# Patient Record
Sex: FEMALE | Race: WHITE | NOT HISPANIC OR LATINO | URBAN - METROPOLITAN AREA
[De-identification: names, ages, dates, MRNs, and addresses within clinical notes are randomized per-mention and may not be internally consistent; named-entity substitution may affect disease eponyms.]

---

## 2017-11-19 ENCOUNTER — INPATIENT (INPATIENT)
Facility: HOSPITAL | Age: 82
LOS: 1 days | Discharge: HOME | DRG: 923 | End: 2017-11-21
Attending: FAMILY MEDICINE | Admitting: HOSPITALIST
Payer: MEDICARE

## 2017-11-19 VITALS
SYSTOLIC BLOOD PRESSURE: 176 MMHG | OXYGEN SATURATION: 96 % | TEMPERATURE: 98 F | DIASTOLIC BLOOD PRESSURE: 80 MMHG | RESPIRATION RATE: 18 BRPM | HEART RATE: 98 BPM | WEIGHT: 162.92 LBS

## 2017-11-19 DIAGNOSIS — R06.09 OTHER FORMS OF DYSPNEA: ICD-10-CM

## 2017-11-19 DIAGNOSIS — Z09 ENCOUNTER FOR FOLLOW-UP EXAMINATION AFTER COMPLETED TREATMENT FOR CONDITIONS OTHER THAN MALIGNANT NEOPLASM: Chronic | ICD-10-CM

## 2017-11-19 DIAGNOSIS — Z98.89 OTHER SPECIFIED POSTPROCEDURAL STATES: Chronic | ICD-10-CM

## 2017-11-19 DIAGNOSIS — F32.9 MAJOR DEPRESSIVE DISORDER, SINGLE EPISODE, UNSPECIFIED: ICD-10-CM

## 2017-11-19 DIAGNOSIS — Z79.82 LONG TERM (CURRENT) USE OF ASPIRIN: ICD-10-CM

## 2017-11-19 DIAGNOSIS — J18.9 PNEUMONIA, UNSPECIFIED ORGANISM: ICD-10-CM

## 2017-11-19 DIAGNOSIS — Z87.891 PERSONAL HISTORY OF NICOTINE DEPENDENCE: ICD-10-CM

## 2017-11-19 DIAGNOSIS — N32.81 OVERACTIVE BLADDER: ICD-10-CM
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
## 2017-11-19 DIAGNOSIS — Z90.49 ACQUIRED ABSENCE OF OTHER SPECIFIED PARTS OF DIGESTIVE TRACT: Chronic | ICD-10-CM

## 2017-11-19 DIAGNOSIS — E03.9 HYPOTHYROIDISM, UNSPECIFIED: ICD-10-CM

## 2017-11-19 DIAGNOSIS — R00.0 TACHYCARDIA, UNSPECIFIED: ICD-10-CM

## 2017-11-19 DIAGNOSIS — I25.10 ATHEROSCLEROTIC HEART DISEASE OF NATIVE CORONARY ARTERY WITHOUT ANGINA PECTORIS: ICD-10-CM

## 2017-11-19 DIAGNOSIS — I10 ESSENTIAL (PRIMARY) HYPERTENSION: ICD-10-CM

## 2017-11-19 DIAGNOSIS — M10.9 GOUT, UNSPECIFIED: ICD-10-CM

## 2017-11-19 DIAGNOSIS — Z29.9 ENCOUNTER FOR PROPHYLACTIC MEASURES, UNSPECIFIED: ICD-10-CM

## 2017-11-19 DIAGNOSIS — Z90.710 ACQUIRED ABSENCE OF BOTH CERVIX AND UTERUS: Chronic | ICD-10-CM

## 2017-11-19 DIAGNOSIS — T78.1XXA OTHER ADVERSE FOOD REACTIONS, NOT ELSEWHERE CLASSIFIED, INITIAL ENCOUNTER: ICD-10-CM

## 2017-11-19 DIAGNOSIS — M19.90 UNSPECIFIED OSTEOARTHRITIS, UNSPECIFIED SITE: ICD-10-CM

## 2017-11-19 DIAGNOSIS — R09.81 NASAL CONGESTION: ICD-10-CM

## 2017-11-19 LAB
ALBUMIN SERPL ELPH-MCNC: 3.5 G/DL — SIGNIFICANT CHANGE UP (ref 3.3–5)
ALP SERPL-CCNC: 101 U/L — SIGNIFICANT CHANGE UP (ref 40–120)
ALT FLD-CCNC: 23 U/L — SIGNIFICANT CHANGE UP (ref 12–78)
ANION GAP SERPL CALC-SCNC: 6 MMOL/L — SIGNIFICANT CHANGE UP (ref 5–17)
APTT BLD: 28 SEC — SIGNIFICANT CHANGE UP (ref 27.5–37.4)
AST SERPL-CCNC: 19 U/L — SIGNIFICANT CHANGE UP (ref 15–37)
BILIRUB SERPL-MCNC: 0.2 MG/DL — SIGNIFICANT CHANGE UP (ref 0.2–1.2)
BUN SERPL-MCNC: 39 MG/DL — HIGH (ref 7–23)
CALCIUM SERPL-MCNC: 9.1 MG/DL — SIGNIFICANT CHANGE UP (ref 8.5–10.1)
CHLORIDE SERPL-SCNC: 113 MMOL/L — HIGH (ref 96–108)
CO2 SERPL-SCNC: 25 MMOL/L — SIGNIFICANT CHANGE UP (ref 22–31)
CREAT SERPL-MCNC: 1.2 MG/DL — SIGNIFICANT CHANGE UP (ref 0.5–1.3)
GLUCOSE SERPL-MCNC: 119 MG/DL — HIGH (ref 70–99)
HCT VFR BLD CALC: 36.9 % — SIGNIFICANT CHANGE UP (ref 34.5–45)
HGB BLD-MCNC: 11.3 G/DL — LOW (ref 11.5–15.5)
INR BLD: 0.97 RATIO — SIGNIFICANT CHANGE UP (ref 0.88–1.16)
LACTATE SERPL-SCNC: 1.1 MMOL/L — SIGNIFICANT CHANGE UP (ref 0.7–2)
MCHC RBC-ENTMCNC: 30.2 PG — SIGNIFICANT CHANGE UP (ref 27–34)
MCHC RBC-ENTMCNC: 30.5 GM/DL — LOW (ref 32–36)
MCV RBC AUTO: 99.1 FL — SIGNIFICANT CHANGE UP (ref 80–100)
NT-PROBNP SERPL-SCNC: 488 PG/ML — HIGH (ref 0–450)
PLATELET # BLD AUTO: 236 K/UL — SIGNIFICANT CHANGE UP (ref 150–400)
POTASSIUM SERPL-MCNC: 4.6 MMOL/L — SIGNIFICANT CHANGE UP (ref 3.5–5.3)
POTASSIUM SERPL-SCNC: 4.6 MMOL/L — SIGNIFICANT CHANGE UP (ref 3.5–5.3)
PROCALCITONIN SERPL-MCNC: <0.05 — SIGNIFICANT CHANGE UP (ref 0–0.04)
PROT SERPL-MCNC: 7 G/DL — SIGNIFICANT CHANGE UP (ref 6–8.3)
PROTHROM AB SERPL-ACNC: 10.6 SEC — SIGNIFICANT CHANGE UP (ref 9.8–12.7)
RAPID RVP RESULT: SIGNIFICANT CHANGE UP
RBC # BLD: 3.72 M/UL — LOW (ref 3.8–5.2)
RBC # FLD: 13.7 % — SIGNIFICANT CHANGE UP (ref 10.3–14.5)
SODIUM SERPL-SCNC: 144 MMOL/L — SIGNIFICANT CHANGE UP (ref 135–145)
WBC # BLD: 8.7 K/UL — SIGNIFICANT CHANGE UP (ref 3.8–10.5)
WBC # FLD AUTO: 8.7 K/UL — SIGNIFICANT CHANGE UP (ref 3.8–10.5)

## 2017-11-19 PROCEDURE — 71010: CPT | Mod: 26

## 2017-11-19 PROCEDURE — 93010 ELECTROCARDIOGRAM REPORT: CPT | Mod: 76

## 2017-11-19 PROCEDURE — 71250 CT THORAX DX C-: CPT | Mod: 26

## 2017-11-19 PROCEDURE — 99285 EMERGENCY DEPT VISIT HI MDM: CPT

## 2017-11-19 PROCEDURE — 99223 1ST HOSP IP/OBS HIGH 75: CPT | Mod: AI

## 2017-11-19 RX ORDER — SENNA PLUS 8.6 MG/1
2 TABLET ORAL AT BEDTIME
Qty: 0 | Refills: 0 | Status: DISCONTINUED | OUTPATIENT
Start: 2017-11-19 | End: 2017-11-21

## 2017-11-19 RX ORDER — IPRATROPIUM/ALBUTEROL SULFATE 18-103MCG
3 AEROSOL WITH ADAPTER (GRAM) INHALATION ONCE
Qty: 0 | Refills: 0 | Status: COMPLETED | OUTPATIENT
Start: 2017-11-19 | End: 2017-11-19

## 2017-11-19 RX ORDER — SODIUM CHLORIDE 9 MG/ML
500 INJECTION INTRAMUSCULAR; INTRAVENOUS; SUBCUTANEOUS ONCE
Qty: 0 | Refills: 0 | Status: COMPLETED | OUTPATIENT
Start: 2017-11-19 | End: 2017-11-19

## 2017-11-19 RX ORDER — HEPARIN SODIUM 5000 [USP'U]/ML
5000 INJECTION INTRAVENOUS; SUBCUTANEOUS EVERY 12 HOURS
Qty: 0 | Refills: 0 | Status: DISCONTINUED | OUTPATIENT
Start: 2017-11-19 | End: 2017-11-21

## 2017-11-19 RX ADMIN — Medication 125 MILLIGRAM(S): at 19:11

## 2017-11-19 RX ADMIN — SODIUM CHLORIDE 500 MILLILITER(S): 9 INJECTION INTRAMUSCULAR; INTRAVENOUS; SUBCUTANEOUS at 22:21

## 2017-11-19 RX ADMIN — Medication 3 MILLILITER(S): at 19:11

## 2017-11-19 NOTE — ED PROVIDER NOTE - ATTENDING CONTRIBUTION TO CARE
I have personally performed a face to face diagnostic evaluation on this patient.  I have reviewed the PA note and agree with the history, exam, and plan of care, except as noted.  History and Exam by me shows 89 female presents to ER c/o shortness of breath worse with exertion for the past week, getting worse in the past few days, feels heart racing, better with rest, denies fever, feels chest congestion, no cough, patient alert and oriented, heart sounds tachycardic, lungs sounds right lower lobe expiratory wheeze, 2(+)pedal edema, f/u labs, chest xray, ekg.

## 2017-11-19 NOTE — ED ADULT NURSE NOTE - OBJECTIVE STATEMENT
pt arrived with reports of SOB on exertion, pt from home. denies fever, chills, cp, nausea, vomiting. pt lungs diminished on the bases with wheezing. pt speaks in full sentences, equal chest rise and fall.

## 2017-11-19 NOTE — H&P ADULT - PROBLEM SELECTOR PLAN 1
pneumonia vs new copd vs suspected PE  continue IV abx  duonebs  VQ scan in the AM  continous pulse ox

## 2017-11-19 NOTE — ED ADULT NURSE NOTE - PSH
S/P cholecystectomy    S/P hysterectomy    S/P lumpectomy of breast    S/P lung surgery, follow-up exam  s/p removal of suspicious lesion, negative

## 2017-11-19 NOTE — H&P ADULT - NSHPREVIEWOFSYSTEMS_GEN_ALL_CORE
REVIEW OF SYSTEMS:    CONSTITUTIONAL: No fever, weight loss, or fatigue  EYES: No eye pain, visual disturbances, or discharge  ENMT:  No difficulty hearing, tinnitus, vertigo; No sinus or throat pain  NECK: No pain or stiffness  RESPIRATORY: +SOB, +productive cough, +wheezing, + chills no hemoptysis  CARDIOVASCULAR: No chest pain, palpitations, dizziness, or leg swelling  GASTROINTESTINAL: No abdominal or epigastric pain. No nausea, vomiting, or hematemesis; No diarrhea or constipation. No melena or hematochezia.  GENITOURINARY: No dysuria, frequency, hematuria, or incontinence  NEUROLOGICAL: No headaches, memory loss, loss of strength, numbness, or tremors  SKIN: No itching, burning, rashes, or lesions   LYMPH NODES: No enlarged glands  ENDOCRINE: No heat or cold intolerance; No hair loss  MUSCULOSKELETAL: No joint pain or swelling; No muscle, back, or extremity pain  PSYCHIATRIC: No depression, anxiety, mood swings, or difficulty sleeping REVIEW OF SYSTEMS:    CONSTITUTIONAL: No fever, weight loss, or fatigue  EYES: No eye pain, visual disturbances, or discharge  ENMT:  No difficulty hearing, tinnitus, vertigo; No sinus or throat pain  NECK: No pain or stiffness  RESPIRATORY: +SOB, +productive cough, +wheezing, + chills no hemoptysis  CARDIOVASCULAR: No chest pain, palpitations, dizziness, or leg swelling  GASTROINTESTINAL: No abdominal or epigastric pain. No nausea, vomiting, or hematemesis; No diarrhea or constipation. No melena or hematochezia.  GENITOURINARY: No dysuria, frequency, hematuria, or incontinence  NEUROLOGICAL: No headaches, memory loss, loss of strength, or tremors.  +chronic numbness on right foot  SKIN: No itching, burning, rashes, or lesions   LYMPH NODES: No enlarged glands  ENDOCRINE: No heat or cold intolerance; No hair loss  MUSCULOSKELETAL: No joint pain or swelling; No muscle, back, or extremity pain  PSYCHIATRIC: No depression, anxiety, mood swings, or difficulty sleeping

## 2017-11-19 NOTE — ED ADULT NURSE NOTE - PMH
Depression    Gout    HLD (hyperlipidemia)    Hypertension    Hypothyroid    Osteoarthritis    Overactive bladder

## 2017-11-19 NOTE — ED PROVIDER NOTE - OBJECTIVE STATEMENT
88 yo female presents with phlegmy cough, chills, sob with exertion x 2 days, feels she is loosing her voice.  had some mild left chest pain, that resolved.  states has hx of pneumonia, no sick contacts, no recent travel.  former smoker , quit 37 years ago. PMD Dr Varela , Cardio Dr Fonseca, hx of htn, hld, hypothyroid

## 2017-11-19 NOTE — H&P ADULT - ASSESSMENT
88 yo f pmh Depression,Gout,HLD Hypertension, Hypothyroid, Osteoarthritis, Overactive bladder being admitted for shortness of breath 2/2 pneumonia vs new COPD vs possible PE

## 2017-11-19 NOTE — H&P ADULT - HISTORY OF PRESENT ILLNESS
89 yo f pmh Depression,Gout,HLD Hypertension, Hypothyroid, Osteoarthritis, Overactive bladder presenting to the ER with shortness of breath with exertion for the past 3 to 4 days.  It started to get worse yesterday and became difficult to breath today.  Pt does report a productive cough, chills, but no fever. Denies CP.  In the ER, CXR showed infiltrates.  Pt was given levaquin. Initially thinking patient was in rapid afib, she was given Cardizem, but it was likely a tachycardia.  Duonebs and steroids were given 2/2 the wheezing heard by the provider.  Pt's d-dimer was elevated, CTA was not ordered 2/2 pt's allergy to iodine.

## 2017-11-19 NOTE — ED PROVIDER NOTE - CARE PLAN
Principal Discharge DX:	Dyspnea on exertion  Secondary Diagnosis:	Cough Principal Discharge DX:	Dyspnea on exertion  Secondary Diagnosis:	Cough  Secondary Diagnosis:	Pneumonia due to infectious organism, unspecified laterality, unspecified part of lung

## 2017-11-19 NOTE — H&P ADULT - FAMILY HISTORY
Family history of breast cancer     Child  Still living? Unknown  Family history of nasopharyngeal cancer, Age at diagnosis: Age Unknown

## 2017-11-19 NOTE — H&P ADULT - NSHPPHYSICALEXAM_GEN_ALL_CORE
GENERAL: NAD, well-groomed, well-developed  HEAD:  Atraumatic, Normocephalic  EYES: EOMI, PERRLA, conjunctiva and sclera clear  ENMT: No tonsillar erythema, exudates, or enlargement; Moist mucous membranes, Good dentition, No lesions  NECK: Supple, No JVD, Normal thyroid  CHEST/LUNG: +Wheezing b/l, rhonchi heard left base  HEART: Regular rate and rhythm; No murmurs, rubs, or gallops  ABDOMEN: Soft, Nontender, Nondistended; Bowel sounds present  EXTREMITIES:  2+ Peripheral Pulses, No clubbing, cyanosis, or edema  LYMPH: No lymphadenopathy noted  NERVOUS SYSTEM:  Alert & Oriented X3, Good concentration; Motor Strength 5/5 B/L upper and lower extremities; DTRs 2+ intact and symmetric  SKIN: No rashes or lesions

## 2017-11-20 DIAGNOSIS — N32.81 OVERACTIVE BLADDER: ICD-10-CM

## 2017-11-20 LAB
ANION GAP SERPL CALC-SCNC: 8 MMOL/L — SIGNIFICANT CHANGE UP (ref 5–17)
BASOPHILS # BLD AUTO: 0 K/UL — SIGNIFICANT CHANGE UP (ref 0–0.2)
BASOPHILS NFR BLD AUTO: 0.4 % — SIGNIFICANT CHANGE UP (ref 0–2)
BUN SERPL-MCNC: 33 MG/DL — HIGH (ref 7–23)
CALCIUM SERPL-MCNC: 9.1 MG/DL — SIGNIFICANT CHANGE UP (ref 8.5–10.1)
CHLORIDE SERPL-SCNC: 114 MMOL/L — HIGH (ref 96–108)
CK MB BLD-MCNC: 2.9 % — SIGNIFICANT CHANGE UP (ref 0–3.5)
CK MB CFR SERPL CALC: 2.6 NG/ML — SIGNIFICANT CHANGE UP (ref 0–3.6)
CK SERPL-CCNC: 89 U/L — SIGNIFICANT CHANGE UP (ref 26–192)
CO2 SERPL-SCNC: 22 MMOL/L — SIGNIFICANT CHANGE UP (ref 22–31)
CREAT SERPL-MCNC: 1.1 MG/DL — SIGNIFICANT CHANGE UP (ref 0.5–1.3)
EOSINOPHIL # BLD AUTO: 0 K/UL — SIGNIFICANT CHANGE UP (ref 0–0.5)
EOSINOPHIL NFR BLD AUTO: 0 % — SIGNIFICANT CHANGE UP (ref 0–6)
GLUCOSE SERPL-MCNC: 142 MG/DL — HIGH (ref 70–99)
HCT VFR BLD CALC: 36.5 % — SIGNIFICANT CHANGE UP (ref 34.5–45)
HGB BLD-MCNC: 11.1 G/DL — LOW (ref 11.5–15.5)
LYMPHOCYTES # BLD AUTO: 0.9 K/UL — LOW (ref 1–3.3)
LYMPHOCYTES # BLD AUTO: 11 % — LOW (ref 13–44)
MCHC RBC-ENTMCNC: 29.6 PG — SIGNIFICANT CHANGE UP (ref 27–34)
MCHC RBC-ENTMCNC: 30.4 GM/DL — LOW (ref 32–36)
MCV RBC AUTO: 97.3 FL — SIGNIFICANT CHANGE UP (ref 80–100)
MONOCYTES # BLD AUTO: 0.1 K/UL — SIGNIFICANT CHANGE UP (ref 0–0.9)
MONOCYTES NFR BLD AUTO: 0.9 % — LOW (ref 1–9)
NEUTROPHILS # BLD AUTO: 7.4 K/UL — SIGNIFICANT CHANGE UP (ref 1.8–7.4)
NEUTROPHILS NFR BLD AUTO: 87.8 % — HIGH (ref 43–77)
PLATELET # BLD AUTO: 235 K/UL — SIGNIFICANT CHANGE UP (ref 150–400)
POTASSIUM SERPL-MCNC: 4.8 MMOL/L — SIGNIFICANT CHANGE UP (ref 3.5–5.3)
POTASSIUM SERPL-SCNC: 4.8 MMOL/L — SIGNIFICANT CHANGE UP (ref 3.5–5.3)
RBC # BLD: 3.75 M/UL — LOW (ref 3.8–5.2)
RBC # FLD: 13.4 % — SIGNIFICANT CHANGE UP (ref 10.3–14.5)
SODIUM SERPL-SCNC: 144 MMOL/L — SIGNIFICANT CHANGE UP (ref 135–145)
TROPONIN I SERPL-MCNC: 0.02 NG/ML — SIGNIFICANT CHANGE UP (ref 0.01–0.04)
WBC # BLD: 8.4 K/UL — SIGNIFICANT CHANGE UP (ref 3.8–10.5)
WBC # FLD AUTO: 8.4 K/UL — SIGNIFICANT CHANGE UP (ref 3.8–10.5)

## 2017-11-20 PROCEDURE — 99233 SBSQ HOSP IP/OBS HIGH 50: CPT

## 2017-11-20 PROCEDURE — 99223 1ST HOSP IP/OBS HIGH 75: CPT

## 2017-11-20 PROCEDURE — 78582 LUNG VENTILAT&PERFUS IMAGING: CPT | Mod: 26

## 2017-11-20 RX ORDER — SPIRONOLACTONE 25 MG/1
25 TABLET, FILM COATED ORAL DAILY
Qty: 0 | Refills: 0 | Status: DISCONTINUED | OUTPATIENT
Start: 2017-11-20 | End: 2017-11-21

## 2017-11-20 RX ORDER — ATENOLOL 25 MG/1
25 TABLET ORAL EVERY 12 HOURS
Qty: 0 | Refills: 0 | Status: DISCONTINUED | OUTPATIENT
Start: 2017-11-20 | End: 2017-11-21

## 2017-11-20 RX ORDER — OXYBUTYNIN CHLORIDE 5 MG
5 TABLET ORAL AT BEDTIME
Qty: 0 | Refills: 0 | Status: DISCONTINUED | OUTPATIENT
Start: 2017-11-20 | End: 2017-11-21

## 2017-11-20 RX ORDER — VENLAFAXINE HCL 75 MG
37.5 CAPSULE, EXT RELEASE 24 HR ORAL DAILY
Qty: 0 | Refills: 0 | Status: DISCONTINUED | OUTPATIENT
Start: 2017-11-20 | End: 2017-11-21

## 2017-11-20 RX ORDER — ATENOLOL 25 MG/1
12.5 TABLET ORAL
Qty: 0 | Refills: 0 | Status: DISCONTINUED | OUTPATIENT
Start: 2017-11-20 | End: 2017-11-20

## 2017-11-20 RX ORDER — DOXAZOSIN MESYLATE 4 MG
2 TABLET ORAL DAILY
Qty: 0 | Refills: 0 | Status: DISCONTINUED | OUTPATIENT
Start: 2017-11-20 | End: 2017-11-21

## 2017-11-20 RX ORDER — ASPIRIN/CALCIUM CARB/MAGNESIUM 324 MG
81 TABLET ORAL DAILY
Qty: 0 | Refills: 0 | Status: DISCONTINUED | OUTPATIENT
Start: 2017-11-20 | End: 2017-11-21

## 2017-11-20 RX ORDER — VENLAFAXINE HCL 75 MG
37.5 CAPSULE, EXT RELEASE 24 HR ORAL DAILY
Qty: 0 | Refills: 0 | Status: DISCONTINUED | OUTPATIENT
Start: 2017-11-20 | End: 2017-11-20

## 2017-11-20 RX ORDER — ALLOPURINOL 300 MG
100 TABLET ORAL DAILY
Qty: 0 | Refills: 0 | Status: COMPLETED | OUTPATIENT
Start: 2017-11-20 | End: 2017-11-20

## 2017-11-20 RX ORDER — LEVOTHYROXINE SODIUM 125 MCG
75 TABLET ORAL DAILY
Qty: 0 | Refills: 0 | Status: DISCONTINUED | OUTPATIENT
Start: 2017-11-20 | End: 2017-11-21

## 2017-11-20 RX ORDER — VALSARTAN 80 MG/1
160 TABLET ORAL DAILY
Qty: 0 | Refills: 0 | Status: DISCONTINUED | OUTPATIENT
Start: 2017-11-20 | End: 2017-11-21

## 2017-11-20 RX ORDER — NIFEDIPINE 30 MG
90 TABLET, EXTENDED RELEASE 24 HR ORAL DAILY
Qty: 0 | Refills: 0 | Status: DISCONTINUED | OUTPATIENT
Start: 2017-11-20 | End: 2017-11-21

## 2017-11-20 RX ORDER — LANOLIN ALCOHOL/MO/W.PET/CERES
3 CREAM (GRAM) TOPICAL ONCE
Qty: 0 | Refills: 0 | Status: COMPLETED | OUTPATIENT
Start: 2017-11-20 | End: 2017-11-20

## 2017-11-20 RX ADMIN — Medication 100 MILLIGRAM(S): at 11:41

## 2017-11-20 RX ADMIN — ATENOLOL 25 MILLIGRAM(S): 25 TABLET ORAL at 17:22

## 2017-11-20 RX ADMIN — VALSARTAN 160 MILLIGRAM(S): 80 TABLET ORAL at 07:21

## 2017-11-20 RX ADMIN — HEPARIN SODIUM 5000 UNIT(S): 5000 INJECTION INTRAVENOUS; SUBCUTANEOUS at 17:22

## 2017-11-20 RX ADMIN — Medication 3 MILLIGRAM(S): at 20:27

## 2017-11-20 RX ADMIN — HEPARIN SODIUM 5000 UNIT(S): 5000 INJECTION INTRAVENOUS; SUBCUTANEOUS at 05:59

## 2017-11-20 RX ADMIN — SPIRONOLACTONE 25 MILLIGRAM(S): 25 TABLET, FILM COATED ORAL at 05:59

## 2017-11-20 RX ADMIN — Medication 5 MILLIGRAM(S): at 20:27

## 2017-11-20 RX ADMIN — Medication 81 MILLIGRAM(S): at 11:41

## 2017-11-20 RX ADMIN — Medication 75 MICROGRAM(S): at 05:59

## 2017-11-20 RX ADMIN — ATENOLOL 12.5 MILLIGRAM(S): 25 TABLET ORAL at 05:59

## 2017-11-20 RX ADMIN — Medication 90 MILLIGRAM(S): at 06:07

## 2017-11-20 RX ADMIN — Medication 37.5 MILLIGRAM(S): at 11:41

## 2017-11-20 RX ADMIN — Medication 2 MILLIGRAM(S): at 05:58

## 2017-11-20 NOTE — PROGRESS NOTE ADULT - ASSESSMENT
89 yo f pmh Depression,Gout,HLD Hypertension, Hypothyroid, Osteoarthritis, Overactive bladder being admitted for dyspnea on exertion, unclear cause, cardiac and pulm workup underway.

## 2017-11-20 NOTE — PROGRESS NOTE ADULT - SUBJECTIVE AND OBJECTIVE BOX
HPI:  89 yo f pmh Depression,Gout,HLD Hypertension, Hypothyroid, Osteoarthritis, Overactive bladder presented to the ER with shortness of breath with exertion for the past 3 to 4 days.  It started to get worse one day prior and became difficult to breath the day of admission.  Pt does report a productive cough, chills, but no fever. Denies CP.  In the ER, CXR showed infiltrates.  Initially there was concern for rapid afib, she was given Cardizem, but it was likely a sinus tachycardia.  Duonebs and steroids were given 2/2 the wheezing heard by the ED provider.  Pt's d-dimer was elevated, CTA was not ordered 2/2 pt's allergy to iodine. (19 Nov 2017 23:30)    INTERVAL EVENTS: No acute events overnight. Patient feels fine while at rest, she becomes short of breath with ambulation. She denies any chest pain or palpitations, but says she has felt her heart "pounding" at times when this has happened. She reports only minimal cough, but no sputum production.     REVIEW OF SYSTEMS:    CONSTITUTIONAL: No weakness, fevers or chills  EYES/ENT: No visual changes, no throat pain   RESPIRATORY: Minimal cough, no wheezing, hemoptysis; No shortness of breath at rest, +SPEAR.  CARDIOVASCULAR: No chest pain or palpitations  GASTROINTESTINAL: No abdominal pain, nausea, vomiting, or hematemesis; No diarrhea or constipation. No melena or hematochezia.  GENITOURINARY: No dysuria, frequency or hematuria  NEUROLOGICAL: No dizziness, numbness, or weakness  SKIN: No itching, burning, rashes, or lesions   All other review of systems is negative unless indicated above.    VITAL SIGNS:  Vital Signs Last 24 Hrs  T(C): 36.7 (20 Nov 2017 12:39), Max: 36.9 (20 Nov 2017 08:37)  T(F): 98 (20 Nov 2017 12:39), Max: 98.4 (20 Nov 2017 08:37)  HR: 65 (20 Nov 2017 12:39) (65 - 110)  BP: 159/68 (20 Nov 2017 12:39) (131/53 - 176/80)  BP(mean): --  RR: 16 (20 Nov 2017 12:39) (15 - 24)  SpO2: 98% (20 Nov 2017 12:39) (96% - 98%)      PHYSICAL EXAM:     GENERAL: no acute distress  HEENT: NC/AT, EOMI, neck supple, MMM  RESPIRATORY: LCTAB/L, no rhonchi, rales, or wheezing  CARDIOVASCULAR: RRR, no murmurs, gallops, rubs  ABDOMINAL: soft, non-tender, non-distended, positive bowel sounds   EXTREMITIES: no clubbing, cyanosis, or edema  NEUROLOGICAL: alert and oriented x 3, non-focal  SKIN: no rashes or lesions   MUSCULOSKELETAL: no gross joint deformity                          11.1   8.4   )-----------( 235      ( 20 Nov 2017 06:10 )             36.5     11-20    144  |  114<H>  |  33<H>  ----------------------------<  142<H>  4.8   |  22  |  1.10    Ca    9.1      20 Nov 2017 06:10    TPro  7.0  /  Alb  3.5  /  TBili  0.2  /  DBili  x   /  AST  19  /  ALT  23  /  AlkPhos  101  11-19      MEDICATIONS  (STANDING):  aspirin enteric coated 81 milliGRAM(s) Oral daily  ATENolol  Tablet 25 milliGRAM(s) Oral every 12 hours  doxazosin 2 milliGRAM(s) Oral daily  heparin  Injectable 5000 Unit(s) SubCutaneous every 12 hours  levothyroxine 75 MICROGram(s) Oral daily  NIFEdipine XL 90 milliGRAM(s) Oral daily  oxybutynin 5 milliGRAM(s) Oral at bedtime  spironolactone 25 milliGRAM(s) Oral daily  valsartan 160 milliGRAM(s) Oral daily  venlafaxine 37.5 milliGRAM(s) Oral daily

## 2017-11-20 NOTE — PROGRESS NOTE ADULT - PROBLEM SELECTOR PLAN 1
Unclear cause.  No sign of pneumonia on CT, patient afebrile, procalcitonin negative, will stop Levaquin and monitor.    V/Q scan very low probablilty of PE. Patient is saturating satisfactorily on room air. She maintains her saturation even when ambulating.   Pulchuckie sandoval pending ([facility]).    Will attempt to rule out cardiac causes of dyspnea.   Will get outpatient cardiology records.  Continue to monitor on tele and will trend cardiac enzymes. First set negative.   Will check echo.  Cardio following.

## 2017-11-20 NOTE — ED ADULT NURSE REASSESSMENT NOTE - NS ED NURSE REASSESS COMMENT FT1
10 mg of Cardizem given to pt for rapid heart rate as ordered by MD. Vss, afebrile. another EKG ordered by MD. will continue to monitor
Received pt and report from FABIÁN Bashir. Pt in no apparent distress at this time. Waiting to give report to floor.
Assumed care for pt awake alert oriented x 4, breathing treatment in progress, O2 Sat 97% on room air, remained on the monitor, sinus tachy, denies chest pain at this time. Md aware of heart rate. Vss, afebrile. will continue to monitor

## 2017-11-21 ENCOUNTER — TRANSCRIPTION ENCOUNTER (OUTPATIENT)
Age: 82
End: 2017-11-21

## 2017-11-21 VITALS
OXYGEN SATURATION: 96 % | SYSTOLIC BLOOD PRESSURE: 132 MMHG | DIASTOLIC BLOOD PRESSURE: 73 MMHG | TEMPERATURE: 98 F | HEART RATE: 55 BPM | RESPIRATION RATE: 18 BRPM

## 2017-11-21 DIAGNOSIS — R00.0 TACHYCARDIA, UNSPECIFIED: ICD-10-CM

## 2017-11-21 LAB
ANION GAP SERPL CALC-SCNC: 5 MMOL/L — SIGNIFICANT CHANGE UP (ref 5–17)
BUN SERPL-MCNC: 43 MG/DL — HIGH (ref 7–23)
CALCIUM SERPL-MCNC: 8.8 MG/DL — SIGNIFICANT CHANGE UP (ref 8.5–10.1)
CHLORIDE SERPL-SCNC: 112 MMOL/L — HIGH (ref 96–108)
CO2 SERPL-SCNC: 27 MMOL/L — SIGNIFICANT CHANGE UP (ref 22–31)
CREAT SERPL-MCNC: 1.3 MG/DL — SIGNIFICANT CHANGE UP (ref 0.5–1.3)
GLUCOSE SERPL-MCNC: 90 MG/DL — SIGNIFICANT CHANGE UP (ref 70–99)
HCT VFR BLD CALC: 36.7 % — SIGNIFICANT CHANGE UP (ref 34.5–45)
HGB BLD-MCNC: 11.4 G/DL — LOW (ref 11.5–15.5)
MCHC RBC-ENTMCNC: 30.6 PG — SIGNIFICANT CHANGE UP (ref 27–34)
MCHC RBC-ENTMCNC: 30.9 GM/DL — LOW (ref 32–36)
MCV RBC AUTO: 99.1 FL — SIGNIFICANT CHANGE UP (ref 80–100)
PLATELET # BLD AUTO: 238 K/UL — SIGNIFICANT CHANGE UP (ref 150–400)
POTASSIUM SERPL-MCNC: 4.6 MMOL/L — SIGNIFICANT CHANGE UP (ref 3.5–5.3)
POTASSIUM SERPL-SCNC: 4.6 MMOL/L — SIGNIFICANT CHANGE UP (ref 3.5–5.3)
RBC # BLD: 3.71 M/UL — LOW (ref 3.8–5.2)
RBC # FLD: 14.1 % — SIGNIFICANT CHANGE UP (ref 10.3–14.5)
SODIUM SERPL-SCNC: 144 MMOL/L — SIGNIFICANT CHANGE UP (ref 135–145)
WBC # BLD: 12.1 K/UL — HIGH (ref 3.8–10.5)
WBC # FLD AUTO: 12.1 K/UL — HIGH (ref 3.8–10.5)

## 2017-11-21 PROCEDURE — 99233 SBSQ HOSP IP/OBS HIGH 50: CPT

## 2017-11-21 PROCEDURE — 99239 HOSP IP/OBS DSCHRG MGMT >30: CPT

## 2017-11-21 PROCEDURE — 93306 TTE W/DOPPLER COMPLETE: CPT | Mod: 26

## 2017-11-21 RX ORDER — ATENOLOL 25 MG/1
1 TABLET ORAL
Qty: 60 | Refills: 0 | OUTPATIENT
Start: 2017-11-21 | End: 2017-12-21

## 2017-11-21 RX ADMIN — Medication 37.5 MILLIGRAM(S): at 11:30

## 2017-11-21 RX ADMIN — ATENOLOL 25 MILLIGRAM(S): 25 TABLET ORAL at 05:55

## 2017-11-21 RX ADMIN — Medication 90 MILLIGRAM(S): at 05:55

## 2017-11-21 RX ADMIN — HEPARIN SODIUM 5000 UNIT(S): 5000 INJECTION INTRAVENOUS; SUBCUTANEOUS at 05:56

## 2017-11-21 RX ADMIN — Medication 75 MICROGRAM(S): at 05:55

## 2017-11-21 RX ADMIN — VALSARTAN 160 MILLIGRAM(S): 80 TABLET ORAL at 05:55

## 2017-11-21 RX ADMIN — Medication 81 MILLIGRAM(S): at 11:30

## 2017-11-21 RX ADMIN — SPIRONOLACTONE 25 MILLIGRAM(S): 25 TABLET, FILM COATED ORAL at 05:55

## 2017-11-21 RX ADMIN — Medication 2 MILLIGRAM(S): at 05:56

## 2017-11-21 NOTE — DISCHARGE NOTE ADULT - CARE PROVIDER_API CALL
Manuel Varela), Internal Medicine  [address] [facility]  [address] [place] [address]  Phone: [phone]  Fax: [phone]    Juvenal Anderson), Critical Care Medicine; Internal Medicine; Pulmonary Disease  [address] [place] [address]  Phone: [phone]  Fax: [phone]

## 2017-11-21 NOTE — DISCHARGE NOTE ADULT - MEDICATION SUMMARY - MEDICATIONS TO CHANGE
I will SWITCH the dose or number of times a day I take the medications listed below when I get home from the hospital:    atenolol  -- 12.5  by mouth 2 times a day

## 2017-11-21 NOTE — CONSULT NOTE ADULT - ASSESSMENT
88 yo f pmh Depression,Gout,HLD Hypertension, Hypothyroid, Osteoarthritis, Overactive bladder being admitted for dyspnea on exertion, most likely due to tachyarrythmmia and possible allergic rxn to shrimp.  Now asymptomatic with good O2 sat.
89 F w hx of HTN, hypothyroid, reportedly nonobs CAD in past, HLD presents for dyspnea.   - She is complaining of worsening dyspnea on exertion. She states that this has happened to her in the past and her cardiac workup had been unremarkable.   - Ddimer is mildly positive and patient was reportedly tachycardic on arrival.  Would pursue a V/Q scan  - No signs of vol ol  - EKG without ischemic changes. Trend CE.   - Consider Pulm eval  - Check echo  - Montir on tele.   - try to get outpt records.   - BP appears elevated. I would cont the Nifedipine, Doxazosin, Diovan at their current dose. Increase atenolol 25mg q12.   - Further cardiac workup, including ischemic workup,  will depend on clinical course.   - All other workup per primary team. Will followup.

## 2017-11-21 NOTE — DISCHARGE NOTE ADULT - PATIENT PORTAL LINK FT
“You can access the [facility] Patient Portal, offered by [facility], by registering with the following website: [email or web address]”

## 2017-11-21 NOTE — CONSULT NOTE ADULT - SUBJECTIVE AND OBJECTIVE BOX
PULMONARY/CRITICAL CARE        Patient is a 89y old  Female who presents with a chief complaint of shortness of breath on exertion (19 Nov 2017 23:30)    BRIEF HOSPITAL COURSE: ***89 yo f pmh Depression,Gout,HLD Hypertension, Hypothyroid, Osteoarthritis, Overactive bladder presented to the ER with shortness of breath with exertion for the past 3 to 4 days.  It started to get worse one day prior and became difficult to breath the day of admission.  Pt does report a productive cough, chills, but no fever. Denies CP.  In the ER, CXR showed infiltrates.  Initially there was concern for rapid afib, she was given Cardizem, but it was likely a sinus tachycardia.  Duonebs and steroids were given 2/2 the wheezing heard by the ED provider.  Pt's d-dimer was elevated, CTA was not ordered 2/2 pt's allergy to iodine. (19 Nov 2017 23:30)  Pt was exposed to shrimp when palpitations occured, which she is allergic to.  Now asymptomatic  Events last 24 hours: ***    PAST MEDICAL & SURGICAL HISTORY:  HLD (hyperlipidemia)  Hypothyroid  Osteoarthritis  Gout  Overactive bladder  Depression  Hypertension  S/P lung surgery, follow-up exam: s/p removal of suspicious lesion, negative  S/P hysterectomy  S/P lumpectomy of breast  S/P cholecystectomy    Allergies    iodine (Anaphylaxis)  penicillin (Other)  sulfa drugs (Hives)  throat closes (Other)  throat closes (Other)    Intolerances      FAMILY HISTORY:  Family history of breast cancer  Family history of nasopharyngeal cancer (Child): daughter      Review of Systems:  CONSTITUTIONAL: No fever, chills, or fatigue  EYES: No eye pain, visual disturbances, or discharge  ENMT:  No difficulty hearing, tinnitus, vertigo; No sinus or throat pain  NECK: No pain or stiffness  RESPIRATORY: No cough, wheezing, chills or hemoptysis; No shortness of breath  CARDIOVASCULAR: No chest pain, palpitations, dizziness, or leg swelling  GASTROINTESTINAL: No abdominal or epigastric pain. No nausea, vomiting, or hematemesis; No diarrhea or constipation. No melena or hematochezia.  GENITOURINARY: No dysuria, frequency, hematuria, or incontinence  NEUROLOGICAL: No headaches, memory loss, loss of strength, numbness, or tremors  SKIN: No itching, burning, rashes, or lesions   MUSCULOSKELETAL: No joint pain or swelling; No muscle, back, or extremity pain  PSYCHIATRIC: No depression, anxiety, mood swings, or difficulty sleeping      Medications:    ATENolol  Tablet 25 milliGRAM(s) Oral every 12 hours  doxazosin 2 milliGRAM(s) Oral daily  NIFEdipine XL 90 milliGRAM(s) Oral daily  spironolactone 25 milliGRAM(s) Oral daily  valsartan 160 milliGRAM(s) Oral daily      venlafaxine 37.5 milliGRAM(s) Oral daily      aspirin enteric coated 81 milliGRAM(s) Oral daily  heparin  Injectable 5000 Unit(s) SubCutaneous every 12 hours    bisacodyl 5 milliGRAM(s) Oral daily PRN  senna 2 Tablet(s) Oral at bedtime PRN    oxybutynin 5 milliGRAM(s) Oral at bedtime    levothyroxine 75 MICROGram(s) Oral daily                  ICU Vital Signs Last 24 Hrs  T(C): 36.9 (21 Nov 2017 07:11), Max: 37 (21 Nov 2017 05:00)  T(F): 98.5 (21 Nov 2017 07:11), Max: 98.6 (21 Nov 2017 05:00)  HR: 50 (21 Nov 2017 07:11) (50 - 75)  BP: 138/73 (21 Nov 2017 07:11) (133/68 - 171/75)  BP(mean): --  ABP: --  ABP(mean): --  RR: 18 (21 Nov 2017 07:11) (16 - 18)  SpO2: 94% (21 Nov 2017 07:11) (90% - 98%)    Vital Signs Last 24 Hrs  T(C): 36.9 (21 Nov 2017 07:11), Max: 37 (21 Nov 2017 05:00)  T(F): 98.5 (21 Nov 2017 07:11), Max: 98.6 (21 Nov 2017 05:00)  HR: 50 (21 Nov 2017 07:11) (50 - 75)  BP: 138/73 (21 Nov 2017 07:11) (133/68 - 171/75)  BP(mean): --  RR: 18 (21 Nov 2017 07:11) (16 - 18)  SpO2: 94% (21 Nov 2017 07:11) (90% - 98%)        I&O's Detail    20 Nov 2017 07:01  -  21 Nov 2017 07:00  --------------------------------------------------------  IN:    Oral Fluid: 840 mL  Total IN: 840 mL    OUT:    Voided: 100 mL  Total OUT: 100 mL    Total NET: 740 mL            LABS:                        11.4   12.1  )-----------( 238      ( 21 Nov 2017 06:17 )             36.7     11-21    144  |  112<H>  |  43<H>  ----------------------------<  90  4.6   |  27  |  1.30    Ca    8.8      21 Nov 2017 06:17    TPro  7.0  /  Alb  3.5  /  TBili  0.2  /  DBili  x   /  AST  19  /  ALT  23  /  AlkPhos  101  11-19      CARDIAC MARKERS ( 20 Nov 2017 14:28 )  .021 ng/mL / x     / 89 U/L / x     / 2.6 ng/mL  CARDIAC MARKERS ( 19 Nov 2017 18:13 )  <.015 ng/mL / x     / 53 U/L / x     / 1.1 ng/mL      CAPILLARY BLOOD GLUCOSE        PT/INR - ( 19 Nov 2017 18:13 )   PT: 10.6 sec;   INR: 0.97 ratio         PTT - ( 19 Nov 2017 18:13 )  PTT:28.0 sec    CULTURES:  Culture Results:   No growth to date. (11-19 @ 23:50)  Culture Results:   No growth to date. (11-19 @ 23:39)      Physical Examination:    General: No acute distress.      HEENT: Pupils equal, reactive to light.  Symmetric.    PULM: Clear to auscultation bilaterally, no significant sputum production    CVS: Regular rate and rhythm, no murmurs, rubs, or gallops    ABD: Soft, nondistended, nontender, normoactive bowel sounds, no masses    EXT: trace pedal edema, nontender    SKIN: Warm and well perfused, no rashes noted.    NEURO: Alert, oriented, interactive, nonfocal    RADIOLOGY: ***    CRITICAL CARE TIME SPENT: ***
CHIEF COMPLAINT: Patient is a 89y old  Female who presents with a chief complaint of shortness of breath on exertion (19 Nov 2017 23:30)      HPI:  88 yo f pmh Depression, Gout, HLD Hypertension, Hypothyroid, Osteoarthritis, Overactive bladder, positive stress test and negative angiogram in the past (last on ~4 years ago) presenting to the ER with shortness of breath with exertion for the past 3 to 4 days. She notes that her dyspnea has been worsening over the last week.  She also noted that her heart would race when walking. Pt does report a productive cough, chills, but no fever. Denies CP.    She has chronic lower extremity. Denies any chest pain,  PND, orthopnea, near syncope, syncope,   stroke like symptoms.   Her last ischemic evaluation was apparently about 2 years ago which was unremarkable. Currentl;y she is feeling better.   In the ED she had an CXR with infiltrates.    EKG: SR with 1st degree AVB with SA.     REVIEW OF SYSTEMS:   All other review of systems are negative unless indicated above    PAST MEDICAL & SURGICAL HISTORY:  HLD (hyperlipidemia)  Hypothyroid  Osteoarthritis  Gout  Overactive bladder  Depression  Hypertension  S/P lung surgery, follow-up exam: s/p removal of suspicious lesion, negative  S/P hysterectomy  S/P lumpectomy of breast  S/P cholecystectomy      SOCIAL HISTORY:  No tobacco, ethanol, or drug abuse.    FAMILY HISTORY:  Family history of breast cancer  Family history of nasopharyngeal cancer (Child): daughter    No family history of acute MI or sudden cardiac death.    MEDICATIONS  (STANDING):  aspirin enteric coated 81 milliGRAM(s) Oral daily  ATENolol  Tablet 12.5 milliGRAM(s) Oral two times a day  doxazosin 2 milliGRAM(s) Oral daily  heparin  Injectable 5000 Unit(s) SubCutaneous every 12 hours  levoFLOXacin IVPB 250 milliGRAM(s) IV Intermittent every 24 hours  levothyroxine 75 MICROGram(s) Oral daily  NIFEdipine XL 90 milliGRAM(s) Oral daily  oxybutynin 5 milliGRAM(s) Oral at bedtime  spironolactone 25 milliGRAM(s) Oral daily  valsartan 160 milliGRAM(s) Oral daily  venlafaxine 37.5 milliGRAM(s) Oral daily    MEDICATIONS  (PRN):  bisacodyl 5 milliGRAM(s) Oral daily PRN Constipation  senna 2 Tablet(s) Oral at bedtime PRN Constipation      Allergies    iodine (Anaphylaxis)  penicillin (Other)  sulfa drugs (Hives)  throat closes (Other)  throat closes (Other)    Intolerances        Home meds:  Home Medications:  allopurinol 100 mg oral tablet: 1 tab(s) orally once a day (at bedtime) (20 Feb 2015 15:31)  aspirin 81 mg oral tablet: 1 tab(s) orally once a day (20 Nov 2017 00:14)  atenolol: 12.5  orally 2 times a day (20 Feb 2015 15:31)  Calcium 600+D:  orally once a day (20 Feb 2015 15:31)  dicyclomine 20 mg oral tablet: 1 tab(s) orally 4 times a day (20 Nov 2017 00:19)  Diovan 160 mg oral tablet: 1 tab(s) orally 2 times a day (11 May 2014 13:45)  doxazosin 2 mg oral tablet: 1 tab(s) orally once a day (20 Nov 2017 00:21)  Effexor 37.5 mg oral capsule, extended release: 1 cap(s) orally once a day (11 May 2014 13:45)  Multi-Day Plus Minerals Multiple Vitamins with Minerals oral tablet: 1 tab(s) orally once a day (11 May 2014 13:45)  NIFEdipine 90 mg oral tablet, extended release: 1 tab(s) orally once a day (11 May 2014 13:45)  oxybutynin 5 mg oral tablet:  orally once a day (at bedtime) (11 May 2014 13:45)  spironolactone 25 mg oral tablet: 1 tab(s) orally once a day (20 Nov 2017 00:21)  Synthroid 75 mcg (0.075 mg) oral tablet: 1 tab(s) orally once a day (11 May 2014 13:45)  Vitamin D3:  orally once a day (11 May 2014 13:45)  Zetia 10 mg oral tablet: 1 tab(s) orally once a day (20 Feb 2015 15:44)        VITAL SIGNS:   Vital Signs Last 24 Hrs  T(C): 36.9 (20 Nov 2017 08:37), Max: 36.9 (20 Nov 2017 08:37)  T(F): 98.4 (20 Nov 2017 08:37), Max: 98.4 (20 Nov 2017 08:37)  HR: 83 (20 Nov 2017 08:37) (82 - 110)  BP: 169/70 (20 Nov 2017 08:37) (131/53 - 176/80)  BP(mean): --  RR: 17 (20 Nov 2017 08:37) (15 - 24)  SpO2: 98% (20 Nov 2017 08:37) (96% - 98%)    I&O's Summary      On Exam:  TELE: SR  Constitutional: NAD, awake and alert, well-developed  HEENT: Moist Mucous Membranes, Anicteric  Pulmonary: Decreased breath sounds b/l.   Cardiovascular: Regular, S1 and S2, 1/6 SM  Gastrointestinal: Bowel Sounds present, soft, nontender.   Lymph: trace peripheral edema. No lymphadenopathy.  Skin: No visible rashes or ulcers.  Psych:  Mood & affect appropriate    LABS: All Labs Reviewed:                        11.1   8.4   )-----------( 235      ( 20 Nov 2017 06:10 )             36.5                         11.3   8.7   )-----------( 236      ( 19 Nov 2017 18:13 )             36.9     20 Nov 2017 06:10    144    |  114    |  33     ----------------------------<  142    4.8     |  22     |  1.10   19 Nov 2017 18:13    144    |  113    |  39     ----------------------------<  119    4.6     |  25     |  1.20     Ca    9.1        20 Nov 2017 06:10  Ca    9.1        19 Nov 2017 18:13    TPro  7.0    /  Alb  3.5    /  TBili  0.2    /  DBili  x      /  AST  19     /  ALT  23     /  AlkPhos  101    19 Nov 2017 18:13    PT/INR - ( 19 Nov 2017 18:13 )   PT: 10.6 sec;   INR: 0.97 ratio         PTT - ( 19 Nov 2017 18:13 )  PTT:28.0 sec  CARDIAC MARKERS ( 19 Nov 2017 18:13 )  <.015 ng/mL / x     / 53 U/L / x     / 1.1 ng/mL      Blood Culture:   11-19 @ 18:13  Pro Bnp 488        RADIOLOGY:    < from: CT Chest No Cont (11.19.17 @ 22:49) >    EXAM:  CT CHEST                            PROCEDURE DATE:  11/19/2017          INTERPRETATION:  HISTORY: Difficulty breathing.    TECHNIQUE: A non-contrast CT scan of the chest was performed from the   thoracic inlet to the adrenal glands. [place] sagittal reformatted   images are provided.    COMPARISON: CT chest from 2/23/2015    FINDINGS:   Interval resolution of previously seen bilateral airspace opacities and   left lower lobe airspace consolidation. The lungs are clear without   suspicious pulmonary nodule or mass. There is no pleural effusion or   pneumothorax. The trachea and main bronchi are clear.    The heart is not enlarged. There is no pericardial effusion. The thoracic   aorta is normal in caliber without intramural hematoma. The main   pulmonary artery is again dilated to 3.3 cm.    Stable subcentimeter hypodense lesion in the right lobe of the thyroid   gland.    There is no significant axillary, mediastinal, or hilar lymphadenopathy.    The visualized bones do not demonstrate acute abnormality.    Images of the upper abdomen again demonstrate bilateral renal cysts, with   a hyperdense cyst again seen on the left. Nodularity of the left adrenal   gland secondary to adenomas is unchanged.    IMPRESSION:   Resolved airspace opacities. No evidence of pneumonia.                VENU LAU M.D., RADIOLOGIST  This document has been electronically signed. Nov 20 2017 12:25AM                < end of copied text >

## 2017-11-21 NOTE — DISCHARGE NOTE ADULT - PLAN OF CARE
maximize functional ability Likely related to possible allergic reaction versus nasal congestion.  Follow up with primary care physician.  Follow up with pulmonology Dr. Anderson for pulmonary function tests.  Follow up with your cardiologist for a repeat stress test. Take Atenolol 25mg two times daily.  Resume other home meds as previously prescribed. Continue Synthroid as prescribed. Continue home medication. Continue Allopurinol. Continue home meds. Atenolol has been increased to 25mg twice daily.

## 2017-11-21 NOTE — PROGRESS NOTE ADULT - ASSESSMENT
89 F w hx of HTN, hypothyroid, reportedly nonobs CAD in past, HLD presents for dyspnea.     - She feels better this AM.    - VQ low probability of PE  - No signs of vol ol  - EKG without ischemic changes  - CE trend not consistent with ACS  - Pulm eval pending  - Preliminary echo with normal LV function, no significant valvular heart disease, and normal pulmonary pressures  - Can stop telemetry  - Continue current antihypertensive regimen  - Suggested she get outpatient pharm stress with her cardiologist.    - D/c planning per primary team

## 2017-11-21 NOTE — DISCHARGE NOTE ADULT - CARE PLAN
Principal Discharge DX:	Dyspnea on exertion  Goal:	maximize functional ability  Instructions for follow-up, activity and diet:	Likely related to possible allergic reaction versus nasal congestion.  Follow up with primary care physician.  Follow up with pulmonology Dr. Anderson for pulmonary function tests.  Follow up with your cardiologist for a repeat stress test.  Secondary Diagnosis:	Hypertension  Instructions for follow-up, activity and diet:	Take Atenolol 25mg two times daily.  Resume other home meds as previously prescribed.  Secondary Diagnosis:	Hypothyroid  Instructions for follow-up, activity and diet:	Continue Synthroid as prescribed.  Secondary Diagnosis:	Overactive bladder  Instructions for follow-up, activity and diet:	Continue home medication.  Secondary Diagnosis:	Gout  Instructions for follow-up, activity and diet:	Continue Allopurinol.  Secondary Diagnosis:	Depression  Instructions for follow-up, activity and diet:	Continue home meds.  Secondary Diagnosis:	Tachyarrhythmia  Instructions for follow-up, activity and diet:	Atenolol has been increased to 25mg twice daily.

## 2017-11-21 NOTE — DISCHARGE NOTE ADULT - MEDICATION SUMMARY - MEDICATIONS TO TAKE
I will START or STAY ON the medications listed below when I get home from the hospital:    spironolactone 25 mg oral tablet  -- 1 tab(s) by mouth once a day  -- Indication: For Hypertension    aspirin 81 mg oral tablet  -- 1 tab(s) by mouth once a day  -- Indication: For Prevention    Diovan 160 mg oral tablet  -- 1 tab(s) by mouth 2 times a day  -- Indication: For Htn    doxazosin 2 mg oral tablet  -- 1 tab(s) by mouth once a day  -- Indication: For Htn    Effexor 37.5 mg oral capsule, extended release  -- 1 cap(s) by mouth once a day  -- Indication: For anxiety    allopurinol 100 mg oral tablet  -- 1 tab(s) by mouth once a day (at bedtime)  -- Indication: For Gout    Zetia 10 mg oral tablet  -- 1 tab(s) by mouth once a day  -- Indication: For Hld    atenolol 25 mg oral tablet  -- 1 tab(s) by mouth every 12 hours   -- Indication: For Htn    NIFEdipine 90 mg oral tablet, extended release  -- 1 tab(s) by mouth once a day  -- Indication: For Htn    dicyclomine 20 mg oral tablet  -- 1 tab(s) by mouth 4 times a day  -- Indication: For GI    Synthroid 75 mcg (0.075 mg) oral tablet  -- 1 tab(s) by mouth once a day  -- Indication: For Hypothyroid    oxybutynin 5 mg oral tablet  --  by mouth once a day (at bedtime)  -- Indication: For Overactive bladder    Calcium 600+D  --  by mouth once a day  -- Indication: For supplement    Multi-Day Plus Minerals Multiple Vitamins with Minerals oral tablet  -- 1 tab(s) by mouth once a day  -- Indication: For supplement    Vitamin D3  --  by mouth once a day  -- Indication: For supplement

## 2017-11-21 NOTE — PROGRESS NOTE ADULT - SUBJECTIVE AND OBJECTIVE BOX
[facility] - Talisha Duff, Jenniffer, Josefa, Zach, Savannah Pacheco  Office Number:  [phone]    Patient resting comfortably in bed in NAD.  Laying flat with no respiratory distress.  No complaints of chest pain, dyspnea, palpitations, PND, or orthopnea.    Telemetry:  Sinus rhythm and sinus bradycardia.    MEDICATIONS  (STANDING):  aspirin enteric coated 81 milliGRAM(s) Oral daily  ATENolol  Tablet 25 milliGRAM(s) Oral every 12 hours  doxazosin 2 milliGRAM(s) Oral daily  heparin  Injectable 5000 Unit(s) SubCutaneous every 12 hours  levothyroxine 75 MICROGram(s) Oral daily  NIFEdipine XL 90 milliGRAM(s) Oral daily  oxybutynin 5 milliGRAM(s) Oral at bedtime  spironolactone 25 milliGRAM(s) Oral daily  valsartan 160 milliGRAM(s) Oral daily  venlafaxine 37.5 milliGRAM(s) Oral daily    MEDICATIONS  (PRN):  bisacodyl 5 milliGRAM(s) Oral daily PRN Constipation  senna 2 Tablet(s) Oral at bedtime PRN Constipation      Allergies    iodine (Anaphylaxis)  penicillin (Other)  sulfa drugs (Hives)  throat closes (Other)  throat closes (Other)        Vital Signs Last 24 Hrs  T(C): 36.9 (21 Nov 2017 07:11), Max: 37 (21 Nov 2017 05:00)  T(F): 98.5 (21 Nov 2017 07:11), Max: 98.6 (21 Nov 2017 05:00)  HR: 50 (21 Nov 2017 07:11) (50 - 75)  BP: 138/73 (21 Nov 2017 07:11) (133/68 - 171/75)  BP(mean): --  RR: 18 (21 Nov 2017 07:11) (16 - 18)  SpO2: 94% (21 Nov 2017 07:11) (90% - 98%)    I&O's Summary    20 Nov 2017 07:01  -  21 Nov 2017 07:00  --------------------------------------------------------  IN: 840 mL / OUT: 100 mL / NET: 740 mL        ON EXAM:    General: NAD, awake and alert, oriented x 3  HEENT: Mucous membranes are moist, anicteric  Lungs: Non-labored, breath sounds are clear bilaterally, No wheezing, rales or rhonchi  Cardiovascular: Regular, S1 and S2, 1/6 systolic murmur  Gastrointestinal: Bowel Sounds present, soft, nontender.   Lymph: No peripheral edema. No lymphadenopathy.  Skin: No rashes or ulcers  Psych:  Mood & affect appropriate    LABS: All Labs Reviewed:                        11.4   12.1  )-----------( 238      ( 21 Nov 2017 06:17 )             36.7                         11.1   8.4   )-----------( 235      ( 20 Nov 2017 06:10 )             36.5                         11.3   8.7   )-----------( 236      ( 19 Nov 2017 18:13 )             36.9     21 Nov 2017 06:17    144    |  112    |  43     ----------------------------<  90     4.6     |  27     |  1.30   20 Nov 2017 06:10    144    |  114    |  33     ----------------------------<  142    4.8     |  22     |  1.10   19 Nov 2017 18:13    144    |  113    |  39     ----------------------------<  119    4.6     |  25     |  1.20     Ca    8.8        21 Nov 2017 06:17  Ca    9.1        20 Nov 2017 06:10  Ca    9.1        19 Nov 2017 18:13    TPro  7.0    /  Alb  3.5    /  TBili  0.2    /  DBili  x      /  AST  19     /  ALT  23     /  AlkPhos  101    19 Nov 2017 18:13    PT/INR - ( 19 Nov 2017 18:13 )   PT: 10.6 sec;   INR: 0.97 ratio         PTT - ( 19 Nov 2017 18:13 )  PTT:28.0 sec  CARDIAC MARKERS ( 20 Nov 2017 14:28 )  .021 ng/mL / x     / 89 U/L / x     / 2.6 ng/mL  CARDIAC MARKERS ( 19 Nov 2017 18:13 )  <.015 ng/mL / x     / 53 U/L / x     / 1.1 ng/mL      Blood Culture: Organism --  Gram Stain Blood -- Gram Stain --  Specimen Source .Blood Blood  Culture-Blood --    Organism --  Gram Stain Blood -- Gram Stain --  Specimen Source .Blood Blood  Culture-Blood --      11-19 @ 18:13  Pro Bnp 488

## 2017-11-21 NOTE — DISCHARGE NOTE ADULT - HOSPITAL COURSE
HPI:  89 yo f pmh Depression,Gout,HLD Hypertension, Hypothyroid, Osteoarthritis, Overactive bladder presenting to the ER with shortness of breath with exertion for the past 3 to 4 days.  It started to get worse one day prior to admission and became difficult to breathe today.  Pt does report a productive cough, chills, but no fever. She was initially treated with Levaquin for suspected pneumonia, but after further testing, antibiotics were discontinued.  PE ruled out with V/Q scan. Troponins/EKG were not consistent with ACS, and echo showed no finding to explain symptoms. Patient was evaluated by Cardio Dr. Serrano and Pulm Dr. Milner. She was monitored on telemetry and had no events. Her shortness of breath improved. Of note patient reports that she could have been exposed to shrimp in a dinner she shared and that she did have a similar reaction in the past.     Patient was seen and examined on day of discharge. Denies any chest pain, shortness of breath, palpitations or dizziness. Ambulating in room and dressing herself without dyspnea. Speaking in full sentences while ambulating.     VITAL SIGNS:  Vital Signs Last 24 Hrs  T(C): 36.8 (21 Nov 2017 15:43), Max: 37 (21 Nov 2017 05:00)  T(F): 98.2 (21 Nov 2017 15:43), Max: 98.6 (21 Nov 2017 05:00)  HR: 55 (21 Nov 2017 15:43) (50 - 87)  BP: 132/73 (21 Nov 2017 15:43) (123/53 - 171/75)  BP(mean): --  RR: 18 (21 Nov 2017 15:43) (16 - 18)  SpO2: 96% (21 Nov 2017 15:43) (90% - 96%)      PHYSICAL EXAM:     GENERAL: no acute distress  HEENT: NC/AT, EOMI, neck supple, MMM  RESPIRATORY: LCTAB/L, no rhonchi, rales, or wheezing  CARDIOVASCULAR: RRR, no murmurs, gallops, rubs  ABDOMINAL: soft, non-tender, non-distended, positive bowel sounds   EXTREMITIES: no clubbing, cyanosis, or edema  NEUROLOGICAL: alert and oriented x 3, non-focal  SKIN: no rashes or lesions   MUSCULOSKELETAL: no gross joint deformity     Patient stable for discharge home. She has been cleared by cardiology and pulmonology. She will call this week to schedule an appointment with Dr. Carrasco. He was notified of patient's discharge home and we will fax discharge note to him.     Time spent: 40 minutes.

## 2017-11-22 LAB — LEGIONELLA AG UR QL: NEGATIVE — SIGNIFICANT CHANGE UP

## 2017-11-25 LAB
CULTURE RESULTS: SIGNIFICANT CHANGE UP
CULTURE RESULTS: SIGNIFICANT CHANGE UP
SPECIMEN SOURCE: SIGNIFICANT CHANGE UP
SPECIMEN SOURCE: SIGNIFICANT CHANGE UP

## 2017-12-19 PROCEDURE — 93005 ELECTROCARDIOGRAM TRACING: CPT

## 2017-12-19 PROCEDURE — A9567: CPT

## 2017-12-19 PROCEDURE — 71045 X-RAY EXAM CHEST 1 VIEW: CPT

## 2017-12-19 PROCEDURE — 85027 COMPLETE CBC AUTOMATED: CPT

## 2017-12-19 PROCEDURE — 84145 PROCALCITONIN (PCT): CPT

## 2017-12-19 PROCEDURE — 87633 RESP VIRUS 12-25 TARGETS: CPT

## 2017-12-19 PROCEDURE — 99285 EMERGENCY DEPT VISIT HI MDM: CPT | Mod: 25

## 2017-12-19 PROCEDURE — 82553 CREATINE MB FRACTION: CPT

## 2017-12-19 PROCEDURE — 96365 THER/PROPH/DIAG IV INF INIT: CPT

## 2017-12-19 PROCEDURE — 84484 ASSAY OF TROPONIN QUANT: CPT

## 2017-12-19 PROCEDURE — 94640 AIRWAY INHALATION TREATMENT: CPT

## 2017-12-19 PROCEDURE — A9540: CPT

## 2017-12-19 PROCEDURE — 87798 DETECT AGENT NOS DNA AMP: CPT

## 2017-12-19 PROCEDURE — 93306 TTE W/DOPPLER COMPLETE: CPT

## 2017-12-19 PROCEDURE — 96375 TX/PRO/DX INJ NEW DRUG ADDON: CPT

## 2017-12-19 PROCEDURE — 85730 THROMBOPLASTIN TIME PARTIAL: CPT

## 2017-12-19 PROCEDURE — 85610 PROTHROMBIN TIME: CPT

## 2017-12-19 PROCEDURE — 78582 LUNG VENTILAT&PERFUS IMAGING: CPT

## 2017-12-19 PROCEDURE — 87449 NOS EACH ORGANISM AG IA: CPT

## 2017-12-19 PROCEDURE — 82550 ASSAY OF CK (CPK): CPT

## 2017-12-19 PROCEDURE — 83605 ASSAY OF LACTIC ACID: CPT

## 2017-12-19 PROCEDURE — 85379 FIBRIN DEGRADATION QUANT: CPT

## 2017-12-19 PROCEDURE — 71250 CT THORAX DX C-: CPT

## 2017-12-19 PROCEDURE — 80053 COMPREHEN METABOLIC PANEL: CPT

## 2017-12-19 PROCEDURE — 83880 ASSAY OF NATRIURETIC PEPTIDE: CPT

## 2017-12-19 PROCEDURE — 96372 THER/PROPH/DIAG INJ SC/IM: CPT | Mod: 59

## 2017-12-19 PROCEDURE — 87040 BLOOD CULTURE FOR BACTERIA: CPT

## 2017-12-19 PROCEDURE — 87581 M.PNEUMON DNA AMP PROBE: CPT

## 2017-12-19 PROCEDURE — 87486 CHLMYD PNEUM DNA AMP PROBE: CPT

## 2017-12-19 PROCEDURE — 80048 BASIC METABOLIC PNL TOTAL CA: CPT

## 2018-05-25 ENCOUNTER — INPATIENT (INPATIENT)
Facility: HOSPITAL | Age: 83
LOS: 8 days | Discharge: HOME | DRG: 684 | End: 2018-06-03
Attending: HOSPITALIST | Admitting: HOSPITALIST
Payer: MEDICARE

## 2018-05-25 VITALS
DIASTOLIC BLOOD PRESSURE: 62 MMHG | HEART RATE: 78 BPM | TEMPERATURE: 98 F | RESPIRATION RATE: 18 BRPM | OXYGEN SATURATION: 96 % | HEIGHT: 60 IN | SYSTOLIC BLOOD PRESSURE: 124 MMHG | WEIGHT: 154.1 LBS

## 2018-05-25 DIAGNOSIS — N17.9 ACUTE KIDNEY FAILURE, UNSPECIFIED: ICD-10-CM

## 2018-05-25 DIAGNOSIS — E78.5 HYPERLIPIDEMIA, UNSPECIFIED: ICD-10-CM

## 2018-05-25 DIAGNOSIS — R06.09 OTHER FORMS OF DYSPNEA: ICD-10-CM

## 2018-05-25 DIAGNOSIS — Z09 ENCOUNTER FOR FOLLOW-UP EXAMINATION AFTER COMPLETED TREATMENT FOR CONDITIONS OTHER THAN MALIGNANT NEOPLASM: Chronic | ICD-10-CM

## 2018-05-25 DIAGNOSIS — I10 ESSENTIAL (PRIMARY) HYPERTENSION: ICD-10-CM

## 2018-05-25 DIAGNOSIS — Z90.710 ACQUIRED ABSENCE OF BOTH CERVIX AND UTERUS: Chronic | ICD-10-CM

## 2018-05-25 DIAGNOSIS — R94.31 ABNORMAL ELECTROCARDIOGRAM [ECG] [EKG]: ICD-10-CM

## 2018-05-25 DIAGNOSIS — Z90.49 ACQUIRED ABSENCE OF OTHER SPECIFIED PARTS OF DIGESTIVE TRACT: Chronic | ICD-10-CM

## 2018-05-25 DIAGNOSIS — Z98.89 OTHER SPECIFIED POSTPROCEDURAL STATES: Chronic | ICD-10-CM

## 2018-05-25 LAB
ALBUMIN SERPL ELPH-MCNC: 3.7 G/DL — SIGNIFICANT CHANGE UP (ref 3.3–5)
ALP SERPL-CCNC: 75 U/L — SIGNIFICANT CHANGE UP (ref 30–120)
ALT FLD-CCNC: 29 U/L DA — SIGNIFICANT CHANGE UP (ref 10–60)
ANION GAP SERPL CALC-SCNC: 11 MMOL/L — SIGNIFICANT CHANGE UP (ref 5–17)
APPEARANCE UR: CLEAR — SIGNIFICANT CHANGE UP
AST SERPL-CCNC: 24 U/L — SIGNIFICANT CHANGE UP (ref 10–40)
BASOPHILS # BLD AUTO: 0.1 K/UL — SIGNIFICANT CHANGE UP (ref 0–0.2)
BASOPHILS NFR BLD AUTO: 1.2 % — SIGNIFICANT CHANGE UP (ref 0–2)
BILIRUB SERPL-MCNC: 0.4 MG/DL — SIGNIFICANT CHANGE UP (ref 0.2–1.2)
BILIRUB UR-MCNC: NEGATIVE — SIGNIFICANT CHANGE UP
BUN SERPL-MCNC: 119 MG/DL — HIGH (ref 7–23)
CALCIUM SERPL-MCNC: 9.1 MG/DL — SIGNIFICANT CHANGE UP (ref 8.4–10.5)
CHLORIDE SERPL-SCNC: 106 MMOL/L — SIGNIFICANT CHANGE UP (ref 96–108)
CO2 SERPL-SCNC: 20 MMOL/L — LOW (ref 22–31)
COLOR SPEC: SIGNIFICANT CHANGE UP
CREAT SERPL-MCNC: 3.04 MG/DL — HIGH (ref 0.5–1.3)
DIFF PNL FLD: ABNORMAL
EOSINOPHIL # BLD AUTO: 0.2 K/UL — SIGNIFICANT CHANGE UP (ref 0–0.5)
EOSINOPHIL NFR BLD AUTO: 1.6 % — SIGNIFICANT CHANGE UP (ref 0–6)
GLUCOSE SERPL-MCNC: 115 MG/DL — HIGH (ref 70–99)
GLUCOSE UR QL: NEGATIVE MG/DL — SIGNIFICANT CHANGE UP
HCT VFR BLD CALC: 35.7 % — SIGNIFICANT CHANGE UP (ref 34.5–45)
HGB BLD-MCNC: 11.1 G/DL — LOW (ref 11.5–15.5)
KETONES UR-MCNC: NEGATIVE — SIGNIFICANT CHANGE UP
LEUKOCYTE ESTERASE UR-ACNC: ABNORMAL
LIDOCAIN IGE QN: 359 U/L — SIGNIFICANT CHANGE UP (ref 73–393)
LYMPHOCYTES # BLD AUTO: 2.6 K/UL — SIGNIFICANT CHANGE UP (ref 1–3.3)
LYMPHOCYTES # BLD AUTO: 26.6 % — SIGNIFICANT CHANGE UP (ref 13–44)
MAGNESIUM SERPL-MCNC: 1.7 MG/DL — SIGNIFICANT CHANGE UP (ref 1.6–2.6)
MCHC RBC-ENTMCNC: 30.8 PG — SIGNIFICANT CHANGE UP (ref 27–34)
MCHC RBC-ENTMCNC: 31.2 GM/DL — LOW (ref 32–36)
MCV RBC AUTO: 98.6 FL — SIGNIFICANT CHANGE UP (ref 80–100)
MONOCYTES # BLD AUTO: 0.8 K/UL — SIGNIFICANT CHANGE UP (ref 0–0.9)
MONOCYTES NFR BLD AUTO: 8.3 % — SIGNIFICANT CHANGE UP (ref 2–14)
NEUTROPHILS # BLD AUTO: 6.2 K/UL — SIGNIFICANT CHANGE UP (ref 1.8–7.4)
NEUTROPHILS NFR BLD AUTO: 62.3 % — SIGNIFICANT CHANGE UP (ref 43–77)
NITRITE UR-MCNC: NEGATIVE — SIGNIFICANT CHANGE UP
NT-PROBNP SERPL-SCNC: 295 PG/ML — SIGNIFICANT CHANGE UP (ref 0–450)
PH UR: 5 — SIGNIFICANT CHANGE UP (ref 5–8)
PHOSPHATE SERPL-MCNC: 5 MG/DL — HIGH (ref 2.5–4.5)
PLATELET # BLD AUTO: 249 K/UL — SIGNIFICANT CHANGE UP (ref 150–400)
POTASSIUM SERPL-MCNC: 5.2 MMOL/L — SIGNIFICANT CHANGE UP (ref 3.5–5.3)
POTASSIUM SERPL-SCNC: 5.2 MMOL/L — SIGNIFICANT CHANGE UP (ref 3.5–5.3)
PROT SERPL-MCNC: 7.1 G/DL — SIGNIFICANT CHANGE UP (ref 6–8.3)
PROT UR-MCNC: 30 MG/DL
RBC # BLD: 3.62 M/UL — LOW (ref 3.8–5.2)
RBC # FLD: 13.7 % — SIGNIFICANT CHANGE UP (ref 10.3–14.5)
SODIUM SERPL-SCNC: 137 MMOL/L — SIGNIFICANT CHANGE UP (ref 135–145)
SP GR SPEC: 1.02 — SIGNIFICANT CHANGE UP (ref 1.01–1.02)
T3FREE SERPL-MCNC: 1.83 PG/ML — SIGNIFICANT CHANGE UP (ref 1.8–4.6)
T4 FREE SERPL-MCNC: 1.4 NG/DL — SIGNIFICANT CHANGE UP (ref 0.9–1.8)
TSH SERPL-MCNC: 1.11 UIU/ML — SIGNIFICANT CHANGE UP (ref 0.27–4.2)
UROBILINOGEN FLD QL: NEGATIVE MG/DL — SIGNIFICANT CHANGE UP
WBC # BLD: 9.9 K/UL — SIGNIFICANT CHANGE UP (ref 3.8–10.5)
WBC # FLD AUTO: 9.9 K/UL — SIGNIFICANT CHANGE UP (ref 3.8–10.5)

## 2018-05-25 PROCEDURE — 74176 CT ABD & PELVIS W/O CONTRAST: CPT | Mod: 26

## 2018-05-25 PROCEDURE — 71046 X-RAY EXAM CHEST 2 VIEWS: CPT | Mod: 26

## 2018-05-25 PROCEDURE — 99223 1ST HOSP IP/OBS HIGH 75: CPT

## 2018-05-25 PROCEDURE — 93010 ELECTROCARDIOGRAM REPORT: CPT

## 2018-05-25 PROCEDURE — 76775 US EXAM ABDO BACK WALL LIM: CPT | Mod: 26

## 2018-05-25 PROCEDURE — 99285 EMERGENCY DEPT VISIT HI MDM: CPT

## 2018-05-25 PROCEDURE — 99223 1ST HOSP IP/OBS HIGH 75: CPT | Mod: AI

## 2018-05-25 RX ORDER — SODIUM CHLORIDE 9 MG/ML
500 INJECTION INTRAMUSCULAR; INTRAVENOUS; SUBCUTANEOUS ONCE
Qty: 0 | Refills: 0 | Status: COMPLETED | OUTPATIENT
Start: 2018-05-25 | End: 2018-05-25

## 2018-05-25 RX ORDER — SODIUM CHLORIDE 9 MG/ML
1000 INJECTION INTRAMUSCULAR; INTRAVENOUS; SUBCUTANEOUS
Qty: 0 | Refills: 0 | Status: DISCONTINUED | OUTPATIENT
Start: 2018-05-25 | End: 2018-05-28

## 2018-05-25 RX ORDER — OXYBUTYNIN CHLORIDE 5 MG
5 TABLET ORAL AT BEDTIME
Qty: 0 | Refills: 0 | Status: DISCONTINUED | OUTPATIENT
Start: 2018-05-25 | End: 2018-05-28

## 2018-05-25 RX ORDER — SODIUM CHLORIDE 9 MG/ML
3 INJECTION INTRAMUSCULAR; INTRAVENOUS; SUBCUTANEOUS ONCE
Qty: 0 | Refills: 0 | Status: COMPLETED | OUTPATIENT
Start: 2018-05-25 | End: 2018-05-25

## 2018-05-25 RX ORDER — HEPARIN SODIUM 5000 [USP'U]/ML
5000 INJECTION INTRAVENOUS; SUBCUTANEOUS EVERY 12 HOURS
Qty: 0 | Refills: 0 | Status: DISCONTINUED | OUTPATIENT
Start: 2018-05-25 | End: 2018-05-30

## 2018-05-25 RX ORDER — SODIUM CHLORIDE 9 MG/ML
1000 INJECTION INTRAMUSCULAR; INTRAVENOUS; SUBCUTANEOUS ONCE
Qty: 0 | Refills: 0 | Status: COMPLETED | OUTPATIENT
Start: 2018-05-25 | End: 2018-05-25

## 2018-05-25 RX ORDER — LEVOTHYROXINE SODIUM 125 MCG
75 TABLET ORAL DAILY
Qty: 0 | Refills: 0 | Status: DISCONTINUED | OUTPATIENT
Start: 2018-05-25 | End: 2018-06-03

## 2018-05-25 RX ORDER — DOXAZOSIN MESYLATE 4 MG
4 TABLET ORAL AT BEDTIME
Qty: 0 | Refills: 0 | Status: DISCONTINUED | OUTPATIENT
Start: 2018-05-25 | End: 2018-05-25

## 2018-05-25 RX ORDER — VENLAFAXINE HCL 75 MG
37.5 CAPSULE, EXT RELEASE 24 HR ORAL DAILY
Qty: 0 | Refills: 0 | Status: DISCONTINUED | OUTPATIENT
Start: 2018-05-25 | End: 2018-06-03

## 2018-05-25 RX ORDER — NIFEDIPINE 30 MG
90 TABLET, EXTENDED RELEASE 24 HR ORAL DAILY
Qty: 0 | Refills: 0 | Status: DISCONTINUED | OUTPATIENT
Start: 2018-05-25 | End: 2018-05-25

## 2018-05-25 RX ORDER — ATENOLOL 25 MG/1
25 TABLET ORAL EVERY 12 HOURS
Qty: 0 | Refills: 0 | Status: DISCONTINUED | OUTPATIENT
Start: 2018-05-25 | End: 2018-05-27

## 2018-05-25 RX ORDER — ALLOPURINOL 300 MG
100 TABLET ORAL AT BEDTIME
Qty: 0 | Refills: 0 | Status: DISCONTINUED | OUTPATIENT
Start: 2018-05-25 | End: 2018-06-03

## 2018-05-25 RX ORDER — DOXAZOSIN MESYLATE 4 MG
2 TABLET ORAL DAILY
Qty: 0 | Refills: 0 | Status: DISCONTINUED | OUTPATIENT
Start: 2018-05-25 | End: 2018-05-25

## 2018-05-25 RX ADMIN — Medication 37.5 MILLIGRAM(S): at 21:59

## 2018-05-25 RX ADMIN — SODIUM CHLORIDE 3 MILLILITER(S): 9 INJECTION INTRAMUSCULAR; INTRAVENOUS; SUBCUTANEOUS at 10:33

## 2018-05-25 RX ADMIN — HEPARIN SODIUM 5000 UNIT(S): 5000 INJECTION INTRAVENOUS; SUBCUTANEOUS at 18:28

## 2018-05-25 RX ADMIN — Medication 5 MILLIGRAM(S): at 21:59

## 2018-05-25 RX ADMIN — SODIUM CHLORIDE 9999 MILLILITER(S): 9 INJECTION INTRAMUSCULAR; INTRAVENOUS; SUBCUTANEOUS at 10:43

## 2018-05-25 RX ADMIN — ATENOLOL 25 MILLIGRAM(S): 25 TABLET ORAL at 18:28

## 2018-05-25 RX ADMIN — SODIUM CHLORIDE 500 MILLILITER(S): 9 INJECTION INTRAMUSCULAR; INTRAVENOUS; SUBCUTANEOUS at 12:43

## 2018-05-25 RX ADMIN — SODIUM CHLORIDE 1000 MILLILITER(S): 9 INJECTION INTRAMUSCULAR; INTRAVENOUS; SUBCUTANEOUS at 15:00

## 2018-05-25 RX ADMIN — Medication 100 MILLIGRAM(S): at 23:21

## 2018-05-25 NOTE — ED PROVIDER NOTE - CARE PLAN
Principal Discharge DX:	Acute kidney injury  Assessment and plan of treatment:	admit  Secondary Diagnosis:	Dehydration

## 2018-05-25 NOTE — CONSULT NOTE ADULT - SUBJECTIVE AND OBJECTIVE BOX
REASON FOR CONSULT: abnormal EKG    CHIEF COMPLAINT:  was sent by her physician     HPI:  Pt is a 89 yo female who presents to the ED for abnormal lab result.  PMHx of depression, gout, HLD, HTN, hypothyroidism, OA, overactive bladder.  Pt reports that for the last several weeks she has not been feeling well.  She reports extreme fatigue and chills.  Pt followed up with her PMD this week who preformed outpatient blood work.  She was notified today that her potassium was elevated and that she needed to come to the ED for further work up. Pt denies fever, V/C, CP, SOB, abd pain, ext numbness or weakness.  She does report that she suffers from IBS and had a flare yesterday with loose watery stool but pt reports that it was non-bloody, and she denies melena.  She further reports that she followed up with her urologist several days ago and was started on po Doxycyline for a possible UTI.  Pt also states that she has been steadily losing weight over the last several months and is down from 163 pounds to 154.  She also reports that all her life she has suffered from HTN but lately her BP has been running low and she has not had to take her medication including her water pill.    	Of note Dr. Pelaez confirms pt weight loss and states that her BPs have been running low which is not usual for her.  He reports that he received a call today that her BUN/Cr was 99/2 and her K was 6.4.  Pt last ratio in April was 4.1/1.3.  He also reports that she had RUQ pain on exam and had reported mild nausea at that time   Pt reports that she did have a prior upper endo and colonoscopy many years ago which was WNL per reports  She had 1 liter IVF in ED , second liter is going, CT scan of abdomen did not show any hydronephrosis or bladder distention. (25 May 2018 12:41). Patient was placed on Lasix  by her cardiologist 2 months ago ,as she was having SPEAR , which has improved much with diuretics ,     patient denies any chest pain ,  patient was not taking her medication for last couple of days , she was noted to have low normal SBP when she came to ER , BP improved with IV fluids  currently comfortable      PAST MEDICAL & SURGICAL HISTORY:  HLD (hyperlipidemia)  Hypothyroid  Osteoarthritis  Gout  Overactive bladder  Depression  Hypertension  S/P lung surgery, follow-up exam: s/p removal of suspicious lesion, negative  S/P hysterectomy  S/P lumpectomy of breast  S/P cholecystectomy      Allergies    iodine (Anaphylaxis)  penicillin (Other)  sulfa drugs (Hives)  throat closes (Other)  throat closes (Other)    Intolerances        SOCIAL HISTORY: former smoker     FAMILY HISTORY:  Family history of breast cancer  Family history of nasopharyngeal cancer (Child): daughter      MEDICATIONS:  MEDICATIONS  (STANDING):  allopurinol 100 milliGRAM(s) Oral at bedtime  ATENolol  Tablet 25 milliGRAM(s) Oral every 12 hours  levothyroxine 75 MICROGram(s) Oral daily  oxybutynin 5 milliGRAM(s) Oral at bedtime  sodium chloride 0.9% Bolus 1000 milliLiter(s) IV Bolus once  sodium chloride 0.9%. 1000 milliLiter(s) (100 mL/Hr) IV Continuous <Continuous>  venlafaxine XR. 37.5 milliGRAM(s) Oral daily    MEDICATIONS  (PRN):      REVIEW OF SYSTEMS:     as above   GENITOURINARY: positive for  dysuria, frequency or hematuria  NEUROLOGICAL: No numbness or weakness  SKIN: No itching, burning, rashes, or lesions   All other review of systems is negative unless indicated above    Vital Signs Last 24 Hrs  T(C): 36.7 (25 May 2018 09:16), Max: 36.7 (25 May 2018 09:16)  T(F): 98.1 (25 May 2018 09:16), Max: 98.1 (25 May 2018 09:16)  HR: 82 (25 May 2018 11:37) (78 - 82)  BP: 132/74 (25 May 2018 11:37) (124/62 - 132/74)  BP(mean): --  RR: 16 (25 May 2018 11:37) (16 - 18)  SpO2: 96% (25 May 2018 11:37) (96% - 96%)    I&O's Summary      PHYSICAL EXAM:    Constitutional: NAD, awake and alert, well-developed  HEENT: PERR, EOMI,  No oral cyananosis.  Neck:  supple,  No JVD  Respiratory: Breath sounds are clear bilaterally, No wheezing, rales or rhonchi  Cardiovascular: S1 and S2, regular rate and rhythm, no Murmurs, gallops or rubs  Gastrointestinal: Bowel Sounds present, soft, nontender.   Extremities: No peripheral edema. No clubbing or cyanosis.  Vascular: 2+ peripheral pulses  Neurological: A/O x 3, no focal deficits  Musculoskeletal: no calf tenderness.  Skin: No rashes.      LABS: All Labs Reviewed:                        11.1   9.9   )-----------( 249      ( 25 May 2018 09:58 )             35.7     25 May 2018 09:58    137    |  106    |  119    ----------------------------<  115    5.2     |  20     |  3.04     Ca    9.1        25 May 2018 09:58  Phos  5.0       25 May 2018 09:58  Mg     1.7       25 May 2018 09:58    TPro  7.1    /  Alb  3.7    /  TBili  0.4    /  DBili  x      /  AST  24     /  ALT  29     /  AlkPhos  75     25 May 2018 09:58          Blood Culture:   05-25 @ 09:58  Pro Bnp 295        RADIOLOGY/EKG: normal sinus rhythm  first degree av block   as per primary patient had normal EF

## 2018-05-25 NOTE — CONSULT NOTE ADULT - ASSESSMENT
·	BRENDA, CKD 4: Prerenal azotemia  ·	Hyperkalemia      Will hold ARB, Aldactone. IV hydration. Check sonogram. Check repeat labs and monitor renal function trend. Encourage PO intake as tolerated.   Labs as ordered. Avoid nephrotoxic meds as possible. Avoid ACEI, ARB and NSAIDS. Monitor input and output.   Will follow electrolytes and renal function trend. Further recommendations pending clinical course. Thank you for the courtesy of this referral. ·	BRENDA, CKD 4: Prerenal azotemia  ·	Hyperkalemia, as out pt  ·	Hypertension       Will hold ARB, Aldactone. IV hydration. Check sonogram. Check repeat labs and monitor renal function trend. Encourage PO intake as tolerated.   Labs as ordered. Avoid nephrotoxic meds as possible. Avoid ACEI, ARB and NSAIDS. Monitor input and output.   Will follow electrolytes and renal function trend. Further recommendations pending clinical course. Thank you for the courtesy of this referral.

## 2018-05-25 NOTE — H&P ADULT - HISTORY OF PRESENT ILLNESS
Pt is a 89 yo female who presents to the ED for abnormal lab result.  PMHx of depression, gout, HLD, HTN, hypothyroidism, OA, overactive bladder.  Pt reports that for the last several weeks she has not been feeling well.  She reports extreme fatigue and chills.  Pt followed up with her PMD this week who preformed outpatient blood work.  She was notified today that her potassium was elevated and that she needed to come to the ED for further work up. Pt denies fever, V/C, CP, SOB, abd pain, ext numbness or weakness.  She does report that she suffers from IBS and had a flare yesterday with loose watery stool but pt reports that it was non-bloody, and she denies melena.  She further reports that she followed up with her urologist several days ago and was started on po Doxycyline for a possible UTI.  Pt also states that she has been steadily losing weight over the last several months and is down from 163 pounds to 154.  She also reports that all her life she has suffered from HTN but lately her BP has been running low and she has not had to take her medication including her water pill.    	Of note Dr. Pelaez confirms pt weight loss and states that her BPs have been running low which is not usual for her.  He reports that he received a call today that her BUN/Cr was 99/2 and her K was 6.4.  Pt last ratio in April was 4.1/1.3.  He also reports that she had RUQ pain on exam and had reported mild nausea at that time   Pt reports that she did have a prior upper endo and colonoscopy many years ago which was WNL per reports Pt is a 89 yo female who presents to the ED for abnormal lab result.  PMHx of depression, gout, HLD, HTN, hypothyroidism, OA, overactive bladder.  Pt reports that for the last several weeks she has not been feeling well.  She reports extreme fatigue and chills.  Pt followed up with her PMD this week who preformed outpatient blood work.  She was notified today that her potassium was elevated and that she needed to come to the ED for further work up. Pt denies fever, V/C, CP, SOB, abd pain, ext numbness or weakness.  She does report that she suffers from IBS and had a flare yesterday with loose watery stool but pt reports that it was non-bloody, and she denies melena.  She further reports that she followed up with her urologist several days ago and was started on po Doxycyline for a possible UTI.  Pt also states that she has been steadily losing weight over the last several months and is down from 163 pounds to 154.  She also reports that all her life she has suffered from HTN but lately her BP has been running low and she has not had to take her medication including her water pill.    	Of note Dr. Pelaez confirms pt weight loss and states that her BPs have been running low which is not usual for her.  He reports that he received a call today that her BUN/Cr was 99/2 and her K was 6.4.  Pt last ratio in April was 4.1/1.3.  He also reports that she had RUQ pain on exam and had reported mild nausea at that time   Pt reports that she did have a prior upper endo and colonoscopy many years ago which was WNL per reports  She had 1 liter IVF in ED , second liter is going, CT scan of abdomen did not show any hydronephrosis or bladder distention.

## 2018-05-25 NOTE — ED PROVIDER NOTE - CONDUCTED A DETAILED DISCUSSION WITH PATIENT AND/OR GUARDIAN REGARDING, MDM
need to admit/lab results/return to ED if symptoms worsen, persist or questions arise/radiology results

## 2018-05-25 NOTE — H&P ADULT - NSHPSOCIALHISTORY_GEN_ALL_CORE
Alochol: Denied  Smoking: Nonsmoker  Drug Use: Denied  Marital Status:  Alcohol: Socially   Smoking: Nonsmoker  Drug Use: Denied  Marital Status:

## 2018-05-25 NOTE — CONSULT NOTE ADULT - SUBJECTIVE AND OBJECTIVE BOX
Patient is a 89y old  Female who presents with a chief complaint of abnormal labs (25 May 2018 12:41)    HPI:  Pt is a 89 yo female who presents to the ED for abnormal lab result.  PMHx of depression, gout, HLD, HTN, hypothyroidism, OA, overactive bladder.  Pt reports that for the last several weeks she has not been feeling well.  She reports extreme fatigue and chills.  Pt followed up with her PMD this week who preformed outpatient blood work.  She was notified today that her potassium was elevated and that she needed to come to the ED for further work up. Pt denies fever, V/C, CP, SOB, abd pain, ext numbness or weakness.  She does report that she suffers from IBS and had a flare yesterday with loose watery stool but pt reports that it was non-bloody, and she denies melena.  She further reports that she followed up with her urologist several days ago and was started on po Doxycyline for a possible UTI.  Pt also states that she has been steadily losing weight over the last several months and is down from 163 pounds to 154.  She also reports that all her life she has suffered from HTN but lately her BP has been running low and she has not had to take her medication including her water pill.     Of note Dr. Pelaez confirms pt weight loss and states that her BPs have been running low which is not usual for her.  He reports that he received a call today that her BUN/Cr was 99/2 and her K was 6.4.  Pt last ratio in April was 4.1/1.3.  He also reports that she had RUQ pain on exam and had reported mild nausea at that time   Pt reports that she did have a prior upper endo and colonoscopy many years ago which was WNL per reports  She had 1 liter IVF in ED , second liter is going, CT scan of abdomen did not show any hydronephrosis or bladder distention. (25 May 2018 12:41)    Renal consult called for BRENDA, Hyperkalemia. Pt on ARB and aldactone as out pt. No obstructive changes on CT.       PAST MEDICAL HISTORY:  HLD (hyperlipidemia)  Hypothyroid  Osteoarthritis  Gout  Overactive bladder  CVA (cerebral infarction)  Depression  Hypertension      PAST SURGICAL HISTORY:  S/P lung surgery, follow-up exam  S/P hysterectomy  S/P lumpectomy of breast  S/P cholecystectomy  No significant past surgical history      FAMILY HISTORY:  Family history of breast cancer  Family history of nasopharyngeal cancer (Child): daughter      SOCIAL HISTORY: No smoking or alcohol use     Allergies    iodine (Anaphylaxis)  penicillin (Other)  sulfa drugs (Hives)  throat closes (Other)  throat closes (Other)    Intolerances      Home Medications:  allopurinol 100 mg oral tablet: 1 tab(s) orally once a day (at bedtime) (25 May 2018 09:25)  Calcium 600+D:  orally once a day (25 May 2018 09:25)  cloNIDine: 1 tab(s) orally once a day (25 May 2018 12:32)  Diovan 160 mg oral tablet: 1 tab(s) orally once a day (25 May 2018 12:32)  doxazosin 2 mg oral tablet: 1 tab(s) orally once a day (25 May 2018 12:32)  doxazosin 4 mg oral tablet: orally once a day (at bedtime) (25 May 2018 12:32)  Effexor 37.5 mg oral capsule, extended release: 1 cap(s) orally once a day (25 May 2018 09:25)  Multi-Day Plus Minerals Multiple Vitamins with Minerals oral tablet: 1 tab(s) orally once a day (25 May 2018 09:25)  NIFEdipine 90 mg oral tablet, extended release: 1 tab(s) orally once a day (25 May 2018 09:25)  oxybutynin 5 mg oral tablet:  orally once a day (at bedtime) (25 May 2018 09:25)  spironolactone 25 mg oral tablet: 1 tab(s) orally once a day (25 May 2018 09:25)  Synthroid 75 mcg (0.075 mg) oral tablet: 1 tab(s) orally once a day (25 May 2018 09:25)  Vitamin D3:  orally once a day (25 May 2018 09:25)    MEDICATIONS  (STANDING):  allopurinol 100 milliGRAM(s) Oral at bedtime  ATENolol  Tablet 25 milliGRAM(s) Oral every 12 hours  levothyroxine 75 MICROGram(s) Oral daily  oxybutynin 5 milliGRAM(s) Oral at bedtime  sodium chloride 0.9% Bolus 1000 milliLiter(s) IV Bolus once  sodium chloride 0.9%. 1000 milliLiter(s) (100 mL/Hr) IV Continuous <Continuous>  venlafaxine XR. 37.5 milliGRAM(s) Oral daily    MEDICATIONS  (PRN):      REVIEW OF SYSTEMS:  General: NAD  Respiratory: No cough, SOB  Cardiovascular: No CP or Palpitations	  Gastrointestinal: No nausea, Vomiting. No diarrhea  Genitourinary: No urinary complaints	  Musculoskeletal: No leg swelling, No new rash or lesions	  Neurological: 	  all other systems negative    T(F): 98.1 (18 @ 09:16), Max: 98.1 (18 @ 09:16)  HR: 82 (18 @ 11:37) (78 - 82)  BP: 132/74 (18 @ 11:37) (124/62 - 132/74)  RR: 16 (18 @ 11:37) (16 - 18)  SpO2: 96% (18 @ 11:37) (96% - 96%)  Wt(kg): --    PHYSICAL EXAM:  General: NAD  Respiratory: b/l air entry  Cardiovascular: S1 S2  Gastrointestinal: soft  Extremities: no edema          137  |  106  |  119<H>  ----------------------------<  115<H>  5.2   |  20<L>  |  3.04<H>    Ca    9.1      25 May 2018 09:58  Phos  5.0       Mg     1.7         TPro  7.1  /  Alb  3.7  /  TBili  0.4  /  DBili  x   /  AST  24  /  ALT  29  /  AlkPhos  75                            11.1   9.9   )-----------( 249      ( 25 May 2018 09:58 )             35.7       Hematocrit: 35.7 % ( @ 09:58)  Hemoglobin: 11.1 g/dL ( @ 09:58)  Potassium, Serum: 5.2 mmol/L ( 09:58)  Calcium, Total Serum: 9.1 mg/dL ( 09:58)      Creatinine, Serum: 3.04 ( @ 09:58)      Urinalysis Basic - ( 25 May 2018 10:18 )    Color: Pale Yellow / Appearance: Clear / S.020 / pH: x  Gluc: x / Ketone: Negative  / Bili: Negative / Urobili: Negative mg/dL   Blood: x / Protein: 30 mg/dL / Nitrite: Negative   Leuk Esterase: Moderate / RBC: 0-2 /HPF / WBC 3-5   Sq Epi: x / Non Sq Epi: Few / Bacteria: Few      LIVER FUNCTIONS - ( 25 May 2018 09:58 )  Alb: 3.7 g/dL / Pro: 7.1 g/dL / ALK PHOS: 75 U/L / ALT: 29 U/L DA / AST: 24 U/L / GGT: x               < from: CT Renal Stone Hunt (18 @ 11:01) >  EXAM:  CT RENAL STONE HUNT                                  PROCEDURE DATE:  2018          INTERPRETATION:  History: New onset renal insufficiency with UTI.    CT abdomen and pelvis no exogenous oral or intravenous contrast.  There is a decrease in the cardiac chamber blood pool attenuation   suggesting an anemic state. Bibasal pleural parenchymal scarring. There   is a 3 mm subpleural parenchymal nodule in the lingula.  Gallbladder not visualized may be absent or contracted. No significant   biliary dilatation. Unenhanced liver spleen not remarkable.  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 Correlate with contrast-enhanced abdominal MR.  Subcentimeter left adrenal nodule statistically most likely representing   a cortical adenoma.  Multiple bilateral renal cysts including some hemorrhagic cysts. The   largest cyst in the right upper pole measures up to  6 cm. No   hydroureteronephrosis or urolithiasis bilaterally.  Atherosclerotic normal caliber abdominal aorta.  No suspicious adenopathy.  No actively inflamed or obstructed bowel. Moderate retained colonic   stool. Lipomatosis ileocecal valve.  No extraluminal fluid or gas.  Status post hysterectomy. Bladder not remarkable.  Grade 1 anterolisthesis L4 on L5. Mild age-indeterminate superior   endplate compression deformities of L3 and T11 exact age indeterminate   probably chronic.    Impression:    No evidenceof obstructive uropathy or urolithiasis.  No evidence of acute inflammatory process.  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 Correlate with MR if there are no contraindications.  Additional nonacute/incidental findings as discussed    < end of copied text > Patient is a 89y old  Female who presents with a chief complaint of abnormal labs (25 May 2018 12:41)    HPI:  Pt is a 89 yo female who presents to the ED for abnormal lab result.  PMHx of depression, gout, HLD, HTN, hypothyroidism, OA, overactive bladder.  Pt reports that for the last several weeks she has not been feeling well.  She reports extreme fatigue and chills.  Pt followed up with her PMD this week who preformed outpatient blood work.  She was notified today that her potassium was elevated and that she needed to come to the ED for further work up. Pt denies fever, V/C, CP, SOB, abd pain, ext numbness or weakness.  She does report that she suffers from IBS and had a flare yesterday with loose watery stool but pt reports that it was non-bloody, and she denies melena.  She further reports that she followed up with her urologist several days ago and was started on po Doxycyline for a possible UTI.  Pt also states that she has been steadily losing weight over the last several months and is down from 163 pounds to 154.  She also reports that all her life she has suffered from HTN but lately her BP has been running low and she has not had to take her medication including her water pill.     Of note Dr. Pelaez confirms pt weight loss and states that her BPs have been running low which is not usual for her.  He reports that he received a call today that her BUN/Cr was 99/2 and her K was 6.4.  Pt last ratio in April was 4.1/1.3.  He also reports that she had RUQ pain on exam and had reported mild nausea at that time   Pt reports that she did have a prior upper endo and colonoscopy many years ago which was WNL per reports  She had 1 liter IVF in ED , second liter is going, CT scan of abdomen did not show any hydronephrosis or bladder distention. (25 May 2018 12:41)    Renal consult called for BRENDA, Hyperkalemia. Pt on ARB and aldactone as out pt. No obstructive changes on CT.   History obtained from chart and pt. Pt denies any knowledge of kidney disease.       PAST MEDICAL HISTORY:  HLD (hyperlipidemia)  Hypothyroid  Osteoarthritis  Gout  Overactive bladder  CVA (cerebral infarction)  Depression  Hypertension      PAST SURGICAL HISTORY:  S/P lung surgery, follow-up exam  S/P hysterectomy  S/P lumpectomy of breast  S/P cholecystectomy  No significant past surgical history      FAMILY HISTORY:  Family history of breast cancer  Family history of nasopharyngeal cancer (Child): daughter      SOCIAL HISTORY: No smoking or alcohol use     Allergies    iodine (Anaphylaxis)  penicillin (Other)  sulfa drugs (Hives)  throat closes (Other)  throat closes (Other)    Intolerances      Home Medications:  allopurinol 100 mg oral tablet: 1 tab(s) orally once a day (at bedtime) (25 May 2018 09:25)  Calcium 600+D:  orally once a day (25 May 2018 09:25)  cloNIDine: 1 tab(s) orally once a day (25 May 2018 12:32)  Diovan 160 mg oral tablet: 1 tab(s) orally once a day (25 May 2018 12:32)  doxazosin 2 mg oral tablet: 1 tab(s) orally once a day (25 May 2018 12:32)  doxazosin 4 mg oral tablet: orally once a day (at bedtime) (25 May 2018 12:32)  Effexor 37.5 mg oral capsule, extended release: 1 cap(s) orally once a day (25 May 2018 09:25)  Multi-Day Plus Minerals Multiple Vitamins with Minerals oral tablet: 1 tab(s) orally once a day (25 May 2018 09:25)  NIFEdipine 90 mg oral tablet, extended release: 1 tab(s) orally once a day (25 May 2018 09:25)  oxybutynin 5 mg oral tablet:  orally once a day (at bedtime) (25 May 2018 09:25)  spironolactone 25 mg oral tablet: 1 tab(s) orally once a day (25 May 2018 09:25)  Synthroid 75 mcg (0.075 mg) oral tablet: 1 tab(s) orally once a day (25 May 2018 09:25)  Vitamin D3:  orally once a day (25 May 2018 09:25)    MEDICATIONS  (STANDING):  allopurinol 100 milliGRAM(s) Oral at bedtime  ATENolol  Tablet 25 milliGRAM(s) Oral every 12 hours  levothyroxine 75 MICROGram(s) Oral daily  oxybutynin 5 milliGRAM(s) Oral at bedtime  sodium chloride 0.9% Bolus 1000 milliLiter(s) IV Bolus once  sodium chloride 0.9%. 1000 milliLiter(s) (100 mL/Hr) IV Continuous <Continuous>  venlafaxine XR. 37.5 milliGRAM(s) Oral daily    MEDICATIONS  (PRN):      REVIEW OF SYSTEMS:  General: NAD  Respiratory: No cough, SOB  Cardiovascular: No CP or Palpitations	  Gastrointestinal: Poor PO intake   Genitourinary: No urinary complaints	  Musculoskeletal: No leg swelling, No new rash or lesions	  Neurological: 	  all other systems negative    T(F): 98.1 (18 @ 09:16), Max: 98.1 (18 @ 09:16)  HR: 82 (18 @ 11:37) (78 - 82)  BP: 132/74 (18 @ 11:37) (124/62 - 132/74)  RR: 16 (18 @ 11:37) (16 - 18)  SpO2: 96% (18 @ 11:37) (96% - 96%)  Wt(kg): --    PHYSICAL EXAM:  General: NAD  Respiratory: b/l air entry  Cardiovascular: S1 S2  Gastrointestinal: soft  Extremities: no edema          137  |  106  |  119<H>  ----------------------------<  115<H>  5.2   |  20<L>  |  3.04<H>    Ca    9.1      25 May 2018 09:58  Phos  5.0       Mg     1.7         TPro  7.1  /  Alb  3.7  /  TBili  0.4  /  DBili  x   /  AST  24  /  ALT  29  /  Alk Phos  75                            11.1   9.9   )-----------( 249      ( 25 May 2018 09:58 )             35.7       Hematocrit: 35.7 % ( @ 09:58)  Hemoglobin: 11.1 g/dL ( @ 09:58)  Potassium, Serum: 5.2 mmol/L ( @ 09:58)  Calcium, Total Serum: 9.1 mg/dL ( @ 09:58)      Creatinine, Serum: 3.04 ( @ 09:58)      Urinalysis Basic - ( 25 May 2018 10:18 )    Color: Pale Yellow / Appearance: Clear / S.020 / pH: x  Gluc: x / Ketone: Negative  / Bili: Negative / Urobili: Negative mg/dL   Blood: x / Protein: 30 mg/dL / Nitrite: Negative   Leuk Esterase: Moderate / RBC: 0-2 /HPF / WBC 3-5   Sq Epi: x / Non Sq Epi: Few / Bacteria: Few      LIVER FUNCTIONS - ( 25 May 2018 09:58 )  Alb: 3.7 g/dL / Pro: 7.1 g/dL / ALK PHOS: 75 U/L / ALT: 29 U/L DA / AST: 24 U/L / GGT: x               < from: CT Renal Stone Hunt (18 @ 11:01) >  EXAM:  CT RENAL STONE HUNT                                  PROCEDURE DATE:  2018          INTERPRETATION:  History: New onset renal insufficiency with UTI.    CT abdomen and pelvis no exogenous oral or intravenous contrast.  There is a decrease in the cardiac chamber blood pool attenuation   suggesting an anemic state. Bibasal pleural parenchymal scarring. There   is a 3 mm subpleural parenchymal nodule in the lingula.  Gallbladder not visualized may be absent or contracted. No significant   biliary dilatation. Unenhanced liver spleen not remarkable.  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 Correlate with contrast-enhanced abdominal MR.  Subcentimeter left adrenal nodule statistically most likely representing   a cortical adenoma.  Multiple bilateral renal cysts including some hemorrhagic cysts. The   largest cyst in the right upper pole measures up to  6 cm. No   hydroureteronephrosis or urolithiasis bilaterally.  Atherosclerotic normal caliber abdominal aorta.  No suspicious adenopathy.  No actively inflamed or obstructed bowel. Moderate retained colonic   stool. Lipomatosis ileocecal valve.  No extraluminal fluid or gas.  Status post hysterectomy. Bladder not remarkable.  Grade 1 anterolisthesis L4 on L5. Mild age-indeterminate superior   endplate compression deformities of L3 and T11 exact age indeterminate   probably chronic.    Impression:    No evidenceof obstructive uropathy or urolithiasis.  No evidence of acute inflammatory process.  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 Correlate with MR if there are no contraindications.  Additional nonacute/incidental findings as discussed    < end of copied text >

## 2018-05-25 NOTE — CONSULT NOTE ADULT - PROBLEM SELECTOR RECOMMENDATION 9
now controlled , in setting severe dehydration ,BRENDA ,     would keep her on lopressor 12.5 mg po BID , discontinue atenolol as it cleared by renal ,  monitor BP , will adjust dose base BP response during hospital stay

## 2018-05-25 NOTE — ED PROVIDER NOTE - OBJECTIVE STATEMENT
Pt is a 89 yo female who presents to the ED for abnormal lab result.  PMHx of depression, gout, HLD, HTN, hypothyroidism, OA, overactive bladder.  Pt reports that for the last several weeks she has not been feeling well.  She reports extreme fatigue and chills.  Pt followed up with her PMD this week who preformed outpatient blood work.  She was notified today that her potassium was elevated and that she needed to come to the ED for further work up. Pt denies fever, V/C, CP, SOB, abd pain, ext numbness or weakness.  She does report that she suffers from IBS and had a flare yesterday with loose watery stool but pt reports that it was non-bloody, and she denies melena.  She further reports that she followed up with her urologist several days ago and was started on po Doxycyline for a possible UTI.  Pt also states that she has been steadily losing weight over the last several months and is down from 163 pounds to 154.  She also reports that all her life she has suffered from HTN but lately her BP has been running low and she has not had to take her medication including her water pill.    Of note Dr. Pelaez confirms pt weight loss and states that her BPs have been running low which is not usual for her.  He reports that he received a call today that her BUN/Cr was 99/2 and her K was 6.4.  Pt last ratio in April was 4.1/1.3.  He also reports that she had RUQ pain on exam and had reported mild nausea at that time   Pt reports that she did have a prior upper endo and colonoscopy many years ago which was WNL per reports

## 2018-05-25 NOTE — H&P ADULT - ASSESSMENT
Pt is a 89 yo female who presents to the ED for abnormal lab result.  PMHx of depression, gout, HLD, HTN, hypothyroidism, OA, overactive bladder. Pt is a 89 yo female who presents to the ED for abnormal lab result.  PMHx of depression, gout, HLD, HTN, hypothyroidism, OA, overactive bladder.    BRENDA , 2/2 overdiuresis, pt not drinking enough  fluid. post renal causes already ruled out by CT scan, cont IVF, consult renal  with Dr Cornelius,   avoid any nephrotoxicity, IV contrast, monitor renal function.     HTN , recently was running low, hold all bp meds, resume it if bp start rising one by one.     Hypothyroid, stable ,cont synthroid    HLD controlled, off medication.    Depression, cont effexor.     IMPROVE VTE Individual Risk Assessment, start on heparin sc and SCD.           RISK                                                          Points    [  ] Previous VTE                                                3    [  ] Thrombophilia                                             2    [  ] Lower limb paralysis                                    2        (unable to hold up >15 seconds)      [  ] Current Cancer                                             2         (within 6 months)    [  ] Immobilization > 24 hrs                              1    [  ] ICU/CCU stay > 24 hours                            1    [ x ] Age > 60                                                    1    IMPROVE VTE Score ___1______ Pt is a 89 yo female who presents to the ED for abnormal lab result.  PMHx of depression, gout, HLD, HTN, hypothyroidism, OA, overactive bladder.    BRENDA , 2/2 overdiuresis, pt not drinking enough  fluid. post renal causes already ruled out by CT scan, cont IVF, consult renal  with Dr Cornelius,   avoid any nephrotoxicity, IV contrast, monitor renal function.     HTN , recently was running low, hold all bp meds, resume it if bp start rising one by one.     Hypothyroid, stable ,cont synthroid    HLD controlled, off medication.    Depression, cont effexor.     Hyperphosphatemia, 2/2 BRENDA, monitor level and f/u with renal team.     IMPROVE VTE Individual Risk Assessment, start on heparin sc and SCD.           RISK                                                          Points    [  ] Previous VTE                                                3    [  ] Thrombophilia                                             2    [  ] Lower limb paralysis                                    2        (unable to hold up >15 seconds)      [  ] Current Cancer                                             2         (within 6 months)    [  ] Immobilization > 24 hrs                              1    [  ] ICU/CCU stay > 24 hours                            1    [ x ] Age > 60                                                    1    IMPROVE VTE Score ___1______

## 2018-05-25 NOTE — CONSULT NOTE ADULT - PROBLEM SELECTOR RECOMMENDATION 5
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

## 2018-05-25 NOTE — ED ADULT NURSE REASSESSMENT NOTE - NS ED NURSE REASSESS COMMENT FT1
pt voided 30cc and u/a and culture sent,   post void bladder scan no urine pt pending labs pt states does not drink enough bladder scan same at pmd yesterday

## 2018-05-25 NOTE — ED ADULT NURSE NOTE - OBJECTIVE STATEMENT
told by pmd to go to ed for elevated potassium no h/o renal disease saw pmd yesterday also c/o weight loss and loss of appetite in past few months

## 2018-05-25 NOTE — ED ADULT NURSE NOTE - CHPI ED SYMPTOMS NEG
no vomiting/no chills/no back pain/no dizziness/no fever/no headache/no loss of consciousness/no nausea

## 2018-05-25 NOTE — H&P ADULT - NSHPPHYSICALEXAM_GEN_ALL_CORE
PHYSICAL EXAM    Constitutional: NAD, well-groomed, well-developed  HEENT: PERRLA, EOMI, Normal Hearing, MMM  Neck: No LAD, No JVD  Back: Normal spine flexure, No CVA tenderness  Respiratory: CTAB/L   Cardiovascular: S1 and S2, RRR, no M/G/R  Gastrointestinal: BS+, soft, NT/ND  Extremities: No peripheral edema  Vascular: 2+ peripheral pulses  Neurological: A/O x 3, no focal deficits  Skin: No rashes

## 2018-05-26 LAB
ANION GAP SERPL CALC-SCNC: 6 MMOL/L — SIGNIFICANT CHANGE UP (ref 5–17)
ANION GAP SERPL CALC-SCNC: 8 MMOL/L — SIGNIFICANT CHANGE UP (ref 5–17)
BUN SERPL-MCNC: 63 MG/DL — HIGH (ref 7–23)
BUN SERPL-MCNC: 79 MG/DL — HIGH (ref 7–23)
CALCIUM SERPL-MCNC: 8.8 MG/DL — SIGNIFICANT CHANGE UP (ref 8.4–10.5)
CALCIUM SERPL-MCNC: 9 MG/DL — SIGNIFICANT CHANGE UP (ref 8.4–10.5)
CHLORIDE SERPL-SCNC: 116 MMOL/L — HIGH (ref 96–108)
CHLORIDE SERPL-SCNC: 116 MMOL/L — HIGH (ref 96–108)
CHLORIDE UR-SCNC: 54 MMOL/L — SIGNIFICANT CHANGE UP
CO2 SERPL-SCNC: 19 MMOL/L — LOW (ref 22–31)
CO2 SERPL-SCNC: 21 MMOL/L — LOW (ref 22–31)
CREAT ?TM UR-MCNC: 47 MG/DL — SIGNIFICANT CHANGE UP
CREAT SERPL-MCNC: 1.58 MG/DL — HIGH (ref 0.5–1.3)
CREAT SERPL-MCNC: 1.75 MG/DL — HIGH (ref 0.5–1.3)
CULTURE RESULTS: SIGNIFICANT CHANGE UP
GLUCOSE SERPL-MCNC: 104 MG/DL — HIGH (ref 70–99)
GLUCOSE SERPL-MCNC: 91 MG/DL — SIGNIFICANT CHANGE UP (ref 70–99)
HCT VFR BLD CALC: 32.6 % — LOW (ref 34.5–45)
HGB BLD-MCNC: 10.4 G/DL — LOW (ref 11.5–15.5)
MCHC RBC-ENTMCNC: 31.4 PG — SIGNIFICANT CHANGE UP (ref 27–34)
MCHC RBC-ENTMCNC: 31.8 GM/DL — LOW (ref 32–36)
MCV RBC AUTO: 98.8 FL — SIGNIFICANT CHANGE UP (ref 80–100)
PLATELET # BLD AUTO: 225 K/UL — SIGNIFICANT CHANGE UP (ref 150–400)
POTASSIUM SERPL-MCNC: 5.4 MMOL/L — HIGH (ref 3.5–5.3)
POTASSIUM SERPL-MCNC: 5.6 MMOL/L — HIGH (ref 3.5–5.3)
POTASSIUM SERPL-SCNC: 5.4 MMOL/L — HIGH (ref 3.5–5.3)
POTASSIUM SERPL-SCNC: 5.6 MMOL/L — HIGH (ref 3.5–5.3)
POTASSIUM UR-SCNC: 16 MMOL/L — SIGNIFICANT CHANGE UP
PROT ?TM UR-MCNC: 6 MG/DL — SIGNIFICANT CHANGE UP (ref 0–12)
RBC # BLD: 3.3 M/UL — LOW (ref 3.8–5.2)
RBC # FLD: 13.5 % — SIGNIFICANT CHANGE UP (ref 10.3–14.5)
SODIUM SERPL-SCNC: 143 MMOL/L — SIGNIFICANT CHANGE UP (ref 135–145)
SODIUM SERPL-SCNC: 143 MMOL/L — SIGNIFICANT CHANGE UP (ref 135–145)
SODIUM UR-SCNC: 65 MMOL/L — SIGNIFICANT CHANGE UP
SPECIMEN SOURCE: SIGNIFICANT CHANGE UP
URATE SERPL-MCNC: 6.6 MG/DL — SIGNIFICANT CHANGE UP (ref 2.5–7)
WBC # BLD: 7.7 K/UL — SIGNIFICANT CHANGE UP (ref 3.8–10.5)
WBC # FLD AUTO: 7.7 K/UL — SIGNIFICANT CHANGE UP (ref 3.8–10.5)

## 2018-05-26 PROCEDURE — 99233 SBSQ HOSP IP/OBS HIGH 50: CPT

## 2018-05-26 PROCEDURE — 12345: CPT | Mod: NC

## 2018-05-26 PROCEDURE — 99232 SBSQ HOSP IP/OBS MODERATE 35: CPT

## 2018-05-26 RX ORDER — PANTOPRAZOLE SODIUM 20 MG/1
40 TABLET, DELAYED RELEASE ORAL ONCE
Qty: 0 | Refills: 0 | Status: COMPLETED | OUTPATIENT
Start: 2018-05-26 | End: 2018-05-26

## 2018-05-26 RX ORDER — LABETALOL HCL 100 MG
10 TABLET ORAL ONCE
Qty: 0 | Refills: 0 | Status: COMPLETED | OUTPATIENT
Start: 2018-05-26 | End: 2018-05-27

## 2018-05-26 RX ORDER — HYDRALAZINE HCL 50 MG
10 TABLET ORAL ONCE
Qty: 0 | Refills: 0 | Status: COMPLETED | OUTPATIENT
Start: 2018-05-26 | End: 2018-05-26

## 2018-05-26 RX ADMIN — Medication 0.1 MILLIGRAM(S): at 21:49

## 2018-05-26 RX ADMIN — ATENOLOL 25 MILLIGRAM(S): 25 TABLET ORAL at 17:42

## 2018-05-26 RX ADMIN — ATENOLOL 25 MILLIGRAM(S): 25 TABLET ORAL at 05:37

## 2018-05-26 RX ADMIN — Medication 5 MILLIGRAM(S): at 21:50

## 2018-05-26 RX ADMIN — HEPARIN SODIUM 5000 UNIT(S): 5000 INJECTION INTRAVENOUS; SUBCUTANEOUS at 17:45

## 2018-05-26 RX ADMIN — Medication 75 MICROGRAM(S): at 05:35

## 2018-05-26 RX ADMIN — Medication 100 MILLIGRAM(S): at 21:50

## 2018-05-26 RX ADMIN — Medication 10 MILLIGRAM(S): at 21:49

## 2018-05-26 RX ADMIN — SODIUM CHLORIDE 75 MILLILITER(S): 9 INJECTION INTRAMUSCULAR; INTRAVENOUS; SUBCUTANEOUS at 05:36

## 2018-05-26 RX ADMIN — PANTOPRAZOLE SODIUM 40 MILLIGRAM(S): 20 TABLET, DELAYED RELEASE ORAL at 16:46

## 2018-05-26 RX ADMIN — HEPARIN SODIUM 5000 UNIT(S): 5000 INJECTION INTRAVENOUS; SUBCUTANEOUS at 05:35

## 2018-05-26 RX ADMIN — Medication 37.5 MILLIGRAM(S): at 11:19

## 2018-05-26 NOTE — PROGRESS NOTE ADULT - SUBJECTIVE AND OBJECTIVE BOX
CHARLES GRAHAM  89y  Female    Patient is a 89y old  Female who presents with a chief complaint of abnormal labs (25 May 2018 12:41)      complains of some abdominal pain.   denies any nausea or vomiting.   has been eating oranges, and bananas.      PAST MEDICAL & SURGICAL HISTORY:  HLD (hyperlipidemia)  Hypothyroid  Osteoarthritis  Gout  Overactive bladder  Depression  Hypertension  S/P lung surgery, follow-up exam: s/p removal of suspicious lesion, negative  S/P hysterectomy  S/P lumpectomy of breast  S/P cholecystectomy    PHYSICAL EXAM:    T(C): 36.7 (18 @ 13:59), Max: 36.9 (18 @ 01:10)  HR: 70 (18 @ 13:59) (51 - 92)  BP: 156/72 (18 @ 13:59) (129/67 - 162/69)  RR: 18 (18 @ 13:59) (17 - 20)  SpO2: 96% (18 @ 13:59) (94% - 96%)  Wt(kg): --    I&O's Detail    25 May 2018 07:01  -  26 May 2018 07:00  --------------------------------------------------------  IN:    sodium chloride 0.9%.: 1125 mL  Total IN: 1125 mL    OUT:    Voided: 1450 mL  Total OUT: 1450 mL    Total NET: -325 mL          Respiratory: clear anteriorly, decreased BS at bases  Cardiovascular: S1 S2  Gastrointestinal: soft but tender, ND +BS  Extremities: edema   Neuro: Awake and alert    MEDICATIONS  (STANDING):  allopurinol 100 milliGRAM(s) Oral at bedtime  ATENolol  Tablet 25 milliGRAM(s) Oral every 12 hours  heparin  Injectable 5000 Unit(s) SubCutaneous every 12 hours  levothyroxine 75 MICROGram(s) Oral daily  oxybutynin 5 milliGRAM(s) Oral at bedtime  pantoprazole  Injectable 40 milliGRAM(s) IV Push once  sodium chloride 0.9%. 1000 milliLiter(s) (75 mL/Hr) IV Continuous <Continuous>  venlafaxine XR. 37.5 milliGRAM(s) Oral daily    MEDICATIONS  (PRN):                            10.4   7.7   )-----------( 225      ( 26 May 2018 07:12 )             32.6           143  |  116<H>  |  79<H>  ----------------------------<  91  5.6<H>   |  21<L>  |  1.75<H>    Ca    9.0      26 May 2018 07:12  Phos  5.0       Mg     1.7         TPro  7.1  /  Alb  3.7  /  TBili  0.4  /  DBili  x   /  AST  24  /  ALT  29  /  AlkPhos  75        Urinalysis Basic - ( 25 May 2018 10:18 )    Color: Pale Yellow / Appearance: Clear / S.020 / pH: x  Gluc: x / Ketone: Negative  / Bili: Negative / Urobili: Negative mg/dL   Blood: x / Protein: 30 mg/dL / Nitrite: Negative   Leuk Esterase: Moderate / RBC: 0-2 /HPF / WBC 3-5   Sq Epi: x / Non Sq Epi: Few / Bacteria: Few    < from: US Renal (18 @ 15:43) >  PROCEDURE DATE:  2018          INTERPRETATION:  History: Elevated BUN/creatinine.    Bilateral renal ultrasound.    Right kidney 9.5 the left 10.3 cm long dimension.  No hydronephrosis or shadowing calculus bilaterally. Multiple bilateral   renal cortical cysts are identified, some with low-level internal echoes   representing complex (hemorrhagic or proteinaceous) cyst. There is a   dominant septated cyst in the right upper pole measuring 6.6 x 5.5 cm.  Bladder distends up to 144-cc with a post void residual volume of 66 cc.    Impression: No post renal obstruction. Multiple bilateral renal cortical   cysts as described.

## 2018-05-26 NOTE — PROGRESS NOTE ADULT - SUBJECTIVE AND OBJECTIVE BOX
REASON FOR CONSULT: abnormal EKG    CHIEF COMPLAINT:  was sent by her physician     HPI:  Pt is a 89 yo female who presents to the ED for abnormal lab result.  PMHx of depression, gout, HLD, HTN, hypothyroidism, OA, overactive bladder.  Pt reports that for the last several weeks she has not been feeling well.  She reports extreme fatigue and chills.  Pt followed up with her PMD this week who preformed outpatient blood work.  She was notified today that her potassium was elevated and that she needed to come to the ED for further work up. Pt denies fever, V/C, CP, SOB, abd pain, ext numbness or weakness.  She does report that she suffers from IBS and had a flare yesterday with loose watery stool but pt reports that it was non-bloody, and she denies melena.  She further reports that she followed up with her urologist several days ago and was started on po Doxycyline for a possible UTI.  Pt also states that she has been steadily losing weight over the last several months and is down from 163 pounds to 154.  She also reports that all her life she has suffered from HTN but lately her BP has been running low and she has not had to take her medication including her water pill.    	Of note Dr. Pelaez confirms pt weight loss and states that her BPs have been running low which is not usual for her.  He reports that he received a call today that her BUN/Cr was 99/2 and her K was 6.4.  Pt last ratio in April was 4.1/1.3.  He also reports that she had RUQ pain on exam and had reported mild nausea at that time   Pt reports that she did have a prior upper endo and colonoscopy many years ago which was WNL per reports  She had 1 liter IVF in ED , second liter is going, CT scan of abdomen did not show any hydronephrosis or bladder distention. (25 May 2018 12:41). Patient was placed on Lasix  by her cardiologist 2 months ago ,as she was having SPEAR , which has improved much with diuretics ,     patient denies any chest pain ,  patient was not taking her medication for last couple of days , she was noted to have low normal SBP when she came to ER , BP improved with IV fluids  currently comfortable    18: No complaints and feels fine.  Her repeat potasium this AM is 5.6  No cp or sob.        PAST MEDICAL & SURGICAL HISTORY:  HLD (hyperlipidemia)  Hypothyroid  Osteoarthritis  Gout  Overactive bladder  Depression  Hypertension  S/P lung surgery, follow-up exam: s/p removal of suspicious lesion, negative  S/P hysterectomy  S/P lumpectomy of breast  S/P cholecystectomy      Allergies    iodine (Anaphylaxis)  penicillin (Other)  sulfa drugs (Hives)  throat closes (Other)  throat closes (Other)    Intolerances        SOCIAL HISTORY: former smoker     FAMILY HISTORY:  Family history of breast cancer  Family history of nasopharyngeal cancer (Child): daughter    REVIEW OF SYSTEMS:     as above   GENITOURINARY: positive for  dysuria, frequency or hematuria  NEUROLOGICAL: No numbness or weakness  SKIN: No itching, burning, rashes, or lesions   All other review of systems is negative unless indicated above    MEDICATIONS  (STANDING):  allopurinol 100 milliGRAM(s) Oral at bedtime  ATENolol  Tablet 25 milliGRAM(s) Oral every 12 hours  heparin  Injectable 5000 Unit(s) SubCutaneous every 12 hours  levothyroxine 75 MICROGram(s) Oral daily  oxybutynin 5 milliGRAM(s) Oral at bedtime  pantoprazole  Injectable 40 milliGRAM(s) IV Push once  sodium chloride 0.9%. 1000 milliLiter(s) (75 mL/Hr) IV Continuous <Continuous>  venlafaxine XR. 37.5 milliGRAM(s) Oral daily    MEDICATIONS  (PRN):      Vital Signs Last 24 Hrs  T(C): 36.7 (26 May 2018 13:59), Max: 36.9 (26 May 2018 01:10)  T(F): 98.1 (26 May 2018 13:59), Max: 98.5 (26 May 2018 01:10)  HR: 70 (26 May 2018 13:59) (51 - 91)  BP: 156/72 (26 May 2018 13:59) (136/79 - 162/69)  BP(mean): --  RR: 18 (26 May 2018 13:59) (17 - 20)  SpO2: 96% (26 May 2018 13:59) (94% - 96%)    I&O's Detail    25 May 2018 07:01  -  26 May 2018 07:00  --------------------------------------------------------  IN:    sodium chloride 0.9%.: 1125 mL  Total IN: 1125 mL    OUT:    Voided: 1450 mL  Total OUT: 1450 mL    Total NET: -325 mL          Daily     Daily Weight in k.5 (26 May 2018 12:07)    PHYSICAL EXAM:    Constitutional: NAD, awake and alert, well-developed  HEENT: PERR, EOMI,  No oral cyananosis.  Neck:  supple,  No JVD  Respiratory: Breath sounds are clear bilaterally, No wheezing, rales or rhonchi  Cardiovascular: S1 and S2, regular rate and rhythm, no Murmurs, gallops or rubs  Gastrointestinal: Bowel Sounds present, soft, nontender.   Extremities: No peripheral edema. No clubbing or cyanosis.  Vascular: 2+ peripheral pulses  Neurological: A/O x 3, no focal deficits  Musculoskeletal: no calf tenderness.  Skin: No rashes.      LABS: All Labs Reviewed:                                   10.4   7.7   )-----------( 225      ( 26 May 2018 07:12 )             32.6     05-26    143  |  116<H>  |  79<H>  ----------------------------<  91  5.6<H>   |  21<L>  |  1.75<H>    Ca    9.0      26 May 2018 07:12  Phos  5.0     05-25  Mg     1.7     05-25    TPro  7.1  /  Alb  3.7  /  TBili  0.4  /  DBili  x   /  AST  24  /  ALT  29  /  AlkPhos  75  05-25        LIVER FUNCTIONS - ( 25 May 2018 09:58 )  Alb: 3.7 g/dL / Pro: 7.1 g/dL / ALK PHOS: 75 U/L / ALT: 29 U/L DA / AST: 24 U/L / GGT: x             Pro Bnp 295    < from: TTE Echo Doppler w/o Cont (17 @ 10:03) >  Normal left ventricular size and systolic function, estimated LVEF of   65%. Normal right ventricular size and systolic function.  Grade 1   diastolic dysfunction. Normal biatrial size. The aortic root is normal in   size. The mitral valve is structurally normal, trace physiologic MR. The   aortic valve is calcified without stenosis or insufficiency. Trace   physiologic TR. Estimated PA systolic pressure is 41 mm Hg, assuming an   RA pressure of 10 mmHg. No significant pericardial effusion.        < end of copied text >      RADIOLOGY/EKG: normal sinus rhythm  first degree av block

## 2018-05-26 NOTE — DIETITIAN INITIAL EVALUATION ADULT. - OTHER INFO
Pt reports she had a mother/daughter home, grandson and his family live upstairs, she does he own food shopping and cooking, follows a low na diet, eating less for the last month as she was just not feeling like usual, no specific nutrition related complaints, takes a Align probiotic daily, feeling better and eating better, had a zachary BM yesterday. Pt with pmhx as below being treated for HTN and severe dehydration. skin- intact . Pt aware that her potassium and phosphorus is on the high side right now that is why she is on a renal . She says that she loves and normally eats a lot of tomatoes. Pt aware that for now she wont be getting tomatoes but once labs normalize her diet can hoepfully be liberalized.

## 2018-05-26 NOTE — CHART NOTE - NSCHARTNOTEFT_GEN_A_CORE
Called again re: BP.  Even after a dose of hydralazine and clonidine, repeat BP now is SBP >190 with a HR 76.  Will try a dose of labetalol.

## 2018-05-26 NOTE — DIETITIAN INITIAL EVALUATION ADULT. - PERTINENT LABORATORY DATA
( 5-25-18) phos 5.0( H) ( 5-26-18) na 143 k 5.6( H) cl 116( H) glu 91 BUN79( H) creat 1.75( H) trending down from 5-25-18 ca 9.0

## 2018-05-26 NOTE — PROGRESS NOTE ADULT - SUBJECTIVE AND OBJECTIVE BOX
Patient is a 89y old  Female who presents with a chief complaint of abnormal labs (25 May 2018 12:41)      INTERVAL HPI/OVERNIGHT EVENTS: no acute overnight events , pt is feeling fine .     MEDICATIONS  (STANDING):  allopurinol 100 milliGRAM(s) Oral at bedtime  ATENolol  Tablet 25 milliGRAM(s) Oral every 12 hours  heparin  Injectable 5000 Unit(s) SubCutaneous every 12 hours  levothyroxine 75 MICROGram(s) Oral daily  oxybutynin 5 milliGRAM(s) Oral at bedtime  sodium chloride 0.9%. 1000 milliLiter(s) (75 mL/Hr) IV Continuous <Continuous>  venlafaxine XR. 37.5 milliGRAM(s) Oral daily    MEDICATIONS  (PRN):      Allergies    iodine (Anaphylaxis)  penicillin (Other)  sulfa drugs (Hives)  throat closes (Other)  throat closes (Other)    Intolerances        REVIEW OF SYSTEMS:  CONSTITUTIONAL: No fever or chills  HEENT:  No headache, no sore throat  RESPIRATORY: No cough, wheezing, or shortness of breath  CARDIOVASCULAR: No chest pain, palpitations, or leg swelling  GASTROINTESTINAL: No nausea, vomiting, or diarrhea  GENITOURINARY: No dysuria, frequency, or hematuria  NEUROLOGICAL: no focal weakness or dizziness  SKIN:  No rashes or lesions   MUSCULOSKELETAL: no myalgias   PSYCHIATRIC: No depression or anxiety      Vital Signs Last 24 Hrs  T(C): 36.6 (26 May 2018 09:36), Max: 36.9 (26 May 2018 01:10)  T(F): 97.9 (26 May 2018 09:36), Max: 98.5 (26 May 2018 01:10)  HR: 51 (26 May 2018 09:36) (51 - 92)  BP: 158/70 (26 May 2018 09:36) (129/67 - 162/69)  BP(mean): --  RR: 20 (26 May 2018 09:36) (17 - 20)  SpO2: 95% (26 May 2018 05:42) (94% - 95%)    PHYSICAL EXAM:  GENERAL: NAD  HEENT:  EOMI, mmm  CHEST/LUNG:  CTA b/l, no rales, wheezes, or rhonchi  HEART:  RRR, S1, S2, []murmur  ABDOMEN:  BS+, soft, NT, ND  EXTREMITIES: no edema or calf tenderness  NERVOUS SYSTEM: AA&Ox3    DIET:     Cultures: Culture Results:   <10,000 CFU/ml Normal Urogenital sandeep present ( @ 14:56)      LABS:                        10.4   7.7   )-----------( 225      ( 26 May 2018 07:12 )             32.6     CBC Full  -  ( 26 May 2018 07:12 )  WBC Count : 7.7 K/uL  Hemoglobin : 10.4 g/dL  Hematocrit : 32.6 %  Platelet Count - Automated : 225 K/uL  Mean Cell Volume : 98.8 fl  Mean Cell Hemoglobin : 31.4 pg  Mean Cell Hemoglobin Concentration : 31.8 gm/dL  Auto Neutrophil # : x  Auto Lymphocyte # : x  Auto Monocyte # : x  Auto Eosinophil # : x  Auto Basophil # : x  Auto Neutrophil % : x  Auto Lymphocyte % : x  Auto Monocyte % : x  Auto Eosinophil % : x  Auto Basophil % : x    26 May 2018 07:12    143    |  116    |  79     ----------------------------<  91     5.6     |  21     |  1.75     Ca    9.0        26 May 2018 07:12        Urinalysis Basic - ( 25 May 2018 10:18 )    Color: Pale Yellow / Appearance: Clear / S.020 / pH: x  Gluc: x / Ketone: Negative  / Bili: Negative / Urobili: Negative mg/dL   Blood: x / Protein: 30 mg/dL / Nitrite: Negative   Leuk Esterase: Moderate / RBC: 0-2 /HPF / WBC 3-5   Sq Epi: x / Non Sq Epi: Few / Bacteria: Few      CAPILLARY BLOOD GLUCOSE              RADIOLOGY & ADDITIONAL TESTS:    Personally reviewed.     Consultant(s) Notes Reviewed:  [x] YES  [ ] NO    Care Discussed with [ ] Consultants  [x] Patient  [ ] Family  [x]      [ ] Other; RN  DVT ppx  Advanced directive

## 2018-05-26 NOTE — PROGRESS NOTE ADULT - ASSESSMENT
Pt is a 89 yo female with  PMHx of depression, gout, HLD, HTN, hypothyroidism, OA, overactive bladder is being admitted for BRENDA.     1. BRENDA likely due to  overdiuresis, pt not drinking enough  fluid. post renal causes already ruled out by CT scan,    Now improving   continue with  IVF,   renal  evaluation from  Dr Cornelius appreciated. ,   avoid any nephrotoxicity, IV contrast, monitor renal function.       2. Abnormal EKG. : non specific conduction disease , no  associated symptoms. cardiology evaluation appreciated.        3. SPEAR (dyspnea on exertion).  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     4. HTN - atenolol was discontinued due to worsened renal function.  continue with lopressor.     5. Hypothyroid, stable ,cont synthroid    6. HLD controlled, off medication.    7. Depression, cont effexor.     Hyperkalemia- , 2/2 BRENDA, monitor level and f/u with renal team.     IMPROVE VTE Individual Risk Assessment, start on heparin sc and SCD.           RISK                                                          Points    [  ] Previous VTE                                                3    [  ] Thrombophilia                                             2    [  ] Lower limb paralysis                                    2        (unable to hold up >15 seconds)      [  ] Current Cancer                                             2         (within 6 months)    [  ] Immobilization > 24 hrs                              1    [  ] ICU/CCU stay > 24 hours                            1    [ x ] Age > 60                                                    1    IMPROVE VTE Score ___1______ Pt is a 89 yo female with  PMHx of depression, gout, HLD, HTN, hypothyroidism, OA, overactive bladder is being admitted for BRENDA.     1. BRENDA likely due to  overdiuresis, pt not drinking enough  fluid. post renal causes already ruled out by CT scan,    Now improving   continue with  IVF,   renal  evaluation from  Dr Cornelius appreciated. ,   avoid any nephrotoxicity, IV contrast, monitor renal function.       2. Abnormal EKG. : non specific conduction disease , no  associated symptoms. cardiology evaluation appreciated.        3. SPEAR (dyspnea on exertion).  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     4. HTN - atenolol was discontinued due to worsened renal function.  continue with lopressor.     5. Hypothyroid, stable ,cont synthroid    6. HLD controlled, off medication.  7. Hyperkalemia- repeat BMP ordered , hold aldactone and ARBs , c/w iv fluids   low potassium diet. Renal follow up appreciated.   7. Depression, cont effexor.     Hyperkalemia- , 2/2 BRENDA, monitor level and f/u with renal team.     IMPROVE VTE Individual Risk Assessment, start on heparin sc and SCD.           RISK                                                          Points    [  ] Previous VTE                                                3    [  ] Thrombophilia                                             2    [  ] Lower limb paralysis                                    2        (unable to hold up >15 seconds)      [  ] Current Cancer                                             2         (within 6 months)    [  ] Immobilization > 24 hrs                              1    [  ] ICU/CCU stay > 24 hours                            1    [ x ] Age > 60                                                    1    IMPROVE VTE Score ___1______ Pt is a 89 yo female with  PMHx of depression, gout, HLD, HTN, hypothyroidism, OA, overactive bladder is being admitted for BRENDA.     1. BRENDA likely due to  overdiuresis, pt not drinking enough  fluid. post renal causes already ruled out by CT scan,    Now improving   continue with  IVF,   renal  evaluation from  Dr Cornelius appreciated. ,   avoid any nephrotoxicity, IV contrast, monitor renal function.       2. Abnormal EKG. : non specific conduction disease , no  associated symptoms. cardiology evaluation appreciated.        3. SPEAR (dyspnea on exertion).  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     4. HTN - atenolol was discontinued due to worsened renal function. .     5. Hypothyroid, stable ,cont synthroid    6. HLD controlled, off medication.  7. Hyperkalemia- repeat BMP ordered , hold aldactone and ARBs , c/w iv fluids   low potassium diet. Renal follow up appreciated.   7. Depression, cont effexor.     Hyperkalemia- , 2/2 BRENDA, monitor level and f/u with renal team.     IMPROVE VTE Individual Risk Assessment, start on heparin sc and SCD.           RISK                                                          Points    [  ] Previous VTE                                                3    [  ] Thrombophilia                                             2    [  ] Lower limb paralysis                                    2        (unable to hold up >15 seconds)      [  ] Current Cancer                                             2         (within 6 months)    [  ] Immobilization > 24 hrs                              1    [  ] ICU/CCU stay > 24 hours                            1    [ x ] Age > 60                                                    1    IMPROVE VTE Score ___1______

## 2018-05-26 NOTE — PROGRESS NOTE ADULT - ASSESSMENT
·	BRENDA, CKD 4: Prerenal azotemia, continue IVF  ·	Hyperkalemia, will repeat labs and place her on low potassium diet.   ·	Hypertension       Will hold ARB, Aldactone. IV hydration. Renal sonogram is normal. Check repeat labs and monitor renal function trend. Encourage PO intake as tolerated.   Labs as ordered. Avoid nephrotoxic meds as possible. Avoid ACEI, ARB and NSAIDS. Monitor input and output.   Will follow electrolytes and renal function trend. Spoke to family at bed side.

## 2018-05-27 LAB
ANION GAP SERPL CALC-SCNC: 8 MMOL/L — SIGNIFICANT CHANGE UP (ref 5–17)
ANION GAP SERPL CALC-SCNC: 8 MMOL/L — SIGNIFICANT CHANGE UP (ref 5–17)
BUN SERPL-MCNC: 45 MG/DL — HIGH (ref 7–23)
BUN SERPL-MCNC: 50 MG/DL — HIGH (ref 7–23)
CALCIUM SERPL-MCNC: 8.5 MG/DL — SIGNIFICANT CHANGE UP (ref 8.4–10.5)
CALCIUM SERPL-MCNC: 9.1 MG/DL — SIGNIFICANT CHANGE UP (ref 8.4–10.5)
CHLORIDE SERPL-SCNC: 112 MMOL/L — HIGH (ref 96–108)
CHLORIDE SERPL-SCNC: 117 MMOL/L — HIGH (ref 96–108)
CO2 SERPL-SCNC: 18 MMOL/L — LOW (ref 22–31)
CO2 SERPL-SCNC: 20 MMOL/L — LOW (ref 22–31)
CREAT SERPL-MCNC: 1.31 MG/DL — HIGH (ref 0.5–1.3)
CREAT SERPL-MCNC: 1.46 MG/DL — HIGH (ref 0.5–1.3)
GLUCOSE SERPL-MCNC: 101 MG/DL — HIGH (ref 70–99)
GLUCOSE SERPL-MCNC: 98 MG/DL — SIGNIFICANT CHANGE UP (ref 70–99)
HCT VFR BLD CALC: 32 % — LOW (ref 34.5–45)
HGB BLD-MCNC: 10.2 G/DL — LOW (ref 11.5–15.5)
MCHC RBC-ENTMCNC: 31.3 PG — SIGNIFICANT CHANGE UP (ref 27–34)
MCHC RBC-ENTMCNC: 31.9 GM/DL — LOW (ref 32–36)
MCV RBC AUTO: 98.1 FL — SIGNIFICANT CHANGE UP (ref 80–100)
PLATELET # BLD AUTO: 216 K/UL — SIGNIFICANT CHANGE UP (ref 150–400)
POTASSIUM SERPL-MCNC: 5.1 MMOL/L — SIGNIFICANT CHANGE UP (ref 3.5–5.3)
POTASSIUM SERPL-MCNC: 5.7 MMOL/L — HIGH (ref 3.5–5.3)
POTASSIUM SERPL-SCNC: 5.1 MMOL/L — SIGNIFICANT CHANGE UP (ref 3.5–5.3)
POTASSIUM SERPL-SCNC: 5.7 MMOL/L — HIGH (ref 3.5–5.3)
RBC # BLD: 3.26 M/UL — LOW (ref 3.8–5.2)
RBC # FLD: 14 % — SIGNIFICANT CHANGE UP (ref 10.3–14.5)
SODIUM SERPL-SCNC: 138 MMOL/L — SIGNIFICANT CHANGE UP (ref 135–145)
SODIUM SERPL-SCNC: 145 MMOL/L — SIGNIFICANT CHANGE UP (ref 135–145)
WBC # BLD: 9.1 K/UL — SIGNIFICANT CHANGE UP (ref 3.8–10.5)
WBC # FLD AUTO: 9.1 K/UL — SIGNIFICANT CHANGE UP (ref 3.8–10.5)

## 2018-05-27 PROCEDURE — 99232 SBSQ HOSP IP/OBS MODERATE 35: CPT

## 2018-05-27 PROCEDURE — 99233 SBSQ HOSP IP/OBS HIGH 50: CPT

## 2018-05-27 RX ORDER — SODIUM POLYSTYRENE SULFONATE 4.1 MEQ/G
15 POWDER, FOR SUSPENSION ORAL ONCE
Qty: 0 | Refills: 0 | Status: COMPLETED | OUTPATIENT
Start: 2018-05-27 | End: 2018-05-27

## 2018-05-27 RX ORDER — SODIUM POLYSTYRENE SULFONATE 4.1 MEQ/G
30 POWDER, FOR SUSPENSION ORAL ONCE
Qty: 0 | Refills: 0 | Status: COMPLETED | OUTPATIENT
Start: 2018-05-27 | End: 2018-05-27

## 2018-05-27 RX ORDER — HYDRALAZINE HCL 50 MG
25 TABLET ORAL EVERY 8 HOURS
Qty: 0 | Refills: 0 | Status: DISCONTINUED | OUTPATIENT
Start: 2018-05-27 | End: 2018-06-02

## 2018-05-27 RX ORDER — METOPROLOL TARTRATE 50 MG
12.5 TABLET ORAL
Qty: 0 | Refills: 0 | Status: DISCONTINUED | OUTPATIENT
Start: 2018-05-27 | End: 2018-05-27

## 2018-05-27 RX ADMIN — Medication 0.1 MILLIGRAM(S): at 23:21

## 2018-05-27 RX ADMIN — SODIUM CHLORIDE 75 MILLILITER(S): 9 INJECTION INTRAMUSCULAR; INTRAVENOUS; SUBCUTANEOUS at 11:38

## 2018-05-27 RX ADMIN — Medication 100 MILLIGRAM(S): at 21:21

## 2018-05-27 RX ADMIN — Medication 5 MILLIGRAM(S): at 21:21

## 2018-05-27 RX ADMIN — SODIUM POLYSTYRENE SULFONATE 15 GRAM(S): 4.1 POWDER, FOR SUSPENSION ORAL at 15:25

## 2018-05-27 RX ADMIN — HEPARIN SODIUM 5000 UNIT(S): 5000 INJECTION INTRAVENOUS; SUBCUTANEOUS at 05:59

## 2018-05-27 RX ADMIN — ATENOLOL 25 MILLIGRAM(S): 25 TABLET ORAL at 06:00

## 2018-05-27 RX ADMIN — Medication 75 MICROGRAM(S): at 05:59

## 2018-05-27 RX ADMIN — Medication 25 MILLIGRAM(S): at 19:27

## 2018-05-27 RX ADMIN — Medication 10 MILLIGRAM(S): at 00:09

## 2018-05-27 RX ADMIN — Medication 37.5 MILLIGRAM(S): at 11:38

## 2018-05-27 RX ADMIN — HEPARIN SODIUM 5000 UNIT(S): 5000 INJECTION INTRAVENOUS; SUBCUTANEOUS at 18:06

## 2018-05-27 RX ADMIN — SODIUM CHLORIDE 75 MILLILITER(S): 9 INJECTION INTRAMUSCULAR; INTRAVENOUS; SUBCUTANEOUS at 21:21

## 2018-05-27 NOTE — PROGRESS NOTE ADULT - ASSESSMENT
Pt is a 89 yo female with  PMHx of depression, gout, HLD, HTN, hypothyroidism, OA, overactive bladder is being admitted for BRENDA.     1. BRENDA likely due to  overdiuresis, pt not drinking enough  fluid. post renal causes already ruled out by CT scan,    Now improving   continue with  IVF,   renal  evaluation from  Dr Cornelius appreciated. ,   avoid any nephrotoxicity, IV contrast, monitor renal function.       2. Abnormal EKG. : non specific conduction disease , no  associated symptoms. cardiology evaluation appreciated.        3. SPEAR (dyspnea on exertion).  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     4. HTN - atenolol was discontinued due to worsened renal function. monitor BP off meds , cardiology is following.     5. Hypothyroid, stable ,cont synthroid    6. HLD controlled, off medication.  7. Hyperkalemia- repeat BMP ordered , hold aldactone and ARBs , c/w iv fluids   low potassium diet. Renal follow up appreciated.   7. Depression, cont effexor.     Hyperkalemia- , 2/2 BRENDA, monitor level and f/u with renal team.     IMPROVE VTE Individual Risk Assessment, start on heparin sc and SCD.           RISK                                                          Points    [  ] Previous VTE                                                3    [  ] Thrombophilia                                             2    [  ] Lower limb paralysis                                    2        (unable to hold up >15 seconds)      [  ] Current Cancer                                             2         (within 6 months)    [  ] Immobilization > 24 hrs                              1    [  ] ICU/CCU stay > 24 hours                            1    [ x ] Age > 60                                                    1    IMPROVE VTE Score ___1______ Pt is a 89 yo female with  PMHx of depression, gout, HLD, HTN, hypothyroidism, OA, overactive bladder is being admitted for BRENDA.     1. BRENDA likely due to  overdiuresis, pt not drinking enough  fluid. post renal causes already ruled out by CT scan,    Now improving   continue with  IVF,   renal  evaluation from  Dr Cornelius appreciated. ,   avoid any nephrotoxicity, IV contrast, monitor renal function.       2. Abnormal EKG. : non specific conduction disease , no  associated symptoms. cardiology evaluation appreciated.        3. SPEAR (dyspnea on exertion).  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 HTN - atenolol was discontinued due to worsened renal function. monitor BP off meds , cardiology is following. Bp is on higher side , monitor Bp closely.     5. Hypothyroid, stable ,cont synthroid    6. HLD controlled, off medication.  7. Hyperkalemia- repeat BMP ordered , hold aldactone and ARBs , c/w iv fluids   low potassium diet. Renal follow up appreciated.   7. Depression, cont effexor.     Hyperkalemia- , 2/2 BRENDA, monitor level and f/u with renal team.     IMPROVE VTE Individual Risk Assessment, start on heparin sc and SCD.           RISK                                                          Points    [  ] Previous VTE                                                3    [  ] Thrombophilia                                             2    [  ] Lower limb paralysis                                    2        (unable to hold up >15 seconds)      [  ] Current Cancer                                             2         (within 6 months)    [  ] Immobilization > 24 hrs                              1    [  ] ICU/CCU stay > 24 hours                            1    [ x ] Age > 60                                                    1    IMPROVE VTE Score ___1______

## 2018-05-27 NOTE — PROGRESS NOTE ADULT - SUBJECTIVE AND OBJECTIVE BOX
REASON FOR CONSULT: abnormal EKG    CHIEF COMPLAINT:  was sent by her physician     HPI:  Pt is a 89 yo female who presents to the ED for abnormal lab result.  PMHx of depression, gout, HLD, HTN, hypothyroidism, OA, overactive bladder.  Pt reports that for the last several weeks she has not been feeling well.  She reports extreme fatigue and chills.  Pt followed up with her PMD this week who preformed outpatient blood work.  She was notified today that her potassium was elevated and that she needed to come to the ED for further work up. Pt denies fever, V/C, CP, SOB, abd pain, ext numbness or weakness.  She does report that she suffers from IBS and had a flare yesterday with loose watery stool but pt reports that it was non-bloody, and she denies melena.  She further reports that she followed up with her urologist several days ago and was started on po Doxycyline for a possible UTI.  Pt also states that she has been steadily losing weight over the last several months and is down from 163 pounds to 154.  She also reports that all her life she has suffered from HTN but lately her BP has been running low and she has not had to take her medication including her water pill.  Of note Dr. Pelaez confirms pt weight loss and states that her BPs have been running low which is not usual for her.  He reports that he received a call today that her BUN/Cr was 99/2 and her K was 6.4.  Pt last ratio in April was 4.1/1.3.  He also reports that she had RUQ pain on exam and had reported mild nausea at that time. Pt reports that she did have a prior upper endo and colonoscopy many years ago which was WNL per reports  She had 1 liter IVF in ED , second liter is going, CT scan of abdomen did not show any hydronephrosis or bladder distention. (25 May 2018 12:41). Patient was placed on Lasix  by her cardiologist 2 months ago ,as she was having SPEAR , which has improved much with diuretics ,     patient denies any chest pain ,  patient was not taking her medication for last couple of days , she was noted to have low normal SBP when she came to ER , BP improved with IV fluids  currently comfortable    18: No complaints and feels fine.  Her repeat potasium this AM is 5.6  No cp or sob.   5/27/18: No new complaints. BP was running very high and potasium is elevated.      PAST MEDICAL & SURGICAL HISTORY:  HLD (hyperlipidemia)  Hypothyroid  Osteoarthritis  Gout  Overactive bladder  Depression  Hypertension  S/P lung surgery, follow-up exam: s/p removal of suspicious lesion, negative  S/P hysterectomy  S/P lumpectomy of breast  S/P cholecystectomy    Allergies  iodine (Anaphylaxis)  penicillin (Other)  sulfa drugs (Hives)  throat closes (Other)  throat closes (Other)    SOCIAL HISTORY: former smoker     FAMILY HISTORY:  Family history of breast cancer  Family history of nasopharyngeal cancer (Child): daughter    REVIEW OF SYSTEMS:     as above   GENITOURINARY: positive for  dysuria, frequency or hematuria  NEUROLOGICAL: No numbness or weakness  SKIN: No itching, burning, rashes, or lesions   All other review of systems is negative unless indicated above    MEDICATIONS  (STANDING):  allopurinol 100 milliGRAM(s) Oral at bedtime  heparin  Injectable 5000 Unit(s) SubCutaneous every 12 hours  levothyroxine 75 MICROGram(s) Oral daily  oxybutynin 5 milliGRAM(s) Oral at bedtime  sodium chloride 0.9%. 1000 milliLiter(s) (75 mL/Hr) IV Continuous <Continuous>  venlafaxine XR. 37.5 milliGRAM(s) Oral daily    MEDICATIONS  (PRN):      Vital Signs Last 24 Hrs  T(C): 36.9 (27 May 2018 13:55), Max: 37.1 (27 May 2018 04:57)  T(F): 98.4 (27 May 2018 13:55), Max: 98.8 (27 May 2018 04:57)  HR: 61 (27 May 2018 13:55) (56 - 82)  BP: 152/67 (27 May 2018 13:55) (152/67 - 178/76)  BP(mean): --  RR: 16 (27 May 2018 13:55) (14 - 19)  SpO2: 97% (27 May 2018 13:55) (95% - 97%)    I&O's Detail    26 May 2018 07:01  -  27 May 2018 07:00  --------------------------------------------------------  IN:    sodium chloride 0.9%.: 1575 mL  Total IN: 1575 mL    OUT:    Voided: 1100 mL  Total OUT: 1100 mL    Total NET: 475 mL      27 May 2018 07:01  -  27 May 2018 16:13  --------------------------------------------------------  IN:  Total IN: 0 mL    OUT:    Voided: 500 mL  Total OUT: 500 mL    Total NET: -500 mL          Daily     Daily     Daily     Daily Weight in k.5 (26 May 2018 12:07)    PHYSICAL EXAM:    Constitutional: NAD, awake and alert, well-developed  HEENT: PERR, EOMI,  No oral cyananosis.  Neck:  supple,  No JVD  Respiratory: Breath sounds are clear bilaterally, No wheezing, rales or rhonchi  Cardiovascular: S1 and S2, regular rate and rhythm, no Murmurs, gallops or rubs  Gastrointestinal: Bowel Sounds present, soft, nontender.   Extremities: No peripheral edema. No clubbing or cyanosis.  Vascular: 2+ peripheral pulses  Neurological: A/O x 3, no focal deficits  Musculoskeletal: no calf tenderness.  Skin: No rashes.      LABS: All Labs Reviewed:                         10.2   9.1   )-----------( 216      ( 27 May 2018 07:27 )             32.0     05-27    145  |  117<H>  |  50<H>  ----------------------------<  98  5.7<H>   |  20<L>  |  1.46<H>    Ca    9.1      27 May 2018 07:27      Pro Bnp 295    < from: TTE Echo Doppler w/o Cont (11.21.17 @ 10:03) >  Normal left ventricular size and systolic function, estimated LVEF of   65%. Normal right ventricular size and systolic function.  Grade 1   diastolic dysfunction. Normal biatrial size. The aortic root is normal in   size. The mitral valve is structurally normal, trace physiologic MR. The   aortic valve is calcified without stenosis or insufficiency. Trace   physiologic TR. Estimated PA systolic pressure is 41 mm Hg, assuming an   RA pressure of 10 mmHg. No significant pericardial effusion.        < end of copied text >      RADIOLOGY/EKG: normal sinus rhythm  first degree av block

## 2018-05-27 NOTE — PROGRESS NOTE ADULT - SUBJECTIVE AND OBJECTIVE BOX
Patient is a 89y old  Female who presents with a chief complaint of abnormal labs (25 May 2018 12:41)      INTERVAL HPI/OVERNIGHT EVENTS: no acute overnight events , pt is feeling fine .     MEDICATIONS  (STANDING):  allopurinol 100 milliGRAM(s) Oral at bedtime  heparin  Injectable 5000 Unit(s) SubCutaneous every 12 hours  levothyroxine 75 MICROGram(s) Oral daily  oxybutynin 5 milliGRAM(s) Oral at bedtime  sodium chloride 0.9%. 1000 milliLiter(s) (75 mL/Hr) IV Continuous <Continuous>  sodium polystyrene sulfonate Suspension 30 Gram(s) Oral once  venlafaxine XR. 37.5 milliGRAM(s) Oral daily    MEDICATIONS  (PRN):      Allergies    iodine (Anaphylaxis)  penicillin (Other)  sulfa drugs (Hives)  throat closes (Other)  throat closes (Other)    Intolerances        REVIEW OF SYSTEMS:  CONSTITUTIONAL: No fever or chills  HEENT:  No headache, no sore throat  RESPIRATORY: No cough, wheezing, or shortness of breath  CARDIOVASCULAR: No chest pain, palpitations, or leg swelling  GASTROINTESTINAL: No nausea, vomiting, or diarrhea  GENITOURINARY: No dysuria, frequency, or hematuria  NEUROLOGICAL: no focal weakness or dizziness  SKIN:  No rashes or lesions   MUSCULOSKELETAL: no myalgias   PSYCHIATRIC: No depression or anxiety  ROS Unable to obtain due to - [ ] Dementia  [ ] Lethargy  REST OF REVIEW Of SYSTEM - [ ] Normal     Vital Signs Last 24 Hrs  T(C): 36.9 (27 May 2018 09:26), Max: 37.1 (27 May 2018 04:57)  T(F): 98.4 (27 May 2018 09:26), Max: 98.8 (27 May 2018 04:57)  HR: 56 (27 May 2018 09:26) (56 - 82)  BP: 157/75 (27 May 2018 09:26) (156/72 - 178/76)  BP(mean): --  RR: 16 (27 May 2018 09:26) (14 - 19)  SpO2: 96% (27 May 2018 09:26) (95% - 96%)    PHYSICAL EXAM:  GENERAL: NAD  HEENT:  EOMI, mmm  CHEST/LUNG:  CTA b/l, no rales, wheezes, or rhonchi  HEART:  RRR, S1, S2, []murmur  ABDOMEN:  BS+, soft, NT, ND  EXTREMITIES: no edema or calf tenderness  SKIN:  no rash  NERVOUS SYSTEM: AA&Ox3    DIET:     Cultures: Culture Results:   <10,000 CFU/ml Normal Urogenital sandeep present (05-25 @ 14:56)      LABS:                        10.2   9.1   )-----------( 216      ( 27 May 2018 07:27 )             32.0     CBC Full  -  ( 27 May 2018 07:27 )  WBC Count : 9.1 K/uL  Hemoglobin : 10.2 g/dL  Hematocrit : 32.0 %  Platelet Count - Automated : 216 K/uL  Mean Cell Volume : 98.1 fl  Mean Cell Hemoglobin : 31.3 pg  Mean Cell Hemoglobin Concentration : 31.9 gm/dL  Auto Neutrophil # : x  Auto Lymphocyte # : x  Auto Monocyte # : x  Auto Eosinophil # : x  Auto Basophil # : x  Auto Neutrophil % : x  Auto Lymphocyte % : x  Auto Monocyte % : x  Auto Eosinophil % : x  Auto Basophil % : x    27 May 2018 07:27    145    |  117    |  50     ----------------------------<  98     5.7     |  20     |  1.46     Ca    9.1        27 May 2018 07:27          CAPILLARY BLOOD GLUCOSE              RADIOLOGY & ADDITIONAL TESTS:    Personally reviewed.     Consultant(s) Notes Reviewed:  [x] YES  [ ] NO    Care Discussed with [ ] Consultants  [x] Patient  [ ] Family  [x]      [ ] Other; RN  DVT ppx  Advanced directive Patient is a 89y old  Female who presents with a chief complaint of abnormal labs (25 May 2018 12:41)      INTERVAL HPI/OVERNIGHT EVENTS: no acute overnight events , pt is feeling fine .     MEDICATIONS  (STANDING):  allopurinol 100 milliGRAM(s) Oral at bedtime  heparin  Injectable 5000 Unit(s) SubCutaneous every 12 hours  levothyroxine 75 MICROGram(s) Oral daily  oxybutynin 5 milliGRAM(s) Oral at bedtime  sodium chloride 0.9%. 1000 milliLiter(s) (75 mL/Hr) IV Continuous <Continuous>  sodium polystyrene sulfonate Suspension 30 Gram(s) Oral once  venlafaxine XR. 37.5 milliGRAM(s) Oral daily    MEDICATIONS  (PRN):      Allergies    iodine (Anaphylaxis)  penicillin (Other)  sulfa drugs (Hives)  throat closes (Other)  throat closes (Other)    Intolerances        REVIEW OF SYSTEMS:  CONSTITUTIONAL: No fever or chills  HEENT:  No headache, no sore throat  RESPIRATORY: No cough, wheezing, or shortness of breath  CARDIOVASCULAR: No chest pain, palpitations, or leg swelling  GASTROINTESTINAL: No nausea, vomiting, or diarrhea  GENITOURINARY: No dysuria, frequency, or hematuria  NEUROLOGICAL: no focal weakness or dizziness  SKIN:  No rashes or lesions   MUSCULOSKELETAL: no myalgias   PSYCHIATRIC: No depression or anxiety      Vital Signs Last 24 Hrs  T(C): 36.9 (27 May 2018 09:26), Max: 37.1 (27 May 2018 04:57)  T(F): 98.4 (27 May 2018 09:26), Max: 98.8 (27 May 2018 04:57)  HR: 56 (27 May 2018 09:26) (56 - 82)  BP: 157/75 (27 May 2018 09:26) (156/72 - 178/76)  BP(mean): --  RR: 16 (27 May 2018 09:26) (14 - 19)  SpO2: 96% (27 May 2018 09:26) (95% - 96%)    PHYSICAL EXAM:  GENERAL: NAD  HEENT:  EOMI, mmm  CHEST/LUNG:  CTA b/l, no rales, wheezes, or rhonchi  HEART:  RRR, S1, S2, []murmur  ABDOMEN:  BS+, soft, NT, ND  EXTREMITIES: no edema or calf tenderness  NERVOUS SYSTEM: AA&Ox3    DIET:     Cultures: Culture Results:   <10,000 CFU/ml Normal Urogenital sandeep present (05-25 @ 14:56)      LABS:                        10.2   9.1   )-----------( 216      ( 27 May 2018 07:27 )             32.0     CBC Full  -  ( 27 May 2018 07:27 )  WBC Count : 9.1 K/uL  Hemoglobin : 10.2 g/dL  Hematocrit : 32.0 %  Platelet Count - Automated : 216 K/uL  Mean Cell Volume : 98.1 fl  Mean Cell Hemoglobin : 31.3 pg  Mean Cell Hemoglobin Concentration : 31.9 gm/dL  Auto Neutrophil # : x  Auto Lymphocyte # : x  Auto Monocyte # : x  Auto Eosinophil # : x  Auto Basophil # : x  Auto Neutrophil % : x  Auto Lymphocyte % : x  Auto Monocyte % : x  Auto Eosinophil % : x  Auto Basophil % : x    27 May 2018 07:27    145    |  117    |  50     ----------------------------<  98     5.7     |  20     |  1.46     Ca    9.1        27 May 2018 07:27          CAPILLARY BLOOD GLUCOSE              RADIOLOGY & ADDITIONAL TESTS:    Personally reviewed.     Consultant(s) Notes Reviewed:  [x] YES  [ ] NO    Care Discussed with [ ] Consultants  [x] Patient  [ ] Family  [x]      [ ] Other; RN  DVT ppx  Advanced directive

## 2018-05-27 NOTE — ED PROVIDER NOTE - PROGRESS NOTE
BL OM, resolving strep  dc amox, augmetin (hi dose for amox component), motrin pr, one dose now oral dex
Stable.

## 2018-05-27 NOTE — PROGRESS NOTE ADULT - ASSESSMENT
·	BRENDA, CKD 4: Prerenal azotemia, continue IVF  ·	Hyperkalemia, will repeat labs and place her on low potassium diet. Will give a dose of kayexalate today.   ·	Hypertension     Will hold ARB, Aldactone. IV hydration. Renal sonogram is normal. Check repeat labs and monitor renal function trend. Encourage PO intake as tolerated.   Labs as ordered. Avoid nephrotoxic meds as possible. Avoid ACEI, ARB and NSAIDS. Monitor input and output.   Will follow electrolytes and renal function trend. Spoke to family at bed side.

## 2018-05-27 NOTE — PROGRESS NOTE ADULT - SUBJECTIVE AND OBJECTIVE BOX
CHARLES GRAHAM  89y  Female    Patient is a 89y old  Female who presents with a chief complaint of abnormal labs (25 May 2018 12:41)      out of bed to chair. family at bed side.       PAST MEDICAL & SURGICAL HISTORY:  HLD (hyperlipidemia)  Hypothyroid  Osteoarthritis  Gout  Overactive bladder  Depression  Hypertension  S/P lung surgery, follow-up exam: s/p removal of suspicious lesion, negative  S/P hysterectomy  S/P lumpectomy of breast  S/P cholecystectomy          PHYSICAL EXAM:    T(C): 36.9 (05-27-18 @ 09:26), Max: 37.1 (05-27-18 @ 04:57)  HR: 56 (05-27-18 @ 09:26) (56 - 82)  BP: 157/75 (05-27-18 @ 09:26) (157/75 - 178/76)  RR: 16 (05-27-18 @ 09:26) (14 - 19)  SpO2: 96% (05-27-18 @ 09:26) (95% - 96%)  Wt(kg): --    I&O's Detail    26 May 2018 07:01  -  27 May 2018 07:00  --------------------------------------------------------  IN:    sodium chloride 0.9%.: 1575 mL  Total IN: 1575 mL    OUT:    Voided: 1100 mL  Total OUT: 1100 mL    Total NET: 475 mL      27 May 2018 07:01  -  27 May 2018 15:09  --------------------------------------------------------  IN:  Total IN: 0 mL    OUT:    Voided: 500 mL  Total OUT: 500 mL    Total NET: -500 mL          Respiratory: clear anteriorly, decreased BS at bases  Cardiovascular: S1 S2  Gastrointestinal: soft NT ND +BS  Extremities: edema   Neuro: Awake and alert    MEDICATIONS  (STANDING):  allopurinol 100 milliGRAM(s) Oral at bedtime  heparin  Injectable 5000 Unit(s) SubCutaneous every 12 hours  levothyroxine 75 MICROGram(s) Oral daily  oxybutynin 5 milliGRAM(s) Oral at bedtime  sodium chloride 0.9%. 1000 milliLiter(s) (75 mL/Hr) IV Continuous <Continuous>  sodium polystyrene sulfonate Suspension 15 Gram(s) Oral once  venlafaxine XR. 37.5 milliGRAM(s) Oral daily    MEDICATIONS  (PRN):                            10.2   9.1   )-----------( 216      ( 27 May 2018 07:27 )             32.0       05-27    145  |  117<H>  |  50<H>  ----------------------------<  98  5.7<H>   |  20<L>  |  1.46<H>    Ca    9.1      27 May 2018 07:27

## 2018-05-28 LAB
ANION GAP SERPL CALC-SCNC: 8 MMOL/L — SIGNIFICANT CHANGE UP (ref 5–17)
BUN SERPL-MCNC: 36 MG/DL — HIGH (ref 7–23)
CALCIUM SERPL-MCNC: 8.4 MG/DL — SIGNIFICANT CHANGE UP (ref 8.4–10.5)
CHLORIDE SERPL-SCNC: 114 MMOL/L — HIGH (ref 96–108)
CO2 SERPL-SCNC: 21 MMOL/L — LOW (ref 22–31)
CREAT SERPL-MCNC: 1.15 MG/DL — SIGNIFICANT CHANGE UP (ref 0.5–1.3)
EOSINOPHIL NFR URNS MANUAL: NEGATIVE — SIGNIFICANT CHANGE UP
GLUCOSE SERPL-MCNC: 86 MG/DL — SIGNIFICANT CHANGE UP (ref 70–99)
HCT VFR BLD CALC: 30.2 % — LOW (ref 34.5–45)
HGB BLD-MCNC: 9.6 G/DL — LOW (ref 11.5–15.5)
MCHC RBC-ENTMCNC: 31.2 PG — SIGNIFICANT CHANGE UP (ref 27–34)
MCHC RBC-ENTMCNC: 31.8 GM/DL — LOW (ref 32–36)
MCV RBC AUTO: 98.1 FL — SIGNIFICANT CHANGE UP (ref 80–100)
PLATELET # BLD AUTO: 194 K/UL — SIGNIFICANT CHANGE UP (ref 150–400)
POTASSIUM SERPL-MCNC: 5.3 MMOL/L — SIGNIFICANT CHANGE UP (ref 3.5–5.3)
POTASSIUM SERPL-SCNC: 5.3 MMOL/L — SIGNIFICANT CHANGE UP (ref 3.5–5.3)
RBC # BLD: 3.08 M/UL — LOW (ref 3.8–5.2)
RBC # FLD: 13.8 % — SIGNIFICANT CHANGE UP (ref 10.3–14.5)
SODIUM SERPL-SCNC: 143 MMOL/L — SIGNIFICANT CHANGE UP (ref 135–145)
WBC # BLD: 10.1 K/UL — SIGNIFICANT CHANGE UP (ref 3.8–10.5)
WBC # FLD AUTO: 10.1 K/UL — SIGNIFICANT CHANGE UP (ref 3.8–10.5)

## 2018-05-28 PROCEDURE — 99233 SBSQ HOSP IP/OBS HIGH 50: CPT

## 2018-05-28 PROCEDURE — 12345: CPT | Mod: NC

## 2018-05-28 PROCEDURE — 93010 ELECTROCARDIOGRAM REPORT: CPT

## 2018-05-28 PROCEDURE — 99232 SBSQ HOSP IP/OBS MODERATE 35: CPT

## 2018-05-28 PROCEDURE — 71045 X-RAY EXAM CHEST 1 VIEW: CPT | Mod: 26

## 2018-05-28 RX ORDER — AMLODIPINE BESYLATE 2.5 MG/1
5 TABLET ORAL ONCE
Qty: 0 | Refills: 0 | Status: COMPLETED | OUTPATIENT
Start: 2018-05-28 | End: 2018-05-28

## 2018-05-28 RX ORDER — METOPROLOL TARTRATE 50 MG
5 TABLET ORAL
Qty: 0 | Refills: 0 | Status: COMPLETED | OUTPATIENT
Start: 2018-05-28 | End: 2018-05-29

## 2018-05-28 RX ORDER — METOPROLOL TARTRATE 50 MG
12.5 TABLET ORAL EVERY 12 HOURS
Qty: 0 | Refills: 0 | Status: DISCONTINUED | OUTPATIENT
Start: 2018-05-28 | End: 2018-05-29

## 2018-05-28 RX ORDER — FUROSEMIDE 40 MG
20 TABLET ORAL ONCE
Qty: 0 | Refills: 0 | Status: COMPLETED | OUTPATIENT
Start: 2018-05-28 | End: 2018-05-28

## 2018-05-28 RX ADMIN — Medication 12.5 MILLIGRAM(S): at 13:04

## 2018-05-28 RX ADMIN — Medication 12.5 MILLIGRAM(S): at 21:44

## 2018-05-28 RX ADMIN — Medication 100 MILLIGRAM(S): at 21:44

## 2018-05-28 RX ADMIN — Medication 37.5 MILLIGRAM(S): at 11:20

## 2018-05-28 RX ADMIN — Medication 25 MILLIGRAM(S): at 05:43

## 2018-05-28 RX ADMIN — Medication 25 MILLIGRAM(S): at 16:24

## 2018-05-28 RX ADMIN — HEPARIN SODIUM 5000 UNIT(S): 5000 INJECTION INTRAVENOUS; SUBCUTANEOUS at 05:44

## 2018-05-28 RX ADMIN — Medication 20 MILLIGRAM(S): at 18:57

## 2018-05-28 RX ADMIN — AMLODIPINE BESYLATE 5 MILLIGRAM(S): 2.5 TABLET ORAL at 08:31

## 2018-05-28 RX ADMIN — HEPARIN SODIUM 5000 UNIT(S): 5000 INJECTION INTRAVENOUS; SUBCUTANEOUS at 17:09

## 2018-05-28 RX ADMIN — Medication 75 MICROGRAM(S): at 05:43

## 2018-05-28 NOTE — PROGRESS NOTE ADULT - ASSESSMENT
Pt is a 89 yo female with  PMHx of depression, gout, HLD, HTN, hypothyroidism, OA, overactive bladder is being admitted for BRENDA.     1. BRENDA likely due to  overdiuresis, pt not drinking enough  fluid. post renal causes already ruled out by CT scan,    Now improving   continue with  IVF,   renal  evaluation from  Dr Cornelius appreciated. ,   avoid any nephrotoxicity, IV contrast, monitor renal function.       2. Abnormal EKG. : non specific conduction disease , no  associated symptoms. cardiology evaluation appreciated.        3. SPEAR (dyspnea on exertion).  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 HTN - atenolol was discontinued due to worsened renal function. monitor BP off meds , cardiology is following. Bp is on higher side , monitor Bp closely.     5. Hypothyroid, stable ,cont synthroid    6. HLD controlled, off medication.  7. Hyperkalemia- repeat BMP ordered , hold aldactone and ARBs , c/w iv fluids   low potassium diet. Renal follow up appreciated.   7. Depression, cont effexor.     Hyperkalemia- , 2/2 BRENDA, monitor level and f/u with renal team.     IMPROVE VTE Individual Risk Assessment, start on heparin sc and SCD.           RISK                                                          Points    [  ] Previous VTE                                                3    [  ] Thrombophilia                                             2    [  ] Lower limb paralysis                                    2        (unable to hold up >15 seconds)      [  ] Current Cancer                                             2         (within 6 months)    [  ] Immobilization > 24 hrs                              1    [  ] ICU/CCU stay > 24 hours                            1    [ x ] Age > 60                                                    1    IMPROVE VTE Score ___1______ Pt is a 89 yo female with  PMHx of depression, gout, HLD, HTN, hypothyroidism, OA, overactive bladder is being admitted for BRENDA.     1. BRENDA likely due to  overdiuresis - improved   post renal causes already ruled out by CT scan,     continue with  IVF,   renal  evaluation from  Dr Cornelius appreciated. ,   avoid any nephrotoxicity, IV contrast, monitor renal function.       2. Abnormal EKG. : non specific conduction disease , no  associated symptoms. cardiology evaluation appreciated.        3. SPEAR (dyspnea on exertion)- resolved , cardiology f/up appreciated.     4. HTN - atenolol was discontinued , Aldactone and ARBs on hold   Bp was high , pt recieved amlodipine this am .  started low dose lopressor.   monitor BP closely.     5. Hypothyroid, stable ,cont synthroid    6. HLD controlled, off medication.  7. Hyperkalemia- repeat BMP ordered , hold aldactone and ARBs , c/w iv fluids   low potassium diet. Renal follow up appreciated.   7. Depression, cont effexor.     Hyperkalemia- , 2/2 BRENDA,now improved , pt received  Kayexalate yesterday.    monitor level and f/u with renal team.     IMPROVE VTE Individual Risk Assessment, start on heparin sc and SCD.           RISK                                                          Points    [  ] Previous VTE                                                3    [  ] Thrombophilia                                             2    [  ] Lower limb paralysis                                    2        (unable to hold up >15 seconds)      [  ] Current Cancer                                             2         (within 6 months)    [  ] Immobilization > 24 hrs                              1    [  ] ICU/CCU stay > 24 hours                            1    [ x ] Age > 60                                                    1    IMPROVE VTE Score ___1______

## 2018-05-28 NOTE — PROGRESS NOTE ADULT - SUBJECTIVE AND OBJECTIVE BOX
Patient is a 89y old  Female who presents with a chief complaint of abnormal labs (25 May 2018 12:41)      INTERVAL HPI/OVERNIGHT EVENTS:    MEDICATIONS  (STANDING):  allopurinol 100 milliGRAM(s) Oral at bedtime  heparin  Injectable 5000 Unit(s) SubCutaneous every 12 hours  levothyroxine 75 MICROGram(s) Oral daily  metoprolol tartrate 12.5 milliGRAM(s) Oral every 12 hours  oxybutynin 5 milliGRAM(s) Oral at bedtime  sodium chloride 0.9%. 1000 milliLiter(s) (75 mL/Hr) IV Continuous <Continuous>  venlafaxine XR. 37.5 milliGRAM(s) Oral daily    MEDICATIONS  (PRN):  hydrALAZINE 25 milliGRAM(s) Oral every 8 hours PRN Systolic blood pressure >      Allergies    iodine (Anaphylaxis)  penicillin (Other)  sulfa drugs (Hives)  throat closes (Other)  throat closes (Other)    Intolerances        REVIEW OF SYSTEMS:  CONSTITUTIONAL: No fever or chills  HEENT:  No headache, no sore throat  RESPIRATORY: No cough, wheezing, or shortness of breath  CARDIOVASCULAR: No chest pain, palpitations, or leg swelling  GASTROINTESTINAL: No nausea, vomiting, or diarrhea  GENITOURINARY: No dysuria, frequency, or hematuria  NEUROLOGICAL: no focal weakness or dizziness  SKIN:  No rashes or lesions   MUSCULOSKELETAL: no myalgias   PSYCHIATRIC: No depression or anxiety  ROS Unable to obtain due to - [ ] Dementia  [ ] Lethargy  REST OF REVIEW Of SYSTEM - [ ] Normal     Vital Signs Last 24 Hrs  T(C): 37.1 (28 May 2018 09:29), Max: 37.1 (28 May 2018 09:29)  T(F): 98.8 (28 May 2018 09:29), Max: 98.8 (28 May 2018 09:29)  HR: 69 (28 May 2018 09:29) (57 - 77)  BP: 148/67 (28 May 2018 09:29) (148/67 - 190/70)  BP(mean): --  RR: 18 (28 May 2018 09:29) (16 - 18)  SpO2: 96% (28 May 2018 09:29) (96% - 98%)    PHYSICAL EXAM:  GENERAL: NAD  HEENT:  EOMI, mmm  CHEST/LUNG:  CTA b/l, no rales, wheezes, or rhonchi  HEART:  RRR, S1, S2, []murmur  ABDOMEN:  BS+, soft, NT, ND  EXTREMITIES: no edema or calf tenderness  SKIN:  no rash  NERVOUS SYSTEM: AA&Ox3    DIET:     Cultures: Culture Results:   <10,000 CFU/ml Normal Urogenital sandeep present (05-25 @ 14:56)      LABS:                        9.6    10.1  )-----------( 194      ( 28 May 2018 07:30 )             30.2     CBC Full  -  ( 28 May 2018 07:30 )  WBC Count : 10.1 K/uL  Hemoglobin : 9.6 g/dL  Hematocrit : 30.2 %  Platelet Count - Automated : 194 K/uL  Mean Cell Volume : 98.1 fl  Mean Cell Hemoglobin : 31.2 pg  Mean Cell Hemoglobin Concentration : 31.8 gm/dL  Auto Neutrophil # : x  Auto Lymphocyte # : x  Auto Monocyte # : x  Auto Eosinophil # : x  Auto Basophil # : x  Auto Neutrophil % : x  Auto Lymphocyte % : x  Auto Monocyte % : x  Auto Eosinophil % : x  Auto Basophil % : x    28 May 2018 07:30    143    |  114    |  36     ----------------------------<  86     5.3     |  21     |  1.15     Ca    8.4        28 May 2018 07:30          CAPILLARY BLOOD GLUCOSE              RADIOLOGY & ADDITIONAL TESTS:    Personally reviewed.     Consultant(s) Notes Reviewed:  [x] YES  [ ] NO    Care Discussed with [ ] Consultants  [x] Patient  [ ] Family  [x]      [ ] Other; RN  DVT ppx  Advanced directive Patient is a 89y old  Female who presents with a chief complaint of abnormal labs (25 May 2018 12:41)      INTERVAL HPI/OVERNIGHT EVENTS: pts SBP was high , pt is feeling fine . No acute complaints .    MEDICATIONS  (STANDING):  allopurinol 100 milliGRAM(s) Oral at bedtime  heparin  Injectable 5000 Unit(s) SubCutaneous every 12 hours  levothyroxine 75 MICROGram(s) Oral daily  metoprolol tartrate 12.5 milliGRAM(s) Oral every 12 hours  oxybutynin 5 milliGRAM(s) Oral at bedtime  sodium chloride 0.9%. 1000 milliLiter(s) (75 mL/Hr) IV Continuous <Continuous>  venlafaxine XR. 37.5 milliGRAM(s) Oral daily    MEDICATIONS  (PRN):  hydrALAZINE 25 milliGRAM(s) Oral every 8 hours PRN Systolic blood pressure >      Allergies    iodine (Anaphylaxis)  penicillin (Other)  sulfa drugs (Hives)  throat closes (Other)  throat closes (Other)    Intolerances        REVIEW OF SYSTEMS:  CONSTITUTIONAL: No fever or chills  HEENT:  No headache, no sore throat  RESPIRATORY: No cough, wheezing, or shortness of breath  CARDIOVASCULAR: No chest pain, palpitations, or leg swelling  GASTROINTESTINAL: No nausea, vomiting, or diarrhea  GENITOURINARY: No dysuria, frequency, or hematuria  NEUROLOGICAL: no focal weakness or dizziness  SKIN:  No rashes or lesions   MUSCULOSKELETAL: no myalgias   PSYCHIATRIC: No depression or anxiety      Vital Signs Last 24 Hrs  T(C): 37.1 (28 May 2018 09:29), Max: 37.1 (28 May 2018 09:29)  T(F): 98.8 (28 May 2018 09:29), Max: 98.8 (28 May 2018 09:29)  HR: 69 (28 May 2018 09:29) (57 - 77)  BP: 148/67 (28 May 2018 09:29) (148/67 - 190/70)  BP(mean): --  RR: 18 (28 May 2018 09:29) (16 - 18)  SpO2: 96% (28 May 2018 09:29) (96% - 98%)    PHYSICAL EXAM:  GENERAL: NAD  HEENT:  EOMI, mmm  CHEST/LUNG:  CTA b/l, no rales, wheezes, or rhonchi  HEART:  RRR, S1, S2, []murmur  ABDOMEN:  BS+, soft, NT, ND  EXTREMITIES: no edema or calf tenderness  NERVOUS SYSTEM: AA&Ox3 , no focal neurological deficit.     DIET:     Cultures: Culture Results:   <10,000 CFU/ml Normal Urogenital sandeep present (05-25 @ 14:56)      LABS:                        9.6    10.1  )-----------( 194      ( 28 May 2018 07:30 )             30.2     CBC Full  -  ( 28 May 2018 07:30 )  WBC Count : 10.1 K/uL  Hemoglobin : 9.6 g/dL  Hematocrit : 30.2 %  Platelet Count - Automated : 194 K/uL  Mean Cell Volume : 98.1 fl  Mean Cell Hemoglobin : 31.2 pg  Mean Cell Hemoglobin Concentration : 31.8 gm/dL  Auto Neutrophil # : x  Auto Lymphocyte # : x  Auto Monocyte # : x  Auto Eosinophil # : x  Auto Basophil # : x  Auto Neutrophil % : x  Auto Lymphocyte % : x  Auto Monocyte % : x  Auto Eosinophil % : x  Auto Basophil % : x    28 May 2018 07:30    143    |  114    |  36     ----------------------------<  86     5.3     |  21     |  1.15     Ca    8.4        28 May 2018 07:30          CAPILLARY BLOOD GLUCOSE              RADIOLOGY & ADDITIONAL TESTS:    Personally reviewed.     Consultant(s) Notes Reviewed:  [x] YES  [ ] NO    Care Discussed with [ ] Consultants  [x] Patient  [ ] Family  [x]      [ ] Other; RN  DVT ppx  Advanced directive

## 2018-05-28 NOTE — PROGRESS NOTE ADULT - SUBJECTIVE AND OBJECTIVE BOX
· Subjective and Objective: 	  REASON FOR CONSULT: abnormal EKG    CHIEF COMPLAINT:  was sent by her physician     HPI:  Pt is a 89 yo female who presents to the ED for abnormal lab result.  PMHx of depression, gout, HLD, HTN, hypothyroidism, OA, overactive bladder.  Pt reports that for the last several weeks she has not been feeling well.  She reports extreme fatigue and chills.  Pt followed up with her PMD this week who preformed outpatient blood work.  She was notified today that her potassium was elevated and that she needed to come to the ED for further work up. Pt denies fever, V/C, CP, SOB, abd pain, ext numbness or weakness.  She does report that she suffers from IBS and had a flare yesterday with loose watery stool but pt reports that it was non-bloody, and she denies melena.  She further reports that she followed up with her urologist several days ago and was started on po Doxycyline for a possible UTI.  Pt also states that she has been steadily losing weight over the last several months and is down from 163 pounds to 154.  She also reports that all her life she has suffered from HTN but lately her BP has been running low and she has not had to take her medication including her water pill.  Of note Dr. Pelaez confirms pt weight loss and states that her BPs have been running low which is not usual for her.  He reports that he received a call today that her BUN/Cr was 99/2 and her K was 6.4.  Pt last ratio in April was 4.1/1.3.  He also reports that she had RUQ pain on exam and had reported mild nausea at that time. Pt reports that she did have a prior upper endo and colonoscopy many years ago which was WNL per reports  She had 1 liter IVF in ED , second liter is going, CT scan of abdomen did not show any hydronephrosis or bladder distention. (25 May 2018 12:41). Patient was placed on Lasix  by her cardiologist 2 months ago ,as she was having SPEAR , which has improved much with diuretics ,     patient denies any chest pain ,  patient was not taking her medication for last couple of days , she was noted to have low normal SBP when she came to ER , BP improved with IV fluids  currently comfortable    5/26/18: No complaints and feels fine.  Her repeat potasium this AM is 5.6  No cp or sob.   5/27/18: No new complaints. BP was running very high and potasium is elevated.      05/28/18 -  Comfortable. Denies chest pain, SOB, lightheadedness, palpitations.       PAST MEDICAL & SURGICAL HISTORY:  HLD (hyperlipidemia)  Hypothyroid  Osteoarthritis  Gout  Overactive bladder  Depression  Hypertension  S/P lung surgery, follow-up exam: s/p removal of suspicious lesion, negative  S/P hysterectomy  S/P lumpectomy of breast  S/P cholecystectomy      Allergies    iodine (Anaphylaxis)  penicillin (Other)  sulfa drugs (Hives)  throat closes (Other)  throat closes (Other)      MEDICATIONS:    MEDICATIONS  (STANDING):  allopurinol 100 milliGRAM(s) Oral at bedtime  amLODIPine   Tablet 5 milliGRAM(s) Oral once  heparin  Injectable 5000 Unit(s) SubCutaneous every 12 hours  levothyroxine 75 MICROGram(s) Oral daily  oxybutynin 5 milliGRAM(s) Oral at bedtime  sodium chloride 0.9%. 1000 milliLiter(s) (75 mL/Hr) IV Continuous <Continuous>  venlafaxine XR. 37.5 milliGRAM(s) Oral daily    MEDICATIONS  (PRN):  hydrALAZINE 25 milliGRAM(s) Oral every 8 hours PRN Systolic blood pressure >      Vital Signs Last 24 Hrs  T(C): 36.7 (28 May 2018 04:55), Max: 36.9 (27 May 2018 09:26)  T(F): 98.1 (28 May 2018 04:55), Max: 98.4 (27 May 2018 09:26)  HR: 72 (28 May 2018 07:31) (56 - 77)  BP: 190/70 (28 May 2018 07:31) (152/67 - 190/70)  BP(mean): --  RR: 16 (28 May 2018 04:55) (16 - 17)  SpO2: 96% (28 May 2018 04:55) (96% - 98%)    I&O's Summary    27 May 2018 07:01  -  28 May 2018 07:00  --------------------------------------------------------  IN: 900 mL / OUT: 2370 mL / NET: -1470 mL        PHYSICAL EXAM:    Constitutional: NAD, awake and alert, well-developed  HEENT: NC/AT. Anicteric. No oral cyanosis.   Respiratory: Breath sounds are clear bilaterally, No wheezing, rales or rhonchi  Cardiovascular: S1 and S2, regular rhythm.  Gastrointestinal: Bowel Sounds present, soft, nontender.   Extremities: No peripheral edema. No clubbing or cyanosis.  Neurological: A/O x 3.  Musculoskeletal: no calf tenderness.  Skin: No rashes.      LABS: All Labs Reviewed:                        10.2   9.1   )-----------( 216      ( 27 May 2018 07:27 )             32.0                         10.4   7.7   )-----------( 225      ( 26 May 2018 07:12 )             32.6                         11.1   9.9   )-----------( 249      ( 25 May 2018 09:58 )             35.7     28 May 2018 07:30    143    |  114    |  36     ----------------------------<  86     5.3     |  21     |  1.15   27 May 2018 16:06    138    |  112    |  45     ----------------------------<  101    5.1     |  18     |  1.31   27 May 2018 07:27    145    |  117    |  50     ----------------------------<  98     5.7     |  20     |  1.46     Ca    8.4        28 May 2018 07:30  Ca    8.5        27 May 2018 16:06  Ca    9.1        27 May 2018 07:27  Phos  5.0       25 May 2018 09:58  Mg     1.7       25 May 2018 09:58    TPro  7.1    /  Alb  3.7    /  TBili  0.4    /  DBili  x      /  AST  24     /  ALT  29     /  AlkPhos  75     25 May 2018 09:58        05-25 @ 09:58  Pro Bnp 295    05-25 @ 15:01  TSH: 1.11      RADIOLOGY/EKG:    EXAM:  XR CHEST PA LAT 2V                            PROCEDURE DATE:  05/25/2018          INTERPRETATION:  History: Weakness, admission.    PA lateral. Prior 11/19/2017.    Normal heart mediastinum. Atherosclerotic aorta. Mildly hyperinflated  lungs. Linear scarring right midlung and the right base. Blunted right   costophrenic angle similar to prior prior likely represents pleural   scarring. No acute infiltrate. Degenerative spondylosis dorsal spine.    Impression: No acute process. Stable exam.        GARRISON NAIK M.D., ATTENDING RADIOLOGIST  This document has been electronically signed. May 25 2018 10:23AM    OBSERVATIONS: - Echocardiogram -     Mitral Valve: normal, trace physiologic MR.  Aortic Valve/Aorta: Calcified trileaflet aortic valve.  Tricuspid Valve: normal with trace TR.  Pulmonic Valve: normal  Left Atrium: normal  Right Atrium: normal  Left Ventricle: normal LV size and systolic function, estimated LVEF of   65%.  Right Ventricle: normal size and systolic function.  Pericardium/Pleura: no significant pleural effusion, no significant   pericardial effusion.  Pulmonary/RV Pressure: estimated PA systolic pressure of 41 mmHg assuming   an RA pressure of 10 mmHg.  LV Diastolic Function: Grade 1 diastolic dysfunction.     Normal left ventricular size and systolic function, estimated LVEF of   65%. Normal right ventricular size and systolic function.  Grade 1   diastolic dysfunction. Normal biatrial size. The aortic root is normal in   size. The mitral valve is structurally normal, trace physiologic MR. The   aortic valve is calcified without stenosis or insufficiency. Trace   physiologic TR. Estimated PA systolic pressure is 41 mm Hg, assuming an   RA pressure of 10 mmHg. No significant pericardial effusion.        SAMANTA LIMON M.D., ATTENDING CARDIOLOGIST  This document has been electronically signed. Nov 22 2017  4:08PM    rhythm - sinus

## 2018-05-28 NOTE — CHART NOTE - NSCHARTNOTEFT_GEN_A_CORE
Assessment: Pt admitted c  BRENDA: Hx depression, gout, HLD, HTN, hypothyroidism, OA, overactive bladder. Hyperkalemia now resolved. Pt requested to see RD for education on K rich foods. Provided verbal and written education. Advised pt to avoid high K foods for now and to follow MD instructions p d/c.    Factors impacting intake: [ ] none [ ] nausea  [ ] vomiting [ ] diarrhea [ ] constipation  [ ]chewing problems [ ] swallowing issues  [ ] other:     Diet Presciption: Diet, Renal Restrictions:   For patients receiving Renal Replacement - No Protein Restr, No Conc K, No Conc Phos, Low Sodium (05-25-18 @ 14:27)    Intake: >50%    Current Weight: Weight (kg): 69.9 (05-25 @ 09:16)  % Weight Change no new wt    Pertinent Medications: MEDICATIONS  (STANDING):  allopurinol 100 milliGRAM(s) Oral at bedtime  heparin  Injectable 5000 Unit(s) SubCutaneous every 12 hours  levothyroxine 75 MICROGram(s) Oral daily  metoprolol tartrate 12.5 milliGRAM(s) Oral every 12 hours  oxybutynin 5 milliGRAM(s) Oral at bedtime  sodium chloride 0.9%. 1000 milliLiter(s) (75 mL/Hr) IV Continuous <Continuous>  venlafaxine XR. 37.5 milliGRAM(s) Oral daily    MEDICATIONS  (PRN):  hydrALAZINE 25 milliGRAM(s) Oral every 8 hours PRN Systolic blood pressure >    Pertinent Labs: 05-28 Na143 mmol/L Glu 86 mg/dL K+ 5.3 mmol/L Cr  1.15 mg/dL BUN 36 mg/dL<H> 05-25 Phos 5.0 mg/dL<H> 05-25 Alb 3.7 g/dL     CAPILLARY BLOOD GLUCOSE        Skin:     Estimated Needs:   [ x] no change since previous assessment  [ ] recalculated:     Previous Nutrition Diagnosis:   [ ] Inadequate Energy Intake [ ]Inadequate Oral Intake [ ] Excessive Energy Intake   [ ] Underweight [ ] Increased Nutrient Needs [ ] Overweight/Obesity   [ ] Altered GI Function [ ] Unintended Weight Loss [ ] Food & Nutrition Related Knowledge Deficit [ ] Malnutrition [x] altered nutrition related labs    Nutrition Diagnosis is [ ] ongoing  [x ] resolved [ ] not applicable     New Nutrition Diagnosis: [ ] not applicable       Interventions:   Recommend  [ ] Change Diet To:  [ ] Nutrition Supplement  [ ] Nutrition Support  [x ] Other: Continue plan of care. Recommend continue renal diet until MD states otherwise.    Monitoring and Evaluation:   [ ] PO intake [ x ] Tolerance to diet prescription [ x ] weights [ x ] labs[ x ] follow up per protocol  [ ] other:

## 2018-05-28 NOTE — PROGRESS NOTE ADULT - SUBJECTIVE AND OBJECTIVE BOX
Denies CP, c/o mild SPEAR    Vital Signs Last 24 Hrs  T(C): 36.7 (05-28-18 @ 16:56), Max: 37.1 (05-28-18 @ 09:29)  T(F): 98.1 (05-28-18 @ 16:56), Max: 98.8 (05-28-18 @ 09:29)  HR: 71 (05-28-18 @ 16:56) (63 - 77)  BP: 172/74 (05-28-18 @ 16:56) (148/67 - 190/70)  RR: 17 (05-28-18 @ 16:56) (16 - 18)  SpO2: 99% (05-28-18 @ 16:56) (96% - 99%)    Respiratory:  decreased BS at bases b/l  Cardiovascular: S1 S2  Gastrointestinal: soft NT ND +BS  Extremities: sm edema   Neuro: Awake and alert    allopurinol 100 milliGRAM(s) Oral at bedtime  heparin  Injectable 5000 Unit(s) SubCutaneous every 12 hours  hydrALAZINE 25 milliGRAM(s) Oral every 8 hours PRN  levothyroxine 75 MICROGram(s) Oral daily  metoprolol tartrate 12.5 milliGRAM(s) Oral every 12 hours  oxybutynin 5 milliGRAM(s) Oral at bedtime  sodium chloride 0.9%. 1000 milliLiter(s) IV Continuous <Continuous>  venlafaxine XR. 37.5 milliGRAM(s) Oral daily                 9.6    10.1  )-----------( 194      ( 28 May 2018 07:30 )             30.2     28 May 2018 07:30    143    |  114    |  36     ----------------------------<  86     5.3     |  21     |  1.15     Ca    8.4        28 May 2018 07:30    Assessment and Plan:   		  Pre-renal BRENDA on CKD III improved  Uncontrolled HTN  Will d/c IVF and f/u BP  Renal diet  Avoid ACE, ARB, Aldactone in future  Titrate Hydralazine as needed for BP control  No objection to Lasix as needed  BMP in am  No NSAID's  Renal f/u as op

## 2018-05-28 NOTE — PROGRESS NOTE ADULT - ASSESSMENT
Problem/Plan - 1:  ·  Problem: Hypertension.  Plan: Running high off meds.. Received po Amlodipine just recently this am. Would start low dose Lopressor for now; continue Amlodipine. She has been receiving  PRN hydralazine if SBP>160. Adjust BP meds as indicated.     Problem/Plan - 2:  ·  Problem: HLD (hyperlipidemia).  Plan: Lipid panel pending.     Problem/Plan - 3:  ·  Problem: Acute kidney injury.  Plan: Improving with IVF. Nephrology is following.     Problem/Plan - 4:  ·  Problem: Abnormal EKG.  Plan: Stable.     Problem/Plan - 5:  ·  Problem: SPEAR (dyspnea on exertion).  Plan: Resolved.  Last echocardiogram as noted above.

## 2018-05-28 NOTE — CHART NOTE - NSCHARTNOTEFT_GEN_A_CORE
Called regarding rapid afib and elevated BP.  Patient was in sinus but now is in rapid afib with HR in the 140s.  BP also elevated at 180s.  Medications were changed today.  Will give lopressor 10mg IV x1.

## 2018-05-29 DIAGNOSIS — E86.0 DEHYDRATION: ICD-10-CM

## 2018-05-29 DIAGNOSIS — E03.9 HYPOTHYROIDISM, UNSPECIFIED: ICD-10-CM

## 2018-05-29 LAB
ANION GAP SERPL CALC-SCNC: 5 MMOL/L — SIGNIFICANT CHANGE UP (ref 5–17)
BUN SERPL-MCNC: 28 MG/DL — HIGH (ref 7–23)
CALCIUM SERPL-MCNC: 8.9 MG/DL — SIGNIFICANT CHANGE UP (ref 8.4–10.5)
CHLORIDE SERPL-SCNC: 111 MMOL/L — HIGH (ref 96–108)
CO2 SERPL-SCNC: 23 MMOL/L — SIGNIFICANT CHANGE UP (ref 22–31)
CREAT SERPL-MCNC: 1.04 MG/DL — SIGNIFICANT CHANGE UP (ref 0.5–1.3)
GLUCOSE SERPL-MCNC: 102 MG/DL — HIGH (ref 70–99)
HCT VFR BLD CALC: 30.8 % — LOW (ref 34.5–45)
HGB BLD-MCNC: 10.7 G/DL — LOW (ref 11.5–15.5)
MCHC RBC-ENTMCNC: 32.1 PG — SIGNIFICANT CHANGE UP (ref 27–34)
MCHC RBC-ENTMCNC: 34.8 GM/DL — SIGNIFICANT CHANGE UP (ref 32–36)
MCV RBC AUTO: 92.4 FL — SIGNIFICANT CHANGE UP (ref 80–100)
PLATELET # BLD AUTO: 213 K/UL — SIGNIFICANT CHANGE UP (ref 150–400)
POTASSIUM SERPL-MCNC: 4 MMOL/L — SIGNIFICANT CHANGE UP (ref 3.5–5.3)
POTASSIUM SERPL-SCNC: 4 MMOL/L — SIGNIFICANT CHANGE UP (ref 3.5–5.3)
RBC # BLD: 3.34 M/UL — LOW (ref 3.8–5.2)
RBC # FLD: 13.4 % — SIGNIFICANT CHANGE UP (ref 10.3–14.5)
SODIUM SERPL-SCNC: 139 MMOL/L — SIGNIFICANT CHANGE UP (ref 135–145)
WBC # BLD: 11.1 K/UL — HIGH (ref 3.8–10.5)
WBC # FLD AUTO: 11.1 K/UL — HIGH (ref 3.8–10.5)

## 2018-05-29 PROCEDURE — 99233 SBSQ HOSP IP/OBS HIGH 50: CPT

## 2018-05-29 PROCEDURE — 12345: CPT | Mod: NC

## 2018-05-29 RX ORDER — METOPROLOL TARTRATE 50 MG
25 TABLET ORAL EVERY 12 HOURS
Qty: 0 | Refills: 0 | Status: DISCONTINUED | OUTPATIENT
Start: 2018-05-29 | End: 2018-05-31

## 2018-05-29 RX ORDER — METOPROLOL TARTRATE 50 MG
12.5 TABLET ORAL ONCE
Qty: 0 | Refills: 0 | Status: COMPLETED | OUTPATIENT
Start: 2018-05-29 | End: 2018-05-29

## 2018-05-29 RX ADMIN — Medication 25 MILLIGRAM(S): at 17:29

## 2018-05-29 RX ADMIN — HEPARIN SODIUM 5000 UNIT(S): 5000 INJECTION INTRAVENOUS; SUBCUTANEOUS at 17:30

## 2018-05-29 RX ADMIN — Medication 5 MILLIGRAM(S): at 00:09

## 2018-05-29 RX ADMIN — Medication 37.5 MILLIGRAM(S): at 12:09

## 2018-05-29 RX ADMIN — Medication 25 MILLIGRAM(S): at 20:58

## 2018-05-29 RX ADMIN — Medication 5 MILLIGRAM(S): at 00:02

## 2018-05-29 RX ADMIN — Medication 100 MILLIGRAM(S): at 20:59

## 2018-05-29 RX ADMIN — Medication 12.5 MILLIGRAM(S): at 05:48

## 2018-05-29 RX ADMIN — Medication 75 MICROGRAM(S): at 05:48

## 2018-05-29 RX ADMIN — HEPARIN SODIUM 5000 UNIT(S): 5000 INJECTION INTRAVENOUS; SUBCUTANEOUS at 05:46

## 2018-05-29 NOTE — PROGRESS NOTE ADULT - ASSESSMENT
Pt is a 89 yo female with  PMHx of depression, gout, HLD, HTN, hypothyroidism, OA, overactive bladder is being admitted for BRENDA, most likely due to dehydration. Improved.    1. BRENDA likely due to  overdiuresis - improved   post renal causes already ruled out by CT scan,     continue with  IVF,   renal  evaluation from  Dr Cornelius appreciated. ,   avoid any nephrotoxicity, IV contrast, monitor renal function.       2. Abnormal EKG. : non specific conduction disease , no  associated symptoms. cardiology evaluation appreciated.        3. SPEAR (dyspnea on exertion)- resolved , cardiology f/up appreciated.  Chronic, followed by me in office.

## 2018-05-29 NOTE — PROGRESS NOTE ADULT - ASSESSMENT
Atrial fibrillation:  Paroxysmal - currently in sinus rhythm; elevated risk of thromboembolism due to female sex, presence of HTN and advanced age with indication for initiation of anticoagulation -- patient asked me to discuss with her outpatient cardiologist (Dr. Gates) - I left a message with his  and await discussion; continue metoprolol (increase dose) -- observed pauses occur with conversions of AF to SR; patient has been tolerating higher doses of beta blocker (atenolol) in outpatient setting.    Hypertension:  Uncontrolled / labile / improving; titrate hydralazine.    Acute kidney injury:  Resolved ARF with IVF and discontinuation of Diovan, Aldactone.

## 2018-05-29 NOTE — PROGRESS NOTE ADULT - PROBLEM SELECTOR PROBLEM 5
History  Chief Complaint   Patient presents with    Cast Problem     right foot cast placed yesterday (after splinting in ED, and first cast that got wet)  presented today because cast feels very tight especially around ankle, which is not where the break is, saw ortho on [facility] [address]     51-year-old male with history right 5th metatarsal proximal fracture on the 05/13 presents to the emergency department with increasing pain in his ankle to tightness of his cast   Patient saw his orthopedist yesterday for a cast replacement after getting water into it and states since it has hardened his been very tight around his ankle is requesting the cast be taken off  States his toes feel numb and tingling however is good cap refill he does not have any systemic systems and his remaining review of systems negative  History provided by:  Patient   used: No    Ankle Pain   Location:  Ankle  Injury: yes    Ankle location:  R ankle  Pain details:     Quality:  Aching    Radiates to:  Does not radiate    Severity:  Moderate    Onset quality:  Sudden    Timing:  Constant    Progression:  Worsening  Chronicity:  New  Associated symptoms: no fatigue and no fever        None       Past Medical History:   Diagnosis Date    Foot injury     Fractures        History reviewed  No pertinent surgical history  History reviewed  No pertinent family history  I have reviewed and agree with the history as documented  Social History   Substance Use Topics    Smoking status: Never Smoker    Smokeless tobacco: Never Used    Alcohol use No        Review of Systems   Constitutional: Negative for chills, fatigue and fever  HENT: Negative for sore throat  Eyes: Negative for visual disturbance  Respiratory: Negative for shortness of breath  Cardiovascular: Negative for chest pain  Gastrointestinal: Negative for abdominal pain, blood in stool, constipation, diarrhea, nausea and vomiting  Genitourinary: Negative for difficulty urinating, dysuria and hematuria  Musculoskeletal: Positive for arthralgias  Skin: Negative for rash  Neurological: Negative for syncope, weakness and headaches  Hematological: Negative for adenopathy  Psychiatric/Behavioral: Negative for agitation and behavioral problems  All other systems reviewed and are negative  Physical Exam  ED Triage Vitals [05/19/18 2332]   Temperature Pulse Respirations Blood Pressure SpO2   98 °F (36 7 °C) 88 18 155/96 99 %      Temp src Heart Rate Source Patient Position - Orthostatic VS BP Location FiO2 (%)   -- Monitor -- -- --      Pain Score       9           Orthostatic Vital Signs  Vitals:    05/19/18 2332   BP: 155/96   Pulse: 88       Physical Exam   Constitutional: He is oriented to person, place, and time  He appears well-developed and well-nourished  HENT:   Head: Normocephalic and atraumatic  Eyes: Conjunctivae and EOM are normal  No scleral icterus  Neck: Normal range of motion  Neck supple  Cardiovascular: Normal rate and regular rhythm  No murmur heard  Pulmonary/Chest: Effort normal and breath sounds normal    Abdominal: Soft  Bowel sounds are normal  There is no tenderness  Musculoskeletal: Normal range of motion  Neurological: He is alert and oriented to person, place, and time  A sensory deficit is present  Skin: Skin is warm and dry  Psychiatric: He has a normal mood and affect  His behavior is normal    Nursing note and vitals reviewed        ED Medications  Medications - No data to display    Diagnostic Studies  Results Reviewed     None                 No orders to display         Procedures  Static Splint Application  Date/Time: 5/20/2018 1:04 AM  Performed by: Tobi Mclaughlin by: Omi Carrillo     Patient location:  Bedside and ED  Procedure performed by emergency physician: Yes    Other Assisting Provider: No    Consent:     Consent obtained:  Verbal    Consent given by:  Patient    Risks discussed:  Numbness and pain    Alternatives discussed:  No treatment  Universal protocol:     Procedure explained and questions answered to patient or proxy's satisfaction: yes      Relevant documents present and verified: yes      Patient identity confirmed:  Verbally with patient  Indication:     Indications: fracture    Pre-procedure details:     Sensation:  Numbness  Procedure details:     Laterality:  Right    Location:  Ankle    Ankle:  R ankle    Strapping: no      Splint type:  Short leg  Post-procedure details:     Pain:  Improved    Sensation:  Normal    Neurovascular Exam: skin pink      Patient tolerance of procedure: Tolerated well, no immediate complications          Phone Consults  ED Phone Contact    ED Course                               MDM  Number of Diagnoses or Management Options  Cast discomfort: new and does not require workup  Diagnosis management comments: 22-year-old male with cast on his right foot, will cut the cast and re-splint the cast pieces with Ace bandage, patient will follow up with his orthopedist on Monday  Good cap refill after wrapping of splint and patient's pain is significantly relieved  Amount and/or Complexity of Data Reviewed  Review and summarize past medical records: yes      CritCare Time    Disposition  Final diagnoses:   Cast discomfort     Time reflects when diagnosis was documented in both MDM as applicable and the Disposition within this note     Time User Action Codes Description Comment    5/20/2018 12:52 AM Fanny Robin Add [Z47 [id number]] Cast discomfort       ED Disposition     ED Disposition Condition Comment    Discharge  [facility] discharge to home/self care      Condition at discharge: Good        Follow-up Information     Follow up With Specialties Details Why Contact Info Additional Information    Neel José MD Sports Medicine, Orthopedic Surgery Schedule an appointment as soon as possible for a visit  21 [phone] [address]  [address] [address]  [facility] [address]  Emergency Department Emergency Medicine Go to If symptoms worsen [address] [facility], [address] [place] [address], [facility]          There are no discharge medications for this patient  No discharge procedures on file  ED Provider  Attending physically available and evaluated Davide Pastrana I managed the patient along with the ED Attending      Electronically Signed by         Sravan Weller MD  05/20/18 8283 Hypothyroid

## 2018-05-29 NOTE — PROGRESS NOTE ADULT - SUBJECTIVE AND OBJECTIVE BOX
Patient is a 89y old  Female who presents with a chief complaint of abnormal labs (25 May 2018 12:41)      Patient seen in follow up for BRENDA. Improved renal function.     PAST MEDICAL HISTORY:  HLD (hyperlipidemia)  Hypothyroid  Osteoarthritis  Gout  Overactive bladder  CVA (cerebral infarction)  Depression  Hypertension    MEDICATIONS  (STANDING):  allopurinol 100 milliGRAM(s) Oral at bedtime  heparin  Injectable 5000 Unit(s) SubCutaneous every 12 hours  levothyroxine 75 MICROGram(s) Oral daily  metoprolol tartrate 25 milliGRAM(s) Oral every 12 hours  venlafaxine XR. 37.5 milliGRAM(s) Oral daily    MEDICATIONS  (PRN):  hydrALAZINE 25 milliGRAM(s) Oral every 8 hours PRN Systolic blood pressure >    T(C): 36.7 (05-29-18 @ 10:22), Max: 37.1 (05-28-18 @ 09:29)  HR: 57 (05-29-18 @ 10:22) (57 - 136)  BP: 128/- (05-29-18 @ 10:22) (128/- - 190/70)  RR: 16 (05-29-18 @ 10:22) (16 - 18)  SpO2: 99% (05-29-18 @ 10:22) (95% - 99%)  Wt(kg): --  I&O's Detail    28 May 2018 07:01  -  29 May 2018 07:00  --------------------------------------------------------  IN:    Oral Fluid: 360 mL    sodium chloride 0.9%: 375 mL  Total IN: 735 mL    OUT:    Voided: 2550 mL  Total OUT: 2550 mL    Total NET: -1815 mL      29 May 2018 07:01  -  29 May 2018 12:07  --------------------------------------------------------  IN:    Oral Fluid: 500 mL  Total IN: 500 mL    OUT:  Total OUT: 0 mL    Total NET: 500 mL          PHYSICAL EXAM:  General: NAD  Respiratory: b/l air entry  Cardiovascular: S1 S2  Gastrointestinal: soft  Extremities:  edema                          10.7   11.1  )-----------( 213      ( 29 May 2018 07:34 )             30.8     05-29    139  |  111<H>  |  28<H>  ----------------------------<  102<H>  4.0   |  23  |  1.04    Ca    8.9      29 May 2018 07:34                Sodium, Serum: 139 (05-29 @ 07:34)  Sodium, Serum: 143 (05-28 @ 07:30)  Sodium, Serum: 138 (05-27 @ 16:06)  Sodium, Serum: 145 (05-27 @ 07:27)    Creatinine, Serum: 1.04 (05-29 @ 07:34)  Creatinine, Serum: 1.15 (05-28 @ 07:30)  Creatinine, Serum: 1.31 (05-27 @ 16:06)  Creatinine, Serum: 1.46 (05-27 @ 07:27)  Creatinine, Serum: 1.58 (05-26 @ 17:05)  Creatinine, Serum: 1.75 (05-26 @ 07:12)    Potassium, Serum: 4.0 (05-29 @ 07:34)  Potassium, Serum: 5.3 (05-28 @ 07:30)  Potassium, Serum: 5.1 (05-27 @ 16:06)  Potassium, Serum: 5.7 (05-27 @ 07:27)    Hemoglobin: 10.7 (05-29 @ 07:34)  Hemoglobin: 9.6 (05-28 @ 07:30)  Hemoglobin: 10.2 (05-27 @ 07:27)  Hemoglobin: 10.4 (05-26 @ 07:12)

## 2018-05-29 NOTE — PHYSICAL THERAPY INITIAL EVALUATION ADULT - ADDITIONAL COMMENTS
lives on first floor with no stairs. family lives above her. lives on first floor with no stairs. family lives above her. uses straight cane sometimes in home, uses straight cane outside of home.

## 2018-05-29 NOTE — PROGRESS NOTE ADULT - SUBJECTIVE AND OBJECTIVE BOX
PULMONARY/CRITICAL CARE      INTERVAL HPI/OVERNIGHT EVENTS: Pt. well known to me from office. Chronic SPEAR. Admitted for dehydration. Improved now.    89y FemaleHPI:  Pt is a 89 yo female who presents to the ED for abnormal lab result.  PMHx of depression, gout, HLD, HTN, hypothyroidism, OA, overactive bladder.  Pt reports that for the last several weeks she has not been feeling well.  She reports extreme fatigue and chills.  Pt followed up with her PMD this week who preformed outpatient blood work.  She was notified today that her potassium was elevated and that she needed to come to the ED for further work up. Pt denies fever, V/C, CP, SOB, abd pain, ext numbness or weakness.  She does report that she suffers from IBS and had a flare yesterday with loose watery stool but pt reports that it was non-bloody, and she denies melena.  She further reports that she followed up with her urologist several days ago and was started on po Doxycyline for a possible UTI.  Pt also states that she has been steadily losing weight over the last several months and is down from 163 pounds to 154.  She also reports that all her life she has suffered from HTN but lately her BP has been running low and she has not had to take her medication including her water pill.    	Of note Dr. Pelaez confirms pt weight loss and states that her BPs have been running low which is not usual for her.  He reports that he received a call today that her BUN/Cr was 99/2 and her K was 6.4.  Pt last ratio in April was 4.1/1.3.  He also reports that she had RUQ pain on exam and had reported mild nausea at that time   Pt reports that she did have a prior upper endo and colonoscopy many years ago which was WNL per reports  She had 1 liter IVF in ED , second liter is going, CT scan of abdomen did not show any hydronephrosis or bladder distention. (25 May 2018 12:41)        PAST MEDICAL & SURGICAL HISTORY:  HLD (hyperlipidemia)  Hypothyroid  Osteoarthritis  Gout  Overactive bladder  Depression  Hypertension  S/P lung surgery, follow-up exam: s/p removal of suspicious lesion, negative  S/P hysterectomy  S/P lumpectomy of breast  S/P cholecystectomy        ICU Vital Signs Last 24 Hrs  T(C): 36.8 (29 May 2018 06:37), Max: 37.1 (28 May 2018 09:29)  T(F): 98.2 (29 May 2018 06:37), Max: 98.8 (28 May 2018 09:29)  HR: 59 (29 May 2018 06:37) (59 - 136)  BP: 153/70 (29 May 2018 06:37) (148/67 - 182/92)  BP(mean): --  ABP: --  ABP(mean): --  RR: 18 (29 May 2018 06:37) (16 - 18)  SpO2: 99% (29 May 2018 06:37) (95% - 99%)    Qtts:     I&O's Summary    28 May 2018 07:01  -  29 May 2018 07:00  --------------------------------------------------------  IN: 735 mL / OUT: 2550 mL / NET: -1815 mL            REVIEW OF SYSTEMS:    CONSTITUTIONAL: No fever, weight loss, or fatigue  EYES: No eye pain, visual disturbances, or discharge  ENMT:  No difficulty hearing, tinnitus, vertigo; No sinus or throat pain  NECK: No pain or stiffness  BREASTS: No pain, masses, or nipple discharge  RESPIRATORY: No cough, wheezing, chills or hemoptysis; mild exertional shortness of breath  CARDIOVASCULAR: No chest pain, palpitations, dizziness, or leg swelling  GASTROINTESTINAL: No abdominal or epigastric pain. No nausea, vomiting, or hematemesis; No diarrhea or constipation. No melena or hematochezia.  GENITOURINARY: No dysuria, frequency, hematuria, or incontinence  NEUROLOGICAL: No headaches, memory loss, loss of strength, numbness, or tremors  SKIN: No itching, burning, rashes, or lesions   LYMPH NODES: No enlarged glands  ENDOCRINE: No heat or cold intolerance; No hair loss  MUSCULOSKELETAL: No joint pain or swelling; No muscle, back, or extremity pain, no calf tenderness  PSYCHIATRIC: No depression, anxiety, mood swings, or difficulty sleeping  HEME/LYMPH: No easy bruising, or bleeding gums  ALLERGY AND IMMUNOLOGIC: No hives or eczema      PHYSICAL EXAM:    GENERAL: NAD, well-groomed, well-developed, NAD  HEAD:  Atraumatic, Normocephalic  EYES: EOMI, PERRLA, conjunctiva and sclera clear  ENMT: No tonsillar erythema, exudates, or enlargement; Moist mucous membranes, Good dentition, No lesions  NECK: Supple, No JVD, Normal thyroid  NERVOUS SYSTEM:  Alert & Oriented X3, Good concentration; Motor Strength 5/5 B/L upper and lower extremities  CHEST/LUNG: Clear to percussion bilaterally; No rales, rhonchi, wheezing, or rubs  HEART: Regular rate and rhythm; No murmurs, rubs, or gallops  ABDOMEN: Soft, Nontender, Nondistended; Bowel sounds present  EXTREMITIES:  2+ Peripheral Pulses, No clubbing, cyanosis, or edema  LYMPH: No lymphadenopathy noted  SKIN: No rashes or lesions        LABS:                        10.7   11.1  )-----------( 213      ( 29 May 2018 07:34 )             30.8     05-29    139  |  111<H>  |  28<H>  ----------------------------<  102<H>  4.0   |  23  |  1.04    Ca    8.9      29 May 2018 07:34            vanco through     RADIOLOGY & ADDITIONAL STUDIES:< from: US Renal (05.25.18 @ 15:43) >  EXAM:  US KIDNEY(S)                                  PROCEDURE DATE:  05/25/2018          INTERPRETATION:  History: Elevated BUN/creatinine.    Bilateral renal ultrasound.    Right kidney 9.5 the left 10.3 cm long dimension.  No hydronephrosis or shadowing calculus bilaterally. Multiple bilateral   renal cortical cysts are identified, some with low-level internal echoes   representing complex (hemorrhagic or proteinaceous) cyst. There is a   dominant septated cyst in the right upper pole measuring 6.6 x 5.5 cm.  Bladder distends up to 144-cc with a post void residual volume of 66 cc.    Impression: No post renal obstruction. Multiple bilateral renal cortical   cysts as described.                  GARRISON NAIK M.D., ATTENDING RADIOLOGIST  This document has been electronically signed. May 25 2018  3:52PM    < end of copied text >  < from: Xray Chest 2 Views PA/Lat (05.25.18 @ 10:19) >    EXAM:  XR CHEST PA LAT 2V                                  PROCEDURE DATE:  05/25/2018          INTERPRETATION:  History: Weakness, admission.    PA lateral. Prior 11/19/2017.    Normal heart mediastinum. Atherosclerotic aorta. Mildly hyperinflated  lungs. Linear scarring right midlung and the right base. Blunted right   costophrenic angle similar to prior prior likely represents pleural   scarring. No acute infiltrate. Degenerative spondylosis dorsal spine.    Impression: No acute process. Stable exam.                  GARRISON NAKI M.D., ATTENDING RADIOLOGIST  This document has been electronically signed. May 25 2018 10:23AM        < end of copied text >  < from: CT Renal [place] [facility] (05.25.18 @ 11:01) >  EXAM:  CT RENAL STONE HUNT                                  PROCEDURE DATE:  05/25/2018          INTERPRETATION:  History: New onset renal insufficiency with UTI.    CT abdomen and pelvis no exogenous oral or intravenous contrast.  There is a decrease in the cardiac chamber blood pool attenuation   suggesting an anemic state. Bibasal pleural parenchymal scarring. There   is a 3 mm subpleural parenchymal nodule in the lingula.  Gallbladder not visualized may be absent or contracted. No significant   biliary dilatation. Unenhanced liver spleen not remarkable.  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 Correlate with contrast-enhanced abdominal MR.  Subcentimeter left adrenal nodule statistically most likely representing   a cortical adenoma.  Multiple bilateral renal cysts including some hemorrhagic cysts. The   largest cyst in the right upper pole measures up to  6 cm. No   hydroureteronephrosis or urolithiasis bilaterally.  Atherosclerotic normal caliber abdominal aorta.  No suspicious adenopathy.  No actively inflamed or obstructed bowel. Moderate retained colonic   stool. Lipomatosis ileocecal valve.  No extraluminal fluid or gas.  Status post hysterectomy. Bladder not remarkable.  Grade 1 anterolisthesis L4 on L5. Mild age-indeterminate superior   endplate compression deformities of L3 and T11 exact age indeterminate   probably chronic.    Impression:    No evidenceof obstructive uropathy or urolithiasis.  No evidence of acute inflammatory process.  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 Correlate with MR if there are no contraindications.  Additional nonacute/incidental findings as discussed                    GARRISON NAIK M.D., ATTENDING RADIOLOGIST  This document has been electronically signed. May 25 2018 11:40AM              < end of copied text >        CRITICAL CARE TIME SPENT:

## 2018-05-29 NOTE — PROGRESS NOTE ADULT - SUBJECTIVE AND OBJECTIVE BOX
REASON FOR VISIT: Atrial fibrillation    HPI:  89 year old woman with a history of HLD, hypothyroidism, osteoarthritis, gout, HTN, depression, admitted 5/25/18 with acute renal failure attributed to intravascular volume depletion; hospitalization complicated by poorly controlled hypertension and paroxysms of atrial fibrillation associated with rapid ventricular response.    5/29/18:  Comfortable; no new complaints; denies: palpitations.    MEDICATIONS  (STANDING):  allopurinol 100 milliGRAM(s) Oral at bedtime  heparin  Injectable 5000 Unit(s) SubCutaneous every 12 hours  levothyroxine 75 MICROGram(s) Oral daily  metoprolol tartrate 12.5 milliGRAM(s) Oral every 12 hours  venlafaxine XR. 37.5 milliGRAM(s) Oral daily    MEDICATIONS  (PRN):  hydrALAZINE 25 milliGRAM(s) Oral every 8 hours PRN Systolic blood pressure >    Vital Signs Last 24 Hrs  T(C): 36.8 (29 May 2018 06:37), Max: 36.8 (28 May 2018 16:18)  T(F): 98.2 (29 May 2018 06:37), Max: 98.2 (28 May 2018 16:18)  HR: 59 (29 May 2018 06:37) (59 - 136)  BP: 153/70 (29 May 2018 06:37) (149/75 - 182/92)  RR: 18 (29 May 2018 06:37) (16 - 18)  SpO2: 99% (29 May 2018 06:37) (95% - 99%)    PHYSICAL EXAM:  Constitutional: Seated in bedside chair, appears younger than stated age, awake and alert  HEENT: No oral cyanosis.   Respiratory: Breath sounds are clear bilaterally, No wheezing, rales or rhonchi  Cardiovascular: S1 and S2, regular rhythm.  Extremities: No peripheral edema. No clubbing or cyanosis.  Neurological: A/O x 3.  Skin: No rash.    LABS:                    10.7   11.1  )-----------( 213      ( 29 May 2018 07:34 )             30.8     139  |  111<H>  |  28<H>  ----------------------------<  102<H>  4.0   |  23  |  1.04    Tele: Sinus rhythm with paroxysms of AF with RVR; conversion pauses    TTE Echo Doppler w/o Cont (11.21.17 @ 10:03):  Normal left ventricular size and systolic function, estimated LVEF of  65%. Normal right ventricular size and systolic function.    Grade 1 diastolic dysfunction.   Normal biatrial size.  Trace physiologic MR.   The aortic valve is calcified without stenosis or insufficiency.   Trace physiologic TR.     Xray Chest 1 View-PORTABLE IMMEDIATE (05.28.18 @ 18:01):  Right costophrenic angle is blunted, unchanged. No lobar consolidation or vascular congestion. Heart size within normal limits. Hilar regions, mediastinal contours intact. Aortic knob contains calcifications. No acute osseous abnormalities. Old right rib fractures present.

## 2018-05-29 NOTE — GOALS OF CARE CONVERSATION - PERSONAL ADVANCE DIRECTIVE - CONVERSATION DETAILS
Pt requested DNR DNI,states she does not want a feeding tube.States her dtr Angela Snyder is her proxy,grandson Corby Snyder is alt HCP.

## 2018-05-29 NOTE — PROGRESS NOTE ADULT - ASSESSMENT
Pt is a 89 yo female with  PMHx of depression, gout, HLD, HTN, hypothyroidism, OA, overactive bladder is being admitted for BRENDA.     1. BRENDA likely due to  overdiuresis - resolved s/p IV fluids, creatinine back to baseline  -appreciate renal recs    2. Paroxysmal Afib with RVR with 2.4 second pauses: now in NSR,  c/w metoprolol 25mg bid, can have pauses during conversion from afib to nsr, cards will speak with patient outpatient cards,  (Dr. Gates)  regarding starting anticoag       3. SPEAR (dyspnea on exertion)- resolved , cardiology/pulm followup    4. HTN - BP controlled, atenolol was discontinued , Aldactone, ARBs on hold per renal given hyperkalemia  c/w metoprolol 25mg bid  c/w hydralazine 25mg q8 prn SBP>160  c/w lasix prn  also on clonidine daily at home but has gotten sporadic dosing while in patient  monitor BP closely.     5. Hypothyroid, stable ,cont synthroid    6. HLD controlled, off medication.    7. Hyperkalemia- resolved likely due to aldactone and ARBs  -low potassium diet. Renal follow up appreciated.   -monitor bmp as needed    7. Depression, cont effexor.         IMPROVE VTE Individual Risk Assessment, start on heparin sc and SCD.           RISK                                                          Points    [  ] Previous VTE                                                3    [  ] Thrombophilia                                             2    [  ] Lower limb paralysis                                    2        (unable to hold up >15 seconds)      [  ] Current Cancer                                             2         (within 6 months)    [  ] Immobilization > 24 hrs                              1    [  ] ICU/CCU stay > 24 hours                            1    [ x ] Age > 60                                                    1    IMPROVE VTE Score ___1______        Dispo: pending medical optomization Pt is a 89 yo female with  PMHx of depression, gout, HLD, HTN, hypothyroidism, OA, overactive bladder is being admitted for BRENDA.     1. BRENDA likely due to  overdiuresis - resolved s/p IV fluids, creatinine back to baseline  -appreciate renal recs    2. Paroxysmal Afib with RVR with 2.4 second pauses: now in NSR,  c/w metoprolol 25mg bid, can have pauses during conversion from afib to nsr, cards will speak with patient outpatient cards,  (Dr. Gates)  regarding starting anticoag       3. SPEAR (dyspnea on exertion)- resolved , cardiology/pulm followup    4. HTN - BP controlled, atenolol was discontinued , Aldactone, ARBs on hold per renal given hyperkalemia  c/w metoprolol 25mg bid  c/w hydralazine 25mg q8 prn SBP>160  c/w lasix prn  also on clonidine daily at home but has gotten sporadic dosing while in patient  monitor BP closely.     5. Hypothyroid, stable ,cont synthroid    6. HLD controlled, off medication.    7. Hyperkalemia- resolved likely due to aldactone and ARBs  -low potassium diet. Renal follow up appreciated.   -monitor bmp as needed    7. Depression, cont effexor.     8: dvt ppx: SHQ    Dispo: pending medical optimization

## 2018-05-29 NOTE — PROGRESS NOTE ADULT - SUBJECTIVE AND OBJECTIVE BOX
Patient is a 89y old  Female who presents with a chief complaint of abnormal labs (25 May 2018 12:41)      SUBJECTIVE / OVERNIGHT EVENTS: [place] that heart was racing last night, noted to be new onset AFib with RVR s/p lopressor 10mg IV  x 1 with pauses on tele longest 2.4 seconds, then sponeatnly converted to NSR. Otherwise patient now feels well without any chest pain, sob, palpitations, n/v.     MEDICATIONS  (STANDING):  allopurinol 100 milliGRAM(s) Oral at bedtime  heparin  Injectable 5000 Unit(s) SubCutaneous every 12 hours  levothyroxine 75 MICROGram(s) Oral daily  metoprolol tartrate 25 milliGRAM(s) Oral every 12 hours  venlafaxine XR. 37.5 milliGRAM(s) Oral daily    MEDICATIONS  (PRN):  hydrALAZINE 25 milliGRAM(s) Oral every 8 hours PRN Systolic blood pressure >      Vital Signs Last 24 Hrs  T(C): 36.7 (29 May 2018 10:22), Max: 36.8 (28 May 2018 16:18)  T(F): 98.1 (29 May 2018 10:22), Max: 98.2 (28 May 2018 16:18)  HR: 57 (29 May 2018 10:22) (57 - 136)  BP: 128/- (29 May 2018 10:22) (128/- - 182/92)  BP(mean): --  RR: 16 (29 May 2018 10:22) (16 - 18)  SpO2: 99% (29 May 2018 10:22) (95% - 99%)  CAPILLARY BLOOD GLUCOSE        I&O's Summary    28 May 2018 07:01  -  29 May 2018 07:00  --------------------------------------------------------  IN: 735 mL / OUT: 2550 mL / NET: -1815 mL    29 May 2018 07:01  -  29 May 2018 13:08  --------------------------------------------------------  IN: 500 mL / OUT: 0 mL / NET: 500 mL        PHYSICAL EXAM:  GENERAL: NAD, well-developed  HEAD:  Atraumatic, Normocephalic  NECK: Supple, No JVD  CHEST/LUNG: Clear to auscultation bilaterally; No wheeze  HEART: Regular rate and rhythm;   ABDOMEN: Soft, Nontender, Nondistended; Bowel sounds present  EXTREMITIES:  2+ Peripheral Pulses, No clubbing, cyanosis, or edema  PSYCH: AAOx3      LABS:                        10.7   11.1  )-----------( 213      ( 29 May 2018 07:34 )             30.8     05-29    139  |  111<H>  |  28<H>  ----------------------------<  102<H>  4.0   |  23  |  1.04    Ca    8.9      29 May 2018 07:34                RADIOLOGY & ADDITIONAL TESTS:    tele reviewed as above :currently in NSR, was in AFib with RVR with pauses overnight    Imaging Personally Reviewed:    Consultant(s) Notes Reviewed:  cards, nephro, pulm    Care Discussed with Consultants/Other Providers:

## 2018-05-29 NOTE — PHYSICAL THERAPY INITIAL EVALUATION ADULT - PERTINENT HX OF CURRENT PROBLEM, REHAB EVAL
HTN had not been feeling good in week prior. reports fatigue and chills. lower than normal BP. steadily losing weight over past month. elevated potassium

## 2018-05-29 NOTE — PROGRESS NOTE ADULT - ASSESSMENT
·	BRENDA, CKD 4: Prerenal azotemia  ·	Hyperkalemia, as out pt  ·	Hypertension       Improved renal function. To continue current meds. Monitor BP trend. Titrate BP meds as needed. Salt restriction.   Avoid nephrotoxic meds as possible. Will follow electrolytes and renal function trend.

## 2018-05-30 PROCEDURE — 93010 ELECTROCARDIOGRAM REPORT: CPT

## 2018-05-30 PROCEDURE — 99233 SBSQ HOSP IP/OBS HIGH 50: CPT

## 2018-05-30 RX ORDER — APIXABAN 2.5 MG/1
2.5 TABLET, FILM COATED ORAL EVERY 12 HOURS
Qty: 0 | Refills: 0 | Status: DISCONTINUED | OUTPATIENT
Start: 2018-05-30 | End: 2018-06-03

## 2018-05-30 RX ADMIN — Medication 25 MILLIGRAM(S): at 17:49

## 2018-05-30 RX ADMIN — HEPARIN SODIUM 5000 UNIT(S): 5000 INJECTION INTRAVENOUS; SUBCUTANEOUS at 05:38

## 2018-05-30 RX ADMIN — Medication 25 MILLIGRAM(S): at 21:17

## 2018-05-30 RX ADMIN — Medication 100 MILLIGRAM(S): at 21:17

## 2018-05-30 RX ADMIN — Medication 75 MICROGRAM(S): at 05:39

## 2018-05-30 RX ADMIN — Medication 37.5 MILLIGRAM(S): at 12:05

## 2018-05-30 RX ADMIN — APIXABAN 2.5 MILLIGRAM(S): 2.5 TABLET, FILM COATED ORAL at 17:49

## 2018-05-30 RX ADMIN — Medication 25 MILLIGRAM(S): at 05:39

## 2018-05-30 NOTE — PROGRESS NOTE ADULT - SUBJECTIVE AND OBJECTIVE BOX
PULMONARY/CRITICAL CARE      INTERVAL HPI/OVERNIGHT EVENTS: Some tachycardia overnite. No sob. No cp. Feels better. Ambulated.    89y FemaleHPI:  Pt is a 89 yo female who presents to the ED for abnormal lab result.  PMHx of depression, gout, HLD, HTN, hypothyroidism, OA, overactive bladder.  Pt reports that for the last several weeks she has not been feeling well.  She reports extreme fatigue and chills.  Pt followed up with her PMD this week who preformed outpatient blood work.  She was notified today that her potassium was elevated and that she needed to come to the ED for further work up. Pt denies fever, V/C, CP, SOB, abd pain, ext numbness or weakness.  She does report that she suffers from IBS and had a flare yesterday with loose watery stool but pt reports that it was non-bloody, and she denies melena.  She further reports that she followed up with her urologist several days ago and was started on po Doxycyline for a possible UTI.  Pt also states that she has been steadily losing weight over the last several months and is down from 163 pounds to 154.  She also reports that all her life she has suffered from HTN but lately her BP has been running low and she has not had to take her medication including her water pill.    	Of note Dr. Pelaez confirms pt weight loss and states that her BPs have been running low which is not usual for her.  He reports that he received a call today that her BUN/Cr was 99/2 and her K was 6.4.  Pt last ratio in April was 4.1/1.3.  He also reports that she had RUQ pain on exam and had reported mild nausea at that time   Pt reports that she did have a prior upper endo and colonoscopy many years ago which was WNL per reports  She had 1 liter IVF in ED , second liter is going, CT scan of abdomen did not show any hydronephrosis or bladder distention. (25 May 2018 12:41)        PAST MEDICAL & SURGICAL HISTORY:  HLD (hyperlipidemia)  Hypothyroid  Osteoarthritis  Gout  Overactive bladder  Depression  Hypertension  S/P lung surgery, follow-up exam: s/p removal of suspicious lesion, negative  S/P hysterectomy  S/P lumpectomy of breast  S/P cholecystectomy        ICU Vital Signs Last 24 Hrs  T(C): 36.9 (30 May 2018 05:32), Max: 36.9 (30 May 2018 05:32)  T(F): 98.4 (30 May 2018 05:32), Max: 98.4 (30 May 2018 05:32)  HR: 126 (30 May 2018 05:32) (57 - 126)  BP: 133/77 (30 May 2018 05:32) (128/- - 181/77)  BP(mean): --  ABP: --  ABP(mean): --  RR: 18 (30 May 2018 05:32) (16 - 20)  SpO2: 94% (30 May 2018 05:32) (94% - 99%)    Qtts:     I&O's Summary    29 May 2018 07:01  -  30 May 2018 07:00  --------------------------------------------------------  IN: 500 mL / OUT: 400 mL / NET: 100 mL            REVIEW OF SYSTEMS:    CONSTITUTIONAL: No fever, weight loss, or fatigue  EYES: No eye pain, visual disturbances, or discharge  ENMT:  No difficulty hearing, tinnitus, vertigo; No sinus or throat pain  NECK: No pain or stiffness  BREASTS: No pain, masses, or nipple discharge  RESPIRATORY: No cough, wheezing, chills or hemoptysis; No shortness of breath  CARDIOVASCULAR: No chest pain, palpitations, dizziness, or leg swelling  GASTROINTESTINAL: No abdominal or epigastric pain. No nausea, vomiting, or hematemesis; No diarrhea or constipation. No melena or hematochezia.  GENITOURINARY: No dysuria, frequency, hematuria, or incontinence  NEUROLOGICAL: No headaches, memory loss, loss of strength, numbness, or tremors  SKIN: No itching, burning, rashes, or lesions   LYMPH NODES: No enlarged glands  ENDOCRINE: No heat or cold intolerance; No hair loss  MUSCULOSKELETAL: No joint pain or swelling; No muscle, back, or extremity pain, no calf tenderness  PSYCHIATRIC: No depression, anxiety, mood swings, or difficulty sleeping  HEME/LYMPH: No easy bruising, or bleeding gums  ALLERGY AND IMMUNOLOGIC: No hives or eczema      PHYSICAL EXAM:    GENERAL: NAD, well-groomed, well-developed, NAD  HEAD:  Atraumatic, Normocephalic  EYES: EOMI, PERRLA, conjunctiva and sclera clear  ENMT: No tonsillar erythema, exudates, or enlargement; Moist mucous membranes, Good dentition, No lesions  NECK: Supple, No JVD, Normal thyroid  NERVOUS SYSTEM:  Alert & Oriented X3, Good concentration; Motor Strength 5/5 B/L upper and lower extremities  CHEST/LUNG: Clear to percussion bilaterally; No rales, rhonchi, wheezing, or rubs  HEART: Regular rate and rhythm; No murmurs, rubs, or gallops  ABDOMEN: Soft, Nontender, Nondistended; Bowel sounds present  EXTREMITIES:  2+ Peripheral Pulses, No clubbing, cyanosis, or edema  LYMPH: No lymphadenopathy noted  SKIN: No rashes or lesions        LABS:                        10.7   11.1  )-----------( 213      ( 29 May 2018 07:34 )             30.8     05-29    139  |  111<H>  |  28<H>  ----------------------------<  102<H>  4.0   |  23  |  1.04    Ca    8.9      29 May 2018 07:34            vanco through     RADIOLOGY & ADDITIONAL STUDIES:      < from: Xray Chest 1 View-PORTABLE IMMEDIATE (05.28.18 @ 18:01) >  EXAM:  XR CHEST PORTABLE IMMED 1V                                  PROCEDURE DATE:  05/28/2018          INTERPRETATION:  Comparison study dated 5/25/2018    Clinical information: Shortness of breath    Portable exam, 5:54 PM    Cardiac monitor leads overlie the chest.    Right costophrenic angle is blunted, unchanged. No lobar consolidation or   vascular congestion. Heart size within normal limits. Hilar regions,   mediastinal contours intact. Aortic knob contains calcifications. No   acute osseous abnormalities. Old right rib fractures present.    IMPRESSION: See above report                  SAHRA LOVING M.D.,ATTENDING RADIOLOGIST  This document has been electronically signed. May 29 2018  8:22AM              < end of copied text >  CRITICAL CARE TIME SPENT:

## 2018-05-30 NOTE — PROGRESS NOTE ADULT - ASSESSMENT
Pt is a 89 yo female with  PMHx of depression, gout, HLD, HTN, hypothyroidism, OA, overactive bladder is being admitted for BRENDA, most likely due to dehydration. Improved.    1. BRENDA likely due to  overdiuresis - improved   post renal causes already ruled out by CT scan,     continue with  IVF,   renal  evaluation from  Dr Cornelius appreciated. ,   avoid any nephrotoxicity, IV contrast, monitor renal function.       2. Abnormal EKG. : non specific conduction disease , no  associated symptoms. cardiology evaluation appreciated.        3. SPEAR (dyspnea on exertion)- resolved , cardiology f/up appreciated.  Chronic, followed by me in office. Pt is a 89 yo female with  PMHx of depression, gout, HLD, HTN, hypothyroidism, OA, overactive bladder is being admitted for BRENDA, most likely due to dehydration. Improved.  PAF.    1. BRENDA likely due to  overdiuresis - improved   post renal causes already ruled out by CT scan,     continue with  IVF,   renal  evaluation from  Dr Cornelius appreciated. ,   avoid any nephrotoxicity, IV contrast, monitor renal function.       2. Abnormal EKG. : non specific conduction disease , no  associated symptoms. cardiology evaluation appreciated.        3. SPEAR (dyspnea on exertion)- resolved , cardiology f/up appreciated.  Chronic, followed by me in office.

## 2018-05-30 NOTE — PROGRESS NOTE ADULT - SUBJECTIVE AND OBJECTIVE BOX
Patient is a 89y old  Female who is admitted for BRENDA on CKD attributed to diuretics.       SUBJECTIVE / OVERNIGHT EVENTS: BRENDA resolved, feeling well. Had newfound PAF seen by cardiology, started on eliquis. She denies CP SOB, orthopnea,  claudication on leg edema. She is ambulating and even denies dyspnea which she normally has when she ambulates long distances.       MEDICATIONS  (STANDING):  allopurinol 100 milliGRAM(s) Oral at bedtime  heparin  Injectable 5000 Unit(s) SubCutaneous every 12 hours  levothyroxine 75 MICROGram(s) Oral daily  metoprolol tartrate 25 milliGRAM(s) Oral every 12 hours  venlafaxine XR. 37.5 milliGRAM(s) Oral daily    Vital Signs Last 24 Hrs  T(C): 36.9 (30 May 2018 09:26), Max: 36.9 (30 May 2018 05:32)  T(F): 98.5 (30 May 2018 09:26), Max: 98.5 (30 May 2018 09:26)  HR: 85 (30 May 2018 09:26) (66 - 126)  BP: 152/61 (30 May 2018 09:26) (133/77 - 181/77)  BP(mean): --  RR: 18 (30 May 2018 09:26) (18 - 20)  SpO2: 95% (30 May 2018 09:26) (94% - 98%)    Tele strip AF 100s        PHYSICAL EXAM:  GENERAL: NAD, well-developed  HEAD:  Atraumatic, Normocephalic  NECK: Supple, No JVD  CHEST/LUNG: Clear to auscultation bilaterally; No wheeze  HEART: irreg Regular rate and rhythm;   ABDOMEN: Soft, Nontender, Nondistended; Bowel sounds present  EXTREMITIES:  2+ Peripheral Pulses, No clubbing, cyanosis, or edema  PSYCH: AAOx3                              10.7   11.1  )-----------( 213      ( 29 May 2018 07:34 )             30.8     29 May 2018 07:34    139    |  111    |  28     ----------------------------<  102    4.0     |  23     |  1.04     Ca    8.9        29 May 2018 07:34          CAPILLARY BLOOD GLUCOSE

## 2018-05-30 NOTE — PROGRESS NOTE ADULT - ASSESSMENT
Assessment and Plan:   · Assessment		    Atrial fibrillation:  Paroxysmal - currently in sinus rhythm;  as previously discussed, elevated risk of thromboembolism due to female sex, presence of HTN and advanced age with indication for initiation of anticoagulation . I spoke with her cardiologist Dr. Gates regarding her hospital course ( including labs ) . He is in agreement with initiating anticoagulation. He desires Eliquis 2.5 mg po BID. Continue beta blocker. Monitor CBC with anticoagulation.     Hypertension:  Uncontrolled / labile / improving; titrate hydralazine as required.     Acute kidney injury:  Resolved ARF with IVF and discontinuation of Diovan, Aldactone. Monitor BMP, Mg++ level.

## 2018-05-30 NOTE — PROGRESS NOTE ADULT - SUBJECTIVE AND OBJECTIVE BOX
Progress Note:   · Provider Specialty	Cardiology	      · Subjective and Objective: 	  REASON FOR VISIT: Atrial fibrillation    HPI:  89 year old woman with a history of HLD, hypothyroidism, osteoarthritis, gout, HTN, depression, admitted 5/25/18 with acute renal failure attributed to intravascular volume depletion; hospitalization complicated by poorly controlled hypertension and paroxysms of atrial fibrillation associated with rapid ventricular response.    5/29/18:  Comfortable; no new complaints; denies: palpitations.    05/30/18 - Feels well. No SOB, chest pail, palpitation, lightheadedness. Has ambulated without cardiac related sxs.       PAST MEDICAL & SURGICAL HISTORY:  HLD (hyperlipidemia)  Hypothyroid  Osteoarthritis  Gout  Overactive bladder  Depression  Hypertension  S/P lung surgery, follow-up exam: s/p removal of suspicious lesion, negative  S/P hysterectomy  S/P lumpectomy of breast  S/P cholecystectomy      Allergies    iodine (Anaphylaxis)  penicillin (Other)  sulfa drugs (Hives)  throat closes (Other)  throat closes (Other)      MEDICATIONS:    MEDICATIONS  (STANDING):  allopurinol 100 milliGRAM(s) Oral at bedtime  heparin  Injectable 5000 Unit(s) SubCutaneous every 12 hours  levothyroxine 75 MICROGram(s) Oral daily  metoprolol tartrate 25 milliGRAM(s) Oral every 12 hours  venlafaxine XR. 37.5 milliGRAM(s) Oral daily    MEDICATIONS  (PRN):  hydrALAZINE 25 milliGRAM(s) Oral every 8 hours PRN Systolic blood pressure >        Vital Signs Last 24 Hrs  T(C): 36.9 (30 May 2018 09:26), Max: 36.9 (30 May 2018 05:32)  T(F): 98.5 (30 May 2018 09:26), Max: 98.5 (30 May 2018 09:26)  HR: 85 (30 May 2018 09:26) (57 - 126)  BP: 152/61 (30 May 2018 09:26) (128/- - 181/77)  BP(mean): --  RR: 18 (30 May 2018 09:26) (16 - 20)  SpO2: 95% (30 May 2018 09:26) (94% - 99%)    I&O's Summary    29 May 2018 07:01  -  30 May 2018 07:00  --------------------------------------------------------  IN: 500 mL / OUT: 400 mL / NET: 100 mL        PHYSICAL EXAM:    Constitutional: NAD, awake and alert, well-developed  HEENT: NC/AT. No oral cyanosis.   Respiratory: Breath sounds are clear bilaterally, No wheezing, rales or rhonchi  Cardiovascular: S1 and S2, regular  rhythm.   Gastrointestinal: Bowel Sounds present, soft, nontender.   Extremities: No peripheral edema. No clubbing or cyanosis.  Neurological: A/O x 3.   Musculoskeletal: no calf tenderness.  Skin: No rashes.      LABS: All Labs Reviewed:                        10.7   11.1  )-----------( 213      ( 29 May 2018 07:34 )             30.8                         9.6    10.1  )-----------( 194      ( 28 May 2018 07:30 )             30.2     29 May 2018 07:34    139    |  111    |  28     ----------------------------<  102    4.0     |  23     |  1.04   28 May 2018 07:30    143    |  114    |  36     ----------------------------<  86     5.3     |  21     |  1.15   27 May 2018 16:06    138    |  112    |  45     ----------------------------<  101    5.1     |  18     |  1.31     Ca    8.9        29 May 2018 07:34  Ca    8.4        28 May 2018 07:30  Ca    8.5        27 May 2018 16:06        RADIOLOGY/EKG:      TTE Echo Doppler w/o Cont (11.21.17 @ 10:03):  Normal left ventricular size and systolic function, estimated LVEF of  65%. Normal right ventricular size and systolic function.    Grade 1 diastolic dysfunction.   Normal biatrial size.  Trace physiologic MR.   The aortic valve is calcified without stenosis or insufficiency.   Trace physiologic TR.     Xray Chest 1 View-PORTABLE IMMEDIATE (05.28.18 @ 18:01):  Right costophrenic angle is blunted, unchanged. No lobar consolidation or vascular congestion. Heart size within normal limits. Hilar regions, mediastinal contours intact. Aortic knob contains calcifications. No acute osseous abnormalities. Old right rib fractures present.    telemetry - sinus rhythm with episodic atrial fibrillation.

## 2018-05-30 NOTE — PROGRESS NOTE ADULT - ASSESSMENT
Pt is a 89 yo female with  PMHx of depression, gout, HLD, HTN, hypothyroidism, OA, overactive bladder admitted for BRENDA on CKD.     1. BRENDA superimposed on CKD 3. resolved, attributed to overdiuresis. Renal note appreciated.     2. Paroxysmal Afib. Day 1 Eliquis cardiology Dr Knox note appreciated.     3. SPEAR (dyspnea on exertion)- resolved    4. HTN - BP controlled, atenolol was discontinued , Aldactone, ARBs on hold per renal given hyperkalemia  c/w metoprolol 25mg bid  c/w hydralazine 25mg q8 prn SBP>160  c/w lasix prn  also on clonidine daily at home but has gotten sporadic dosing while in patient  monitor BP closely.     5. Hypothyroid, stable ,cont synthroid    6. HLD controlled, off medication.    7. Hyperkalemia- resolved likely due to aldactone and ARBs  -low potassium diet. Renal follow up appreciated.   -monitor bmp as needed    7. Depression, cont effexor.     8: dvt ppx: SHQ    Dispo: trial elequis, better rate control, trend Renal indices. Then possible candidate for DC home.

## 2018-05-31 LAB
ANION GAP SERPL CALC-SCNC: 8 MMOL/L — SIGNIFICANT CHANGE UP (ref 5–17)
BUN SERPL-MCNC: 32 MG/DL — HIGH (ref 7–23)
CALCIUM SERPL-MCNC: 9.2 MG/DL — SIGNIFICANT CHANGE UP (ref 8.4–10.5)
CHLORIDE SERPL-SCNC: 112 MMOL/L — HIGH (ref 96–108)
CO2 SERPL-SCNC: 24 MMOL/L — SIGNIFICANT CHANGE UP (ref 22–31)
CREAT SERPL-MCNC: 1.19 MG/DL — SIGNIFICANT CHANGE UP (ref 0.5–1.3)
GLUCOSE SERPL-MCNC: 92 MG/DL — SIGNIFICANT CHANGE UP (ref 70–99)
HCT VFR BLD CALC: 32.5 % — LOW (ref 34.5–45)
HGB BLD-MCNC: 10.8 G/DL — LOW (ref 11.5–15.5)
MCHC RBC-ENTMCNC: 32.6 PG — SIGNIFICANT CHANGE UP (ref 27–34)
MCHC RBC-ENTMCNC: 33.1 GM/DL — SIGNIFICANT CHANGE UP (ref 32–36)
MCV RBC AUTO: 98.5 FL — SIGNIFICANT CHANGE UP (ref 80–100)
PLATELET # BLD AUTO: 204 K/UL — SIGNIFICANT CHANGE UP (ref 150–400)
POTASSIUM SERPL-MCNC: 3.8 MMOL/L — SIGNIFICANT CHANGE UP (ref 3.5–5.3)
POTASSIUM SERPL-SCNC: 3.8 MMOL/L — SIGNIFICANT CHANGE UP (ref 3.5–5.3)
RBC # BLD: 3.3 M/UL — LOW (ref 3.8–5.2)
RBC # FLD: 13.8 % — SIGNIFICANT CHANGE UP (ref 10.3–14.5)
SODIUM SERPL-SCNC: 144 MMOL/L — SIGNIFICANT CHANGE UP (ref 135–145)
WBC # BLD: 9.9 K/UL — SIGNIFICANT CHANGE UP (ref 3.8–10.5)
WBC # FLD AUTO: 9.9 K/UL — SIGNIFICANT CHANGE UP (ref 3.8–10.5)

## 2018-05-31 PROCEDURE — 12345: CPT | Mod: NC

## 2018-05-31 PROCEDURE — 99232 SBSQ HOSP IP/OBS MODERATE 35: CPT

## 2018-05-31 PROCEDURE — 99233 SBSQ HOSP IP/OBS HIGH 50: CPT

## 2018-05-31 RX ORDER — AMLODIPINE BESYLATE 2.5 MG/1
5 TABLET ORAL ONCE
Qty: 0 | Refills: 0 | Status: COMPLETED | OUTPATIENT
Start: 2018-05-31 | End: 2018-05-31

## 2018-05-31 RX ORDER — LANOLIN ALCOHOL/MO/W.PET/CERES
1 CREAM (GRAM) TOPICAL AT BEDTIME
Qty: 0 | Refills: 0 | Status: DISCONTINUED | OUTPATIENT
Start: 2018-05-31 | End: 2018-06-03

## 2018-05-31 RX ORDER — METOPROLOL TARTRATE 50 MG
37.5 TABLET ORAL
Qty: 0 | Refills: 0 | Status: DISCONTINUED | OUTPATIENT
Start: 2018-05-31 | End: 2018-06-03

## 2018-05-31 RX ORDER — METOPROLOL TARTRATE 50 MG
12.5 TABLET ORAL ONCE
Qty: 0 | Refills: 0 | Status: COMPLETED | OUTPATIENT
Start: 2018-05-31 | End: 2018-05-31

## 2018-05-31 RX ADMIN — Medication 25 MILLIGRAM(S): at 05:41

## 2018-05-31 RX ADMIN — Medication 25 MILLIGRAM(S): at 15:21

## 2018-05-31 RX ADMIN — Medication 25 MILLIGRAM(S): at 23:45

## 2018-05-31 RX ADMIN — Medication 12.5 MILLIGRAM(S): at 09:44

## 2018-05-31 RX ADMIN — APIXABAN 2.5 MILLIGRAM(S): 2.5 TABLET, FILM COATED ORAL at 05:41

## 2018-05-31 RX ADMIN — AMLODIPINE BESYLATE 5 MILLIGRAM(S): 2.5 TABLET ORAL at 21:28

## 2018-05-31 RX ADMIN — Medication 37.5 MILLIGRAM(S): at 12:30

## 2018-05-31 RX ADMIN — APIXABAN 2.5 MILLIGRAM(S): 2.5 TABLET, FILM COATED ORAL at 18:05

## 2018-05-31 RX ADMIN — Medication 37.5 MILLIGRAM(S): at 18:05

## 2018-05-31 RX ADMIN — Medication 100 MILLIGRAM(S): at 21:28

## 2018-05-31 RX ADMIN — Medication 75 MICROGRAM(S): at 05:41

## 2018-05-31 NOTE — PROGRESS NOTE ADULT - SUBJECTIVE AND OBJECTIVE BOX
PULMONARY/CRITICAL CARE      INTERVAL HPI/OVERNIGHT EVENTS:  Feels better, ambulated. No sob.    89y FemaleHPI:  Pt is a 89 yo female who presents to the ED for abnormal lab result.  PMHx of depression, gout, HLD, HTN, hypothyroidism, OA, overactive bladder.  Pt reports that for the last several weeks she has not been feeling well.  She reports extreme fatigue and chills.  Pt followed up with her PMD this week who preformed outpatient blood work.  She was notified today that her potassium was elevated and that she needed to come to the ED for further work up. Pt denies fever, V/C, CP, SOB, abd pain, ext numbness or weakness.  She does report that she suffers from IBS and had a flare yesterday with loose watery stool but pt reports that it was non-bloody, and she denies melena.  She further reports that she followed up with her urologist several days ago and was started on po Doxycyline for a possible UTI.  Pt also states that she has been steadily losing weight over the last several months and is down from 163 pounds to 154.  She also reports that all her life she has suffered from HTN but lately her BP has been running low and she has not had to take her medication including her water pill.    	Of note Dr. Pelaez confirms pt weight loss and states that her BPs have been running low which is not usual for her.  He reports that he received a call today that her BUN/Cr was 99/2 and her K was 6.4.  Pt last ratio in April was 4.1/1.3.  He also reports that she had RUQ pain on exam and had reported mild nausea at that time   Pt reports that she did have a prior upper endo and colonoscopy many years ago which was WNL per reports  She had 1 liter IVF in ED , second liter is going, CT scan of abdomen did not show any hydronephrosis or bladder distention. (25 May 2018 12:41)        PAST MEDICAL & SURGICAL HISTORY:  HLD (hyperlipidemia)  Hypothyroid  Osteoarthritis  Gout  Overactive bladder  Depression  Hypertension  S/P lung surgery, follow-up exam: s/p removal of suspicious lesion, negative  S/P hysterectomy  S/P lumpectomy of breast  S/P cholecystectomy        ICU Vital Signs Last 24 Hrs  T(C): 36.7 (31 May 2018 05:43), Max: 36.9 (30 May 2018 09:26)  T(F): 98.1 (31 May 2018 05:43), Max: 98.5 (30 May 2018 09:26)  HR: 84 (31 May 2018 05:43) (66 - 88)  BP: 183/85 (31 May 2018 05:43) (140/60 - 197/74)  BP(mean): --  ABP: --  ABP(mean): --  RR: 18 (31 May 2018 05:43) (18 - 19)  SpO2: 95% (31 May 2018 05:43) (95% - 97%)    Qtts:     I&O's Summary    30 May 2018 07:01  -  31 May 2018 07:00  --------------------------------------------------------  IN: 400 mL / OUT: 400 mL / NET: 0 mL              REVIEW OF SYSTEMS:    CONSTITUTIONAL: No fever, weight loss, or fatigue  EYES: No eye pain, visual disturbances, or discharge  ENMT:  No difficulty hearing, tinnitus, vertigo; No sinus or throat pain  NECK: No pain or stiffness  BREASTS: No pain, masses, or nipple discharge  RESPIRATORY: No cough, wheezing, chills or hemoptysis; mild exertional shortness of breath  CARDIOVASCULAR: No chest pain, palpitations, dizziness, or leg swelling  GASTROINTESTINAL: No abdominal or epigastric pain. No nausea, vomiting, or hematemesis; No diarrhea or constipation. No melena or hematochezia.  GENITOURINARY: No dysuria, frequency, hematuria, or incontinence  NEUROLOGICAL: No headaches, memory loss, loss of strength, numbness, or tremors  SKIN: No itching, burning, rashes, or lesions   LYMPH NODES: No enlarged glands  ENDOCRINE: No heat or cold intolerance; No hair loss  MUSCULOSKELETAL: No joint pain or swelling; No muscle, back, or extremity pain, no calf tenderness  PSYCHIATRIC: No depression, anxiety, mood swings, or difficulty sleeping  HEME/LYMPH: No easy bruising, or bleeding gums  ALLERGY AND IMMUNOLOGIC: No hives or eczema      PHYSICAL EXAM:    GENERAL: NAD, well-groomed, well-developed, NAD  HEAD:  Atraumatic, Normocephalic  EYES: EOMI, PERRLA, conjunctiva and sclera clear  ENMT: No tonsillar erythema, exudates, or enlargement; Moist mucous membranes, Good dentition, No lesions  NECK: Supple, No JVD, Normal thyroid  NERVOUS SYSTEM:  Alert & Oriented X3, Good concentration; Motor Strength 5/5 B/L upper and lower extremities  CHEST/LUNG: Clear to percussion bilaterally; No rales, rhonchi, wheezing, or rubs  HEART: Regular rate and rhythm; No murmurs, rubs, or gallops  ABDOMEN: Soft, Nontender, Nondistended; Bowel sounds present  EXTREMITIES:  2+ Peripheral Pulses, No clubbing, cyanosis, or edema  LYMPH: No lymphadenopathy noted  SKIN: No rashes or lesions              LABS:                        10.8   9.9   )-----------( 204      ( 31 May 2018 06:24 )             32.5     05-31    144  |  112<H>  |  32<H>  ----------------------------<  92  3.8   |  24  |  1.19    Ca    9.2      31 May 2018 06:24            vanco through     RADIOLOGY & ADDITIONAL STUDIES:      CRITICAL CARE TIME SPENT:

## 2018-05-31 NOTE — PROGRESS NOTE ADULT - ASSESSMENT
Assessment and Plan:     Atrial fibrillation:  Paroxysmal - currently in sinus rhythm but episode of AF with RVR in 4am hour earlier today (asymptomatic); continue Eliquis; increase metoprolol to 37.5mg BID; Dr. Knox discussed case with her outpatient cardiologist (Dr. Gates) yesterday.    Hypertension:  Labile; continue hydralazine, metoprolol (dose to be increased today).     Acute kidney injury:  Resolved ARF with IVF and discontinuation of Diovan, Aldactone. Management as per nephrology.      ** D/c planning - I recommend that patient f/u with her outpatient cardiologist within 1 week of hospital discharge.

## 2018-05-31 NOTE — PROGRESS NOTE ADULT - ASSESSMENT
Pt is a 89 yo female with  PMHx of depression, gout, HLD, HTN, hypothyroidism, OA, overactive bladder admitted for BRENDA on CKD which resolved. Had AF RVR recently started on eliquis and metoprolol.     1. BRENDA superimposed on CKD 3. resolved. Seen by Nephro     2. Paroxysmal Afib. Day 2 Eliquis and upward titration of metoprolol. Spoke with Dr Nunez, and patient at length re: the benefits for continued telemetry monitoring while titrating her rate control. She is reasonable and accepted to stay, pending she is allowed to shower. Also spoke at length with her daughter Ms Angela Snyder via telephone, who voiced agreement to plan.      3. SPEAR (dyspnea on exertion)- resolved    4. HTN - BP controlled, atenolol was discontinued , Aldactone, ARBs on hold per renal given hyperkalemia  c/w metoprolol 37.5 mg bid  c/w hydralazine 25mg q8 prn SBP>160  c/w lasix prn  also on clonidine daily at home but has gotten sporadic dosing while in patient  monitor BP closely.     5. Hypothyroid, stable ,cont synthroid    6. HLD controlled, off medication.    7. Hyperkalemia- resolved likely due to aldactone and ARBs  -low potassium diet. Renal follow up appreciated.   -monitor bmp as needed    7. Depression, cont effexor.     8: dvt ppx: SHQ    Dispo: trial elequis, better rate control, trend HR on tele.

## 2018-05-31 NOTE — PROGRESS NOTE ADULT - ASSESSMENT
·	BRENDA, CKD 4: Prerenal azotemia  ·	Hyperkalemia, as out pt  ·	Hypertension       Stable renal function. To continue current meds. Monitor BP trend. Titrate BP meds as needed. Salt restriction.   Avoid nephrotoxic meds as possible. Will follow electrolytes and renal function trend. D/c planning.

## 2018-05-31 NOTE — PROGRESS NOTE ADULT - ASSESSMENT
Pt is a 89 yo female with  PMHx of depression, gout, HLD, HTN, hypothyroidism, OA, overactive bladder is being admitted for BRENDA, most likely due to dehydration. Improved.  PAF.  Wants to go home now. Can followup with me in office.    1. BRENDA likely due to  overdiuresis - improved   post renal causes already ruled out by CT scan,     continue with  IVF,   renal  evaluation from  Dr Cornelius appreciated. ,   avoid any nephrotoxicity, IV contrast, monitor renal function.       2. Abnormal EKG. : non specific conduction disease , no  associated symptoms. cardiology evaluation appreciated.        3. SPEAR (dyspnea on exertion)- resolved , cardiology f/up appreciated.  Chronic, followed by me in office.

## 2018-05-31 NOTE — PROGRESS NOTE ADULT - SUBJECTIVE AND OBJECTIVE BOX
Patient is a 89y old  Female who is admitted for BRENDA on CKD attributed to diuretics. New onset AF RVR with recent start of metoprolol and eliquis.       seen by Cardiolgy, increased metoprolol to 37.5 mg BID. She denies CP SOB. Has occasional palpitations. requests DC home soon, but really emphasises would like to shower.     Tele strip sinus 49 BPM        PHYSICAL EXAM:  GENERAL: NAD, well-developed  HEAD:  Atraumatic, Normocephalic  NECK: Supple, No JVD  CHEST/LUNG: Clear to auscultation bilaterally; No wheeze  HEART: irreg Regular rate and rhythm;   ABDOMEN: Soft, Nontender, Nondistended; Bowel sounds present  EXTREMITIES:  2+ Peripheral Pulses, No clubbing, cyanosis, or edema  PSYCH: AAOx3                                         10.8   9.9   )-----------( 204      ( 31 May 2018 06:24 )             32.5     31 May 2018 06:24    144    |  112    |  32     ----------------------------<  92     3.8     |  24     |  1.19     Ca    9.2        31 May 2018 06:24          CAPILLARY BLOOD GLUCOSE

## 2018-05-31 NOTE — PROGRESS NOTE ADULT - SUBJECTIVE AND OBJECTIVE BOX
REASON FOR VISIT: Atrial fibrillation    HPI:  89 year old woman with a history of HLD, hypothyroidism, osteoarthritis, gout, HTN, depression, admitted 5/25/18 with acute renal failure attributed to intravascular volume depletion; hospitalization complicated by poorly controlled hypertension and paroxysms of atrial fibrillation associated with rapid ventricular response.    5/31/18:  Complaining of poor quality sleep but otherwise feels well and eager to return home; denies: palpitations, dyspnea, chest pain.    MEDICATIONS  (STANDING):  allopurinol 100 milliGRAM(s) Oral at bedtime  apixaban 2.5 milliGRAM(s) Oral every 12 hours  levothyroxine 75 MICROGram(s) Oral daily  metoprolol tartrate 25 milliGRAM(s) Oral every 12 hours  venlafaxine XR. 37.5 milliGRAM(s) Oral daily    Vital Signs Last 24 Hrs  T(C): 36.7 (31 May 2018 05:43), Max: 36.9 (30 May 2018 09:26)  T(F): 98.1 (31 May 2018 05:43), Max: 98.5 (30 May 2018 09:26)  HR: 84 (31 May 2018 05:43) (66 - 88)  BP: 183/85 (31 May 2018 05:43) (140/60 - 197/74)  RR: 18 (31 May 2018 05:43) (18 - 19)  SpO2: 95% (31 May 2018 05:43) (95% - 97%)    PHYSICAL EXAM:  Constitutional: NAD, awake and alert, seated in bedside chair, no distress  HEENT: NC/AT. No oral cyanosis.   Respiratory: Breath sounds are clear bilaterally, No wheezing, rales or rhonchi  Cardiovascular: S1 and S2, regular  rhythm.   Gastrointestinal: Bowel Sounds present, soft, nontender.   Extremities: No peripheral edema. No clubbing or cyanosis.  Psych:  Appropriate mood and affect    LABS:                     10.8   9.9   )-----------( 204      ( 31 May 2018 06:24 )             32.5     144  |  112<H>  |  32<H>  ----------------------------<  92  3.8   |  24  |  1.19    Ca    9.2      31 May 2018 06:24    TTE Echo Doppler w/o Cont (11.21.17 @ 10:03):  Normal left ventricular size and systolic function, estimated LVEF of  65%. Normal right ventricular size and systolic function.    Grade 1 diastolic dysfunction.   Normal biatrial size.  Trace physiologic MR.   The aortic valve is calcified without stenosis or insufficiency.   Trace physiologic TR.     Xray Chest 1 View-PORTABLE IMMEDIATE (05.28.18 @ 18:01):  Right costophrenic angle is blunted, unchanged. No lobar consolidation or vascular congestion. Heart size within normal limits. Hilar regions, mediastinal contours intact. Aortic knob contains calcifications. No acute osseous abnormalities. Old right rib fractures present.    Telemetry - sinus rhythm with episodic atrial fibrillation.

## 2018-05-31 NOTE — PROGRESS NOTE ADULT - SUBJECTIVE AND OBJECTIVE BOX
Patient is a 89y old  Female who presents with a chief complaint of abnormal labs (25 May 2018 12:41)      Patient seen in follow up for BRENDA. Stable renal function.     PAST MEDICAL HISTORY:  HLD (hyperlipidemia)  Hypothyroid  Osteoarthritis  Gout  Overactive bladder  CVA (cerebral infarction)  Depression  Hypertension    MEDICATIONS  (STANDING):  allopurinol 100 milliGRAM(s) Oral at bedtime  apixaban 2.5 milliGRAM(s) Oral every 12 hours  levothyroxine 75 MICROGram(s) Oral daily  metoprolol tartrate 37.5 milliGRAM(s) Oral two times a day  venlafaxine XR. 37.5 milliGRAM(s) Oral daily    MEDICATIONS  (PRN):  hydrALAZINE 25 milliGRAM(s) Oral every 8 hours PRN Systolic blood pressure >    T(C): 36.7 (05-31-18 @ 14:08), Max: 36.9 (05-30-18 @ 05:32)  HR: 69 (05-31-18 @ 15:19) (66 - 126)  BP: 175/74 (05-31-18 @ 15:19) (133/77 - 197/74)  RR: 20 (05-31-18 @ 14:08)  SpO2: 94% (05-31-18 @ 14:08)  Wt(kg): --  I&O's Detail    30 May 2018 07:01  -  31 May 2018 07:00  --------------------------------------------------------  IN:    Oral Fluid: 400 mL  Total IN: 400 mL    OUT:    Voided: 400 mL  Total OUT: 400 mL    Total NET: 0 mL      31 May 2018 07:01  -  31 May 2018 15:24  --------------------------------------------------------  IN:    Oral Fluid: 350 mL  Total IN: 350 mL    OUT:  Total OUT: 0 mL    Total NET: 350 mL            PHYSICAL EXAM:  General: NAD  Respiratory: b/l air entry  Cardiovascular: S1 S2  Gastrointestinal: soft  Extremities:  edema         LABORATORY:                        10.8   9.9   )-----------( 204      ( 31 May 2018 06:24 )             32.5     05-31    144  |  112<H>  |  32<H>  ----------------------------<  92  3.8   |  24  |  1.19    Ca    9.2      31 May 2018 06:24      Sodium, Serum: 144 mmol/L (05-31 @ 06:24)    Potassium, Serum: 3.8 mmol/L (05-31 @ 06:24)    Hemoglobin: 10.8 g/dL (05-31 @ 06:24)  Hemoglobin: 10.7 g/dL (05-29 @ 07:34)    Creatinine, Serum 1.19 (05-31 @ 06:24)  Creatinine, Serum 1.04 (05-29 @ 07:34)

## 2018-06-01 ENCOUNTER — TRANSCRIPTION ENCOUNTER (OUTPATIENT)
Age: 83
End: 2018-06-01

## 2018-06-01 LAB
ANION GAP SERPL CALC-SCNC: 8 MMOL/L — SIGNIFICANT CHANGE UP (ref 5–17)
BUN SERPL-MCNC: 29 MG/DL — HIGH (ref 7–23)
CALCIUM SERPL-MCNC: 9.3 MG/DL — SIGNIFICANT CHANGE UP (ref 8.4–10.5)
CHLORIDE SERPL-SCNC: 109 MMOL/L — HIGH (ref 96–108)
CO2 SERPL-SCNC: 23 MMOL/L — SIGNIFICANT CHANGE UP (ref 22–31)
CREAT SERPL-MCNC: 1.02 MG/DL — SIGNIFICANT CHANGE UP (ref 0.5–1.3)
GLUCOSE SERPL-MCNC: 100 MG/DL — HIGH (ref 70–99)
HCT VFR BLD CALC: 33 % — LOW (ref 34.5–45)
HGB BLD-MCNC: 11 G/DL — LOW (ref 11.5–15.5)
MCHC RBC-ENTMCNC: 32.6 PG — SIGNIFICANT CHANGE UP (ref 27–34)
MCHC RBC-ENTMCNC: 33.4 GM/DL — SIGNIFICANT CHANGE UP (ref 32–36)
MCV RBC AUTO: 97.5 FL — SIGNIFICANT CHANGE UP (ref 80–100)
PLATELET # BLD AUTO: 216 K/UL — SIGNIFICANT CHANGE UP (ref 150–400)
POTASSIUM SERPL-MCNC: 4.1 MMOL/L — SIGNIFICANT CHANGE UP (ref 3.5–5.3)
POTASSIUM SERPL-SCNC: 4.1 MMOL/L — SIGNIFICANT CHANGE UP (ref 3.5–5.3)
RBC # BLD: 3.39 M/UL — LOW (ref 3.8–5.2)
RBC # FLD: 14.2 % — SIGNIFICANT CHANGE UP (ref 10.3–14.5)
SODIUM SERPL-SCNC: 140 MMOL/L — SIGNIFICANT CHANGE UP (ref 135–145)
WBC # BLD: 11 K/UL — HIGH (ref 3.8–10.5)
WBC # FLD AUTO: 11 K/UL — HIGH (ref 3.8–10.5)

## 2018-06-01 PROCEDURE — 99232 SBSQ HOSP IP/OBS MODERATE 35: CPT

## 2018-06-01 PROCEDURE — 12345: CPT | Mod: NC

## 2018-06-01 PROCEDURE — 74018 RADEX ABDOMEN 1 VIEW: CPT | Mod: 26

## 2018-06-01 RX ORDER — SPIRONOLACTONE 25 MG/1
1 TABLET, FILM COATED ORAL
Qty: 0 | Refills: 0 | COMMUNITY

## 2018-06-01 RX ORDER — DOXAZOSIN MESYLATE 4 MG
2 TABLET ORAL DAILY
Qty: 0 | Refills: 0 | Status: DISCONTINUED | OUTPATIENT
Start: 2018-06-01 | End: 2018-06-02

## 2018-06-01 RX ORDER — HYDRALAZINE HCL 50 MG
1 TABLET ORAL
Qty: 0 | Refills: 0 | COMMUNITY
Start: 2018-06-01

## 2018-06-01 RX ORDER — ONDANSETRON 8 MG/1
4 TABLET, FILM COATED ORAL EVERY 6 HOURS
Qty: 0 | Refills: 0 | Status: DISCONTINUED | OUTPATIENT
Start: 2018-06-01 | End: 2018-06-03

## 2018-06-01 RX ORDER — NIFEDIPINE 30 MG
90 TABLET, EXTENDED RELEASE 24 HR ORAL DAILY
Qty: 0 | Refills: 0 | Status: DISCONTINUED | OUTPATIENT
Start: 2018-06-02 | End: 2018-06-03

## 2018-06-01 RX ORDER — DOXAZOSIN MESYLATE 4 MG
4 TABLET ORAL AT BEDTIME
Qty: 0 | Refills: 0 | Status: DISCONTINUED | OUTPATIENT
Start: 2018-06-01 | End: 2018-06-03

## 2018-06-01 RX ORDER — VALSARTAN 80 MG/1
1 TABLET ORAL
Qty: 0 | Refills: 0 | COMMUNITY

## 2018-06-01 RX ORDER — METOPROLOL TARTRATE 50 MG
1 TABLET ORAL
Qty: 60 | Refills: 0 | OUTPATIENT
Start: 2018-06-01

## 2018-06-01 RX ORDER — APIXABAN 2.5 MG/1
1 TABLET, FILM COATED ORAL
Qty: 60 | Refills: 0
Start: 2018-06-01

## 2018-06-01 RX ORDER — POLYETHYLENE GLYCOL 3350 17 G/17G
17 POWDER, FOR SOLUTION ORAL AT BEDTIME
Qty: 0 | Refills: 0 | Status: DISCONTINUED | OUTPATIENT
Start: 2018-06-01 | End: 2018-06-03

## 2018-06-01 RX ORDER — NIFEDIPINE 30 MG
90 TABLET, EXTENDED RELEASE 24 HR ORAL DAILY
Qty: 0 | Refills: 0 | Status: DISCONTINUED | OUTPATIENT
Start: 2018-06-01 | End: 2018-06-01

## 2018-06-01 RX ORDER — AMLODIPINE BESYLATE 2.5 MG/1
5 TABLET ORAL ONCE
Qty: 0 | Refills: 0 | Status: COMPLETED | OUTPATIENT
Start: 2018-06-01 | End: 2018-06-01

## 2018-06-01 RX ADMIN — AMLODIPINE BESYLATE 5 MILLIGRAM(S): 2.5 TABLET ORAL at 03:00

## 2018-06-01 RX ADMIN — Medication 4 MILLIGRAM(S): at 21:39

## 2018-06-01 RX ADMIN — Medication 75 MICROGRAM(S): at 06:02

## 2018-06-01 RX ADMIN — Medication 37.5 MILLIGRAM(S): at 17:31

## 2018-06-01 RX ADMIN — POLYETHYLENE GLYCOL 3350 17 GRAM(S): 17 POWDER, FOR SOLUTION ORAL at 21:40

## 2018-06-01 RX ADMIN — Medication 10 MILLIGRAM(S): at 15:08

## 2018-06-01 RX ADMIN — APIXABAN 2.5 MILLIGRAM(S): 2.5 TABLET, FILM COATED ORAL at 17:31

## 2018-06-01 RX ADMIN — Medication 100 MILLIGRAM(S): at 21:39

## 2018-06-01 RX ADMIN — Medication 2 MILLIGRAM(S): at 10:47

## 2018-06-01 RX ADMIN — Medication 1 MILLIGRAM(S): at 00:32

## 2018-06-01 RX ADMIN — Medication 37.5 MILLIGRAM(S): at 11:55

## 2018-06-01 RX ADMIN — APIXABAN 2.5 MILLIGRAM(S): 2.5 TABLET, FILM COATED ORAL at 06:02

## 2018-06-01 RX ADMIN — Medication 12.5 MILLIGRAM(S): at 06:03

## 2018-06-01 RX ADMIN — ONDANSETRON 4 MILLIGRAM(S): 8 TABLET, FILM COATED ORAL at 14:21

## 2018-06-01 NOTE — PROGRESS NOTE ADULT - ASSESSMENT
·	BRENDA, CKD 4: Prerenal azotemia  ·	Hyperkalemia, as out pt  ·	Hypertension       BP meds adjusted by cardio. To continue current meds. Monitor BP trend. Titrate BP meds as needed as out pt. Salt restriction.   Avoid nephrotoxic meds as possible. Will follow electrolytes and renal function trend. D/c planning.

## 2018-06-01 NOTE — DISCHARGE NOTE ADULT - MEDICATION SUMMARY - MEDICATIONS TO STOP TAKING
I will STOP taking the medications listed below when I get home from the hospital:    oxybutynin 5 mg oral tablet  --  by mouth once a day (at bedtime)    spironolactone 25 mg oral tablet  -- 1 tab(s) by mouth once a day    atenolol 25 mg oral tablet  -- 1 tab(s) by mouth every 12 hours    cloNIDine  -- 1 tab(s) by mouth once a day    Diovan 160 mg oral tablet  -- 1 tab(s) by mouth once a day

## 2018-06-01 NOTE — CHART NOTE - NSCHARTNOTEFT_GEN_A_CORE
called to room by RN as patient called for help on toilet. She states she had constipation this morning that resolved with a hard BM. Now she had lunch and had stomach ache followed by large amount of liquid stool and nausea and emesis X 1 of undigested food. She denies CP SOB. Abd pain to lower abdomen bilat quadrants, crampy in natuyre amd 4/10.    O:   Vital Signs Last 24 Hrs  T(C): 36.7 (01 Jun 2018 14:27), Max: 37 (01 Jun 2018 00:05)  T(F): 98 (01 Jun 2018 14:27), Max: 98.6 (01 Jun 2018 00:05)  HR: 95 (01 Jun 2018 14:27) (68 - 96)  BP: 133/66 (01 Jun 2018 14:27) (117/69 - 185/74)  BP(mean): --  RR: 18 (01 Jun 2018 14:27) (16 - 20)  SpO2: 96% (01 Jun 2018 14:27) (95% - 98%)    Exam    Gen alert NAD in bed  HEENT trach ML no jvd  Cor RRR  Pulm CTAB  Abd soft non tender +BS hypoactive negative rebound neg psoas/obturator. Rectal refused  Ext no edema      A/P    1. Nausea Vomiting and diarrhea. AXR with retained voluminous stool. no gross loops or air levels. Will call [facility] for stat read. DDx overflow diarrhea superimposed on chronic constipation. NPO, suppository. trend symptoms. May benefit from GI consult. Hold DC home pending clinical course. Patient and Son in law Eliecer at bedside agrees with plan.

## 2018-06-01 NOTE — PROGRESS NOTE ADULT - ASSESSMENT
Assessment and Plan:     Atrial fibrillation:  Paroxysmal - currently in sinus rhythm; still with asymptomatic episodes of AF (ventricular rate is less fast when in AF); continue metoprolol, Eliquis; additional titrations of AVN slowing agents may be necessary in outpatient setting - patient plans to see Dr. Gates next week.    Hypertension:  Poorly controlled; resume doxazosin and nifedipine; I discussed with Dr. Cornelius.     Acute kidney injury:  Resolved ARF with IVF and discontinuation of Diovan, Aldactone. Management as per nephrology.      ** Case d/w Dr. Reyes 5/31.

## 2018-06-01 NOTE — CHART NOTE - NSCHARTNOTEFT_GEN_A_CORE
Assessment:     Factors impacting intake: [ x] none [ ] nausea  [ ] vomiting [ ] diarrhea [ ] constipation  [ ]chewing problems [ ] swallowing issues  [ ] other:     Diet Presciption: Diet, Renal Restrictions:   For patients receiving Renal Replacement - No Protein Restr, No Conc K, No Conc Phos, Low Sodium (05-25-18 @ 14:27)    Intake: 50-75%    Current Weight: Weight (kg): 69.9 (05-25 @ 09:16)  % Weight Change    Pertinent Medications: MEDICATIONS  (STANDING):  allopurinol 100 milliGRAM(s) Oral at bedtime  apixaban 2.5 milliGRAM(s) Oral every 12 hours  levothyroxine 75 MICROGram(s) Oral daily  metoprolol tartrate 37.5 milliGRAM(s) Oral two times a day  venlafaxine XR. 37.5 milliGRAM(s) Oral daily    MEDICATIONS  (PRN):  hydrALAZINE 25 milliGRAM(s) Oral every 8 hours PRN Systolic blood pressure >  melatonin 1 milliGRAM(s) Oral at bedtime PRN Insomnia    Pertinent Labs: 06-01 Na140 mmol/L Glu 100 mg/dL<H> K+ 4.1 mmol/L Cr  1.02 mg/dL BUN 29 mg/dL<H> 05-25 Phos 5.0 mg/dL<H> 05-25 Alb 3.7 g/dL     CAPILLARY BLOOD GLUCOSE        Skin:     Estimated Needs:   [x ] no change since previous assessment  [ ] recalculated:     Previous Nutrition Diagnosis:   [ ] Inadequate Energy Intake [ ]Inadequate Oral Intake [ ] Excessive Energy Intake   [ ] Underweight [ ] Increased Nutrient Needs [ ] Overweight/Obesity   [ ] Altered GI Function [ ] Unintended Weight Loss [ ] Food & Nutrition Related Knowledge Deficit [ ] Malnutrition     Nutrition Diagnosis is [ ] ongoing  [ ] resolved [ ] not applicable     New Nutrition Diagnosis: [x ] not applicable       Interventions:   Recommend  [ ] Change Diet To:  [ ] Nutrition Supplement  [ ] Nutrition Support  [x ] Other: Continue plan of care    Monitoring and Evaluation:   [ ] PO intake [ x ] Tolerance to diet prescription [ x ] weights [ x ] labs[ x ] follow up per protocol  [ ] other: Assessment:  Pt admitted c  BRENDA: Hx depression, gout, HLD, HTN, hypothyroidism, OA, overactive bladder. Hyperkalemia now resolved. Pt reports feeling much better.  Provided verbal and written education on renal diet. Advised pt to avoid high K foods for now and to follow MD instructions p d/c.    Factors impacting intake: [ x] none [ ] nausea  [ ] vomiting [ ] diarrhea [ ] constipation  [ ]chewing problems [ ] swallowing issues  [ ] other:     Diet Presciption: Diet, Renal Restrictions:   For patients receiving Renal Replacement - No Protein Restr, No Conc K, No Conc Phos, Low Sodium (05-25-18 @ 14:27)    Intake: 50-75%    Current Weight: Weight (kg): 69.9 (05-25 @ 09:16)  % Weight Change    Pertinent Medications: MEDICATIONS  (STANDING):  allopurinol 100 milliGRAM(s) Oral at bedtime  apixaban 2.5 milliGRAM(s) Oral every 12 hours  levothyroxine 75 MICROGram(s) Oral daily  metoprolol tartrate 37.5 milliGRAM(s) Oral two times a day  venlafaxine XR. 37.5 milliGRAM(s) Oral daily    MEDICATIONS  (PRN):  hydrALAZINE 25 milliGRAM(s) Oral every 8 hours PRN Systolic blood pressure >  melatonin 1 milliGRAM(s) Oral at bedtime PRN Insomnia    Pertinent Labs: 06-01 Na140 mmol/L Glu 100 mg/dL<H> K+ 4.1 mmol/L Cr  1.02 mg/dL BUN 29 mg/dL<H> 05-25 Phos 5.0 mg/dL<H> 05-25 Alb 3.7 g/dL     CAPILLARY BLOOD GLUCOSE        Skin:     Estimated Needs:   [x ] no change since previous assessment  [ ] recalculated:     Previous Nutrition Diagnosis:   [ ] Inadequate Energy Intake [ ]Inadequate Oral Intake [ ] Excessive Energy Intake   [ ] Underweight [ ] Increased Nutrient Needs [ ] Overweight/Obesity   [ ] Altered GI Function [ ] Unintended Weight Loss [ ] Food & Nutrition Related Knowledge Deficit [ ] Malnutrition     Nutrition Diagnosis is [ ] ongoing  [ ] resolved [ ] not applicable     New Nutrition Diagnosis: [x ] not applicable       Interventions:   Recommend  [ ] Change Diet To:  [ ] Nutrition Supplement  [ ] Nutrition Support  [x ] Other: Continue plan of care    Monitoring and Evaluation:   [ ] PO intake [ x ] Tolerance to diet prescription [ x ] weights [ x ] labs[ x ] follow up per protocol  [ ] other: Assessment:  Pt admitted c  BRENDA: Hx depression, gout, HLD, HTN, hypothyroidism, OA, overactive bladder. Hyperkalemia now resolved. Pt reports feeling much better.  Provided verbal and written education on renal diet. Advised pt to avoid high K/Na  foods for now and to follow MD instructions p d/c.    Factors impacting intake: [ x] none [ ] nausea  [ ] vomiting [ ] diarrhea [ ] constipation  [ ]chewing problems [ ] swallowing issues  [ ] other:     Diet Presciption: Diet, Renal Restrictions:   For patients receiving Renal Replacement - No Protein Restr, No Conc K, No Conc Phos, Low Sodium (05-25-18 @ 14:27)    Intake: 50-75%    Current Weight: Weight (kg): 69.9 (05-25 @ 09:16)  % Weight Change no new wt    Pertinent Medications: MEDICATIONS  (STANDING):  allopurinol 100 milliGRAM(s) Oral at bedtime  apixaban 2.5 milliGRAM(s) Oral every 12 hours  levothyroxine 75 MICROGram(s) Oral daily  metoprolol tartrate 37.5 milliGRAM(s) Oral two times a day  venlafaxine XR. 37.5 milliGRAM(s) Oral daily    MEDICATIONS  (PRN):  hydrALAZINE 25 milliGRAM(s) Oral every 8 hours PRN Systolic blood pressure >  melatonin 1 milliGRAM(s) Oral at bedtime PRN Insomnia    Pertinent Labs: 06-01 Na140 mmol/L Glu 100 mg/dL<H> K+ 4.1 mmol/L Cr  1.02 mg/dL BUN 29 mg/dL<H> 05-25 Phos 5.0 mg/dL<H> 05-25 Alb 3.7 g/dL     CAPILLARY BLOOD GLUCOSE        Skin: intact    Estimated Needs:   [x ] no change since previous assessment  [ ] recalculated:     Previous Nutrition Diagnosis:   [ ] Inadequate Energy Intake [ ]Inadequate Oral Intake [ ] Excessive Energy Intake   [ ] Underweight [ ] Increased Nutrient Needs [ ] Overweight/Obesity   [ ] Altered GI Function [ ] Unintended Weight Loss [ ] Food & Nutrition Related Knowledge Deficit [ ] Malnutrition     Nutrition Diagnosis is [ ] ongoing  [ ] resolved [ ] not applicable     New Nutrition Diagnosis: [x ] not applicable       Interventions:   Recommend  [ ] Change Diet To:  [ ] Nutrition Supplement  [ ] Nutrition Support  [x ] Other: Continue plan of care    Monitoring and Evaluation:   [ ] PO intake [ x ] Tolerance to diet prescription [ x ] weights [ x ] labs[ x ] follow up per protocol  [ ] other:

## 2018-06-01 NOTE — PROGRESS NOTE ADULT - SUBJECTIVE AND OBJECTIVE BOX
REASON FOR VISIT: Atrial fibrillation    HPI:  89 year old woman with a history of HLD, hypothyroidism, osteoarthritis, gout, HTN, depression, admitted 5/25/18 with acute renal failure attributed to intravascular volume depletion; hospitalization complicated by poorly controlled hypertension and paroxysms of atrial fibrillation associated with rapid ventricular response.    5/31/18:  Complaining of poor quality sleep but otherwise feels well and eager to return home; denies: palpitations, dyspnea, chest pain.  6/1/18:  Feels well; no complaints.    MEDICATIONS  (STANDING):  allopurinol 100 milliGRAM(s) Oral at bedtime  apixaban 2.5 milliGRAM(s) Oral every 12 hours  levothyroxine 75 MICROGram(s) Oral daily  metoprolol tartrate 37.5 milliGRAM(s) Oral two times a day  venlafaxine XR. 37.5 milliGRAM(s) Oral daily    MEDICATIONS  (PRN):  hydrALAZINE 25 milliGRAM(s) Oral every 8 hours PRN Systolic blood pressure >  melatonin 1 milliGRAM(s) Oral at bedtime PRN Insomnia    Vital Signs Last 24 Hrs  T(C): 36.5 (01 Jun 2018 05:28), Max: 37 (01 Jun 2018 00:05)  T(F): 97.7 (01 Jun 2018 05:28), Max: 98.6 (01 Jun 2018 00:05)  HR: 84 (01 Jun 2018 05:28) (68 - 96)  BP: 169/74 (01 Jun 2018 05:28) (146/88 - 187/75)  RR: 16 (01 Jun 2018 05:28) (16 - 20)  SpO2: 96% (01 Jun 2018 05:28) (94% - 98%)    PHYSICAL EXAM:  Constitutional: NAD, awake and alert, seated in bedside chair  Respiratory: Breath sounds are clear bilaterally, No wheezing, rales or rhonchi  Cardiovascular: S1 and S2, regular  rhythm.   Gastrointestinal: Bowel Sounds present, soft, nontender.   Extremities: No peripheral edema. No clubbing or cyanosis.  Psych:  Appropriate mood and affect    LABS:                             11.0   11.0  )-----------( 216      ( 01 Jun 2018 07:23 )             33.0     140  |  109<H>  |  29<H>  ----------------------------<  100<H>  4.1   |  23  |  1.02    Ca    9.3      01 Jun 2018 07:23    TTE Echo Doppler w/o Cont (11.21.17 @ 10:03):  Normal left ventricular size and systolic function, estimated LVEF of  65%. Normal right ventricular size and systolic function.    Grade 1 diastolic dysfunction.   Normal biatrial size.  Trace physiologic MR.   The aortic valve is calcified without stenosis or insufficiency.   Trace physiologic TR.     Xray Chest 1 View-PORTABLE IMMEDIATE (05.28.18 @ 18:01):  Right costophrenic angle is blunted, unchanged. No lobar consolidation or vascular congestion. Heart size within normal limits. Hilar regions, mediastinal contours intact. Aortic knob contains calcifications. No acute osseous abnormalities. Old right rib fractures present.    Telemetry - sinus rhythm with episodic atrial fibrillation.

## 2018-06-01 NOTE — DISCHARGE NOTE ADULT - CARE PROVIDER_API CALL
chao goodwin  [address] [place], [place]  Phone: [phone]  Fax: (   )    -    Manuel Varela), Internal Medicine  [address] [place] [address]  Phone: [phone]  Fax: [phone]

## 2018-06-01 NOTE — PROGRESS NOTE ADULT - ASSESSMENT
Pt is a 89 yo female with  PMHx of depression, gout, HLD, HTN, hypothyroidism, OA, overactive bladder admitted for BRENDA on CKD which resolved. Had AF RVR recently started on eliquis and metoprolol.     1. BRENDA superimposed on CKD 3. resolved. Seen by Nephro     2. Paroxysmal Afib. Day 3 Eliquis and upward titration of metoprolol.     3. SPEAR (dyspnea on exertion)- resolved    4. HTN - BP poorly controlled, atenolol was discontinued , Aldactone, ARBs on hold per renal given hyperkalemia  c/w metoprolol 37.5 mg bid  c/w hydralazine 25mg q8 prn SBP>160  c/w lasix prn  cardiology recs noted; to restart doxazosin and nifedipine.     5. Hypothyroid, stable ,cont synthroid    6. HLD controlled, off medication.    7. Hyperkalemia- resolved likely due to aldactone and ARBs  -low potassium diet. Renal follow up appreciated.   -monitor bmp as needed    7. Depression, cont effexor.     8: dvt ppx: SHQ  9. Insomnia; improved with melatonin 1 mg  10. constopation; resolved. patient states MOM at home usually works for her.         Dispo: stable for DC home with appt next week with Dr kim PMD and Dr Lombardi [facility]. Both called; awaiting response.

## 2018-06-01 NOTE — DISCHARGE NOTE ADULT - HOSPITAL COURSE
89 YO F PMH HTN and peripheral edema sent to ED by PMD for abnormal labs. Found to be in acute kidney injury/hyperkalemia  attributed to diuretic use. Seen by renal and cardiology. Medications held and revised, and Cr And K normalized. While admitted, found to be in atrial fibrillation with RVR. Seen by cardiology and spoke with her private cardiolgy at [facility], agreed to start on metoprolol and Eliquis. She was scheduled to be DC home 5/31 but held for brief bradycardia that resolved. her BP was labile and poorly controlled after Discontinuing Diovan and some of her diuretics. Cardiolgy reccomending DC home with restarting doxazosin and nifedipine. 89 YO F PMH HTN and peripheral edema sent to ED by PMD for abnormal labs. Found to be in acute kidney injury/hyperkalemia  attributed to diuretic use. Seen by renal and cardiology. Medications held and revised, and Cr And K normalized. While admitted, found to be in atrial fibrillation with RVR. Seen by cardiology and spoke with her private cardiolgy at [facility], agreed to start on metoprolol and Eliquis. She was scheduled to be DC home 5/31 but held for brief bradycardia that resolved. her BP was labile and poorly controlled after Discontinuing Diovan and some of her diuretics. Cardiology recommending DC home with restarting doxazosin and nifedipine.   Meds titrated - feels well; eager to go home.  Will need close outpatient medical/cardio f/up.    NAD  lungs clear  RRR  abdomen soft  no edema    notified PCP about discharge.

## 2018-06-01 NOTE — DISCHARGE NOTE ADULT - MEDICATION SUMMARY - MEDICATIONS TO TAKE
I will START or STAY ON the medications listed below when I get home from the hospital:    doxazosin 2 mg oral tablet  -- 1 tab(s) by mouth once a day  -- Indication: For HTN    doxazosin 4 mg oral tablet  -- orally once a day (at bedtime)  -- Indication: For HTN    apixaban 2.5 mg oral tablet  -- 1 tab(s) by mouth every 12 hours  -- Indication: For PAF    Effexor 37.5 mg oral capsule, extended release  -- 1 cap(s) by mouth once a day  -- Indication: For Depression    allopurinol 100 mg oral tablet  -- 1 tab(s) by mouth once a day (at bedtime)  -- Indication: For gout    metoprolol tartrate 37.5 mg oral tablet  -- 1 tab(s) by mouth 2 times a day  -- Indication: For PAF    NIFEdipine 90 mg oral tablet, extended release  -- 1 tab(s) by mouth once a day  -- Indication: For Htn    Synthroid 75 mcg (0.075 mg) oral tablet  -- 1 tab(s) by mouth once a day  -- Indication: For Hypothyroid    hydrALAZINE 25 mg oral tablet  -- 1 tab(s) by mouth every 8 hours, As needed, Systolic blood pressure >  -- Indication: For HTN    Multi-Day Plus Minerals Multiple Vitamins with Minerals oral tablet  -- 1 tab(s) by mouth once a day  -- Indication: For general health    Calcium 600+D  --  by mouth once a day  -- Indication: For general health    Vitamin D3  --  by mouth once a day  -- Indication: For general health

## 2018-06-01 NOTE — DISCHARGE NOTE ADULT - CARE PLAN
Principal Discharge DX:	Acute kidney injury  Goal:	normalize renal function  Assessment and plan of treatment:	medications revised  Secondary Diagnosis:	Paroxysmal atrial fibrillation  Goal:	rate control, anticoagulation  Assessment and plan of treatment:	[facility] re: routine surveillance Principal Discharge DX:	Acute kidney injury  Goal:	normalize renal function  Assessment and plan of treatment:	medications revised - stopped ARB and Aldactone.  patient has CKD III  Secondary Diagnosis:	Paroxysmal atrial fibrillation  Goal:	rate control, anticoagulation  Assessment and plan of treatment:	[facility] re: routine surveillance  Secondary Diagnosis:	Essential hypertension  Assessment and plan of treatment:	labile BP - adjust meds as appropriate

## 2018-06-01 NOTE — PROGRESS NOTE ADULT - SUBJECTIVE AND OBJECTIVE BOX
Patient is a 89y old  Female who presents with a chief complaint of abnormal labs (25 May 2018 12:41)      Patient seen in follow up for BRENDA. Stable renal function. BP meds adjusted by cardio. for d/c    PAST MEDICAL HISTORY:  HLD (hyperlipidemia)  Hypothyroid  Osteoarthritis  Gout  Overactive bladder  CVA (cerebral infarction)  Depression  Hypertension    MEDICATIONS  (STANDING):  allopurinol 100 milliGRAM(s) Oral at bedtime  apixaban 2.5 milliGRAM(s) Oral every 12 hours  doxazosin 2 milliGRAM(s) Oral daily  doxazosin 4 milliGRAM(s) Oral at bedtime  levothyroxine 75 MICROGram(s) Oral daily  metoprolol tartrate 37.5 milliGRAM(s) Oral two times a day  venlafaxine XR. 37.5 milliGRAM(s) Oral daily    MEDICATIONS  (PRN):  hydrALAZINE 25 milliGRAM(s) Oral every 8 hours PRN Systolic blood pressure >  melatonin 1 milliGRAM(s) Oral at bedtime PRN Insomnia    T(C): 36.8 (06-01-18 @ 09:17), Max: 37 (06-01-18 @ 00:05)  HR: 71 (06-01-18 @ 09:18) (66 - 96)  BP: 140/70 (06-01-18 @ 10:15) (117/69 - 197/74)  RR: 18 (06-01-18 @ 09:17)  SpO2: 95% (06-01-18 @ 09:17)  Wt(kg): --  I&O's Detail    31 May 2018 07:01  -  01 Jun 2018 07:00  --------------------------------------------------------  IN:    Oral Fluid: 350 mL  Total IN: 350 mL    OUT:  Total OUT: 0 mL    Total NET: 350 mL                PHYSICAL EXAM:  General: NAD  Respiratory: b/l air entry  Cardiovascular: S1 S2  Gastrointestinal: soft  Extremities:  edema      LABORATORY:                        11.0   11.0  )-----------( 216      ( 01 Jun 2018 07:23 )             33.0     06-01    140  |  109<H>  |  29<H>  ----------------------------<  100<H>  4.1   |  23  |  1.02    Ca    9.3      01 Jun 2018 07:23      Sodium, Serum: 140 mmol/L (06-01 @ 07:23)  Sodium, Serum: 144 mmol/L (05-31 @ 06:24)    Potassium, Serum: 4.1 mmol/L (06-01 @ 07:23)  Potassium, Serum: 3.8 mmol/L (05-31 @ 06:24)    Hemoglobin: 11.0 g/dL (06-01 @ 07:23)  Hemoglobin: 10.8 g/dL (05-31 @ 06:24)    Creatinine, Serum 1.02 (06-01 @ 07:23)  Creatinine, Serum 1.19 (05-31 @ 06:24)

## 2018-06-01 NOTE — PROGRESS NOTE ADULT - SUBJECTIVE AND OBJECTIVE BOX
PULMONARY/CRITICAL CARE      INTERVAL HPI/OVERNIGHT EVENTS:  Pt, stable, no sob. Ambulated.  89y FemaleHPI:  Pt is a 89 yo female who presents to the ED for abnormal lab result.  PMHx of depression, gout, HLD, HTN, hypothyroidism, OA, overactive bladder.  Pt reports that for the last several weeks she has not been feeling well.  She reports extreme fatigue and chills.  Pt followed up with her PMD this week who preformed outpatient blood work.  She was notified today that her potassium was elevated and that she needed to come to the ED for further work up. Pt denies fever, V/C, CP, SOB, abd pain, ext numbness or weakness.  She does report that she suffers from IBS and had a flare yesterday with loose watery stool but pt reports that it was non-bloody, and she denies melena.  She further reports that she followed up with her urologist several days ago and was started on po Doxycyline for a possible UTI.  Pt also states that she has been steadily losing weight over the last several months and is down from 163 pounds to 154.  She also reports that all her life she has suffered from HTN but lately her BP has been running low and she has not had to take her medication including her water pill.    	Of note Dr. Pelaez confirms pt weight loss and states that her BPs have been running low which is not usual for her.  He reports that he received a call today that her BUN/Cr was 99/2 and her K was 6.4.  Pt last ratio in April was 4.1/1.3.  He also reports that she had RUQ pain on exam and had reported mild nausea at that time   Pt reports that she did have a prior upper endo and colonoscopy many years ago which was WNL per reports  She had 1 liter IVF in ED , second liter is going, CT scan of abdomen did not show any hydronephrosis or bladder distention. (25 May 2018 12:41)        PAST MEDICAL & SURGICAL HISTORY:  HLD (hyperlipidemia)  Hypothyroid  Osteoarthritis  Gout  Overactive bladder  Depression  Hypertension  S/P lung surgery, follow-up exam: s/p removal of suspicious lesion, negative  S/P hysterectomy  S/P lumpectomy of breast  S/P cholecystectomy        ICU Vital Signs Last 24 Hrs  T(C): 36.5 (01 Jun 2018 05:28), Max: 37 (01 Jun 2018 00:05)  T(F): 97.7 (01 Jun 2018 05:28), Max: 98.6 (01 Jun 2018 00:05)  HR: 84 (01 Jun 2018 05:28) (67 - 96)  BP: 169/74 (01 Jun 2018 05:28) (142/70 - 187/75)  BP(mean): --  ABP: --  ABP(mean): --  RR: 16 (01 Jun 2018 05:28) (16 - 20)  SpO2: 96% (01 Jun 2018 05:28) (94% - 98%)    Qtts:     I&O's Summary    31 May 2018 07:01  -  01 Jun 2018 07:00  --------------------------------------------------------  IN: 350 mL / OUT: 0 mL / NET: 350 mL            REVIEW OF SYSTEMS:    CONSTITUTIONAL: No fever, weight loss, or fatigue  EYES: No eye pain, visual disturbances, or discharge  ENMT:  No difficulty hearing, tinnitus, vertigo; No sinus or throat pain  NECK: No pain or stiffness  BREASTS: No pain, masses, or nipple discharge  RESPIRATORY: No cough, wheezing, chills or hemoptysis; No shortness of breath  CARDIOVASCULAR: No chest pain, palpitations, dizziness, or leg swelling  GASTROINTESTINAL: No abdominal or epigastric pain. No nausea, vomiting, or hematemesis; No diarrhea or constipation. No melena or hematochezia.  GENITOURINARY: No dysuria, frequency, hematuria, or incontinence  NEUROLOGICAL: No headaches, memory loss, loss of strength, numbness, or tremors  SKIN: No itching, burning, rashes, or lesions   LYMPH NODES: No enlarged glands  ENDOCRINE: No heat or cold intolerance; No hair loss  MUSCULOSKELETAL: No joint pain or swelling; No muscle, back, or extremity pain, no calf tenderness  PSYCHIATRIC: No depression, anxiety, mood swings, or difficulty sleeping  HEME/LYMPH: No easy bruising, or bleeding gums  ALLERGY AND IMMUNOLOGIC: No hives or eczema      PHYSICAL EXAM:    GENERAL: NAD, well-groomed, well-developed, NAD  HEAD:  Atraumatic, Normocephalic  EYES: EOMI, PERRLA, conjunctiva and sclera clear  ENMT: No tonsillar erythema, exudates, or enlargement; Moist mucous membranes, Good dentition, No lesions  NECK: Supple, No JVD, Normal thyroid  NERVOUS SYSTEM:  Alert & Oriented X3, Good concentration; Motor Strength 5/5 B/L upper and lower extremities  CHEST/LUNG: Clear to percussion bilaterally; No rales, rhonchi, wheezing, or rubs  HEART: Regular rate and rhythm; No murmurs, rubs, or gallops  ABDOMEN: Soft, Nontender, Nondistended; Bowel sounds present  EXTREMITIES:  2+ Peripheral Pulses, No clubbing, cyanosis, or edema  LYMPH: No lymphadenopathy noted  SKIN: No rashes or lesions        LABS:                        11.0   11.0  )-----------( 216      ( 01 Jun 2018 07:23 )             33.0     06-01    140  |  109<H>  |  29<H>  ----------------------------<  100<H>  4.1   |  23  |  1.02    Ca    9.3      01 Jun 2018 07:23            vanco through     RADIOLOGY & ADDITIONAL STUDIES:      CRITICAL CARE TIME SPENT:

## 2018-06-01 NOTE — DISCHARGE NOTE ADULT - PATIENT PORTAL LINK FT
You can access the [facility], offered by [facility], by registering with the following website: [email or web address]

## 2018-06-01 NOTE — DISCHARGE NOTE ADULT - PROVIDER TOKENS
FREE:[LAST:[[email or web address]],FIRST:[chao],PHONE:[phone]],FAX:[(   [phone]    -],ADDRESS:[[address] [place] [address]]],TOKEN:'[id number]'

## 2018-06-02 DIAGNOSIS — K59.00 CONSTIPATION, UNSPECIFIED: ICD-10-CM

## 2018-06-02 LAB
ANION GAP SERPL CALC-SCNC: 8 MMOL/L — SIGNIFICANT CHANGE UP (ref 5–17)
BUN SERPL-MCNC: 33 MG/DL — HIGH (ref 7–23)
CALCIUM SERPL-MCNC: 9.1 MG/DL — SIGNIFICANT CHANGE UP (ref 8.4–10.5)
CHLORIDE SERPL-SCNC: 111 MMOL/L — HIGH (ref 96–108)
CO2 SERPL-SCNC: 24 MMOL/L — SIGNIFICANT CHANGE UP (ref 22–31)
CREAT SERPL-MCNC: 1.26 MG/DL — SIGNIFICANT CHANGE UP (ref 0.5–1.3)
GLUCOSE SERPL-MCNC: 99 MG/DL — SIGNIFICANT CHANGE UP (ref 70–99)
POTASSIUM SERPL-MCNC: 4.4 MMOL/L — SIGNIFICANT CHANGE UP (ref 3.5–5.3)
POTASSIUM SERPL-SCNC: 4.4 MMOL/L — SIGNIFICANT CHANGE UP (ref 3.5–5.3)
SODIUM SERPL-SCNC: 143 MMOL/L — SIGNIFICANT CHANGE UP (ref 135–145)

## 2018-06-02 PROCEDURE — 99233 SBSQ HOSP IP/OBS HIGH 50: CPT

## 2018-06-02 PROCEDURE — 99232 SBSQ HOSP IP/OBS MODERATE 35: CPT

## 2018-06-02 RX ADMIN — Medication 37.5 MILLIGRAM(S): at 05:38

## 2018-06-02 RX ADMIN — Medication 90 MILLIGRAM(S): at 05:39

## 2018-06-02 RX ADMIN — APIXABAN 2.5 MILLIGRAM(S): 2.5 TABLET, FILM COATED ORAL at 18:16

## 2018-06-02 RX ADMIN — Medication 10 MILLIGRAM(S): at 18:25

## 2018-06-02 RX ADMIN — Medication 75 MICROGRAM(S): at 05:39

## 2018-06-02 RX ADMIN — POLYETHYLENE GLYCOL 3350 17 GRAM(S): 17 POWDER, FOR SOLUTION ORAL at 22:54

## 2018-06-02 RX ADMIN — Medication 37.5 MILLIGRAM(S): at 11:05

## 2018-06-02 RX ADMIN — Medication 100 MILLIGRAM(S): at 22:53

## 2018-06-02 RX ADMIN — Medication 37.5 MILLIGRAM(S): at 18:16

## 2018-06-02 RX ADMIN — Medication 2 MILLIGRAM(S): at 05:38

## 2018-06-02 RX ADMIN — APIXABAN 2.5 MILLIGRAM(S): 2.5 TABLET, FILM COATED ORAL at 05:39

## 2018-06-02 NOTE — PROGRESS NOTE ADULT - ASSESSMENT
Assessment and Plan:     Atrial fibrillation:  Paroxysmal - currently in sinus rhythm; still with asymptomatic episodes of AF (ventricular rate is less fast when in AF); continue metoprolol, Eliquis; additional titrations of AVN slowing agents may be necessary in outpatient setting - patient plans to see Dr. Gates next week.    Hypertension: Systolic pressure 100 today. D/W hospitalist. Doxazosin reduced.     Acute kidney injury:  Resolved ARF with IVF and discontinuation of Diovan, Aldactone. Management as per nephrology.

## 2018-06-02 NOTE — PROGRESS NOTE ADULT - ASSESSMENT
Pt is a 89 yo female with  PMHx of depression, gout, HLD, HTN, hypothyroidism, OA, overactive bladder is being admitted for BRENDA, most likely due to dehydration. Improved.  PAF.  Wants to go home now. Can followup with me in office.    1. BRENDA likely due to  overdiuresis - improved   post renal causes already ruled out by CT scan,      Renal  evaluation from  Dr Cornelius appreciated. ,   avoid any nephrotoxicity, IV contrast, monitor renal function.       2. Abnormal EKG. : non specific conduction disease , no  associated symptoms. cardiology evaluation appreciated.        3. SPEAR (dyspnea on exertion)- resolved , cardiology f/up appreciated.  Chronic, followed by me in office.

## 2018-06-02 NOTE — PROGRESS NOTE ADULT - SUBJECTIVE AND OBJECTIVE BOX
PULMONARY/CRITICAL CARE      INTERVAL HPI/OVERNIGHT EVENTS:  Was constipated. Had BM. Had n and v.    89y FemaleHPI:  Pt is a 89 yo female who presents to the ED for abnormal lab result.  PMHx of depression, gout, HLD, HTN, hypothyroidism, OA, overactive bladder.  Pt reports that for the last several weeks she has not been feeling well.  She reports extreme fatigue and chills.  Pt followed up with her PMD this week who preformed outpatient blood work.  She was notified today that her potassium was elevated and that she needed to come to the ED for further work up. Pt denies fever, V/C, CP, SOB, abd pain, ext numbness or weakness.  She does report that she suffers from IBS and had a flare yesterday with loose watery stool but pt reports that it was non-bloody, and she denies melena.  She further reports that she followed up with her urologist several days ago and was started on po Doxycyline for a possible UTI.  Pt also states that she has been steadily losing weight over the last several months and is down from 163 pounds to 154.  She also reports that all her life she has suffered from HTN but lately her BP has been running low and she has not had to take her medication including her water pill.    	Of note Dr. Pelaez confirms pt weight loss and states that her BPs have been running low which is not usual for her.  He reports that he received a call today that her BUN/Cr was 99/2 and her K was 6.4.  Pt last ratio in April was 4.1/1.3.  He also reports that she had RUQ pain on exam and had reported mild nausea at that time   Pt reports that she did have a prior upper endo and colonoscopy many years ago which was WNL per reports  She had 1 liter IVF in ED , second liter is going, CT scan of abdomen did not show any hydronephrosis or bladder distention. (25 May 2018 12:41)        PAST MEDICAL & SURGICAL HISTORY:  HLD (hyperlipidemia)  Hypothyroid  Osteoarthritis  Gout  Overactive bladder  Depression  Hypertension  S/P lung surgery, follow-up exam: s/p removal of suspicious lesion, negative  S/P hysterectomy  S/P lumpectomy of breast  S/P cholecystectomy        ICU Vital Signs Last 24 Hrs  T(C): 36.6 (02 Jun 2018 05:05), Max: 36.8 (01 Jun 2018 09:17)  T(F): 97.9 (02 Jun 2018 05:05), Max: 98.2 (01 Jun 2018 09:17)  HR: 86 (02 Jun 2018 05:05) (66 - 95)  BP: 115/54 (02 Jun 2018 05:05) (110/58 - 144/68)  BP(mean): --  ABP: --  ABP(mean): --  RR: 16 (02 Jun 2018 05:05) (16 - 19)  SpO2: 93% (02 Jun 2018 05:05) (93% - 96%)    Qtts:     I&O's Summary    01 Jun 2018 07:01  -  02 Jun 2018 07:00  --------------------------------------------------------  IN: 490 mL / OUT: 0 mL / NET: 490 mL    02 Jun 2018 07:01  -  02 Jun 2018 08:52  --------------------------------------------------------  IN: 420 mL / OUT: 0 mL / NET: 420 mL            REVIEW OF SYSTEMS:    CONSTITUTIONAL: No fever, weight loss, or fatigue  EYES: No eye pain, visual disturbances, or discharge  ENMT:  No difficulty hearing, tinnitus, vertigo; No sinus or throat pain  NECK: No pain or stiffness  BREASTS: No pain, masses, or nipple discharge  RESPIRATORY: No cough, wheezing, chills or hemoptysis; No shortness of breath  CARDIOVASCULAR: No chest pain, palpitations, dizziness, or leg swelling  GASTROINTESTINAL: No abdominal or epigastric pain. No nausea, vomiting, or hematemesis; No diarrhea or constipation. No melena or hematochezia.  GENITOURINARY: No dysuria, frequency, hematuria, or incontinence  NEUROLOGICAL: No headaches, memory loss, loss of strength, numbness, or tremors  SKIN: No itching, burning, rashes, or lesions   LYMPH NODES: No enlarged glands  ENDOCRINE: No heat or cold intolerance; No hair loss  MUSCULOSKELETAL: No joint pain or swelling; No muscle, back, or extremity pain, no calf tenderness  PSYCHIATRIC: No depression, anxiety, mood swings, or difficulty sleeping  HEME/LYMPH: No easy bruising, or bleeding gums  ALLERGY AND IMMUNOLOGIC: No hives or eczema      PHYSICAL EXAM:    GENERAL: NAD, well-groomed, well-developed, NAD  HEAD:  Atraumatic, Normocephalic  EYES: EOMI, PERRLA, conjunctiva and sclera clear  ENMT: No tonsillar erythema, exudates, or enlargement; Moist mucous membranes, Good dentition, No lesions  NECK: Supple, No JVD, Normal thyroid  NERVOUS SYSTEM:  Alert & Oriented X3, Good concentration; Motor Strength 5/5 B/L upper and lower extremities  CHEST/LUNG: Clear to percussion bilaterally; No rales, rhonchi, wheezing, or rubs  HEART: Regular rate and rhythm; No murmurs, rubs, or gallops  ABDOMEN: Soft, Nontender, Nondistended; Bowel sounds present  EXTREMITIES:  2+ Peripheral Pulses, No clubbing, cyanosis, or edema  LYMPH: No lymphadenopathy noted  SKIN: No rashes or lesions        LABS:                        11.0   11.0  )-----------( 216      ( 01 Jun 2018 07:23 )             33.0     06-02    143  |  111<H>  |  33<H>  ----------------------------<  99  4.4   |  24  |  1.26    Ca    9.1      02 Jun 2018 07:00            vanco through     RADIOLOGY & ADDITIONAL STUDIES:      CRITICAL CARE TIME SPENT:

## 2018-06-02 NOTE — CONSULT NOTE ADULT - SUBJECTIVE AND OBJECTIVE BOX
Chief Complaint:  Patient is a 89y old  Female who presents with a chief complaint of abnormal labs (01 Jun 2018 12:20)    HLD (hyperlipidemia)  Hypothyroid  Osteoarthritis  Gout  Overactive bladder  CVA (cerebral infarction)  Depression  Hypertension  S/P lung surgery, follow-up exam  S/P hysterectomy  S/P lumpectomy of breast  S/P cholecystectomy  No significant past surgical history     HPI:  Pt is a 89 yo female who presents to the ED for abnormal lab result.  PMHx of depression, gout, HLD, HTN, hypothyroidism, OA, overactive bladder.  Pt reports that for the last several weeks she has not been feeling well.  She reports extreme fatigue and chills.  Pt followed up with her PMD this week who preformed outpatient blood work.  She was notified today that her potassium was elevated and that she needed to come to the ED for further work up. Pt denies fever, V/C, CP, SOB, abd pain, ext numbness or weakness.  She does report that she suffers from IBS and had a flare yesterday with loose watery stool but pt reports that it was non-bloody, and she denies melena.  She further reports that she followed up with her urologist several days ago and was started on po Doxycyline for a possible UTI.  Pt also states that she has been steadily losing weight over the last several months and is down from 163 pounds to 154.  She also reports that all her life she has suffered from HTN but lately her BP has been running low and she has not had to take her medication including her water pill.    	Of note Dr. Pelaez confirms pt weight loss and states that her BPs have been running low which is not usual for her.  He reports that he received a call today that her BUN/Cr was 99/2 and her K was 6.4.  Pt last ratio in April was 4.1/1.3.  He also reports that she had RUQ pain on exam and had reported mild nausea at that time   Pt reports that she did have a prior upper endo and colonoscopy many years ago which was WNL per reports  She had 1 liter IVF in ED , second liter is going, CT scan of abdomen did not show any hydronephrosis or bladder distention. (25 May 2018 12:41)      iodine (Anaphylaxis)  penicillin (Other)  sulfa drugs (Hives)  throat closes (Other)  throat closes (Other)      allopurinol 100 milliGRAM(s) Oral at bedtime  apixaban 2.5 milliGRAM(s) Oral every 12 hours  bisacodyl Suppository 10 milliGRAM(s) Rectal daily PRN  doxazosin 2 milliGRAM(s) Oral daily  doxazosin 4 milliGRAM(s) Oral at bedtime  hydrALAZINE 25 milliGRAM(s) Oral every 8 hours PRN  levothyroxine 75 MICROGram(s) Oral daily  melatonin 1 milliGRAM(s) Oral at bedtime PRN  metoprolol tartrate 37.5 milliGRAM(s) Oral two times a day  NIFEdipine XL 90 milliGRAM(s) Oral daily  ondansetron   Disintegrating Tablet 4 milliGRAM(s) Oral every 6 hours PRN  ondansetron   Disintegrating Tablet 4 milliGRAM(s) Oral every 6 hours PRN  polyethylene glycol 3350 17 Gram(s) Oral at bedtime  venlafaxine XR. 37.5 milliGRAM(s) Oral daily        FAMILY HISTORY:  Family history of breast cancer  Family history of nasopharyngeal cancer (Child): daughter        Review of Systems:    General:  No wt loss, fevers, chills, night sweats,fatigue,   Eyes:  Good vision, no reported pain  ENT:  No sore throat, pain, runny nose, dysphagia  CV:  No pain, palpitatioins, hypo/hypertension  Resp:  No dyspnea, cough, tachypnea, wheezing  :  No pain, bleeding, incontinence, nocturia  Muscle:  No pain, weakness  Neuro:  No weakness, tingling, memory problems  Psych:  No fatigue, insomnia, mood problems, depression  Endocrine:  No polyuria, polydypsia, cold/heat intolerance  Heme:  No petechiae, ecchymosis, easy bruisability  Skin:  No rash, tattoos, scars, edema    Relevant Family History:       Relevant Social History:       Physical Exam:    Vital Signs:  Vital Signs Last 24 Hrs  T(C): 36.6 (02 Jun 2018 09:17), Max: 36.7 (01 Jun 2018 14:27)  T(F): 97.8 (02 Jun 2018 09:17), Max: 98 (01 Jun 2018 14:27)  HR: 78 (02 Jun 2018 09:17) (66 - 95)  BP: 100/51 (02 Jun 2018 09:17) (100/51 - 144/68)  BP(mean): --  RR: 18 (02 Jun 2018 09:17) (16 - 19)  SpO2: 93% (02 Jun 2018 09:17) (93% - 96%)  Daily     Daily     General:  Appears stated age, well-groomed, well-nourished, no distress  HEENT:  NC/AT,  conjunctivae clear and pink, no thyromegaly, nodules, adenopathy, no JVD  Chest:  Full & symmetric excursion, no increased effort, breath sounds clear  Cardiovascular:  Regular rhythm, S1, S2, no murmur/rub/S3/S4, no abdominal bruit, no edema  Abdomen:  Soft, non-tender, non-distended, normoactive bowel sounds,  no masses ,no hepatosplenomeagaly, no signs of chronic liver disease  Extremities:  no cyanosis,clubbing or edema  Skin:  No rash/erythema/ecchymoses/petechiae/wounds/abscess/warm/dry  Neuro/Psych:  Alert, oriented, no asterixis, no tremor, no encephalopathy    Laboratory:                            11.0   11.0  )-----------( 216      ( 01 Jun 2018 07:23 )             33.0     06-02    143  |  111<H>  |  33<H>  ----------------------------<  99  4.4   |  24  |  1.26    Ca    9.1      02 Jun 2018 07:00              Imaging:

## 2018-06-02 NOTE — PROGRESS NOTE ADULT - SUBJECTIVE AND OBJECTIVE BOX
PCP:    REQUESTING PHYSICIAN:    REASON FOR CONSULT:    CHIEF COMPLAINT:    HPI:  89 year old woman with a history of HLD, hypothyroidism, osteoarthritis, gout, HTN, depression, admitted 5/25/18 with acute renal failure attributed to intravascular volume depletion; hospitalization complicated by poorly controlled hypertension and paroxysms of atrial fibrillation associated with rapid ventricular response.    5/31/18:  Complaining of poor quality sleep but otherwise feels well and eager to return home; denies: palpitations, dyspnea, chest pain.  6/1/18:  Feels well; no complaints.  6/2/18: Feels tired today. Questions regarding b/p meds.    PAST MEDICAL & SURGICAL HISTORY:  HLD (hyperlipidemia)  Hypothyroid  Osteoarthritis  Gout  Overactive bladder  Depression  Hypertension  S/P lung surgery, follow-up exam: s/p removal of suspicious lesion, negative  S/P hysterectomy  S/P lumpectomy of breast  S/P cholecystectomy      SOCIAL HISTORY:    FAMILY HISTORY:  Family history of breast cancer  Family history of nasopharyngeal cancer (Child): daughter      ALLERGIES:  Allergies    iodine (Anaphylaxis)  penicillin (Other)  sulfa drugs (Hives)  throat closes (Other)  throat closes (Other)    Intolerances        MEDICATIONS:    MEDICATIONS  (STANDING):  allopurinol 100 milliGRAM(s) Oral at bedtime  apixaban 2.5 milliGRAM(s) Oral every 12 hours  doxazosin 2 milliGRAM(s) Oral daily  doxazosin 4 milliGRAM(s) Oral at bedtime  levothyroxine 75 MICROGram(s) Oral daily  metoprolol tartrate 37.5 milliGRAM(s) Oral two times a day  NIFEdipine XL 90 milliGRAM(s) Oral daily  polyethylene glycol 3350 17 Gram(s) Oral at bedtime  venlafaxine XR. 37.5 milliGRAM(s) Oral daily    MEDICATIONS  (PRN):  bisacodyl Suppository 10 milliGRAM(s) Rectal daily PRN Constipation  hydrALAZINE 25 milliGRAM(s) Oral every 8 hours PRN Systolic blood pressure >  melatonin 1 milliGRAM(s) Oral at bedtime PRN Insomnia  ondansetron   Disintegrating Tablet 4 milliGRAM(s) Oral every 6 hours PRN Nausea and/or Vomiting  ondansetron   Disintegrating Tablet 4 milliGRAM(s) Oral every 6 hours PRN Nausea and/or Vomiting      Vital Signs Last 24 Hrs  T(C): 36.6 (02 Jun 2018 09:17), Max: 36.7 (01 Jun 2018 14:27)  T(F): 97.8 (02 Jun 2018 09:17), Max: 98 (01 Jun 2018 14:27)  HR: 78 (02 Jun 2018 09:17) (66 - 95)  BP: 100/51 (02 Jun 2018 09:17) (100/51 - 144/68)  BP(mean): --  RR: 18 (02 Jun 2018 09:17) (16 - 19)  SpO2: 93% (02 Jun 2018 09:17) (93% - 96%)Daily     Daily I&O's Summary    01 Jun 2018 07:01  -  02 Jun 2018 07:00  --------------------------------------------------------  IN: 490 mL / OUT: 0 mL / NET: 490 mL    02 Jun 2018 07:01  -  02 Jun 2018 10:58  --------------------------------------------------------  IN: 420 mL / OUT: 0 mL / NET: 420 mL        PHYSICAL EXAM:    Constitutional: NAD, awake and alert, well-developed  HEENT: PERR, EOMI,  No oral cyananosis.  Neck:  supple,  No JVD  Respiratory: Breath sounds are clear bilaterally, No wheezing, rales or rhonchi  Cardiovascular: S1 and S2, regular rate and rhythm, 2/6 KERI  Gastrointestinal: Bowel Sounds present, soft, nontender.   Extremities: No peripheral edema. No clubbing or cyanosis.  Vascular: 2+ peripheral pulses  Neurological: A/O x 3, no focal deficits  Musculoskeletal: no calf tenderness.  Skin: No rashes.      LABS: All Labs Reviewed:                        11.0   11.0  )-----------( 216      ( 01 Jun 2018 07:23 )             33.0                         10.8   9.9   )-----------( 204      ( 31 May 2018 06:24 )             32.5     02 Jun 2018 07:00    143    |  111    |  33     ----------------------------<  99     4.4     |  24     |  1.26   01 Jun 2018 07:23    140    |  109    |  29     ----------------------------<  100    4.1     |  23     |  1.02   31 May 2018 06:24    144    |  112    |  32     ----------------------------<  92     3.8     |  24     |  1.19     Ca    9.1        02 Jun 2018 07:00  Ca    9.3        01 Jun 2018 07:23  Ca    9.2        31 May 2018 06:24            Blood Culture:         RADIOLOGY/EKG:      ECHO/CARDIAC CATHTERIZATION/STRESS TEST:

## 2018-06-02 NOTE — PROGRESS NOTE ADULT - SUBJECTIVE AND OBJECTIVE BOX
Patient is a 89y old  Female who presents with a chief complaint of abnormal labs (01 Jun 2018 12:20)      INTERVAL History of Present Illness/OVERNIGHT EVENTS: generalized weakness.  BP low - not dizzy    MEDICATIONS  (STANDING):  allopurinol 100 milliGRAM(s) Oral at bedtime  apixaban 2.5 milliGRAM(s) Oral every 12 hours  doxazosin 4 milliGRAM(s) Oral at bedtime  levothyroxine 75 MICROGram(s) Oral daily  metoprolol tartrate 37.5 milliGRAM(s) Oral two times a day  NIFEdipine XL 90 milliGRAM(s) Oral daily  polyethylene glycol 3350 17 Gram(s) Oral at bedtime  venlafaxine XR. 37.5 milliGRAM(s) Oral daily    MEDICATIONS  (PRN):  bisacodyl Suppository 10 milliGRAM(s) Rectal daily PRN Constipation  melatonin 1 milliGRAM(s) Oral at bedtime PRN Insomnia  ondansetron   Disintegrating Tablet 4 milliGRAM(s) Oral every 6 hours PRN Nausea and/or Vomiting  ondansetron   Disintegrating Tablet 4 milliGRAM(s) Oral every 6 hours PRN Nausea and/or Vomiting      Allergies    iodine (Anaphylaxis)  penicillin (Other)  sulfa drugs (Hives)  throat closes (Other)  throat closes (Other)    Intolerances        REVIEW OF SYSTEMS:  Negative unless otherwise specified above.    Vital Signs Last 24 Hrs  T(C): 36.6 (02 Jun 2018 14:27), Max: 36.6 (02 Jun 2018 00:11)  T(F): 97.8 (02 Jun 2018 14:27), Max: 97.9 (02 Jun 2018 00:11)  HR: 116 (02 Jun 2018 14:27) (66 - 116)  BP: 93/58 (02 Jun 2018 14:27) (93/58 - 144/68)  BP(mean): --  RR: 18 (02 Jun 2018 14:27) (16 - 19)  SpO2: 97% (02 Jun 2018 14:27) (93% - 97%)        PHYSICAL EXAM:  GENERAL: No apparent distress  HEAD:  Atraumatic, Normocephalic  EYES: conjunctiva and sclera clear  ENMT: Moist mucous membranes  NECK: Supple  CHEST/LUNG: Clear to auscultation bilaterally  HEART: irregular rate and rhythm  ABDOMEN: Soft, Nontender, Nondistended; Bowel sounds present  EXTREMITIES:  No clubbing, cyanosis or edema  SKIN: No rashes or lesions  NERVOUS SYSTEM:  Alert & Oriented X3; Bilateral Lower extremity mobile, sensation to light touch intact      LABS:    02 Jun 2018 07:00    143    |  111    |  33     ----------------------------<  99     4.4     |  24     |  1.26     Ca    9.1        02 Jun 2018 07:00          CAPILLARY BLOOD GLUCOSE          RADIOLOGY & ADDITIONAL TESTS:    Images reviewed personally    Consultant Notes Reviewed and Care Discussed with relevant Consultants/Other Providers.

## 2018-06-02 NOTE — PROGRESS NOTE ADULT - ASSESSMENT
Pt is a 89 yo female with  PMHx of depression, gout, HLD, HTN, hypothyroidism, OA, overactive bladder admitted for BRENDA on CKD which resolved. Had AF RVR recently started on eliquis and metoprolol.     1. BRENDA superimposed on CKD 3. resolved. Seen by Nephro. monitor intake and output.    2. Paroxysmal Afib.  Eliquis and metoprolol.    3. SPEAR (dyspnea on exertion)- resolved    4. HTN - overmedicated currently.  Allow some permissive HTN given elderly status.  d/w Dr. Espinoza.    5. Hypothyroid, stable ,cont synthroid    6. HLD controlled, off medication.    7. Hyperkalemia- resolved likely due to aldactone and ARBs  -low potassium diet. Renal follow up appreciated.   -monitor bmp as needed    7. Depression, cont effexor.     8: dvt ppx: SHQ    9. Insomnia; improved with melatonin 1 mg    10. constipation; resolved. patient states MOM at home usually works for her.         Dispo: appt next week with Dr kim [facility] and Dr Lombardi [facility].

## 2018-06-03 VITALS
HEART RATE: 70 BPM | SYSTOLIC BLOOD PRESSURE: 111 MMHG | RESPIRATION RATE: 18 BRPM | TEMPERATURE: 98 F | OXYGEN SATURATION: 95 % | DIASTOLIC BLOOD PRESSURE: 70 MMHG

## 2018-06-03 PROCEDURE — 71046 X-RAY EXAM CHEST 2 VIEWS: CPT

## 2018-06-03 PROCEDURE — 82436 ASSAY OF URINE CHLORIDE: CPT

## 2018-06-03 PROCEDURE — 80053 COMPREHEN METABOLIC PANEL: CPT

## 2018-06-03 PROCEDURE — 83735 ASSAY OF MAGNESIUM: CPT

## 2018-06-03 PROCEDURE — 87086 URINE CULTURE/COLONY COUNT: CPT

## 2018-06-03 PROCEDURE — 84100 ASSAY OF PHOSPHORUS: CPT

## 2018-06-03 PROCEDURE — 84439 ASSAY OF FREE THYROXINE: CPT

## 2018-06-03 PROCEDURE — 71045 X-RAY EXAM CHEST 1 VIEW: CPT

## 2018-06-03 PROCEDURE — 83690 ASSAY OF LIPASE: CPT

## 2018-06-03 PROCEDURE — 93005 ELECTROCARDIOGRAM TRACING: CPT

## 2018-06-03 PROCEDURE — 74176 CT ABD & PELVIS W/O CONTRAST: CPT

## 2018-06-03 PROCEDURE — 76775 US EXAM ABDO BACK WALL LIM: CPT

## 2018-06-03 PROCEDURE — 84550 ASSAY OF BLOOD/URIC ACID: CPT

## 2018-06-03 PROCEDURE — 87205 SMEAR GRAM STAIN: CPT

## 2018-06-03 PROCEDURE — 85027 COMPLETE CBC AUTOMATED: CPT

## 2018-06-03 PROCEDURE — 84443 ASSAY THYROID STIM HORMONE: CPT

## 2018-06-03 PROCEDURE — 82570 ASSAY OF URINE CREATININE: CPT

## 2018-06-03 PROCEDURE — 80048 BASIC METABOLIC PNL TOTAL CA: CPT

## 2018-06-03 PROCEDURE — 99285 EMERGENCY DEPT VISIT HI MDM: CPT

## 2018-06-03 PROCEDURE — 84133 ASSAY OF URINE POTASSIUM: CPT

## 2018-06-03 PROCEDURE — 12345: CPT | Mod: NC

## 2018-06-03 PROCEDURE — 82962 GLUCOSE BLOOD TEST: CPT

## 2018-06-03 PROCEDURE — 84300 ASSAY OF URINE SODIUM: CPT

## 2018-06-03 PROCEDURE — 84156 ASSAY OF PROTEIN URINE: CPT

## 2018-06-03 PROCEDURE — 74018 RADEX ABDOMEN 1 VIEW: CPT

## 2018-06-03 PROCEDURE — 81001 URINALYSIS AUTO W/SCOPE: CPT

## 2018-06-03 PROCEDURE — 36415 COLL VENOUS BLD VENIPUNCTURE: CPT

## 2018-06-03 PROCEDURE — 84481 FREE ASSAY (FT-3): CPT

## 2018-06-03 PROCEDURE — 99232 SBSQ HOSP IP/OBS MODERATE 35: CPT

## 2018-06-03 PROCEDURE — 97161 PT EVAL LOW COMPLEX 20 MIN: CPT

## 2018-06-03 PROCEDURE — 99239 HOSP IP/OBS DSCHRG MGMT >30: CPT

## 2018-06-03 PROCEDURE — 83880 ASSAY OF NATRIURETIC PEPTIDE: CPT

## 2018-06-03 RX ORDER — DOXAZOSIN MESYLATE 4 MG
1 TABLET ORAL
Qty: 0 | Refills: 0 | COMMUNITY

## 2018-06-03 RX ORDER — METOPROLOL TARTRATE 50 MG
1 TABLET ORAL
Qty: 0 | Refills: 0 | DISCHARGE
Start: 2018-06-03

## 2018-06-03 RX ORDER — POLYETHYLENE GLYCOL 3350 17 G/17G
17 POWDER, FOR SOLUTION ORAL
Qty: 0 | Refills: 0 | DISCHARGE
Start: 2018-06-03

## 2018-06-03 RX ORDER — METOPROLOL TARTRATE 50 MG
1 TABLET ORAL
Qty: 60 | Refills: 0 | OUTPATIENT
Start: 2018-06-03

## 2018-06-03 RX ADMIN — Medication 37.5 MILLIGRAM(S): at 06:13

## 2018-06-03 RX ADMIN — APIXABAN 2.5 MILLIGRAM(S): 2.5 TABLET, FILM COATED ORAL at 06:14

## 2018-06-03 RX ADMIN — Medication 90 MILLIGRAM(S): at 06:12

## 2018-06-03 RX ADMIN — Medication 37.5 MILLIGRAM(S): at 12:03

## 2018-06-03 RX ADMIN — Medication 1 MILLIGRAM(S): at 00:33

## 2018-06-03 RX ADMIN — Medication 75 MICROGRAM(S): at 06:13

## 2018-06-03 NOTE — PROGRESS NOTE ADULT - PROBLEM SELECTOR PROBLEM 2
HLD (hyperlipidemia)
SPEAR (dyspnea on exertion)
HLD (hyperlipidemia)
SPEAR (dyspnea on exertion)

## 2018-06-03 NOTE — PROGRESS NOTE ADULT - SUBJECTIVE AND OBJECTIVE BOX
PCP:    REQUESTING PHYSICIAN:    REASON FOR CONSULT:    CHIEF COMPLAINT:    HPI:89 year old woman with a history of HLD, hypothyroidism, osteoarthritis, gout, HTN, depression, admitted 5/25/18 with acute renal failure attributed to intravascular volume depletion; hospitalization complicated by poorly controlled hypertension and paroxysms of atrial fibrillation associated with rapid ventricular response.    5/31/18:  Complaining of poor quality sleep but otherwise feels well and eager to return home; denies: palpitations, dyspnea, chest pain.  6/1/18:  Feels well; no complaints.  6/2/18: Feels tired today. Questions regarding b/p meds.  6/3/18: Feels improved. Meds adjusted    PAST MEDICAL & SURGICAL HISTORY:  HLD (hyperlipidemia)  Hypothyroid  Osteoarthritis  Gout  Overactive bladder  Depression  Hypertension  S/P lung surgery, follow-up exam: s/p removal of suspicious lesion, negative  S/P hysterectomy  S/P lumpectomy of breast  S/P cholecystectomy      SOCIAL HISTORY:    FAMILY HISTORY:  Family history of breast cancer  Family history of nasopharyngeal cancer (Child): daughter      ALLERGIES:  Allergies    iodine (Anaphylaxis)  penicillin (Other)  sulfa drugs (Hives)  throat closes (Other)  throat closes (Other)    Intolerances        MEDICATIONS:    MEDICATIONS  (STANDING):  allopurinol 100 milliGRAM(s) Oral at bedtime  apixaban 2.5 milliGRAM(s) Oral every 12 hours  doxazosin 4 milliGRAM(s) Oral at bedtime  levothyroxine 75 MICROGram(s) Oral daily  metoprolol tartrate 37.5 milliGRAM(s) Oral two times a day  NIFEdipine XL 90 milliGRAM(s) Oral daily  polyethylene glycol 3350 17 Gram(s) Oral at bedtime  venlafaxine XR. 37.5 milliGRAM(s) Oral daily    MEDICATIONS  (PRN):  bisacodyl Suppository 10 milliGRAM(s) Rectal daily PRN Constipation  melatonin 1 milliGRAM(s) Oral at bedtime PRN Insomnia  ondansetron   Disintegrating Tablet 4 milliGRAM(s) Oral every 6 hours PRN Nausea and/or Vomiting  ondansetron   Disintegrating Tablet 4 milliGRAM(s) Oral every 6 hours PRN Nausea and/or Vomiting      Vital Signs Last 24 Hrs  T(C): 36.9 (03 Jun 2018 09:31), Max: 36.9 (03 Jun 2018 09:31)  T(F): 98.5 (03 Jun 2018 09:31), Max: 98.5 (03 Jun 2018 09:31)  HR: 70 (03 Jun 2018 09:31) (63 - 116)  BP: 111/70 (03 Jun 2018 09:31) (93/58 - 158/79)  BP(mean): --  RR: 18 (03 Jun 2018 09:31) (16 - 179)  SpO2: 95% (03 Jun 2018 09:31) (95% - 97%)Daily     Daily I&O's Summary    02 Jun 2018 07:01  -  03 Jun 2018 07:00  --------------------------------------------------------  IN: 420 mL / OUT: 0 mL / NET: 420 mL    03 Jun 2018 07:01  -  03 Jun 2018 12:53  --------------------------------------------------------  IN: 420 mL / OUT: 0 mL / NET: 420 mL        PHYSICAL EXAM:    Constitutional: NAD, awake and alert, well-developed  HEENT: PERR, EOMI,  No oral cyananosis.  Neck:  supple,  No JVD  Respiratory: Breath sounds are clear bilaterally, No wheezing, rales or rhonchi  Cardiovascular: S1 and S2, regular rate and rhythm, no Murmurs, gallops or rubs  Gastrointestinal: Bowel Sounds present, soft, nontender.   Extremities: No peripheral edema. No clubbing or cyanosis.  Vascular: 2+ peripheral pulses  Neurological: A/O x 3, no focal deficits  Musculoskeletal: no calf tenderness.  Skin: No rashes.      LABS: All Labs Reviewed:                        11.0   11.0  )-----------( 216      ( 01 Jun 2018 07:23 )             33.0     02 Jun 2018 07:00    143    |  111    |  33     ----------------------------<  99     4.4     |  24     |  1.26   01 Jun 2018 07:23    140    |  109    |  29     ----------------------------<  100    4.1     |  23     |  1.02     Ca    9.1        02 Jun 2018 07:00  Ca    9.3        01 Jun 2018 07:23            Blood Culture:         RADIOLOGY/EKG:      ECHO/CARDIAC CATHTERIZATION/STRESS TEST:

## 2018-06-03 NOTE — PROGRESS NOTE ADULT - SUBJECTIVE AND OBJECTIVE BOX
INTERVAL HPI/OVERNIGHT EVENTS:  no bm  MEDICATIONS  (STANDING):  allopurinol 100 milliGRAM(s) Oral at bedtime  apixaban 2.5 milliGRAM(s) Oral every 12 hours  doxazosin 4 milliGRAM(s) Oral at bedtime  levothyroxine 75 MICROGram(s) Oral daily  metoprolol tartrate 37.5 milliGRAM(s) Oral two times a day  NIFEdipine XL 90 milliGRAM(s) Oral daily  polyethylene glycol 3350 17 Gram(s) Oral at bedtime  venlafaxine XR. 37.5 milliGRAM(s) Oral daily    MEDICATIONS  (PRN):  bisacodyl Suppository 10 milliGRAM(s) Rectal daily PRN Constipation  melatonin 1 milliGRAM(s) Oral at bedtime PRN Insomnia  ondansetron   Disintegrating Tablet 4 milliGRAM(s) Oral every 6 hours PRN Nausea and/or Vomiting  ondansetron   Disintegrating Tablet 4 milliGRAM(s) Oral every 6 hours PRN Nausea and/or Vomiting      Allergies    iodine (Anaphylaxis)  penicillin (Other)  sulfa drugs (Hives)  throat closes (Other)  throat closes (Other)    Intolerances        Review of Systems:    General:  No wt loss, fevers, chills, night sweats,fatigue,   Eyes:  Good vision, no reported pain  ENT:  No sore throat, pain, runny nose, dysphagia  CV:  No pain, palpitatioins, hypo/hypertension  Resp:  No dyspnea, cough, tachypnea, wheezing  GI:  No pain, No nausea, No vomiting, No diarrhea, No constipatiion, No weight loss, No fever, No pruritis, No rectal bleeding, No tarry stools, No dysphagia,  :  No pain, bleeding, incontinence, nocturia  Muscle:  No pain, weakness  Neuro:  No weakness, tingling, memory problems  Psych:  No fatigue, insomnia, mood problems, depression  Endocrine:  No polyuria, polydypsia, cold/heat intolerance  Heme:  No petechiae, ecchymosis, easy bruisability  Skin:  No rash, tattoos, scars, edema      Vital Signs Last 24 Hrs  T(C): 36.9 (03 Jun 2018 09:31), Max: 36.9 (03 Jun 2018 09:31)  T(F): 98.5 (03 Jun 2018 09:31), Max: 98.5 (03 Jun 2018 09:31)  HR: 70 (03 Jun 2018 09:31) (63 - 116)  BP: 111/70 (03 Jun 2018 09:31) (93/58 - 158/79)  BP(mean): --  RR: 18 (03 Jun 2018 09:31) (16 - 179)  SpO2: 95% (03 Jun 2018 09:31) (95% - 97%)    PHYSICAL EXAM:    Constitutional: NAD, well-developed  HEENT: EOMI, throat clear  Neck: No LAD, supple  Respiratory: CTA and P  Cardiovascular: S1 and S2, RRR, no M  Gastrointestinal: BS+, soft, NT/ND, neg HSM,  Extremities: No peripheral edema, neg clubing, cyanosis  Vascular: 2+ peripheral pulses  Neurological: A/O x 3, no focal deficits  Psychiatric: Normal mood, normal affect  Skin: No rashes      LABS:    06-02    143  |  111<H>  |  33<H>  ----------------------------<  99  4.4   |  24  |  1.26    Ca    9.1      02 Jun 2018 07:00            RADIOLOGY & ADDITIONAL TESTS:

## 2018-06-03 NOTE — PROGRESS NOTE ADULT - PROBLEM SELECTOR PLAN 1
Running high off meds.  restart lopressor in am and PRN hydralazine if SBP>160.
Watch Cr  Renal fu
increase water intake  op bowel regimen
Watch Cr  Renal fu
Running slight high off meds.  Continue to observe and if worsens will adjust meds.
Watch Cr  Renal fu

## 2018-06-03 NOTE — PROGRESS NOTE ADULT - ASSESSMENT
Pt is a 89 yo female with  PMHx of depression, gout, HLD, HTN, hypothyroidism, OA, overactive bladder admitted for BRENDA on CKD which resolved. Had AF RVR recently started on eliquis and metoprolol.     1. BRENDA superimposed on CKD 3. resolved. Seen by Nephro. monitor intake and output.    2. Paroxysmal Afib.  Eliquis and metoprolol.    3. SPEAR (dyspnea on exertion)- resolved    4. HTN - meds adjusted for optimal BP control.    5. Hypothyroid, stable ,cont synthroid    6. HLD controlled, off medication.    7. Hyperkalemia- resolved likely due to aldactone and ARBs  -low potassium diet. Renal follow up appreciated.   -monitor bmp as needed    7. Depression, cont effexor.     8: dvt ppx: SHQ    9. Insomnia; improved with melatonin 1 mg    10. constipation; resolved. patient states MOM at home usually works for her.         Dispo: appt next week with Dr kim [facility] and Dr Lombardi [facility].   spent 35 mins on discharge

## 2018-06-03 NOTE — PROGRESS NOTE ADULT - SUBJECTIVE AND OBJECTIVE BOX
PULMONARY/CRITICAL CARE      INTERVAL HPI/OVERNIGHT EVENTS: Had some tachycardia. Stable, asymptomatic now. No sob.    89y FemaleHPI:  Pt is a 89 yo female who presents to the ED for abnormal lab result.  PMHx of depression, gout, HLD, HTN, hypothyroidism, OA, overactive bladder.  Pt reports that for the last several weeks she has not been feeling well.  She reports extreme fatigue and chills.  Pt followed up with her PMD this week who preformed outpatient blood work.  She was notified today that her potassium was elevated and that she needed to come to the ED for further work up. Pt denies fever, V/C, CP, SOB, abd pain, ext numbness or weakness.  She does report that she suffers from IBS and had a flare yesterday with loose watery stool but pt reports that it was non-bloody, and she denies melena.  She further reports that she followed up with her urologist several days ago and was started on po Doxycyline for a possible UTI.  Pt also states that she has been steadily losing weight over the last several months and is down from 163 pounds to 154.  She also reports that all her life she has suffered from HTN but lately her BP has been running low and she has not had to take her medication including her water pill.    	Of note Dr. Pelaez confirms pt weight loss and states that her BPs have been running low which is not usual for her.  He reports that he received a call today that her BUN/Cr was 99/2 and her K was 6.4.  Pt last ratio in April was 4.1/1.3.  He also reports that she had RUQ pain on exam and had reported mild nausea at that time   Pt reports that she did have a prior upper endo and colonoscopy many years ago which was WNL per reports  She had 1 liter IVF in ED , second liter is going, CT scan of abdomen did not show any hydronephrosis or bladder distention. (25 May 2018 12:41)        PAST MEDICAL & SURGICAL HISTORY:  HLD (hyperlipidemia)  Hypothyroid  Osteoarthritis  Gout  Overactive bladder  Depression  Hypertension  S/P lung surgery, follow-up exam: s/p removal of suspicious lesion, negative  S/P hysterectomy  S/P lumpectomy of breast  S/P cholecystectomy        ICU Vital Signs Last 24 Hrs  T(C): 36.9 (03 Jun 2018 09:31), Max: 36.9 (03 Jun 2018 09:31)  T(F): 98.5 (03 Jun 2018 09:31), Max: 98.5 (03 Jun 2018 09:31)  HR: 70 (03 Jun 2018 09:31) (63 - 116)  BP: 111/70 (03 Jun 2018 09:31) (93/58 - 158/79)  BP(mean): --  ABP: --  ABP(mean): --  RR: 18 (03 Jun 2018 09:31) (16 - 179)  SpO2: 95% (03 Jun 2018 09:31) (95% - 97%)    Qtts:     I&O's Summary    02 Jun 2018 07:01  -  03 Jun 2018 07:00  --------------------------------------------------------  IN: 420 mL / OUT: 0 mL / NET: 420 mL            REVIEW OF SYSTEMS:    CONSTITUTIONAL: No fever, weight loss, or fatigue  EYES: No eye pain, visual disturbances, or discharge  ENMT:  No difficulty hearing, tinnitus, vertigo; No sinus or throat pain  NECK: No pain or stiffness  BREASTS: No pain, masses, or nipple discharge  RESPIRATORY: No cough, wheezing, chills or hemoptysis; No shortness of breath  CARDIOVASCULAR: No chest pain, palpitations, dizziness, or leg swelling  GASTROINTESTINAL: No abdominal or epigastric pain. No nausea, vomiting, or hematemesis; No diarrhea or constipation. No melena or hematochezia.  GENITOURINARY: No dysuria, frequency, hematuria, or incontinence  NEUROLOGICAL: No headaches, memory loss, loss of strength, numbness, or tremors  SKIN: No itching, burning, rashes, or lesions   LYMPH NODES: No enlarged glands  ENDOCRINE: No heat or cold intolerance; No hair loss  MUSCULOSKELETAL: No joint pain or swelling; No muscle, back, or extremity pain, no calf tenderness  PSYCHIATRIC: No depression, anxiety, mood swings, or difficulty sleeping  HEME/LYMPH: No easy bruising, or bleeding gums  ALLERGY AND IMMUNOLOGIC: No hives or eczema      PHYSICAL EXAM:    GENERAL: NAD, well-groomed, well-developed, NAD  HEAD:  Atraumatic, Normocephalic  EYES: EOMI, PERRLA, conjunctiva and sclera clear  ENMT: No tonsillar erythema, exudates, or enlargement; Moist mucous membranes, Good dentition, No lesions  NECK: Supple, No JVD, Normal thyroid  NERVOUS SYSTEM:  Alert & Oriented X3, Good concentration; Motor Strength 5/5 B/L upper and lower extremities  CHEST/LUNG: Clear to percussion bilaterally; No rales, rhonchi, wheezing, or rubs  HEART: Regular rate and rhythm; No murmurs, rubs, or gallops  ABDOMEN: Soft, Nontender, Nondistended; Bowel sounds present  EXTREMITIES:  2+ Peripheral Pulses, No clubbing, cyanosis, or edema  LYMPH: No lymphadenopathy noted  SKIN: No rashes or lesions        LABS:    06-02    143  |  111<H>  |  33<H>  ----------------------------<  99  4.4   |  24  |  1.26    Ca    9.1      02 Jun 2018 07:00            vanco through     RADIOLOGY & ADDITIONAL STUDIES:      CRITICAL CARE TIME SPENT:

## 2018-06-03 NOTE — PROGRESS NOTE ADULT - ASSESSMENT
Assessment and Plan:     Atrial fibrillation:  Paroxysmal - currently in sinus rhythm; still with asymptomatic episodes of AF (ventricular rate is less fast when in AF); continue metoprolol, Eliquis;   Hypertension: Improved with reduction of doxazosin and modification of BB.   Acute kidney injury:  Resolved ARF with IVF and discontinuation of Diovan, Aldactone. Management as per nephrology.

## 2018-06-03 NOTE — PROGRESS NOTE ADULT - PROBLEM SELECTOR PROBLEM 1
Hypertension
Acute kidney injury
Constipation
Acute kidney injury
Hypertension
Acute kidney injury

## 2018-06-03 NOTE — PROGRESS NOTE ADULT - PROVIDER SPECIALTY LIST ADULT
Cardiology
Critical Care
Gastroenterology
Hospitalist
Nephrology
Pulmonology
Hospitalist
Nephrology
Cardiology
Hospitalist
Cardiology
Cardiology
Pulmonology

## 2018-06-03 NOTE — PROGRESS NOTE ADULT - SUBJECTIVE AND OBJECTIVE BOX
Patient is a 89y old  Female who presents with a chief complaint of abnormal labs (01 Jun 2018 12:20)      INTERVAL History of Present Illness/OVERNIGHT EVENTS: feels well. eager to go home.    MEDICATIONS  (STANDING):  allopurinol 100 milliGRAM(s) Oral at bedtime  apixaban 2.5 milliGRAM(s) Oral every 12 hours  doxazosin 4 milliGRAM(s) Oral at bedtime  levothyroxine 75 MICROGram(s) Oral daily  metoprolol tartrate 37.5 milliGRAM(s) Oral two times a day  NIFEdipine XL 90 milliGRAM(s) Oral daily  polyethylene glycol 3350 17 Gram(s) Oral at bedtime  venlafaxine XR. 37.5 milliGRAM(s) Oral daily    MEDICATIONS  (PRN):  bisacodyl Suppository 10 milliGRAM(s) Rectal daily PRN Constipation  melatonin 1 milliGRAM(s) Oral at bedtime PRN Insomnia  ondansetron   Disintegrating Tablet 4 milliGRAM(s) Oral every 6 hours PRN Nausea and/or Vomiting  ondansetron   Disintegrating Tablet 4 milliGRAM(s) Oral every 6 hours PRN Nausea and/or Vomiting      Allergies    iodine (Anaphylaxis)  penicillin (Other)  sulfa drugs (Hives)  throat closes (Other)  throat closes (Other)    Intolerances        REVIEW OF SYSTEMS:  Negative unless otherwise specified above.    Vital Signs Last 24 Hrs  T(C): 36.9 (03 Jun 2018 09:31), Max: 36.9 (03 Jun 2018 09:31)  T(F): 98.5 (03 Jun 2018 09:31), Max: 98.5 (03 Jun 2018 09:31)  HR: 70 (03 Jun 2018 09:31) (63 - 116)  BP: 111/70 (03 Jun 2018 09:31) (93/58 - 158/79)  BP(mean): --  RR: 18 (03 Jun 2018 09:31) (16 - 179)  SpO2: 95% (03 Jun 2018 09:31) (95% - 97%)        PHYSICAL EXAM:  GENERAL: No apparent distress  HEAD:  Atraumatic, Normocephalic  EYES: conjunctiva and sclera clear  ENMT: Moist mucous membranes  NECK: Supple  CHEST/LUNG: Clear to auscultation bilaterally  HEART: Regular rate and rhythm  ABDOMEN: Soft, Nontender, Nondistended; Bowel sounds present  EXTREMITIES:  No clubbing, cyanosis or edema  SKIN: No rashes or lesions  NERVOUS SYSTEM:  Alert & Oriented X3; Bilateral Lower extremity mobile, sensation to light touch intact      LABS:      Ca    9.1        02 Jun 2018 07:00          CAPILLARY BLOOD GLUCOSE      POCT Blood Glucose.: 110 mg/dL (02 Jun 2018 21:10)      RADIOLOGY & ADDITIONAL TESTS:    Images reviewed personally    Consultant Notes Reviewed and Care Discussed with relevant Consultants/Other Providers.

## 2018-06-03 NOTE — PROGRESS NOTE ADULT - ASSESSMENT
Pt is a 89 yo female with  PMHx of depression, gout, HLD, HTN, hypothyroidism, OA, overactive bladder is being admitted for BRENDA, most likely due to dehydration. Improved.  PAF.  Wants to go home now. Can followup with me in office.    1. BRENDA likely due to  overdiuresis - improved   post renal causes already ruled out by CT scan,      Renal  evaluation from  Dr Cornelius appreciated. ,   avoid any nephrotoxicity, IV contrast, monitor renal function.       2. Abnormal EKG. : non specific conduction disease , no  associated symptoms. cardiology evaluation appreciated.        3. SPEAR (dyspnea on exertion)- resolved , cardiology f/up appreciated.  Chronic, followed by me in office.

## 2018-07-21 ENCOUNTER — EMERGENCY (EMERGENCY)
Facility: HOSPITAL | Age: 83
LOS: 1 days | Discharge: HOME | End: 2018-07-21
Attending: EMERGENCY MEDICINE
Payer: MEDICARE

## 2018-07-21 VITALS
DIASTOLIC BLOOD PRESSURE: 80 MMHG | HEART RATE: 102 BPM | RESPIRATION RATE: 18 BRPM | SYSTOLIC BLOOD PRESSURE: 140 MMHG | OXYGEN SATURATION: 96 % | TEMPERATURE: 98 F | WEIGHT: 160.06 LBS

## 2018-07-21 VITALS
RESPIRATION RATE: 15 BRPM | TEMPERATURE: 98 F | OXYGEN SATURATION: 98 % | DIASTOLIC BLOOD PRESSURE: 76 MMHG | SYSTOLIC BLOOD PRESSURE: 129 MMHG | HEART RATE: 99 BPM

## 2018-07-21 DIAGNOSIS — Z98.89 OTHER SPECIFIED POSTPROCEDURAL STATES: Chronic | ICD-10-CM

## 2018-07-21 DIAGNOSIS — Z09 ENCOUNTER FOR FOLLOW-UP EXAMINATION AFTER COMPLETED TREATMENT FOR CONDITIONS OTHER THAN MALIGNANT NEOPLASM: Chronic | ICD-10-CM

## 2018-07-21 DIAGNOSIS — Z90.49 ACQUIRED ABSENCE OF OTHER SPECIFIED PARTS OF DIGESTIVE TRACT: Chronic | ICD-10-CM

## 2018-07-21 DIAGNOSIS — Z90.710 ACQUIRED ABSENCE OF BOTH CERVIX AND UTERUS: Chronic | ICD-10-CM

## 2018-07-21 LAB
ALBUMIN SERPL ELPH-MCNC: 3.5 G/DL — SIGNIFICANT CHANGE UP (ref 3.3–5)
ALP SERPL-CCNC: 89 U/L — SIGNIFICANT CHANGE UP (ref 40–120)
ALT FLD-CCNC: 20 U/L — SIGNIFICANT CHANGE UP (ref 12–78)
ANION GAP SERPL CALC-SCNC: 8 MMOL/L — SIGNIFICANT CHANGE UP (ref 5–17)
APPEARANCE UR: CLEAR — SIGNIFICANT CHANGE UP
APTT BLD: 32.8 SEC — SIGNIFICANT CHANGE UP (ref 27.5–37.4)
AST SERPL-CCNC: 21 U/L — SIGNIFICANT CHANGE UP (ref 15–37)
BACTERIA # UR AUTO: ABNORMAL
BASOPHILS # BLD AUTO: 0.03 K/UL — SIGNIFICANT CHANGE UP (ref 0–0.2)
BASOPHILS NFR BLD AUTO: 0.2 % — SIGNIFICANT CHANGE UP (ref 0–2)
BILIRUB SERPL-MCNC: 0.3 MG/DL — SIGNIFICANT CHANGE UP (ref 0.2–1.2)
BILIRUB UR-MCNC: NEGATIVE — SIGNIFICANT CHANGE UP
BUN SERPL-MCNC: 38 MG/DL — HIGH (ref 7–23)
CALCIUM SERPL-MCNC: 9 MG/DL — SIGNIFICANT CHANGE UP (ref 8.5–10.1)
CHLORIDE SERPL-SCNC: 105 MMOL/L — SIGNIFICANT CHANGE UP (ref 96–108)
CK MB BLD-MCNC: <2.4 % — SIGNIFICANT CHANGE UP (ref 0–3.5)
CK MB CFR SERPL CALC: <1 NG/ML — SIGNIFICANT CHANGE UP (ref 0–3.6)
CK SERPL-CCNC: 41 U/L — SIGNIFICANT CHANGE UP (ref 26–192)
CO2 SERPL-SCNC: 31 MMOL/L — SIGNIFICANT CHANGE UP (ref 22–31)
COLOR SPEC: YELLOW — SIGNIFICANT CHANGE UP
CREAT SERPL-MCNC: 1.1 MG/DL — SIGNIFICANT CHANGE UP (ref 0.5–1.3)
DIFF PNL FLD: NEGATIVE — SIGNIFICANT CHANGE UP
EOSINOPHIL # BLD AUTO: 0.2 K/UL — SIGNIFICANT CHANGE UP (ref 0–0.5)
EOSINOPHIL NFR BLD AUTO: 1.4 % — SIGNIFICANT CHANGE UP (ref 0–6)
EPI CELLS # UR: SIGNIFICANT CHANGE UP
GLUCOSE SERPL-MCNC: 104 MG/DL — HIGH (ref 70–99)
GLUCOSE UR QL: NEGATIVE — SIGNIFICANT CHANGE UP
HCT VFR BLD CALC: 36.1 % — SIGNIFICANT CHANGE UP (ref 34.5–45)
HGB BLD-MCNC: 11.2 G/DL — LOW (ref 11.5–15.5)
IMM GRANULOCYTES NFR BLD AUTO: 0.4 % — SIGNIFICANT CHANGE UP (ref 0–1.5)
INR BLD: 1.07 RATIO — SIGNIFICANT CHANGE UP (ref 0.88–1.16)
KETONES UR-MCNC: NEGATIVE — SIGNIFICANT CHANGE UP
LEUKOCYTE ESTERASE UR-ACNC: ABNORMAL
LIDOCAIN IGE QN: 132 U/L — SIGNIFICANT CHANGE UP (ref 73–393)
LYMPHOCYTES # BLD AUTO: 1.45 K/UL — SIGNIFICANT CHANGE UP (ref 1–3.3)
LYMPHOCYTES # BLD AUTO: 10.5 % — LOW (ref 13–44)
MAGNESIUM SERPL-MCNC: 2.3 MG/DL — SIGNIFICANT CHANGE UP (ref 1.6–2.6)
MCHC RBC-ENTMCNC: 30.2 PG — SIGNIFICANT CHANGE UP (ref 27–34)
MCHC RBC-ENTMCNC: 31 GM/DL — LOW (ref 32–36)
MCV RBC AUTO: 97.3 FL — SIGNIFICANT CHANGE UP (ref 80–100)
MONOCYTES # BLD AUTO: 1.04 K/UL — HIGH (ref 0–0.9)
MONOCYTES NFR BLD AUTO: 7.5 % — SIGNIFICANT CHANGE UP (ref 2–14)
NEUTROPHILS # BLD AUTO: 11.06 K/UL — HIGH (ref 1.8–7.4)
NEUTROPHILS NFR BLD AUTO: 80 % — HIGH (ref 43–77)
NITRITE UR-MCNC: NEGATIVE — SIGNIFICANT CHANGE UP
NT-PROBNP SERPL-SCNC: 288 PG/ML — SIGNIFICANT CHANGE UP (ref 0–450)
PH UR: 5 — SIGNIFICANT CHANGE UP (ref 5–8)
PLATELET # BLD AUTO: 279 K/UL — SIGNIFICANT CHANGE UP (ref 150–400)
POTASSIUM SERPL-MCNC: 4.3 MMOL/L — SIGNIFICANT CHANGE UP (ref 3.5–5.3)
POTASSIUM SERPL-SCNC: 4.3 MMOL/L — SIGNIFICANT CHANGE UP (ref 3.5–5.3)
PROT SERPL-MCNC: 7.2 G/DL — SIGNIFICANT CHANGE UP (ref 6–8.3)
PROT UR-MCNC: NEGATIVE — SIGNIFICANT CHANGE UP
PROTHROM AB SERPL-ACNC: 11.7 SEC — SIGNIFICANT CHANGE UP (ref 9.8–12.7)
RBC # BLD: 3.71 M/UL — LOW (ref 3.8–5.2)
RBC # FLD: 13.8 % — SIGNIFICANT CHANGE UP (ref 10.3–14.5)
RBC CASTS # UR COMP ASSIST: NEGATIVE /HPF — SIGNIFICANT CHANGE UP (ref 0–4)
SODIUM SERPL-SCNC: 144 MMOL/L — SIGNIFICANT CHANGE UP (ref 135–145)
SP GR SPEC: 1 — LOW (ref 1.01–1.02)
TROPONIN I SERPL-MCNC: <.015 NG/ML — SIGNIFICANT CHANGE UP (ref 0.01–0.04)
UROBILINOGEN FLD QL: NEGATIVE — SIGNIFICANT CHANGE UP
WBC # BLD: 13.84 K/UL — HIGH (ref 3.8–10.5)
WBC # FLD AUTO: 13.84 K/UL — HIGH (ref 3.8–10.5)
WBC UR QL: ABNORMAL

## 2018-07-21 PROCEDURE — 94640 AIRWAY INHALATION TREATMENT: CPT

## 2018-07-21 PROCEDURE — 87086 URINE CULTURE/COLONY COUNT: CPT

## 2018-07-21 PROCEDURE — 93005 ELECTROCARDIOGRAM TRACING: CPT

## 2018-07-21 PROCEDURE — 71250 CT THORAX DX C-: CPT

## 2018-07-21 PROCEDURE — 71045 X-RAY EXAM CHEST 1 VIEW: CPT | Mod: 26

## 2018-07-21 PROCEDURE — 80053 COMPREHEN METABOLIC PANEL: CPT

## 2018-07-21 PROCEDURE — 82550 ASSAY OF CK (CPK): CPT

## 2018-07-21 PROCEDURE — 71250 CT THORAX DX C-: CPT | Mod: 26

## 2018-07-21 PROCEDURE — 71045 X-RAY EXAM CHEST 1 VIEW: CPT

## 2018-07-21 PROCEDURE — 85730 THROMBOPLASTIN TIME PARTIAL: CPT

## 2018-07-21 PROCEDURE — 81001 URINALYSIS AUTO W/SCOPE: CPT

## 2018-07-21 PROCEDURE — 99285 EMERGENCY DEPT VISIT HI MDM: CPT

## 2018-07-21 PROCEDURE — 99221 1ST HOSP IP/OBS SF/LOW 40: CPT

## 2018-07-21 PROCEDURE — 99284 EMERGENCY DEPT VISIT MOD MDM: CPT | Mod: 25

## 2018-07-21 PROCEDURE — 85027 COMPLETE CBC AUTOMATED: CPT

## 2018-07-21 PROCEDURE — 82553 CREATINE MB FRACTION: CPT

## 2018-07-21 PROCEDURE — 83690 ASSAY OF LIPASE: CPT

## 2018-07-21 PROCEDURE — 94760 N-INVAS EAR/PLS OXIMETRY 1: CPT

## 2018-07-21 PROCEDURE — 83735 ASSAY OF MAGNESIUM: CPT

## 2018-07-21 PROCEDURE — 83880 ASSAY OF NATRIURETIC PEPTIDE: CPT

## 2018-07-21 PROCEDURE — 85610 PROTHROMBIN TIME: CPT

## 2018-07-21 PROCEDURE — 84484 ASSAY OF TROPONIN QUANT: CPT

## 2018-07-21 RX ORDER — NITROFURANTOIN MACROCRYSTAL 50 MG
100 CAPSULE ORAL ONCE
Qty: 0 | Refills: 0 | Status: COMPLETED | OUTPATIENT
Start: 2018-07-21 | End: 2018-07-21

## 2018-07-21 RX ORDER — NITROFURANTOIN MACROCRYSTAL 50 MG
1 CAPSULE ORAL
Qty: 14 | Refills: 0
Start: 2018-07-21 | End: 2018-07-27

## 2018-07-21 RX ORDER — IPRATROPIUM/ALBUTEROL SULFATE 18-103MCG
3 AEROSOL WITH ADAPTER (GRAM) INHALATION ONCE
Qty: 0 | Refills: 0 | Status: COMPLETED | OUTPATIENT
Start: 2018-07-21 | End: 2018-07-21

## 2018-07-21 RX ADMIN — Medication 3 MILLILITER(S): at 17:20

## 2018-07-21 RX ADMIN — Medication 100 MILLIGRAM(S): at 20:17

## 2018-07-21 NOTE — ED ADULT NURSE REASSESSMENT NOTE - NS ED NURSE REASSESS COMMENT FT1
Patient 97% o2 sat without oxygen. Patient ambulatory to the restroom without difficulty. No respiratory distress. Patient reports she is feeling much better. Plan for discharge. Safety maintained.
Received patient from Tam LOCKWOOD. Patient alert and oriented. Vital signs as documented. Denies pain at this time. Continued on bedside cardiac monitor. CT completed. Patient reports she is feeling much better at this time. Awaiting results. Safety maintained. Call bell in reach.

## 2018-07-21 NOTE — CONSULT NOTE ADULT - SUBJECTIVE AND OBJECTIVE BOX
[facility] - Talisha Duff, Jenniffer, Josefa, Zach, Savannah Pacheco  Office Number: [phone]    Initial Consult Note    CHIEF COMPLAINT: Patient is a 89y old  Female who presents with a chief complaint of shortness of breath    HPI:  89 yo female hx of hld, htn c/o cough/URI symptoms for several days went to urgent care c/o dyspnea on exertion. No fever/chills.  No chest pain.  Having trouble sleeping on her back at night because she feels so short of breath.  Was given duoneb, lasix, and 1 sublingual nitro.    She was recently admitted to [facility] in 6/2018 with acute renal failure attributed to intravascular volume depletion; hospitalization complicated by poorly controlled hypertension and paroxysms of atrial fibrillation associated with rapid ventricular response.    She reports viral symptoms and cough for a few days, and now with shortness of breath. No chest pain.  Reports lower extremity edema.  She has a history of PAF, and is on Eliquis.  Has been on metoprolol and doxazosin.    PAST MEDICAL & SURGICAL HISTORY:  HLD (hyperlipidemia)  Hypothyroid  Osteoarthritis  Gout  Overactive bladder  Depression  Hypertension  S/P lung surgery, follow-up exam: s/p removal of suspicious lesion, negative  S/P hysterectomy  S/P lumpectomy of breast  S/P cholecystectomy      SOCIAL HISTORY:  No tobacco, ethanol, or drug abuse.    FAMILY HISTORY:  Family history of breast cancer  Family history of nasopharyngeal cancer (Child): daughter    No family history of acute MI or sudden cardiac death.    MEDICATIONS  (STANDING):    MEDICATIONS  (PRN):      Allergies    iodine (Anaphylaxis)  penicillin (Other)  sulfa drugs (Hives)  throat closes (Other)  throat closes (Other)    Intolerances        REVIEW OF SYSTEMS:    CONSTITUTIONAL: No weakness, fevers or chills  EYES/ENT: No visual changes;  No vertigo or throat pain   NECK: No pain or stiffness  RESPIRATORY: No cough, wheezing, hemoptysis; + shortness of breath  CARDIOVASCULAR: No chest pain or palpitations  GASTROINTESTINAL: No abdominal pain. No nausea, vomiting, or hematemesis; No diarrhea or constipation. No melena or hematochezia.  GENITOURINARY: No dysuria, frequency or hematuria  NEUROLOGICAL: No numbness or weakness  SKIN: No itching or rash  All other review of systems is negative unless indicated above    VITAL SIGNS:   Vital Signs Last 24 Hrs  T(C): 36.8 (21 Jul 2018 19:25), Max: 36.9 (21 Jul 2018 16:27)  T(F): 98.3 (21 Jul 2018 19:25), Max: 98.4 (21 Jul 2018 16:27)  HR: 102 (21 Jul 2018 19:25) (90 - 103)  BP: 134/62 (21 Jul 2018 19:25) (134/62 - 150/70)  BP(mean): --  RR: 16 (21 Jul 2018 19:25) (16 - 18)  SpO2: 99% (21 Jul 2018 19:25) (92% - 99%)    I&O's Summary      On Exam:    Constitutional: NAD, alert and oriented x 3  Lungs:  Non-labored, breath sounds are clear bilaterally, No wheezing, rales or rhonchi  Cardiovascular: RRR.  S1 and S2 positive.  No murmurs, rubs, gallops or clicks  Gastrointestinal: Bowel Sounds present, soft, nontender.   Lymph: + peripheral edema. No cervical lymphadenopathy.  Neurological: Alert, no focal deficits  Skin: No rashes or ulcers   Psych:  Mood & affect appropriate.    LABS: All Labs Reviewed:                        11.2   13.84 )-----------( 279      ( 21 Jul 2018 17:25 )             36.1     21 Jul 2018 17:25    144    |  105    |  38     ----------------------------<  104    4.3     |  31     |  1.10     Ca    9.0        21 Jul 2018 17:25  Mg     2.3       21 Jul 2018 17:25    TPro  7.2    /  Alb  3.5    /  TBili  0.3    /  DBili  x      /  AST  21     /  ALT  20     /  AlkPhos  89     21 Jul 2018 17:25    PT/INR - ( 21 Jul 2018 17:25 )   PT: 11.7 sec;   INR: 1.07 ratio         PTT - ( 21 Jul 2018 17:25 )  PTT:32.8 sec  CARDIAC MARKERS ( 21 Jul 2018 17:25 )  <.015 ng/mL / x     / 41 U/L / x     / <1.0 ng/mL      Blood Culture:   07-21 @ 17:25  Pro Bnp 288        RADIOLOGY:    EKG: SR with 1st degree avb

## 2018-07-21 NOTE — ED ADULT NURSE NOTE - OBJECTIVE STATEMENT
brought in by ambulance for shortness of breath,aox4,denies chest pain and felt better after getting lasix and nebulizer,as stated

## 2018-07-21 NOTE — ED PROVIDER NOTE - OBJECTIVE STATEMENT
89 yo female hx of hld, htn c/o cough/URI symptoms for several days went to urgent care c/o dyspnea on exertion. No fever/chills.  No chest pain.  Having trouble sleeping on her back at night because she feels so short of breath.  No leg swelling.  Was given duoneb, lasix, and 1 sublingual nitro.

## 2018-07-21 NOTE — CONSULT NOTE ADULT - ASSESSMENT
Mrs. Pitts is a pleasant 89 year old female with HTN, HLD and PAF on Eliquis, here with shortness of breath. She was recently admitted with acute kidney injury in the setting of intravascular depletion and had issues with AF.    - Though she has LE edema, I do not suspect CHF. Her BNP is minimal. CXR does not show significant pleural effusions  - Cardiac enzymes are negative x 1.  - EKG without sign of ischemia.  - Await CT to evaluate for PNA  - Last echo in 11/2017 with normal LV function, mild diastolic dysfunction.  - Continue Eliquis for anticoagulation  - Metoprolol for rate control.  - Disposition depends on CT findings. If she is comfortable off oxygen, we can plan for discharge and follow up with her primary cardiologist.

## 2018-07-21 NOTE — ED PROVIDER NOTE - PHYSICAL EXAMINATION
Gen: Alert, NAD  Head/eyes: NC/AT, PERRL, EOMI, normal lids/conjunctiva, on scleral icterus  ENT: Bilateral TM WNL, normal hearing, patent oropharynx without erythema/exudate, uvula midline, no peritonsillar abscess, no tongue/uvula swelling  Neck: supple, no tenderness/meningismus/JVD, Trachea midline  Pulm: coarse breath sound and wheeze at bilateral bases, no retractions  CV: RRR, no M/R/G, +2 dist pulses (radial, pedal DP/PT, popliteal)  Abd: soft, NT/ND, +BS, no guarding/rebound tenderness  Musculoskeletal: no edema/erythema/cyanosis  Skin: no rash, no vesicles, no petechaie, no ecchymosis, no swelling, no pitting edema  Neuro: AAOx3, CN 2-12 intact, normal sensation, 5/5 motor stength in all extremities, normal gait, no dysmetria

## 2018-07-21 NOTE — ED PROVIDER NOTE - CARE PLAN
Principal Discharge DX:	Shortness of breath Principal Discharge DX:	Shortness of breath  Secondary Diagnosis:	Urinary tract infection

## 2018-07-22 LAB
CULTURE RESULTS: SIGNIFICANT CHANGE UP
SPECIMEN SOURCE: SIGNIFICANT CHANGE UP

## 2018-11-02 ENCOUNTER — INPATIENT (INPATIENT)
Facility: HOSPITAL | Age: 83
LOS: 4 days | Discharge: HOME | DRG: 291 | End: 2018-11-07
Attending: HOSPITALIST | Admitting: FAMILY MEDICINE
Payer: MEDICARE

## 2018-11-02 VITALS
RESPIRATION RATE: 20 BRPM | DIASTOLIC BLOOD PRESSURE: 80 MMHG | OXYGEN SATURATION: 100 % | SYSTOLIC BLOOD PRESSURE: 175 MMHG | HEART RATE: 86 BPM | TEMPERATURE: 98 F | WEIGHT: 166.89 LBS | HEIGHT: 60 IN

## 2018-11-02 DIAGNOSIS — F32.9 MAJOR DEPRESSIVE DISORDER, SINGLE EPISODE, UNSPECIFIED: ICD-10-CM

## 2018-11-02 DIAGNOSIS — Z90.710 ACQUIRED ABSENCE OF BOTH CERVIX AND UTERUS: Chronic | ICD-10-CM

## 2018-11-02 DIAGNOSIS — I10 ESSENTIAL (PRIMARY) HYPERTENSION: ICD-10-CM

## 2018-11-02 DIAGNOSIS — Z90.49 ACQUIRED ABSENCE OF OTHER SPECIFIED PARTS OF DIGESTIVE TRACT: Chronic | ICD-10-CM

## 2018-11-02 DIAGNOSIS — I50.9 HEART FAILURE, UNSPECIFIED: ICD-10-CM

## 2018-11-02 DIAGNOSIS — Z98.89 OTHER SPECIFIED POSTPROCEDURAL STATES: Chronic | ICD-10-CM

## 2018-11-02 DIAGNOSIS — I48.0 PAROXYSMAL ATRIAL FIBRILLATION: ICD-10-CM

## 2018-11-02 DIAGNOSIS — Z29.9 ENCOUNTER FOR PROPHYLACTIC MEASURES, UNSPECIFIED: ICD-10-CM

## 2018-11-02 DIAGNOSIS — Z09 ENCOUNTER FOR FOLLOW-UP EXAMINATION AFTER COMPLETED TREATMENT FOR CONDITIONS OTHER THAN MALIGNANT NEOPLASM: Chronic | ICD-10-CM

## 2018-11-02 DIAGNOSIS — E03.9 HYPOTHYROIDISM, UNSPECIFIED: ICD-10-CM

## 2018-11-02 DIAGNOSIS — M19.90 UNSPECIFIED OSTEOARTHRITIS, UNSPECIFIED SITE: ICD-10-CM

## 2018-11-02 DIAGNOSIS — J45.909 UNSPECIFIED ASTHMA, UNCOMPLICATED: ICD-10-CM

## 2018-11-02 DIAGNOSIS — M10.9 GOUT, UNSPECIFIED: ICD-10-CM

## 2018-11-02 DIAGNOSIS — E78.5 HYPERLIPIDEMIA, UNSPECIFIED: ICD-10-CM

## 2018-11-02 LAB
ALBUMIN SERPL ELPH-MCNC: 3.2 G/DL — LOW (ref 3.3–5)
ALP SERPL-CCNC: 86 U/L — SIGNIFICANT CHANGE UP (ref 40–120)
ALT FLD-CCNC: 25 U/L — SIGNIFICANT CHANGE UP (ref 12–78)
ANION GAP SERPL CALC-SCNC: 7 MMOL/L — SIGNIFICANT CHANGE UP (ref 5–17)
APPEARANCE UR: CLEAR — SIGNIFICANT CHANGE UP
APTT BLD: 32.7 SEC — SIGNIFICANT CHANGE UP (ref 28.5–37)
AST SERPL-CCNC: 21 U/L — SIGNIFICANT CHANGE UP (ref 15–37)
BACTERIA # UR AUTO: ABNORMAL
BASOPHILS # BLD AUTO: 0.03 K/UL — SIGNIFICANT CHANGE UP (ref 0–0.2)
BASOPHILS NFR BLD AUTO: 0.2 % — SIGNIFICANT CHANGE UP (ref 0–2)
BILIRUB SERPL-MCNC: 0.4 MG/DL — SIGNIFICANT CHANGE UP (ref 0.2–1.2)
BILIRUB UR-MCNC: NEGATIVE — SIGNIFICANT CHANGE UP
BUN SERPL-MCNC: 41 MG/DL — HIGH (ref 7–23)
CALCIUM SERPL-MCNC: 8.1 MG/DL — LOW (ref 8.5–10.1)
CHLORIDE SERPL-SCNC: 109 MMOL/L — HIGH (ref 96–108)
CK MB CFR SERPL CALC: <1 NG/ML — SIGNIFICANT CHANGE UP (ref 0–3.6)
CO2 SERPL-SCNC: 27 MMOL/L — SIGNIFICANT CHANGE UP (ref 22–31)
COLOR SPEC: YELLOW — SIGNIFICANT CHANGE UP
COMMENT - URINE: SIGNIFICANT CHANGE UP
CREAT SERPL-MCNC: 1.1 MG/DL — SIGNIFICANT CHANGE UP (ref 0.5–1.3)
DIFF PNL FLD: NEGATIVE — SIGNIFICANT CHANGE UP
EOSINOPHIL # BLD AUTO: 0.17 K/UL — SIGNIFICANT CHANGE UP (ref 0–0.5)
EOSINOPHIL NFR BLD AUTO: 1.1 % — SIGNIFICANT CHANGE UP (ref 0–6)
EPI CELLS # UR: SIGNIFICANT CHANGE UP
GLUCOSE SERPL-MCNC: 99 MG/DL — SIGNIFICANT CHANGE UP (ref 70–99)
GLUCOSE UR QL: NEGATIVE — SIGNIFICANT CHANGE UP
HCT VFR BLD CALC: 31.5 % — LOW (ref 34.5–45)
HGB BLD-MCNC: 10 G/DL — LOW (ref 11.5–15.5)
IMM GRANULOCYTES NFR BLD AUTO: 0.7 % — SIGNIFICANT CHANGE UP (ref 0–1.5)
INR BLD: 1.18 RATIO — HIGH (ref 0.88–1.16)
KETONES UR-MCNC: NEGATIVE — SIGNIFICANT CHANGE UP
LACTATE SERPL-SCNC: 0.9 MMOL/L — SIGNIFICANT CHANGE UP (ref 0.7–2)
LEUKOCYTE ESTERASE UR-ACNC: ABNORMAL
LYMPHOCYTES # BLD AUTO: 1.45 K/UL — SIGNIFICANT CHANGE UP (ref 1–3.3)
LYMPHOCYTES # BLD AUTO: 9 % — LOW (ref 13–44)
MAGNESIUM SERPL-MCNC: 2.4 MG/DL — SIGNIFICANT CHANGE UP (ref 1.6–2.6)
MCHC RBC-ENTMCNC: 30 PG — SIGNIFICANT CHANGE UP (ref 27–34)
MCHC RBC-ENTMCNC: 31.7 GM/DL — LOW (ref 32–36)
MCV RBC AUTO: 94.6 FL — SIGNIFICANT CHANGE UP (ref 80–100)
MONOCYTES # BLD AUTO: 1.36 K/UL — HIGH (ref 0–0.9)
MONOCYTES NFR BLD AUTO: 8.4 % — SIGNIFICANT CHANGE UP (ref 2–14)
NEUTROPHILS # BLD AUTO: 13.06 K/UL — HIGH (ref 1.8–7.4)
NEUTROPHILS NFR BLD AUTO: 80.6 % — HIGH (ref 43–77)
NITRITE UR-MCNC: NEGATIVE — SIGNIFICANT CHANGE UP
NRBC # BLD: 0 /100 WBCS — SIGNIFICANT CHANGE UP (ref 0–0)
NT-PROBNP SERPL-SCNC: 635 PG/ML — HIGH (ref 0–450)
PH UR: 6.5 — SIGNIFICANT CHANGE UP (ref 5–8)
PLATELET # BLD AUTO: 214 K/UL — SIGNIFICANT CHANGE UP (ref 150–400)
POTASSIUM SERPL-MCNC: 4.1 MMOL/L — SIGNIFICANT CHANGE UP (ref 3.5–5.3)
POTASSIUM SERPL-SCNC: 4.1 MMOL/L — SIGNIFICANT CHANGE UP (ref 3.5–5.3)
PROT SERPL-MCNC: 7.1 G/DL — SIGNIFICANT CHANGE UP (ref 6–8.3)
PROT UR-MCNC: NEGATIVE — SIGNIFICANT CHANGE UP
PROTHROM AB SERPL-ACNC: 13.5 SEC — HIGH (ref 10–12.9)
RAPID RVP RESULT: SIGNIFICANT CHANGE UP
RBC # BLD: 3.33 M/UL — LOW (ref 3.8–5.2)
RBC # FLD: 15.6 % — HIGH (ref 10.3–14.5)
SODIUM SERPL-SCNC: 143 MMOL/L — SIGNIFICANT CHANGE UP (ref 135–145)
SP GR SPEC: 1 — LOW (ref 1.01–1.02)
TROPONIN I SERPL-MCNC: <.015 NG/ML — SIGNIFICANT CHANGE UP (ref 0.01–0.04)
UROBILINOGEN FLD QL: NEGATIVE — SIGNIFICANT CHANGE UP
WBC # BLD: 16.19 K/UL — HIGH (ref 3.8–10.5)
WBC # FLD AUTO: 16.19 K/UL — HIGH (ref 3.8–10.5)
WBC UR QL: ABNORMAL

## 2018-11-02 PROCEDURE — 99223 1ST HOSP IP/OBS HIGH 75: CPT

## 2018-11-02 PROCEDURE — 99285 EMERGENCY DEPT VISIT HI MDM: CPT

## 2018-11-02 PROCEDURE — 99223 1ST HOSP IP/OBS HIGH 75: CPT | Mod: AI,GC

## 2018-11-02 PROCEDURE — 93010 ELECTROCARDIOGRAM REPORT: CPT

## 2018-11-02 PROCEDURE — 71045 X-RAY EXAM CHEST 1 VIEW: CPT | Mod: 26

## 2018-11-02 RX ORDER — DOXAZOSIN MESYLATE 4 MG
4 TABLET ORAL AT BEDTIME
Qty: 0 | Refills: 0 | Status: DISCONTINUED | OUTPATIENT
Start: 2018-11-02 | End: 2018-11-07

## 2018-11-02 RX ORDER — FUROSEMIDE 40 MG
40 TABLET ORAL
Qty: 0 | Refills: 0 | Status: DISCONTINUED | OUTPATIENT
Start: 2018-11-02 | End: 2018-11-05

## 2018-11-02 RX ORDER — DOXAZOSIN MESYLATE 4 MG
0 TABLET ORAL
Qty: 0 | Refills: 0 | COMMUNITY

## 2018-11-02 RX ORDER — APIXABAN 2.5 MG/1
2.5 TABLET, FILM COATED ORAL EVERY 12 HOURS
Qty: 0 | Refills: 0 | Status: DISCONTINUED | OUTPATIENT
Start: 2018-11-02 | End: 2018-11-03

## 2018-11-02 RX ORDER — FUROSEMIDE 40 MG
40 TABLET ORAL ONCE
Qty: 0 | Refills: 0 | Status: COMPLETED | OUTPATIENT
Start: 2018-11-02 | End: 2018-11-02

## 2018-11-02 RX ORDER — METOPROLOL TARTRATE 50 MG
37.5 TABLET ORAL DAILY
Qty: 0 | Refills: 0 | Status: DISCONTINUED | OUTPATIENT
Start: 2018-11-02 | End: 2018-11-07

## 2018-11-02 RX ORDER — CELECOXIB 200 MG/1
200 CAPSULE ORAL DAILY
Qty: 0 | Refills: 0 | Status: DISCONTINUED | OUTPATIENT
Start: 2018-11-02 | End: 2018-11-07

## 2018-11-02 RX ORDER — MULTIVIT-MIN/FERROUS GLUCONATE 9 MG/15 ML
1 LIQUID (ML) ORAL DAILY
Qty: 0 | Refills: 0 | Status: DISCONTINUED | OUTPATIENT
Start: 2018-11-02 | End: 2018-11-07

## 2018-11-02 RX ORDER — LACTOBACILLUS ACIDOPHILUS 100MM CELL
1 CAPSULE ORAL DAILY
Qty: 0 | Refills: 0 | Status: DISCONTINUED | OUTPATIENT
Start: 2018-11-02 | End: 2018-11-07

## 2018-11-02 RX ORDER — ASPIRIN/CALCIUM CARB/MAGNESIUM 324 MG
81 TABLET ORAL DAILY
Qty: 0 | Refills: 0 | Status: DISCONTINUED | OUTPATIENT
Start: 2018-11-02 | End: 2018-11-07

## 2018-11-02 RX ORDER — DOXAZOSIN MESYLATE 4 MG
2 TABLET ORAL DAILY
Qty: 0 | Refills: 0 | Status: DISCONTINUED | OUTPATIENT
Start: 2018-11-02 | End: 2018-11-07

## 2018-11-02 RX ORDER — ALBUTEROL 90 UG/1
2.5 AEROSOL, METERED ORAL EVERY 6 HOURS
Qty: 0 | Refills: 0 | Status: DISCONTINUED | OUTPATIENT
Start: 2018-11-02 | End: 2018-11-03

## 2018-11-02 RX ORDER — NIFEDIPINE 30 MG
90 TABLET, EXTENDED RELEASE 24 HR ORAL DAILY
Qty: 0 | Refills: 0 | Status: DISCONTINUED | OUTPATIENT
Start: 2018-11-02 | End: 2018-11-07

## 2018-11-02 RX ORDER — LEVOTHYROXINE SODIUM 125 MCG
75 TABLET ORAL DAILY
Qty: 0 | Refills: 0 | Status: DISCONTINUED | OUTPATIENT
Start: 2018-11-02 | End: 2018-11-07

## 2018-11-02 RX ORDER — ALLOPURINOL 300 MG
100 TABLET ORAL DAILY
Qty: 0 | Refills: 0 | Status: DISCONTINUED | OUTPATIENT
Start: 2018-11-02 | End: 2018-11-07

## 2018-11-02 RX ORDER — VENLAFAXINE HCL 75 MG
37.5 CAPSULE, EXT RELEASE 24 HR ORAL DAILY
Qty: 0 | Refills: 0 | Status: DISCONTINUED | OUTPATIENT
Start: 2018-11-02 | End: 2018-11-07

## 2018-11-02 RX ORDER — LOSARTAN POTASSIUM 100 MG/1
50 TABLET, FILM COATED ORAL DAILY
Qty: 0 | Refills: 0 | Status: DISCONTINUED | OUTPATIENT
Start: 2018-11-02 | End: 2018-11-07

## 2018-11-02 RX ORDER — IPRATROPIUM/ALBUTEROL SULFATE 18-103MCG
3 AEROSOL WITH ADAPTER (GRAM) INHALATION ONCE
Qty: 0 | Refills: 0 | Status: COMPLETED | OUTPATIENT
Start: 2018-11-02 | End: 2018-11-02

## 2018-11-02 RX ORDER — DOCUSATE SODIUM 100 MG
100 CAPSULE ORAL
Qty: 0 | Refills: 0 | Status: DISCONTINUED | OUTPATIENT
Start: 2018-11-02 | End: 2018-11-07

## 2018-11-02 RX ADMIN — Medication 90 MILLIGRAM(S): at 23:02

## 2018-11-02 RX ADMIN — Medication 81 MILLIGRAM(S): at 22:47

## 2018-11-02 RX ADMIN — Medication 37.5 MILLIGRAM(S): at 22:47

## 2018-11-02 RX ADMIN — Medication 100 MILLIGRAM(S): at 22:47

## 2018-11-02 RX ADMIN — Medication 40 MILLIGRAM(S): at 18:11

## 2018-11-02 RX ADMIN — Medication 3 MILLILITER(S): at 18:11

## 2018-11-02 RX ADMIN — APIXABAN 2.5 MILLIGRAM(S): 2.5 TABLET, FILM COATED ORAL at 22:47

## 2018-11-02 RX ADMIN — Medication 4 MILLIGRAM(S): at 21:56

## 2018-11-02 NOTE — ED ADULT NURSE NOTE - NSIMPLEMENTINTERV_GEN_ALL_ED
Implemented All Fall with Harm Risk Interventions:  [place] to call system. Call bell, personal items and telephone within reach. Instruct patient to call for assistance. Room bathroom lighting operational. Non-slip footwear when patient is off stretcher. Physically safe environment: no spills, clutter or unnecessary equipment. Stretcher in lowest position, wheels locked, appropriate side rails in place. Provide visual cue, wrist band, yellow gown, etc. Monitor gait and stability. Monitor for mental status changes and reorient to person, place, and time. Review medications for side effects contributing to fall risk. Reinforce activity limits and safety measures with patient and family. Provide visual clues: red socks.

## 2018-11-02 NOTE — H&P ADULT - FAMILY HISTORY
Family history of breast cancer     Child  Still living? Unknown  Family history of nasopharyngeal cancer, Age at diagnosis: Age Unknown Child  Still living? Unknown  Family history of nasopharyngeal cancer, Age at diagnosis: Age Unknown     Mother  Still living? No  Family history of breast cancer, Age at diagnosis: Age Unknown

## 2018-11-02 NOTE — H&P ADULT - PROBLEM SELECTOR PLAN 1
Admit to TELE.   - diuresis with IV lasix 40 BID  - cardio consulted (Dr. Pacheco), appreciate recs Admit to TELE.   - Unsure if patient has been taking her lasix as there is a mix up on her med list as it is listed as FAMOTIDINE (lasix) and not furosemide   - diuresis with IV lasix 40 BID  - ECHO   - daily weights   - cardio consulted (Dr. Pacheco), appreciate recs

## 2018-11-02 NOTE — H&P ADULT - ATTENDING COMMENTS
Suspect patient confused her home medication regiment and was not taking her lasix. Will give IV lasix here and ensure safe medication reconciliation when she is discharged from hospital.

## 2018-11-02 NOTE — H&P ADULT - RS GEN PE MLT RESP DETAILS PC
diminished breath sounds, R/breath sounds equal/airway patent/clear to auscultation bilaterally/no rales/respirations non-labored/good air movement

## 2018-11-02 NOTE — ED ADULT NURSE REASSESSMENT NOTE - NS ED NURSE REASSESS COMMENT FT1
assumed care at this time, patient denies pain, no complaints made, no distress/discomfort observed, with g 18LAC flushed with NS patent and intact 1930 assumed care at this time, received report from FABIÁN Smith , patient is alert and orientedx4, denies pain, no complaints made, no distress/discomfort observed, with g 18LAC flushed with NS patent and intact

## 2018-11-02 NOTE — ED ADULT NURSE NOTE - OBJECTIVE STATEMENT
Received the patient in the Er. patient is alert and oriented. Skin warm and dry. C/O breathing difficulty and cough. Bilateral lower extremity swelling noticed.

## 2018-11-02 NOTE — H&P ADULT - ASSESSMENT
admitted for acute on chronic CHF. 89F with PMH of afib on eliquis, HTN, HLD, hypothyroidism, depression, asthma, gout, and osteoarthritis presents with SOB and admitted for acute on chronic CHF.

## 2018-11-02 NOTE — ED PROVIDER NOTE - CARE PLAN
Principal Discharge DX:	Acute on chronic congestive heart failure, unspecified heart failure type  Secondary Diagnosis:	Bronchitis

## 2018-11-02 NOTE — H&P ADULT - HISTORY OF PRESENT ILLNESS
In ED, VS stable. Labs significant for WBC 16.9, H/H 10/31.5. GA/INR 13.5/1.18. Lactate 0.9. Trop neg. ProBNP 635. RVP pending. CXR negative. In ED, VS stable. Labs significant for WBC 16.9, H/H 10/31.5. SC/INR 13.5/1.18. Lactate 0.9. Trop neg. ProBNP 635. UA negative. RVP pending. CXR negative. EKG shows sinus rhythm with 1st degree AV block. 89F with PMH of afib on eliquis, HTN, HLD, hypothyroidism, depression, asthma, gout, and osteoarthritis presents to ED from urgent care for SOB since last night. Patient went to [facility] with SOB thinking it was a cold but sent in for eval for CHF. Patient states she has no hx of CHF. Last ECHO 11/2017 shows EF 65% with grade 1 diastolic dysfunction. Also notes that her LE edema has been worsening over the past week. Denies fever, chills, CP, palpitations, n/v/d.     In ED, VS stable. Labs significant for WBC 16.9, H/H 10/31.5. LA/INR 13.5/1.18. Lactate 0.9. Trop neg. ProBNP 635. UA negative. RVP pending. EKG shows sinus rhythm with 1st degree AV block. Given dose of Levaquin for possible PNA.  CXR negative. Received duonebs for SOB. Started on dose of IV lasix 40mg for diuresis. Seen by cardio in ED.

## 2018-11-02 NOTE — ED PROVIDER NOTE - OBJECTIVE STATEMENT
89 female sent to ER by ambulance from urgent care for evaluation of shortness of breath. Patient states on Sunday she "had a tickle" in the back of her throat and then had shortness of breath, congestion, difficulty breathing, worse when lying down and with exertion, increased lower extremity swelling, mild cough, worsening last night.

## 2018-11-02 NOTE — H&P ADULT - NSHPREVIEWOFSYSTEMS_GEN_ALL_CORE
CONSTITUTIONAL:  No fever, chills,  weight loss, weakness or fatigue.  HEENT:  Eyes:  No visual loss, blurred vision, diplopia or yellow sclerae.   Ears, Nose, Throat:  No hearing loss, congestion, runny nose or dysphagia.  CARDIOVASCULAR:  No chest pain, chest pressure or chest discomfort. No palpitations or edema.  RESPIRATORY:  No dyspnea, cough, congestion, wheeze.   GASTROINTESTINAL:  No abdominal pain, nausea, vomiting or diarrhea.   GENITOURINARY:  No dysuria, urinary frequency or hematuria.   NEUROLOGICAL:  No headache, dizziness, syncope, paralysis, ataxia, numbness or tingling in the extremities. No change in bowel or bladder control.  MUSCULOSKELETAL:  No muscle, back pain, joint pain or stiffness.  SKIN: No itch, rash, lesions.   HEMATOLOGIC:  No anemia, bleeding or bruising.  LYMPHATICS:  No enlarged nodes. No history of splenectomy.  PSYCHIATRIC:  No history of depression or anxiety.  ENDOCRINOLOGIC:  No reports of sweating, cold or heat intolerance. No polyuria or polydipsia. CONSTITUTIONAL:  No fever, chills,  weight loss, weakness or fatigue.  HEENT:  Eyes:  No visual loss, blurred vision, diplopia or yellow sclerae.   Ears, Nose, Throat:  No hearing loss, congestion, runny nose or dysphagia.  CARDIOVASCULAR:  No chest pain, chest pressure or chest discomfort. No palpitations or edema.  RESPIRATORY: SOB. No cough, congestion, wheeze.   GASTROINTESTINAL:  No abdominal pain, nausea, vomiting or diarrhea.   GENITOURINARY:  No dysuria, urinary frequency or hematuria.   NEUROLOGICAL:  No headache, dizziness, syncope, paralysis, ataxia, numbness or tingling in the extremities. .  MUSCULOSKELETAL:  No muscle, back pain, joint pain or stiffness.  SKIN: No itch, rash, lesions.

## 2018-11-02 NOTE — H&P ADULT - NSHPSOCIALHISTORY_GEN_ALL_CORE
Marital Status:  Lives with:   Occupation:     Tobacco Use:   Alcohol Use:  Substance Use:    Immunization Hx  [  ] Flu shot                          Date:   [  ] Pneumonia shot           Date:  [  ] Tetanus                          Date:    [  ] Advanced Directives: [   ] declined   [  ] health care proxy: Lives with: Grandson  Occupation: Retired    Tobacco Use: Former smoker quit over 50yrs ago  Alcohol Use: Denies  Substance Use: Denies    Immunization Hx  [  ] Flu shot                          Date:   [  ] Pneumonia shot           Date:  [  ] Tetanus                          Date:    [  ] Advanced Directives: [   ] declined   [  ] health care proxy: Lives with: Grandson  Occupation: Retired    Tobacco Use: Former smoker quit over 50yrs ago  Alcohol Use: Denies  Substance Use: Denies

## 2018-11-02 NOTE — H&P ADULT - PROBLEM SELECTOR PLAN 3
- Continue metoprolol, nifedipine, Doxazosin Chronic, elevated. Pt did not take her BP meds today.   - Continue metoprolol, nifedipine, doxazosin

## 2018-11-02 NOTE — H&P ADULT - NSHPPHYSICALEXAM_GEN_ALL_CORE
Vital Signs Last 24 Hrs  T(C): 36.8 (02 Nov 2018 17:10), Max: 36.8 (02 Nov 2018 17:10)  T(F): 98.2 (02 Nov 2018 17:10), Max: 98.2 (02 Nov 2018 17:10)  HR: 86 (02 Nov 2018 17:10) (86 - 86)  BP: 175/80 (02 Nov 2018 17:10) (175/80 - 175/80)  RR: 20 (02 Nov 2018 17:10) (20 - 20)  SpO2: 100% (02 Nov 2018 17:10) (100% - 100%)

## 2018-11-02 NOTE — H&P ADULT - PMH
Depression    Gout    HLD (hyperlipidemia)    Hypertension    Hypothyroid    Osteoarthritis    Overactive bladder Asthma    Depression    Gout    HLD (hyperlipidemia)    Hypertension    Hypothyroid    Osteoarthritis    Overactive bladder    Paroxysmal atrial fibrillation

## 2018-11-03 LAB
CK MB BLD-MCNC: 2.1 % — SIGNIFICANT CHANGE UP (ref 0–3.5)
CK MB CFR SERPL CALC: 1.5 NG/ML — SIGNIFICANT CHANGE UP (ref 0–3.6)
CK SERPL-CCNC: 72 U/L — SIGNIFICANT CHANGE UP (ref 26–192)
TROPONIN I SERPL-MCNC: <.015 NG/ML — SIGNIFICANT CHANGE UP (ref 0.01–0.04)

## 2018-11-03 PROCEDURE — 99233 SBSQ HOSP IP/OBS HIGH 50: CPT

## 2018-11-03 PROCEDURE — 99232 SBSQ HOSP IP/OBS MODERATE 35: CPT

## 2018-11-03 RX ORDER — BENZOCAINE AND MENTHOL 5; 1 G/100ML; G/100ML
1 LIQUID ORAL EVERY 4 HOURS
Qty: 0 | Refills: 0 | Status: DISCONTINUED | OUTPATIENT
Start: 2018-11-03 | End: 2018-11-07

## 2018-11-03 RX ORDER — APIXABAN 2.5 MG/1
5 TABLET, FILM COATED ORAL EVERY 12 HOURS
Qty: 0 | Refills: 0 | Status: DISCONTINUED | OUTPATIENT
Start: 2018-11-03 | End: 2018-11-07

## 2018-11-03 RX ORDER — ALBUTEROL 90 UG/1
2.5 AEROSOL, METERED ORAL EVERY 6 HOURS
Qty: 0 | Refills: 0 | Status: DISCONTINUED | OUTPATIENT
Start: 2018-11-03 | End: 2018-11-07

## 2018-11-03 RX ADMIN — Medication 37.5 MILLIGRAM(S): at 05:13

## 2018-11-03 RX ADMIN — Medication 100 MILLIGRAM(S): at 19:04

## 2018-11-03 RX ADMIN — Medication 1 TABLET(S): at 11:26

## 2018-11-03 RX ADMIN — APIXABAN 5 MILLIGRAM(S): 2.5 TABLET, FILM COATED ORAL at 19:04

## 2018-11-03 RX ADMIN — ALBUTEROL 2.5 MILLIGRAM(S): 90 AEROSOL, METERED ORAL at 11:38

## 2018-11-03 RX ADMIN — Medication 600 MILLIGRAM(S): at 22:20

## 2018-11-03 RX ADMIN — BENZOCAINE AND MENTHOL 1 LOZENGE: 5; 1 LIQUID ORAL at 10:42

## 2018-11-03 RX ADMIN — APIXABAN 2.5 MILLIGRAM(S): 2.5 TABLET, FILM COATED ORAL at 05:11

## 2018-11-03 RX ADMIN — Medication 75 MICROGRAM(S): at 05:10

## 2018-11-03 RX ADMIN — CELECOXIB 200 MILLIGRAM(S): 200 CAPSULE ORAL at 12:27

## 2018-11-03 RX ADMIN — CELECOXIB 200 MILLIGRAM(S): 200 CAPSULE ORAL at 11:27

## 2018-11-03 RX ADMIN — Medication 100 MILLIGRAM(S): at 11:26

## 2018-11-03 RX ADMIN — Medication 40 MILLIGRAM(S): at 05:12

## 2018-11-03 RX ADMIN — LOSARTAN POTASSIUM 50 MILLIGRAM(S): 100 TABLET, FILM COATED ORAL at 05:12

## 2018-11-03 RX ADMIN — Medication 81 MILLIGRAM(S): at 11:27

## 2018-11-03 RX ADMIN — Medication 2 MILLIGRAM(S): at 05:11

## 2018-11-03 RX ADMIN — Medication 40 MILLIGRAM(S): at 19:04

## 2018-11-03 RX ADMIN — Medication 90 MILLIGRAM(S): at 05:11

## 2018-11-03 RX ADMIN — Medication 4 MILLIGRAM(S): at 22:20

## 2018-11-03 RX ADMIN — Medication 100 MILLIGRAM(S): at 05:11

## 2018-11-03 RX ADMIN — Medication 37.5 MILLIGRAM(S): at 11:26

## 2018-11-03 NOTE — PROGRESS NOTE ADULT - ASSESSMENT
89F with PMH of afib on eliquis, HTN, HLD, hypothyroidism, depression, asthma, gout, and osteoarthritis presents with SOB and admitted for acute on chronic CHF.

## 2018-11-03 NOTE — PROGRESS NOTE ADULT - ASSESSMENT
89F with PMH of afib on eliquis, HFPEF, HTN, HLD, hypothyroidism, depression, asthma, gout, and osteoarthritis presents with ADHF    -Pt reports improvement of SOB, still with significant LE edema on exam  -Continue Lasix 40mg IV q12  - Please continue to maintain strict I/Os, monitor daily weights, Cr, and K.     - No sx of angina  - EKG without ischemic changes  - Trend Jacques  - Check echo  - Cont Eliquis for cva risk reduction    - BP trend improved with diuresis per flowsheet  - Cont Losartan 50mg Qday  - Cont Nifedipine  - Cont lopressor      - Monitor and replete electrolytes. Keep K>4.0 and Mg>2.0.  - Further cardiac workup will depend on clinical course.   - All other workup per primary team. Will followup.     DELIA BaroneC 89F with PMH of afib on eliquis, HFPEF, HTN, HLD, hypothyroidism, depression, asthma, gout, and osteoarthritis presents with ADHF    -Pt reports improvement of SOB, still with significant LE edema on exam  -Continue Lasix 40mg IV q12  - Please continue to maintain strict I/Os, monitor daily weights, Cr, and K.     - No sx of angina  - EKG without ischemic changes  - Trend Jacques  - Check echo  - Cont Eliquis for cva risk reduction    - BP trend improved with diuresis per flowsheet  - Cont Losartan 50mg Qday  - Cont Nifedipine   - Cont lopressor      - Monitor and replete electrolytes. Keep K>4.0 and Mg>2.0.  - Further cardiac workup will depend on clinical course.   - All other workup per primary team. Will followup.     DELIA BaroneC

## 2018-11-03 NOTE — PROGRESS NOTE ADULT - PROBLEM SELECTOR PLAN 3
Chronic, elevated. Pt did not take her BP meds today.   - Continue metoprolol, nifedipine, doxazosin

## 2018-11-03 NOTE — PROGRESS NOTE ADULT - SUBJECTIVE AND OBJECTIVE BOX
[facility] -- Talisha Duff, Jenniffer, Josefa, Junior Serrano Savella  Office # [phone]      Follow Up:  SOB    Subjective/Observations: Pt seen and examined. Laying in bed in no acute distress. Denies chest pain, reports SOB improved.       REVIEW OF SYSTEMS: All other review of systems is negative unless indicated above    PAST MEDICAL & SURGICAL HISTORY:  Asthma  Paroxysmal atrial fibrillation  HLD (hyperlipidemia)  Hypothyroid  Osteoarthritis  Gout  Overactive bladder  Depression  Hypertension  S/P lung surgery, follow-up exam: s/p removal of suspicious lesion, negative  S/P hysterectomy  S/P lumpectomy of breast  S/P cholecystectomy      MEDICATIONS  (STANDING):  allopurinol 100 milliGRAM(s) Oral daily  apixaban 2.5 milliGRAM(s) Oral every 12 hours  aspirin  chewable 81 milliGRAM(s) Oral daily  celecoxib 200 milliGRAM(s) Oral daily  docusate sodium 100 milliGRAM(s) Oral two times a day  doxazosin 2 milliGRAM(s) Oral daily  doxazosin 4 milliGRAM(s) Oral at bedtime  furosemide   Injectable 40 milliGRAM(s) IV Push two times a day  lactobacillus acidophilus 1 Tablet(s) Oral daily  levothyroxine 75 MICROGram(s) Oral daily  losartan 50 milliGRAM(s) Oral daily  metoprolol tartrate 37.5 milliGRAM(s) Oral daily  multivitamin/minerals 1 Tablet(s) Oral daily  NIFEdipine XL 90 milliGRAM(s) Oral daily  venlafaxine XR. 37.5 milliGRAM(s) Oral daily    MEDICATIONS  (PRN):  ALBUTerol    0.083% 2.5 milliGRAM(s) Nebulizer every 6 hours PRN wheezing or shortness of breath  benzocaine 15 mG/menthol 3.6 mG Lozenge 1 Lozenge Oral every 4 hours PRN Sore Throat      Allergies    iodine (Anaphylaxis)  penicillin (Other)  shellfish (Short breath; Hives)  sulfa drugs (Hives)    Intolerances            Vital Signs Last 24 Hrs  T(C): 36.8 (03 Nov 2018 05:00), Max: 37.3 (02 Nov 2018 22:08)  T(F): 98.3 (03 Nov 2018 05:00), Max: 99.2 (02 Nov 2018 22:08)  HR: 60 (03 Nov 2018 11:38) (60 - 86)  BP: 135/69 (03 Nov 2018 05:00) (135/69 - 183/74)  BP(mean): --  RR: 17 (03 Nov 2018 05:00) (15 - 20)  SpO2: 97% (03 Nov 2018 11:38) (93% - 100%)    I&O's Summary    Weight (kg): 78.8 (11-03 @ 00:59)    PHYSICAL EXAM:  TELE: SR 70s,  no events  Constitutional: NAD, awake and alert, well-developed  HEENT: Moist Mucous Membranes, Anicteric  Pulmonary: Non-labored, breath sounds are clear bilaterally, No wheezing, rales or rhonchi  Cardiovascular: Regular, S1 and S2, No murmurs, rubs, gallops or clicks  Gastrointestinal: Bowel Sounds present, soft, nontender.   Lymph: +3 peripheral edema. No lymphadenopathy.  Skin: No visible rashes or ulcers.  Psych:  Mood & affect appropriate    LABS: All Labs Reviewed:                        10.0   16.19 )-----------( 214      ( 02 Nov 2018 17:51 )             31.5     02 Nov 2018 17:51    143    |  109    |  41     ----------------------------<  99     4.1     |  27     |  1.10     Ca    8.1        02 Nov 2018 17:51  Mg     2.4       02 Nov 2018 17:51    TPro  7.1    /  Alb  3.2    /  TBili  0.4    /  DBili  x      /  AST  21     /  ALT  25     /  AlkPhos  86     02 Nov 2018 17:51    PT/INR - ( 02 Nov 2018 17:51 )   PT: 13.5 sec;   INR: 1.18 ratio         PTT - ( 02 Nov 2018 17:51 )  PTT:32.7 sec  CARDIAC MARKERS ( 02 Nov 2018 17:51 )  <.015 ng/mL / x     / x     / x     / <1.0 ng/mL       < from: TTE Echo Doppler w/o Cont (11.21.17 @ 10:03) >     EXAM:  ECHO TTE W/O CON COMP W/DOPPLR         PROCEDURE DATE:  11/21/2017        INTERPRETATION:  INDICATION: Shortness of breath    Blood Pressure 138/73    Height 152.4     Weight 79       BSA 1.76    Dimensions:    LA 2.8       Normal Values: 2.0 - 4.0 cm    Ao 3.0        Normal Values: 2.0 - 3.8 cm  SEPTUM 0.9       Normal Values: 0.6 - 1.2 cm  PWT 1.0       Normal Values: 0.6 - 1.1 cm  LVIDd 4.8         Normal Values: 3.0 - 5.6 cm  LVIDs 2.4         Normal Values: 1.8 - 4.0 cm    Derived Variables:  LVMI     g/m2  RWT      Fractional Short      Ejection Fraction 65    Doppler Peak v. AoV=   (m/sec)    OBSERVATIONS:    Mitral Valve: normal, trace physiologic MR.  Aortic Valve/Aorta: Calcified trileaflet aortic valve.  Tricuspid Valve: normal with trace TR.  Pulmonic Valve: normal  Left Atrium: normal  Right Atrium: normal  Left Ventricle: normal LV size and systolic function, estimated LVEF of   65%.  Right Ventricle: normal size and systolic function.  Pericardium/Pleura: no significant pleural effusion, no significant   pericardial effusion.  Pulmonary/RV Pressure: estimated PA systolic pressure of 41 mmHg assuming   an RA pressure of 10 mmHg.  LV Diastolic Function: Grade 1 diastolic dysfunction.     Normal left ventricular size and systolic function, estimated LVEF of   65%. Normal right ventricular size and systolic function.  Grade 1   diastolic dysfunction. Normal biatrial size. The aortic root is normal in   size. The mitral valve is structurally normal, trace physiologic MR. The   aortic valve is calcified without stenosis or insufficiency. Trace   physiologic TR. Estimated PA systolic pressure is 41 mm Hg, assuming an   RA pressure of 10 mmHg. No significant pericardial effusion.                  SAMANTA LIMON M.D., ATTENDING CARDIOLOGIST  This document has been electronically signed. Nov 22 2017  4:08PM                < end of copied text >  < from: Xray Chest 1 View- PORTABLE-Urgent (11.02.18 @ 18:04) >    EXAM:  XR CHEST PORTABLE URGENT 1V                            PROCEDURE DATE:  11/02/2018          INTERPRETATION:  AP semierect chest on November 2, 2018 at 6:08 PM.   Patient has hypertension and is short of breath.    Heart is magnified by technique.    Scattered lung linear scarring again noted with blunting of the lateral   costophrenic angles.    There is no sense of active lung or pleural finding.    Chest is similar to July 21 of this year.    IMPRESSION: Chronic lung and pleural findings again noted.                YO NIÑO M.D., ATTENDING RADIOLOGIST  This document has been electronically signed. Nov 2 2018  6:51PM                < end of copied text >       < from: 12 Lead ECG (11.02.18 @ 17:33) >  Ventricular Rate 88 BPM    Atrial Rate 88 BPM    P-R Interval 280 ms    QRS Duration 84 ms    Q-T Interval 360 ms    QTC Calculation(Bezet) 435 ms    P Axis 82 degrees    R Axis -10 degrees    T Axis 15 degrees    Diagnosis Line Sinus rhythm with 1st degree AV block  Otherwise normal ECG  When compared with ECG of 21-JUL-2018 17:43,  No significant change was found  Confirmed by Julienne Henao ([id number]) on 11/3/2018 11:54:50 AM    < end of copied text > [facility] -- Talisha Duff, Jenniffer, Josefa, Junior Serrano Savella  Office # [phone]      Follow Up:  SOB    Subjective/Observations: Pt seen and examined. Laying in bed in no acute distress. Denies chest pain, reports SOB/orthopnea improving.       REVIEW OF SYSTEMS: All other review of systems is negative unless indicated above    PAST MEDICAL & SURGICAL HISTORY:  Asthma  Paroxysmal atrial fibrillation  HLD (hyperlipidemia)  Hypothyroid  Osteoarthritis  Gout  Overactive bladder  Depression  Hypertension  S/P lung surgery, follow-up exam: s/p removal of suspicious lesion, negative  S/P hysterectomy  S/P lumpectomy of breast  S/P cholecystectomy      MEDICATIONS  (STANDING):  allopurinol 100 milliGRAM(s) Oral daily  apixaban 2.5 milliGRAM(s) Oral every 12 hours  aspirin  chewable 81 milliGRAM(s) Oral daily  celecoxib 200 milliGRAM(s) Oral daily  docusate sodium 100 milliGRAM(s) Oral two times a day  doxazosin 2 milliGRAM(s) Oral daily  doxazosin 4 milliGRAM(s) Oral at bedtime  furosemide   Injectable 40 milliGRAM(s) IV Push two times a day  lactobacillus acidophilus 1 Tablet(s) Oral daily  levothyroxine 75 MICROGram(s) Oral daily  losartan 50 milliGRAM(s) Oral daily  metoprolol tartrate 37.5 milliGRAM(s) Oral daily  multivitamin/minerals 1 Tablet(s) Oral daily  NIFEdipine XL 90 milliGRAM(s) Oral daily  venlafaxine XR. 37.5 milliGRAM(s) Oral daily    MEDICATIONS  (PRN):  ALBUTerol    0.083% 2.5 milliGRAM(s) Nebulizer every 6 hours PRN wheezing or shortness of breath  benzocaine 15 mG/menthol 3.6 mG Lozenge 1 Lozenge Oral every 4 hours PRN Sore Throat      Allergies    iodine (Anaphylaxis)  penicillin (Other)  shellfish (Short breath; Hives)  sulfa drugs (Hives)    Intolerances            Vital Signs Last 24 Hrs  T(C): 36.8 (03 Nov 2018 05:00), Max: 37.3 (02 Nov 2018 22:08)  T(F): 98.3 (03 Nov 2018 05:00), Max: 99.2 (02 Nov 2018 22:08)  HR: 60 (03 Nov 2018 11:38) (60 - 86)  BP: 135/69 (03 Nov 2018 05:00) (135/69 - 183/74)  BP(mean): --  RR: 17 (03 Nov 2018 05:00) (15 - 20)  SpO2: 97% (03 Nov 2018 11:38) (93% - 100%)    I&O's Summary    Weight (kg): 78.8 (11-03 @ 00:59)    PHYSICAL EXAM:  TELE: SR 70s,  no events  Constitutional: NAD, awake and alert, well-developed  HEENT: Moist Mucous Membranes, Anicteric  Pulmonary: Non-labored, breath sounds are clear bilaterally, No wheezing, rales or rhonchi  Cardiovascular: Regular, S1 and S2, No murmurs, rubs, gallops or clicks  Gastrointestinal: Bowel Sounds present, soft, nontender.   Lymph: +3 peripheral edema. No lymphadenopathy.  Skin: No visible rashes or ulcers.  Psych:  Mood & affect appropriate    LABS: All Labs Reviewed:                        10.0   16.19 )-----------( 214      ( 02 Nov 2018 17:51 )             31.5     02 Nov 2018 17:51    143    |  109    |  41     ----------------------------<  99     4.1     |  27     |  1.10     Ca    8.1        02 Nov 2018 17:51  Mg     2.4       02 Nov 2018 17:51    TPro  7.1    /  Alb  3.2    /  TBili  0.4    /  DBili  x      /  AST  21     /  ALT  25     /  AlkPhos  86     02 Nov 2018 17:51    PT/INR - ( 02 Nov 2018 17:51 )   PT: 13.5 sec;   INR: 1.18 ratio         PTT - ( 02 Nov 2018 17:51 )  PTT:32.7 sec  CARDIAC MARKERS ( 02 Nov 2018 17:51 )  <.015 ng/mL / x     / x     / x     / <1.0 ng/mL       < from: TTE Echo Doppler w/o Cont (11.21.17 @ 10:03) >     EXAM:  ECHO TTE W/O CON COMP W/DOPPLR         PROCEDURE DATE:  11/21/2017        INTERPRETATION:  INDICATION: Shortness of breath    Blood Pressure 138/73    Height 152.4     Weight 79       BSA 1.76    Dimensions:    LA 2.8       Normal Values: 2.0 - 4.0 cm    Ao 3.0        Normal Values: 2.0 - 3.8 cm  SEPTUM 0.9       Normal Values: 0.6 - 1.2 cm  PWT 1.0       Normal Values: 0.6 - 1.1 cm  LVIDd 4.8         Normal Values: 3.0 - 5.6 cm  LVIDs 2.4         Normal Values: 1.8 - 4.0 cm    Derived Variables:  LVMI     g/m2  RWT      Fractional Short      Ejection Fraction 65    Doppler Peak v. AoV=   (m/sec)    OBSERVATIONS:    Mitral Valve: normal, trace physiologic MR.  Aortic Valve/Aorta: Calcified trileaflet aortic valve.  Tricuspid Valve: normal with trace TR.  Pulmonic Valve: normal  Left Atrium: normal  Right Atrium: normal  Left Ventricle: normal LV size and systolic function, estimated LVEF of   65%.  Right Ventricle: normal size and systolic function.  Pericardium/Pleura: no significant pleural effusion, no significant   pericardial effusion.  Pulmonary/RV Pressure: estimated PA systolic pressure of 41 mmHg assuming   an RA pressure of 10 mmHg.  LV Diastolic Function: Grade 1 diastolic dysfunction.     Normal left ventricular size and systolic function, estimated LVEF of   65%. Normal right ventricular size and systolic function.  Grade 1   diastolic dysfunction. Normal biatrial size. The aortic root is normal in   size. The mitral valve is structurally normal, trace physiologic MR. The   aortic valve is calcified without stenosis or insufficiency. Trace   physiologic TR. Estimated PA systolic pressure is 41 mm Hg, assuming an   RA pressure of 10 mmHg. No significant pericardial effusion.                  SAMANTA LIMON M.D., ATTENDING CARDIOLOGIST  This document has been electronically signed. Nov 22 2017  4:08PM                < end of copied text >  < from: Xray Chest 1 View- PORTABLE-Urgent (11.02.18 @ 18:04) >    EXAM:  XR CHEST PORTABLE URGENT 1V                            PROCEDURE DATE:  11/02/2018          INTERPRETATION:  AP semierect chest on November 2, 2018 at 6:08 PM.   Patient has hypertension and is short of breath.    Heart is magnified by technique.    Scattered lung linear scarring again noted with blunting of the lateral   costophrenic angles.    There is no sense of active lung or pleural finding.    Chest is similar to July 21 of this year.    IMPRESSION: Chronic lung and pleural findings again noted.                YO NIÑO M.D., ATTENDING RADIOLOGIST  This document has been electronically signed. Nov 2 2018  6:51PM                < end of copied text >       < from: 12 Lead ECG (11.02.18 @ 17:33) >  Ventricular Rate 88 BPM    Atrial Rate 88 BPM    P-R Interval 280 ms    QRS Duration 84 ms    Q-T Interval 360 ms    QTC Calculation(Bezet) 435 ms    P Axis 82 degrees    R Axis -10 degrees    T Axis 15 degrees    Diagnosis Line Sinus rhythm with 1st degree AV block  Otherwise normal ECG  When compared with ECG of 21-JUL-2018 17:43,  No significant change was found  Confirmed by Julienne Henao ([id number]) on 11/3/2018 11:54:50 AM    < end of copied text >

## 2018-11-03 NOTE — PROGRESS NOTE ADULT - ATTENDING COMMENTS
Continue IV Lasix  monitor electrolytes  Cardiology Following  Ambulate as tolerated Continue IV Lasix  monitor electrolytes  Cardiology Following  Ambulate as tolerated  Pulm Dr. Lacy called for consult

## 2018-11-03 NOTE — CONSULT NOTE ADULT - SUBJECTIVE AND OBJECTIVE BOX
CHIEF COMPLAINT: Patient is a 89y old  Female who presents with a chief complaint of SOB (02 Nov 2018 19:23)      HPI:  89F with PMH of afib on eliquis, HFPEF, HTN, HLD, hypothyroidism, depression, asthma, gout, and osteoarthritis presents to ED from urgent care for SOB since last night. Patient went to [facility] with SOB thinking it was a cold but sent in for eval for CHF. She has been complaining of increased dyspnea and lower extremity edema for the last 1 week. +orthopnea.   Denies any chest pain,  palpitations, PND,  near syncope, syncope,  stroke like symptoms.     In ED, VS stable. Labs significant for WBC 16.9, H/H 10/31.5. VT/INR 13.5/1.18. Lactate 0.9. Trop neg. ProBNP 635. UA negative. RVP pending. EKG shows sinus rhythm with 1st degree AV block. Given dose of Levaquin for possible PNA.  CXR negative. Received duonebs for SOB. Started on dose of IV lasix 40mg for diuresis.         REVIEW OF SYSTEMS:   All other review of systems are negative unless indicated above    PAST MEDICAL & SURGICAL HISTORY:  Asthma  Paroxysmal atrial fibrillation  HLD (hyperlipidemia)  Hypothyroid  Osteoarthritis  Gout  Overactive bladder  Depression  Hypertension  S/P lung surgery, follow-up exam: s/p removal of suspicious lesion, negative  S/P hysterectomy  S/P lumpectomy of breast  S/P cholecystectomy      SOCIAL HISTORY:  No tobacco, ethanol, or drug abuse.    FAMILY HISTORY:  Family history of breast cancer (Mother)  Family history of nasopharyngeal cancer (Child): daughter    No family history of acute MI or sudden cardiac death.    MEDICATIONS  (STANDING):  allopurinol 100 milliGRAM(s) Oral daily  apixaban 2.5 milliGRAM(s) Oral every 12 hours  aspirin  chewable 81 milliGRAM(s) Oral daily  celecoxib 200 milliGRAM(s) Oral daily  docusate sodium 100 milliGRAM(s) Oral two times a day  doxazosin 2 milliGRAM(s) Oral daily  doxazosin 4 milliGRAM(s) Oral at bedtime  furosemide   Injectable 40 milliGRAM(s) IV Push two times a day  lactobacillus acidophilus 1 Tablet(s) Oral daily  levothyroxine 75 MICROGram(s) Oral daily  losartan 50 milliGRAM(s) Oral daily  metoprolol tartrate 37.5 milliGRAM(s) Oral daily  multivitamin/minerals 1 Tablet(s) Oral daily  NIFEdipine XL 90 milliGRAM(s) Oral daily  venlafaxine XR. 37.5 milliGRAM(s) Oral daily    MEDICATIONS  (PRN):  ALBUTerol   0.5% 2.5 milliGRAM(s) Nebulizer every 6 hours PRN Shortness of Breath and/or Wheezing      Allergies    iodine (Anaphylaxis)  penicillin (Other)  shellfish (Short breath; Hives)  sulfa drugs (Hives)    Intolerances        Home meds:  Home Medications:  albuterol 2.5 mg/3 mL (0.083%) inhalation solution: 3 milliliter(s) inhaled every 6 hours, As Needed (02 Nov 2018 20:03)  Align 4 mg oral capsule: 1 cap(s) orally once a day (02 Nov 2018 20:03)  allopurinol 100 mg oral tablet: 1 tab(s) orally once a day (02 Nov 2018 20:03)  aspirin 81 mg oral tablet: 1 tab(s) orally once a day (02 Nov 2018 20:03)  Colace 100 mg oral capsule: 1 cap(s) orally once a day (at bedtime) (02 Nov 2018 20:03)  doxazosin 2 mg oral tablet: 1 tab(s) orally once a day (02 Nov 2018 20:03)  doxazosin 4 mg oral tablet: 1 tab(s) orally once a day (at bedtime) (02 Nov 2018 20:03)  Effexor 37.5 mg oral capsule, extended release: 1 cap(s) orally once a day (02 Nov 2018 20:03)  irbesartan 150 mg oral tablet: 1 tab(s) orally once a day (02 Nov 2018 20:03)  Lasix 20 mg oral tablet: 1 tab(s) orally once a day (02 Nov 2018 20:03)  meloxicam 15 mg oral tablet: 1 tab(s) orally once a day (02 Nov 2018 20:03)  metoprolol tartrate 37.5 mg oral tablet: 1 tab(s) orally once a day (at bedtime) (02 Nov 2018 20:03)  Multi-Day Plus Minerals Multiple Vitamins with Minerals oral tablet: 1 tab(s) orally once a day (02 Nov 2018 20:03)  NIFEdipine 90 mg oral tablet, extended release: 1 tab(s) orally once a day (02 Nov 2018 20:03)  Synthroid 75 mcg (0.075 mg) oral tablet: 1 tab(s) orally once a day (02 Nov 2018 20:03)        VITAL SIGNS:   Vital Signs Last 24 Hrs  T(C): 36.8 (02 Nov 2018 17:10), Max: 36.8 (02 Nov 2018 17:10)  T(F): 98.2 (02 Nov 2018 17:10), Max: 98.2 (02 Nov 2018 17:10)  HR: 76 (02 Nov 2018 19:36) (76 - 86)  BP: 160/61 (02 Nov 2018 19:36) (160/61 - 175/80)  BP(mean): --  RR: 16 (02 Nov 2018 19:36) (16 - 20)  SpO2: 95% (02 Nov 2018 19:36) (95% - 100%)    I&O's Summary      On Exam:     Constitutional: NAD, awake and alert, well-developed  HEENT: Moist Mucous Membranes, Anicteric  Pulmonary: Decreased breath sounds b/l. b/l crackles  Cardiovascular: Regular, S1 and S2, No murmurs, rubs, gallops or clicks  Gastrointestinal: Bowel Sounds present, soft, nontender.   Lymph: 2-3+ peripheral edema. No lymphadenopathy.  Skin: No visible rashes or ulcers.  Psych:  Mood & affect appropriate    LABS: All Labs Reviewed:                        10.0   16.19 )-----------( 214      ( 02 Nov 2018 17:51 )             31.5     02 Nov 2018 17:51    143    |  109    |  41     ----------------------------<  99     4.1     |  27     |  1.10     Ca    8.1        02 Nov 2018 17:51  Mg     2.4       02 Nov 2018 17:51    TPro  7.1    /  Alb  3.2    /  TBili  0.4    /  DBili  x      /  AST  21     /  ALT  25     /  AlkPhos  86     02 Nov 2018 17:51    PT/INR - ( 02 Nov 2018 17:51 )   PT: 13.5 sec;   INR: 1.18 ratio         PTT - ( 02 Nov 2018 17:51 )  PTT:32.7 sec  CARDIAC MARKERS ( 02 Nov 2018 17:51 )  <.015 ng/mL / x     / x     / x     / <1.0 ng/mL      Blood Culture:   11-02 @ 17:51  Pro Bnp 635        RADIOLOGY:    < from: TTE Echo Doppler w/o Cont (11.21.17 @ 10:03) >     EXAM:  ECHO TTE W/O CON COMP W/DOPPLR         PROCEDURE DATE:  11/21/2017        INTERPRETATION:  INDICATION: Shortness of breath    Blood Pressure 138/73    Height 152.4     Weight 79       BSA 1.76    Dimensions:    LA 2.8       Normal Values: 2.0 - 4.0 cm    Ao 3.0        Normal Values: 2.0 - 3.8 cm  SEPTUM 0.9       Normal Values: 0.6 - 1.2 cm  PWT 1.0       Normal Values: 0.6 - 1.1 cm  LVIDd 4.8         Normal Values: 3.0 - 5.6 cm  LVIDs 2.4         Normal Values: 1.8 - 4.0 cm    Derived Variables:  LVMI     g/m2  RWT      Fractional Short      Ejection Fraction 65    Doppler Peak v. AoV=   (m/sec)    OBSERVATIONS:    Mitral Valve: normal, trace physiologic MR.  Aortic Valve/Aorta: Calcified trileaflet aortic valve.  Tricuspid Valve: normal with trace TR.  Pulmonic Valve: normal  Left Atrium: normal  Right Atrium: normal  Left Ventricle: normal LV size and systolic function, estimated LVEF of   65%.  Right Ventricle: normal size and systolic function.  Pericardium/Pleura: no significant pleural effusion, no significant   pericardial effusion.  Pulmonary/RV Pressure: estimated PA systolic pressure of 41 mmHg assuming   an RA pressure of 10 mmHg.  LV Diastolic Function: Grade 1 diastolic dysfunction.     Normal left ventricular size and systolic function, estimated LVEF of   65%. Normal right ventricular size and systolic function.  Grade 1   diastolic dysfunction. Normal biatrial size. The aortic root is normal in   size. The mitral valve is structurally normal, trace physiologic MR. The   aortic valve is calcified without stenosis or insufficiency. Trace   physiologic TR. Estimated PA systolic pressure is 41 mm Hg, assuming an   RA pressure of 10 mmHg. No significant pericardial effusion.                  SAAMNTA LIMON M.D., ATTENDING CARDIOLOGIST  This document has been electronically signed. Nov 22 2017  4:08PM                < end of copied text >  < from: Xray Chest 1 View- PORTABLE-Urgent (11.02.18 @ 18:04) >    EXAM:  XR CHEST PORTABLE URGENT 1V                            PROCEDURE DATE:  11/02/2018          INTERPRETATION:  AP semierect chest on November 2, 2018 at 6:08 PM.   Patient has hypertension and is short of breath.    Heart is magnified by technique.    Scattered lung linear scarring again noted with blunting of the lateral   costophrenic angles.    There is no sense of active lung or pleural finding.    Chest is similar to July 21 of this year.    IMPRESSION: Chronic lung and pleural findings again noted.                YO NIÑO M.D., ATTENDING RADIOLOGIST  This document has been electronically signed. Nov 2 2018  6:51PM                < end of copied text >
Date/Time Patient Seen:  		  Referring MD:   Data Reviewed	       Patient is a 89y old  Female who presents with a chief complaint of SOB (03 Nov 2018 13:11)      Subjective/HPI    vs and meds reviewed  labs and imaging reviewed  H and P reviewed  ER provider note reviewed  Cardio eval noted    H&P Adult [Charted Location: [place] [address]] [Authored: 02-Nov-2018 19:23]- for Visit: [id number], Complete, Revised, Signed in Full, General    History and Physical:   Source of Information	Patient  Outpatient Providers	PMD: Dr. Manuel Washington  Cardio: Dr. Gates     Language:  · Patient/Family of Limited English Proficiency	No       History of Present Illness:  Reason for Admission: SOB  History of Present Illness:   89F with PMH of afib on eliquis, HTN, HLD, hypothyroidism, depression, asthma, gout, and osteoarthritis presents to ED from urgent care for SOB since last night. Patient went to [facility] with SOB thinking it was a cold but sent in for eval for CHF. Patient states she has no hx of CHF. Last ECHO 11/2017 shows EF 65% with grade 1 diastolic dysfunction. Also notes that her LE edema has been worsening over the past week. Denies fever, chills, CP, palpitations, n/v/d.     In ED, VS stable. Labs significant for WBC 16.9, H/H 10/31.5. CT/INR 13.5/1.18. Lactate 0.9. Trop neg. ProBNP 635. UA negative. RVP pending. EKG shows sinus rhythm with 1st degree AV block. Given dose of Levaquin for possible PNA.  CXR negative. Received duonebs for SOB. Started on dose of IV lasix 40mg for diuresis. Seen by cardio in ED.      PAST MEDICAL & SURGICAL HISTORY:  Asthma  Paroxysmal atrial fibrillation  HLD (hyperlipidemia)  Hypothyroid  Osteoarthritis  Gout  Overactive bladder  CVA (cerebral infarction)  Depression  Hypertension  S/P lung surgery, follow-up exam: s/p removal of suspicious lesion, negative  S/P hysterectomy  S/P lumpectomy of breast  S/P cholecystectomy  No significant past surgical history        Medication list         MEDICATIONS  (STANDING):  allopurinol 100 milliGRAM(s) Oral daily  apixaban 5 milliGRAM(s) Oral every 12 hours  aspirin  chewable 81 milliGRAM(s) Oral daily  celecoxib 200 milliGRAM(s) Oral daily  docusate sodium 100 milliGRAM(s) Oral two times a day  doxazosin 2 milliGRAM(s) Oral daily  doxazosin 4 milliGRAM(s) Oral at bedtime  furosemide   Injectable 40 milliGRAM(s) IV Push two times a day  lactobacillus acidophilus 1 Tablet(s) Oral daily  levothyroxine 75 MICROGram(s) Oral daily  losartan 50 milliGRAM(s) Oral daily  metoprolol tartrate 37.5 milliGRAM(s) Oral daily  multivitamin/minerals 1 Tablet(s) Oral daily  NIFEdipine XL 90 milliGRAM(s) Oral daily  venlafaxine XR. 37.5 milliGRAM(s) Oral daily    MEDICATIONS  (PRN):  ALBUTerol    0.083% 2.5 milliGRAM(s) Nebulizer every 6 hours PRN wheezing or shortness of breath  benzocaine 15 mG/menthol 3.6 mG Lozenge 1 Lozenge Oral every 4 hours PRN Sore Throat         Vitals log        ICU Vital Signs Last 24 Hrs  T(C): 36.7 (03 Nov 2018 13:31), Max: 37.3 (02 Nov 2018 22:08)  T(F): 98 (03 Nov 2018 13:31), Max: 99.2 (02 Nov 2018 22:08)  HR: 68 (03 Nov 2018 13:31) (60 - 76)  BP: 124/73 (03 Nov 2018 13:31) (124/73 - 183/74)  BP(mean): --  ABP: --  ABP(mean): --  RR: 17 (03 Nov 2018 13:31) (15 - 17)  SpO2: 92% (03 Nov 2018 13:31) (92% - 98%)           Input and Output:  I&O's Detail      Lab Data                        10.0   16.19 )-----------( 214      ( 02 Nov 2018 17:51 )             31.5     11-02    143  |  109<H>  |  41<H>  ----------------------------<  99  4.1   |  27  |  1.10    Ca    8.1<L>      02 Nov 2018 17:51  Mg     2.4     11-02    TPro  7.1  /  Alb  3.2<L>  /  TBili  0.4  /  DBili  x   /  AST  21  /  ALT  25  /  AlkPhos  86  11-02      CARDIAC MARKERS ( 03 Nov 2018 13:42 )  <.015 ng/mL / x     / 72 U/L / x     / 1.5 ng/mL  CARDIAC MARKERS ( 02 Nov 2018 17:51 )  <.015 ng/mL / x     / x     / x     / <1.0 ng/mL        Review of Systems	      Objective     Physical Examination        Pertinent Lab findings & Imaging      Nunez:  NO   Adequate UO     I&O's Detail           Discussed with:     Cultures:	        Radiology

## 2018-11-03 NOTE — PROGRESS NOTE ADULT - SUBJECTIVE AND OBJECTIVE BOX
Patient is a 89y old  Female who presents with a chief complaint of SOB (02 Nov 2018 21:52)       INTERVAL HPI/OVERNIGHT EVENTS: 89F with PMH of afib on eliquis, HTN, HLD, hypothyroidism, depression, asthma, gout, and osteoarthritis presents with SOB and admitted for acute on chronic CHF. Patient seen and examined at bedside. SOB has slowly been improving.       MEDICATIONS  (STANDING):  allopurinol 100 milliGRAM(s) Oral daily  apixaban 2.5 milliGRAM(s) Oral every 12 hours  aspirin  chewable 81 milliGRAM(s) Oral daily  celecoxib 200 milliGRAM(s) Oral daily  docusate sodium 100 milliGRAM(s) Oral two times a day  doxazosin 2 milliGRAM(s) Oral daily  doxazosin 4 milliGRAM(s) Oral at bedtime  furosemide   Injectable 40 milliGRAM(s) IV Push two times a day  lactobacillus acidophilus 1 Tablet(s) Oral daily  levothyroxine 75 MICROGram(s) Oral daily  losartan 50 milliGRAM(s) Oral daily  metoprolol tartrate 37.5 milliGRAM(s) Oral daily  multivitamin/minerals 1 Tablet(s) Oral daily  NIFEdipine XL 90 milliGRAM(s) Oral daily  venlafaxine XR. 37.5 milliGRAM(s) Oral daily    MEDICATIONS  (PRN):  ALBUTerol   0.5% 2.5 milliGRAM(s) Nebulizer every 6 hours PRN Shortness of Breath and/or Wheezing  benzocaine 15 mG/menthol 3.6 mG Lozenge 1 Lozenge Oral every 4 hours PRN Sore Throat      Allergies    iodine (Anaphylaxis)  penicillin (Other)  shellfish (Short breath; Hives)  sulfa drugs (Hives)    Intolerances        REVIEW OF SYSTEMS:  CONSTITUTIONAL: No fever, weight loss, or fatigue  EYES: No eye pain, visual disturbances, or discharge  ENMT:  No difficulty hearing, tinnitus, vertigo; No sinus or throat pain  NECK: No pain or stiffness  RESPIRATORY: No cough, wheezing, chills or hemoptysis; + shortness of breath  CARDIOVASCULAR: No chest pain, palpitations, dizziness, or leg swelling  GASTROINTESTINAL: No abdominal or epigastric pain. No nausea, vomiting, or hematemesis; No diarrhea or constipation. No melena or hematochezia.  GENITOURINARY: No dysuria, frequency, hematuria, or incontinence  NEUROLOGICAL: No headaches, memory loss, loss of strength, numbness, or tremors  SKIN: No itching, burning, rashes, or lesions   LYMPH NODES: No enlarged glands  ENDOCRINE: No heat or cold intolerance; No hair loss; No polydipsia or polyuria  MUSCULOSKELETAL: No joint pain or swelling; No muscle, back, or extremity pain  PSYCHIATRIC: No depression, anxiety, mood swings, or difficulty sleeping  HEME/LYMPH: No easy bruising, or bleeding gums  ALLERGY AND IMMUNOLOGIC: No hives or eczema    Vital Signs Last 24 Hrs  T(C): 36.8 (03 Nov 2018 05:00), Max: 37.3 (02 Nov 2018 22:08)  T(F): 98.3 (03 Nov 2018 05:00), Max: 99.2 (02 Nov 2018 22:08)  HR: 60 (03 Nov 2018 05:00) (60 - 86)  BP: 135/69 (03 Nov 2018 05:00) (135/69 - 183/74)  BP(mean): --  RR: 17 (03 Nov 2018 05:00) (15 - 20)  SpO2: 98% (03 Nov 2018 05:00) (93% - 100%)    PHYSICAL EXAM:  GENERAL: NAD, well-groomed, well-developed  HEAD:  Atraumatic, Normocephalic  EYES: EOMI, PERRLA, conjunctiva and sclera clear  ENMT: No tonsillar erythema, exudates, or enlargement; Moist mucous membranes  NECK: Supple, No JVD, Normal thyroid  NERVOUS SYSTEM:  Alert & Oriented X3, Good concentration; Motor Strength 5/5 B/L upper and lower extremities; DTRs 2+ intact and symmetric  CHEST/LUNG: Decrease  to auscultation bilaterally; No rales, rhonchi, wheezing, or rubs  HEART: S1S2+, Regular rate and rhythm  ABDOMEN: Soft, Nontender, Nondistended; Bowel sounds present  EXTREMITIES:  2+ Peripheral Pulses, No clubbing, cyanosis, + bilat LE edema   LYMPH: No lymphadenopathy noted      LABS:                        10.0   16.19 )-----------( 214      ( 02 Nov 2018 17:51 )             31.5     02 Nov 2018 17:51    143    |  109    |  41     ----------------------------<  99     4.1     |  27     |  1.10     Ca    8.1        02 Nov 2018 17:51  Mg     2.4       02 Nov 2018 17:51    TPro  7.1    /  Alb  3.2    /  TBili  0.4    /  DBili  x      /  AST  21     /  ALT  25     /  AlkPhos  86     02 Nov 2018 17:51    PT/INR - ( 02 Nov 2018 17:51 )   PT: 13.5 sec;   INR: 1.18 ratio         PTT - ( 02 Nov 2018 17:51 )  PTT:32.7 sec  CAPILLARY BLOOD GLUCOSE        BLOOD CULTURE    RADIOLOGY & ADDITIONAL TESTS:    Imaging Personally Reviewed:  [ ] YES     Consultant(s) Notes Reviewed:      Care Discussed with Consultants/Other Providers:

## 2018-11-04 DIAGNOSIS — J98.4 OTHER DISORDERS OF LUNG: ICD-10-CM

## 2018-11-04 DIAGNOSIS — J18.9 PNEUMONIA, UNSPECIFIED ORGANISM: ICD-10-CM

## 2018-11-04 LAB
-  AMIKACIN: SIGNIFICANT CHANGE UP
-  AMOXICILLIN/CLAVULANIC ACID: SIGNIFICANT CHANGE UP
-  AMPICILLIN/SULBACTAM: SIGNIFICANT CHANGE UP
-  AMPICILLIN: SIGNIFICANT CHANGE UP
-  AZTREONAM: SIGNIFICANT CHANGE UP
-  CEFAZOLIN: SIGNIFICANT CHANGE UP
-  CEFEPIME: SIGNIFICANT CHANGE UP
-  CEFOXITIN: SIGNIFICANT CHANGE UP
-  CEFTRIAXONE: SIGNIFICANT CHANGE UP
-  CIPROFLOXACIN: SIGNIFICANT CHANGE UP
-  ERTAPENEM: SIGNIFICANT CHANGE UP
-  GENTAMICIN: SIGNIFICANT CHANGE UP
-  LEVOFLOXACIN: SIGNIFICANT CHANGE UP
-  MEROPENEM: SIGNIFICANT CHANGE UP
-  NITROFURANTOIN: SIGNIFICANT CHANGE UP
-  PIPERACILLIN/TAZOBACTAM: SIGNIFICANT CHANGE UP
-  TOBRAMYCIN: SIGNIFICANT CHANGE UP
-  TRIMETHOPRIM/SULFAMETHOXAZOLE: SIGNIFICANT CHANGE UP
ANION GAP SERPL CALC-SCNC: 6 MMOL/L — SIGNIFICANT CHANGE UP (ref 5–17)
BUN SERPL-MCNC: 48 MG/DL — HIGH (ref 7–23)
CALCIUM SERPL-MCNC: 8.3 MG/DL — LOW (ref 8.5–10.1)
CHLORIDE SERPL-SCNC: 105 MMOL/L — SIGNIFICANT CHANGE UP (ref 96–108)
CO2 SERPL-SCNC: 32 MMOL/L — HIGH (ref 22–31)
CREAT SERPL-MCNC: 1.3 MG/DL — SIGNIFICANT CHANGE UP (ref 0.5–1.3)
CULTURE RESULTS: SIGNIFICANT CHANGE UP
GLUCOSE SERPL-MCNC: 101 MG/DL — HIGH (ref 70–99)
HCT VFR BLD CALC: 31.8 % — LOW (ref 34.5–45)
HGB BLD-MCNC: 9.8 G/DL — LOW (ref 11.5–15.5)
MCHC RBC-ENTMCNC: 29.9 PG — SIGNIFICANT CHANGE UP (ref 27–34)
MCHC RBC-ENTMCNC: 30.8 GM/DL — LOW (ref 32–36)
MCV RBC AUTO: 97 FL — SIGNIFICANT CHANGE UP (ref 80–100)
METHOD TYPE: SIGNIFICANT CHANGE UP
NRBC # BLD: 0 /100 WBCS — SIGNIFICANT CHANGE UP (ref 0–0)
ORGANISM # SPEC MICROSCOPIC CNT: SIGNIFICANT CHANGE UP
ORGANISM # SPEC MICROSCOPIC CNT: SIGNIFICANT CHANGE UP
PLATELET # BLD AUTO: 222 K/UL — SIGNIFICANT CHANGE UP (ref 150–400)
POTASSIUM SERPL-MCNC: 4 MMOL/L — SIGNIFICANT CHANGE UP (ref 3.5–5.3)
POTASSIUM SERPL-SCNC: 4 MMOL/L — SIGNIFICANT CHANGE UP (ref 3.5–5.3)
PROCALCITONIN SERPL-MCNC: 0.12 NG/ML — HIGH (ref 0–0.04)
RBC # BLD: 3.28 M/UL — LOW (ref 3.8–5.2)
RBC # FLD: 15.1 % — HIGH (ref 10.3–14.5)
SODIUM SERPL-SCNC: 143 MMOL/L — SIGNIFICANT CHANGE UP (ref 135–145)
SPECIMEN SOURCE: SIGNIFICANT CHANGE UP
WBC # BLD: 12.42 K/UL — HIGH (ref 3.8–10.5)
WBC # FLD AUTO: 12.42 K/UL — HIGH (ref 3.8–10.5)

## 2018-11-04 PROCEDURE — 71250 CT THORAX DX C-: CPT | Mod: 26

## 2018-11-04 PROCEDURE — 99232 SBSQ HOSP IP/OBS MODERATE 35: CPT

## 2018-11-04 PROCEDURE — 99233 SBSQ HOSP IP/OBS HIGH 50: CPT

## 2018-11-04 RX ADMIN — Medication 100 MILLIGRAM(S): at 18:04

## 2018-11-04 RX ADMIN — ALBUTEROL 2.5 MILLIGRAM(S): 90 AEROSOL, METERED ORAL at 11:40

## 2018-11-04 RX ADMIN — Medication 1 TABLET(S): at 11:13

## 2018-11-04 RX ADMIN — BENZOCAINE AND MENTHOL 1 LOZENGE: 5; 1 LIQUID ORAL at 03:16

## 2018-11-04 RX ADMIN — LOSARTAN POTASSIUM 50 MILLIGRAM(S): 100 TABLET, FILM COATED ORAL at 05:38

## 2018-11-04 RX ADMIN — Medication 110 MILLIGRAM(S): at 18:05

## 2018-11-04 RX ADMIN — Medication 2 MILLIGRAM(S): at 05:38

## 2018-11-04 RX ADMIN — BENZOCAINE AND MENTHOL 1 LOZENGE: 5; 1 LIQUID ORAL at 23:32

## 2018-11-04 RX ADMIN — Medication 40 MILLIGRAM(S): at 05:35

## 2018-11-04 RX ADMIN — CELECOXIB 200 MILLIGRAM(S): 200 CAPSULE ORAL at 11:13

## 2018-11-04 RX ADMIN — APIXABAN 5 MILLIGRAM(S): 2.5 TABLET, FILM COATED ORAL at 05:38

## 2018-11-04 RX ADMIN — Medication 100 MILLIGRAM(S): at 05:38

## 2018-11-04 RX ADMIN — APIXABAN 5 MILLIGRAM(S): 2.5 TABLET, FILM COATED ORAL at 18:04

## 2018-11-04 RX ADMIN — Medication 37.5 MILLIGRAM(S): at 05:35

## 2018-11-04 RX ADMIN — Medication 75 MICROGRAM(S): at 05:37

## 2018-11-04 RX ADMIN — Medication 90 MILLIGRAM(S): at 05:38

## 2018-11-04 RX ADMIN — Medication 100 MILLIGRAM(S): at 11:13

## 2018-11-04 RX ADMIN — Medication 4 MILLIGRAM(S): at 22:20

## 2018-11-04 RX ADMIN — Medication 40 MILLIGRAM(S): at 18:05

## 2018-11-04 RX ADMIN — Medication 81 MILLIGRAM(S): at 11:14

## 2018-11-04 RX ADMIN — CELECOXIB 200 MILLIGRAM(S): 200 CAPSULE ORAL at 12:13

## 2018-11-04 RX ADMIN — Medication 110 MILLIGRAM(S): at 11:11

## 2018-11-04 RX ADMIN — Medication 37.5 MILLIGRAM(S): at 11:14

## 2018-11-04 NOTE — PROGRESS NOTE ADULT - ATTENDING COMMENTS
Continue IV Lasix  Check O2 sat on room air Continue IV Lasix  Check O2 sat on room air  CT + for pneumonia: Started on Iv antibiotics. RVP was negative upon admission

## 2018-11-04 NOTE — PROGRESS NOTE ADULT - SUBJECTIVE AND OBJECTIVE BOX
Date/Time Patient Seen:  		  Referring MD:   Data Reviewed	       Patient is a 89y old  Female who presents with a chief complaint of SOB (03 Nov 2018 19:29)    in bed  on o2 support  vs and meds reviewed    Subjective/HPI     PAST MEDICAL & SURGICAL HISTORY:  Asthma  Paroxysmal atrial fibrillation  HLD (hyperlipidemia)  Hypothyroid  Osteoarthritis  Gout  Overactive bladder  CVA (cerebral infarction)  Depression  Hypertension  S/P lung surgery, follow-up exam: s/p removal of suspicious lesion, negative  S/P hysterectomy  S/P lumpectomy of breast  S/P cholecystectomy  No significant past surgical history        Medication list         MEDICATIONS  (STANDING):  allopurinol 100 milliGRAM(s) Oral daily  apixaban 5 milliGRAM(s) Oral every 12 hours  aspirin  chewable 81 milliGRAM(s) Oral daily  celecoxib 200 milliGRAM(s) Oral daily  docusate sodium 100 milliGRAM(s) Oral two times a day  doxazosin 2 milliGRAM(s) Oral daily  doxazosin 4 milliGRAM(s) Oral at bedtime  furosemide   Injectable 40 milliGRAM(s) IV Push two times a day  lactobacillus acidophilus 1 Tablet(s) Oral daily  levothyroxine 75 MICROGram(s) Oral daily  losartan 50 milliGRAM(s) Oral daily  metoprolol tartrate 37.5 milliGRAM(s) Oral daily  multivitamin/minerals 1 Tablet(s) Oral daily  NIFEdipine XL 90 milliGRAM(s) Oral daily  venlafaxine XR. 37.5 milliGRAM(s) Oral daily    MEDICATIONS  (PRN):  ALBUTerol    0.083% 2.5 milliGRAM(s) Nebulizer every 6 hours PRN wheezing or shortness of breath  benzocaine 15 mG/menthol 3.6 mG Lozenge 1 Lozenge Oral every 4 hours PRN Sore Throat         Vitals log        ICU Vital Signs Last 24 Hrs  T(C): 36.9 (04 Nov 2018 04:38), Max: 37.1 (03 Nov 2018 21:37)  T(F): 98.5 (04 Nov 2018 04:38), Max: 98.7 (03 Nov 2018 21:37)  HR: 86 (04 Nov 2018 04:38) (60 - 86)  BP: 123/66 (04 Nov 2018 04:38) (120/77 - 124/73)  BP(mean): --  ABP: --  ABP(mean): --  RR: 16 (04 Nov 2018 04:38) (16 - 17)  SpO2: 97% (04 Nov 2018 04:38) (92% - 97%)           Input and Output:  I&O's Detail      Lab Data                        10.0   16.19 )-----------( 214      ( 02 Nov 2018 17:51 )             31.5     11-02    143  |  109<H>  |  41<H>  ----------------------------<  99  4.1   |  27  |  1.10    Ca    8.1<L>      02 Nov 2018 17:51  Mg     2.4     11-02    TPro  7.1  /  Alb  3.2<L>  /  TBili  0.4  /  DBili  x   /  AST  21  /  ALT  25  /  AlkPhos  86  11-02      CARDIAC MARKERS ( 03 Nov 2018 13:42 )  <.015 ng/mL / x     / 72 U/L / x     / 1.5 ng/mL  CARDIAC MARKERS ( 02 Nov 2018 17:51 )  <.015 ng/mL / x     / x     / x     / <1.0 ng/mL        Review of Systems	      Objective     Physical Examination    heart s1s2  lung dec BS  abd soft  cn grossly int      Pertinent Lab findings & Imaging      Mayra:  NO   Adequate UO     I&O's Detail           Discussed with:     Cultures:	        Radiology

## 2018-11-04 NOTE — PROGRESS NOTE ADULT - PROBLEM SELECTOR PLAN 2
Rate controlled on metoprolol  - A/C with eliquis CT chest results + for infiltrate, patient has cough and white count was elevated on admission  Patient is allergic to PCN. Will add doxycycline for now and check pro calcitonins  Nebs PRN  Check RVP, Legionella Ag

## 2018-11-04 NOTE — PROGRESS NOTE ADULT - PROBLEM SELECTOR PLAN 3
Chronic, elevated. Pt did not take her BP meds today.   - Continue metoprolol, nifedipine, doxazosin Rate controlled on metoprolol  - A/C with eliquis

## 2018-11-04 NOTE — PROGRESS NOTE ADULT - SUBJECTIVE AND OBJECTIVE BOX
Patient is a 89y old  Female who presents with a chief complaint of SOB (04 Nov 2018 07:49)       INTERVAL HPI/OVERNIGHT EVENTS: 89F with PMH of afib on eliquis, HTN, HLD, hypothyroidism, depression, asthma, gout, and osteoarthritis presents with SOB and admitted for acute on chronic CHF. Feeling better. Still has SOB  but better. O2 supplement helping. Denies any other new symptoms, complaints     MEDICATIONS  (STANDING):  allopurinol 100 milliGRAM(s) Oral daily  apixaban 5 milliGRAM(s) Oral every 12 hours  aspirin  chewable 81 milliGRAM(s) Oral daily  celecoxib 200 milliGRAM(s) Oral daily  docusate sodium 100 milliGRAM(s) Oral two times a day  doxazosin 2 milliGRAM(s) Oral daily  doxazosin 4 milliGRAM(s) Oral at bedtime  furosemide   Injectable 40 milliGRAM(s) IV Push two times a day  lactobacillus acidophilus 1 Tablet(s) Oral daily  levothyroxine 75 MICROGram(s) Oral daily  losartan 50 milliGRAM(s) Oral daily  metoprolol tartrate 37.5 milliGRAM(s) Oral daily  multivitamin/minerals 1 Tablet(s) Oral daily  NIFEdipine XL 90 milliGRAM(s) Oral daily  venlafaxine XR. 37.5 milliGRAM(s) Oral daily    MEDICATIONS  (PRN):  ALBUTerol    0.083% 2.5 milliGRAM(s) Nebulizer every 6 hours PRN wheezing or shortness of breath  benzocaine 15 mG/menthol 3.6 mG Lozenge 1 Lozenge Oral every 4 hours PRN Sore Throat      Allergies    iodine (Anaphylaxis)  penicillin (Other)  shellfish (Short breath; Hives)  sulfa drugs (Hives)    Intolerances        REVIEW OF SYSTEMS:  CONSTITUTIONAL: No fever, weight loss, or fatigue  EYES: No eye pain, visual disturbances, or discharge  ENMT:  No difficulty hearing, tinnitus, vertigo; No sinus or throat pain  NECK: No pain or stiffness  RESPIRATORY: No cough, wheezing, chills or hemoptysis; + shortness of breath  CARDIOVASCULAR: No chest pain, palpitations, dizziness, or leg swelling  GASTROINTESTINAL: No abdominal or epigastric pain. No nausea, vomiting, or hematemesis; No diarrhea or constipation. No melena or hematochezia.  GENITOURINARY: No dysuria, frequency, hematuria, or incontinence  NEUROLOGICAL: No headaches, memory loss, loss of strength, numbness, or tremors  SKIN: No itching, burning, rashes, or lesions   LYMPH NODES: No enlarged glands  ENDOCRINE: No heat or cold intolerance; No hair loss; No polydipsia or polyuria  MUSCULOSKELETAL: No joint pain or swelling; No muscle, back, or extremity pain  PSYCHIATRIC: No depression, anxiety, mood swings, or difficulty sleeping  HEME/LYMPH: No easy bruising, or bleeding gums  ALLERGY AND IMMUNOLOGIC: No hives or eczema    Vital Signs Last 24 Hrs  T(C): 36.9 (04 Nov 2018 04:38), Max: 37.1 (03 Nov 2018 21:37)  T(F): 98.5 (04 Nov 2018 04:38), Max: 98.7 (03 Nov 2018 21:37)  HR: 86 (04 Nov 2018 04:38) (60 - 86)  BP: 123/66 (04 Nov 2018 04:38) (120/77 - 124/73)  BP(mean): --  RR: 16 (04 Nov 2018 04:38) (16 - 17)  SpO2: 97% (04 Nov 2018 04:38) (92% - 97%)    PHYSICAL EXAM:  GENERAL: NAD, Awake, alert   HEAD:  Atraumatic, Normocephalic  EYES: EOMI, PERRLA, conjunctiva and sclera clear  ENMT: No tonsillar erythema, exudates, or enlargement; Moist mucous membranes  NECK: Supple, No JVD, Normal thyroid  NERVOUS SYSTEM:  Alert & Oriented X3, Good concentration; Grossly non focal   CHEST/LUNG: Decrease  to auscultation bilaterally; No rales, rhonchi, wheezing, or rubs  HEART: S1S2+, Regular rate and rhythm  ABDOMEN: Soft, Nontender, Nondistended; Bowel sounds present  EXTREMITIES:  2+ Peripheral Pulses, No clubbing, cyanosis, + bilat LE edema   LYMPH: No lymphadenopathy noted  SKIN: No rashes or lesions    LABS:      Ca    8.1        02 Nov 2018 17:51      PT/INR - ( 02 Nov 2018 17:51 )   PT: 13.5 sec;   INR: 1.18 ratio         PTT - ( 02 Nov 2018 17:51 )  PTT:32.7 sec  CAPILLARY BLOOD GLUCOSE        BLOOD CULTURE  11-02 @ 22:31   10,000 - 49,000 CFU/mL Proteus mirabilis  --  --  11-02 @ 22:29   No growth to date.  --  --    RADIOLOGY & ADDITIONAL TESTS:    Imaging Personally Reviewed:  [ ] YES     Consultant(s) Notes Reviewed:      Care Discussed with Consultants/Other Providers:

## 2018-11-04 NOTE — PROGRESS NOTE ADULT - ASSESSMENT
89F with PMH of afib on eliquis, HTN, HLD, hypothyroidism, depression, asthma, gout, and osteoarthritis presents with SOB and admitted for acute on chronic CHF. 89F with PMH of afib on eliquis, HTN, HLD, hypothyroidism, depression, asthma, gout, and osteoarthritis presents with SOB and admitted for acute on chronic CHF, Ct chest + for suspected PNA

## 2018-11-04 NOTE — PROGRESS NOTE ADULT - PROBLEM SELECTOR PLAN 4
not on any meds Chronic, elevated. Pt did not take her BP meds today.   - Continue metoprolol, nifedipine, doxazosin

## 2018-11-04 NOTE — PROGRESS NOTE ADULT - SUBJECTIVE AND OBJECTIVE BOX
[facility] -- Talisha Duff, Josefa Abad, Junior Serrano Savella  Office # [phone]      Follow Up:  SOB, HTN, pAF     Subjective/Observations:  Pt seen and examined. sitting up in bed having breakfast.  Denies chest pain, reports SOB/orthopnea improving. States she "feels much better"       REVIEW OF SYSTEMS: All other review of systems is negative unless indicated above    PAST MEDICAL & SURGICAL HISTORY:  Asthma  Paroxysmal atrial fibrillation  HLD (hyperlipidemia)  Hypothyroid  Osteoarthritis  Gout  Overactive bladder  Depression  Hypertension  S/P lung surgery, follow-up exam: s/p removal of suspicious lesion, negative  S/P hysterectomy  S/P lumpectomy of breast  S/P cholecystectomy      MEDICATIONS  (STANDING):  allopurinol 100 milliGRAM(s) Oral daily  apixaban 5 milliGRAM(s) Oral every 12 hours  aspirin  chewable 81 milliGRAM(s) Oral daily  celecoxib 200 milliGRAM(s) Oral daily  docusate sodium 100 milliGRAM(s) Oral two times a day  doxazosin 2 milliGRAM(s) Oral daily  doxazosin 4 milliGRAM(s) Oral at bedtime  furosemide   Injectable 40 milliGRAM(s) IV Push two times a day  lactobacillus acidophilus 1 Tablet(s) Oral daily  levothyroxine 75 MICROGram(s) Oral daily  losartan 50 milliGRAM(s) Oral daily  metoprolol tartrate 37.5 milliGRAM(s) Oral daily  multivitamin/minerals 1 Tablet(s) Oral daily  NIFEdipine XL 90 milliGRAM(s) Oral daily  venlafaxine XR. 37.5 milliGRAM(s) Oral daily    MEDICATIONS  (PRN):  ALBUTerol    0.083% 2.5 milliGRAM(s) Nebulizer every 6 hours PRN wheezing or shortness of breath  benzocaine 15 mG/menthol 3.6 mG Lozenge 1 Lozenge Oral every 4 hours PRN Sore Throat      Allergies    iodine (Anaphylaxis)  penicillin (Other)  shellfish (Short breath; Hives)  sulfa drugs (Hives)    Intolerances            Vital Signs Last 24 Hrs  T(C): 36.9 (04 Nov 2018 04:38), Max: 37.1 (03 Nov 2018 21:37)  T(F): 98.5 (04 Nov 2018 04:38), Max: 98.7 (03 Nov 2018 21:37)  HR: 86 (04 Nov 2018 04:38) (60 - 86)  BP: 123/66 (04 Nov 2018 04:38) (120/77 - 124/73)  BP(mean): --  RR: 16 (04 Nov 2018 04:38) (16 - 17)  SpO2: 97% (04 Nov 2018 04:38) (92% - 97%)    I&O's Summary        PHYSICAL EXAM:  TELE: SR w/ 1st degree AVB at 80 bpm, frequent PVCs, no events   Constitutional: NAD, awake and alert, well-developed  HEENT: Moist Mucous Membranes, Anicteric  Pulmonary: Non-labored, breath sounds are clear bilaterally, No wheezing, rales or rhonchi  Cardiovascular: Regular, S1 and S2, No murmurs, rubs, gallops or clicks  Gastrointestinal: Bowel Sounds present, soft, nontender.   Lymph: +3/+4 bilateral lower peripheral edema. No lymphadenopathy.  Skin: No visible rashes or ulcers.  Psych:  Mood & affect appropriate    LABS: All Labs Reviewed:                        10.0   16.19 )-----------( 214      ( 02 Nov 2018 17:51 )             31.5     02 Nov 2018 17:51    143    |  109    |  41     ----------------------------<  99     4.1     |  27     |  1.10     Ca    8.1        02 Nov 2018 17:51  Mg     2.4       02 Nov 2018 17:51    TPro  7.1    /  Alb  3.2    /  TBili  0.4    /  DBili  x      /  AST  21     /  ALT  25     /  AlkPhos  86     02 Nov 2018 17:51    PT/INR - ( 02 Nov 2018 17:51 )   PT: 13.5 sec;   INR: 1.18 ratio         PTT - ( 02 Nov 2018 17:51 )  PTT:32.7 sec  CARDIAC MARKERS ( 03 Nov 2018 13:42 )  <.015 ng/mL / x     / 72 U/L / x     / 1.5 ng/mL  CARDIAC MARKERS ( 02 Nov 2018 17:51 )  <.015 ng/mL / x     / x     / x     / <1.0 ng/mL    < from: CT Chest No Cont (11.04.18 @ 08:32) >    EXAM:  CT CHEST                            PROCEDURE DATE:  11/04/2018          INTERPRETATION:  Clinical Information: Evaluate chronic lung disease.    Comparison: 07/21/2018    Procedure: Noncontrast CT of the chest with axial, sagittal, coronal,and   axial MIP reconstructions.    Findings:     Airways, pleura, lungs: Small opacity superior segment left lower lobe is   new compared to 07/21/2018. Scattered nodules measuring up to 6 mm,   unchanged compared to 07/20/2018, increased compared to 02/23/2015. No   chronic interstitial opacification. Right lung surgical staple line.    Vasculature: Coronary artery and aortic atherosclerosis  Mediastinum and alphonso: Mild global cardiomegaly. Pericardium and esophagus   unremarkable. Multiple subcentimeter mediastinal lymph nodes are   nonspecific  Chest wall and lower neck: Multiple thyroid nodules.  Imaged upper abdomen: Multiple renal cysts.  Musculoskeletal: Degenerative disc disease.    Impression:   -New airspace opacity left lower lung,likely pneumonia.  -Scattered pulmonary nodules, stable compared to 07/21/2018, increased   compared to 02/23/2015. Etiology is inflammatory versus neoplastic. Too   small for percutaneous biopsy.  -No chronic interstitial opacification.    < end of copied text >        < from: TTE Echo Doppler w/o Cont (11.21.17 @ 10:03) >     EXAM:  ECHO TTE W/O CON COMP W/DOPPLR         PROCEDURE DATE:  11/21/2017        INTERPRETATION:  INDICATION: Shortness of breath    Blood Pressure 138/73    Height 152.4     Weight 79       BSA 1.76    Dimensions:    LA 2.8       Normal Values: 2.0 - 4.0 cm    Ao 3.0        Normal Values: 2.0 - 3.8 cm  SEPTUM 0.9       Normal Values: 0.6 - 1.2 cm  PWT 1.0       Normal Values: 0.6 - 1.1 cm  LVIDd 4.8         Normal Values: 3.0 - 5.6 cm  LVIDs 2.4         Normal Values: 1.8 - 4.0 cm    Derived Variables:  LVMI     g/m2  RWT      Fractional Short      Ejection Fraction 65    Doppler Peak v. AoV=   (m/sec)    OBSERVATIONS:    Mitral Valve: normal, trace physiologic MR.  Aortic Valve/Aorta: Calcified trileaflet aortic valve.  Tricuspid Valve: normal with trace TR.  Pulmonic Valve: normal  Left Atrium: normal  Right Atrium: normal  Left Ventricle: normal LV size and systolic function, estimated LVEF of   65%.  Right Ventricle: normal size and systolic function.  Pericardium/Pleura: no significant pleural effusion, no significant   pericardial effusion.  Pulmonary/RV Pressure: estimated PA systolic pressure of 41 mmHg assuming   an RA pressure of 10 mmHg.  LV Diastolic Function: Grade 1 diastolic dysfunction.     Normal left ventricular size and systolic function, estimated LVEF of   65%. Normal right ventricular size and systolic function.  Grade 1   diastolic dysfunction. Normal biatrial size. The aortic root is normal in   size. The mitral valve is structurally normal, trace physiologic MR. The   aortic valve is calcified without stenosis or insufficiency. Trace   physiologic TR. Estimated PA systolic pressure is 41 mm Hg, assuming an   RA pressure of 10 mmHg. No significant pericardial effusion.                  SAMANTA LIMON M.D., ATTENDING CARDIOLOGIST  This document has been electronically signed. Nov 22 2017  4:08PM                < end of copied text >  < from: Xray Chest 1 View- PORTABLE-Urgent (11.02.18 @ 18:04) >    EXAM:  XR CHEST PORTABLE URGENT 1V                            PROCEDURE DATE:  11/02/2018          INTERPRETATION:  AP semierect chest on November 2, 2018 at 6:08 PM.   Patient has hypertension and is short of breath.    Heart is magnified by technique.    Scattered lung linear scarring again noted with blunting of the lateral   costophrenic angles.    There is no sense of active lung or pleural finding.    Chest is similar to July 21 of this year.    IMPRESSION: Chronic lung and pleural findings again noted.                YO NIÑO M.D., ATTENDING RADIOLOGIST  This document has been electronically signed. Nov 2 2018  6:51PM                < end of copied text >       < from: 12 Lead ECG (11.02.18 @ 17:33) >  Ventricular Rate 88 BPM    Atrial Rate 88 BPM    P-R Interval 280 ms    QRS Duration 84 ms    Q-T Interval 360 ms    QTC Calculation(Bezet) 435 ms    P Axis 82 degrees    R Axis -10 degrees    T Axis 15 degrees    Diagnosis Line Sinus rhythm with 1st degree AV block  Otherwise normal ECG  When compared with ECG of 21-JUL-2018 17:43,  No significant change was found  Confirmed by Julienne Henao ([id number]) on 11/3/2018 11:54:50 AM    < end of copied text > [facility] -- Talisha Duff, Josefa Abad, Junior Serrano Savella  Office # [phone]      Follow Up:  SOB, HTN, pAF     Subjective/Observations:  Pt seen and examined. sitting up in bed having breakfast.  Denies chest pain, reports SOB/orthopnea improving. States she "feels much better"       REVIEW OF SYSTEMS: All other review of systems is negative unless indicated above    PAST MEDICAL & SURGICAL HISTORY:  Asthma  Paroxysmal atrial fibrillation  HLD (hyperlipidemia)  Hypothyroid  Osteoarthritis  Gout  Overactive bladder  Depression  Hypertension  S/P lung surgery, follow-up exam: s/p removal of suspicious lesion, negative  S/P hysterectomy  S/P lumpectomy of breast  S/P cholecystectomy      MEDICATIONS  (STANDING):  allopurinol 100 milliGRAM(s) Oral daily  apixaban 5 milliGRAM(s) Oral every 12 hours  aspirin  chewable 81 milliGRAM(s) Oral daily  celecoxib 200 milliGRAM(s) Oral daily  docusate sodium 100 milliGRAM(s) Oral two times a day  doxazosin 2 milliGRAM(s) Oral daily  doxazosin 4 milliGRAM(s) Oral at bedtime  furosemide   Injectable 40 milliGRAM(s) IV Push two times a day  lactobacillus acidophilus 1 Tablet(s) Oral daily  levothyroxine 75 MICROGram(s) Oral daily  losartan 50 milliGRAM(s) Oral daily  metoprolol tartrate 37.5 milliGRAM(s) Oral daily  multivitamin/minerals 1 Tablet(s) Oral daily  NIFEdipine XL 90 milliGRAM(s) Oral daily  venlafaxine XR. 37.5 milliGRAM(s) Oral daily    MEDICATIONS  (PRN):  ALBUTerol    0.083% 2.5 milliGRAM(s) Nebulizer every 6 hours PRN wheezing or shortness of breath  benzocaine 15 mG/menthol 3.6 mG Lozenge 1 Lozenge Oral every 4 hours PRN Sore Throat      Allergies    iodine (Anaphylaxis)  penicillin (Other)  shellfish (Short breath; Hives)  sulfa drugs (Hives)    Intolerances            Vital Signs Last 24 Hrs  T(C): 36.9 (04 Nov 2018 04:38), Max: 37.1 (03 Nov 2018 21:37)  T(F): 98.5 (04 Nov 2018 04:38), Max: 98.7 (03 Nov 2018 21:37)  HR: 86 (04 Nov 2018 04:38) (60 - 86)  BP: 123/66 (04 Nov 2018 04:38) (120/77 - 124/73)  BP(mean): --  RR: 16 (04 Nov 2018 04:38) (16 - 17)  SpO2: 97% (04 Nov 2018 04:38) (92% - 97%)    I&O's Summary        PHYSICAL EXAM:  TELE: SR w/ 1st degree AVB at 80 bpm, frequent PVCs, no events   Constitutional: NAD, awake and alert, well-developed  HEENT: Moist Mucous Membranes, Anicteric  Pulmonary: Non-labored, breath sounds are clear bilaterally, No wheezing, rales or rhonchi  Cardiovascular: Regular, S1 and S2, No murmurs, rubs, gallops or clicks  Gastrointestinal: Bowel Sounds present, soft, nontender.   Lymph: +3  bilateral lower peripheral edema. No lymphadenopathy.  Skin: No visible rashes or ulcers.  Psych:  Mood & affect appropriate    LABS: All Labs Reviewed:                        10.0   16.19 )-----------( 214      ( 02 Nov 2018 17:51 )             31.5     02 Nov 2018 17:51    143    |  109    |  41     ----------------------------<  99     4.1     |  27     |  1.10     Ca    8.1        02 Nov 2018 17:51  Mg     2.4       02 Nov 2018 17:51    TPro  7.1    /  Alb  3.2    /  TBili  0.4    /  DBili  x      /  AST  21     /  ALT  25     /  AlkPhos  86     02 Nov 2018 17:51    PT/INR - ( 02 Nov 2018 17:51 )   PT: 13.5 sec;   INR: 1.18 ratio         PTT - ( 02 Nov 2018 17:51 )  PTT:32.7 sec  CARDIAC MARKERS ( 03 Nov 2018 13:42 )  <.015 ng/mL / x     / 72 U/L / x     / 1.5 ng/mL  CARDIAC MARKERS ( 02 Nov 2018 17:51 )  <.015 ng/mL / x     / x     / x     / <1.0 ng/mL    < from: CT Chest No Cont (11.04.18 @ 08:32) >    EXAM:  CT CHEST                            PROCEDURE DATE:  11/04/2018          INTERPRETATION:  Clinical Information: Evaluate chronic lung disease.    Comparison: 07/21/2018    Procedure: Noncontrast CT of the chest with axial, sagittal, coronal,and   axial MIP reconstructions.    Findings:     Airways, pleura, lungs: Small opacity superior segment left lower lobe is   new compared to 07/21/2018. Scattered nodules measuring up to 6 mm,   unchanged compared to 07/20/2018, increased compared to 02/23/2015. No   chronic interstitial opacification. Right lung surgical staple line.    Vasculature: Coronary artery and aortic atherosclerosis  Mediastinum and alphonso: Mild global cardiomegaly. Pericardium and esophagus   unremarkable. Multiple subcentimeter mediastinal lymph nodes are   nonspecific  Chest wall and lower neck: Multiple thyroid nodules.  Imaged upper abdomen: Multiple renal cysts.  Musculoskeletal: Degenerative disc disease.    Impression:   -New airspace opacity left lower lung,likely pneumonia.  -Scattered pulmonary nodules, stable compared to 07/21/2018, increased   compared to 02/23/2015. Etiology is inflammatory versus neoplastic. Too   small for percutaneous biopsy.  -No chronic interstitial opacification.    < end of copied text >        < from: TTE Echo Doppler w/o Cont (11.21.17 @ 10:03) >     EXAM:  ECHO TTE W/O CON COMP W/DOPPLR         PROCEDURE DATE:  11/21/2017        INTERPRETATION:  INDICATION: Shortness of breath    Blood Pressure 138/73    Height 152.4     Weight 79       BSA 1.76    Dimensions:    LA 2.8       Normal Values: 2.0 - 4.0 cm    Ao 3.0        Normal Values: 2.0 - 3.8 cm  SEPTUM 0.9       Normal Values: 0.6 - 1.2 cm  PWT 1.0       Normal Values: 0.6 - 1.1 cm  LVIDd 4.8         Normal Values: 3.0 - 5.6 cm  LVIDs 2.4         Normal Values: 1.8 - 4.0 cm    Derived Variables:  LVMI     g/m2  RWT      Fractional Short      Ejection Fraction 65    Doppler Peak v. AoV=   (m/sec)    OBSERVATIONS:    Mitral Valve: normal, trace physiologic MR.  Aortic Valve/Aorta: Calcified trileaflet aortic valve.  Tricuspid Valve: normal with trace TR.  Pulmonic Valve: normal  Left Atrium: normal  Right Atrium: normal  Left Ventricle: normal LV size and systolic function, estimated LVEF of   65%.  Right Ventricle: normal size and systolic function.  Pericardium/Pleura: no significant pleural effusion, no significant   pericardial effusion.  Pulmonary/RV Pressure: estimated PA systolic pressure of 41 mmHg assuming   an RA pressure of 10 mmHg.  LV Diastolic Function: Grade 1 diastolic dysfunction.     Normal left ventricular size and systolic function, estimated LVEF of   65%. Normal right ventricular size and systolic function.  Grade 1   diastolic dysfunction. Normal biatrial size. The aortic root is normal in   size. The mitral valve is structurally normal, trace physiologic MR. The   aortic valve is calcified without stenosis or insufficiency. Trace   physiologic TR. Estimated PA systolic pressure is 41 mm Hg, assuming an   RA pressure of 10 mmHg. No significant pericardial effusion.                  SAMANTA LIMON M.D., ATTENDING CARDIOLOGIST  This document has been electronically signed. Nov 22 2017  4:08PM                < end of copied text >  < from: Xray Chest 1 View- PORTABLE-Urgent (11.02.18 @ 18:04) >    EXAM:  XR CHEST PORTABLE URGENT 1V                            PROCEDURE DATE:  11/02/2018          INTERPRETATION:  AP semierect chest on November 2, 2018 at 6:08 PM.   Patient has hypertension and is short of breath.    Heart is magnified by technique.    Scattered lung linear scarring again noted with blunting of the lateral   costophrenic angles.    There is no sense of active lung or pleural finding.    Chest is similar to July 21 of this year.    IMPRESSION: Chronic lung and pleural findings again noted.                YO NIÑO M.D., ATTENDING RADIOLOGIST  This document has been electronically signed. Nov 2 2018  6:51PM                < end of copied text >       < from: 12 Lead ECG (11.02.18 @ 17:33) >  Ventricular Rate 88 BPM    Atrial Rate 88 BPM    P-R Interval 280 ms    QRS Duration 84 ms    Q-T Interval 360 ms    QTC Calculation(Bezet) 435 ms    P Axis 82 degrees    R Axis -10 degrees    T Axis 15 degrees    Diagnosis Line Sinus rhythm with 1st degree AV block  Otherwise normal ECG  When compared with ECG of 21-JUL-2018 17:43,  No significant change was found  Confirmed by Julienne Henao ([id number]) on 11/3/2018 11:54:50 AM    < end of copied text >

## 2018-11-04 NOTE — PROGRESS NOTE ADULT - PROBLEM SELECTOR PLAN 2
HF  HFpEF  Valv heart disease  mild Pulm HTN  on LASIX  I and O  BP control  serial labs  replete lytes  cardio follow up  am labs pending  TTE repeat pending

## 2018-11-05 ENCOUNTER — TRANSCRIPTION ENCOUNTER (OUTPATIENT)
Age: 83
End: 2018-11-05

## 2018-11-05 LAB
ANION GAP SERPL CALC-SCNC: 7 MMOL/L — SIGNIFICANT CHANGE UP (ref 5–17)
BUN SERPL-MCNC: 54 MG/DL — HIGH (ref 7–23)
CALCIUM SERPL-MCNC: 8.6 MG/DL — SIGNIFICANT CHANGE UP (ref 8.5–10.1)
CHLORIDE SERPL-SCNC: 103 MMOL/L — SIGNIFICANT CHANGE UP (ref 96–108)
CO2 SERPL-SCNC: 31 MMOL/L — SIGNIFICANT CHANGE UP (ref 22–31)
CREAT SERPL-MCNC: 1.5 MG/DL — HIGH (ref 0.5–1.3)
GLUCOSE SERPL-MCNC: 104 MG/DL — HIGH (ref 70–99)
HCT VFR BLD CALC: 29.8 % — LOW (ref 34.5–45)
HGB BLD-MCNC: 9.4 G/DL — LOW (ref 11.5–15.5)
LEGIONELLA AG UR QL: NEGATIVE — SIGNIFICANT CHANGE UP
MCHC RBC-ENTMCNC: 29.9 PG — SIGNIFICANT CHANGE UP (ref 27–34)
MCHC RBC-ENTMCNC: 31.5 GM/DL — LOW (ref 32–36)
MCV RBC AUTO: 94.9 FL — SIGNIFICANT CHANGE UP (ref 80–100)
NRBC # BLD: 0 /100 WBCS — SIGNIFICANT CHANGE UP (ref 0–0)
PLATELET # BLD AUTO: 249 K/UL — SIGNIFICANT CHANGE UP (ref 150–400)
POTASSIUM SERPL-MCNC: 4 MMOL/L — SIGNIFICANT CHANGE UP (ref 3.5–5.3)
POTASSIUM SERPL-SCNC: 4 MMOL/L — SIGNIFICANT CHANGE UP (ref 3.5–5.3)
PROCALCITONIN SERPL-MCNC: 0.14 NG/ML — HIGH (ref 0–0.04)
RBC # BLD: 3.14 M/UL — LOW (ref 3.8–5.2)
RBC # FLD: 15.1 % — HIGH (ref 10.3–14.5)
SODIUM SERPL-SCNC: 141 MMOL/L — SIGNIFICANT CHANGE UP (ref 135–145)
WBC # BLD: 11.36 K/UL — HIGH (ref 3.8–10.5)
WBC # FLD AUTO: 11.36 K/UL — HIGH (ref 3.8–10.5)

## 2018-11-05 PROCEDURE — 99233 SBSQ HOSP IP/OBS HIGH 50: CPT

## 2018-11-05 RX ORDER — BUDESONIDE AND FORMOTEROL FUMARATE DIHYDRATE 160; 4.5 UG/1; UG/1
2 AEROSOL RESPIRATORY (INHALATION)
Qty: 0 | Refills: 0 | Status: DISCONTINUED | OUTPATIENT
Start: 2018-11-05 | End: 2018-11-07

## 2018-11-05 RX ORDER — FUROSEMIDE 40 MG
40 TABLET ORAL DAILY
Qty: 0 | Refills: 0 | Status: DISCONTINUED | OUTPATIENT
Start: 2018-11-05 | End: 2018-11-07

## 2018-11-05 RX ADMIN — CELECOXIB 200 MILLIGRAM(S): 200 CAPSULE ORAL at 11:52

## 2018-11-05 RX ADMIN — Medication 81 MILLIGRAM(S): at 11:52

## 2018-11-05 RX ADMIN — LOSARTAN POTASSIUM 50 MILLIGRAM(S): 100 TABLET, FILM COATED ORAL at 05:29

## 2018-11-05 RX ADMIN — Medication 2 MILLIGRAM(S): at 05:29

## 2018-11-05 RX ADMIN — Medication 4 MILLIGRAM(S): at 21:10

## 2018-11-05 RX ADMIN — Medication 37.5 MILLIGRAM(S): at 11:52

## 2018-11-05 RX ADMIN — Medication 100 MILLIGRAM(S): at 11:52

## 2018-11-05 RX ADMIN — Medication 75 MICROGRAM(S): at 05:28

## 2018-11-05 RX ADMIN — Medication 200 MILLIGRAM(S): at 10:30

## 2018-11-05 RX ADMIN — BUDESONIDE AND FORMOTEROL FUMARATE DIHYDRATE 2 PUFF(S): 160; 4.5 AEROSOL RESPIRATORY (INHALATION) at 21:10

## 2018-11-05 RX ADMIN — Medication 100 MILLIGRAM(S): at 17:20

## 2018-11-05 RX ADMIN — Medication 40 MILLIGRAM(S): at 11:52

## 2018-11-05 RX ADMIN — Medication 1 TABLET(S): at 11:52

## 2018-11-05 RX ADMIN — Medication 37.5 MILLIGRAM(S): at 05:29

## 2018-11-05 RX ADMIN — Medication 100 MILLIGRAM(S): at 05:30

## 2018-11-05 RX ADMIN — APIXABAN 5 MILLIGRAM(S): 2.5 TABLET, FILM COATED ORAL at 17:21

## 2018-11-05 RX ADMIN — Medication 90 MILLIGRAM(S): at 05:28

## 2018-11-05 RX ADMIN — Medication 40 MILLIGRAM(S): at 05:27

## 2018-11-05 RX ADMIN — APIXABAN 5 MILLIGRAM(S): 2.5 TABLET, FILM COATED ORAL at 05:28

## 2018-11-05 RX ADMIN — Medication 110 MILLIGRAM(S): at 05:28

## 2018-11-05 NOTE — DISCHARGE NOTE ADULT - MEDICATION SUMMARY - MEDICATIONS TO TAKE
I will START or STAY ON the medications listed below when I get home from the hospital:    meloxicam 15 mg oral tablet  -- 1 tab(s) by mouth once a day  -- Indication: For Osteoarthritis    aspirin 81 mg oral tablet  -- 1 tab(s) by mouth once a day  -- Indication: For Need for prophylactic measure    irbesartan 150 mg oral tablet  -- 1 tab(s) by mouth once a day  -- Indication: For Hypertension    doxazosin 2 mg oral tablet  -- 1 tab(s) by mouth once a day  -- Indication: For Incontinence    doxazosin 4 mg oral tablet  -- 1 tab(s) by mouth once a day (at bedtime)  -- Indication: For Incontinence    apixaban 2.5 mg oral tablet  -- 1 tab(s) by mouth every 12 hours  -- Indication: For Paroxysmal atrial fibrillation    Effexor 37.5 mg oral capsule, extended release  -- 1 cap(s) by mouth once a day  -- Indication: For Depression    allopurinol 100 mg oral tablet  -- 1 tab(s) by mouth once a day  -- Indication: For Gout    doxycycline hyclate 100 mg oral capsule  -- 1 cap(s) by mouth 2 times a day to be completed 11/11/18  -- Avoid prolonged or excessive exposure to direct and/or artificial sunlight while taking this medication.  Do not take this drug if you are pregnant.  Finish all this medication unless otherwise directed by prescriber.  Medication should be taken with plenty of water.    -- Indication: For Pneumonia    metoprolol tartrate 37.5 mg oral tablet  -- 1 tab(s) by mouth once a day (at bedtime)  -- Indication: For Paroxysmal atrial fibrillation    budesonide-formoterol 160 mcg-4.5 mcg/inh inhalation aerosol  -- 2 puff(s) inhaled 2 times a day  -- Indication: For Chronic lung disease    albuterol 2.5 mg/3 mL (0.083%) inhalation solution  -- 3 milliliter(s) inhaled every 6 hours, As Needed  -- Indication: For Chronic lung disease    NIFEdipine 90 mg oral tablet, extended release  -- 1 tab(s) by mouth once a day  -- Indication: For Hypertension    Lasix 40 mg oral tablet  -- 1 tab(s) by mouth once a day (follow up with cardiologist within 3-5 days, have potassium levels checked at this time)  -- Indication: For Acute on chronic congestive heart failure, unspecified heart failure type    guaiFENesin 100 mg/5 mL oral liquid  -- 10 milliliter(s) by mouth every 6 hours, As needed, Cough  -- Indication: For Cough    Colace 100 mg oral capsule  -- 1 cap(s) by mouth once a day (at bedtime)  -- Indication: For Constipation    Align 4 mg oral capsule  -- 1 cap(s) by mouth once a day  -- Indication: For Need for prophylactic measure    lactobacillus acidophilus oral capsule  -- 1 tab(s) by mouth 3 times a day (with meals)  -- Indication: For Need for prophylactic measure    Synthroid 75 mcg (0.075 mg) oral tablet  -- 1 tab(s) by mouth once a day  -- Indication: For Hypothyroid    Multi-Day Plus Minerals Multiple Vitamins with Minerals oral tablet  -- 1 tab(s) by mouth once a day  -- Indication: For Need for prophylactic measure

## 2018-11-05 NOTE — PROGRESS NOTE ADULT - ASSESSMENT
89F with PMH of afib on eliquis, HFPEF, HTN, HLD, hypothyroidism, depression, asthma, gout, and osteoarthritis presents with ADHF    # 1 SOB  -Pt reports improvement of SOB, still with significant LE edema on exam and orthopnea although improving.   - Lasix changed to 40mg oral daily from 40mg IV q12  - Cr bumped to 1.5   - Please continue to maintain strict I/Os, monitor daily weights, Cr, and K.   - CT 11/4/18 small opacity LLL PNA   - Cont abx and nebs per pulm    #2 pAF  - No sx of angina  - EKG without ischemic changes  - Trend Jacques  - ECHO pending   -cont BB for rate control   - Cont Eliquis for cva risk reduction  - cont Aspirin   - HR stable per flow sheet    #3 HTN  - BP trend improved with diuresis per flowsheet  - Cont Losartan  - Cont Nifedipine   - Cont lopressor    #4 BRENDA  - monitor closely Cr bumped to 1.5  - Lasix changed from IV to po   - Avoid nephrotoxic meds    - Monitor and replete electrolytes. Keep K>4.0 and Mg>2.0.  - Further cardiac workup will depend on clinical course.   - All other workup per primary team. Will followup.     Alexa Underwood NP-C  Cardiology 89F with PMH of afib on eliquis, HFPEF, HTN, HLD, hypothyroidism, depression, asthma, gout, and osteoarthritis presents with ADHF    # 1 SOB  -Pt reported ongoing orthopnea this am, with a cough, suggesting ongoing severe 	hf without adequate improvement  -still with significant LE edema on exam and orthopnea   - Lasix changed to 40mg oral daily from 40mg IV q12, noting a bump in creatinine  - Please continue to maintain strict I/Os, monitor daily weights, Cr, and K.   - CT 11/4/18 small opacity LLL PNA   - Cont abx and nebs per pulm    #2 pAF  - No sx of angina  - EKG without ischemic changes  - Trend Jacques  - ECHO pending   -cont BB for rate control   - Cont Eliquis for cva risk reduction  - cont Aspirin   - HR stable per flow sheet    #3 HTN  - BP trend improved with diuresis per flowsheet  - Cont Losartan  - Cont Nifedipine   - Cont lopressor    #4 BRENDA  - monitor closely Cr bumped to 1.5  - Lasix changed from IV to po   - Avoid nephrotoxic meds    - Monitor and replete electrolytes. Keep K>4.0 and Mg>2.0.  - Further cardiac workup will depend on clinical course.   - All other workup per primary team. Will followup.     Alexa Underwood NP-C  Cardiology

## 2018-11-05 NOTE — PROGRESS NOTE ADULT - SUBJECTIVE AND OBJECTIVE BOX
PULMONARY/CRITICAL CARE      INTERVAL HPI/OVERNIGHT EVENTS: pt. well known to me from office. Mild sob. No cp. SPEAR. No fever.    89y FemaleHPI:  89F with PMH of afib on eliquis, HTN, HLD, hypothyroidism, depression, asthma, gout, and osteoarthritis presents to ED from urgent care for SOB since last night. Patient went to [facility] with SOB thinking it was a cold but sent in for eval for CHF. Patient states she has no hx of CHF. Last ECHO 11/2017 shows EF 65% with grade 1 diastolic dysfunction. Also notes that her LE edema has been worsening over the past week. Denies fever, chills, CP, palpitations, n/v/d.     In ED, VS stable. Labs significant for WBC 16.9, H/H 10/31.5. DC/INR 13.5/1.18. Lactate 0.9. Trop neg. ProBNP 635. UA negative. RVP pending. EKG shows sinus rhythm with 1st degree AV block. Given dose of Levaquin for possible PNA.  CXR negative. Received duonebs for SOB. Started on dose of IV lasix 40mg for diuresis. Seen by cardio in ED. (02 Nov 2018 19:23)        PAST MEDICAL & SURGICAL HISTORY:  Asthma  Paroxysmal atrial fibrillation  HLD (hyperlipidemia)  Hypothyroid  Osteoarthritis  Gout  Overactive bladder  Depression  Hypertension  S/P lung surgery, follow-up exam: s/p removal of suspicious lesion, negative  S/P hysterectomy  S/P lumpectomy of breast  S/P cholecystectomy        ICU Vital Signs Last 24 Hrs  T(C): 36.6 (05 Nov 2018 05:17), Max: 36.6 (04 Nov 2018 21:19)  T(F): 97.8 (05 Nov 2018 05:17), Max: 97.9 (04 Nov 2018 21:19)  HR: 82 (05 Nov 2018 05:17) (63 - 85)  BP: 112/63 (05 Nov 2018 05:17) (112/63 - 137/65)  BP(mean): --  ABP: --  ABP(mean): --  RR: 17 (05 Nov 2018 05:17) (16 - 17)  SpO2: 95% (05 Nov 2018 05:17) (90% - 95%)    Qtts:     I&O's Summary    04 Nov 2018 07:01 - 05 Nov 2018 07:00  --------------------------------------------------------  IN: 300 mL / OUT: 0 mL / NET: 300 mL            REVIEW OF SYSTEMS:    CONSTITUTIONAL: No fever, weight loss, or fatigue  EYES: No eye pain, visual disturbances, or discharge  ENMT:  No difficulty hearing, tinnitus, vertigo; No sinus or throat pain  NECK: No pain or stiffness  BREASTS: No pain, masses, or nipple discharge  RESPIRATORY: No cough, wheezing, chills or hemoptysis;  MIld  shortness of breath  CARDIOVASCULAR: No chest pain, palpitations, dizziness, or leg swelling  GASTROINTESTINAL: No abdominal or epigastric pain. No nausea, vomiting, or hematemesis; No diarrhea or constipation. No melena or hematochezia.  GENITOURINARY: No dysuria, frequency, hematuria, or incontinence  NEUROLOGICAL: No headaches, memory loss, loss of strength, numbness, or tremors  SKIN: No itching, burning, rashes, or lesions   LYMPH NODES: No enlarged glands  ENDOCRINE: No heat or cold intolerance; No hair loss  MUSCULOSKELETAL: No joint pain or swelling; No muscle, back, or extremity pain, no calf tenderness  PSYCHIATRIC: No depression, anxiety, mood swings, or difficulty sleeping      PHYSICAL EXAM:    GENERAL: NAD, well-groomed, well-developed, NAD  HEAD:  Atraumatic, Normocephalic  EYES: EOMI, PERRLA, conjunctiva and sclera clear  ENMT: No tonsillar erythema, exudates, or enlargement; Moist mucous membranes, Good dentition, No lesions  NECK: Supple, No JVD, Normal thyroid  NERVOUS SYSTEM:  Alert & Oriented X3, Good concentration; Motor Strength 5/5 B/L upper and lower extremities  CHEST/LUNG: slight  wheezing, or rubs  HEART: Regular rate and rhythm; No murmurs, rubs, or gallops  ABDOMEN: Soft, Nontender, Nondistended; Bowel sounds present  EXTREMITIES:  2+ Peripheral Pulses, No clubbing, cyanosis, 1 plus pedal edema  LYMPH: No lymphadenopathy noted  SKIN: No rashes or lesions        LABS:                        9.8    12.42 )-----------( 222      ( 04 Nov 2018 10:19 )             31.8     11-04    143  |  105  |  48<H>  ----------------------------<  101<H>  4.0   |  32<H>  |  1.30    Ca    8.3<L>      04 Nov 2018 10:19            vanco through     RADIOLOGY & ADDITIONAL STUDIES:< from: CT Chest No Cont (11.04.18 @ 08:32) >  EXAM:  CT CHEST                            PROCEDURE DATE:  11/04/2018          INTERPRETATION:  Clinical Information: Evaluate chronic lung disease.    Comparison: 07/21/2018    Procedure: Noncontrast CT of the chest with axial, sagittal, coronal,and   axial MIP reconstructions.    Findings:     Airways, pleura, lungs: Small opacity superior segment left lower lobe is   new compared to 07/21/2018. Scattered nodules measuring up to 6 mm,   unchanged compared to 07/20/2018, increased compared to 02/23/2015. No   chronic interstitial opacification. Right lung surgical staple line.    Vasculature: Coronary artery and aortic atherosclerosis  Mediastinum and alphonso: Mild global cardiomegaly. Pericardium and esophagus   unremarkable. Multiple subcentimeter mediastinal lymph nodes are   nonspecific  Chest wall and lower neck: Multiple thyroid nodules.  Imaged upper abdomen: Multiple renal cysts.  Musculoskeletal: Degenerative disc disease.    Impression:   -New airspace opacity left lower lung,likely pneumonia.  -Scattered pulmonary nodules, stable compared to 07/21/2018, increased   compared to 02/23/2015. Etiology is inflammatory versus neoplastic. Too   small for percutaneous biopsy.  -No chronic interstitial opacification.                AQUILES KIM M.D., ATTENDING RADIOLOGIST  This document has been electronically signed. Nov 4 2018  9:40AM        < end of copied text >        CRITICAL CARE TIME SPENT:

## 2018-11-05 NOTE — DISCHARGE NOTE ADULT - CARE PROVIDER_API CALL
Manuel Varela), Internal Medicine  [address] [facility] [address] [facility]  [address]  [place] [address]  Phone: [phone]  Fax: [phone]    Melchor Gates  [address]  Phone: [phone]  Fax: (   )    -

## 2018-11-05 NOTE — PROGRESS NOTE ADULT - PROBLEM SELECTOR PLAN 2
CT chest results + for infiltrate, patient has cough and white count was elevated on admission  - Patient is allergic to PCN- On doxycycline BID, pro-guillermo mildly elevated 0.12  - Nebs PRN  - RVP neg, legionella ag neg

## 2018-11-05 NOTE — DISCHARGE NOTE ADULT - PATIENT PORTAL LINK FT
You can access the [facility] Patient Portal, offered by [facility], by registering with the following website: [email or web address]

## 2018-11-05 NOTE — DISCHARGE NOTE ADULT - ADDITIONAL INSTRUCTIONS
Continue doxycycline twice daily as directed.  Continue all home medications as directed. Follow up with your primary care doctor within 1 week of discharge.  Follow up with your cardiologist within 3-5 days of discharge. Continue doxycycline twice daily as directed until 11/11/18.  Continue with oral lasix daily at 40mg.  Continue all home medications as directed. Follow up with your primary care doctor within 1 week of discharge.  Follow up with your cardiologist within 3-5 days of discharge. Have your potassium levels checked at this time.

## 2018-11-05 NOTE — DISCHARGE NOTE ADULT - PROVIDER TOKENS
TOKEN:'[id number]',FREE:[LAST:[Ezranawaf],FIRST:[Melchor],PHONE:[phone]],FAX:[(   [phone]    -],ADDRESS:[[address] [place] [address]]]

## 2018-11-05 NOTE — DISCHARGE NOTE ADULT - CARE PLAN
Principal Discharge DX:	Acute on chronic congestive heart failure, unspecified heart failure type  Goal:	Improvement in symptoms  Assessment and plan of treatment:	You were found to be in an acute exacerbation of congestive heart failure. You were treated with IV lasix until symptoms improved and then switched to oral lasix. Continue to take oral lasix daily and follow up with your cardiologist in 3-5 days following discharge.  Secondary Diagnosis:	Pneumonia  Assessment and plan of treatment:	You were found to have suspected pneumonia in the left lower lobe of your lung. Continue doxycycline twice a day as directed. Follow up with your primary care doctor within 1 week of discharge.  Secondary Diagnosis:	Hypertension  Assessment and plan of treatment:	Continue all home medications as directed. Follow up with your primary care doctor within 1 week of discharge.  Secondary Diagnosis:	HLD (hyperlipidemia)  Assessment and plan of treatment:	Continue all home medications as directed. Follow up with your primary care doctor within 1 week of discharge.  Secondary Diagnosis:	Hypothyroid  Assessment and plan of treatment:	Continue all home medications as directed. Follow up with your primary care doctor within 1 week of discharge.  Secondary Diagnosis:	Depression  Assessment and plan of treatment:	Continue all home medications as directed. Follow up with your primary care doctor within 1 week of discharge.  Secondary Diagnosis:	Osteoarthritis  Assessment and plan of treatment:	Continue all home medications as directed. Follow up with your primary care doctor within 1 week of discharge. Principal Discharge DX:	Acute on chronic congestive heart failure, unspecified heart failure type  Goal:	Improvement in symptoms  Assessment and plan of treatment:	You were found to be in an acute exacerbation of congestive heart failure. You were treated with IV lasix until symptoms improved and then switched to oral lasix. Your dose of lasix was increased to 40mg daily. Continue to take oral lasix daily at 40mg dose and follow up with your cardiologist in 3-5 days following discharge. Please have your potassium levels checked at this time.  Secondary Diagnosis:	Pneumonia  Assessment and plan of treatment:	You were found to have suspected pneumonia in the left lower lobe of your lung. Continue doxycycline twice a day as directed to be completed 11/11/18. Follow up with your primary care doctor within 1 week of discharge.  Secondary Diagnosis:	Hypertension  Assessment and plan of treatment:	Continue all home medications as directed. Follow up with your primary care doctor within 1 week of discharge.  Secondary Diagnosis:	HLD (hyperlipidemia)  Assessment and plan of treatment:	Continue all home medications as directed. Follow up with your primary care doctor within 1 week of discharge.  Secondary Diagnosis:	Hypothyroid  Assessment and plan of treatment:	Continue all home medications as directed. Follow up with your primary care doctor within 1 week of discharge.  Secondary Diagnosis:	Depression  Assessment and plan of treatment:	Continue all home medications as directed. Follow up with your primary care doctor within 1 week of discharge.  Secondary Diagnosis:	Osteoarthritis  Assessment and plan of treatment:	Continue all home medications as directed. Follow up with your primary care doctor within 1 week of discharge. Principal Discharge DX:	Acute on chronic congestive heart failure, unspecified heart failure type  Goal:	Improvement in symptoms  Assessment and plan of treatment:	You were found to be in an acute exacerbation of congestive heart failure( diastolic dysfunction ) . You were treated with IV lasix until symptoms improved and then switched to oral lasix. Your dose of lasix was increased to 40mg daily. Continue to take oral lasix daily at 40mg dose and follow up with your cardiologist in 3-5 days following discharge. Please have your potassium levels checked at this time.  Secondary Diagnosis:	Pneumonia  Assessment and plan of treatment:	You were found to have suspected pneumonia in the left lower lobe of your lung. Continue doxycycline twice a day as directed to be completed 11/11/18. Follow up with your primary care doctor within 1 week of discharge.  Secondary Diagnosis:	Hypertension  Assessment and plan of treatment:	Continue all home medications as directed. Follow up with your primary care doctor within 1 week of discharge.  Secondary Diagnosis:	HLD (hyperlipidemia)  Assessment and plan of treatment:	Continue all home medications as directed. Follow up with your primary care doctor within 1 week of discharge.  Secondary Diagnosis:	Hypothyroid  Assessment and plan of treatment:	Continue all home medications as directed. Follow up with your primary care doctor within 1 week of discharge.  Secondary Diagnosis:	Depression  Assessment and plan of treatment:	Continue all home medications as directed. Follow up with your primary care doctor within 1 week of discharge.  Secondary Diagnosis:	Osteoarthritis  Assessment and plan of treatment:	Continue all home medications as directed. Follow up with your primary care doctor within 1 week of discharge.

## 2018-11-05 NOTE — PROGRESS NOTE ADULT - ASSESSMENT
89F with PMH of afib on eliquis, HTN, HLD, hypothyroidism, depression, asthma, gout, and osteoarthritis presents with SOB and admitted for acute on chronic CHF, Ct chest + for suspected PNA

## 2018-11-05 NOTE — GOALS OF CARE CONVERSATION - PERSONAL ADVANCE DIRECTIVE - CONVERSATION DETAILS
Reviewed with pt her advance directives as per copy of MOLST. She states there are no changes and will have her grandson bring the original

## 2018-11-05 NOTE — PROGRESS NOTE ADULT - SUBJECTIVE AND OBJECTIVE BOX
CHARLES GRAHAM  89y  Female      Patient is a 89y old  Female who presents with a chief complaint of SOB (05 Nov 2018 08:25)      INTERVAL HPI/OVERNIGHT EVENTS: 89F with PMH of afib on eliquis, HTN, HLD, hypothyroidism, depression, asthma, gout, and osteoarthritis presents with SOB and admitted for acute on chronic CHF. Feeling better today, off oxygen. Complaining of cough when lying down, likely PNA. Given robitussin. Still has swelling in legs but states feels much better than she did on admission. No acute events overnight.         REVIEW OF SYSTEMS:  CONSTITUTIONAL: No fever, weight loss, or fatigue  ENMT:  No difficulty hearing, tinnitus, vertigo; No sinus or throat pain  RESPIRATORY: Endorses cough, denies wheezing, chills or hemoptysis; Mild SOB with exertion  CARDIOVASCULAR: Endorses leg swelling b/l, No chest pain, palpitations, dizziness  GASTROINTESTINAL: No abdominal or epigastric pain. No nausea, vomiting, or hematemesis; No diarrhea or constipation. No melena or hematochezia.  GENITOURINARY: No dysuria, frequency, hematuria, or incontinence  NEUROLOGICAL: No headaches, memory loss, loss of strength, numbness, or tremors  MUSCULOSKELETAL: No joint pain or swelling; No muscle, back, or extremity pain  PSYCHIATRIC: Difficulty sleeping secondary to cough, No depression, anxiety, mood swings    T(C): 36.6 (11-05-18 @ 05:17), Max: 36.6 (11-04-18 @ 21:19)  HR: 82 (11-05-18 @ 05:17) (78 - 85)  BP: 112/63 (11-05-18 @ 05:17) (112/63 - 137/65)  RR: 17 (11-05-18 @ 05:17) (16 - 17)  SpO2: 95% (11-05-18 @ 05:17) (90% - 95%)  Wt(kg): --Vital Signs Last 24 Hrs  T(C): 36.6 (05 Nov 2018 05:17), Max: 36.6 (04 Nov 2018 21:19)  T(F): 97.8 (05 Nov 2018 05:17), Max: 97.9 (04 Nov 2018 21:19)  HR: 82 (05 Nov 2018 05:17) (78 - 85)  BP: 112/63 (05 Nov 2018 05:17) (112/63 - 137/65)  BP(mean): --  RR: 17 (05 Nov 2018 05:17) (16 - 17)  SpO2: 95% (05 Nov 2018 05:17) (90% - 95%)    PHYSICAL EXAM:  GENERAL: NAD, well-groomed, well-developed  HEAD:  Atraumatic, Normocephalic  EYES: EOMI, PERRLA, conjunctiva and sclera clear  ENMT: No tonsillar erythema, exudates, or enlargement; Moist mucous membranes  NERVOUS SYSTEM:  Alert & Oriented X3, Good concentration; Motor Strength 5/5 B/L upper and lower extremities; DTRs 2+ intact and symmetric  CHEST/LUNG: Crackles in the LL base, no wheezing  HEART: Irregularly irregular; No murmurs, rubs, or gallops  ABDOMEN: Soft, Nontender, Nondistended; Bowel sounds present  EXTREMITIES:  2+ pitting edema in lower extremities b/l    Consultant(s) Notes Reviewed:  [ x ] YES  [ ] NO  Care Discussed with Consultants/Other Providers [ x ] YES  [ ] NO    LABS:                        9.4    11.36 )-----------( 249      ( 05 Nov 2018 09:19 )             29.8     11-05    141  |  103  |  54<H>  ----------------------------<  104<H>  4.0   |  31  |  1.50<H>    Ca    8.6      05 Nov 2018 09:19          CAPILLARY BLOOD GLUCOSE                RADIOLOGY & ADDITIONAL TESTS:    Imaging Personally Reviewed:  [ x ] YES  [ ] NO    HEALTH ISSUES - PROBLEM Dx:  Pneumonia: Pneumonia  Chronic lung disease: Chronic lung disease  Need for prophylactic measure: Need for prophylactic measure  Depression: Depression  Gout: Gout  Asthma: Asthma  Osteoarthritis: Osteoarthritis  Hypothyroid: Hypothyroid  HLD (hyperlipidemia): HLD (hyperlipidemia)  Hypertension: Hypertension  Paroxysmal atrial fibrillation: Paroxysmal atrial fibrillation  Acute on chronic congestive heart failure, unspecified heart failure type: Acute on chronic congestive heart failure, unspecified heart failure type

## 2018-11-05 NOTE — PROGRESS NOTE ADULT - SUBJECTIVE AND OBJECTIVE BOX
[facility] -- Talisha Duff, Josefa Abad, Junior Serrano Savella  Office # [phone]      Follow Up:  SOB, HTN, pAF    Subjective/Observations: Seen and examined.  Walking around feeling better but still reports swelling in legs and orthopnea, but admits to be able to lie down on her side today for a while, otherwise continues to sleep in the chair sitting up.  No reports of cp, palpitations with improving sob.        REVIEW OF SYSTEMS: All other review of systems is negative unless indicated above    PAST MEDICAL & SURGICAL HISTORY:  Asthma  Paroxysmal atrial fibrillation  HLD (hyperlipidemia)  Hypothyroid  Osteoarthritis  Gout  Overactive bladder  Depression  Hypertension  S/P lung surgery, follow-up exam: s/p removal of suspicious lesion, negative  S/P hysterectomy  S/P lumpectomy of breast  S/P cholecystectomy      MEDICATIONS  (STANDING):  allopurinol 100 milliGRAM(s) Oral daily  apixaban 5 milliGRAM(s) Oral every 12 hours  aspirin  chewable 81 milliGRAM(s) Oral daily  buDESOnide 160 MICROgram(s)/formoterol 4.5 MICROgram(s) Inhaler 2 Puff(s) Inhalation two times a day  celecoxib 200 milliGRAM(s) Oral daily  docusate sodium 100 milliGRAM(s) Oral two times a day  doxazosin 2 milliGRAM(s) Oral daily  doxazosin 4 milliGRAM(s) Oral at bedtime  doxycycline hyclate Capsule 100 milliGRAM(s) Oral every 12 hours  furosemide    Tablet 40 milliGRAM(s) Oral daily  lactobacillus acidophilus 1 Tablet(s) Oral daily  levothyroxine 75 MICROGram(s) Oral daily  losartan 50 milliGRAM(s) Oral daily  metoprolol tartrate 37.5 milliGRAM(s) Oral daily  multivitamin/minerals 1 Tablet(s) Oral daily  NIFEdipine XL 90 milliGRAM(s) Oral daily  venlafaxine XR. 37.5 milliGRAM(s) Oral daily    MEDICATIONS  (PRN):  ALBUTerol    0.083% 2.5 milliGRAM(s) Nebulizer every 6 hours PRN wheezing or shortness of breath  benzocaine 15 mG/menthol 3.6 mG Lozenge 1 Lozenge Oral every 4 hours PRN Sore Throat  guaiFENesin    Syrup 200 milliGRAM(s) Oral every 6 hours PRN Cough      Allergies    iodine (Anaphylaxis)  penicillin (Other)  shellfish (Short breath; Hives)  sulfa drugs (Hives)    Intolerances            Vital Signs Last 24 Hrs  T(C): 36.7 (05 Nov 2018 14:01), Max: 36.7 (05 Nov 2018 14:01)  T(F): 98.1 (05 Nov 2018 14:01), Max: 98.1 (05 Nov 2018 14:01)  HR: 74 (05 Nov 2018 14:01) (74 - 85)  BP: 116/60 (05 Nov 2018 14:01) (112/63 - 137/65)  BP(mean): --  RR: 16 (05 Nov 2018 14:01) (16 - 17)  SpO2: 93% (05 Nov 2018 14:01) (90% - 95%)    I&O's Summary    04 Nov 2018 07:01  -  05 Nov 2018 07:00  --------------------------------------------------------  IN: 300 mL / OUT: 0 mL / NET: 300 mL          PHYSICAL EXAM:  TELE: Not on tele  Constitutional: NAD, awake and alert, well-developed  HEENT: Moist Mucous Membranes, Anicteric  Pulmonary: Non-labored, breath sounds with coarse wheezing scattered bilaterally  Cardiovascular: Regular, S1 and S2, No murmurs, rubs, gallops or clicks  Gastrointestinal: Bowel Sounds present, soft, nontender.   Lymph: 2-3+ peripheral edema. No lymphadenopathy.  Skin: No visible rashes or ulcers.  Psych:  Mood & affect appropriate    LABS: All Labs Reviewed:                        9.4    11.36 )-----------( 249      ( 05 Nov 2018 09:19 )             29.8                         9.8    12.42 )-----------( 222      ( 04 Nov 2018 10:19 )             31.8                         10.0   16.19 )-----------( 214      ( 02 Nov 2018 17:51 )             31.5     05 Nov 2018 09:19    141    |  103    |  54     ----------------------------<  104    4.0     |  31     |  1.50   04 Nov 2018 10:19    143    |  105    |  48     ----------------------------<  101    4.0     |  32     |  1.30   02 Nov 2018 17:51    143    |  109    |  41     ----------------------------<  99     4.1     |  27     |  1.10     Ca    8.6        05 Nov 2018 09:19  Ca    8.3        04 Nov 2018 10:19  Ca    8.1        02 Nov 2018 17:51  Mg     2.4       02 Nov 2018 17:51    TPro  7.1    /  Alb  3.2    /  TBili  0.4    /  DBili  x      /  AST  21     /  ALT  25     /  AlkPhos  86     02 Nov 2018 17:51      < from: 12 Lead ECG (11.02.18 @ 17:33) >  Ventricular Rate 88 BPM    Atrial Rate 88 BPM    P-R Interval 280 ms    QRS Duration 84 ms    Q-T Interval 360 ms    QTC Calculation(Bezet) 435 ms    P Axis 82 degrees    R Axis -10 degrees    T Axis 15 degrees    Diagnosis Line Sinus rhythm with 1st degree AV block  Otherwise normal ECG  When compared with ECG of 21-JUL-2018 17:43,  No significant change was found  Confirmed by Julienne Henao ([id number]) on 11/3/2018 11:54:50 AM    < end of copied text >    < from: TTE Echo Doppler w/o Cont (11.21.17 @ 10:03) >     EXAM:  ECHO TTE W/O CON COMP W/DOPPLR         PROCEDURE DATE:  11/21/2017        INTERPRETATION:  INDICATION: Shortness of breath    Blood Pressure 138/73    Height 152.4     Weight 79       BSA 1.76    Dimensions:    LA 2.8       Normal Values: 2.0 - 4.0 cm    Ao 3.0        Normal Values: 2.0 - 3.8 cm  SEPTUM 0.9       Normal Values: 0.6 - 1.2 cm  PWT 1.0       Normal Values: 0.6 - 1.1 cm  LVIDd 4.8         Normal Values: 3.0 - 5.6 cm  LVIDs 2.4         Normal Values: 1.8 - 4.0 cm    Derived Variables:  LVMI     g/m2  RWT      Fractional Short      Ejection Fraction 65    Doppler Peak v. AoV=   (m/sec)    OBSERVATIONS:    Mitral Valve: normal, trace physiologic MR.  Aortic Valve/Aorta: Calcified trileaflet aortic valve.  Tricuspid Valve: normal with trace TR.  Pulmonic Valve: normal  Left Atrium: normal  Right Atrium: normal  Left Ventricle: normal LV size and systolic function, estimated LVEF of   65%.  Right Ventricle: normal size and systolic function.  Pericardium/Pleura: no significant pleural effusion, no significant   pericardial effusion.  Pulmonary/RV Pressure: estimated PA systolic pressure of 41 mmHg assuming   an RA pressure of 10 mmHg.  LV Diastolic Function: Grade 1 diastolic dysfunction.     Normal left ventricular size and systolic function, estimated LVEF of   65%. Normal right ventricular size and systolic function.  Grade 1   diastolic dysfunction. Normal biatrial size. The aortic root is normal in   size. The mitral valve is structurally normal, trace physiologic MR. The   aortic valve is calcified without stenosis or insufficiency. Trace   physiologic TR. Estimated PA systolic pressure is 41 mm Hg, assuming an   RA pressure of 10 mmHg. No significant pericardial effusion.                  SAMANTA LIMON M.D., ATTENDING CARDIOLOGIST  This document has been electronically signed. Nov 22 2017  4:08PM    < end of copied text >      < from: CT Chest No Cont (11.04.18 @ 08:32) >  EXAM:  CT CHEST                            PROCEDURE DATE:  11/04/2018          INTERPRETATION:  Clinical Information: Evaluate chronic lung disease.    Comparison: 07/21/2018    Procedure: Noncontrast CT of the chest with axial, sagittal, coronal,and   axial MIP reconstructions.    Findings:     Airways, pleura, lungs: Small opacity superior segment left lower lobe is   new compared to 07/21/2018. Scattered nodules measuring up to 6 mm,   unchanged compared to 07/20/2018, increased compared to 02/23/2015. No   chronic interstitial opacification. Right lung surgical staple line.    Vasculature: Coronary artery and aortic atherosclerosis  Mediastinum and alphonso: Mild global cardiomegaly. Pericardium and esophagus   unremarkable. Multiple subcentimeter mediastinal lymph nodes are   nonspecific  Chest wall and lower neck: Multiple thyroid nodules.  Imaged upper abdomen: Multiple renal cysts.  Musculoskeletal: Degenerative disc disease.    Impression:   -New airspace opacity left lower lung,likely pneumonia.  -Scattered pulmonary nodules, stable compared to 07/21/2018, increased   compared to 02/23/2015. Etiology is inflammatory versus neoplastic. Too   small for percutaneous biopsy.  -No chronic interstitial opacification.                KWAN AL ROUBAIE M.D., ATTENDING RADIOLOGIST  This document has been electronically signed. Nov 4 2018  9:40AM                < end of copied text >    < from: Xray Chest 1 View- PORTABLE-Urgent (11.02.18 @ 18:04) >  EXAM:  XR CHEST PORTABLE URGENT 1V                            PROCEDURE DATE:  11/02/2018          INTERPRETATION:  AP semierect chest on November 2, 2018 at 6:08 PM.   Patient has hypertension and is short of breath.    Heart is magnified by technique.    Scattered lung linear scarring again noted with blunting of the lateral   costophrenic angles.    There is no sense of active lung or pleural finding.    Chest is similar to July 21 of this year.    IMPRESSION: Chronic lung and pleural findings again noted.                YO NIÑO M.D., ATTENDING RADIOLOGIST  This document has been electronically signed. Nov 2 2018  6:51PM        < end of copied text >

## 2018-11-05 NOTE — DISCHARGE NOTE ADULT - PLAN OF CARE
Improvement in symptoms You were found to be in an acute exacerbation of congestive heart failure. You were treated with IV lasix until symptoms improved and then switched to oral lasix. Continue to take oral lasix daily and follow up with your cardiologist in 3-5 days following discharge. You were found to have suspected pneumonia in the left lower lobe of your lung. Continue doxycycline twice a day as directed. Follow up with your primary care doctor within 1 week of discharge. Continue all home medications as directed. Follow up with your primary care doctor within 1 week of discharge. You were found to be in an acute exacerbation of congestive heart failure. You were treated with IV lasix until symptoms improved and then switched to oral lasix. Your dose of lasix was increased to 40mg daily. Continue to take oral lasix daily at 40mg dose and follow up with your cardiologist in 3-5 days following discharge. Please have your potassium levels checked at this time. You were found to have suspected pneumonia in the left lower lobe of your lung. Continue doxycycline twice a day as directed to be completed 11/11/18. Follow up with your primary care doctor within 1 week of discharge. You were found to be in an acute exacerbation of congestive heart failure( diastolic dysfunction ) . You were treated with IV lasix until symptoms improved and then switched to oral lasix. Your dose of lasix was increased to 40mg daily. Continue to take oral lasix daily at 40mg dose and follow up with your cardiologist in 3-5 days following discharge. Please have your potassium levels checked at this time.

## 2018-11-05 NOTE — PROGRESS NOTE ADULT - ASSESSMENT
89F with PMH of afib on eliquis, HTN, HLD, hypothyroidism, depression, asthma, gout, and osteoarthritis presents with SOB and admitted for acute on chronic CHF, Ct chest + for suspected PNA   Hx asthma.

## 2018-11-05 NOTE — DISCHARGE NOTE ADULT - HOSPITAL COURSE
89F with PMH of afib on eliquis, HTN, HLD, hypothyroidism, depression, asthma, gout, and osteoarthritis presents to ED from urgent care for SOB. In ED, VS stable. Labs significant for WBC 16.9, H/H 10/31.5. NV/INR 13.5/1.18. Lactate 0.9. Trop neg. ProBNP 635. UA negative. RVP pending. EKG shows sinus rhythm with 1st degree AV block. Given dose of Levaquin for possible PNA.  CXR negative. Received duonebs for SOB. Started on dose of IV lasix 40mg for diuresis. Seen by cardio in ED. Started on IV lasix 40 BID. Admitted for acute on chronic HF exacerbation and PNA. Continued on levaquin, IV lasix BID and metoprolol/eliquis for afib. Cardiac enzymes remained negative. Patient began to have improvement in symptoms. 89F with PMH of afib on eliquis, HTN, HLD, hypothyroidism, depression, asthma, gout, and osteoarthritis presents to ED from urgent care for SOB. In ED, VS stable. Labs significant for WBC 16.9, H/H 10/31.5. OH/INR 13.5/1.18. Lactate 0.9. Trop neg. ProBNP 635. UA negative. RVP pending. EKG shows sinus rhythm with 1st degree AV block. Given dose of Levaquin for possible PNA.  CXR negative. Received duonebs for SOB. Started on dose of IV lasix 40mg for diuresis. Seen by cardio in ED. Started on IV lasix 40 BID. Admitted for acute on chronic HF exacerbation and PNA. Continued on levaquin, IV lasix BID and metoprolol/eliquis for afib. Cardiac enzymes remained negative. Patient began to have improvement in symptoms. Pulm consulted for PNA, recommended continue abx and diuresis. Patient transitioned to daily oral lasix 40mg. Echo done, showed _______________________ 89F with PMH of afib on eliquis, HTN, HLD, hypothyroidism, depression, asthma, gout, and osteoarthritis presents to ED from urgent care for SOB. In ED, VS stable. Labs significant for WBC 16.9, H/H 10/31.5. CT/INR 13.5/1.18. Lactate 0.9. Trop neg. ProBNP 635. UA negative. RVP pending. EKG shows sinus rhythm with 1st degree AV block. Given dose of Levaquin for possible PNA.  CXR negative. Received duonebs for SOB. Given dose of IV lasix 40mg for diuresis. Seen by cardio in ED. Started on IV lasix 40 BID. Admitted for acute on chronic HF exacerbation and PNA. Given dose of levaquin in ED, antibiotics switched to doxycycline. IV lasix BID and metoprolol/eliquis for afib. Cardiac enzymes remained negative. Patient began to have improvement in symptoms. Pulm consulted for PNA, recommended continue abx and diuresis. Patient transitioned to daily oral lasix 40mg daily. Echo done, showed normal systolic function EF 60% with diastolic dysfunction.     Patient seen and evaluated at bedside on day of discharge.     T(C): 36.7 (11-07-18 @ 04:33), Max: 37.1 (11-06-18 @ 20:32)  HR: 89 (11-07-18 @ 04:33) (82 - 92)  BP: 148/62 (11-07-18 @ 04:33) (134/63 - 148/62)  RR: 18 (11-07-18 @ 04:33) (17 - 18)  SpO2: 93% (11-07-18 @ 04:33) (93% - 94%)    PHYSICAL EXAM:  GENERAL: NAD, well-groomed, well-developed  HEAD:  Atraumatic, Normocephalic  EYES: EOMI, PERRLA, conjunctiva and sclera clear  ENMT: No tonsillar erythema, exudates, or enlargement; Moist mucous membranes  NERVOUS SYSTEM:  Alert & Oriented X3, Good concentration; Motor Strength 5/5 B/L upper and lower extremities; DTRs 2+ intact and symmetric  CHEST/LUNG: Crackles in the LL base, no wheezing  HEART: Irregularly irregular; No murmurs, rubs, or gallops  ABDOMEN: Soft, Nontender, Nondistended; Bowel sounds present  EXTREMITIES: non-pitting edema in lower extremities b/l    Patient stable for discharge home with cardiology follow up and oral abx.

## 2018-11-06 LAB
ANION GAP SERPL CALC-SCNC: 4 MMOL/L — LOW (ref 5–17)
BUN SERPL-MCNC: 49 MG/DL — HIGH (ref 7–23)
CALCIUM SERPL-MCNC: 8.6 MG/DL — SIGNIFICANT CHANGE UP (ref 8.5–10.1)
CHLORIDE SERPL-SCNC: 106 MMOL/L — SIGNIFICANT CHANGE UP (ref 96–108)
CO2 SERPL-SCNC: 34 MMOL/L — HIGH (ref 22–31)
CREAT SERPL-MCNC: 1.3 MG/DL — SIGNIFICANT CHANGE UP (ref 0.5–1.3)
GLUCOSE SERPL-MCNC: 102 MG/DL — HIGH (ref 70–99)
HCT VFR BLD CALC: 31.1 % — LOW (ref 34.5–45)
HGB BLD-MCNC: 9.7 G/DL — LOW (ref 11.5–15.5)
MCHC RBC-ENTMCNC: 29.6 PG — SIGNIFICANT CHANGE UP (ref 27–34)
MCHC RBC-ENTMCNC: 31.2 GM/DL — LOW (ref 32–36)
MCV RBC AUTO: 94.8 FL — SIGNIFICANT CHANGE UP (ref 80–100)
NRBC # BLD: 0 /100 WBCS — SIGNIFICANT CHANGE UP (ref 0–0)
PLATELET # BLD AUTO: 266 K/UL — SIGNIFICANT CHANGE UP (ref 150–400)
POTASSIUM SERPL-MCNC: 4 MMOL/L — SIGNIFICANT CHANGE UP (ref 3.5–5.3)
POTASSIUM SERPL-SCNC: 4 MMOL/L — SIGNIFICANT CHANGE UP (ref 3.5–5.3)
RBC # BLD: 3.28 M/UL — LOW (ref 3.8–5.2)
RBC # FLD: 14.9 % — HIGH (ref 10.3–14.5)
SODIUM SERPL-SCNC: 144 MMOL/L — SIGNIFICANT CHANGE UP (ref 135–145)
WBC # BLD: 10.26 K/UL — SIGNIFICANT CHANGE UP (ref 3.8–10.5)
WBC # FLD AUTO: 10.26 K/UL — SIGNIFICANT CHANGE UP (ref 3.8–10.5)

## 2018-11-06 PROCEDURE — 71045 X-RAY EXAM CHEST 1 VIEW: CPT | Mod: 26

## 2018-11-06 PROCEDURE — 99232 SBSQ HOSP IP/OBS MODERATE 35: CPT

## 2018-11-06 PROCEDURE — 93306 TTE W/DOPPLER COMPLETE: CPT | Mod: 26

## 2018-11-06 PROCEDURE — 99233 SBSQ HOSP IP/OBS HIGH 50: CPT

## 2018-11-06 RX ORDER — LACTOBACILLUS ACIDOPHILUS 100MM CELL
1 CAPSULE ORAL
Qty: 0 | Refills: 0 | DISCHARGE
Start: 2018-11-06

## 2018-11-06 RX ORDER — BUDESONIDE AND FORMOTEROL FUMARATE DIHYDRATE 160; 4.5 UG/1; UG/1
2 AEROSOL RESPIRATORY (INHALATION)
Qty: 0 | Refills: 0 | DISCHARGE
Start: 2018-11-06

## 2018-11-06 RX ORDER — FUROSEMIDE 40 MG
1 TABLET ORAL
Qty: 0 | Refills: 0 | COMMUNITY
Start: 2018-11-06

## 2018-11-06 RX ORDER — FUROSEMIDE 40 MG
1 TABLET ORAL
Qty: 0 | Refills: 0 | COMMUNITY

## 2018-11-06 RX ADMIN — Medication 90 MILLIGRAM(S): at 05:21

## 2018-11-06 RX ADMIN — CELECOXIB 200 MILLIGRAM(S): 200 CAPSULE ORAL at 11:54

## 2018-11-06 RX ADMIN — Medication 81 MILLIGRAM(S): at 11:53

## 2018-11-06 RX ADMIN — LOSARTAN POTASSIUM 50 MILLIGRAM(S): 100 TABLET, FILM COATED ORAL at 05:21

## 2018-11-06 RX ADMIN — Medication 1 TABLET(S): at 11:54

## 2018-11-06 RX ADMIN — Medication 100 MILLIGRAM(S): at 17:49

## 2018-11-06 RX ADMIN — Medication 40 MILLIGRAM(S): at 05:21

## 2018-11-06 RX ADMIN — Medication 2 MILLIGRAM(S): at 06:01

## 2018-11-06 RX ADMIN — APIXABAN 5 MILLIGRAM(S): 2.5 TABLET, FILM COATED ORAL at 17:49

## 2018-11-06 RX ADMIN — Medication 100 MILLIGRAM(S): at 11:56

## 2018-11-06 RX ADMIN — Medication 100 MILLIGRAM(S): at 05:22

## 2018-11-06 RX ADMIN — BUDESONIDE AND FORMOTEROL FUMARATE DIHYDRATE 2 PUFF(S): 160; 4.5 AEROSOL RESPIRATORY (INHALATION) at 22:35

## 2018-11-06 RX ADMIN — CELECOXIB 200 MILLIGRAM(S): 200 CAPSULE ORAL at 12:26

## 2018-11-06 RX ADMIN — Medication 100 MILLIGRAM(S): at 05:21

## 2018-11-06 RX ADMIN — Medication 200 MILLIGRAM(S): at 22:37

## 2018-11-06 RX ADMIN — Medication 75 MICROGRAM(S): at 06:01

## 2018-11-06 RX ADMIN — Medication 4 MILLIGRAM(S): at 22:35

## 2018-11-06 RX ADMIN — Medication 37.5 MILLIGRAM(S): at 11:53

## 2018-11-06 RX ADMIN — BUDESONIDE AND FORMOTEROL FUMARATE DIHYDRATE 2 PUFF(S): 160; 4.5 AEROSOL RESPIRATORY (INHALATION) at 09:34

## 2018-11-06 RX ADMIN — Medication 1 TABLET(S): at 11:53

## 2018-11-06 RX ADMIN — Medication 37.5 MILLIGRAM(S): at 06:02

## 2018-11-06 RX ADMIN — APIXABAN 5 MILLIGRAM(S): 2.5 TABLET, FILM COATED ORAL at 05:21

## 2018-11-06 NOTE — PROGRESS NOTE ADULT - ASSESSMENT
89F with PMH of afib on eliquis, HFPEF, HTN, HLD, hypothyroidism, depression, asthma, gout, and osteoarthritis presents with ADHF    # 1 SOB  -Pt reported ongoing orthopnea this am, with a cough.  The symptoms persist, but are improved.    -still with significant LE edema on exam and orthopnea   - Lasix changed to PO today.  Monitor one more day to see if she is tolerating a PO regimen  - Please continue to maintain strict I/Os, monitor daily weights, Cr, and K.   - CT 11/4/18 small opacity LLL PNA   - Cont abx and nebs per pulm    #2 pAF  - No sx of angina  - EKG without ischemic changes  - Trend Jacques  - ECHO pending   -cont BB for rate control   - Cont Eliquis for cva risk reduction  - cont Aspirin   - HR stable per flow sheet    #3 HTN  - BP trend improved with diuresis per flowsheet  - Cont Losartan  - Cont Nifedipine   - Cont lopressor    #4 BRENDA  - monitor closely Cr bumped to 1.5  - Lasix changed to PO  - Avoid nephrotoxic meds    - Monitor and replete electrolytes. Keep K>4.0 and Mg>2.0.  - Further cardiac workup will depend on clinical course.   - All other workup per primary team. Will followup.

## 2018-11-06 NOTE — PROGRESS NOTE ADULT - SUBJECTIVE AND OBJECTIVE BOX
CHARLES GRAHAM  89y  Female      Patient is a 89y old  Female who presents with a chief complaint of SOB (06 Nov 2018 08:31)      INTERVAL HPI/OVERNIGHT EVENTS: 89F with PMH of afib on eliquis, HTN, HLD, hypothyroidism, depression, asthma, gout, and osteoarthritis presents with SOB and admitted for acute on chronic CHF. She is sitting comfortably in bed. She appears in no distress. No acute events overnight. She feels that her breathing and cough has significantly improved and says she doesn't have shortness of breath at rest. Still has swelling in legs but states feels much better than she did on admission. She denies and headaches, dizziness, chest pain, shortness of breath, abdominal pain, nausea, and vomiting.        REVIEW OF SYSTEMS:  CONSTITUTIONAL: No fever, weight loss, or fatigue  RESPIRATORY: No cough, wheezing, chills or hemoptysis; No shortness of breath  CARDIOVASCULAR: Endorses leg edema b/l improving, No chest pain, palpitations, dizziness  GASTROINTESTINAL: No abdominal or epigastric pain. No nausea, vomiting, or hematemesis; No diarrhea or constipation. No melena or hematochezia.  MUSCULOSKELETAL: No joint pain or swelling; No muscle, back, or extremity pain    T(C): 36.6 (11-06-18 @ 05:15), Max: 36.8 (11-05-18 @ 17:55)  HR: 87 (11-06-18 @ 05:15) (74 - 101)  BP: 144/76 (11-06-18 @ 05:15) (116/60 - 144/76)  RR: 17 (11-06-18 @ 05:15) (16 - 17)  SpO2: 92% (11-06-18 @ 05:15) (86% - 93%)  Wt(kg): --Vital Signs Last 24 Hrs  T(C): 36.6 (06 Nov 2018 05:15), Max: 36.8 (05 Nov 2018 17:55)  T(F): 97.9 (06 Nov 2018 05:15), Max: 98.2 (05 Nov 2018 17:55)  HR: 87 (06 Nov 2018 05:15) (74 - 101)  BP: 144/76 (06 Nov 2018 05:15) (116/60 - 144/76)  BP(mean): --  RR: 17 (06 Nov 2018 05:15) (16 - 17)  SpO2: 92% (06 Nov 2018 05:15) (86% - 93%)    PHYSICAL EXAM:  GENERAL: NAD, well-groomed, well-developed  HEAD:  Atraumatic, Normocephalic  NERVOUS SYSTEM:  Alert & Oriented X3, Good concentration;   CHEST/LUNG: Slight crackles in the posterior left lung fields; No rales, rhonchi, wheezing, or rubs  HEART: Regular rate and rhythm; No murmurs, rubs, or gallops  ABDOMEN: Soft, Nontender, Nondistended; Bowel sounds present  EXTREMITIES:  1+ pitting edema in the lower extremities bilaterally, 2+ Peripheral Pulses, No clubbing, cyanosis      Consultant(s) Notes Reviewed:  [ x ] YES  [ ] NO  Care Discussed with Consultants/Other Providers [ x ] YES  [ ] NO    LABS:                        9.4    11.36 )-----------( 249      ( 05 Nov 2018 09:19 )             29.8     11-05    141  |  103  |  54<H>  ----------------------------<  104<H>  4.0   |  31  |  1.50<H>    Ca    8.6      05 Nov 2018 09:19          CAPILLARY BLOOD GLUCOSE                RADIOLOGY & ADDITIONAL TESTS:    Imaging Personally Reviewed:  [ ] YES  [ ] NO    HEALTH ISSUES - PROBLEM Dx:  Pneumonia: Pneumonia  Chronic lung disease: Chronic lung disease  Need for prophylactic measure: Need for prophylactic measure  Depression: Depression  Gout: Gout  Asthma: Asthma  Osteoarthritis: Osteoarthritis  Hypothyroid: Hypothyroid  HLD (hyperlipidemia): HLD (hyperlipidemia)  Hypertension: Hypertension  Paroxysmal atrial fibrillation: Paroxysmal atrial fibrillation  Acute on chronic congestive heart failure, unspecified heart failure type: Acute on chronic congestive heart failure, unspecified heart failure type

## 2018-11-06 NOTE — DIETITIAN INITIAL EVALUATION ADULT. - PROBLEM SELECTOR PLAN 1
Admit to TELE.   - Unsure if patient has been taking her lasix as there is a mix up on her med list as it is listed as FAMOTIDINE (lasix) and not furosemide   - diuresis with IV lasix 40 BID  - ECHO   - daily weights   - cardio consulted (Dr. Pacheco), appreciate recs

## 2018-11-06 NOTE — PROGRESS NOTE ADULT - ASSESSMENT
89F with PMH of afib on eliquis, HTN, HLD, hypothyroidism, depression, asthma, gout, and osteoarthritis presents with SOB and admitted for acute on chronic CHF, Ct chest + for suspected PNA   Hx asthma.  Should be able to go home on O2, doxycycline.  Advised see me in office next week.

## 2018-11-06 NOTE — PROGRESS NOTE ADULT - SUBJECTIVE AND OBJECTIVE BOX
PULMONARY/CRITICAL CARE      INTERVAL HPI/OVERNIGHT EVENTS:  Less sob. Some desat. No fever. Ambulated.  89y FemaleHPI:  89F with PMH of afib on eliquis, HTN, HLD, hypothyroidism, depression, asthma, gout, and osteoarthritis presents to ED from urgent care for SOB since last night. Patient went to [facility] with SOB thinking it was a cold but sent in for eval for CHF. Patient states she has no hx of CHF. Last ECHO 11/2017 shows EF 65% with grade 1 diastolic dysfunction. Also notes that her LE edema has been worsening over the past week. Denies fever, chills, CP, palpitations, n/v/d.     In ED, VS stable. Labs significant for WBC 16.9, H/H 10/31.5. MT/INR 13.5/1.18. Lactate 0.9. Trop neg. ProBNP 635. UA negative. RVP pending. EKG shows sinus rhythm with 1st degree AV block. Given dose of Levaquin for possible PNA.  CXR negative. Received duonebs for SOB. Started on dose of IV lasix 40mg for diuresis. Seen by cardio in ED. (02 Nov 2018 19:23)        PAST MEDICAL & SURGICAL HISTORY:  Asthma  Paroxysmal atrial fibrillation  HLD (hyperlipidemia)  Hypothyroid  Osteoarthritis  Gout  Overactive bladder  Depression  Hypertension  S/P lung surgery, follow-up exam: s/p removal of suspicious lesion, negative  S/P hysterectomy  S/P lumpectomy of breast  S/P cholecystectomy        ICU Vital Signs Last 24 Hrs  T(C): 36.6 (06 Nov 2018 05:15), Max: 36.8 (05 Nov 2018 17:55)  T(F): 97.9 (06 Nov 2018 05:15), Max: 98.2 (05 Nov 2018 17:55)  HR: 87 (06 Nov 2018 05:15) (74 - 101)  BP: 144/76 (06 Nov 2018 05:15) (116/60 - 144/76)  BP(mean): --  ABP: --  ABP(mean): --  RR: 17 (06 Nov 2018 05:15) (16 - 17)  SpO2: 92% (06 Nov 2018 05:15) (86% - 93%)    Qtts:     I&O's Summary    05 Nov 2018 07:01  -  06 Nov 2018 07:00  --------------------------------------------------------  IN: 100 mL / OUT: 0 mL / NET: 100 mL            REVIEW OF SYSTEMS:    CONSTITUTIONAL: No fever, weight loss, or fatigue  RESPIRATORY: No cough, wheezing, chills or hemoptysis;  MIld  shortness of breath  CARDIOVASCULAR: No chest pain, palpitations, dizziness, or leg swelling  GASTROINTESTINAL: No abdominal or epigastric pain. No nausea, vomiting, or hematemesis; No diarrhea or constipation. No melena or hematochezia.  NEUROLOGICAL: No headaches, memory loss, loss of strength, numbness, or tremors  SKIN: No itching, burning, rashes, or lesions   LYMPH NODES: No enlarged glands  ENDOCRINE: No heat or cold intolerance; No hair loss  MUSCULOSKELETAL: No joint pain or swelling; No muscle, back, or extremity pain, no calf tenderness      PHYSICAL EXAM:    GENERAL: NAD, well-groomed, well-developed,   HEAD:  Atraumatic, Normocephalic  EYES: EOMI, PERRLA, conjunctiva and sclera clear  ENMT: No tonsillar erythema, exudates, or enlargement; Moist mucous membranes, Good dentition, No lesions  NECK: Supple, No JVD, Normal thyroid  NERVOUS SYSTEM:  Alert & Oriented X3, Good concentration; Motor Strength 5/5 B/L upper and lower extremities  CHEST/LUNG: slight  wheezing, or rubs  HEART: Regular rate and rhythm; No murmurs, rubs, or gallops  ABDOMEN: Soft, Nontender, Nondistended; Bowel sounds present  EXTREMITIES:  2+ Peripheral Pulses, No clubbing, cyanosis, 1 plus pedal edema  LYMPH: No lymphadenopathy noted  SKIN: No rashes or lesions        LABS:                        9.4    11.36 )-----------( 249      ( 05 Nov 2018 09:19 )             29.8     11-05    141  |  103  |  54<H>  ----------------------------<  104<H>  4.0   |  31  |  1.50<H>    Ca    8.6      05 Nov 2018 09:19            vanco through     RADIOLOGY & ADDITIONAL STUDIES:  < from: CT Chest No Cont (11.04.18 @ 08:32) >  EXAM:  CT CHEST                            PROCEDURE DATE:  11/04/2018          INTERPRETATION:  Clinical Information: Evaluate chronic lung disease.    Comparison: 07/21/2018    Procedure: Noncontrast CT of the chest with axial, sagittal, coronal,and   axial MIP reconstructions.    Findings:     Airways, pleura, lungs: Small opacity superior segment left lower lobe is   new compared to 07/21/2018. Scattered nodules measuring up to 6 mm,   unchanged compared to 07/20/2018, increased compared to 02/23/2015. No   chronic interstitial opacification. Right lung surgical staple line.    Vasculature: Coronary artery and aortic atherosclerosis  Mediastinum and alphonso: Mild global cardiomegaly. Pericardium and esophagus   unremarkable. Multiple subcentimeter mediastinal lymph nodes are   nonspecific  Chest wall and lower neck: Multiple thyroid nodules.  Imaged upper abdomen: Multiple renal cysts.  Musculoskeletal: Degenerative disc disease.    Impression:   -New airspace opacity left lower lung,likely pneumonia.  -Scattered pulmonary nodules, stable compared to 07/21/2018, increased   compared to 02/23/2015. Etiology is inflammatory versus neoplastic. Too   small for percutaneous biopsy.  -No chronic interstitial opacification.                AQUILES KIM M.D., ATTENDING RADIOLOGIST    < end of copied text >      CRITICAL CARE TIME SPENT:

## 2018-11-06 NOTE — PROGRESS NOTE ADULT - SUBJECTIVE AND OBJECTIVE BOX
CHARLES GRAHAM  89y  Female      Patient is a 89y old  Female who presents with a chief complaint of SOB (06 Nov 2018 08:31)      INTERVAL HPI/OVERNIGHT EVENTS: She is sitting comfortably in bed. She appears in no distress. No acute events overnight. She feels that her breathing and cough has significantly improved and says she doesn't have shortness of breath at rest. Still has swelling in legs but states feels much better than she did on admission. She denies and headaches, dizziness, chest pain, shortness of breath, abdominal pain, nausea, and vomiting.        REVIEW OF SYSTEMS:  CONSTITUTIONAL: No fever, weight loss, or fatigue  RESPIRATORY: No cough, wheezing, chills or hemoptysis; No shortness of breath  CARDIOVASCULAR: No chest pain, palpitations, dizziness, or leg swelling  GASTROINTESTINAL: No abdominal or epigastric pain. No nausea, vomiting, or hematemesis; No diarrhea or constipation. No melena or hematochezia.  MUSCULOSKELETAL: No joint pain or swelling; No muscle, back, or extremity pain    T(C): 36.6 (11-06-18 @ 05:15), Max: 36.8 (11-05-18 @ 17:55)  HR: 87 (11-06-18 @ 05:15) (74 - 101)  BP: 144/76 (11-06-18 @ 05:15) (116/60 - 144/76)  RR: 17 (11-06-18 @ 05:15) (16 - 17)  SpO2: 92% (11-06-18 @ 05:15) (86% - 93%)  Wt(kg): --Vital Signs Last 24 Hrs  T(C): 36.6 (06 Nov 2018 05:15), Max: 36.8 (05 Nov 2018 17:55)  T(F): 97.9 (06 Nov 2018 05:15), Max: 98.2 (05 Nov 2018 17:55)  HR: 87 (06 Nov 2018 05:15) (74 - 101)  BP: 144/76 (06 Nov 2018 05:15) (116/60 - 144/76)  BP(mean): --  RR: 17 (06 Nov 2018 05:15) (16 - 17)  SpO2: 92% (06 Nov 2018 05:15) (86% - 93%)    PHYSICAL EXAM:  GENERAL: NAD, well-groomed, well-developed  HEAD:  Atraumatic, Normocephalic  NERVOUS SYSTEM:  Alert & Oriented X3, Good concentration;   CHEST/LUNG: Slight crackles in the posterior left lung fields; No rales, rhonchi, wheezing, or rubs  HEART: Regular rate and rhythm; No murmurs, rubs, or gallops  ABDOMEN: Soft, Nontender, Nondistended; Bowel sounds present  EXTREMITIES:  1+ pitting edema in the lower extremities bilaterally, 2+ Peripheral Pulses, No clubbing, cyanosis      Consultant(s) Notes Reviewed:  [x ] YES  [ ] NO  Care Discussed with Consultants/Other Providers [ x] YES  [ ] NO    LABS:                        9.4    11.36 )-----------( 249      ( 05 Nov 2018 09:19 )             29.8     11-05    141  |  103  |  54<H>  ----------------------------<  104<H>  4.0   |  31  |  1.50<H>    Ca    8.6      05 Nov 2018 09:19          CAPILLARY BLOOD GLUCOSE                RADIOLOGY & ADDITIONAL TESTS:    Imaging Personally Reviewed:  [ ] YES  [ ] NO    HEALTH ISSUES - PROBLEM Dx:  Pneumonia: Pneumonia  Chronic lung disease: Chronic lung disease  Need for prophylactic measure: Need for prophylactic measure  Depression: Depression  Gout: Gout  Asthma: Asthma  Osteoarthritis: Osteoarthritis  Hypothyroid: Hypothyroid  HLD (hyperlipidemia): HLD (hyperlipidemia)  Hypertension: Hypertension  Paroxysmal atrial fibrillation: Paroxysmal atrial fibrillation  Acute on chronic congestive heart failure, unspecified heart failure type: Acute on chronic congestive heart failure, unspecified heart failure type

## 2018-11-06 NOTE — PROGRESS NOTE ADULT - SUBJECTIVE AND OBJECTIVE BOX
[facility] - Talisha Duff, Jenniffer, Josefa, Zach, Savannah Pacheco  Office Number:  [phone]    Patient resting comfortably in bed in NAD.  Still SOB and coughing when laying flat, but better.    F/U for:  CHF    Telemetry:  NSR    MEDICATIONS  (STANDING):  allopurinol 100 milliGRAM(s) Oral daily  apixaban 5 milliGRAM(s) Oral every 12 hours  aspirin  chewable 81 milliGRAM(s) Oral daily  buDESOnide 160 MICROgram(s)/formoterol 4.5 MICROgram(s) Inhaler 2 Puff(s) Inhalation two times a day  celecoxib 200 milliGRAM(s) Oral daily  docusate sodium 100 milliGRAM(s) Oral two times a day  doxazosin 2 milliGRAM(s) Oral daily  doxazosin 4 milliGRAM(s) Oral at bedtime  doxycycline hyclate Capsule 100 milliGRAM(s) Oral every 12 hours  furosemide    Tablet 40 milliGRAM(s) Oral daily  lactobacillus acidophilus 1 Tablet(s) Oral daily  levothyroxine 75 MICROGram(s) Oral daily  losartan 50 milliGRAM(s) Oral daily  metoprolol tartrate 37.5 milliGRAM(s) Oral daily  multivitamin/minerals 1 Tablet(s) Oral daily  NIFEdipine XL 90 milliGRAM(s) Oral daily  venlafaxine XR. 37.5 milliGRAM(s) Oral daily    MEDICATIONS  (PRN):  ALBUTerol    0.083% 2.5 milliGRAM(s) Nebulizer every 6 hours PRN wheezing or shortness of breath  benzocaine 15 mG/menthol 3.6 mG Lozenge 1 Lozenge Oral every 4 hours PRN Sore Throat  guaiFENesin    Syrup 200 milliGRAM(s) Oral every 6 hours PRN Cough      Allergies    iodine (Anaphylaxis)  penicillin (Other)  shellfish (Short breath; Hives)  sulfa drugs (Hives)    Intolerances        Vital Signs Last 24 Hrs  T(C): 36.6 (06 Nov 2018 05:15), Max: 36.8 (05 Nov 2018 17:55)  T(F): 97.9 (06 Nov 2018 05:15), Max: 98.2 (05 Nov 2018 17:55)  HR: 87 (06 Nov 2018 05:15) (74 - 101)  BP: 144/76 (06 Nov 2018 05:15) (116/60 - 144/76)  BP(mean): --  RR: 17 (06 Nov 2018 05:15) (16 - 17)  SpO2: 92% (06 Nov 2018 05:15) (86% - 93%)    I&O's Summary    05 Nov 2018 07:01  -  06 Nov 2018 07:00  --------------------------------------------------------  IN: 100 mL / OUT: 0 mL / NET: 100 mL        ON EXAM:    General: NAD, awake and alert, oriented x 3  HEENT: Mucous membranes are moist, anicteric  Lungs: Non-labored, breath sounds are clear bilaterally, b/l rales, left worse than right  Cardiovascular: Regular, S1 and S2, no murmurs, rubs, or gallops  Gastrointestinal: Bowel Sounds present, soft, nontender.   Lymph: Mild b/l. peripheral edema. No lymphadenopathy.  Skin: No rashes or ulcers  Psych:  Mood & affect appropriate    LABS: All Labs Reviewed:                        9.7    10.26 )-----------( 266      ( 06 Nov 2018 09:38 )             31.1                         9.4    11.36 )-----------( 249      ( 05 Nov 2018 09:19 )             29.8                         9.8    12.42 )-----------( 222      ( 04 Nov 2018 10:19 )             31.8     06 Nov 2018 09:38    144    |  106    |  49     ----------------------------<  102    4.0     |  34     |  1.30   05 Nov 2018 09:19    141    |  103    |  54     ----------------------------<  104    4.0     |  31     |  1.50   04 Nov 2018 10:19    143    |  105    |  48     ----------------------------<  101    4.0     |  32     |  1.30     Ca    8.6        06 Nov 2018 09:38  Ca    8.6        05 Nov 2018 09:19  Ca    8.3        04 Nov 2018 10:19            Blood Culture: Organism Proteus mirabilis  Gram Stain Blood -- Gram Stain --  Specimen Source .Urine Clean Catch (Midstream)  Culture-Blood --    Organism --  Gram Stain Blood -- Gram Stain --  Specimen Source .Blood Blood-Peripheral  Culture-Blood --

## 2018-11-06 NOTE — PROGRESS NOTE ADULT - PROBLEM SELECTOR PLAN 2
CT chest results + for infiltrate, patient has cough and white count was elevated on admission  - Patient is allergic to PCN- On doxycycline BID  - Nebs PRN  - RVP neg, legionella ag neg

## 2018-11-06 NOTE — PROGRESS NOTE ADULT - ATTENDING COMMENTS
D/w patient and cardiologist. Will monitor for one more day. If symptoms improved, will d/c in am   TTE still pending  PT evaluation

## 2018-11-06 NOTE — DIETITIAN INITIAL EVALUATION ADULT. - OTHER INFO
Pt is a 89 year old F admitted on 11/2 for CHF, chief complaint of SOB. PMH of depression, asthma, Afib, HLD, hypothyroid, osteoarthritis, gout, overactive bladder, HTN. Pt states decreased po intake since admission. Food preferences noted and communicated to the diet office. Pt allergic to shellfish and ama cheese. Pt reports she takes a MVI and Align probiotic daily. Usual body weight is 167 pounds, however has edema 3+ in bilateral LE, current weight 171.9 pounds. No N/V. Pt states of having IBS, last BM 3 days ago. Offered prunes as a bowel regimen and pt agreed to prunes at every breakfast meal. Pt states to wearing dentures at home, however is not currently wearing them, requesting softer foods. Diet recall noted. Pt states she eats a diet low in salt and vegetables, but eats a plentiful amount of starch and fruits. Skin intact. Pt is a 89 year old F admitted on 11/2 for CHF, chief complaint of SOB. PMH of depression, asthma, Afib, HLD, hypothyroid, osteoarthritis, gout, overactive bladder, HTN. Pt states decreased po intake since admission. Food preferences noted and communicated to the diet office. Pt allergic to shellfish and ama cheese. Pt reports she takes a MVI and Align probiotic daily. Usual body weight is 167 pounds, however has edema 3+ in bilateral LE, current weight 171.9 pounds. No N/V. Pt states of having IBS, last BM 3 days ago. Offered prunes as a bowel regimen and pt agreed to prunes at every breakfast meal. Pt states to wearing dentures at home, however is not currently wearing them, requesting softer foods. Skin intact.

## 2018-11-06 NOTE — DIETITIAN INITIAL EVALUATION ADULT. - NUTRITION INTERVENTION
Phone: Rhina           Fax: [phone]                           Outpatient Physical Therapy                                                                            Daily Note    Patient: Rajan Bowling : 1935  [facility] #: [id number]   Referring Practitioner:  Kaleigh Salvador DPM    Referral Date : 17     Date: 2017    Diagnosis: Calcaneal spur (M77.30)  Treatment Diagnosis: L foot pain, difficulty walking    Onset Date: 17  PT Insurance Information: Medicare  Total # of Visits Approved: 6 Per Physician Order  Total # of Visits to Date: 3  No Show: 0  Canceled Appointment: 0      Pre-Treatment Pain:0/10  Subjective: Pt reportede no pain and that her orthotics are making a big difference     Exercises:     Exercise 2: towel scrunches vega - 1minx2         Exercise 5: Seated baps CW, CCW, F/W 1min each  Exercise 6: Rockerboard fwd + s/s 1min each     Exercise 8: Tennis ball 3min          Assessment  Assessment: Pt reported she feels 90% better and would like to be placed on a two week hold. Pt denies any pain with wearing her orthotics     Patient Education  Pt educated on a two week hold   Pt verbalized/demonstrated good understanding:     [x] Yes         [] No, pt required further clarification. Post Treatment Pain:  0/10      Plan  Times per week: 1  Plan weeks: 6      Goals  (Total # of Visits to Date: 3)   Short Term Goals - Time Frame for Short term goals: 2 weeks   Short term goal 1: Pt will initiate HEP. -met                                        []Met   []Partially met  []Not met   Short term goal 2: Pt will be educated on proper wearing schedule of orthotics to decrease pain with walking.-met  []Met  []Partially met  []Not met   Short term goal 3: Pt will report 25% improvement with pain and overall function for ease of standing and walking tasks. -met  []Met   []Partially met  []Not met      []Met  []Partially met  []Not met     Long Meals and Snack/Nutrition Education

## 2018-11-06 NOTE — DIETITIAN INITIAL EVALUATION ADULT. - ENERGY NEEDS
Wt: 167 pounds, Ht: 60 inches, BMI: 32.6kg/m2, IBW: 100 pounds, +/-10%IBW: 90 pounds - 110 pounds, %IBW: 167%

## 2018-11-06 NOTE — DIETITIAN INITIAL EVALUATION ADULT. - ADHERENCE
Pt states she eats a diet low in salt, however likes to consume pastries./fair Diet recall noted. Pt states she eats a diet low in salt and vegetables, but eats a plentiful amount of starch and fruits./fair

## 2018-11-06 NOTE — DIETITIAN INITIAL EVALUATION ADULT. - NS AS NUTRI INTERV ED CONTENT
Purpose of the nutrition education/Nutrition relationship to health/disease/Recommended modifications/Provided pt with verbal and written nutrition education regarding therapeutic diet Purpose of the nutrition education/Recommended modifications/Nutrition relationship to health/disease/Provided pt with verbal and written nutrition education regarding therapeutic diet. Discussed ways for pt to use less sodium when cooking and alternatives to foods high in sodium.

## 2018-11-07 VITALS
OXYGEN SATURATION: 94 % | SYSTOLIC BLOOD PRESSURE: 110 MMHG | TEMPERATURE: 99 F | HEART RATE: 68 BPM | DIASTOLIC BLOOD PRESSURE: 67 MMHG | RESPIRATION RATE: 17 BRPM

## 2018-11-07 PROCEDURE — 85027 COMPLETE CBC AUTOMATED: CPT

## 2018-11-07 PROCEDURE — 93005 ELECTROCARDIOGRAM TRACING: CPT

## 2018-11-07 PROCEDURE — 81001 URINALYSIS AUTO W/SCOPE: CPT

## 2018-11-07 PROCEDURE — 83880 ASSAY OF NATRIURETIC PEPTIDE: CPT

## 2018-11-07 PROCEDURE — 99232 SBSQ HOSP IP/OBS MODERATE 35: CPT

## 2018-11-07 PROCEDURE — 87186 SC STD MICRODIL/AGAR DIL: CPT

## 2018-11-07 PROCEDURE — 99239 HOSP IP/OBS DSCHRG MGMT >30: CPT

## 2018-11-07 PROCEDURE — 71250 CT THORAX DX C-: CPT

## 2018-11-07 PROCEDURE — 87449 NOS EACH ORGANISM AG IA: CPT

## 2018-11-07 PROCEDURE — 85730 THROMBOPLASTIN TIME PARTIAL: CPT

## 2018-11-07 PROCEDURE — 87581 M.PNEUMON DNA AMP PROBE: CPT

## 2018-11-07 PROCEDURE — 84484 ASSAY OF TROPONIN QUANT: CPT

## 2018-11-07 PROCEDURE — 87633 RESP VIRUS 12-25 TARGETS: CPT

## 2018-11-07 PROCEDURE — 87486 CHLMYD PNEUM DNA AMP PROBE: CPT

## 2018-11-07 PROCEDURE — 87086 URINE CULTURE/COLONY COUNT: CPT

## 2018-11-07 PROCEDURE — 93306 TTE W/DOPPLER COMPLETE: CPT

## 2018-11-07 PROCEDURE — 80053 COMPREHEN METABOLIC PANEL: CPT

## 2018-11-07 PROCEDURE — 85610 PROTHROMBIN TIME: CPT

## 2018-11-07 PROCEDURE — 82553 CREATINE MB FRACTION: CPT

## 2018-11-07 PROCEDURE — 87040 BLOOD CULTURE FOR BACTERIA: CPT

## 2018-11-07 PROCEDURE — 80048 BASIC METABOLIC PNL TOTAL CA: CPT

## 2018-11-07 PROCEDURE — 36415 COLL VENOUS BLD VENIPUNCTURE: CPT

## 2018-11-07 PROCEDURE — 94640 AIRWAY INHALATION TREATMENT: CPT

## 2018-11-07 PROCEDURE — 87798 DETECT AGENT NOS DNA AMP: CPT

## 2018-11-07 PROCEDURE — 96374 THER/PROPH/DIAG INJ IV PUSH: CPT

## 2018-11-07 PROCEDURE — 82550 ASSAY OF CK (CPK): CPT

## 2018-11-07 PROCEDURE — 71045 X-RAY EXAM CHEST 1 VIEW: CPT

## 2018-11-07 PROCEDURE — 99285 EMERGENCY DEPT VISIT HI MDM: CPT | Mod: 25

## 2018-11-07 PROCEDURE — 83605 ASSAY OF LACTIC ACID: CPT

## 2018-11-07 PROCEDURE — 84145 PROCALCITONIN (PCT): CPT

## 2018-11-07 PROCEDURE — 83735 ASSAY OF MAGNESIUM: CPT

## 2018-11-07 PROCEDURE — 94760 N-INVAS EAR/PLS OXIMETRY 1: CPT

## 2018-11-07 RX ORDER — FUROSEMIDE 40 MG
1 TABLET ORAL
Qty: 30 | Refills: 0
Start: 2018-11-07 | End: 2018-12-06

## 2018-11-07 RX ADMIN — Medication 37.5 MILLIGRAM(S): at 12:27

## 2018-11-07 RX ADMIN — Medication 37.5 MILLIGRAM(S): at 06:28

## 2018-11-07 RX ADMIN — Medication 1 TABLET(S): at 12:28

## 2018-11-07 RX ADMIN — LOSARTAN POTASSIUM 50 MILLIGRAM(S): 100 TABLET, FILM COATED ORAL at 06:30

## 2018-11-07 RX ADMIN — Medication 81 MILLIGRAM(S): at 12:28

## 2018-11-07 RX ADMIN — BUDESONIDE AND FORMOTEROL FUMARATE DIHYDRATE 2 PUFF(S): 160; 4.5 AEROSOL RESPIRATORY (INHALATION) at 07:00

## 2018-11-07 RX ADMIN — Medication 75 MICROGRAM(S): at 06:30

## 2018-11-07 RX ADMIN — APIXABAN 5 MILLIGRAM(S): 2.5 TABLET, FILM COATED ORAL at 06:30

## 2018-11-07 RX ADMIN — Medication 90 MILLIGRAM(S): at 06:30

## 2018-11-07 RX ADMIN — Medication 2 MILLIGRAM(S): at 06:28

## 2018-11-07 RX ADMIN — Medication 40 MILLIGRAM(S): at 06:30

## 2018-11-07 RX ADMIN — Medication 100 MILLIGRAM(S): at 12:28

## 2018-11-07 RX ADMIN — CELECOXIB 200 MILLIGRAM(S): 200 CAPSULE ORAL at 12:27

## 2018-11-07 RX ADMIN — Medication 100 MILLIGRAM(S): at 06:28

## 2018-11-07 RX ADMIN — Medication 100 MILLIGRAM(S): at 06:30

## 2018-11-07 NOTE — PROGRESS NOTE ADULT - SUBJECTIVE AND OBJECTIVE BOX
[facility] - Talisha Duff, Jenniffer, Josefa, Junior Serrano Savella  Office Number:  [phone]    Patient sitting up comfortably in bed in [facility]. Patient reports that she had some shortness of breath last night when getting up to use the bathroom but that she otherwise slept through the night. She stated that her O2 sat occasionally went down to 88% overnight but improved by taking deep breaths and she did not require supplemental O2.    Telemetry: Off monitor    MEDICATIONS  (STANDING):  allopurinol 100 milliGRAM(s) Oral daily  apixaban 5 milliGRAM(s) Oral every 12 hours  aspirin  chewable 81 milliGRAM(s) Oral daily  buDESOnide 160 MICROgram(s)/formoterol 4.5 MICROgram(s) Inhaler 2 Puff(s) Inhalation two times a day  celecoxib 200 milliGRAM(s) Oral daily  docusate sodium 100 milliGRAM(s) Oral two times a day  doxazosin 2 milliGRAM(s) Oral daily  doxazosin 4 milliGRAM(s) Oral at bedtime  doxycycline hyclate Capsule 100 milliGRAM(s) Oral every 12 hours  furosemide    Tablet 40 milliGRAM(s) Oral daily  lactobacillus acidophilus 1 Tablet(s) Oral daily  levothyroxine 75 MICROGram(s) Oral daily  losartan 50 milliGRAM(s) Oral daily  metoprolol tartrate 37.5 milliGRAM(s) Oral daily  multivitamin/minerals 1 Tablet(s) Oral daily  NIFEdipine XL 90 milliGRAM(s) Oral daily  venlafaxine XR. 37.5 milliGRAM(s) Oral daily    MEDICATIONS  (PRN):  ALBUTerol    0.083% 2.5 milliGRAM(s) Nebulizer every 6 hours PRN wheezing or shortness of breath  benzocaine 15 mG/menthol 3.6 mG Lozenge 1 Lozenge Oral every 4 hours PRN Sore Throat  guaiFENesin    Syrup 200 milliGRAM(s) Oral every 6 hours PRN Cough      Allergies    Tomas Cheese - Pt states the one time she had a mouthful of tomas cheese, her throat was closing and she required an epinephrine injection. (Anaphylaxis)  iodine (Anaphylaxis)  penicillin (Other)  shellfish (Short breath; Hives)  sulfa drugs (Hives)    Intolerances        Vital Signs Last 24 Hrs  T(C): 36.7 (07 Nov 2018 04:33), Max: 37.1 (06 Nov 2018 20:32)  T(F): 98 (07 Nov 2018 04:33), Max: 98.7 (06 Nov 2018 20:32)  HR: 89 (07 Nov 2018 04:33) (82 - 92)  BP: 148/62 (07 Nov 2018 04:33) (134/63 - 148/62)  BP(mean): --  RR: 18 (07 Nov 2018 04:33) (17 - 18)  SpO2: 93% (07 Nov 2018 04:33) (93% - 94%)    I&O's Summary    06 Nov 2018 07:01  -  07 Nov 2018 07:00  --------------------------------------------------------  IN: 720 mL / OUT: 0 mL / NET: 720 mL        ON EXAM:    General: NAD, awake and alert, oriented x 3  HEENT: Mucous membranes are moist, anicteric  Lungs: Non-labored, breath sounds are clear bilaterally, No wheezing, rales or rhonchi  Cardiovascular: Regular, S1 and S2, no murmurs, rubs, or gallops  Gastrointestinal: Bowel Sounds present, soft, nontender.   Lymph: + Pitting peripheral edema. No lymphadenopathy.  Skin: No rashes or ulcers  Psych:  Mood & affect appropriate    LABS: All Labs Reviewed:                        9.7    10.26 )-----------( 266      ( 06 Nov 2018 09:38 )             31.1                         9.4    11.36 )-----------( 249      ( 05 Nov 2018 09:19 )             29.8     06 Nov 2018 09:38    144    |  106    |  49     ----------------------------<  102    4.0     |  34     |  1.30   05 Nov 2018 09:19    141    |  103    |  54     ----------------------------<  104    4.0     |  31     |  1.50     Ca    8.6        06 Nov 2018 09:38  Ca    8.6        05 Nov 2018 09:19          Blood Culture: Organism Proteus mirabilis  Gram Stain Blood -- Gram Stain --  Specimen Source .Urine Clean Catch (Midstream)  Culture-Blood --    Organism --  Gram Stain Blood -- Gram Stain --  Specimen Source .Blood Blood-Peripheral  Culture-Blood --      EXAM:  ECHO TTE W/O CON COMP W/DOPPLR      PROCEDURE DATE:  11/06/2018      INTERPRETATION:  INDICATION: Shortness of breath  Referring M.D.:   Savella  Blood Pressure     130/60       Weight (kg) :78     Height (cm):152       BSA (sq m): 1.76  Technician: IVANA    Dimensions:    LA 3.0       Normal Values: 2.0 - 4.0 cm    Ao 3.3        Normal Values: 2.0 - 3.8 cm  SEPTUM 1.0       Normal Values: 0.6 - 1.2 cm  PWT 0.9       Normal Values: 0.6 - 1.1 cm  LVIDd 5.3         Normal Values: 3.0 - 5.6 cm  LVIDs 3.5         Normal Values: 1.8 - 4.0 cm      OBSERVATIONS:    Mitral Valve: Mitral annular calcification with mild mitral regurgitation  Aortic Valve/Aorta: Aortic sclerosis  Tricuspid Valve: Normal tricuspid valve. Mild tricuspid regurgitation.  Pulmonic Valve: The pulmonic valve is not well visualized. Probably   normal.  Left Atrium: Normal  Right Atrium: Normal  Left Ventricle: Normal left ventricular size, wall thickness, and global   systolic function. The EF is approximately 60%.  Right Ventricle: Normal right ventricular size and function.  Pericardium/Pleura: No pericardial effusion noted.  Pulmonary/RV Pressure: The right ventricular systolic pressure is   estimated to be 34mmHg, assuming that the right atrial pressure is   estimated to be 8 mmHg. This is consistent with normal pulmonary   pressures.  LV Diastolic Function: Doppler evidence suggestive of diastolic   dysfunction    Conclusion:   1. Normal left ventricular size, wall thickness, and global systolic   function  2. Mitral annular calcification with mild mitral regurgitation  3. Aortic sclerosis    4 Doppler evidence suggestive of diastolic dysfunction

## 2018-11-07 NOTE — PROGRESS NOTE ADULT - PROBLEM SELECTOR PROBLEM 1
Chronic lung disease
Acute on chronic congestive heart failure, unspecified heart failure type

## 2018-11-07 NOTE — PROGRESS NOTE ADULT - NSHPATTENDINGPLANDISCUSS_GEN_ALL_CORE
nursing in detail
Consultants, Patient, RN, Residents
Patient, RN, Residents
Patient, RN
Patient, RN, Pulm

## 2018-11-07 NOTE — PROGRESS NOTE ADULT - ASSESSMENT
89F with PMH of afib on eliquis, HFPEF, HTN, HLD, hypothyroidism, depression, asthma, gout, and osteoarthritis presents with ADHF    # 1 SOB  - Pt reported improved orthopnea.  - Still with LE edema on exam.  - Tolerating PO Lasix.  - Please continue to maintain strict I/Os, monitor daily weights, Cr, and K.   - CT 11/4/18 small opacity LLL PNA   - Cont abx and nebs per pulm    #2 pAF  - No sx of angina  - EKG without ischemic changes  - Troponins negative  - ECHO showed normal LV size, EF 60% and diastolic dysfunction.  - Cont BB for rate control   - Cont Eliquis for cva risk reduction  - Cont Aspirin   - HR stable per flow sheet    #3 HTN  - BP stable per flowsheet  - Cont Losartan  - Cont Nifedipine   - Cont lopressor    #4 BRENDA  - Cr improved from 1.5 to 1.3  - Tolerating PO Lasix  - Avoid nephrotoxic meds    - Monitor and replete electrolytes. Keep K>4.0 and Mg>2.0.  - Further cardiac workup will depend on clinical course.   - All other workup per primary team. Will followup.

## 2018-11-07 NOTE — PROGRESS NOTE ADULT - PROVIDER SPECIALTY LIST ADULT
Cardiology
Hospitalist
Pulmonology
Cardiology

## 2018-11-07 NOTE — PROGRESS NOTE ADULT - ASSESSMENT
89F with PMH of afib on eliquis, HTN, HLD, hypothyroidism, depression, asthma, gout, and osteoarthritis presents with SOB and admitted for acute on chronic CHF, Ct chest + for suspected PNA   Hx asthma.  Should be able to go home on O2, doxycycline.  Refusing O2  Will need nocturnal oximetry done as outpt.  Advised see me in office next week.

## 2018-11-07 NOTE — PROGRESS NOTE ADULT - SUBJECTIVE AND OBJECTIVE BOX
PULMONARY/CRITICAL CARE      INTERVAL HPI/OVERNIGHT EVENTS:  Pt. less sob. Refused O2. No cough, cp, fever.  89y FemaleHPI:  89F with PMH of afib on eliquis, HTN, HLD, hypothyroidism, depression, asthma, gout, and osteoarthritis presents to ED from urgent care for SOB since last night. Patient went to [facility] with SOB thinking it was a cold but sent in for eval for CHF. Patient states she has no hx of CHF. Last ECHO 11/2017 shows EF 65% with grade 1 diastolic dysfunction. Also notes that her LE edema has been worsening over the past week. Denies fever, chills, CP, palpitations, n/v/d.     In ED, VS stable. Labs significant for WBC 16.9, H/H 10/31.5. MA/INR 13.5/1.18. Lactate 0.9. Trop neg. ProBNP 635. UA negative. RVP pending. EKG shows sinus rhythm with 1st degree AV block. Given dose of Levaquin for possible PNA.  CXR negative. Received duonebs for SOB. Started on dose of IV lasix 40mg for diuresis. Seen by cardio in ED. (02 Nov 2018 19:23)        PAST MEDICAL & SURGICAL HISTORY:  Asthma  Paroxysmal atrial fibrillation  HLD (hyperlipidemia)  Hypothyroid  Osteoarthritis  Gout  Overactive bladder  Depression  Hypertension  S/P lung surgery, follow-up exam: s/p removal of suspicious lesion, negative  S/P hysterectomy  S/P lumpectomy of breast  S/P cholecystectomy        ICU Vital Signs Last 24 Hrs  T(C): 36.7 (07 Nov 2018 04:33), Max: 37.1 (06 Nov 2018 20:32)  T(F): 98 (07 Nov 2018 04:33), Max: 98.7 (06 Nov 2018 20:32)  HR: 89 (07 Nov 2018 04:33) (82 - 92)  BP: 148/62 (07 Nov 2018 04:33) (134/63 - 148/62)  BP(mean): --  ABP: --  ABP(mean): --  RR: 18 (07 Nov 2018 04:33) (17 - 18)  SpO2: 93% (07 Nov 2018 04:33) (93% - 94%)    Qtts:     I&O's Summary    06 Nov 2018 07:01  -  07 Nov 2018 07:00  --------------------------------------------------------  IN: 720 mL / OUT: 0 mL / NET: 720 mL      REVIEW OF SYSTEMS:    CONSTITUTIONAL: No fever, weight loss, or fatigue  RESPIRATORY: No cough, wheezing, chills or hemoptysis;  MIld  shortness of breath  CARDIOVASCULAR: No chest pain, palpitations, dizziness, or leg swelling  GASTROINTESTINAL: No abdominal or epigastric pain. No nausea, vomiting, or hematemesis; No diarrhea or constipation. No melena or hematochezia.  NEUROLOGICAL: No headaches, memory loss, loss of strength, numbness, or tremors  MUSCULOSKELETAL: No joint pain or swelling; No muscle, back, or extremity pain, no calf tenderness      PHYSICAL EXAM:    GENERAL: NAD, well-groomed, well-developed,   HEAD:  Atraumatic, Normocephalic  EYES: EOMI, PERRLA, conjunctiva and sclera clear  ENMT: No tonsillar erythema, exudates, or enlargement; Moist mucous membranes, Good dentition, No lesions  NECK: Supple, No JVD, Normal thyroid  NERVOUS SYSTEM:  Alert & Oriented X3, Good concentration; Motor Strength 5/5 B/L upper and lower extremities  CHEST/LUNG: slight  wheezing, or rubs  HEART: Regular rate and rhythm; No murmurs, rubs, or gallops  ABDOMEN: Soft, Nontender, Nondistended; Bowel sounds present  EXTREMITIES:  2+ Peripheral Pulses, No clubbing, cyanosis, 1 plus pedal edema  LYMPH: No lymphadenopathy noted  SKIN: No rashes or lesions          LABS:                        9.7    10.26 )-----------( 266      ( 06 Nov 2018 09:38 )             31.1     11-06    144  |  106  |  49<H>  ----------------------------<  102<H>  4.0   |  34<H>  |  1.30    Ca    8.6      06 Nov 2018 09:38            vanco through     RADIOLOGY & ADDITIONAL STUDIES:< from: Xray Chest 1 View- PORTABLE-Routine (11.06.18 @ 08:05) >  EXAM:  XR CHEST PORTABLE ROUTINE 1V                            PROCEDURE DATE:  11/06/2018          INTERPRETATION:  Clinical information: Pneumonia. Heart failure.    Technique: Frontal view of the chest.    Comparison: Prior chest x-ray examination from 11/2/2018.    Findings: Scattered areas of linear scarring are notable throughout both   lungs. The heart size is mildly enlarged. There are mild multilevel   degenerative changes of the thoracic spine.     IMPRESSION: As above, no interval change.                JORDY DEGROOT M.D., ATTENDING RADIOLOGIST    < end of copied text >        CRITICAL CARE TIME SPENT:

## 2018-11-07 NOTE — PROGRESS NOTE ADULT - PROBLEM SELECTOR PLAN 1
chronic lung disease  will repeat ct chest  last ct scan from July in the EMR  cxr reviewed  NEBS prn  monitor Sat  keep sat > 88 pct with supp o2
- Continue  lasix   -Cardio fu  - daily weights
- Continue Iv lasix   -Cardio fu  - daily weights   - cardio consulted (Dr. Pacheco), appreciate recs
- Continue Iv lasix   -Strict I& O  - Unsure if patient has been taking her lasix as there is a mix up on her med list as it is listed as FAMOTIDINE (lasix) and not furosemide   - diuresis with IV lasix 40 BID  - ECHO   - daily weights   - cardio consulted (Dr. Pacheco), appreciate recs
- Continue Iv lasix   -Strict I& O  - Unsure if patient has been taking her lasix as there is a mix up on her med list as it is listed as FAMOTIDINE (lasix) and not furosemide   - diuresis with IV lasix 40 BID  - ECHO   - daily weights   - cardio consulted (Dr. Pacheco), appreciate recs
- IV lasix transitioned to PO 40mg QD  - Strict I& O  - ECHO ordered- f/u results  - daily weights   - cardio consulted (Dr. Pacheco), appreciate recs  - Robitussin for cough
- continue with lasix PO 40mg QD  - Strict I & O  - ECHO ordered- f/u results  - daily weights   - cardio consulted (Dr. Pacheco), appreciate recs  - Robitussin for cough
- continue with lasix PO 40mg QD  - Strict I& O  - ECHO ordered- f/u results  - daily weights   - cardio consulted (Dr. Pacheco), appreciate recs  - Robitussin for cough
-Cardio fu  See me in office

## 2018-11-19 RX ORDER — MELOXICAM 15 MG/1
15 TABLET ORAL DAILY
Refills: 0 | Status: ACTIVE | COMMUNITY
Start: 2018-11-19

## 2018-11-19 RX ORDER — BUMETANIDE 0.5 MG/1
0.5 TABLET ORAL DAILY
Refills: 0 | Status: ACTIVE | COMMUNITY
Start: 2018-11-19

## 2018-11-19 RX ORDER — IRBESARTAN 150 MG/1
150 TABLET ORAL DAILY
Refills: 0 | Status: ACTIVE | COMMUNITY
Start: 2018-11-19

## 2018-11-19 RX ORDER — VENLAFAXINE HYDROCHLORIDE 37.5 MG/1
37.5 CAPSULE, EXTENDED RELEASE ORAL DAILY
Refills: 0 | Status: ACTIVE | COMMUNITY
Start: 2018-11-19

## 2018-11-19 RX ORDER — DOXAZOSIN 4 MG/1
4 TABLET ORAL DAILY
Refills: 0 | Status: ACTIVE | COMMUNITY
Start: 2018-11-19

## 2018-11-19 RX ORDER — BUDESONIDE AND FORMOTEROL FUMARATE DIHYDRATE 160; 4.5 UG/1; UG/1
160-4.5 AEROSOL RESPIRATORY (INHALATION) TWICE DAILY
Refills: 0 | Status: ACTIVE | COMMUNITY
Start: 2018-11-19

## 2018-11-19 RX ORDER — ALLOPURINOL 100 MG/1
100 TABLET ORAL DAILY
Refills: 0 | Status: ACTIVE | COMMUNITY
Start: 2018-11-19

## 2018-11-19 RX ORDER — ASPIRIN 81 MG/1
81 TABLET ORAL DAILY
Refills: 0 | Status: ACTIVE | COMMUNITY
Start: 2018-11-19

## 2018-11-19 RX ORDER — NIFEDIPINE 90 MG/1
90 TABLET, EXTENDED RELEASE ORAL DAILY
Refills: 0 | Status: ACTIVE | COMMUNITY
Start: 2018-11-19

## 2018-11-19 RX ORDER — METOPROLOL TARTRATE 37.5 MG/1
37.5 TABLET, FILM COATED ORAL DAILY
Refills: 0 | Status: ACTIVE | COMMUNITY
Start: 2018-11-19

## 2018-11-19 RX ORDER — LEVOTHYROXINE SODIUM 75 UG/1
75 TABLET ORAL DAILY
Refills: 0 | Status: ACTIVE | COMMUNITY
Start: 2018-11-19

## 2018-11-19 RX ORDER — L.ACIDOPH/L.BULG/B.BIF/S.THERM 1B-250 MG
TABLET ORAL DAILY
Refills: 0 | Status: ACTIVE | COMMUNITY
Start: 2018-11-19

## 2018-11-19 RX ORDER — APIXABAN 2.5 MG/1
2.5 TABLET, FILM COATED ORAL
Refills: 0 | Status: ACTIVE | COMMUNITY
Start: 2018-11-19

## 2018-11-19 RX ORDER — DOXAZOSIN 2 MG/1
2 TABLET ORAL DAILY
Refills: 0 | Status: ACTIVE | COMMUNITY
Start: 2018-11-19

## 2018-11-19 RX ORDER — BISMUTH SUBSALICYLATE 525 MG/1
TABLET ORAL
Refills: 0 | Status: ACTIVE | COMMUNITY
Start: 2018-11-19

## 2018-11-27 NOTE — CHART NOTE - NSCHARTNOTEFT_GEN_A_CORE
Called regarding elevated BP (/76 HR 60).  Patient's BP meds were on hold (except the atenolol) because of her kidney function/clearance.  Spoke to pharmacy and it was decided to give the patient her clonidine 0.1mg dose and see how her BP responds. Interpolation Flap Text: A decision was made to reconstruct the defect utilizing an interpolation axial flap and a staged reconstruction.  A telfa template was made of the defect.  This telfa template was then used to outline the interpolation flap.  The donor area for the pedicle flap was then injected with anesthesia.  The flap was excised through the skin and subcutaneous tissue down to the layer of the underlying musculature.  The interpolation flap was carefully excised within this deep plane to maintain its blood supply.  The edges of the donor site were undermined.   The donor site was closed in a primary fashion.  The pedicle was then rotated into position and sutured.  Once the tube was sutured into place, adequate blood supply was confirmed with blanching and refill.  The pedicle was then wrapped with xeroform gauze and dressed appropriately with a telfa and gauze bandage to ensure continued blood supply and protect the attached pedicle.

## 2019-10-08 NOTE — ED ADULT TRIAGE NOTE - HEART RATE (BEATS/MIN)
FYI:  Notified [facility] nurse, June, of Dr. Montejo comments regarding ordering labs and visiting patient at nursing home.  June states no labs are being requested by nursing home, at this time.   86

## 2020-01-29 ENCOUNTER — EMERGENCY (EMERGENCY)
Facility: HOSPITAL | Age: 85
LOS: 1 days | Discharge: HOME | End: 2020-01-29
Attending: EMERGENCY MEDICINE | Admitting: EMERGENCY MEDICINE
Payer: MEDICARE

## 2020-01-29 VITALS
SYSTOLIC BLOOD PRESSURE: 198 MMHG | HEIGHT: 60 IN | DIASTOLIC BLOOD PRESSURE: 92 MMHG | TEMPERATURE: 98 F | WEIGHT: 179.9 LBS | HEART RATE: 90 BPM | OXYGEN SATURATION: 92 % | RESPIRATION RATE: 17 BRPM

## 2020-01-29 VITALS
HEART RATE: 69 BPM | SYSTOLIC BLOOD PRESSURE: 138 MMHG | TEMPERATURE: 98 F | RESPIRATION RATE: 16 BRPM | DIASTOLIC BLOOD PRESSURE: 77 MMHG | OXYGEN SATURATION: 95 %

## 2020-01-29 DIAGNOSIS — Z90.49 ACQUIRED ABSENCE OF OTHER SPECIFIED PARTS OF DIGESTIVE TRACT: Chronic | ICD-10-CM

## 2020-01-29 DIAGNOSIS — Z09 ENCOUNTER FOR FOLLOW-UP EXAMINATION AFTER COMPLETED TREATMENT FOR CONDITIONS OTHER THAN MALIGNANT NEOPLASM: Chronic | ICD-10-CM

## 2020-01-29 DIAGNOSIS — Z90.710 ACQUIRED ABSENCE OF BOTH CERVIX AND UTERUS: Chronic | ICD-10-CM

## 2020-01-29 DIAGNOSIS — Z98.89 OTHER SPECIFIED POSTPROCEDURAL STATES: Chronic | ICD-10-CM

## 2020-01-29 PROCEDURE — 99284 EMERGENCY DEPT VISIT MOD MDM: CPT

## 2020-01-29 PROCEDURE — 93005 ELECTROCARDIOGRAM TRACING: CPT

## 2020-01-29 PROCEDURE — 93010 ELECTROCARDIOGRAM REPORT: CPT

## 2020-01-29 NOTE — ED PROVIDER NOTE - NSFOLLOWUPINSTRUCTIONS_ED_ALL_ED_FT
Follow up with Dr. Leavitt for your appointment tomorrow.  Return to ED immediately for any new or concerning symptoms.

## 2020-01-29 NOTE — ED ADULT NURSE NOTE - OBJECTIVE STATEMENT
Pt A&Ox4, ambulatory to ED c/o high blood pressure.  Pt states "I am normally high; I wake up about 170 over something."  Pt states she checks her blood pressure about once a day but today it has been refractory to her medications.  Pt has been monitoring pressure and taking additional medication on the phone advice of her cardiologist, but when pressure failed to lower pt was sent to ED for eval.  Pt denies headache or dizziness, denies chest pain.

## 2020-01-29 NOTE — ED ADULT NURSE NOTE - PMH
Asthma    Depression    Gout    HLD (hyperlipidemia)    Hypertension    Hypothyroid    Osteoarthritis    Overactive bladder    Paroxysmal atrial fibrillation

## 2020-01-29 NOTE — ED PROVIDER NOTE - PATIENT PORTAL LINK FT
You can access the FollowMyHealth Patient Portal offered by [facility] by registering at the following website: [email or web address]. By joining [employer]’s FollowMyHealth portal, you will also be able to view your health information using other applications (apps) compatible with our system.

## 2020-01-29 NOTE — ED PROVIDER NOTE - OBJECTIVE STATEMENT
89 yo F PMHx HTN, HLD, depression, gout, hypothyroidism presents to ED for evaluation of elevated BP readings x today. Pt states her systolic BP usually 170s. Today noted to be high 180s-up to 200. Has been in contact with her cardiologist Jared and PCP Nichole who have instructed to take additional dosages throughout the day. Pt states her BP wasn't responding so cardiologist told her to come to the ED. Pt asymptomatic. Has appt with cardiologist tomorrow. BP currently 160/79 89 yo F PMHx HTN, HLD, depression, gout, hypothyroidism presents to ED for evaluation of elevated BP readings x today. Pt states her systolic BP usually 170s. Today noted to be high 180s-up to 200. Has been in contact with her cardiologist Dagmar and PCP Nichole who have instructed to take additional dosages throughout the day. Pt states her BP wasn't responding so cardiologist told her to come to the ED. Pt asymptomatic. Has appt with cardiologist tomorrow. BP currently 160/79 89 yo F PMHx HTN, HLD, depression, gout, hypothyroidism presents to ED for evaluation of elevated BP readings x today. Pt states her systolic BP usually 170s. Today noted to be high 180s-up to 200. Has been in contact with her cardiologist today who instructed to take additional dosages throughout the day. Pt states her BP wasn't responding so cardiologist told her to come to the ED. Pt asymptomatic. Has appt with cardiologist tomorrow. BP currently 160/79  Cardio- Dagmar  PCP- Hebus

## 2020-01-29 NOTE — ED ADULT NURSE NOTE - CHPI ED NUR SYMPTOMS NEG
no syncope/no chills/no nausea/no shortness of breath/no vomiting/no congestion/no chest pain/no dizziness/no diaphoresis/no fever

## 2020-01-29 NOTE — ED ADULT NURSE NOTE - NSFALLRSKHRMRISKTYPE_ED_ALL_ED
age(85 years old or older)/bones(Osteoporosis,prev fx,steroid use,metastatic bone ca) age(85 years old or older)/bones(Osteoporosis,prev fx,steroid use,metastatic bone ca)/coagulation(Bleeding disorder R/T clinical cond/anti-coags)

## 2020-01-29 NOTE — ED ADULT TRIAGE NOTE - CHIEF COMPLAINT QUOTE
Patient presents with complaints of high blood pressure. States she has been taking her medications with no change in pressure.

## 2020-01-29 NOTE — ED PROVIDER NOTE - CLINICAL SUMMARY MEDICAL DECISION MAKING FREE TEXT BOX
89 yo F p/w elevated BP readings x today---> /79 here, pt asymptomatic; plan: contact pts cardiologist and d/c

## 2020-01-29 NOTE — ED PROVIDER NOTE - NOTES
Discussed case with him. He was the one that patient was in contact with today- advised her to take an extra dose of diltiazem and metoprolol. BP here 160/79, which as per Dr. Chapman is a systolic drop of 40, which he is pleased with. States if EKG nml and pt continues asymptomatic, pt cleared from his standpoint for d/c home with no further workup needed. Pt to f/u with Dr. Leavitt for her appointment tomorrow.

## 2020-01-29 NOTE — ED PROVIDER NOTE - ATTENDING CONTRIBUTION TO CARE
89 yo F with recently poorly controlled BP (working with her md / cardio to adjust meds). Pt checked bp at home and had elevated BP - pt meño cardio and had inc meds. Pt is asymptomatic but had SBP reading over 200 so came to ed. Pt denies cp/sob/palp. no HA/n/v/dizzy. no abd pain. no n/v. NO agg/allev factors. NO other co.  exam: MM Moist. neck supple. no meningeal signs. abd soft NT. no hsm. non-focal detailed neuro exam. no other acute findings.  LUIS FELIPE wilder cardio - pt will see them in am, no addl rx now

## 2020-01-30 PROBLEM — J45.909 UNSPECIFIED ASTHMA, UNCOMPLICATED: Chronic | Status: ACTIVE | Noted: 2018-11-02

## 2020-01-30 PROBLEM — I48.0 PAROXYSMAL ATRIAL FIBRILLATION: Chronic | Status: ACTIVE | Noted: 2018-11-02

## 2020-03-09 NOTE — PROGRESS NOTE ADULT - ASSESSMENT
89F with PMH of afib on eliquis, HFPEF, HTN, HLD, hypothyroidism, depression, asthma, gout, and osteoarthritis presents with ADHF    # 1 SOB  -Pt reports improvement of SOB, still with significant LE edema on exam  -Continue Lasix 40mg IV q12  - Please continue to maintain strict I/Os, monitor daily weights, Cr, and K.   - pulm f/u     #2 pAF  - No sx of angina  - EKG without ischemic changes  - Trend Jacques  - ECHO pending   -cont BB for rate control   - Cont Eliquis for cva risk reduction  - cont Aspirin     #3 HTN  - BP trend improved with diuresis per flowsheet  - Cont Losartan  - Cont Nifedipine   - Cont lopressor      - Monitor and replete electrolytes. Keep K>4.0 and Mg>2.0.  - Further cardiac workup will depend on clinical course.   - All other workup per primary team. Will followup.     Mary Cheek NP-C  Cardiology yes 89F with PMH of afib on eliquis, HFPEF, HTN, HLD, hypothyroidism, depression, asthma, gout, and osteoarthritis presents with ADHF    # 1 SOB  -Pt reports improvement of SOB, still with significant LE edema on exam  - Continue Lasix 40mg IV q12  - Transition to PO soon   - Please continue to maintain strict I/Os, monitor daily weights, Cr, and K.   - pulm f/u     #2 pAF  - No sx of angina  - EKG without ischemic changes  - Trend Jacques  - ECHO pending   -cont BB for rate control   - Cont Eliquis for cva risk reduction  - cont Aspirin     #3 HTN  - BP trend improved with diuresis per flowsheet  - Cont Losartan  - Cont Nifedipine   - Cont lopressor      - Monitor and replete electrolytes. Keep K>4.0 and Mg>2.0.  - Further cardiac workup will depend on clinical course.   - All other workup per primary team. Will followup.     Mary Cheek, NP-C  Cardiology

## 2020-10-28 ENCOUNTER — EMERGENCY (EMERGENCY)
Facility: HOSPITAL | Age: 85
LOS: 1 days | Discharge: HOME | End: 2020-10-28
Attending: EMERGENCY MEDICINE | Admitting: EMERGENCY MEDICINE
Payer: MEDICARE

## 2020-10-28 VITALS
HEART RATE: 86 BPM | TEMPERATURE: 98 F | SYSTOLIC BLOOD PRESSURE: 187 MMHG | OXYGEN SATURATION: 95 % | RESPIRATION RATE: 17 BRPM | HEIGHT: 60 IN | DIASTOLIC BLOOD PRESSURE: 83 MMHG | WEIGHT: 173.06 LBS

## 2020-10-28 VITALS
OXYGEN SATURATION: 95 % | SYSTOLIC BLOOD PRESSURE: 160 MMHG | HEART RATE: 70 BPM | RESPIRATION RATE: 16 BRPM | DIASTOLIC BLOOD PRESSURE: 74 MMHG

## 2020-10-28 DIAGNOSIS — Z98.89 OTHER SPECIFIED POSTPROCEDURAL STATES: Chronic | ICD-10-CM

## 2020-10-28 DIAGNOSIS — Z09 ENCOUNTER FOR FOLLOW-UP EXAMINATION AFTER COMPLETED TREATMENT FOR CONDITIONS OTHER THAN MALIGNANT NEOPLASM: Chronic | ICD-10-CM

## 2020-10-28 DIAGNOSIS — Z90.49 ACQUIRED ABSENCE OF OTHER SPECIFIED PARTS OF DIGESTIVE TRACT: Chronic | ICD-10-CM

## 2020-10-28 DIAGNOSIS — Z90.710 ACQUIRED ABSENCE OF BOTH CERVIX AND UTERUS: Chronic | ICD-10-CM

## 2020-10-28 LAB
ANION GAP SERPL CALC-SCNC: 6 MMOL/L — SIGNIFICANT CHANGE UP (ref 5–17)
BUN SERPL-MCNC: 39 MG/DL — HIGH (ref 7–23)
CALCIUM SERPL-MCNC: 9.1 MG/DL — SIGNIFICANT CHANGE UP (ref 8.5–10.1)
CHLORIDE SERPL-SCNC: 107 MMOL/L — SIGNIFICANT CHANGE UP (ref 96–108)
CO2 SERPL-SCNC: 32 MMOL/L — HIGH (ref 22–31)
CREAT SERPL-MCNC: 1.3 MG/DL — SIGNIFICANT CHANGE UP (ref 0.5–1.3)
GLUCOSE SERPL-MCNC: 105 MG/DL — HIGH (ref 70–99)
HCT VFR BLD CALC: 37.6 % — SIGNIFICANT CHANGE UP (ref 34.5–45)
HGB BLD-MCNC: 12.7 G/DL — SIGNIFICANT CHANGE UP (ref 11.5–15.5)
MCHC RBC-ENTMCNC: 31.6 PG — SIGNIFICANT CHANGE UP (ref 27–34)
MCHC RBC-ENTMCNC: 33.8 GM/DL — SIGNIFICANT CHANGE UP (ref 32–36)
MCV RBC AUTO: 93.5 FL — SIGNIFICANT CHANGE UP (ref 80–100)
NRBC # BLD: 0 /100 WBCS — SIGNIFICANT CHANGE UP (ref 0–0)
PLATELET # BLD AUTO: 312 K/UL — SIGNIFICANT CHANGE UP (ref 150–400)
POTASSIUM SERPL-MCNC: 3.6 MMOL/L — SIGNIFICANT CHANGE UP (ref 3.5–5.3)
POTASSIUM SERPL-SCNC: 3.6 MMOL/L — SIGNIFICANT CHANGE UP (ref 3.5–5.3)
RBC # BLD: 4.02 M/UL — SIGNIFICANT CHANGE UP (ref 3.8–5.2)
RBC # FLD: 14.1 % — SIGNIFICANT CHANGE UP (ref 10.3–14.5)
SODIUM SERPL-SCNC: 145 MMOL/L — SIGNIFICANT CHANGE UP (ref 135–145)
WBC # BLD: 9.5 K/UL — SIGNIFICANT CHANGE UP (ref 3.8–10.5)
WBC # FLD AUTO: 9.5 K/UL — SIGNIFICANT CHANGE UP (ref 3.8–10.5)

## 2020-10-28 PROCEDURE — 93010 ELECTROCARDIOGRAM REPORT: CPT

## 2020-10-28 PROCEDURE — 80048 BASIC METABOLIC PNL TOTAL CA: CPT

## 2020-10-28 PROCEDURE — 36415 COLL VENOUS BLD VENIPUNCTURE: CPT

## 2020-10-28 PROCEDURE — 85027 COMPLETE CBC AUTOMATED: CPT

## 2020-10-28 PROCEDURE — 93005 ELECTROCARDIOGRAM TRACING: CPT

## 2020-10-28 PROCEDURE — 99284 EMERGENCY DEPT VISIT MOD MDM: CPT | Mod: 25

## 2020-10-28 PROCEDURE — 99284 EMERGENCY DEPT VISIT MOD MDM: CPT

## 2020-10-28 PROCEDURE — 96374 THER/PROPH/DIAG INJ IV PUSH: CPT

## 2020-10-28 RX ORDER — HYDRALAZINE HCL 50 MG
10 TABLET ORAL ONCE
Refills: 0 | Status: COMPLETED | OUTPATIENT
Start: 2020-10-28 | End: 2020-10-28

## 2020-10-28 RX ADMIN — Medication 0.1 MILLIGRAM(S): at 13:22

## 2020-10-28 RX ADMIN — Medication 10 MILLIGRAM(S): at 10:08

## 2020-10-28 NOTE — ED PROVIDER NOTE - OBJECTIVE STATEMENT
89 yo white female with few weeks of noted poor control of BP when checked on a daily routine. No recent changes in here BP regimen, either type, dose or frequency. Denies any headache, chest pain, dizziness, nausea, vomiting or palpitations.

## 2020-10-28 NOTE — ED ADULT NURSE NOTE - OBJECTIVE STATEMENT
Patient states her blood pressure has been elevated for weeks, her doctor just tells her to take another pill, she also states she feels head pressure today and light headedness.

## 2020-10-28 NOTE — ED PROVIDER NOTE - PATIENT PORTAL LINK FT
You can access the FollowMyHealth Patient Portal offered by [facility] by registering at the following website: [email or web address]. By joining [employer]’s FollowMyHealth portal, you will also be able to view your health information using other applications (apps) compatible with our system.

## 2020-10-28 NOTE — ED PROVIDER NOTE - CARE PROVIDER_API CALL
Pau Leavitt (MD)  Cardiovascular Disease; Internal Medicine  [address] [place] [address]  Phone: [phone]  Fax: [phone]  Follow Up Time:

## 2020-10-28 NOTE — ED PROVIDER NOTE - PROGRESS NOTE DETAILS
Feels much better at this garry and wants to go home. BP coming down. Feels much better at this garry and wants to go home. BP unchanged so will give dose of P.O Clonidine.

## 2020-10-28 NOTE — ED ADULT NURSE REASSESSMENT NOTE - NS ED NURSE REASSESS COMMENT FT1
Patient ambulated to the bathroom with cane and assist.  Gait is slow, but steady.  Patient denies dizziness.

## 2020-12-19 NOTE — H&P ADULT - SKIN
See telephone encounter from 12/18 Your COVID test was POSITIVE. One of the members of the [facility] hotline will reach out to you with instructions. Detail Level: Detailed Detail Level: Zone Detail Level: Generalized detailed exam color normal/warm and dry

## 2021-06-30 NOTE — H&P ADULT - PROBLEM/PLAN-2
Prescription for zofran given to patient. Nausea is better. Sips of ginger ale and crackers taken and maintained.  voided
DISPLAY PLAN FREE TEXT

## 2021-06-30 NOTE — ED ADULT NURSE NOTE - ED CARDIAC HEART SOUNDS
Preceptor Attestation:  Patient's case reviewed and discussed with Vu Tabares MD resident and I evaluated the patient. I agree with written assessment and plan of care.  Supervising Physician:  Gus Johnson MD, MD [facility]   normal S1, S2 heard

## 2021-11-22 NOTE — PATIENT PROFILE ADULT - DO YOU FEEL LIKE HURTING YOURSELF OR OTHERS?
Xanax      Last Written Prescription Date:  10/26/2021  Last Fill Quantity: 90,   # refills: 0  Last Office Visit: 8/12/2021  Future Office visit:       Routing refill request to provider for review/approval because:  Drug not on the [facility], P or [facility] refill protocol or controlled substance     no

## 2021-11-30 ENCOUNTER — INPATIENT (INPATIENT)
Facility: HOSPITAL | Age: 86
LOS: 4 days | Discharge: HOME | DRG: 177 | End: 2021-12-05
Attending: STUDENT IN AN ORGANIZED HEALTH CARE EDUCATION/TRAINING PROGRAM | Admitting: STUDENT IN AN ORGANIZED HEALTH CARE EDUCATION/TRAINING PROGRAM
Payer: MEDICARE

## 2021-11-30 VITALS
HEART RATE: 11 BPM | OXYGEN SATURATION: 77 % | HEIGHT: 60 IN | DIASTOLIC BLOOD PRESSURE: 88 MMHG | TEMPERATURE: 99 F | SYSTOLIC BLOOD PRESSURE: 200 MMHG | WEIGHT: 160.06 LBS | RESPIRATION RATE: 22 BRPM

## 2021-11-30 DIAGNOSIS — R77.8 OTHER SPECIFIED ABNORMALITIES OF PLASMA PROTEINS: ICD-10-CM

## 2021-11-30 DIAGNOSIS — Z98.89 OTHER SPECIFIED POSTPROCEDURAL STATES: Chronic | ICD-10-CM

## 2021-11-30 DIAGNOSIS — U07.1 COVID-19: ICD-10-CM

## 2021-11-30 DIAGNOSIS — I10 ESSENTIAL (PRIMARY) HYPERTENSION: ICD-10-CM

## 2021-11-30 DIAGNOSIS — D64.9 ANEMIA, UNSPECIFIED: ICD-10-CM

## 2021-11-30 DIAGNOSIS — Z29.9 ENCOUNTER FOR PROPHYLACTIC MEASURES, UNSPECIFIED: ICD-10-CM

## 2021-11-30 DIAGNOSIS — I48.91 UNSPECIFIED ATRIAL FIBRILLATION: ICD-10-CM

## 2021-11-30 DIAGNOSIS — J44.1 CHRONIC OBSTRUCTIVE PULMONARY DISEASE WITH (ACUTE) EXACERBATION: ICD-10-CM

## 2021-11-30 DIAGNOSIS — E03.9 HYPOTHYROIDISM, UNSPECIFIED: ICD-10-CM

## 2021-11-30 DIAGNOSIS — I50.23 ACUTE ON CHRONIC SYSTOLIC (CONGESTIVE) HEART FAILURE: ICD-10-CM

## 2021-11-30 DIAGNOSIS — I50.9 HEART FAILURE, UNSPECIFIED: ICD-10-CM

## 2021-11-30 DIAGNOSIS — R09.89 OTHER SPECIFIED SYMPTOMS AND SIGNS INVOLVING THE CIRCULATORY AND RESPIRATORY SYSTEMS: ICD-10-CM

## 2021-11-30 DIAGNOSIS — F32.9 MAJOR DEPRESSIVE DISORDER, SINGLE EPISODE, UNSPECIFIED: ICD-10-CM

## 2021-11-30 DIAGNOSIS — Z90.49 ACQUIRED ABSENCE OF OTHER SPECIFIED PARTS OF DIGESTIVE TRACT: Chronic | ICD-10-CM

## 2021-11-30 DIAGNOSIS — Z09 ENCOUNTER FOR FOLLOW-UP EXAMINATION AFTER COMPLETED TREATMENT FOR CONDITIONS OTHER THAN MALIGNANT NEOPLASM: Chronic | ICD-10-CM

## 2021-11-30 DIAGNOSIS — Z90.710 ACQUIRED ABSENCE OF BOTH CERVIX AND UTERUS: Chronic | ICD-10-CM

## 2021-11-30 LAB
ALBUMIN SERPL ELPH-MCNC: 2.6 G/DL — LOW (ref 3.3–5)
ALP SERPL-CCNC: 63 U/L — SIGNIFICANT CHANGE UP (ref 40–120)
ALT FLD-CCNC: 28 U/L — SIGNIFICANT CHANGE UP (ref 12–78)
ANION GAP SERPL CALC-SCNC: 3 MMOL/L — LOW (ref 5–17)
APTT BLD: 29.3 SEC — SIGNIFICANT CHANGE UP (ref 27.5–35.5)
AST SERPL-CCNC: 34 U/L — SIGNIFICANT CHANGE UP (ref 15–37)
BASOPHILS # BLD AUTO: 0.03 K/UL — SIGNIFICANT CHANGE UP (ref 0–0.2)
BASOPHILS NFR BLD AUTO: 0.3 % — SIGNIFICANT CHANGE UP (ref 0–2)
BILIRUB SERPL-MCNC: 0.5 MG/DL — SIGNIFICANT CHANGE UP (ref 0.2–1.2)
BUN SERPL-MCNC: 24 MG/DL — HIGH (ref 7–23)
CALCIUM SERPL-MCNC: 8.7 MG/DL — SIGNIFICANT CHANGE UP (ref 8.5–10.1)
CHLORIDE SERPL-SCNC: 106 MMOL/L — SIGNIFICANT CHANGE UP (ref 96–108)
CK MB CFR SERPL CALC: <1 NG/ML — SIGNIFICANT CHANGE UP (ref 0–3.6)
CO2 SERPL-SCNC: 33 MMOL/L — HIGH (ref 22–31)
CREAT SERPL-MCNC: 1.1 MG/DL — SIGNIFICANT CHANGE UP (ref 0.5–1.3)
D DIMER BLD IA.RAPID-MCNC: 339 NG/ML DDU — HIGH
EOSINOPHIL # BLD AUTO: 0.01 K/UL — SIGNIFICANT CHANGE UP (ref 0–0.5)
EOSINOPHIL NFR BLD AUTO: 0.1 % — SIGNIFICANT CHANGE UP (ref 0–6)
GLUCOSE SERPL-MCNC: 111 MG/DL — HIGH (ref 70–99)
HCT VFR BLD CALC: 30.3 % — LOW (ref 34.5–45)
HGB BLD-MCNC: 10 G/DL — LOW (ref 11.5–15.5)
IMM GRANULOCYTES NFR BLD AUTO: 0.7 % — SIGNIFICANT CHANGE UP (ref 0–1.5)
INR BLD: 1.11 RATIO — SIGNIFICANT CHANGE UP (ref 0.88–1.16)
LYMPHOCYTES # BLD AUTO: 0.92 K/UL — LOW (ref 1–3.3)
LYMPHOCYTES # BLD AUTO: 8.7 % — LOW (ref 13–44)
MAGNESIUM SERPL-MCNC: 2.4 MG/DL — SIGNIFICANT CHANGE UP (ref 1.6–2.6)
MCHC RBC-ENTMCNC: 32.6 PG — SIGNIFICANT CHANGE UP (ref 27–34)
MCHC RBC-ENTMCNC: 33 GM/DL — SIGNIFICANT CHANGE UP (ref 32–36)
MCV RBC AUTO: 98.7 FL — SIGNIFICANT CHANGE UP (ref 80–100)
MONOCYTES # BLD AUTO: 1.29 K/UL — HIGH (ref 0–0.9)
MONOCYTES NFR BLD AUTO: 12.3 % — SIGNIFICANT CHANGE UP (ref 2–14)
NEUTROPHILS # BLD AUTO: 8.21 K/UL — HIGH (ref 1.8–7.4)
NEUTROPHILS NFR BLD AUTO: 77.9 % — HIGH (ref 43–77)
NRBC # BLD: 0 /100 WBCS — SIGNIFICANT CHANGE UP (ref 0–0)
NT-PROBNP SERPL-SCNC: 5036 PG/ML — HIGH (ref 0–450)
PLATELET # BLD AUTO: 200 K/UL — SIGNIFICANT CHANGE UP (ref 150–400)
POTASSIUM SERPL-MCNC: 4.2 MMOL/L — SIGNIFICANT CHANGE UP (ref 3.5–5.3)
POTASSIUM SERPL-SCNC: 4.2 MMOL/L — SIGNIFICANT CHANGE UP (ref 3.5–5.3)
PROCALCITONIN SERPL-MCNC: 0.37 NG/ML — HIGH (ref 0–0.04)
PROT SERPL-MCNC: 6.7 G/DL — SIGNIFICANT CHANGE UP (ref 6–8.3)
PROTHROM AB SERPL-ACNC: 12.9 SEC — SIGNIFICANT CHANGE UP (ref 10.6–13.6)
RAPID RVP RESULT: DETECTED
RBC # BLD: 3.07 M/UL — LOW (ref 3.8–5.2)
RBC # FLD: 15.2 % — HIGH (ref 10.3–14.5)
SARS-COV-2 RNA SPEC QL NAA+PROBE: DETECTED
SODIUM SERPL-SCNC: 142 MMOL/L — SIGNIFICANT CHANGE UP (ref 135–145)
TROPONIN I, HIGH SENSITIVITY RESULT: 123.9 NG/L — HIGH
WBC # BLD: 10.53 K/UL — HIGH (ref 3.8–10.5)
WBC # FLD AUTO: 10.53 K/UL — HIGH (ref 3.8–10.5)

## 2021-11-30 PROCEDURE — 93010 ELECTROCARDIOGRAM REPORT: CPT

## 2021-11-30 PROCEDURE — 71045 X-RAY EXAM CHEST 1 VIEW: CPT | Mod: 26

## 2021-11-30 PROCEDURE — 99285 EMERGENCY DEPT VISIT HI MDM: CPT | Mod: CS

## 2021-11-30 RX ORDER — IRBESARTAN 75 MG/1
1 TABLET ORAL
Qty: 0 | Refills: 0 | DISCHARGE

## 2021-11-30 RX ORDER — DOCUSATE SODIUM 100 MG
1 CAPSULE ORAL
Qty: 0 | Refills: 0 | DISCHARGE

## 2021-11-30 RX ORDER — CARVEDILOL PHOSPHATE 80 MG/1
6.25 CAPSULE, EXTENDED RELEASE ORAL EVERY 12 HOURS
Refills: 0 | Status: DISCONTINUED | OUTPATIENT
Start: 2021-11-30 | End: 2021-12-05

## 2021-11-30 RX ORDER — ASPIRIN/CALCIUM CARB/MAGNESIUM 324 MG
1 TABLET ORAL
Qty: 0 | Refills: 0 | DISCHARGE

## 2021-11-30 RX ORDER — REMDESIVIR 5 MG/ML
100 INJECTION INTRAVENOUS EVERY 24 HOURS
Refills: 0 | Status: DISCONTINUED | OUTPATIENT
Start: 2021-12-01 | End: 2021-12-02

## 2021-11-30 RX ORDER — ALLOPURINOL 300 MG
1 TABLET ORAL
Qty: 0 | Refills: 0 | DISCHARGE

## 2021-11-30 RX ORDER — ACETAMINOPHEN 500 MG
650 TABLET ORAL EVERY 6 HOURS
Refills: 0 | Status: DISCONTINUED | OUTPATIENT
Start: 2021-11-30 | End: 2021-12-05

## 2021-11-30 RX ORDER — ALLOPURINOL 300 MG
100 TABLET ORAL DAILY
Refills: 0 | Status: DISCONTINUED | OUTPATIENT
Start: 2021-11-30 | End: 2021-12-05

## 2021-11-30 RX ORDER — DOXAZOSIN MESYLATE 4 MG
1 TABLET ORAL
Qty: 0 | Refills: 0 | DISCHARGE

## 2021-11-30 RX ORDER — LOSARTAN POTASSIUM 100 MG/1
100 TABLET, FILM COATED ORAL DAILY
Refills: 0 | Status: DISCONTINUED | OUTPATIENT
Start: 2021-11-30 | End: 2021-12-01

## 2021-11-30 RX ORDER — DEXAMETHASONE 0.5 MG/5ML
6 ELIXIR ORAL DAILY
Refills: 0 | Status: DISCONTINUED | OUTPATIENT
Start: 2021-12-01 | End: 2021-12-05

## 2021-11-30 RX ORDER — ALBUTEROL 90 UG/1
2 AEROSOL, METERED ORAL EVERY 4 HOURS
Refills: 0 | Status: DISCONTINUED | OUTPATIENT
Start: 2021-11-30 | End: 2021-11-30

## 2021-11-30 RX ORDER — FUROSEMIDE 40 MG
40 TABLET ORAL EVERY 12 HOURS
Refills: 0 | Status: DISCONTINUED | OUTPATIENT
Start: 2021-12-01 | End: 2021-12-01

## 2021-11-30 RX ORDER — IPRATROPIUM/ALBUTEROL SULFATE 18-103MCG
3 AEROSOL WITH ADAPTER (GRAM) INHALATION EVERY 6 HOURS
Refills: 0 | Status: DISCONTINUED | OUTPATIENT
Start: 2021-11-30 | End: 2021-12-05

## 2021-11-30 RX ORDER — VENLAFAXINE HCL 75 MG
37.5 CAPSULE, EXT RELEASE 24 HR ORAL DAILY
Refills: 0 | Status: DISCONTINUED | OUTPATIENT
Start: 2021-11-30 | End: 2021-12-05

## 2021-11-30 RX ORDER — BUDESONIDE, GLYCOPYRROLATE, AND FORMOTEROL FUMARATE 160; 9; 4.8 UG/1; UG/1; UG/1
2 AEROSOL, METERED RESPIRATORY (INHALATION)
Qty: 0 | Refills: 0 | DISCHARGE

## 2021-11-30 RX ORDER — BUDESONIDE AND FORMOTEROL FUMARATE DIHYDRATE 160; 4.5 UG/1; UG/1
2 AEROSOL RESPIRATORY (INHALATION)
Refills: 0 | Status: DISCONTINUED | OUTPATIENT
Start: 2021-11-30 | End: 2021-12-05

## 2021-11-30 RX ORDER — DILTIAZEM HCL 120 MG
240 CAPSULE, EXT RELEASE 24 HR ORAL DAILY
Refills: 0 | Status: DISCONTINUED | OUTPATIENT
Start: 2021-11-30 | End: 2021-12-01

## 2021-11-30 RX ORDER — FAMOTIDINE 10 MG/ML
1 INJECTION INTRAVENOUS
Qty: 0 | Refills: 0 | DISCHARGE

## 2021-11-30 RX ORDER — REMDESIVIR 5 MG/ML
200 INJECTION INTRAVENOUS EVERY 24 HOURS
Refills: 0 | Status: COMPLETED | OUTPATIENT
Start: 2021-11-30 | End: 2021-11-30

## 2021-11-30 RX ORDER — REMDESIVIR 5 MG/ML
INJECTION INTRAVENOUS
Refills: 0 | Status: DISCONTINUED | OUTPATIENT
Start: 2021-11-30 | End: 2021-12-02

## 2021-11-30 RX ORDER — MELOXICAM 15 MG/1
1 TABLET ORAL
Qty: 0 | Refills: 0 | DISCHARGE

## 2021-11-30 RX ORDER — DILTIAZEM HCL 120 MG
10 CAPSULE, EXT RELEASE 24 HR ORAL ONCE
Refills: 0 | Status: COMPLETED | OUTPATIENT
Start: 2021-11-30 | End: 2021-11-30

## 2021-11-30 RX ORDER — FUROSEMIDE 40 MG
40 TABLET ORAL ONCE
Refills: 0 | Status: COMPLETED | OUTPATIENT
Start: 2021-11-30 | End: 2021-11-30

## 2021-11-30 RX ORDER — LANOLIN ALCOHOL/MO/W.PET/CERES
3 CREAM (GRAM) TOPICAL AT BEDTIME
Refills: 0 | Status: DISCONTINUED | OUTPATIENT
Start: 2021-11-30 | End: 2021-12-05

## 2021-11-30 RX ORDER — HYDRALAZINE HCL 50 MG
100 TABLET ORAL EVERY 8 HOURS
Refills: 0 | Status: DISCONTINUED | OUTPATIENT
Start: 2021-11-30 | End: 2021-12-01

## 2021-11-30 RX ORDER — BUMETANIDE 0.25 MG/ML
1 INJECTION INTRAMUSCULAR; INTRAVENOUS
Qty: 0 | Refills: 0 | DISCHARGE

## 2021-11-30 RX ORDER — ALLOPURINOL 300 MG
160 TABLET ORAL
Qty: 0 | Refills: 0 | DISCHARGE

## 2021-11-30 RX ORDER — LEVOTHYROXINE SODIUM 125 MCG
75 TABLET ORAL DAILY
Refills: 0 | Status: DISCONTINUED | OUTPATIENT
Start: 2021-11-30 | End: 2021-12-05

## 2021-11-30 RX ORDER — APIXABAN 2.5 MG/1
2.5 TABLET, FILM COATED ORAL EVERY 12 HOURS
Refills: 0 | Status: DISCONTINUED | OUTPATIENT
Start: 2021-11-30 | End: 2021-12-02

## 2021-11-30 RX ORDER — VENLAFAXINE HCL 75 MG
1 CAPSULE, EXT RELEASE 24 HR ORAL
Qty: 0 | Refills: 0 | DISCHARGE

## 2021-11-30 RX ORDER — LOSARTAN POTASSIUM 100 MG/1
160 TABLET, FILM COATED ORAL
Qty: 0 | Refills: 0 | DISCHARGE

## 2021-11-30 RX ORDER — ASPIRIN/CALCIUM CARB/MAGNESIUM 324 MG
81 TABLET ORAL DAILY
Refills: 0 | Status: DISCONTINUED | OUTPATIENT
Start: 2021-11-30 | End: 2021-12-05

## 2021-11-30 RX ORDER — IPRATROPIUM/ALBUTEROL SULFATE 18-103MCG
3 AEROSOL WITH ADAPTER (GRAM) INHALATION ONCE
Refills: 0 | Status: COMPLETED | OUTPATIENT
Start: 2021-11-30 | End: 2021-12-01

## 2021-11-30 RX ADMIN — Medication 125 MILLIGRAM(S): at 21:35

## 2021-11-30 RX ADMIN — Medication 40 MILLIGRAM(S): at 19:04

## 2021-11-30 RX ADMIN — Medication 10 MILLIGRAM(S): at 21:55

## 2021-11-30 RX ADMIN — Medication 650 MILLIGRAM(S): at 22:45

## 2021-11-30 RX ADMIN — REMDESIVIR 500 MILLIGRAM(S): 5 INJECTION INTRAVENOUS at 21:36

## 2021-11-30 NOTE — H&P ADULT - PROBLEM SELECTOR PLAN 5
Hypertensive urgency on admission  - likely 2/2 to COPD exacerbation and volume overload state in the setting of fluid overload  - continue hydralazine, losartan and diltiazem Patient in sinus tachycardia,   - continue carvedilol, diltiazem for rate control  - continue home eliquis - continue carvedilol, diltiazem  - continue home eliquis

## 2021-11-30 NOTE — H&P ADULT - PROBLEM SELECTOR PLAN 1
- supp O2 prn maintain O2 sats >88%. Prone PRN  - SpO2 , solumedrop 125 X 1 given; continue decadron 6 mg IV for 9 additional days.  - will start remdesivir, GFR >30 and ALT not > than 5x ULN  - monitor volume status closely, avoid aggressive hydration  - tylenol prn myalgias, fever  - continue with metered dose inhalers prn.  - daily labs: CBC with diff and CMP  - ferritin, crp, d-dimer, procal at admission (pending) then will repeat If clinically worsening every 48-72 hours.  - will initiate VTE ppx unless absolute contraindication- on eliquis 2.5 mg BID for pAF will continue   -GOC: DNR/DNI MOLST in chart  - check d dimer, if elevated will obtain CTA r/o PE, if not would obtain CT Chest Noncon - supp O2 prn maintain O2 sats >88%. Prone PRN  - SpO2 , solumedrop 125 X 1 given; continue decadron 6 mg IV for 9 additional days.  - will start remdesivir, GFR >30 and ALT not > than 5x ULN  - monitor volume status closely, avoid aggressive hydration  - tylenol prn myalgias, fever  - continue with metered dose inhalers prn.  - daily labs: CBC with diff and CMP  - ferritin, crp, d-dimer, procal at admission (pending) then will repeat If clinically worsening every 48-72 hours.  - will initiate VTE ppx unless absolute contraindication- on eliquis 2.5 mg BID for pAF will continue   -GOC: DNR/DNI MOLST in chart  - check d dimer, if elevated will obtain CTA r/o PE, if not would obtain CT Chest Noncon  - GABRIELA Simon consulted appreciate recs - supp O2 prn maintain O2 sats >88%. Prone PRN  - SpO2 , solumedrol 125 X 1 given; continue decadron 6 mg IV for 9 additional days.  - will start remdesivir, GFR >30 and ALT not > than 5x ULN  - monitor volume status closely, avoid aggressive hydration  - tylenol prn myalgias, fever  - continue with metered dose inhalers prn.  - daily labs: CBC with diff and CMP  - ferritin, crp, d-dimer, procal at admission (pending) then will repeat If clinically worsening every 48-72 hours.  - will initiate VTE ppx unless absolute contraindication- on eliquis 2.5 mg BID for pAF will continue   -GOC: DNR/DNI MOLST in chart  - check d dimer, if elevated will obtain CTA r/o PE, if not would obtain CT Chest Noncon  - GABRIELA Simon consulted appreciate recs - supp O2 prn maintain O2 sats >88%. Prone PRN  - SpO2 , solumedrol 125 X 1 given, albuterol neb X 1 given; continue decadron 6 mg IV for 9 additional days.  - will start remdesivir, GFR >30 and ALT not > than 5x ULN  - monitor volume status closely, avoid aggressive hydration  - tylenol prn myalgias, fever  - continue with home nebs prn, and homs breztri daily   - daily labs: CBC with diff and CMP  - ferritin, crp, d-dimer, procal at admission (pending) then will repeat If clinically worsening every 48-72 hours.  - will initiate VTE ppx unless absolute contraindication- on eliquis 2.5 mg BID for pAF will continue   -GOC: DNR/DNI MOLST in chart  - check d dimer, if elevated will obtain CTA r/o PE, if not would obtain CT Chest Noncon  - GABRIELA Simon consulted appreciate recs - supp O2 prn maintain O2 sats >88%. Prone PRN  - SpO2 , solumedrol 125 X 1 given, albuterol neb X 1 given; continue decadron 6 mg IV for 9 additional days.  - will start remdesivir, GFR >30 and ALT not > than 5x ULN  - monitor volume status closely, avoid aggressive hydration  - tylenol prn myalgias, fever  - continue with home nebs prn, and homs breztri daily   - daily labs: CBC with diff and CMP  - ferritin, crp, d-dimer (339), procal at admission (pending) then will repeat If clinically worsening every 48-72 hours.  - will initiate VTE ppx unless absolute contraindication- on eliquis 2.5 mg BID for pAF will continue   -GOC: DNR/DNI MOLST in chart  - will obtain CT non con fu results   - GABRIELA Simon consulted appreciate recs

## 2021-11-30 NOTE — ED ADULT TRIAGE NOTE - CHIEF COMPLAINT QUOTE
Pt states' SOB , not feeling x 2 days. Pt states" I had Thanksgiving Day dinner, when a guest was COVID +.

## 2021-11-30 NOTE — H&P ADULT - PROBLEM SELECTOR PLAN 4
Patient in sinus tachycardia,   - continue carvedilol, diltiazem for rate control  - continue home eliquis patient has no anginal symptoms, no evidence of plaque rupture on EKG   - likely 2/2 to demand in the setting of chf exacerbation, volume overload   - will trend   - cardio will see in the AM   - if uptrends would repeat EKG

## 2021-11-30 NOTE — ED PROVIDER NOTE - PROGRESS NOTE DETAILS
paged cardio Dr. Leavitt, awaiting call back spoke with Dr. Chapman, covering for Dr. Leavitt, case discussed, aware of elevated trop, bnp, to continue lasix, recommend admission for serial trop and echo, Dr. Leavitt will consult in the morning

## 2021-11-30 NOTE — H&P ADULT - NSHPOUTPATIENTPROVIDERS_GEN_ALL_CORE
[facility]  [facility]  Dr. Leavitt - cardio

## 2021-11-30 NOTE — H&P ADULT - PROBLEM SELECTOR PLAN 7
full anticoagulated with eliquis 2.5 mg BID continue home effexor 37.5 mg daily continue synthroid   - sinus tachycardic  - check TFTs in the AM Hypertensive urgency on admission  - likely 2/2 to COPD exacerbation and volume overload state in the setting of fluid overload  - continue hydralazine, losartan and diltiazem

## 2021-11-30 NOTE — H&P ADULT - PROBLEM SELECTOR PLAN 6
continue synthroid   - sinus tachycardic  - check TFTs in the AM no Hypertensive urgency on admission  - likely 2/2 to COPD exacerbation and volume overload state in the setting of fluid overload  - continue hydralazine, losartan and diltiazem H/H 10  - unknown baseline  - likely 2/2 to volumer overloaded state   - no symptoms or signs of bleeding  - continue to monitor cbc   - fu iron panel in the morning

## 2021-11-30 NOTE — H&P ADULT - NSHPREVIEWOFSYSTEMS_GEN_ALL_CORE
Constitutional: denies fever, chills, sweating  HEENT: denies headache, dizziness, or lightheadedness  Respiratory: denies SOB, cough, or wheezing  Cardiovascular: denies CP, palpitations  Gastrointestinal: denies nausea, vomiting, diarrhea, constipation, abdominal pain, or bloody stools  Genitourinary: denies painful urination, increased frequency, urgency, or bloody urine  Skin/Breast: denies rashes or itching  Musculoskeletal: denies muscle aches, joint swelling, or muscle weakness  Neurologic: denies loss of sensation, numbness, or tingling  ROS negative except as noted above Constitutional: denies fever, chills, sweating  HEENT: denies headache, dizziness, or lightheadedness  Respiratory: ADMTs SOB, cough, and wheezing  Cardiovascular: denies CP, palpitations  Gastrointestinal: denies nausea, vomiting, diarrhea, constipation, abdominal pain, or bloody stools  Genitourinary: denies painful urination, increased frequency, urgency, or bloody urine  Skin/Breast: denies rashes or itching  Musculoskeletal: ADMITS to LE edema. denies muscle aches, joint swelling, or muscle weakness  Neurologic: denies loss of sensation, numbness, or tingling  ROS negative except as noted above

## 2021-11-30 NOTE — H&P ADULT - PROBLEM SELECTOR PLAN 8
full anticoagulated with eliquis 2.5 mg BID continue home effexor 37.5 mg daily continue synthroid   - sinus tachycardic  - check TFTs in the AM continue synthroid   - tachycardia  - check TFTs in the AM

## 2021-11-30 NOTE — H&P ADULT - NSHPSOCIALHISTORY_GEN_ALL_CORE
lives with  ambulates  smoking  etoh  drugs  CODE lives with son, daughter in law, 2 grandchildren  ambulates walker   smoking former quit in 1977   etoh occasional   drugs denies   CODE: DNR/DNI

## 2021-11-30 NOTE — ED ADULT NURSE REASSESSMENT NOTE - NS ED NURSE REASSESS COMMENT FT1
repeat ekg done as heart rate 152, spoke with resident, will put in orders for hr- 152
Telephone call to resident for HR in 140-150s- Will order meds
T/C to Resident Kalpana- for -160, States she will look into it.   Resident aware of PT condition.

## 2021-11-30 NOTE — H&P ADULT - PROBLEM SELECTOR PLAN 3
#COPD exacerbation  -Nebs  -Steroids  - remdesivir for COVID pneumonia   -Nutrition consult  -Pulmonary Rehab referral  -Respiratory therapy referral #COPD exacerbation  -Nebs  -Steroids  - remdesivir for COVID pneumonia   - pulm consult   -Respiratory therapy referral #COPD exacerbation  -Nebs  -Steroids  - remdesivir for COVID pneumonia   - pulm consult [facility]   -Respiratory therapy referral #COPD exacerbation  -Nebs  -Steroids  - remdesivir for COVID pneumonia   - pulm consult [facility]

## 2021-11-30 NOTE — ED PROVIDER NOTE - NSICDXFAMILYHX_GEN_ALL_CORE_FT
FAMILY HISTORY:  Mother  Still living? No  Family history of breast cancer, Age at diagnosis: Age Unknown    Child  Still living? Unknown  Family history of nasopharyngeal cancer, Age at diagnosis: Age Unknown

## 2021-11-30 NOTE — H&P ADULT - PROBLEM SELECTOR PLAN 2
Presenting volume overload states likely 2/2 to COVID 19 pneumonia  - admit to tele   - O2 to maintain O2 Sat > 92%  - Strict I&Os & daily weights   - Lasix 40mg IVP Q12H w/ close monitoring of renal function  - F/u TTE   - Cardiology consulted Adela by ER, f/u recs  - continue home ARBs, BB Presenting volume overload states, LE edema likely 2/2 to COVID 19 pneumonia, chf exacerbation   - admit to tele   - check dopplers   - O2 to maintain O2 Sat > 92%  - Strict I&Os & daily weights   - Lasix 40mg IVP Q12H w/ close monitoring of renal function  - F/u TTE   - Cardiology consulted Adela by ER, f/u recs  - continue home ARBs, BB

## 2021-11-30 NOTE — ED PROVIDER NOTE - DOMESTIC TRAVEL HIGH RISK QUESTION
Identified on [place] [facility] A1c > 9 report    Attempted to reach patient three times by telephone without success. Messages left. Introductory letter was sent with Nurse Navigator contact information sent via Patient Portal.     Will close case at this time. If need arise in future would be happy to work with patient.    Ankita Dominique RN, [facility]  Nurse Navigator for [facility].   [phone]   No

## 2021-11-30 NOTE — H&P ADULT - NSHPPHYSICALEXAM_GEN_ALL_CORE
VITALS:   T(C): 37.2 (11-30-21 @ 21:07), Max: 37.2 (11-30-21 @ 16:06)  HR: 147 (11-30-21 @ 21:07) (11 - 147)  BP: 134/81 (11-30-21 @ 21:07) (134/81 - 200/88)  RR: 18 (11-30-21 @ 21:07) (18 - 22)  SpO2: 100% (11-30-21 @ 21:07) (77% - 100%)    GENERAL: NAD, sitting up   HEAD:  Atraumatic, Normocephalic  EYES: EOMI, PERRLA, conjunctiva and sclera clear  ENT: Moist mucous membranes  NECK: Supple, No JVD  CHEST/LUNG: rhonci throughout all lungs fields more prominent in the upper lobes b/l  HEART: Regular rate and rhythm; No murmurs, rubs, or gallops  ABDOMEN: BSx4; Soft, nontender, nondistended  EXTREMITIES:  1 + LE edema, nonpitting bilateral   NERVOUS SYSTEM:  A&Ox3, no focal deficits   SKIN: No rashes or lesions

## 2021-11-30 NOTE — ED PROVIDER NOTE - NSICDXPASTMEDICALHX_GEN_ALL_CORE_FT
PAST MEDICAL HISTORY:  Asthma     Depression     Gout     HLD (hyperlipidemia)     Hypertension     Hypothyroid     Osteoarthritis     Overactive bladder     Paroxysmal atrial fibrillation

## 2021-11-30 NOTE — ED ADULT NURSE NOTE - OBJECTIVE STATEMENT
Received pt in bed alert and oriented x4.  C/O SOB x couple of days.  Pt denies fevers, n/v.  Denies chest pain. EKg completed.  Pt on monitor. Received pt in bed alert and oriented x4.  C/O SOB x couple of days.  SOB is worse with exertion. Pt hx of asthma and wears 2 L of 02 at night.  Pt also reports bilateral leg swelling. Pt denies fevers, n/v. Pt reports that a family member also tested positive for covid on Thanksgiving.  Denies chest pain. EKg completed.  Pt on monitor.

## 2021-11-30 NOTE — ED PROVIDER NOTE - NSICDXPASTSURGICALHX_GEN_ALL_CORE_FT
PAST SURGICAL HISTORY:  S/P cholecystectomy     S/P hysterectomy     S/P lumpectomy of breast     S/P lung surgery, follow-up exam s/p removal of suspicious lesion, negative

## 2021-11-30 NOTE — ED ADULT NURSE REASSESSMENT NOTE - RESPIRATORY RATE (BREATHS/MIN)
Patient Discharge Instructions    Lisa Jaeger / [id number] : 1938    Admitted 2020 Discharged: 2020     Take Home Medications            · It is important that you take the medication exactly as they are prescribed. · Keep your medication in the bottles provided by the pharmacist and keep a list of the medication names, dosages, and times to be taken in your wallet. · Do not take other medications without consulting your doctor. What to do at Home    Recommended diet: Regular Diet,   Recommended activity: Activity as tolerated,     If you experience any of the following symptoms abdominal pain, please follow up with Dr Ryan Gonsalez. Follow-up Appointments   Procedures    FOLLOW UP VISIT Appointment in: One Week     Standing Status:   Standing     Number of Occurrences:   1     Order Specific Question:   Appointment in     Answer: One Week            Information obtained by :  I understand that if any problems occur once I am at home I am to contact my physician. I understand and acknowledge receipt of the instructions indicated above.                                                                                                                                            Physician's or R.N.'s Signature                                                                  Date/Time                                                                                                                                              Patient or Representative Signature                                                          Date/Time
17
18

## 2021-11-30 NOTE — ED PROVIDER NOTE - OBJECTIVE STATEMENT
89 female PMH PAF on eliquis, HTN, HLD, asthma presents to ER c/o SOB, patient states over Thanksgiving she found out a family member tested (+)for covid, patient states she has had bilateral lower extremity swelling, SOB worse with exertion, mild cough today called her PMD and told to go to ER. Patient denies fever, no vomiting, patient states she is vaccinated against covid with moderna.

## 2021-11-30 NOTE — ED PROVIDER NOTE - CLINICAL SUMMARY MEDICAL DECISION MAKING FREE TEXT BOX
SOB, increased lower extremity edema, (+)exposure to covid, send RVP, labs, cardiac enzyemes, ekg, chest xray, cardiac monitor

## 2021-11-30 NOTE — H&P ADULT - ASSESSMENT
89 yo F PMHx Paroxysmal Atrial Fibrillation, HTN, Hypothyroidism, COPD (2-3 L nasal cannula at home) Hyper lipidemia admitted for COPD exacerbation 2/2 to COVID 19 pneumonia and CHF exacerbation.

## 2021-11-30 NOTE — H&P ADULT - HISTORY OF PRESENT ILLNESS
89 yo F PMHx Paroxysmal Atrial Fibrillation, HTN, Hypothyroidism, Hyper lipidemia, Asthma, presenting with shortness of breath and LE edema.             ED Course:   Vital Signs: 200/88. O2 77% RA, RR 22  Labs significant for:  WBC 10.53, BUN/Cr: 24/1.10 H/H 10/30.3, BNP: 5036, CK-MB < 1, troponin 123.9   Imaging: Chest X ray: hazy obacities bilaterally in all lung fields, no pulmonary infiltrate, (awaiting official read)   Given: lasix 40 mg IV X 1  89 yo F PMHx Paroxysmal Atrial Fibrillation, HTN, Hypothyroidism, COPD (2-3 L nasal cannula at home) Hyper lipidemia, presenting with shortness of breath and LE edema. Patient is short of breath at baseline but has been having difficulty standing up and walking a few steps for the last week. Patient lives with her son and his family, who hosted thanksgiving dinner. 1 guest at the dinner tested positive for COVID. Son bought an at home rapid test. He was positive, and patient was also positive. Her grandchildren and wife were negative. Patient admits to a dry cough that started Monday and progressively worsening LE edema in the last few days. She is on bumetanide 1 mg daily for water retention denies any changes to dose or missed doses.     She does admit to a high salt diet and does not really watch what she eats.   She denies orthopnea, fever, chills, history of heart failure or CAD.   Son at bedside admits that she has a history of "mini stroke" in the past however, patient adamantly denies.     ED Course:   Vital Signs: 200/88. O2 77% RA, RR 22  Labs significant for:  WBC 10.53, BUN/Cr: 24/1.10 H/H 10/30.3, BNP: 5036, CK-MB < 1, troponin 123.9   Imaging: Chest X ray: hazy opacities bilaterally in all lung fields, no pulmonary infiltrate, (awaiting official read)   Given: lasix 40 mg IV X 1  89 yo F PMHx Paroxysmal Atrial Fibrillation, HTN, Hypothyroidism, COPD (2-3 L nasal cannula at home) Hyper lipidemia, presenting with shortness of breath and LE edema. Patient is short of breath at baseline but has been having difficulty standing up and walking a few steps for the last week. Patient lives with her son and his family, who hosted thanksgiving dinner. 1 guest at the dinner tested positive for COVID. Son bought an at home rapid test. He was positive, and patient was also positive. Her grandchildren and wife were negative. Patient admits to a dry cough that started Monday and progressively worsening LE edema in the last few days. She is on bumetanide 1 mg daily for water retention denies any changes to dose or missed doses.     She does admit to a high salt diet and does not really watch what she eats.   She denies orthopnea, fever, chills, history of heart failure or CAD.   Son at bedside admits that she has a history of "mini stroke" in the past however, patient adamantly denies.     ED Course:   Vital Signs: 200/88. O2 77% RA, RR 22  EKG: sinus tachycardia 101 no ischemic changes   Labs significant for:  WBC 10.53, BUN/Cr: 24/1.10 H/H 10/30.3, BNP: 5036, CK-MB < 1, troponin 123.9   Imaging: Chest X ray: hazy opacities bilaterally in all lung fields, no pulmonary infiltrate, (awaiting official read)   Given: lasix 40 mg IV X 1  89 yo F PMHx Paroxysmal Atrial Fibrillation, HTN, Hypothyroidism, COPD (2-3 L nasal cannula at home) Hyper lipidemia, presenting with shortness of breath and LE edema. Patient is short of breath at baseline but has been having difficulty standing up and walking a few steps for the last week. Patient lives with her son and his family, who hosted thanksgiving dinner. 1 guest at the dinner tested positive for COVID. Son bought an at home rapid test. He was positive, and patient was also positive. Her grandchildren and wife were negative. Patient admits to a dry cough that started Monday and progressively worsening LE edema in the last few days. She is on bumetanide 1 mg daily for water retention denies any changes to dose or missed doses.     She does admit to a high salt diet and does not really watch what she eats.   She denies orthopnea, fever, chills, history of heart failure or CAD.   Son at bedside admits that she has a history of "mini stroke" in the past however, patient adamantly denies.     ED Course:   Vital Signs: 200/88. O2 77% RA, RR 22  EKG: rapid a fib @ 101 no ischemic changes   Labs significant for:  WBC 10.53, BUN/Cr: 24/1.10 H/H 10/30.3, BNP: 5036, CK-MB < 1, troponin 123.9   Imaging: Chest X ray: hazy opacities bilaterally in all lung fields, no pulmonary infiltrate, (awaiting official read)   Given: lasix 40 mg IV X 1  89 yo F PMHx Paroxysmal Atrial Fibrillation, HTN, Hypothyroidism, COPD (2-3 L nasal cannula at home) Hyperlipidemia, presenting with shortness of breath and LE edema. Patient is short of breath at baseline but has been having difficulty standing up and walking a few steps for the last week. Patient lives with her son and his family, who hosted thanksgiving dinner. 1 guest at the dinner tested positive for COVID. Son bought an at home rapid test. He was positive, and patient was also positive. Her grandchildren and his wife were negative. Patient admits to a dry cough that started Monday and progressively worsening LE edema in the last few days. She is on bumetanide 1 mg daily for water retention denies any changes to dose or missed doses.     She does admit to a high salt diet and does not really watch what she eats.   She denies orthopnea, fever, chills, history of heart failure or CAD.   Son at bedside admits that she has a history of "mini stroke" in the past however, patient adamantly denies.     ED Course:   Vital Signs: 200/88. O2 77% RA, RR 22  EKG: rapid a fib @ 101 no ischemic changes   Labs significant for:  WBC 10.53, BUN/Cr: 24/1.10 H/H 10/30.3, BNP: 5036, CK-MB < 1, troponin 123.9   Imaging: Chest X ray: hazy opacities bilaterally in all lung fields, no clear consolidation, (awaiting official read)   Given: lasix 40 mg IV X 1

## 2021-11-30 NOTE — ED ADULT TRIAGE NOTE - ADDITIONAL SAFETY/BANDS...
Additional Safety/Bands:
Cranial shape/Salkum(s) - size and tension/Scalp free of abrasions, defects, masses and swelling/Hair pattern normal

## 2021-12-01 LAB
ALBUMIN SERPL ELPH-MCNC: 2.8 G/DL — LOW (ref 3.3–5)
ALP SERPL-CCNC: 69 U/L — SIGNIFICANT CHANGE UP (ref 40–120)
ALT FLD-CCNC: 32 U/L — SIGNIFICANT CHANGE UP (ref 12–78)
ANION GAP SERPL CALC-SCNC: 6 MMOL/L — SIGNIFICANT CHANGE UP (ref 5–17)
AST SERPL-CCNC: 36 U/L — SIGNIFICANT CHANGE UP (ref 15–37)
BASOPHILS # BLD AUTO: 0 K/UL — SIGNIFICANT CHANGE UP (ref 0–0.2)
BASOPHILS NFR BLD AUTO: 0 % — SIGNIFICANT CHANGE UP (ref 0–2)
BILIRUB SERPL-MCNC: 0.3 MG/DL — SIGNIFICANT CHANGE UP (ref 0.2–1.2)
BUN SERPL-MCNC: 33 MG/DL — HIGH (ref 7–23)
CALCIUM SERPL-MCNC: 8.6 MG/DL — SIGNIFICANT CHANGE UP (ref 8.5–10.1)
CHLORIDE SERPL-SCNC: 104 MMOL/L — SIGNIFICANT CHANGE UP (ref 96–108)
CO2 SERPL-SCNC: 34 MMOL/L — HIGH (ref 22–31)
CREAT SERPL-MCNC: 1.5 MG/DL — HIGH (ref 0.5–1.3)
EOSINOPHIL # BLD AUTO: 0 K/UL — SIGNIFICANT CHANGE UP (ref 0–0.5)
EOSINOPHIL NFR BLD AUTO: 0 % — SIGNIFICANT CHANGE UP (ref 0–6)
FERRITIN SERPL-MCNC: 366 NG/ML — HIGH (ref 15–150)
FOLATE SERPL-MCNC: >20 NG/ML — SIGNIFICANT CHANGE UP
GLUCOSE SERPL-MCNC: 139 MG/DL — HIGH (ref 70–99)
HCT VFR BLD CALC: 34.1 % — LOW (ref 34.5–45)
HGB BLD-MCNC: 10.8 G/DL — LOW (ref 11.5–15.5)
IRON SATN MFR SERPL: 13 UG/DL — LOW (ref 30–160)
IRON SATN MFR SERPL: 5 % — LOW (ref 14–50)
LYMPHOCYTES # BLD AUTO: 0.26 K/UL — LOW (ref 1–3.3)
LYMPHOCYTES # BLD AUTO: 3 % — LOW (ref 13–44)
MCHC RBC-ENTMCNC: 31.7 GM/DL — LOW (ref 32–36)
MCHC RBC-ENTMCNC: 32 PG — SIGNIFICANT CHANGE UP (ref 27–34)
MCV RBC AUTO: 100.9 FL — HIGH (ref 80–100)
MONOCYTES # BLD AUTO: 0.09 K/UL — SIGNIFICANT CHANGE UP (ref 0–0.9)
MONOCYTES NFR BLD AUTO: 1 % — LOW (ref 2–14)
NEUTROPHILS # BLD AUTO: 8.35 K/UL — HIGH (ref 1.8–7.4)
NEUTROPHILS NFR BLD AUTO: 89 % — HIGH (ref 43–77)
NRBC # BLD: SIGNIFICANT CHANGE UP /100 WBCS (ref 0–0)
PLATELET # BLD AUTO: 198 K/UL — SIGNIFICANT CHANGE UP (ref 150–400)
POTASSIUM SERPL-MCNC: 4.2 MMOL/L — SIGNIFICANT CHANGE UP (ref 3.5–5.3)
POTASSIUM SERPL-SCNC: 4.2 MMOL/L — SIGNIFICANT CHANGE UP (ref 3.5–5.3)
PROT SERPL-MCNC: 6.9 G/DL — SIGNIFICANT CHANGE UP (ref 6–8.3)
RBC # BLD: 3.38 M/UL — LOW (ref 3.8–5.2)
RBC # FLD: 15.1 % — HIGH (ref 10.3–14.5)
SODIUM SERPL-SCNC: 144 MMOL/L — SIGNIFICANT CHANGE UP (ref 135–145)
T3 SERPL-MCNC: 36 NG/DL — LOW (ref 80–200)
T4 AB SER-ACNC: 5.1 UG/DL — SIGNIFICANT CHANGE UP (ref 4.6–12)
TIBC SERPL-MCNC: 263 UG/DL — SIGNIFICANT CHANGE UP (ref 220–430)
TROPONIN I, HIGH SENSITIVITY RESULT: 173.4 NG/L — HIGH
TROPONIN I, HIGH SENSITIVITY RESULT: 187.1 NG/L — HIGH
TSH SERPL-MCNC: 0.2 UIU/ML — LOW (ref 0.36–3.74)
UIBC SERPL-MCNC: 250 UG/DL — SIGNIFICANT CHANGE UP (ref 110–370)
VIT B12 SERPL-MCNC: 1062 PG/ML — SIGNIFICANT CHANGE UP (ref 232–1245)
WBC # BLD: 8.7 K/UL — SIGNIFICANT CHANGE UP (ref 3.8–10.5)
WBC # FLD AUTO: 8.7 K/UL — SIGNIFICANT CHANGE UP (ref 3.8–10.5)

## 2021-12-01 PROCEDURE — 93970 EXTREMITY STUDY: CPT | Mod: 26

## 2021-12-01 PROCEDURE — 93010 ELECTROCARDIOGRAM REPORT: CPT

## 2021-12-01 PROCEDURE — 71250 CT THORAX DX C-: CPT | Mod: 26

## 2021-12-01 PROCEDURE — 99223 1ST HOSP IP/OBS HIGH 75: CPT

## 2021-12-01 PROCEDURE — 99232 SBSQ HOSP IP/OBS MODERATE 35: CPT | Mod: GC

## 2021-12-01 RX ORDER — HYDRALAZINE HCL 50 MG
100 TABLET ORAL EVERY 8 HOURS
Refills: 0 | Status: DISCONTINUED | OUTPATIENT
Start: 2021-12-01 | End: 2021-12-01

## 2021-12-01 RX ORDER — ALBUTEROL 90 UG/1
2 AEROSOL, METERED ORAL EVERY 6 HOURS
Refills: 0 | Status: DISCONTINUED | OUTPATIENT
Start: 2021-12-01 | End: 2021-12-05

## 2021-12-01 RX ORDER — DILTIAZEM HCL 120 MG
5 CAPSULE, EXT RELEASE 24 HR ORAL
Qty: 125 | Refills: 0 | Status: DISCONTINUED | OUTPATIENT
Start: 2021-12-01 | End: 2021-12-01

## 2021-12-01 RX ORDER — ASPIRIN/CALCIUM CARB/MAGNESIUM 324 MG
325 TABLET ORAL ONCE
Refills: 0 | Status: COMPLETED | OUTPATIENT
Start: 2021-12-01 | End: 2021-12-01

## 2021-12-01 RX ORDER — HYDRALAZINE HCL 50 MG
25 TABLET ORAL THREE TIMES A DAY
Refills: 0 | Status: DISCONTINUED | OUTPATIENT
Start: 2021-12-01 | End: 2021-12-03

## 2021-12-01 RX ORDER — FAMOTIDINE 10 MG/ML
20 INJECTION INTRAVENOUS DAILY
Refills: 0 | Status: DISCONTINUED | OUTPATIENT
Start: 2021-12-01 | End: 2021-12-05

## 2021-12-01 RX ORDER — DILTIAZEM HCL 120 MG
240 CAPSULE, EXT RELEASE 24 HR ORAL ONCE
Refills: 0 | Status: COMPLETED | OUTPATIENT
Start: 2021-12-01 | End: 2021-12-01

## 2021-12-01 RX ORDER — DILTIAZEM HCL 120 MG
240 CAPSULE, EXT RELEASE 24 HR ORAL DAILY
Refills: 0 | Status: DISCONTINUED | OUTPATIENT
Start: 2021-12-01 | End: 2021-12-01

## 2021-12-01 RX ORDER — SODIUM CHLORIDE 9 MG/ML
250 INJECTION INTRAMUSCULAR; INTRAVENOUS; SUBCUTANEOUS ONCE
Refills: 0 | Status: DISCONTINUED | OUTPATIENT
Start: 2021-12-01 | End: 2021-12-01

## 2021-12-01 RX ORDER — DILTIAZEM HCL 120 MG
240 CAPSULE, EXT RELEASE 24 HR ORAL DAILY
Refills: 0 | Status: DISCONTINUED | OUTPATIENT
Start: 2021-12-02 | End: 2021-12-05

## 2021-12-01 RX ORDER — METRONIDAZOLE 500 MG
500 TABLET ORAL EVERY 8 HOURS
Refills: 0 | Status: DISCONTINUED | OUTPATIENT
Start: 2021-12-01 | End: 2021-12-05

## 2021-12-01 RX ORDER — CEFTRIAXONE 500 MG/1
1000 INJECTION, POWDER, FOR SOLUTION INTRAMUSCULAR; INTRAVENOUS EVERY 24 HOURS
Refills: 0 | Status: DISCONTINUED | OUTPATIENT
Start: 2021-12-01 | End: 2021-12-05

## 2021-12-01 RX ORDER — TIOTROPIUM BROMIDE 18 UG/1
1 CAPSULE ORAL; RESPIRATORY (INHALATION) DAILY
Refills: 0 | Status: DISCONTINUED | OUTPATIENT
Start: 2021-12-01 | End: 2021-12-05

## 2021-12-01 RX ADMIN — Medication 650 MILLIGRAM(S): at 00:25

## 2021-12-01 RX ADMIN — Medication 40 MILLIGRAM(S): at 05:54

## 2021-12-01 RX ADMIN — Medication 5 MG/HR: at 04:28

## 2021-12-01 RX ADMIN — Medication 240 MILLIGRAM(S): at 01:35

## 2021-12-01 RX ADMIN — Medication 37.5 MILLIGRAM(S): at 16:19

## 2021-12-01 RX ADMIN — CEFTRIAXONE 100 MILLIGRAM(S): 500 INJECTION, POWDER, FOR SOLUTION INTRAMUSCULAR; INTRAVENOUS at 16:18

## 2021-12-01 RX ADMIN — CARVEDILOL PHOSPHATE 6.25 MILLIGRAM(S): 80 CAPSULE, EXTENDED RELEASE ORAL at 05:17

## 2021-12-01 RX ADMIN — APIXABAN 2.5 MILLIGRAM(S): 2.5 TABLET, FILM COATED ORAL at 05:54

## 2021-12-01 RX ADMIN — Medication 100 MILLIGRAM(S): at 16:19

## 2021-12-01 RX ADMIN — Medication 650 MILLIGRAM(S): at 06:04

## 2021-12-01 RX ADMIN — Medication 3 MILLILITER(S): at 07:30

## 2021-12-01 RX ADMIN — Medication 325 MILLIGRAM(S): at 07:29

## 2021-12-01 RX ADMIN — Medication 650 MILLIGRAM(S): at 07:00

## 2021-12-01 RX ADMIN — Medication 81 MILLIGRAM(S): at 16:18

## 2021-12-01 RX ADMIN — Medication 500 MILLIGRAM(S): at 21:29

## 2021-12-01 RX ADMIN — REMDESIVIR 500 MILLIGRAM(S): 5 INJECTION INTRAVENOUS at 20:34

## 2021-12-01 RX ADMIN — BUDESONIDE AND FORMOTEROL FUMARATE DIHYDRATE 2 PUFF(S): 160; 4.5 AEROSOL RESPIRATORY (INHALATION) at 05:54

## 2021-12-01 RX ADMIN — APIXABAN 2.5 MILLIGRAM(S): 2.5 TABLET, FILM COATED ORAL at 18:57

## 2021-12-01 RX ADMIN — Medication 75 MICROGRAM(S): at 05:53

## 2021-12-01 RX ADMIN — Medication 6 MILLIGRAM(S): at 05:54

## 2021-12-01 RX ADMIN — Medication 500 MILLIGRAM(S): at 16:19

## 2021-12-01 RX ADMIN — FAMOTIDINE 20 MILLIGRAM(S): 10 INJECTION INTRAVENOUS at 16:19

## 2021-12-01 NOTE — PROGRESS NOTE ADULT - PROBLEM SELECTOR PLAN 2
Presenting volume overload states, LE edema likely 2/2 to COVID 19 pneumonia, chf exacerbation   - admit to tele   - check dopplers   - O2 to maintain O2 Sat > 92%  - Strict I&Os & daily weights   - Lasix 40mg IVP Q12H w/ close monitoring of renal function  - F/u TTE   - Cardiology consulted Adela by ER, f/u recs  - continue home ARBs, BB Presenting volume overload status on admission and LE edema - now euvolemic  -with worsening renal indices will d/c IV lasix as per nephro  - O2 to maintain O2 Sat > 92%  - Strict I&Os & daily weights   - F/u TTE   - Cardiology consulted [facility] by ER, f/u recs  - continue BB with hold parameters

## 2021-12-01 NOTE — PROGRESS NOTE ADULT - SUBJECTIVE AND OBJECTIVE BOX
Patient is a 89y old  Female who presents with a chief complaint of SOB (01 Dec 2021 08:04)      FROM ADMISSION H+P:   HPI:  89 yo F PMHx Paroxysmal Atrial Fibrillation, HTN, Hypothyroidism, COPD (2-3 L nasal cannula at home) Hyperlipidemia, presenting with shortness of breath and LE edema. Patient is short of breath at baseline but has been having difficulty standing up and walking a few steps for the last week. Patient lives with her son and his family, who hosted thanksgiving dinner. 1 guest at the dinner tested positive for COVID. Son bought an at home rapid test. He was positive, and patient was also positive. Her grandchildren and his wife were negative. Patient admits to a dry cough that started Monday and progressively worsening LE edema in the last few days. She is on bumetanide 1 mg daily for water retention denies any changes to dose or missed doses.     She does admit to a high salt diet and does not really watch what she eats.   She denies orthopnea, fever, chills, history of heart failure or CAD.   Son at bedside admits that she has a history of "mini stroke" in the past however, patient adamantly denies.     ED Course:   Vital Signs: 200/88. O2 77% RA, RR 22  EKG: rapid a fib @ 101 no ischemic changes   Labs significant for:  WBC 10.53, BUN/Cr: 24/1.10 H/H 10/30.3, BNP: 5036, CK-MB < 1, troponin 123.9   Imaging: Chest X ray: hazy opacities bilaterally in all lung fields, no clear consolidation, (awaiting official read)   Given: lasix 40 mg IV X 1  (30 Nov 2021 19:49)      ----  INTERVAL HPI/OVERNIGHT EVENTS: Pt seen and evaluated at the bedside. HR now in sinus rhythm in the 50s, off cardizem gtt. Patient with BP of 81/36 - small 250 cc bolus started with improvement in /54. Fluids d/gianluca in setting of CHF. Patient with no current complaints Saturating well on 2L NC.    ----  PAST MEDICAL & SURGICAL HISTORY:  Hypertension    Depression    Overactive bladder    Gout    Osteoarthritis    Hypothyroid    HLD (hyperlipidemia)    Paroxysmal atrial fibrillation    Asthma    S/P cholecystectomy    S/P lumpectomy of breast    S/P hysterectomy    S/P lung surgery, follow-up exam  s/p removal of suspicious lesion, negative        FAMILY HISTORY:  Family history of nasopharyngeal cancer (Child)  daughter    Family history of breast cancer (Mother)        Home Medications:  albuterol 2.5 mg/3 mL (0.083%) inhalation solution: 3 milliliter(s) inhaled every 6 hours, As Needed (30 Nov 2021 21:21)  Align 4 mg oral capsule: 1 cap(s) orally once a day (30 Nov 2021 21:21)  allopurinol 100 mg oral tablet: 1 tab(s) orally once a day (30 Nov 2021 21:21)  aspirin 81 mg oral tablet: 1 tab(s) orally once a day (30 Nov 2021 21:21)  budesonide-formoterol 160 mcg-4.5 mcg/inh inhalation aerosol: 2 puff(s) inhaled 2 times a day (30 Nov 2021 21:21)  bumetanide 2 mg oral tablet: 1 tab(s) orally once a day (30 Nov 2021 21:21)  carvedilol 6.25 mg oral tablet: 1 tab(s) orally 2 times a day (30 Nov 2021 21:21)  DilTIAZem (Eqv-Cardizem CD) 240 mg/24 hours oral capsule, extended release: 1 cap(s) orally once a day (30 Nov 2021 21:21)  Effexor 37.5 mg oral capsule, extended release: 1 cap(s) orally once a day (30 Nov 2021 21:21)  hydrALAZINE 100 mg oral tablet: 1 tab(s) orally 3 times a day (30 Nov 2021 21:21)  losartan 100 mg oral tablet: 1 tab(s) orally once a day (30 Nov 2021 21:21)  Multi-Day Plus Minerals Multiple Vitamins with Minerals oral tablet: 1 tab(s) orally once a day (30 Nov 2021 21:21)  Synthroid 75 mcg (0.075 mg) oral tablet: 1 tab(s) orally once a day (30 Nov 2021 21:21)      Allergies    Tomas Cheese - Pt states the one time she had a mouthful of tomas cheese, her throat was closing and she required an epinephrine injection. (Anaphylaxis)  iodine (Anaphylaxis)  penicillin (Other)  shellfish (Short breath; Hives)  sulfa drugs (Hives)    Intolerances        ----  MEDICATIONS  (STANDING):  allopurinol 100 milliGRAM(s) Oral daily  apixaban 2.5 milliGRAM(s) Oral every 12 hours  aspirin enteric coated 81 milliGRAM(s) Oral daily  budesonide 160 MICROgram(s)/formoterol 4.5 MICROgram(s) Inhaler 2 Puff(s) Inhalation two times a day  carvedilol 6.25 milliGRAM(s) Oral every 12 hours  dexAMETHasone  Injectable 6 milliGRAM(s) IV Push daily  famotidine    Tablet 20 milliGRAM(s) Oral daily  furosemide   Injectable 40 milliGRAM(s) IV Push every 12 hours  hydrALAZINE 100 milliGRAM(s) Oral every 8 hours  levothyroxine 75 MICROGram(s) Oral daily  losartan 100 milliGRAM(s) Oral daily  remdesivir  IVPB   IV Intermittent   remdesivir  IVPB 100 milliGRAM(s) IV Intermittent every 24 hours  tiotropium 18 MICROgram(s) Capsule 1 Capsule(s) Inhalation daily  venlafaxine XR. 37.5 milliGRAM(s) Oral daily    MEDICATIONS  (PRN):  acetaminophen     Tablet .. 650 milliGRAM(s) Oral every 6 hours PRN Temp greater or equal to 38C (100.4F), Mild Pain (1 - 3)  albuterol/ipratropium for Nebulization 3 milliLiter(s) Nebulizer every 6 hours PRN Shortness of Breath and/or Wheezing  benzonatate 100 milliGRAM(s) Oral three times a day PRN Cough  melatonin 3 milliGRAM(s) Oral at bedtime PRN Insomnia      ----  REVIEW OF SYSTEMS:  Constitutional: denies fever, chills  HEENT: denies headache, dizziness, or lightheadedness  Respiratory: denies SOB, cough, or wheezing  Cardiovascular: denies CP, palpitations  Gastrointestinal: denies nausea, vomiting, diarrhea, constipation, abdominal pain, or bloody stools  Genitourinary: denies painful urination, increased frequency, urgency, or bloody urine  Skin/Breast: denies rashes or itching  Musculoskeletal: denies muscle aches, joint swelling, or muscle weakness  Neurologic: denies loss of sensation, numbness, or tingling    ----  PHYSICAL EXAM:  Gen: well appearing, NAD  HEENT: NCAT, PEERLA b/l, EOMI b/l, no conjunctival erythema  Cardio: regular rate and rhythm, +s1s2, no murmurs, rubs, or gallops  Pulm: diminished breath sounds b/l, LLL with crackles posteriorly  Abdomen: soft, nontender, nondistended, +BS x4 quadrants, no guarding  Extremities: trace edema in b/l LE   Neuro: AAOx3  Skin: warm and dry      T(C): 36.8 (12-01-21 @ 07:15), Max: 38.2 (11-30-21 @ 22:45)  HR: 50 (12-01-21 @ 07:15) (11 - 147)  BP: 114/54 (12-01-21 @ 07:15) (81/36 - 200/88)  RR: 18 (12-01-21 @ 07:15) (17 - 181)  SpO2: 98% (12-01-21 @ 07:15) (77% - 100%)  Wt(kg): --    ----  I&O's Summary      LABS:                        10.8   8.70  )-----------( 198      ( 01 Dec 2021 05:15 )             34.1     12-01    144  |  104  |  33<H>  ----------------------------<  139<H>  4.2   |  34<H>  |  1.50<H>    Ca    8.6      01 Dec 2021 05:15  Mg     2.4     11-30    TPro  6.9  /  Alb  2.8<L>  /  TBili  0.3  /  DBili  x   /  AST  36  /  ALT  32  /  AlkPhos  69  12-01    PT/INR - ( 30 Nov 2021 17:27 )   PT: 12.9 sec;   INR: 1.11 ratio         PTT - ( 30 Nov 2021 17:27 )  PTT:29.3 sec    CAPILLARY BLOOD GLUCOSE                    ----

## 2021-12-01 NOTE — CONSULT NOTE ADULT - SUBJECTIVE AND OBJECTIVE BOX
[facility]  INFECTIOUS DISEASES   =======================================================    [id number]  CHARLES GRAHAM     CC: shortness of breath     HPI:  89 yo woman with PMH of HTN, Paroxysmal A-Fib, Hypothyroidism, COPD (2-3 L nasal cannula at home) was admitted with shortness of breath and LE edema. She has been having difficulty standing up and walking a few steps for the last week. Patient lives with her son and his family, who hosted thanksgiving dinner. 1 guest at the dinner tested positive for COVID. Son bought an at home rapid test. He was positive, and patient was also positive. Patient admits to a dry cough that started Monday and progressively worsening LE edema in the last few days.   Not vaccinated.   In ED had high blood pressure and SpO2 77% RA, currently on NC 2L with 95% Sat.       PAST MEDICAL & SURGICAL HISTORY:  Hypertension  Depression  Overactive bladder  Gout  Osteoarthritis  Hypothyoid  HLD (hyperlipidemia)  Paroxysmal atrial fibrillation  Asthma  S/P cholecystectomy  S/P lumpectomy of breast  S/P hysterectomy  S/P lung surgery, follow-up exam  s/p removal of suspicious lesion, negative    Social Hx: no current smoking, ETOH or drugs     FAMILY HISTORY:  Family history of nasopharyngeal cancer (Child)  daughter    Family history of breast cancer (Mother)    Allergies  Tomas Cheese - Pt states the one time she had a mouthful of tomas cheese, her throat was closing and she required an epinephrine injection. (Anaphylaxis)  iodine (Anaphylaxis)  penicillin (Other)  shellfish (Short breath; Hives)  sulfa drugs (Hives)    Antibiotics:  dexAMETHasone  Injectable 6 milliGRAM(s) IV Push daily  remdesivir  IVPB 100 milliGRAM(s) IV Intermittent every 24 hours    REVIEW OF SYSTEMS:  CONSTITUTIONAL:  No Fever or chills  HEENT:  No diplopia or blurred vision.  No sore throat or runny nose.  CARDIOVASCULAR:  No chest pain or SOB.  RESPIRATORY:  +cough, +shortness of breath  GASTROINTESTINAL:  No nausea, vomiting or diarrhea.  GENITOURINARY:  No dysuria, frequency or urgency. No Blood in urine  MUSCULOSKELETAL:  no joint aches, no muscle pain  SKIN:  No change in skin, hair or nails.  NEUROLOGIC:  No paresthesias, fasciculations, seizures or weakness.  PSYCHIATRIC:  No disorder of thought or mood.  ENDOCRINE:  No heat or cold intolerance, polyuria or polydipsia.  HEMATOLOGICAL:  No easy bruising or bleeding.     Physical Exam:  Vital Signs Last 24 Hrs  T(C): 36.8 (01 Dec 2021 07:15), Max: 38.2 (30 Nov 2021 22:45)  T(F): 98.2 (01 Dec 2021 07:15), Max: 100.7 (30 Nov 2021 22:45)  HR: 50 (01 Dec 2021 07:15) (11 - 147)  BP: 114/54 (01 Dec 2021 07:15) (81/36 - 200/88)  RR: 18 (01 Dec 2021 07:15) (17 - 181)  SpO2: 98% (01 Dec 2021 07:15) (77% - 100%)  Height (cm): 152.4 (11-30 @ 16:06)  Weight (kg): 72.6 (11-30 @ 16:06)  BMI (kg/m2): 31.3 (11-30 @ 16:06)  BSA (m2): 1.7 (11-30 @ 16:06)  GEN: NAD, obese   HEENT: normocephalic and atraumatic. EOMI. PERRL.    NECK: Supple.  No lymphadenopathy   LUNGS: Some crackles in bases   HEART: Regular rate and rhythm   ABDOMEN: Soft, nontender, and nondistended.  Positive bowel sounds.    EXTREMITIES: 1+edema.  NEUROLOGIC: grossly intact.  PSYCHIATRIC: Appropriate affect .  SKIN: No rash    Labs:  12-01    144  |  104  |  33<H>  ----------------------------<  139<H>  4.2   |  34<H>  |  1.50<H>    Ca    8.6      01 Dec 2021 05:15  Mg     2.4     11-30    TPro  6.9  /  Alb  2.8<L>  /  TBili  0.3  /  DBili  x   /  AST  36  /  ALT  32  /  AlkPhos  69  12-01                        10.8   8.70  )-----------( 198      ( 01 Dec 2021 05:15 )             34.1     PT/INR - ( 30 Nov 2021 17:27 )   PT: 12.9 sec;   INR: 1.11 ratio    PTT - ( 30 Nov 2021 17:27 )  PTT:29.3 sec    LIVER FUNCTIONS - ( 01 Dec 2021 05:15 )  Alb: 2.8 g/dL / Pro: 6.9 g/dL / ALK PHOS: 69 U/L / ALT: 32 U/L / AST: 36 U/L / GGT: x           CARDIAC MARKERS ( 30 Nov 2021 17:27 )  x     / x     / x     / x     / <1.0 ng/mL    RECENT CULTURES:  11-30 @ 18:08      Detected    All imaging and other data have been reviewed.  < from: CT Chest No Cont (12.01.21 @ 02:32) >  IMPRESSION:  Left lower lobe pneumonia. Continued follow up to resolution is recommended.    Assessment and Plan:   89 yo woman with PMH of HTN, Paroxysmal A-Fib, Hypothyroidism, COPD (2-3 L nasal cannula at home) was admitted with shortness of breath and LE edema.  COVID diagnosis 11/30. Looks like has also pneumonia in LLL, opacity is different from viral pneumonia. Unclear if aspiration (more happens in RLL though) or other bacterial pneumonia, viral opacities are usually GGO.    Currently data and recommendations for COVID-19 treatment are rapidly changing, so this treatment plan is based on my clinical judgement with available information.     COVID 19   - BMP, CBC w diff, NLR. Procalcitonin, Ferritin, CRP, LDH and D dimer for the start and periodically can be repeated.   - Chest CT LLL opacity   - Start Remdesivir 200mg stat and 100mg daily for 4 more days (total 5days) or until discharge whichever comes first.   - Start Dexamethasone 6mg po daily for 10days  - Follow Creat and LFTs daily while on Remdesivir.    - Watch O2 sat closely and taper as tolerated.   - Will start on zosyn 3.375gm q8h to cover for possible aspiration and CAP for now.   - Will send MRSA PCR and urine legionella   - Prophylactic anticoagulation due to hypercoagulable state    Thank you for courtesy of this consult.     Will follow.   Discussed with primary team.     Dr. Mable Simon   Infectious Diseases   Please call [phone] between 8am and 6pm, call [phone] after 6pm or weekends.  [facility]  INFECTIOUS DISEASES   =======================================================    [id number]  CHARLES GRAHAM     CC: shortness of breath     HPI:  89 yo woman with PMH of HTN, Paroxysmal A-Fib, Hypothyroidism, COPD (2-3 L nasal cannula at home) was admitted with shortness of breath and LE edema. She has been having difficulty standing up and walking a few steps for the last week. Patient lives with her son and his family, who hosted thanksgiving dinner. 1 guest at the dinner tested positive for COVID. Son bought an at home rapid test. He was positive, and patient was also positive. Patient admits to a dry cough that started Monday and progressively worsening LE edema in the last few days.   Not vaccinated.   In ED had high blood pressure and SpO2 77% RA, currently on NC 2L with 95% Sat.       PAST MEDICAL & SURGICAL HISTORY:  Hypertension  Depression  Overactive bladder  Gout  Osteoarthritis  Hypothyoid  HLD (hyperlipidemia)  Paroxysmal atrial fibrillation  Asthma  S/P cholecystectomy  S/P lumpectomy of breast  S/P hysterectomy  S/P lung surgery, follow-up exam  s/p removal of suspicious lesion, negative    Social Hx: no current smoking, ETOH or drugs     FAMILY HISTORY:  Family history of nasopharyngeal cancer (Child)  daughter    Family history of breast cancer (Mother)    Allergies  Tomas Cheese - Pt states the one time she had a mouthful of tomas cheese, her throat was closing and she required an epinephrine injection. (Anaphylaxis)  iodine (Anaphylaxis)  penicillin (Other)  shellfish (Short breath; Hives)  sulfa drugs (Hives)    Antibiotics:  dexAMETHasone  Injectable 6 milliGRAM(s) IV Push daily  remdesivir  IVPB 100 milliGRAM(s) IV Intermittent every 24 hours    REVIEW OF SYSTEMS:  CONSTITUTIONAL:  No Fever or chills  HEENT:  No diplopia or blurred vision.  No sore throat or runny nose.  CARDIOVASCULAR:  No chest pain or SOB.  RESPIRATORY:  +cough, +shortness of breath  GASTROINTESTINAL:  No nausea, vomiting or diarrhea.  GENITOURINARY:  No dysuria, frequency or urgency. No Blood in urine  MUSCULOSKELETAL:  no joint aches, no muscle pain  SKIN:  No change in skin, hair or nails.  NEUROLOGIC:  No paresthesias, fasciculations, seizures or weakness.  PSYCHIATRIC:  No disorder of thought or mood.  ENDOCRINE:  No heat or cold intolerance, polyuria or polydipsia.  HEMATOLOGICAL:  No easy bruising or bleeding.     Physical Exam:  Vital Signs Last 24 Hrs  T(C): 36.8 (01 Dec 2021 07:15), Max: 38.2 (30 Nov 2021 22:45)  T(F): 98.2 (01 Dec 2021 07:15), Max: 100.7 (30 Nov 2021 22:45)  HR: 50 (01 Dec 2021 07:15) (11 - 147)  BP: 114/54 (01 Dec 2021 07:15) (81/36 - 200/88)  RR: 18 (01 Dec 2021 07:15) (17 - 181)  SpO2: 98% (01 Dec 2021 07:15) (77% - 100%)  Height (cm): 152.4 (11-30 @ 16:06)  Weight (kg): 72.6 (11-30 @ 16:06)  BMI (kg/m2): 31.3 (11-30 @ 16:06)  BSA (m2): 1.7 (11-30 @ 16:06)  GEN: NAD, obese   HEENT: normocephalic and atraumatic. EOMI. PERRL.    NECK: Supple.  No lymphadenopathy   LUNGS: Some crackles in bases   HEART: Regular rate and rhythm   ABDOMEN: Soft, nontender, and nondistended.  Positive bowel sounds.    EXTREMITIES: 1+edema.  NEUROLOGIC: grossly intact.  PSYCHIATRIC: Appropriate affect .  SKIN: No rash    Labs:  12-01    144  |  104  |  33<H>  ----------------------------<  139<H>  4.2   |  34<H>  |  1.50<H>    Ca    8.6      01 Dec 2021 05:15  Mg     2.4     11-30    TPro  6.9  /  Alb  2.8<L>  /  TBili  0.3  /  DBili  x   /  AST  36  /  ALT  32  /  AlkPhos  69  12-01                        10.8   8.70  )-----------( 198      ( 01 Dec 2021 05:15 )             34.1     PT/INR - ( 30 Nov 2021 17:27 )   PT: 12.9 sec;   INR: 1.11 ratio    PTT - ( 30 Nov 2021 17:27 )  PTT:29.3 sec    LIVER FUNCTIONS - ( 01 Dec 2021 05:15 )  Alb: 2.8 g/dL / Pro: 6.9 g/dL / ALK PHOS: 69 U/L / ALT: 32 U/L / AST: 36 U/L / GGT: x           CARDIAC MARKERS ( 30 Nov 2021 17:27 )  x     / x     / x     / x     / <1.0 ng/mL    RECENT CULTURES:  11-30 @ 18:08      Detected    All imaging and other data have been reviewed.  < from: CT Chest No Cont (12.01.21 @ 02:32) >  IMPRESSION:  Left lower lobe pneumonia. Continued follow up to resolution is recommended.    Assessment and Plan:   89 yo woman with PMH of HTN, Paroxysmal A-Fib, Hypothyroidism, COPD (2-3 L nasal cannula at home) was admitted with shortness of breath and LE edema.  COVID diagnosis 11/30. Looks like has also pneumonia in LLL, opacity is different from viral pneumonia. Unclear if aspiration (more happens in RLL though) or other bacterial pneumonia, viral opacities are usually GGO.    Currently data and recommendations for COVID-19 treatment are rapidly changing, so this treatment plan is based on my clinical judgement with available information.     COVID 19 and possible bacterial/aspiration pneumonia   - BMP, CBC w diff, NLR. Procalcitonin, Ferritin, CRP, LDH and D dimer for the start and periodically can be repeated.   - Chest CT LLL opacity   - Start Remdesivir 200mg stat and 100mg daily for 4 more days (total 5days) or until discharge whichever comes first.   - Start Dexamethasone 6mg po daily for 10days  - Follow Creat and LFTs daily while on Remdesivir.    - Watch O2 sat closely and taper as tolerated.   - Will start on ceftriaxone 1gm daily and metronidazole 500mg q8h to cover for possible aspiration and CAP (allergic to PCN)  - Will send MRSA PCR and urine legionella   - Sputum culture   - Prophylactic anticoagulation due to hypercoagulable state    Thank you for courtesy of this consult.     Will follow.   Discussed with primary team.     Dr. Mable Simon   Infectious Diseases   Please call [phone] between 8am and 6pm, call [phone] after 6pm or weekends.

## 2021-12-01 NOTE — PROGRESS NOTE ADULT - PROBLEM SELECTOR PLAN 3
#COPD exacerbation  -Nebs  -Steroids  - remdesivir for COVID pneumonia   - pulm consult [facility] #COPD exacerbation  -Nebs, albuterol and spiriva  -on Steroids for COVID19  - remdesivir for COVID pneumonia   - pulm consult [facility]

## 2021-12-01 NOTE — PROGRESS NOTE ADULT - PROBLEM SELECTOR PLAN 1
- supp O2 prn maintain O2 sats >88%. Prone PRN  - SpO2 , solumedrol 125 X 1 given, albuterol neb X 1 given; continue decadron 6 mg IV for 9 additional days.  - will start remdesivir, GFR >30 and ALT not > than 5x ULN  - monitor volume status closely, avoid aggressive hydration  - tylenol prn myalgias, fever  - continue with home nebs prn, and homs breztri daily   - daily labs: CBC with diff and CMP  - ferritin, crp, d-dimer (339), procal at admission (pending) then will repeat If clinically worsening every 48-72 hours.  - will initiate VTE ppx unless absolute contraindication- on eliquis 2.5 mg BID for pAF will continue   -GOC: DNR/DNI MOLST in chart  - will obtain CT non con fu results   - GABRIELA Simon consulted appreciate recs - supp O2 prn maintain O2 sats >88%. Prone PRN  - SpO2 , solumedrol 125 X 1 given, albuterol neb X 1 given; continue decadron 6 mg IV for 9 additional days.  - will start remdesivir, GFR >30 and ALT not > than 5x ULN  - monitor volume status closely, avoid aggressive hydration  - tylenol prn myalgias, fever  - continue with albuterol inhaler q6hrs  - daily labs: CBC with diff and CMP  - ferritin, crp, d-dimer (339), procal at admission (pending) then will repeat If clinically worsening every 48-72 hours.  - VTE ppx: on eliquis 2.5 mg BID for pAF will continue   -GOC: DNR/DNI MOLST in chart  - will obtain CT non con fu results   - GABRIELA Simon consulted appreciate recs

## 2021-12-01 NOTE — PROGRESS NOTE ADULT - PROBLEM SELECTOR PLAN 6
H/H 10  - unknown baseline  - likely 2/2 to volumer overloaded state   - no symptoms or signs of bleeding  - continue to monitor cbc   - fu iron panel in the morning H/H 10  - unknown baseline  - no symptoms or signs of bleeding  - continue to monitor cbc   - fu iron panel

## 2021-12-01 NOTE — PATIENT PROFILE ADULT - FALL HARM RISK - HARM RISK INTERVENTIONS

## 2021-12-01 NOTE — PROGRESS NOTE ADULT - PROBLEM SELECTOR PLAN 4
patient has no anginal symptoms, no evidence of plaque rupture on EKG   - likely 2/2 to demand in the setting of chf exacerbation, volume overload   - will trend   - cardio will see in the AM   - if uptrends would repeat EKG patient has no anginal symptoms, no evidence of plaque rupture on EKG   - likely 2/2 to demand in the setting of chf exacerbation, volume overload   - will trend to peak  - cardio consulted

## 2021-12-01 NOTE — CONSULT NOTE ADULT - ASSESSMENT
CKD 3  COVID +, Pneumonia  COPD  Hypertension    Worsening renal indices. Will d/c IV diuretics. Hold losartan. No evidence of fluid overload on CT scan. Rx for COVID pneumonia.   Add hydralazine for BP control. Monitor BP trend. Titrate BP meds as needed. Salt restriction. Will follow electrolytes and renal function trend.   Further recommendations pending clinical course. Thank you for the courtesy of this referral.   
89 yo F PMHx Paroxysmal Atrial Fibrillation, HTN, Hypothyroidism, COPD (2-3 L nasal cannula at home) Hyperlipidemia, presenting with shortness of breath and LE edema. Patient is short of breath at baseline but has been having difficulty standing up and walking a few steps for the last week. Patient lives with her son and his family, who hosted thanksgiving dinner. 1 guest at the dinner tested positive for COVID. Son bought an at home rapid test. He was positive, and patient was also positive. Her grandchildren and his wife were negative. Patient admits to a dry cough that started Monday and progressively worsening LE edema in the last few days. She is on bumetanide 1 mg daily for water retention denies any changes to dose or missed doses.     She does admit to a high salt diet and does not really watch what she eats.   She denies orthopnea, fever, chills.     - Acute hypoxemic respiratory failure due to COVID-19.  RLL pneumonia, ?Covid or bacterial.   Supplemental O2.  steroids, remdesivir.    Possible mild component of CHF.  ProBNP not significantly elevated when adjusted for age and AKD.    She also has chronic mild edema as baseline.    With rising creatinine, agree with holding diuresis.      -Elevated troponin.   Slight upward trend, probable demand ischemia.  EKG without acute change.    - Atrial fibrillation.   She has h/o PAF, usually in sinus rhythm.  In AF with RVR on admission.  Now converted to sinus rhythm. Continue carvedilol 6.25 mg BID and diltiazem  continue home eliquis 2.5 mg BID.    -Hypertension.   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  Now with relative hypotension.  Agree with holding losartan in setting of AKD.   Reduce hydralazine with high hold parameter.     Will follow.   
Pt. with acute respiratory failure due to COPD, LLL pneumonia due to COVID.  Paroxysmal at fib.  Hi troponin.  Agree with Remdesivir, steroids, melatonin.  Add Pepcid, Spiriva.  Fu CXR

## 2021-12-01 NOTE — PROGRESS NOTE ADULT - PROBLEM SELECTOR PLAN 7
Hypertensive urgency on admission  - likely 2/2 to COPD exacerbation and volume overload state in the setting of fluid overload  - continue hydralazine, losartan and diltiazem Hypertensive urgency on admission - now resolved - with episode of hypotension this AM will hold losartan   - will hold home losartan in setting of BRENDA  - continue home diltiazem

## 2021-12-01 NOTE — CONSULT NOTE ADULT - SUBJECTIVE AND OBJECTIVE BOX
PULMONARY/CRITICAL CARE        Patient is a 89y old  Female who presents with a chief complaint of SOB (30 Nov 2021 19:49)  Has COVID pneumonia, rapid atrial fib.    BRIEF HOSPITAL COURSE: ***89 yo F PMHx Paroxysmal Atrial Fibrillation, HTN, Hypothyroidism, COPD (2-3 L nasal cannula at home) Hyperlipidemia, presenting with shortness of breath and LE edema. Patient is short of breath at baseline but has been having difficulty standing up and walking a few steps for the last week. Patient lives with her son and his family, who hosted thanksgiving dinner. 1 guest at the dinner tested positive for COVID. Son bought an at home rapid test. He was positive, and patient was also positive. Her grandchildren and his wife were negative. Patient admits to a dry cough that started Monday and progressively worsening LE edema in the last few days. She is on bumetanide 1 mg daily for water retention denies any changes to dose or missed doses.     She does admit to a high salt diet and does not really watch what she eats.   She denies orthopnea, fever, chills, history of heart failure or CAD.   Son at bedside admits that she has a history of "mini stroke" in the past however, patient adamantly denies.     On Cardizem for Atrial fib.    Events last 24 hours: ***    PAST MEDICAL & SURGICAL HISTORY:  Hypertension    Depression    Overactive bladder    Gout    Osteoarthritis    Hypothyroid    HLD (hyperlipidemia)    Paroxysmal atrial fibrillation    Asthma    S/P cholecystectomy    S/P lumpectomy of breast    S/P hysterectomy    S/P lung surgery, follow-up exam  s/p removal of suspicious lesion, negative      Allergies    Tomas Cheese - Pt states the one time she had a mouthful of tomas cheese, her throat was closing and she required an epinephrine injection. (Anaphylaxis)  iodine (Anaphylaxis)  penicillin (Other)  shellfish (Short breath; Hives)  sulfa drugs (Hives)    Intolerances      FAMILY HISTORY social--no recent cigs, etoh.   Family history of nasopharyngeal cancer (Child)  daughter    Family history of breast cancer (Mother)        Review of Systems:  CONSTITUTIONAL: No fever, chills, or fatigue  EYES: No eye pain, visual disturbances, or discharge  ENMT:  No difficulty hearing, tinnitus, vertigo; No sinus or throat pain  NECK: No pain or stiffness  RESPIRATORY: has cough, wheezing, no chills or hemoptysis; Has shortness of breath  CARDIOVASCULAR: No chest pain, palpitations, dizziness, or leg swelling  GASTROINTESTINAL: No abdominal or epigastric pain. No nausea, vomiting, or hematemesis; No diarrhea or constipation. No melena or hematochezia.  GENITOURINARY: No dysuria, frequency, hematuria, or incontinence  NEUROLOGICAL: No headaches, memory loss, loss of strength, numbness, or tremors  SKIN: No itching, burning, rashes, or lesions   MUSCULOSKELETAL: No joint pain or swelling; No muscle, back, or extremity pain  PSYCHIATRIC: No depression, anxiety, mood swings, or difficulty sleeping      Medications:  remdesivir  IVPB   IV Intermittent   remdesivir  IVPB 100 milliGRAM(s) IV Intermittent every 24 hours    carvedilol 6.25 milliGRAM(s) Oral every 12 hours  furosemide   Injectable 40 milliGRAM(s) IV Push every 12 hours  hydrALAZINE 100 milliGRAM(s) Oral every 8 hours  losartan 100 milliGRAM(s) Oral daily    albuterol/ipratropium for Nebulization 3 milliLiter(s) Nebulizer every 6 hours PRN  albuterol/ipratropium for Nebulization. 3 milliLiter(s) Nebulizer once  benzonatate 100 milliGRAM(s) Oral three times a day PRN  budesonide 160 MICROgram(s)/formoterol 4.5 MICROgram(s) Inhaler 2 Puff(s) Inhalation two times a day  tiotropium 18 MICROgram(s) Capsule 1 Capsule(s) Inhalation daily    acetaminophen     Tablet .. 650 milliGRAM(s) Oral every 6 hours PRN  melatonin 3 milliGRAM(s) Oral at bedtime PRN  venlafaxine XR. 37.5 milliGRAM(s) Oral daily      apixaban 2.5 milliGRAM(s) Oral every 12 hours  aspirin enteric coated 81 milliGRAM(s) Oral daily    famotidine    Tablet 20 milliGRAM(s) Oral daily      allopurinol 100 milliGRAM(s) Oral daily  dexAMETHasone  Injectable 6 milliGRAM(s) IV Push daily  levothyroxine 75 MICROGram(s) Oral daily                  ICU Vital Signs Last 24 Hrs  T(C): 36.8 (01 Dec 2021 07:15), Max: 38.2 (30 Nov 2021 22:45)  T(F): 98.2 (01 Dec 2021 07:15), Max: 100.7 (30 Nov 2021 22:45)  HR: 50 (01 Dec 2021 07:15) (11 - 147)  BP: 114/54 (01 Dec 2021 07:15) (81/36 - 200/88)  BP(mean): --  ABP: --  ABP(mean): --  RR: 18 (01 Dec 2021 07:15) (17 - 181)  SpO2: 98% (01 Dec 2021 07:15) (77% - 100%)    Vital Signs Last 24 Hrs  T(C): 36.8 (01 Dec 2021 07:15), Max: 38.2 (30 Nov 2021 22:45)  T(F): 98.2 (01 Dec 2021 07:15), Max: 100.7 (30 Nov 2021 22:45)  HR: 50 (01 Dec 2021 07:15) (11 - 147)  BP: 114/54 (01 Dec 2021 07:15) (81/36 - 200/88)  BP(mean): --  RR: 18 (01 Dec 2021 07:15) (17 - 181)  SpO2: 98% (01 Dec 2021 07:15) (77% - 100%)        I&O's Detail        LABS:                        10.8   8.70  )-----------( 198      ( 01 Dec 2021 05:15 )             34.1     12-01    144  |  104  |  33<H>  ----------------------------<  139<H>  4.2   |  34<H>  |  1.50<H>    Ca    8.6      01 Dec 2021 05:15  Mg     2.4     11-30    TPro  6.9  /  Alb  2.8<L>  /  TBili  0.3  /  DBili  x   /  AST  36  /  ALT  32  /  AlkPhos  69  12-01      CARDIAC MARKERS ( 30 Nov 2021 17:27 )  x     / x     / x     / x     / <1.0 ng/mL      CAPILLARY BLOOD GLUCOSE        PT/INR - ( 30 Nov 2021 17:27 )   PT: 12.9 sec;   INR: 1.11 ratio         PTT - ( 30 Nov 2021 17:27 )  PTT:29.3 sec    CULTURES:      Physical Examination:    General: mild  acute distress.  elderly female    HEENT: Pupils equal, reactive to light.  Symmetric.    PULM: crackles 1/3 up bilat;  few rhonchi,wheezes bilat; No change percussion    CVS: irregular rate and rhythm, no murmurs, rubs, or gallops    ABD: Soft, nondistended, nontender, normoactive bowel sounds, no masses    EXT: No edema, nontender calves    SKIN: Warm and well perfused, no rashes noted.    NEURO: Alert, oriented, interactive, nonfocal    RADIOLOGY: ***  < from: CT Chest No Cont (12.01.21 @ 02:32) >  EXAM:  CT CHEST                            PROCEDURE DATE:  12/01/2021          INTERPRETATION:  CLINICAL INFORMATION: Shortness of breath, COPD, Covid.    COMPARISON: 11/4/2018    CONTRAST/COMPLICATIONS:  IV Contrast: NONE  Oral Contrast: NONE  Complications: None reported at time of study completion    PROCEDURE:  CT of the Chest was performed.  Sagittal and coronal reformats were performed.    FINDINGS:  Evaluation of solid organs and vascular structures is limited without intravenous contrast.    LUNGS AND AIRWAYS: Patent central airways. Right upper lobe wedge resection. 2 mm right upper lobe nodule (2, 37). Stable 3 mm right middle lobe nodule (2, 80). Stable 6 mm right lower lobe nodule (2, 74). Stable 5 mm nodule along the right major fissure likely reflecting intrapulmonary node (2, 81). Linear scarring/atelectasis in the right lower lobe. Subsegmental lingular and left lower lobe atelectasis. Patchy opacities in the left lower lobe.  PLEURA: No pleural effusion.  MEDIASTINUM AND FIDEL: 1 cm precarinal node. Subcentimeter other mediastinal nodes.  VESSELS: Aortic and coronary atherosclerosis.  HEART: Mild cardiomegaly. No pericardial effusion.  CHEST WALL AND LOWER NECK: Stable 9 mm hypodense right thyroid nodule.  VISUALIZED UPPER ABDOMEN: Cholecystectomy. Multiple cystic lesions within the pancreas, grossly stable, likely IPMNs or pseudocysts. Bilateral renal cysts and hyperdense lesions likely reflecting hemorrhagic/proteinaceous cyst. Stable appearance of right renalcyst containing internal clustered calcifications (2, 37).  BONES: Degenerative changes. Multiple chronic right rib deformities. Stable mild compression deformity of L2.    IMPRESSION:  Left lower lobe pneumonia. Continued follow up to resolution is recommended.        --- End of Report ---            VENU ZHANG MD; Attending Radiologist  This document has been electronically signed. Dec  1 2021  3:26AM    < end of copied text >  < from: US Duplex Venous Lower Ext Complete, Bilateral (12.01.21 @ 02:08) >  EXAM:  US DPLX LWR EXT VEINS COMPL BI                            PROCEDURE DATE:  12/01/2021          INTERPRETATION:  CLINICAL INDICATION: le edmea sob hypoxia.    TECHNIQUE: Grayscale, color Doppler and spectral Doppler ultrasound was utilized toevaluate bilateral lower extremity deep venous system.    COMPARISON: 9/14/2015.    FINDINGS: There is no thrombus in bilateral common femoral veins, femoral veins or popliteal veins. Visualized bilateral posterior tibial veins and peroneal veins arepatent. Bilateral gastrocnemius veins and soleal veins were not visualized.    IMPRESSION:    Bilateral gastrocnemius veins and soleal veins were not visualized, limiting complete evaluation. No obvious thrombus in the visualized bilateral lower extremity deep veins.    --- End of Report ---        < end of copied text >    CRITICAL CARE TIME SPENT: ***

## 2021-12-01 NOTE — CONSULT NOTE ADULT - SUBJECTIVE AND OBJECTIVE BOX
Patient is a 89y old  Female who presents with a chief complaint of SOB (01 Dec 2021 09:56)    HPI:  89 yo F PMHx Paroxysmal Atrial Fibrillation, HTN, Hypothyroidism, COPD (2-3 L nasal cannula at home) Hyperlipidemia, presenting with shortness of breath and LE edema. Patient is short of breath at baseline but has been having difficulty standing up and walking a few steps for the last week. Patient lives with her son and his family, who hosted thanksgiving dinner. 1 guest at the dinner tested positive for COVID. Son bought an at home rapid test. He was positive, and patient was also positive. Her grandchildren and his wife were negative. Patient admits to a dry cough that started Monday and progressively worsening LE edema in the last few days. She is on bumetanide 1 mg daily for water retention denies any changes to dose or missed doses.     She does admit to a high salt diet and does not really watch what she eats.   She denies orthopnea, fever, chills, history of heart failure or CAD.   Son at bedside admits that she has a history of "mini stroke" in the past however, patient adamantly denies.     ED Course:   Vital Signs: 200/88. O2 77% RA, RR 22  EKG: rapid a fib @ 101 no ischemic changes   Labs significant for:  WBC 10.53, BUN/Cr: 24/1.10 H/H 10/30.3, BNP: 5036, CK-MB < 1, troponin 123.9   Imaging: Chest X ray: hazy opacities bilaterally in all lung fields, no clear consolidation, (awaiting official read)   Given: lasix 40 mg IV X 1  (30 Nov 2021 19:49)    Renal consult called for abnormal renal function. History obtained from chart and patient.       PAST MEDICAL HISTORY:  Hypertension    Depression    CVA (cerebral infarction)    Overactive bladder    Gout    Osteoarthritis    Hypothyroid    HLD (hyperlipidemia)    Paroxysmal atrial fibrillation    Asthma        PAST SURGICAL HISTORY:  No significant past surgical history    S/P cholecystectomy    S/P lumpectomy of breast    S/P hysterectomy    S/P lung surgery, follow-up exam        FAMILY HISTORY:  Family history of nasopharyngeal cancer (Child)  daughter    Family history of breast cancer (Mother)        SOCIAL HISTORY: No smoking or alcohol use     Allergies    Tomas Cheese - Pt states the one time she had a mouthful of tomas cheese, her throat was closing and she required an epinephrine injection. (Anaphylaxis)  iodine (Anaphylaxis)  penicillin (Other)  shellfish (Short breath; Hives)  sulfa drugs (Hives)    Intolerances      Home Medications:  albuterol 2.5 mg/3 mL (0.083%) inhalation solution: 3 milliliter(s) inhaled every 6 hours, As Needed (30 Nov 2021 21:21)  Align 4 mg oral capsule: 1 cap(s) orally once a day (30 Nov 2021 21:21)  allopurinol 100 mg oral tablet: 1 tab(s) orally once a day (30 Nov 2021 21:21)  aspirin 81 mg oral tablet: 1 tab(s) orally once a day (30 Nov 2021 21:21)  budesonide-formoterol 160 mcg-4.5 mcg/inh inhalation aerosol: 2 puff(s) inhaled 2 times a day (30 Nov 2021 21:21)  bumetanide 2 mg oral tablet: 1 tab(s) orally once a day (30 Nov 2021 21:21)  carvedilol 6.25 mg oral tablet: 1 tab(s) orally 2 times a day (30 Nov 2021 21:21)  DilTIAZem (Eqv-Cardizem CD) 240 mg/24 hours oral capsule, extended release: 1 cap(s) orally once a day (30 Nov 2021 21:21)  Effexor 37.5 mg oral capsule, extended release: 1 cap(s) orally once a day (30 Nov 2021 21:21)  hydrALAZINE 100 mg oral tablet: 1 tab(s) orally 3 times a day (30 Nov 2021 21:21)  losartan 100 mg oral tablet: 1 tab(s) orally once a day (30 Nov 2021 21:21)  Multi-Day Plus Minerals Multiple Vitamins with Minerals oral tablet: 1 tab(s) orally once a day (30 Nov 2021 21:21)  Synthroid 75 mcg (0.075 mg) oral tablet: 1 tab(s) orally once a day (30 Nov 2021 21:21)    MEDICATIONS  (STANDING):  allopurinol 100 milliGRAM(s) Oral daily  apixaban 2.5 milliGRAM(s) Oral every 12 hours  aspirin enteric coated 81 milliGRAM(s) Oral daily  budesonide 160 MICROgram(s)/formoterol 4.5 MICROgram(s) Inhaler 2 Puff(s) Inhalation two times a day  carvedilol 6.25 milliGRAM(s) Oral every 12 hours  dexAMETHasone  Injectable 6 milliGRAM(s) IV Push daily  famotidine    Tablet 20 milliGRAM(s) Oral daily  furosemide   Injectable 40 milliGRAM(s) IV Push every 12 hours  levothyroxine 75 MICROGram(s) Oral daily  losartan 100 milliGRAM(s) Oral daily  remdesivir  IVPB   IV Intermittent   remdesivir  IVPB 100 milliGRAM(s) IV Intermittent every 24 hours  tiotropium 18 MICROgram(s) Capsule 1 Capsule(s) Inhalation daily  venlafaxine XR. 37.5 milliGRAM(s) Oral daily    MEDICATIONS  (PRN):  acetaminophen     Tablet .. 650 milliGRAM(s) Oral every 6 hours PRN Temp greater or equal to 38C (100.4F), Mild Pain (1 - 3)  albuterol/ipratropium for Nebulization 3 milliLiter(s) Nebulizer every 6 hours PRN Shortness of Breath and/or Wheezing  benzonatate 100 milliGRAM(s) Oral three times a day PRN Cough  melatonin 3 milliGRAM(s) Oral at bedtime PRN Insomnia      REVIEW OF SYSTEMS:  General: weak  Respiratory: + SOB  Cardiovascular: No CP or Palpitations	  Gastrointestinal: No nausea, Vomiting. No diarrhea  Genitourinary: No urinary complaints	  Musculoskeletal: No new rash or lesions	  all other systems negative    T(F): 98.2 (12-01-21 @ 07:15), Max: 100.7 (11-30-21 @ 22:45)  HR: 50 (12-01-21 @ 07:15) (11 - 147)  BP: 114/54 (12-01-21 @ 07:15) (81/36 - 200/88)  RR: 18 (12-01-21 @ 07:15) (17 - 181)  SpO2: 98% (12-01-21 @ 07:15) (77% - 100%)  Wt(kg): --    PHYSICAL EXAM:  General: NAD  Respiratory: b/l air entry  Cardiovascular: S1 S2  Gastrointestinal: soft  Extremities: edema        12-01    144  |  104  |  33<H>  ----------------------------<  139<H>  4.2   |  34<H>  |  1.50<H>    Ca    8.6      01 Dec 2021 05:15  Mg     2.4     11-30    TPro  6.9  /  Alb  2.8<L>  /  TBili  0.3  /  DBili  x   /  AST  36  /  ALT  32  /  AlkPhos  69  12-01                          10.8   8.70  )-----------( 198      ( 01 Dec 2021 05:15 )             34.1       Potassium, Serum: 4.2 mmol/L (12-01 @ 05:15)  Blood Urea Nitrogen, Serum: 33 mg/dL (12-01 @ 05:15)  Calcium, Total Serum: 8.6 mg/dL (12-01 @ 05:15)  Hemoglobin: 10.8 g/dL (12-01 @ 05:15)      Creatinine, Serum: 1.50 (12-01 @ 05:15)  Creatinine, Serum: 1.10 (11-30 @ 17:27)        LIVER FUNCTIONS - ( 01 Dec 2021 05:15 )  Alb: 2.8 g/dL / Pro: 6.9 g/dL / ALK PHOS: 69 U/L / ALT: 32 U/L / AST: 36 U/L / GGT: x           CARDIAC MARKERS ( 30 Nov 2021 17:27 )  x     / x     / x     / x     / <1.0 ng/mL        < from: CT Chest No Cont (12.01.21 @ 02:32) >    EXAM:  CT CHEST                            PROCEDURE DATE:  12/01/2021          INTERPRETATION:  CLINICAL INFORMATION: Shortness of breath, COPD, Covid.    COMPARISON: 11/4/2018    CONTRAST/COMPLICATIONS:  IV Contrast: NONE  Oral Contrast: NONE  Complications: None reported at time of study completion    PROCEDURE:  CT of the Chest was performed.  Sagittal and coronal reformats were performed.    FINDINGS:  Evaluation of solid organs and vascular structures is limited without intravenous contrast.    LUNGS AND AIRWAYS: Patent central airways. Right upper lobe wedge resection. 2 mm right upper lobe nodule (2, 37). Stable 3 mm right middle lobe nodule (2, 80). Stable 6 mm right lower lobe nodule (2, 74). Stable 5 mm nodule along the right major fissure likely reflecting intrapulmonary node (2, 81). Linear scarring/atelectasis in the right lower lobe. Subsegmental lingular and left lower lobe atelectasis. Patchy opacities in the left lower lobe.  PLEURA: No pleural effusion.  MEDIASTINUM AND FIDEL: 1 cm precarinal node. Subcentimeter other mediastinal nodes.  VESSELS: Aortic and coronary atherosclerosis.  HEART: Mild cardiomegaly. No pericardial effusion.  CHEST WALL AND LOWER NECK: Stable 9 mm hypodense right thyroid nodule.  VISUALIZED UPPER ABDOMEN: Cholecystectomy. Multiple cystic lesions within the pancreas, grossly stable, likely IPMNs or pseudocysts. Bilateral renal cysts and hyperdense lesions likely reflecting hemorrhagic/proteinaceous cyst. Stable appearance of right renalcyst containing internal clustered calcifications (2, 37).  BONES: Degenerative changes. Multiple chronic right rib deformities. Stable mild compression deformity of L2.    IMPRESSION:  Left lower lobe pneumonia. Continued follow up to resolution is recommended.        --- End of Report ---            VENU ZHANG MD; Attending Radiologist  This document has been electronically signed. Dec  1 2021  3:26AM    < end of copied text >

## 2021-12-01 NOTE — PROGRESS NOTE ADULT - ATTENDING COMMENTS
Acute hypoxemic respiratory failure due to COVID-19.   : - supp O2 prn maintain O2 sats >88%. Prone PRN  - SpO2 , solumedrol 125 X 1 given, albuterol neb X 1 given; continue decadron 6 mg IV for 9 additional days.  - will start remdesivir, GFR >30 and ALT not > than 5x ULN  - monitor volume status closely, avoid aggressive hydration  - tylenol prn myalgias, fever  - continue with albuterol inhaler q6hrs  - daily labs: CBC with diff and CMP  - ferritin, crp, d-dimer (339), procal at admission (pending) then will repeat If clinically worsening every 48-72 hours.  - VTE ppx: on eliquis 2.5 mg BID for pAF will continue   -GOC: DNR/DNI MOLST in chart  - will obtain CT non con fu results   - GABRIELA Simon consulted appreciate recs. Acute hypoxemic respiratory failure due to COVID-19.   : - supp O2 prn maintain O2 sats >88%. Prone PRN  - SpO2 , solumedrol 125 X 1 given, albuterol neb X 1 given; continue decadron 6 mg IV for 9 additional days.  - will start remdesivir, GFR >30 and ALT not > than 5x ULN  - monitor volume status closely, avoid aggressive hydration  - tylenol prn myalgias, fever  - continue with albuterol inhaler q6hrs  - daily labs: CBC with diff and CMP  - ferritin, crp, d-dimer (339), procal at admission (pending) then will repeat If clinically worsening every 48-72 hours.  - VTE ppx: on eliquis 2.5 mg BID for pAF will continue   -GOC: DNR/DNI MOLST in chart  - will obtain CT non con fu results   - GABRIELA Simon consulted appreciate recs.    discussed with family, and answered all questions

## 2021-12-01 NOTE — PROGRESS NOTE ADULT - PROBLEM SELECTOR PLAN 8
continue synthroid   - tachycardia  - check TFTs in the AM chronic, stable  -continue home synthroid

## 2021-12-01 NOTE — CONSULT NOTE ADULT - SUBJECTIVE AND OBJECTIVE BOX
CARDIOLOGY CONSULT NOTE    Patient is a 89y Female with a known history of :  Acute hypoxemic respiratory failure due to COVID-19 [U07.1]    Acute on chronic systolic congestive heart failure [I50.23]    Atrial fibrillation [I48.91]    Hypertension [I10]    Hypothyroidism [E03.9]    Need for prophylactic measure [Z29.9]    COPD exacerbation [J44.1]    Suspected pulmonary embolism [[id number]]    Major depression [F32.9]    Elevated troponin [R77.8]    Anemia [D64.9]      HPI:  89 yo F PMHx Paroxysmal Atrial Fibrillation, HTN, Hypothyroidism, COPD (2-3 L nasal cannula at home) Hyperlipidemia, presenting with shortness of breath and LE edema. Patient is short of breath at baseline but has been having difficulty standing up and walking a few steps for the last week. Patient lives with her son and his family, who hosted thanksgiving dinner. 1 guest at the dinner tested positive for COVID. Son bought an at home rapid test. He was positive, and patient was also positive. Her grandchildren and his wife were negative. Patient admits to a dry cough that started Monday and progressively worsening LE edema in the last few days. She is on bumetanide 1 mg daily for water retention denies any changes to dose or missed doses.     She does admit to a high salt diet and does not really watch what she eats.   She denies orthopnea, fever, chills, history of heart failure or CAD.   Son at bedside admits that she has a history of "mini stroke" in the past however, patient adamantly denies.     ED Course:   Vital Signs: 200/88. O2 77% RA, RR 22  EKG: rapid a fib @ 101 no ischemic changes   Labs significant for:  WBC 10.53, BUN/Cr: 24/1.10 H/H 10/30.3, BNP: 5036, CK-MB < 1, troponin 123.9   Imaging: Chest X ray: hazy opacities bilaterally in all lung fields, no clear consolidation, (awaiting official read)   Given: lasix 40 mg IV X 1  (30 Nov 2021 19:49)      REVIEW OF SYSTEMS:    CONSTITUTIONAL: No fever, weight loss, or fatigue  EYES: No eye pain, visual disturbances, or discharge  ENMT:  No difficulty hearing, tinnitus, vertigo; No sinus or throat pain  NECK: No pain or stiffness  BREASTS: No pain, masses, or nipple discharge  RESPIRATORY: No cough, wheezing, chills or hemoptysis; No shortness of breath  CARDIOVASCULAR: No chest pain, palpitations, dizziness, or leg swelling  GASTROINTESTINAL: No abdominal or epigastric pain. No nausea, vomiting, or hematemesis; No diarrhea or constipation. No melena or hematochezia.  GENITOURINARY: No dysuria, frequency, hematuria, or incontinence  NEUROLOGICAL: No headaches, memory loss, loss of strength, numbness, or tremors  SKIN: No itching, burning, rashes, or lesions   LYMPH NODES: No enlarged glands  ENDOCRINE: No heat or cold intolerance; No hair loss  MUSCULOSKELETAL: No joint pain or swelling; No muscle, back, or extremity pain  PSYCHIATRIC: No depression, anxiety, mood swings, or difficulty sleeping  HEME/LYMPH: No easy bruising, or bleeding gums  ALLERGY AND IMMUNOLOGIC: No hives or eczema    MEDICATIONS  (STANDING):  albuterol/ipratropium for Nebulization. 3 milliLiter(s) Nebulizer once  allopurinol 100 milliGRAM(s) Oral daily  apixaban 2.5 milliGRAM(s) Oral every 12 hours  aspirin enteric coated 81 milliGRAM(s) Oral daily  budesonide 160 MICROgram(s)/formoterol 4.5 MICROgram(s) Inhaler 2 Puff(s) Inhalation two times a day  carvedilol 6.25 milliGRAM(s) Oral every 12 hours  dexAMETHasone  Injectable 6 milliGRAM(s) IV Push daily  famotidine    Tablet 20 milliGRAM(s) Oral daily  furosemide   Injectable 40 milliGRAM(s) IV Push every 12 hours  hydrALAZINE 100 milliGRAM(s) Oral every 8 hours  levothyroxine 75 MICROGram(s) Oral daily  losartan 100 milliGRAM(s) Oral daily  remdesivir  IVPB   IV Intermittent   remdesivir  IVPB 100 milliGRAM(s) IV Intermittent every 24 hours  tiotropium 18 MICROgram(s) Capsule 1 Capsule(s) Inhalation daily  venlafaxine XR. 37.5 milliGRAM(s) Oral daily    MEDICATIONS  (PRN):  acetaminophen     Tablet .. 650 milliGRAM(s) Oral every 6 hours PRN Temp greater or equal to 38C (100.4F), Mild Pain (1 - 3)  albuterol/ipratropium for Nebulization 3 milliLiter(s) Nebulizer every 6 hours PRN Shortness of Breath and/or Wheezing  benzonatate 100 milliGRAM(s) Oral three times a day PRN Cough  melatonin 3 milliGRAM(s) Oral at bedtime PRN Insomnia      ALLERGIES: Tomas Cheese - Pt states the one time she had a mouthful of tomas cheese, her throat was closing and she required an epinephrine injection. (Anaphylaxis)  iodine (Anaphylaxis)  penicillin (Other)  shellfish (Short breath; Hives)  sulfa drugs (Hives)      FAMILY HISTORY:  Family history of nasopharyngeal cancer (Child)  daughter    Family history of breast cancer (Mother)        PHYSICAL EXAMINATION:  -----------------------------  T(C): 36.8 (12-01-21 @ 07:15), Max: 38.2 (11-30-21 @ 22:45)  HR: 50 (12-01-21 @ 07:15) (11 - 147)  BP: 114/54 (12-01-21 @ 07:15) (81/36 - 200/88)  RR: 18 (12-01-21 @ 07:15) (17 - 181)  SpO2: 98% (12-01-21 @ 07:15) (77% - 100%)  Wt(kg): --    Height (cm): 152.4 (11-30 @ 16:06)  Weight (kg): 72.6 (11-30 @ 16:06)  BMI (kg/m2): 31.3 (11-30 @ 16:06)  BSA (m2): 1.7 (11-30 @ 16:06)    Constitutional: well developed, normal appearance, well groomed, well nourished, no deformities and no acute distress.   Eyes: the conjunctiva exhibited no abnormalities and the eyelids demonstrated no xanthelasmas.   HEENT: normal oral mucosa, no oral pallor and no oral cyanosis.   Neck: normal jugular venous A waves present, normal jugular venous V waves present and no jugular venous lan A waves.   Pulmonary: no respiratory distress, normal respiratory rhythm and effort, no accessory muscle use and lungs were clear to auscultation bilaterally.   Cardiovascular: heart rate and rhythm were normal, normal S1 and S2 and no murmur, gallop, rub, heave or thrill are present.   Abdomen: soft, non-tender, no hepato-splenomegaly and no abdominal mass palpated.   Musculoskeletal: the gait could not be assessed..   Extremities: no clubbing of the fingernails, no localized cyanosis, no petechial hemorrhages and no ischemic changes.   Skin: normal skin color and pigmentation, no rash, no venous stasis, no skin lesions, no skin ulcer and no xanthoma was observed.   Psychiatric: oriented to person, place, and time, the affect was normal, the mood was normal and not feeling anxious.     LABS:   --------  12-01    144  |  104  |  33<H>  ----------------------------<  139<H>  4.2   |  34<H>  |  1.50<H>    Ca    8.6      01 Dec 2021 05:15  Mg     2.4     11-30    TPro  6.9  /  Alb  2.8<L>  /  TBili  0.3  /  DBili  x   /  AST  36  /  ALT  32  /  AlkPhos  69  12-01                         10.8   8.70  )-----------( 198      ( 01 Dec 2021 05:15 )             34.1     PT/INR - ( 30 Nov 2021 17:27 )   PT: 12.9 sec;   INR: 1.11 ratio         PTT - ( 30 Nov 2021 17:27 )  PTT:29.3 sec  11-30 @ 17:27 BNP: 5036 pg/mL            RADIOLOGY:  -----------------        ECG:  CARDIOLOGY CONSULT NOTE    Patient is a 89y Female with a known history of :  Acute hypoxemic respiratory failure due to COVID-19 [U07.1]    Acute on chronic systolic congestive heart failure [I50.23]    Atrial fibrillation [I48.91]    Hypertension [I10]    Hypothyroidism [E03.9]    Need for prophylactic measure [Z29.9]    COPD exacerbation [J44.1]    Suspected pulmonary embolism [[id number]]    Major depression [F32.9]    Elevated troponin [R77.8]    Anemia [D64.9]      HPI:  89 yo F PMHx Paroxysmal Atrial Fibrillation, HTN, Hypothyroidism, COPD (2-3 L nasal cannula at home) Hyperlipidemia, presenting with shortness of breath and LE edema. Patient is short of breath at baseline but has been having difficulty standing up and walking a few steps for the last week. Patient lives with her son and his family, who hosted thanksgiving dinner. 1 guest at the dinner tested positive for COVID. Son bought an at home rapid test. He was positive, and patient was also positive. Her grandchildren and his wife were negative. Patient admits to a dry cough that started Monday and progressively worsening LE edema in the last few days. She is on bumetanide 1 mg daily for water retention denies any changes to dose or missed doses.     She does admit to a high salt diet and does not really watch what she eats.   She denies orthopnea, fever, chills, history of heart failure or CAD.   Son at bedside admits that she has a history of "mini stroke" in the past however, patient adamantly denies.     ED Course:   Vital Signs: 200/88. O2 77% RA, RR 22  EKG: rapid a fib @ 101 no ischemic changes   Labs significant for:  WBC 10.53, BUN/Cr: 24/1.10 H/H 10/30.3, BNP: 5036, CK-MB < 1, troponin 123.9   Imaging: Chest X ray: hazy opacities bilaterally in all lung fields, no clear consolidation, (awaiting official read)   Given: lasix 40 mg IV X 1  (30 Nov 2021 19:49)      REVIEW OF SYSTEMS:    CONSTITUTIONAL:  + fatigue  NECK: No pain or stiffness  RESPIRATORY:  see HPI  CARDIOVASCULAR: No chest pain, palpitations, dizziness,  +leg swelling  GASTROINTESTINAL: No abdominal or epigastric pain. No hematemesis;  No melena or hematochezia.  NEUROLOGICAL: No headaches, memory loss,  numbness, or tremors  SKIN: No itching, burning, rashes, or lesions   MUSCULOSKELETAL: No joint pain or swelling; No muscle, back, or extremity pain  HEME/LYMPH: No easy bruising, or bleeding gums  ALLERGY AND IMMUNOLOGIC: No hives or eczema    MEDICATIONS  (STANDING):  albuterol/ipratropium for Nebulization. 3 milliLiter(s) Nebulizer once  allopurinol 100 milliGRAM(s) Oral daily  apixaban 2.5 milliGRAM(s) Oral every 12 hours  aspirin enteric coated 81 milliGRAM(s) Oral daily  budesonide 160 MICROgram(s)/formoterol 4.5 MICROgram(s) Inhaler 2 Puff(s) Inhalation two times a day  carvedilol 6.25 milliGRAM(s) Oral every 12 hours  dexAMETHasone  Injectable 6 milliGRAM(s) IV Push daily  famotidine    Tablet 20 milliGRAM(s) Oral daily  furosemide   Injectable 40 milliGRAM(s) IV Push every 12 hours  hydrALAZINE 100 milliGRAM(s) Oral every 8 hours  levothyroxine 75 MICROGram(s) Oral daily  losartan 100 milliGRAM(s) Oral daily  remdesivir  IVPB   IV Intermittent   remdesivir  IVPB 100 milliGRAM(s) IV Intermittent every 24 hours  tiotropium 18 MICROgram(s) Capsule 1 Capsule(s) Inhalation daily  venlafaxine XR. 37.5 milliGRAM(s) Oral daily    MEDICATIONS  (PRN):  acetaminophen     Tablet .. 650 milliGRAM(s) Oral every 6 hours PRN Temp greater or equal to 38C (100.4F), Mild Pain (1 - 3)  albuterol/ipratropium for Nebulization 3 milliLiter(s) Nebulizer every 6 hours PRN Shortness of Breath and/or Wheezing  benzonatate 100 milliGRAM(s) Oral three times a day PRN Cough  melatonin 3 milliGRAM(s) Oral at bedtime PRN Insomnia      ALLERGIES: T[employer] Cheese - Pt states the one time she had a mouthful of tomas cheese, her throat was closing and she required an epinephrine injection. (Anaphylaxis)  iodine (Anaphylaxis)  penicillin (Other)  shellfish (Short breath; Hives)  sulfa drugs (Hives)      FAMILY HISTORY:  Family history of nasopharyngeal cancer (Child)  daughter    Family history of breast cancer (Mother)        PHYSICAL EXAMINATION:  -----------------------------  T(C): 36.8 (12-01-21 @ 07:15), Max: 38.2 (11-30-21 @ 22:45)  HR: 50 (12-01-21 @ 07:15) (11 - 147)  BP: 114/54 (12-01-21 @ 07:15) (81/36 - 200/88)  RR: 18 (12-01-21 @ 07:15) (17 - 181)  SpO2: 98% (12-01-21 @ 07:15) (77% - 100%)  Wt(kg): --    Height (cm): 152.4 (11-30 @ 16:06)  Weight (kg): 72.6 (11-30 @ 16:06)  BMI (kg/m2): 31.3 (11-30 @ 16:06)  BSA (m2): 1.7 (11-30 @ 16:06)    Physical exam per prior visits today.     LABS:   --------  12-01    144  |  104  |  33<H>  ----------------------------<  139<H>  4.2   |  34<H>  |  1.50<H>    Ca    8.6      01 Dec 2021 05:15  Mg     2.4     11-30    TPro  6.9  /  Alb  2.8<L>  /  TBili  0.3  /  DBili  x   /  AST  36  /  ALT  32  /  AlkPhos  69  12-01                         10.8   8.70  )-----------( 198      ( 01 Dec 2021 05:15 )             34.1     PT/INR - ( 30 Nov 2021 17:27 )   PT: 12.9 sec;   INR: 1.11 ratio         PTT - ( 30 Nov 2021 17:27 )  PTT:29.3 sec  11-30 @ 17:27 BNP: 5036 pg/mL            RADIOLOGY:  -----------------        ECG:

## 2021-12-02 DIAGNOSIS — N17.9 ACUTE KIDNEY FAILURE, UNSPECIFIED: ICD-10-CM

## 2021-12-02 LAB
ANION GAP SERPL CALC-SCNC: 10 MMOL/L — SIGNIFICANT CHANGE UP (ref 5–17)
APTT BLD: 30.7 SEC — SIGNIFICANT CHANGE UP (ref 27.5–35.5)
BUN SERPL-MCNC: 83 MG/DL — HIGH (ref 7–23)
CALCIUM SERPL-MCNC: 8 MG/DL — LOW (ref 8.5–10.1)
CHLORIDE SERPL-SCNC: 102 MMOL/L — SIGNIFICANT CHANGE UP (ref 96–108)
CK MB BLD-MCNC: 4.1 % — HIGH (ref 0–3.5)
CK MB CFR SERPL CALC: 1.5 NG/ML — SIGNIFICANT CHANGE UP (ref 0–3.6)
CK SERPL-CCNC: 37 U/L — SIGNIFICANT CHANGE UP (ref 26–192)
CO2 SERPL-SCNC: 30 MMOL/L — SIGNIFICANT CHANGE UP (ref 22–31)
CREAT SERPL-MCNC: 2.9 MG/DL — HIGH (ref 0.5–1.3)
D DIMER BLD IA.RAPID-MCNC: 328 NG/ML DDU — HIGH
GLUCOSE SERPL-MCNC: 141 MG/DL — HIGH (ref 70–99)
HCT VFR BLD CALC: 29 % — LOW (ref 34.5–45)
HGB BLD-MCNC: 9.4 G/DL — LOW (ref 11.5–15.5)
MCHC RBC-ENTMCNC: 32.2 PG — SIGNIFICANT CHANGE UP (ref 27–34)
MCHC RBC-ENTMCNC: 32.4 GM/DL — SIGNIFICANT CHANGE UP (ref 32–36)
MCV RBC AUTO: 99.3 FL — SIGNIFICANT CHANGE UP (ref 80–100)
NRBC # BLD: 0 /100 WBCS — SIGNIFICANT CHANGE UP (ref 0–0)
PLATELET # BLD AUTO: 219 K/UL — SIGNIFICANT CHANGE UP (ref 150–400)
POTASSIUM SERPL-MCNC: 4.7 MMOL/L — SIGNIFICANT CHANGE UP (ref 3.5–5.3)
POTASSIUM SERPL-SCNC: 4.7 MMOL/L — SIGNIFICANT CHANGE UP (ref 3.5–5.3)
RBC # BLD: 2.92 M/UL — LOW (ref 3.8–5.2)
RBC # FLD: 15 % — HIGH (ref 10.3–14.5)
SODIUM SERPL-SCNC: 142 MMOL/L — SIGNIFICANT CHANGE UP (ref 135–145)
TROPONIN I, HIGH SENSITIVITY RESULT: 128.3 NG/L — HIGH
WBC # BLD: 11.04 K/UL — HIGH (ref 3.8–10.5)
WBC # FLD AUTO: 11.04 K/UL — HIGH (ref 3.8–10.5)

## 2021-12-02 PROCEDURE — 71045 X-RAY EXAM CHEST 1 VIEW: CPT | Mod: 26

## 2021-12-02 PROCEDURE — 99232 SBSQ HOSP IP/OBS MODERATE 35: CPT

## 2021-12-02 PROCEDURE — 99233 SBSQ HOSP IP/OBS HIGH 50: CPT

## 2021-12-02 RX ORDER — DEXTROSE 50 % IN WATER 50 %
25 SYRINGE (ML) INTRAVENOUS ONCE
Refills: 0 | Status: DISCONTINUED | OUTPATIENT
Start: 2021-12-02 | End: 2021-12-05

## 2021-12-02 RX ORDER — INSULIN LISPRO 100/ML
VIAL (ML) SUBCUTANEOUS AT BEDTIME
Refills: 0 | Status: DISCONTINUED | OUTPATIENT
Start: 2021-12-02 | End: 2021-12-05

## 2021-12-02 RX ORDER — DEXTROSE 50 % IN WATER 50 %
12.5 SYRINGE (ML) INTRAVENOUS ONCE
Refills: 0 | Status: DISCONTINUED | OUTPATIENT
Start: 2021-12-02 | End: 2021-12-05

## 2021-12-02 RX ORDER — SODIUM CHLORIDE 9 MG/ML
1000 INJECTION INTRAMUSCULAR; INTRAVENOUS; SUBCUTANEOUS
Refills: 0 | Status: DISCONTINUED | OUTPATIENT
Start: 2021-12-02 | End: 2021-12-03

## 2021-12-02 RX ORDER — HEPARIN SODIUM 5000 [USP'U]/ML
3000 INJECTION INTRAVENOUS; SUBCUTANEOUS EVERY 6 HOURS
Refills: 0 | Status: DISCONTINUED | OUTPATIENT
Start: 2021-12-02 | End: 2021-12-02

## 2021-12-02 RX ORDER — SODIUM CHLORIDE 9 MG/ML
1000 INJECTION, SOLUTION INTRAVENOUS
Refills: 0 | Status: DISCONTINUED | OUTPATIENT
Start: 2021-12-02 | End: 2021-12-05

## 2021-12-02 RX ORDER — HEPARIN SODIUM 5000 [USP'U]/ML
6000 INJECTION INTRAVENOUS; SUBCUTANEOUS EVERY 6 HOURS
Refills: 0 | Status: DISCONTINUED | OUTPATIENT
Start: 2021-12-02 | End: 2021-12-02

## 2021-12-02 RX ORDER — INSULIN LISPRO 100/ML
VIAL (ML) SUBCUTANEOUS
Refills: 0 | Status: DISCONTINUED | OUTPATIENT
Start: 2021-12-02 | End: 2021-12-05

## 2021-12-02 RX ORDER — GLUCAGON INJECTION, SOLUTION 0.5 MG/.1ML
1 INJECTION, SOLUTION SUBCUTANEOUS ONCE
Refills: 0 | Status: DISCONTINUED | OUTPATIENT
Start: 2021-12-02 | End: 2021-12-05

## 2021-12-02 RX ORDER — DEXTROSE 50 % IN WATER 50 %
15 SYRINGE (ML) INTRAVENOUS ONCE
Refills: 0 | Status: DISCONTINUED | OUTPATIENT
Start: 2021-12-02 | End: 2021-12-05

## 2021-12-02 RX ADMIN — Medication 81 MILLIGRAM(S): at 12:55

## 2021-12-02 RX ADMIN — CARVEDILOL PHOSPHATE 6.25 MILLIGRAM(S): 80 CAPSULE, EXTENDED RELEASE ORAL at 05:17

## 2021-12-02 RX ADMIN — Medication 75 MICROGRAM(S): at 05:18

## 2021-12-02 RX ADMIN — BUDESONIDE AND FORMOTEROL FUMARATE DIHYDRATE 2 PUFF(S): 160; 4.5 AEROSOL RESPIRATORY (INHALATION) at 20:04

## 2021-12-02 RX ADMIN — Medication 500 MILLIGRAM(S): at 13:02

## 2021-12-02 RX ADMIN — Medication 100 MILLIGRAM(S): at 12:55

## 2021-12-02 RX ADMIN — ALBUTEROL 2 PUFF(S): 90 AEROSOL, METERED ORAL at 07:04

## 2021-12-02 RX ADMIN — Medication 37.5 MILLIGRAM(S): at 12:58

## 2021-12-02 RX ADMIN — Medication 3 MILLIGRAM(S): at 21:15

## 2021-12-02 RX ADMIN — BUDESONIDE AND FORMOTEROL FUMARATE DIHYDRATE 2 PUFF(S): 160; 4.5 AEROSOL RESPIRATORY (INHALATION) at 07:02

## 2021-12-02 RX ADMIN — SODIUM CHLORIDE 80 MILLILITER(S): 9 INJECTION INTRAMUSCULAR; INTRAVENOUS; SUBCUTANEOUS at 18:15

## 2021-12-02 RX ADMIN — ALBUTEROL 2 PUFF(S): 90 AEROSOL, METERED ORAL at 12:57

## 2021-12-02 RX ADMIN — Medication 25 MILLIGRAM(S): at 13:02

## 2021-12-02 RX ADMIN — APIXABAN 2.5 MILLIGRAM(S): 2.5 TABLET, FILM COATED ORAL at 05:17

## 2021-12-02 RX ADMIN — TIOTROPIUM BROMIDE 1 CAPSULE(S): 18 CAPSULE ORAL; RESPIRATORY (INHALATION) at 07:02

## 2021-12-02 RX ADMIN — Medication 25 MILLIGRAM(S): at 05:18

## 2021-12-02 RX ADMIN — Medication 500 MILLIGRAM(S): at 05:18

## 2021-12-02 RX ADMIN — CARVEDILOL PHOSPHATE 6.25 MILLIGRAM(S): 80 CAPSULE, EXTENDED RELEASE ORAL at 17:50

## 2021-12-02 RX ADMIN — ALBUTEROL 2 PUFF(S): 90 AEROSOL, METERED ORAL at 20:04

## 2021-12-02 RX ADMIN — Medication 240 MILLIGRAM(S): at 05:18

## 2021-12-02 RX ADMIN — CEFTRIAXONE 100 MILLIGRAM(S): 500 INJECTION, POWDER, FOR SOLUTION INTRAMUSCULAR; INTRAVENOUS at 16:14

## 2021-12-02 RX ADMIN — FAMOTIDINE 20 MILLIGRAM(S): 10 INJECTION INTRAVENOUS at 12:58

## 2021-12-02 RX ADMIN — Medication 6 MILLIGRAM(S): at 05:20

## 2021-12-02 RX ADMIN — Medication 500 MILLIGRAM(S): at 21:16

## 2021-12-02 NOTE — PROGRESS NOTE ADULT - SUBJECTIVE AND OBJECTIVE BOX
Patient is a 89y old  Female who presents with a chief complaint of SOB (02 Dec 2021 12:56)      SUBJECTIVE / OVERNIGHT EVENTS: No ON events or complaints. Denies sob, cp, abd pain, back pain.  Per RN urinating many times,         ADDITIONAL REVIEW OF SYSTEMS: Negative except for above    MEDICATIONS  (STANDING):  ALBUTerol    90 MICROgram(s) HFA Inhaler 2 Puff(s) Inhalation every 6 hours  allopurinol 100 milliGRAM(s) Oral daily  aspirin enteric coated 81 milliGRAM(s) Oral daily  budesonide 160 MICROgram(s)/formoterol 4.5 MICROgram(s) Inhaler 2 Puff(s) Inhalation two times a day  carvedilol 6.25 milliGRAM(s) Oral every 12 hours  cefTRIAXone   IVPB 1000 milliGRAM(s) IV Intermittent every 24 hours  dexAMETHasone  Injectable 6 milliGRAM(s) IV Push daily  dextrose 40% Gel 15 Gram(s) Oral once  dextrose 5%. 1000 milliLiter(s) (50 mL/Hr) IV Continuous <Continuous>  dextrose 5%. 1000 milliLiter(s) (100 mL/Hr) IV Continuous <Continuous>  dextrose 50% Injectable 25 Gram(s) IV Push once  dextrose 50% Injectable 12.5 Gram(s) IV Push once  dextrose 50% Injectable 25 Gram(s) IV Push once  diltiazem    milliGRAM(s) Oral daily  famotidine    Tablet 20 milliGRAM(s) Oral daily  glucagon  Injectable 1 milliGRAM(s) IntraMuscular once  hydrALAZINE 25 milliGRAM(s) Oral three times a day  insulin lispro (ADMELOG) corrective regimen sliding scale   SubCutaneous three times a day before meals  insulin lispro (ADMELOG) corrective regimen sliding scale   SubCutaneous at bedtime  levothyroxine 75 MICROGram(s) Oral daily  metroNIDAZOLE    Tablet 500 milliGRAM(s) Oral every 8 hours  sodium chloride 0.9%. 1000 milliLiter(s) (80 mL/Hr) IV Continuous <Continuous>  tiotropium 18 MICROgram(s) Capsule 1 Capsule(s) Inhalation daily  venlafaxine XR. 37.5 milliGRAM(s) Oral daily    MEDICATIONS  (PRN):  acetaminophen     Tablet .. 650 milliGRAM(s) Oral every 6 hours PRN Temp greater or equal to 38C (100.4F), Mild Pain (1 - 3)  albuterol/ipratropium for Nebulization 3 milliLiter(s) Nebulizer every 6 hours PRN Shortness of Breath and/or Wheezing  benzonatate 100 milliGRAM(s) Oral three times a day PRN Cough  heparin   Injectable 3000 Unit(s) IV Push every 6 hours PRN For aPTT between 40 - 57  heparin   Injectable 6000 Unit(s) IV Push every 6 hours PRN For aPTT less than 40  melatonin 3 milliGRAM(s) Oral at bedtime PRN Insomnia      CAPILLARY BLOOD GLUCOSE        I&O's Summary    01 Dec 2021 07:01  -  02 Dec 2021 07:00  --------------------------------------------------------  IN: 250 mL / OUT: 0 mL / NET: 250 mL        PHYSICAL EXAM:  Vital Signs Last 24 Hrs  T(C): 36.6 (02 Dec 2021 13:12), Max: 36.7 (02 Dec 2021 01:12)  T(F): 97.8 (02 Dec 2021 13:12), Max: 98 (02 Dec 2021 01:12)  HR: 55 (02 Dec 2021 13:12) (55 - 74)  BP: 103/60 (02 Dec 2021 13:12) (96/53 - 138/63)  BP(mean): --  RR: 18 (02 Dec 2021 13:12) (18 - 20)  SpO2: 97% (02 Dec 2021 13:12) (95% - 100%)    PHYSICAL EXAM:  GENERAL: NAD, on NC comfortable  HEAD:  Atraumatic, Normocephalic  NECK: Supple, No JVD  CHEST/LUNG: on NC comfortable  HEART: Regular rate and rhythm;   ABDOMEN:  Nontender, Nondistended;   PSYCH: AAOx3          LABS:                        9.4    11.04 )-----------( 219      ( 02 Dec 2021 16:06 )             29.0     12-02    142  |  102  |  83<H>  ----------------------------<  141<H>  4.7   |  30  |  2.90<H>    Ca    8.0<L>      02 Dec 2021 16:06    TPro  6.9  /  Alb  2.8<L>  /  TBili  0.3  /  DBili  x   /  AST  36  /  ALT  32  /  AlkPhos  69  12-01      CARDIAC MARKERS ( 02 Dec 2021 16:06 )  x     / x     / 37 U/L / x     / 1.5 ng/mL            RADIOLOGY & ADDITIONAL TESTS:    Imaging Personally Reviewed: ct chest: IMPRESSION:  Left lower lobe pneumonia. Continued follow up to resolution is recommended.    Electrocardiogram Personally Reviewed:    COORDINATION OF CARE:  Care Discussed with Consultants/Other Providers [Y/N]:  Prior or Outpatient Records Reviewed [Y/N]:

## 2021-12-02 NOTE — PROGRESS NOTE ADULT - PROBLEM SELECTOR PLAN 11
dvt ppx: eliquis on hold through michelle given joe, venodynes    discussed with Dr Cornelius and daughter Angela  DNR/DNI  prognoses guarded

## 2021-12-02 NOTE — PROGRESS NOTE ADULT - SUBJECTIVE AND OBJECTIVE BOX
Patient is a 89y old  Female who presents with a chief complaint of SOB (02 Dec 2021 12:56)  Patient seen in follow up for BRENDA, CKD.        PAST MEDICAL HISTORY:  Hypertension    Depression    CVA (cerebral infarction)    Overactive bladder    Gout    Osteoarthritis    Hypothyroid    HLD (hyperlipidemia)    Paroxysmal atrial fibrillation    Asthma      MEDICATIONS  (STANDING):  ALBUTerol    90 MICROgram(s) HFA Inhaler 2 Puff(s) Inhalation every 6 hours  allopurinol 100 milliGRAM(s) Oral daily  aspirin enteric coated 81 milliGRAM(s) Oral daily  budesonide 160 MICROgram(s)/formoterol 4.5 MICROgram(s) Inhaler 2 Puff(s) Inhalation two times a day  carvedilol 6.25 milliGRAM(s) Oral every 12 hours  cefTRIAXone   IVPB 1000 milliGRAM(s) IV Intermittent every 24 hours  dexAMETHasone  Injectable 6 milliGRAM(s) IV Push daily  dextrose 40% Gel 15 Gram(s) Oral once  dextrose 5%. 1000 milliLiter(s) (50 mL/Hr) IV Continuous <Continuous>  dextrose 5%. 1000 milliLiter(s) (100 mL/Hr) IV Continuous <Continuous>  dextrose 50% Injectable 25 Gram(s) IV Push once  dextrose 50% Injectable 12.5 Gram(s) IV Push once  dextrose 50% Injectable 25 Gram(s) IV Push once  diltiazem    milliGRAM(s) Oral daily  famotidine    Tablet 20 milliGRAM(s) Oral daily  glucagon  Injectable 1 milliGRAM(s) IntraMuscular once  hydrALAZINE 25 milliGRAM(s) Oral three times a day  insulin lispro (ADMELOG) corrective regimen sliding scale   SubCutaneous three times a day before meals  insulin lispro (ADMELOG) corrective regimen sliding scale   SubCutaneous at bedtime  levothyroxine 75 MICROGram(s) Oral daily  metroNIDAZOLE    Tablet 500 milliGRAM(s) Oral every 8 hours  sodium chloride 0.9%. 1000 milliLiter(s) (80 mL/Hr) IV Continuous <Continuous>  tiotropium 18 MICROgram(s) Capsule 1 Capsule(s) Inhalation daily  venlafaxine XR. 37.5 milliGRAM(s) Oral daily    MEDICATIONS  (PRN):  acetaminophen     Tablet .. 650 milliGRAM(s) Oral every 6 hours PRN Temp greater or equal to 38C (100.4F), Mild Pain (1 - 3)  albuterol/ipratropium for Nebulization 3 milliLiter(s) Nebulizer every 6 hours PRN Shortness of Breath and/or Wheezing  benzonatate 100 milliGRAM(s) Oral three times a day PRN Cough  melatonin 3 milliGRAM(s) Oral at bedtime PRN Insomnia    T(C): 36.6 (12-03-21 @ 04:56), Max: 36.7 (12-02-21 @ 01:12)  HR: 67 (12-03-21 @ 04:56) (55 - 74)  BP: 134/62 (12-03-21 @ 04:56) (96/53 - 138/63)  RR: 18 (12-03-21 @ 04:56)  SpO2: 93% (12-03-21 @ 04:56)  Wt(kg): --  I&O's Detail            PHYSICAL EXAM:  General: No distress  Respiratory: b/l air entry  Cardiovascular: S1 S2  Gastrointestinal: soft  Extremities:  no edema                           LABORATORY:                        9.0    9.81  )-----------( 207      ( 03 Dec 2021 08:45 )             28.2     12-03    142  |  106  |  88<H>  ----------------------------<  132<H>  4.2   |  29  |  2.50<H>    Ca    7.7<L>      03 Dec 2021 08:46      Sodium, Serum: 142 mmol/L (12-03 @ 08:46)  Sodium, Serum: 142 mmol/L (12-02 @ 16:06)    Potassium, Serum: 4.2 mmol/L (12-03 @ 08:46)  Potassium, Serum: 4.7 mmol/L (12-02 @ 16:06)    Hemoglobin: 9.0 g/dL (12-03 @ 08:45)  Hemoglobin: 9.4 g/dL (12-02 @ 16:06)  Hemoglobin: 10.8 g/dL (12-01 @ 05:15)  Hemoglobin: 10.0 g/dL (11-30 @ 17:27)    Creatinine, Serum 2.50 (12-03 @ 08:46)  Creatinine, Serum 2.90 (12-02 @ 16:06)  Creatinine, Serum 1.50 (12-01 @ 05:15)  Creatinine, Serum 1.10 (11-30 @ 17:27)    CARDIAC MARKERS ( 02 Dec 2021 16:06 )  x     / x     / 37 U/L / x     / 1.5 ng/mL           Patient is a 89y old  Female who presents with a chief complaint of SOB (02 Dec 2021 12:56)  Patient seen in follow up for BRENDA, CKD.        PAST MEDICAL HISTORY:  Hypertension    Depression    CVA (cerebral infarction)    Overactive bladder    Gout    Osteoarthritis    Hypothyroid    HLD (hyperlipidemia)    Paroxysmal atrial fibrillation    Asthma      MEDICATIONS  (STANDING):  ALBUTerol    90 MICROgram(s) HFA Inhaler 2 Puff(s) Inhalation every 6 hours  allopurinol 100 milliGRAM(s) Oral daily  aspirin enteric coated 81 milliGRAM(s) Oral daily  budesonide 160 MICROgram(s)/formoterol 4.5 MICROgram(s) Inhaler 2 Puff(s) Inhalation two times a day  carvedilol 6.25 milliGRAM(s) Oral every 12 hours  cefTRIAXone   IVPB 1000 milliGRAM(s) IV Intermittent every 24 hours  dexAMETHasone  Injectable 6 milliGRAM(s) IV Push daily  dextrose 40% Gel 15 Gram(s) Oral once  dextrose 5%. 1000 milliLiter(s) (50 mL/Hr) IV Continuous <Continuous>  dextrose 5%. 1000 milliLiter(s) (100 mL/Hr) IV Continuous <Continuous>  dextrose 50% Injectable 25 Gram(s) IV Push once  dextrose 50% Injectable 12.5 Gram(s) IV Push once  dextrose 50% Injectable 25 Gram(s) IV Push once  diltiazem    milliGRAM(s) Oral daily  famotidine    Tablet 20 milliGRAM(s) Oral daily  glucagon  Injectable 1 milliGRAM(s) IntraMuscular once  hydrALAZINE 25 milliGRAM(s) Oral three times a day  insulin lispro (ADMELOG) corrective regimen sliding scale   SubCutaneous three times a day before meals  insulin lispro (ADMELOG) corrective regimen sliding scale   SubCutaneous at bedtime  levothyroxine 75 MICROGram(s) Oral daily  metroNIDAZOLE    Tablet 500 milliGRAM(s) Oral every 8 hours  sodium chloride 0.9%. 1000 milliLiter(s) (80 mL/Hr) IV Continuous <Continuous>  tiotropium 18 MICROgram(s) Capsule 1 Capsule(s) Inhalation daily  venlafaxine XR. 37.5 milliGRAM(s) Oral daily    MEDICATIONS  (PRN):  acetaminophen     Tablet .. 650 milliGRAM(s) Oral every 6 hours PRN Temp greater or equal to 38C (100.4F), Mild Pain (1 - 3)  albuterol/ipratropium for Nebulization 3 milliLiter(s) Nebulizer every 6 hours PRN Shortness of Breath and/or Wheezing  benzonatate 100 milliGRAM(s) Oral three times a day PRN Cough  heparin   Injectable 3000 Unit(s) IV Push every 6 hours PRN For aPTT between 40 - 57  heparin   Injectable 6000 Unit(s) IV Push every 6 hours PRN For aPTT less than 40  melatonin 3 milliGRAM(s) Oral at bedtime PRN Insomnia      CAPILLARY BLOOD GLUCOSE        I&O's Summary    01 Dec 2021 07:01  -  02 Dec 2021 07:00  --------------------------------------------------------  IN: 250 mL / OUT: 0 mL / NET: 250 mL      Vital Signs Last 24 Hrs  T(C): 36.6 (02 Dec 2021 13:12), Max: 36.7 (02 Dec 2021 01:12)  T(F): 97.8 (02 Dec 2021 13:12), Max: 98 (02 Dec 2021 01:12)  HR: 55 (02 Dec 2021 13:12) (55 - 74)  BP: 103/60 (02 Dec 2021 13:12) (96/53 - 138/63)  BP(mean): --  RR: 18 (02 Dec 2021 13:12) (18 - 20)  SpO2: 97% (02 Dec 2021 13:12) (95% - 100%)  LABS:                        9.4    11.04 )-----------( 219      ( 02 Dec 2021 16:06 )             29.0     12-02    142  |  102  |  83<H>  ----------------------------<  141<H>  4.7   |  30  |  2.90<H>    Ca    8.0<L>      02 Dec 2021 16:06    TPro  6.9  /  Alb  2.8<L>  /  TBili  0.3  /  DBili  x   /  AST  36  /  ALT  32  /  AlkPhos  69  12-01      CARDIAC MARKERS ( 02 Dec 2021 16:06 )  x     / x     / 37 U/L / x     / 1.5 ng/mL        PHYSICAL EXAM:  General: No distress  Respiratory: b/l air entry  Cardiovascular: S1 S2  Gastrointestinal: soft  Extremities:  no edema

## 2021-12-02 NOTE — PROGRESS NOTE ADULT - SUBJECTIVE AND OBJECTIVE BOX
[facility]  INFECTIOUS DISEASES   =======================================================    [id number]  CHARLES GRAHAM     Follow up: COVID     Doing well, no fever, comfortable, no respiratory distress, on o2 with NC.     PAST MEDICAL & SURGICAL HISTORY:  Hypertension  Depression  Overactive bladder  Gout  Osteoarthritis  Hypothyoid  HLD (hyperlipidemia)  Paroxysmal atrial fibrillation  Asthma  S/P cholecystectomy  S/P lumpectomy of breast  S/P hysterectomy  S/P lung surgery, follow-up exam  s/p removal of suspicious lesion, negative    Social Hx: no current smoking, ETOH or drugs     FAMILY HISTORY:  Family history of nasopharyngeal cancer (Child)  daughter    Family history of breast cancer (Mother)    Allergies  Tomas Cheese - Pt states the one time she had a mouthful of tomas cheese, her throat was closing and she required an epinephrine injection. (Anaphylaxis)  iodine (Anaphylaxis)  penicillin (Other)  shellfish (Short breath; Hives)  sulfa drugs (Hives)    Antibiotics:  dexAMETHasone  Injectable 6 milliGRAM(s) IV Push daily  remdesivir  IVPB 100 milliGRAM(s) IV Intermittent every 24 hours    REVIEW OF SYSTEMS:  CONSTITUTIONAL:  No Fever or chills  HEENT:  No diplopia or blurred vision.  No sore throat or runny nose.  CARDIOVASCULAR:  No chest pain or SOB.  RESPIRATORY:  +cough, +shortness of breath  GASTROINTESTINAL:  No nausea, vomiting or diarrhea.  GENITOURINARY:  No dysuria, frequency or urgency. No Blood in urine  MUSCULOSKELETAL:  no joint aches, no muscle pain  SKIN:  No change in skin, hair or nails.  NEUROLOGIC:  No paresthesias, fasciculations, seizures or weakness.  PSYCHIATRIC:  No disorder of thought or mood.  ENDOCRINE:  No heat or cold intolerance, polyuria or polydipsia.  HEMATOLOGICAL:  No easy bruising or bleeding.     Physical Exam:  Vital Signs Last 24 Hrs  T(C): 36.6 (02 Dec 2021 04:55), Max: 36.7 (02 Dec 2021 01:12)  T(F): 97.8 (02 Dec 2021 04:55), Max: 98 (02 Dec 2021 01:12)  HR: 74 (02 Dec 2021 04:55) (66 - 74)  BP: 138/63 (02 Dec 2021 04:55) (96/53 - 138/63)  BP(mean): --  RR: 18 (02 Dec 2021 04:55) (18 - 20)  SpO2: 95% (02 Dec 2021 04:55) (95% - 100%)  GEN: NAD, obese   HEENT: normocephalic and atraumatic. EOMI. PERRL.    NECK: Supple.  No lymphadenopathy   LUNGS: Some crackles in bases   HEART: Regular rate and rhythm   ABDOMEN: Soft, nontender, and nondistended.  Positive bowel sounds.    EXTREMITIES: 1+edema.  NEUROLOGIC: grossly intact.  PSYCHIATRIC: Appropriate affect .  SKIN: No rash      Labs:                        10.8   8.70  )-----------( 198      ( 01 Dec 2021 05:15 )             34.1     12-01    144  |  104  |  33<H>  ----------------------------<  139<H>  4.2   |  34<H>  |  1.50<H>    Ca    8.6      01 Dec 2021 05:15  Mg     2.4     11-30    TPro  6.9  /  Alb  2.8<L>  /  TBili  0.3  /  DBili  x   /  AST  36  /  ALT  32  /  AlkPhos  69  12-01    WBC Count: 8.70 K/uL (12-01-21 @ 05:15)  WBC Count: 10.53 K/uL (11-30-21 @ 17:27)    Creatinine, Serum: 1.50 mg/dL (12-01-21 @ 05:15)  Creatinine, Serum: 1.10 mg/dL (11-30-21 @ 17:27)    Ferritin, Serum: 366 ng/mL (12-01-21 @ 02:57)    Procalcitonin, Serum: 0.37 ng/mL (11-30-21 @ 21:17)     SARS-CoV-2: Detected (11-30-21 @ 18:08)  Rapid RVP Result: Detected (11-30-21 @ 18:08)    All imaging and other data have been reviewed.  < from: CT Chest No Cont (12.01.21 @ 02:32) >  IMPRESSION:  Left lower lobe pneumonia. Continued follow up to resolution is recommended.    Assessment and Plan:   89 yo woman with PMH of HTN, Paroxysmal A-Fib, Hypothyroidism, COPD (2-3 L nasal cannula at home) was admitted with shortness of breath and LE edema.  COVID diagnosis 11/30. Looks like has also pneumonia in LLL, opacity is different from viral pneumonia. Unclear if aspiration (more happens in RLL though) or other bacterial pneumonia, viral opacities are usually GGO.    Currently data and recommendations for COVID-19 treatment are rapidly changing, so this treatment plan is based on my clinical judgement with available information.     COVID 19 and possible bacterial/aspiration pneumonia   - BMP, CBC w diff, NLR. Procalcitonin, Ferritin, CRP, LDH and D dimer for the start and periodically can be repeated.   - Chest CT LLL opacity   - Continue Remdesivir for total 5days or until discharge whichever comes first.   - Continue Dexamethasone 6mg po daily for 10days  - Follow Creat and LFTs daily while on Remdesivir.    - Watch O2 sat closely and taper as tolerated.   - Continue ceftriaxone 1gm daily and metronidazole 500mg q8h to cover for possible aspiration and CAP (allergic to PCN)  - MRSA PCR and urine legionella pending   - Prophylactic anticoagulation as per protocol   - When ready for discharge can switch to oral ABx     Will follow.     Dr. Mable Simon   Infectious Diseases   Please call [phone] between 8am and 6pm, call [phone] after 6pm or weekends.

## 2021-12-02 NOTE — PROGRESS NOTE ADULT - SUBJECTIVE AND OBJECTIVE BOX
[facility]    CHIEF COMPLAINT: Patient is a 89y old  Female who presents with a chief complaint of SOB (02 Dec 2021 17:31)      Follow Up: [ ] Chest Pain      [ ] Dyspnea     [ ] Palpitations    [ ] Atrial Fibrillation     [ ] Ventricular Dysrhythmia    [ ] Abnormal EKG                      [ ] Abnormal Cardiac Enzymes     [ ] Valvular Disease    HPI:  89 yo F PMHx Paroxysmal Atrial Fibrillation, HTN, Hypothyroidism, COPD (2-3 L nasal cannula at home) Hyperlipidemia, presenting with shortness of breath and LE edema. Patient is short of breath at baseline but has been having difficulty standing up and walking a few steps for the last week. Patient lives with her son and his family, who hosted thanksgiving dinner. 1 guest at the dinner tested positive for COVID. Son bought an at home rapid test. He was positive, and patient was also positive. Her grandchildren and his wife were negative. Patient admits to a dry cough that started Monday and progressively worsening LE edema in the last few days. She is on bumetanide 1 mg daily for water retention denies any changes to dose or missed doses.     She does admit to a high salt diet and does not really watch what she eats.   She denies orthopnea, fever, chills, history of heart failure or CAD.   Son at bedside admits that she has a history of "mini stroke" in the past however, patient adamantly denies.     ED Course:   Vital Signs: 200/88. O2 77% RA, RR 22  EKG: rapid a fib @ 101 no ischemic changes   Labs significant for:  WBC 10.53, BUN/Cr: 24/1.10 H/H 10/30.3, BNP: 5036, CK-MB < 1, troponin 123.9   Imaging: Chest X ray: hazy opacities bilaterally in all lung fields, no clear consolidation, (awaiting official read)   Given: lasix 40 mg IV X 1  (30 Nov 2021 19:49)    PAST MEDICAL & SURGICAL HISTORY:  Hypertension    Depression    Overactive bladder    Gout    Osteoarthritis    Hypothyroid    HLD (hyperlipidemia)    Paroxysmal atrial fibrillation    Asthma    S/P cholecystectomy    S/P lumpectomy of breast    S/P hysterectomy    S/P lung surgery, follow-up exam  s/p removal of suspicious lesion, negative      MEDICATIONS  (STANDING):  ALBUTerol    90 MICROgram(s) HFA Inhaler 2 Puff(s) Inhalation every 6 hours  allopurinol 100 milliGRAM(s) Oral daily  aspirin enteric coated 81 milliGRAM(s) Oral daily  budesonide 160 MICROgram(s)/formoterol 4.5 MICROgram(s) Inhaler 2 Puff(s) Inhalation two times a day  carvedilol 6.25 milliGRAM(s) Oral every 12 hours  cefTRIAXone   IVPB 1000 milliGRAM(s) IV Intermittent every 24 hours  dexAMETHasone  Injectable 6 milliGRAM(s) IV Push daily  dextrose 40% Gel 15 Gram(s) Oral once  dextrose 5%. 1000 milliLiter(s) (50 mL/Hr) IV Continuous <Continuous>  dextrose 5%. 1000 milliLiter(s) (100 mL/Hr) IV Continuous <Continuous>  dextrose 50% Injectable 25 Gram(s) IV Push once  dextrose 50% Injectable 12.5 Gram(s) IV Push once  dextrose 50% Injectable 25 Gram(s) IV Push once  diltiazem    milliGRAM(s) Oral daily  famotidine    Tablet 20 milliGRAM(s) Oral daily  glucagon  Injectable 1 milliGRAM(s) IntraMuscular once  hydrALAZINE 25 milliGRAM(s) Oral three times a day  insulin lispro (ADMELOG) corrective regimen sliding scale   SubCutaneous three times a day before meals  insulin lispro (ADMELOG) corrective regimen sliding scale   SubCutaneous at bedtime  levothyroxine 75 MICROGram(s) Oral daily  metroNIDAZOLE    Tablet 500 milliGRAM(s) Oral every 8 hours  sodium chloride 0.9%. 1000 milliLiter(s) (80 mL/Hr) IV Continuous <Continuous>  tiotropium 18 MICROgram(s) Capsule 1 Capsule(s) Inhalation daily  venlafaxine XR. 37.5 milliGRAM(s) Oral daily    MEDICATIONS  (PRN):  acetaminophen     Tablet .. 650 milliGRAM(s) Oral every 6 hours PRN Temp greater or equal to 38C (100.4F), Mild Pain (1 - 3)  albuterol/ipratropium for Nebulization 3 milliLiter(s) Nebulizer every 6 hours PRN Shortness of Breath and/or Wheezing  benzonatate 100 milliGRAM(s) Oral three times a day PRN Cough  melatonin 3 milliGRAM(s) Oral at bedtime PRN Insomnia    Allergies    Tomas Cheese - Pt states the one time she had a mouthful of tomas cheese, her throat was closing and she required an epinephrine injection. (Anaphylaxis)  iodine (Anaphylaxis)  penicillin (Other)  shellfish (Short breath; Hives)  sulfa drugs (Hives)    Intolerances        REVIEW OF SYSTEMS:    CONSTITUTIONAL: No weakness, fevers or chills.   EYES/ENT: No visual changes;    NECK: No pain or stiffness  RESPIRATORY: No cough, wheezing, No shortness of breath  CARDIOVASCULAR: No chest pain or palpitations  GASTROINTESTINAL: No abdominal pain, or hematochezia.  GENITOURINARY: No dysuria orhematuria  NEUROLOGICAL: No numbness or weakness  SKIN: No itching, burning, rashes  All other review of systems is negative unless indicated above    Vital Signs Last 24 Hrs  T(C): 36.6 (02 Dec 2021 13:12), Max: 36.7 (02 Dec 2021 01:12)  T(F): 97.8 (02 Dec 2021 13:12), Max: 98 (02 Dec 2021 01:12)  HR: 55 (02 Dec 2021 13:12) (55 - 74)  BP: 103/60 (02 Dec 2021 13:12) (96/53 - 138/63)  BP(mean): --  RR: 18 (02 Dec 2021 13:12) (18 - 20)  SpO2: 97% (02 Dec 2021 13:12) (95% - 100%)  I&O's Summary    01 Dec 2021 07:01  -  02 Dec 2021 07:00  --------------------------------------------------------  IN: 250 mL / OUT: 0 mL / NET: 250 mL        PHYSICAL EXAM:  Constitutional: NAD  Neurological: Alert, no focal deficits  HEENT: no JVD, EOMI  Cardiovascular: Regular, S1 and S2, no murmur  Pulmonary: breath sounds bilaterally  Gastrointestinal: Bowel Sounds present, soft, nontender  EXT:  no peripheral edema  Skin: No rashes.  Psych:  Mood calm  LABS: All Labs Reviewed:                          9.4    11.04 )-----------( 219      ( 02 Dec 2021 16:06 )             29.0     12-02    142  |  102  |  83<H>  ----------------------------<  141<H>  4.7   |  30  |  2.90<H>    Ca    8.0<L>      02 Dec 2021 16:06    TPro  6.9  /  Alb  2.8<L>  /  TBili  0.3  /  DBili  x   /  AST  36  /  ALT  32  /  AlkPhos  69  12-01      CARDIAC MARKERS ( 02 Dec 2021 16:06 )  x     / x     / 37 U/L / x     / 1.5 ng/mL      12 Lead ECG:   Ventricular Rate 50 BPM    Atrial Rate 50 BPM    P-R Interval 274 ms    QRS Duration 84 ms    Q-T Interval 488 ms    QTC Calculation(Bazett) 444 ms    P Axis 85 degrees    R Axis -1 degrees    T Axis -1 degrees    Diagnosis Line Sinus bradycardia with 1st degree AV block  Septal infarct , age undetermined  Confirmed by AYAKA RAGSDALE ([id number]) on 12/1/2021 2:39:12 PM (12-01-21 @ 07:44)  12 Lead ECG:   Ventricular Rate 143 BPM    Atrial Rate 127 BPM    QRS Duration 82 ms    Q-T Interval 268 ms    QTC Calculation(Bazett) 413 ms    R Axis -2 degrees    T Axis 19 degrees    Diagnosis Line Atrial fibrillation with rapid ventricular response  Nonspecific ST abnormality  Confirmed by AYAKA RAGSDALE ([id number]) on 12/1/2021 2:39:01 PM (11-30-21 @ 21:22)  12 Lead ECG:   Ventricular Rate 151 BPM    Atrial Rate 122 BPM    QRS Duration 80 ms    Q-T Interval 284 ms    QTC Calculation(Bazett) 450 ms    R Axis -1 degrees    T Axis 13 degrees    Diagnosis Line Atrial fibrillation with rapid ventricular response  Nonspecific ST abnormality  Confirmed by AYAKA RAGSDALE ([id number]) on 12/1/2021 2:38:26 PM (11-30-21 @ 21:21)  Xray Chest 1 View- PORTABLE-Urgent:   EXAM:  XR CHEST PORTABLE URGENT 1V                            PROCEDURE DATE:  11/30/2021          INTERPRETATION:  Chest portable.    Clinical History: Shortness of breath.    Comparison: 11/6/2018.    Single AP view submitted.  There is overlyingsternal artifact related to patient's bra.    The lung apices are obscured by the patient's head and chin.    Prominent cardiac silhouette.    Prominence of the bilateral perihilar bronchovascular markings, most pronounced in the right lower lung zone. Probable small left-sided pleural effusion.    Impression:    Findings as discussed above.    --- End of Report ---      TWIN MARTIN MD; Attending Radiologist  This document has been electronically signed. Dec  1 2021  4:07PM (11-30-21 @ 17:36)  12 Lead ECG:   Ventricular Rate 105 BPM    Atrial Rate 105 BPM    P-R Interval 234 ms  QRS Duration 84 ms  Q-T Interval 330 ms  QTC Calculation(Bazett) 436 ms  P Axis 104 degrees  R Axis -12 degrees  T Axis 11 degrees    Diagnosis Line Sinus tachycardia with 1st degree AV block  Septal infarct , age undetermined  Confirmed by AYAKA RAGSDALE ([id number]) on 12/1/2021 2:38:21 PM (11-30-21 @ 16:37)    · Assessment	  89 yo F PMHx Paroxysmal Atrial Fibrillation, HTN, Hypothyroidism, COPD (2-3 L nasal cannula at home) Hyperlipidemia, presenting with shortness of breath and LE edema. Patient is short of breath at baseline but has been having difficulty standing up and walking a few steps for the last week. Patient lives with her son and his family, who hosted thanksAvitideving dinner. 1 guest at the dinner tested positive for COVID. Son bought an at home rapid test. He was positive, and patient was also positive. Her grandchildren and his wife were negative. Patient admits to a dry cough that started Monday and progressively worsening LE edema in the last few days. She is on bumetanide 1 mg daily for water retention denies any changes to dose or missed doses.     She does admit to a high salt diet and does not really watch what she eats.   She denies orthopnea, fever, chills.     - Acute hypoxemic respiratory failure due to COVID-19.  RLL pneumonia, Covid   Supplemental O2.  steroids, remdesivir.    Possible mild component of CHF.  ProBNP not significantly elevated when adjusted for age and AKD.    She also has chronic mild edema as baseline.    With rising creatinine, agree with holding diuresis.      -Elevated troponin.   Slight upward trend, probable demand ischemia.  EKG without acute change.    - Atrial fibrillation.   She has h/o PAF, usually in sinus rhythm.  In AF with RVR on admission.  Now converted to sinus rhythm. Continue carvedilol 6.25 mg BID and diltiazem  BRENDA,  eliquis 2.5 mg BID held    -Hypertension.   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  Now with relative hypotension.  Agree with holding losartan in setting of AKD.   Reduce hydralazine with high hold parameter.     Will follow.

## 2021-12-02 NOTE — PROGRESS NOTE ADULT - PROBLEM SELECTOR PLAN 3
s/p IV lasix now likely dry given BRENDA   -discussed with renal, hold IV lasix and rec start IV NS @ 80cc/hr  - Strict I&Os & daily weights   - F/u TTE   - Cardiology follwoing  - continue BB with hold parameters

## 2021-12-02 NOTE — PROGRESS NOTE ADULT - PROBLEM SELECTOR PLAN 4
#COPD exacerbation  -Nebs, albuterol and spiriva  -on Steroids for COVID19  - remdesivir on hold as above  - pulm consult [facility] following

## 2021-12-02 NOTE — PROGRESS NOTE ADULT - ASSESSMENT
Pt. with acute respiratory failure due to COPD, LLL pneumonia due to COVID.  Improved today.   Paroxysmal at fib.  Hi troponin.  Agree with Remdesivir, steroids, melatonin.  Add Pepcid, Spiriva.  Fu CXR

## 2021-12-02 NOTE — GOALS OF CARE CONVERSATION - ADVANCED CARE PLANNING - CONVERSATION DETAILS
STARS Writer met with pt.at bedside. Reviewed patient's medical and social history as well as events leading to patient's hospitalization. Writer discussed patient's current diagnosis (heart failure,asthma,PAF,HLD,OA,OAB,HTN),  medical condition and management,  prognosis, and life expectancy. Inquired about patient's wishes regarding extent of medical care to be provided including escalation of medical care into the ICU and use of vasopressor support. In addition, the writer inquired about thoughts regarding cardiopulmnary resuscitation, artificial nutrition and hydration including use of feeding tubes and IVF, antibiotics, and further investigative studies such as blood draws and radiology. Pt  showed good  insight into medical condition. All questions answered. Writer recommended new MOLST. Patient consented to DNR/DNI,no feeding tube status. MOLST form filled out and placed in chart. Previous MOLST 2018. Psychosocial support provided.

## 2021-12-02 NOTE — PROGRESS NOTE ADULT - PROBLEM SELECTOR PLAN 6
- continue home carvedilol, diltiazem  - eliquis on hold for joe as discussed with pharmacy and renal  - consider heparin ggt if cr not improved michelle

## 2021-12-02 NOTE — PROGRESS NOTE ADULT - SUBJECTIVE AND OBJECTIVE BOX
PULMONARY/CRITICAL CARE    Dos 12/2/21  Doing well, improved, less sob. No fever.     Patient is a 89y old  Female who presents with a chief complaint of SOB (30 Nov 2021 19:49)  Has COVID pneumonia, rapid atrial fib.    BRIEF HOSPITAL COURSE: ***89 yo F PMHx Paroxysmal Atrial Fibrillation, HTN, Hypothyroidism, COPD (2-3 L nasal cannula at home) Hyperlipidemia, presenting with shortness of breath and LE edema. Patient is short of breath at baseline but has been having difficulty standing up and walking a few steps for the last week. Patient lives with her son and his family, who hosted thanksgiving dinner. 1 guest at the dinner tested positive for COVID. Son bought an at home rapid test. He was positive, and patient was also positive. Her grandchildren and his wife were negative. Patient admits to a dry cough that started Monday and progressively worsening LE edema in the last few days. She is on bumetanide 1 mg daily for water retention denies any changes to dose or missed doses.     She does admit to a high salt diet and does not really watch what she eats.   She denies orthopnea, fever, chills, history of heart failure or CAD.   Son at bedside admits that she has a history of "mini stroke" in the past however, patient adamantly denies.     On Cardizem for Atrial fib.    Events last 24 hours: ***    PAST MEDICAL & SURGICAL HISTORY:  Hypertension    Depression    Overactive bladder    Gout    Osteoarthritis    Hypothyroid    HLD (hyperlipidemia)    Paroxysmal atrial fibrillation    Asthma    S/P cholecystectomy    S/P lumpectomy of breast    S/P hysterectomy    S/P lung surgery, follow-up exam  s/p removal of suspicious lesion, negative      Allergies    Tomas Cheese - Pt states the one time she had a mouthful of tomas cheese, her throat was closing and she required an epinephrine injection. (Anaphylaxis)  iodine (Anaphylaxis)  penicillin (Other)  shellfish (Short breath; Hives)  sulfa drugs (Hives)    Intolerances      FAMILY HISTORY social--no recent cigs, etoh.   Family history of nasopharyngeal cancer (Child)  daughter    Family history of breast cancer (Mother)          Medications:  remdesivir  IVPB   IV Intermittent   remdesivir  IVPB 100 milliGRAM(s) IV Intermittent every 24 hours    carvedilol 6.25 milliGRAM(s) Oral every 12 hours  furosemide   Injectable 40 milliGRAM(s) IV Push every 12 hours  hydrALAZINE 100 milliGRAM(s) Oral every 8 hours  losartan 100 milliGRAM(s) Oral daily    albuterol/ipratropium for Nebulization 3 milliLiter(s) Nebulizer every 6 hours PRN  albuterol/ipratropium for Nebulization. 3 milliLiter(s) Nebulizer once  benzonatate 100 milliGRAM(s) Oral three times a day PRN  budesonide 160 MICROgram(s)/formoterol 4.5 MICROgram(s) Inhaler 2 Puff(s) Inhalation two times a day  tiotropium 18 MICROgram(s) Capsule 1 Capsule(s) Inhalation daily    acetaminophen     Tablet .. 650 milliGRAM(s) Oral every 6 hours PRN  melatonin 3 milliGRAM(s) Oral at bedtime PRN  venlafaxine XR. 37.5 milliGRAM(s) Oral daily      apixaban 2.5 milliGRAM(s) Oral every 12 hours  aspirin enteric coated 81 milliGRAM(s) Oral daily    famotidine    Tablet 20 milliGRAM(s) Oral daily      allopurinol 100 milliGRAM(s) Oral daily  dexAMETHasone  Injectable 6 milliGRAM(s) IV Push daily  levothyroxine 75 MICROGram(s) Oral daily                  ICU Vital Signs Last 24 Hrs  T(C): 36.8 (01 Dec 2021 07:15), Max: 38.2 (30 Nov 2021 22:45)  T(F): 98.2 (01 Dec 2021 07:15), Max: 100.7 (30 Nov 2021 22:45)  HR: 50 (01 Dec 2021 07:15) (11 - 147)  BP: 114/54 (01 Dec 2021 07:15) (81/36 - 200/88)  BP(mean): --  ABP: --  ABP(mean): --  RR: 18 (01 Dec 2021 07:15) (17 - 181)  SpO2: 98% (01 Dec 2021 07:15) (77% - 100%)    Vital Signs Last 24 Hrs  T(C): 36.8 (01 Dec 2021 07:15), Max: 38.2 (30 Nov 2021 22:45)  T(F): 98.2 (01 Dec 2021 07:15), Max: 100.7 (30 Nov 2021 22:45)  HR: 50 (01 Dec 2021 07:15) (11 - 147)  BP: 114/54 (01 Dec 2021 07:15) (81/36 - 200/88)  BP(mean): --  RR: 18 (01 Dec 2021 07:15) (17 - 181)  SpO2: 98% (01 Dec 2021 07:15) (77% - 100%)        I&O's Detail        LABS:                        10.8   8.70  )-----------( 198      ( 01 Dec 2021 05:15 )             34.1     12-01    144  |  104  |  33<H>  ----------------------------<  139<H>  4.2   |  34<H>  |  1.50<H>    Ca    8.6      01 Dec 2021 05:15  Mg     2.4     11-30    TPro  6.9  /  Alb  2.8<L>  /  TBili  0.3  /  DBili  x   /  AST  36  /  ALT  32  /  AlkPhos  69  12-01      CARDIAC MARKERS ( 30 Nov 2021 17:27 )  x     / x     / x     / x     / <1.0 ng/mL      CAPILLARY BLOOD GLUCOSE        PT/INR - ( 30 Nov 2021 17:27 )   PT: 12.9 sec;   INR: 1.11 ratio         PTT - ( 30 Nov 2021 17:27 )  PTT:29.3 sec    CULTURES:      Physical Examination:    General: mild  acute distress.  elderly female    HEENT: Pupils equal, reactive to light.  Symmetric.    PULM: crackles 1/3 up bilat;  few rhonchi,wheezes bilat; No change percussion    CVS: irregular rate and rhythm, no murmurs, rubs, or gallops    ABD: Soft, nondistended, nontender, normoactive bowel sounds, no masses    EXT: No edema, nontender calves    SKIN: Warm and well perfused, no rashes noted.    NEURO: Alert, oriented, interactive, nonfocal    RADIOLOGY: ***  < from: CT Chest No Cont (12.01.21 @ 02:32) >  EXAM:  CT CHEST                            PROCEDURE DATE:  12/01/2021          INTERPRETATION:  CLINICAL INFORMATION: Shortness of breath, COPD, Covid.    COMPARISON: 11/4/2018    CONTRAST/COMPLICATIONS:  IV Contrast: NONE  Oral Contrast: NONE  Complications: None reported at time of study completion    PROCEDURE:  CT of the Chest was performed.  Sagittal and coronal reformats were performed.    FINDINGS:  Evaluation of solid organs and vascular structures is limited without intravenous contrast.    LUNGS AND AIRWAYS: Patent central airways. Right upper lobe wedge resection. 2 mm right upper lobe nodule (2, 37). Stable 3 mm right middle lobe nodule (2, 80). Stable 6 mm right lower lobe nodule (2, 74). Stable 5 mm nodule along the right major fissure likely reflecting intrapulmonary node (2, 81). Linear scarring/atelectasis in the right lower lobe. Subsegmental lingular and left lower lobe atelectasis. Patchy opacities in the left lower lobe.  PLEURA: No pleural effusion.  MEDIASTINUM AND FIDEL: 1 cm precarinal node. Subcentimeter other mediastinal nodes.  VESSELS: Aortic and coronary atherosclerosis.  HEART: Mild cardiomegaly. No pericardial effusion.  CHEST WALL AND LOWER NECK: Stable 9 mm hypodense right thyroid nodule.  VISUALIZED UPPER ABDOMEN: Cholecystectomy. Multiple cystic lesions within the pancreas, grossly stable, likely IPMNs or pseudocysts. Bilateral renal cysts and hyperdense lesions likely reflecting hemorrhagic/proteinaceous cyst. Stable appearance of right renalcyst containing internal clustered calcifications (2, 37).  BONES: Degenerative changes. Multiple chronic right rib deformities. Stable mild compression deformity of L2.    IMPRESSION:  Left lower lobe pneumonia. Continued follow up to resolution is recommended.        --- End of Report ---            VENU ZHANG MD; Attending Radiologist  This document has been electronically signed. Dec  1 2021  3:26AM    < end of copied text >  < from: US Duplex Venous Lower Ext Complete, Bilateral (12.01.21 @ 02:08) >  EXAM:  US DPLX LWR EXT VEINS COMPL BI                            PROCEDURE DATE:  12/01/2021          INTERPRETATION:  CLINICAL INDICATION: le edmea sob hypoxia.    TECHNIQUE: Grayscale, color Doppler and spectral Doppler ultrasound was utilized toevaluate bilateral lower extremity deep venous system.    COMPARISON: 9/14/2015.    FINDINGS: There is no thrombus in bilateral common femoral veins, femoral veins or popliteal veins. Visualized bilateral posterior tibial veins and peroneal veins arepatent. Bilateral gastrocnemius veins and soleal veins were not visualized.    IMPRESSION:    Bilateral gastrocnemius veins and soleal veins were not visualized, limiting complete evaluation. No obvious thrombus in the visualized bilateral lower extremity deep veins.    --- End of Report ---        < end of copied text >    CRITICAL CARE TIME SPENT: ***

## 2021-12-02 NOTE — PROGRESS NOTE ADULT - PROBLEM SELECTOR PLAN 7
hgb 9.4 fluctuating   - unknown baseline  - no symptoms or signs of bleeding  - continue to monitor cbc   -iron panel noted

## 2021-12-02 NOTE — PROGRESS NOTE ADULT - PROBLEM SELECTOR PLAN 8
Hypertensive urgency on admission - now resolved  - will hold home losartan in setting of BRENDA  - continue home diltiazem and coreg  - monitor bp

## 2021-12-02 NOTE — PROGRESS NOTE ADULT - PROBLEM SELECTOR PLAN 2
suspect prerenal given recent IV diuresis  -cr rapidly uptrending 2.7 from 1.5 from 1.1  -r/o retention with bladder scan, if low PVR, will get US kidney/renal bladder   -straight cath as needed  -discussed with renal Dr Cornelius rec starting IVF, no diuretics  -monitor bmp  -send urine lytes

## 2021-12-02 NOTE — PROGRESS NOTE ADULT - ASSESSMENT
CKD 3  COVID +, Pneumonia  COPD  Hypertension    Improving renal indices. Continue IV hydration. Change IVF to half NS. Hold losartan. No evidence of fluid overload on CT scan. Rx for COVID pneumonia. Low BP. Will d/c hydralazine. Monitor BP trend. Titrate BP meds as needed. Salt restriction. Will follow electrolytes and renal function trend.  BRENDA on CKD 3  COVID +, Pneumonia  COPD  Hypertension    Worsening renal indices. Likely prerenal azotemia. Off diuretics. IV hydration. Hold losartan.   Rx for COVID pneumonia. Monitor BP trend. Titrate BP meds as needed. Salt restriction.   Will follow electrolytes and renal function trend.

## 2021-12-03 LAB
A1C WITH ESTIMATED AVERAGE GLUCOSE RESULT: 5.6 % — SIGNIFICANT CHANGE UP (ref 4–5.6)
ANION GAP SERPL CALC-SCNC: 7 MMOL/L — SIGNIFICANT CHANGE UP (ref 5–17)
APPEARANCE UR: ABNORMAL
BACTERIA # UR AUTO: ABNORMAL
BILIRUB UR-MCNC: NEGATIVE — SIGNIFICANT CHANGE UP
BUN SERPL-MCNC: 88 MG/DL — HIGH (ref 7–23)
CALCIUM SERPL-MCNC: 7.7 MG/DL — LOW (ref 8.5–10.1)
CHLORIDE SERPL-SCNC: 106 MMOL/L — SIGNIFICANT CHANGE UP (ref 96–108)
CO2 SERPL-SCNC: 29 MMOL/L — SIGNIFICANT CHANGE UP (ref 22–31)
COLOR SPEC: YELLOW — SIGNIFICANT CHANGE UP
COMMENT - URINE: SIGNIFICANT CHANGE UP
CREAT SERPL-MCNC: 2.5 MG/DL — HIGH (ref 0.5–1.3)
CRP SERPL-MCNC: 85 MG/L — HIGH
D DIMER BLD IA.RAPID-MCNC: 255 NG/ML DDU — HIGH
DIFF PNL FLD: NEGATIVE — SIGNIFICANT CHANGE UP
EPI CELLS # UR: SIGNIFICANT CHANGE UP
ESTIMATED AVERAGE GLUCOSE: 114 MG/DL — SIGNIFICANT CHANGE UP (ref 68–114)
FERRITIN SERPL-MCNC: 451 NG/ML — HIGH (ref 15–150)
FERRITIN SERPL-MCNC: 465 NG/ML — HIGH (ref 15–150)
GLUCOSE SERPL-MCNC: 132 MG/DL — HIGH (ref 70–99)
GLUCOSE UR QL: NEGATIVE — SIGNIFICANT CHANGE UP
HCT VFR BLD CALC: 28.2 % — LOW (ref 34.5–45)
HGB BLD-MCNC: 9 G/DL — LOW (ref 11.5–15.5)
KETONES UR-MCNC: NEGATIVE — SIGNIFICANT CHANGE UP
LEGIONELLA AG UR QL: NEGATIVE — SIGNIFICANT CHANGE UP
LEUKOCYTE ESTERASE UR-ACNC: ABNORMAL
MCHC RBC-ENTMCNC: 31.9 GM/DL — LOW (ref 32–36)
MCHC RBC-ENTMCNC: 31.9 PG — SIGNIFICANT CHANGE UP (ref 27–34)
MCV RBC AUTO: 100 FL — SIGNIFICANT CHANGE UP (ref 80–100)
NITRITE UR-MCNC: NEGATIVE — SIGNIFICANT CHANGE UP
NRBC # BLD: 0 /100 WBCS — SIGNIFICANT CHANGE UP (ref 0–0)
PH UR: 5 — SIGNIFICANT CHANGE UP (ref 5–8)
PLATELET # BLD AUTO: 207 K/UL — SIGNIFICANT CHANGE UP (ref 150–400)
POTASSIUM SERPL-MCNC: 4.2 MMOL/L — SIGNIFICANT CHANGE UP (ref 3.5–5.3)
POTASSIUM SERPL-SCNC: 4.2 MMOL/L — SIGNIFICANT CHANGE UP (ref 3.5–5.3)
PROT UR-MCNC: 30 MG/DL
RBC # BLD: 2.82 M/UL — LOW (ref 3.8–5.2)
RBC # FLD: 14.9 % — HIGH (ref 10.3–14.5)
SODIUM SERPL-SCNC: 142 MMOL/L — SIGNIFICANT CHANGE UP (ref 135–145)
SP GR SPEC: 1.01 — SIGNIFICANT CHANGE UP (ref 1.01–1.02)
UROBILINOGEN FLD QL: NEGATIVE — SIGNIFICANT CHANGE UP
WBC # BLD: 9.81 K/UL — SIGNIFICANT CHANGE UP (ref 3.8–10.5)
WBC # FLD AUTO: 9.81 K/UL — SIGNIFICANT CHANGE UP (ref 3.8–10.5)
WBC UR QL: SIGNIFICANT CHANGE UP

## 2021-12-03 PROCEDURE — 99232 SBSQ HOSP IP/OBS MODERATE 35: CPT

## 2021-12-03 PROCEDURE — 71045 X-RAY EXAM CHEST 1 VIEW: CPT | Mod: 26

## 2021-12-03 PROCEDURE — 99233 SBSQ HOSP IP/OBS HIGH 50: CPT

## 2021-12-03 RX ORDER — SODIUM CHLORIDE 9 MG/ML
1000 INJECTION, SOLUTION INTRAVENOUS
Refills: 0 | Status: DISCONTINUED | OUTPATIENT
Start: 2021-12-03 | End: 2021-12-05

## 2021-12-03 RX ORDER — APIXABAN 2.5 MG/1
2.5 TABLET, FILM COATED ORAL EVERY 12 HOURS
Refills: 0 | Status: DISCONTINUED | OUTPATIENT
Start: 2021-12-03 | End: 2021-12-05

## 2021-12-03 RX ADMIN — ALBUTEROL 2 PUFF(S): 90 AEROSOL, METERED ORAL at 18:23

## 2021-12-03 RX ADMIN — Medication 2: at 12:03

## 2021-12-03 RX ADMIN — ALBUTEROL 2 PUFF(S): 90 AEROSOL, METERED ORAL at 07:07

## 2021-12-03 RX ADMIN — Medication 75 MICROGRAM(S): at 05:31

## 2021-12-03 RX ADMIN — Medication 500 MILLIGRAM(S): at 15:16

## 2021-12-03 RX ADMIN — BUDESONIDE AND FORMOTEROL FUMARATE DIHYDRATE 2 PUFF(S): 160; 4.5 AEROSOL RESPIRATORY (INHALATION) at 07:07

## 2021-12-03 RX ADMIN — Medication 37.5 MILLIGRAM(S): at 12:04

## 2021-12-03 RX ADMIN — FAMOTIDINE 20 MILLIGRAM(S): 10 INJECTION INTRAVENOUS at 12:04

## 2021-12-03 RX ADMIN — TIOTROPIUM BROMIDE 1 CAPSULE(S): 18 CAPSULE ORAL; RESPIRATORY (INHALATION) at 07:07

## 2021-12-03 RX ADMIN — Medication 25 MILLIGRAM(S): at 05:32

## 2021-12-03 RX ADMIN — APIXABAN 2.5 MILLIGRAM(S): 2.5 TABLET, FILM COATED ORAL at 12:03

## 2021-12-03 RX ADMIN — Medication 500 MILLIGRAM(S): at 21:21

## 2021-12-03 RX ADMIN — ALBUTEROL 2 PUFF(S): 90 AEROSOL, METERED ORAL at 01:44

## 2021-12-03 RX ADMIN — Medication 81 MILLIGRAM(S): at 12:04

## 2021-12-03 RX ADMIN — Medication 500 MILLIGRAM(S): at 05:31

## 2021-12-03 RX ADMIN — Medication 100 MILLIGRAM(S): at 21:21

## 2021-12-03 RX ADMIN — ALBUTEROL 2 PUFF(S): 90 AEROSOL, METERED ORAL at 16:04

## 2021-12-03 RX ADMIN — Medication 100 MILLIGRAM(S): at 12:04

## 2021-12-03 RX ADMIN — BUDESONIDE AND FORMOTEROL FUMARATE DIHYDRATE 2 PUFF(S): 160; 4.5 AEROSOL RESPIRATORY (INHALATION) at 18:22

## 2021-12-03 RX ADMIN — Medication 240 MILLIGRAM(S): at 05:31

## 2021-12-03 RX ADMIN — CEFTRIAXONE 100 MILLIGRAM(S): 500 INJECTION, POWDER, FOR SOLUTION INTRAMUSCULAR; INTRAVENOUS at 16:03

## 2021-12-03 RX ADMIN — CARVEDILOL PHOSPHATE 6.25 MILLIGRAM(S): 80 CAPSULE, EXTENDED RELEASE ORAL at 05:31

## 2021-12-03 RX ADMIN — Medication 6 MILLIGRAM(S): at 05:32

## 2021-12-03 NOTE — PROGRESS NOTE ADULT - SUBJECTIVE AND OBJECTIVE BOX
[facility] Cardiology Progress Note [phone] (Dr. Eastman, Dagmar, Adela, Luly)    CHIEF COMPLAINT: Patient is a 89y old  Female who presents with a chief complaint of SOB (03 Dec 2021 07:51)      Follow Up Today: The patient denies any chest discomfort or shortness of breath.    HPI:  89 yo F PMHx Paroxysmal Atrial Fibrillation, HTN, Hypothyroidism, COPD (2-3 L nasal cannula at home) Hyperlipidemia, presenting with shortness of breath and LE edema. Patient is short of breath at baseline but has been having difficulty standing up and walking a few steps for the last week. Patient lives with her son and his family, who hosted thanksgiving dinner. 1 guest at the dinner tested positive for COVID. Son bought an at home rapid test. He was positive, and patient was also positive. Her grandchildren and his wife were negative. Patient admits to a dry cough that started Monday and progressively worsening LE edema in the last few days. She is on bumetanide 1 mg daily for water retention denies any changes to dose or missed doses.     She does admit to a high salt diet and does not really watch what she eats.   She denies orthopnea, fever, chills, history of heart failure or CAD.   Son at bedside admits that she has a history of "mini stroke" in the past however, patient adamantly denies.     ED Course:   Vital Signs: 200/88. O2 77% RA, RR 22  EKG: rapid a fib @ 101 no ischemic changes   Labs significant for:  WBC 10.53, BUN/Cr: 24/1.10 H/H 10/30.3, BNP: 5036, CK-MB < 1, troponin 123.9   Imaging: Chest X ray: hazy opacities bilaterally in all lung fields, no clear consolidation, (awaiting official read)   Given: lasix 40 mg IV X 1  (30 Nov 2021 19:49)      PAST MEDICAL & SURGICAL HISTORY:  Hypertension    Depression    Overactive bladder    Gout    Osteoarthritis    Hypothyroid    HLD (hyperlipidemia)    Paroxysmal atrial fibrillation    Asthma    S/P cholecystectomy    S/P lumpectomy of breast    S/P hysterectomy    S/P lung surgery, follow-up exam  s/p removal of suspicious lesion, negative        MEDICATIONS  (STANDING):  ALBUTerol    90 MICROgram(s) HFA Inhaler 2 Puff(s) Inhalation every 6 hours  allopurinol 100 milliGRAM(s) Oral daily  aspirin enteric coated 81 milliGRAM(s) Oral daily  budesonide 160 MICROgram(s)/formoterol 4.5 MICROgram(s) Inhaler 2 Puff(s) Inhalation two times a day  carvedilol 6.25 milliGRAM(s) Oral every 12 hours  cefTRIAXone   IVPB 1000 milliGRAM(s) IV Intermittent every 24 hours  dexAMETHasone  Injectable 6 milliGRAM(s) IV Push daily  dextrose 40% Gel 15 Gram(s) Oral once  dextrose 5%. 1000 milliLiter(s) (50 mL/Hr) IV Continuous <Continuous>  dextrose 5%. 1000 milliLiter(s) (100 mL/Hr) IV Continuous <Continuous>  dextrose 50% Injectable 25 Gram(s) IV Push once  dextrose 50% Injectable 12.5 Gram(s) IV Push once  dextrose 50% Injectable 25 Gram(s) IV Push once  diltiazem    milliGRAM(s) Oral daily  famotidine    Tablet 20 milliGRAM(s) Oral daily  glucagon  Injectable 1 milliGRAM(s) IntraMuscular once  hydrALAZINE 25 milliGRAM(s) Oral three times a day  insulin lispro (ADMELOG) corrective regimen sliding scale   SubCutaneous three times a day before meals  insulin lispro (ADMELOG) corrective regimen sliding scale   SubCutaneous at bedtime  levothyroxine 75 MICROGram(s) Oral daily  metroNIDAZOLE    Tablet 500 milliGRAM(s) Oral every 8 hours  sodium chloride 0.9%. 1000 milliLiter(s) (80 mL/Hr) IV Continuous <Continuous>  tiotropium 18 MICROgram(s) Capsule 1 Capsule(s) Inhalation daily  venlafaxine XR. 37.5 milliGRAM(s) Oral daily    MEDICATIONS  (PRN):  acetaminophen     Tablet .. 650 milliGRAM(s) Oral every 6 hours PRN Temp greater or equal to 38C (100.4F), Mild Pain (1 - 3)  albuterol/ipratropium for Nebulization 3 milliLiter(s) Nebulizer every 6 hours PRN Shortness of Breath and/or Wheezing  benzonatate 100 milliGRAM(s) Oral three times a day PRN Cough  melatonin 3 milliGRAM(s) Oral at bedtime PRN Insomnia      Allergies    Tomas Cheese - Pt states the one time she had a mouthful of tomas cheese, her throat was closing and she required an epinephrine injection. (Anaphylaxis)  iodine (Anaphylaxis)  penicillin (Other)  shellfish (Short breath; Hives)  sulfa drugs (Hives)    Intolerances        REVIEW OF SYSTEMS:    All other review of systems is negative unless indicated above    Vital Signs Last 24 Hrs  T(C): 36.6 (03 Dec 2021 04:56), Max: 36.6 (02 Dec 2021 13:12)  T(F): 97.9 (03 Dec 2021 04:56), Max: 97.9 (03 Dec 2021 04:56)  HR: 67 (03 Dec 2021 04:56) (55 - 67)  BP: 134/62 (03 Dec 2021 04:56) (103/60 - 134/62)  BP(mean): --  RR: 18 (03 Dec 2021 04:56) (18 - 18)  SpO2: 93% (03 Dec 2021 04:56) (93% - 97%)    I&O's Summary      PHYSICAL EXAM:    Constitutional: NAD, awake and alert, well-developed  Eyes:  EOMI,  Pupils round, No oral cyanosis.  HEENT: No exudate or erythema  Pulmonary: Non-labored, breath sounds are clear bilaterally, No wheezing, rales or rhonchi  Cardiovascular: Regular, S1 and S2, No murmurs, rubs, gallops oir clicks  Gastrointestinal: Bowel Sounds present, soft, nontender.   Ext: No significant LE edema with good pulses x 4  Neurological: Alert, no gross focal motor deficits  Skin: No rashes.  Psych:  Mood & affect appropriate    LABS: All Labs Reviewed:                        9.4    11.04 )-----------( 219      ( 02 Dec 2021 16:06 )             29.0                         10.8   8.70  )-----------( 198      ( 01 Dec 2021 05:15 )             34.1                         10.0   10.53 )-----------( 200      ( 30 Nov 2021 17:27 )             30.3     02 Dec 2021 16:06    142    |  102    |  83     ----------------------------<  141    4.7     |  30     |  2.90   01 Dec 2021 05:15    144    |  104    |  33     ----------------------------<  139    4.2     |  34     |  1.50   30 Nov 2021 17:27    142    |  106    |  24     ----------------------------<  111    4.2     |  33     |  1.10     Ca    8.0        02 Dec 2021 16:06  Ca    8.6        01 Dec 2021 05:15  Ca    8.7        30 Nov 2021 17:27  Mg     2.4       30 Nov 2021 17:27    TPro  6.9    /  Alb  2.8    /  TBili  0.3    /  DBili  x      /  AST  36     /  ALT  32     /  AlkPhos  69     01 Dec 2021 05:15  TPro  6.7    /  Alb  2.6    /  TBili  0.5    /  DBili  x      /  AST  34     /  ALT  28     /  AlkPhos  63     30 Nov 2021 17:27    PTT - ( 02 Dec 2021 22:24 )  PTT:30.7 sec  CARDIAC MARKERS ( 02 Dec 2021 16:06 )  x     / x     / 37 U/L / x     / 1.5 ng/mL      Blood Culture:   11-30 @ 17:27  Pro Bnp [id number]    12-01 @ 05:15  TSH: 0.20      RADIOLOGY/EKG:    Attending Attestation:   20 minutes spent on total encounter; more than 50% of the visit was spent counseling and/or coordinating care by the attending physician.     ASSESSMENT AND PLAN [facility] Progress Note [phone] (Dr. Eastman, Dagmar, Adela, Luly)    CHIEF COMPLAINT: Patient is a 89y old  Female who presents with a chief complaint of SOB (03 Dec 2021 07:51)      Follow Up Today: The patient denies any chest discomfort and feels breathing is comfortable    HPI:  89 yo F PMHx Paroxysmal Atrial Fibrillation, HTN, Hypothyroidism, COPD (2-3 L nasal cannula at home) Hyperlipidemia, presenting with shortness of breath and LE edema. Patient is short of breath at baseline but has been having difficulty standing up and walking a few steps for the last week. Patient lives with her son and his family, who hosted thanksgiving dinner. 1 guest at the dinner tested positive for COVID. Son bought an at home rapid test. He was positive, and patient was also positive. Her grandchildren and his wife were negative. Patient admits to a dry cough that started Monday and progressively worsening LE edema in the last few days. She is on bumetanide 1 mg daily for water retention denies any changes to dose or missed doses.     She does admit to a high salt diet and does not really watch what she eats.   She denies orthopnea, fever, chills, history of heart failure or CAD.   Son at bedside admits that she has a history of "mini stroke" in the past however, patient adamantly denies.     ED Course:   Vital Signs: 200/88. O2 77% RA, RR 22  EKG: rapid a fib @ 101 no ischemic changes   Labs significant for:  WBC 10.53, BUN/Cr: 24/1.10 H/H 10/30.3, BNP: 5036, CK-MB < 1, troponin 123.9   Imaging: Chest X ray: hazy opacities bilaterally in all lung fields, no clear consolidation, (awaiting official read)   Given: lasix 40 mg IV X 1  (30 Nov 2021 19:49)      PAST MEDICAL & SURGICAL HISTORY:  Hypertension    Depression    Overactive bladder    Gout    Osteoarthritis    Hypothyroid    HLD (hyperlipidemia)    Paroxysmal atrial fibrillation    Asthma    S/P cholecystectomy    S/P lumpectomy of breast    S/P hysterectomy    S/P lung surgery, follow-up exam  s/p removal of suspicious lesion, negative        MEDICATIONS  (STANDING):  ALBUTerol    90 MICROgram(s) HFA Inhaler 2 Puff(s) Inhalation every 6 hours  allopurinol 100 milliGRAM(s) Oral daily  aspirin enteric coated 81 milliGRAM(s) Oral daily  budesonide 160 MICROgram(s)/formoterol 4.5 MICROgram(s) Inhaler 2 Puff(s) Inhalation two times a day  carvedilol 6.25 milliGRAM(s) Oral every 12 hours  cefTRIAXone   IVPB 1000 milliGRAM(s) IV Intermittent every 24 hours  dexAMETHasone  Injectable 6 milliGRAM(s) IV Push daily  dextrose 40% Gel 15 Gram(s) Oral once  dextrose 5%. 1000 milliLiter(s) (50 mL/Hr) IV Continuous <Continuous>  dextrose 5%. 1000 milliLiter(s) (100 mL/Hr) IV Continuous <Continuous>  dextrose 50% Injectable 25 Gram(s) IV Push once  dextrose 50% Injectable 12.5 Gram(s) IV Push once  dextrose 50% Injectable 25 Gram(s) IV Push once  diltiazem    milliGRAM(s) Oral daily  famotidine    Tablet 20 milliGRAM(s) Oral daily  glucagon  Injectable 1 milliGRAM(s) IntraMuscular once  hydrALAZINE 25 milliGRAM(s) Oral three times a day  insulin lispro (ADMELOG) corrective regimen sliding scale   SubCutaneous three times a day before meals  insulin lispro (ADMELOG) corrective regimen sliding scale   SubCutaneous at bedtime  levothyroxine 75 MICROGram(s) Oral daily  metroNIDAZOLE    Tablet 500 milliGRAM(s) Oral every 8 hours  sodium chloride 0.9%. 1000 milliLiter(s) (80 mL/Hr) IV Continuous <Continuous>  tiotropium 18 MICROgram(s) Capsule 1 Capsule(s) Inhalation daily  venlafaxine XR. 37.5 milliGRAM(s) Oral daily    MEDICATIONS  (PRN):  acetaminophen     Tablet .. 650 milliGRAM(s) Oral every 6 hours PRN Temp greater or equal to 38C (100.4F), Mild Pain (1 - 3)  albuterol/ipratropium for Nebulization 3 milliLiter(s) Nebulizer every 6 hours PRN Shortness of Breath and/or Wheezing  benzonatate 100 milliGRAM(s) Oral three times a day PRN Cough  melatonin 3 milliGRAM(s) Oral at bedtime PRN Insomnia      Allergies    Tomas Cheese - Pt states the one time she had a mouthful of otmas cheese, her throat was closing and she required an epinephrine injection. (Anaphylaxis)  iodine (Anaphylaxis)  penicillin (Other)  shellfish (Short breath; Hives)  sulfa drugs (Hives)    Intolerances        REVIEW OF SYSTEMS:    All other review of systems is negative unless indicated above    Vital Signs Last 24 Hrs  T(C): 36.6 (03 Dec 2021 04:56), Max: 36.6 (02 Dec 2021 13:12)  T(F): 97.9 (03 Dec 2021 04:56), Max: 97.9 (03 Dec 2021 04:56)  HR: 67 (03 Dec 2021 04:56) (55 - 67)  BP: 134/62 (03 Dec 2021 04:56) (103/60 - 134/62)  BP(mean): --  RR: 18 (03 Dec 2021 04:56) (18 - 18)  SpO2: 93% (03 Dec 2021 04:56) (93% - 97%)    I&O's Summary      PHYSICAL EXAM:    Constitutional: NAD, awake and alert, well-developed  Eyes:  EOMI,  Pupils round, No oral cyanosis.  HEENT: No exudate or erythema  Pulmonary: Non-labored, breath sounds are clear bilaterally, No wheezing, rales or rhonchi  Cardiovascular: Regular, S1 and S2, No murmurs, rubs, gallops oir clicks  Gastrointestinal: Bowel Sounds present, soft, nontender.   Ext: No significant LE edema with good pulses x 4  Neurological: Alert, no gross focal motor deficits  Skin: No rashes.  Psych:  Mood & affect appropriate    LABS: All Labs Reviewed:                        9.4    11.04 )-----------( 219      ( 02 Dec 2021 16:06 )             29.0                         10.8   8.70  )-----------( 198      ( 01 Dec 2021 05:15 )             34.1                         10.0   10.53 )-----------( 200      ( 30 Nov 2021 17:27 )             30.3     02 Dec 2021 16:06    142    |  102    |  83     ----------------------------<  141    4.7     |  30     |  2.90   01 Dec 2021 05:15    144    |  104    |  33     ----------------------------<  139    4.2     |  34     |  1.50   30 Nov 2021 17:27    142    |  106    |  24     ----------------------------<  111    4.2     |  33     |  1.10     Ca    8.0        02 Dec 2021 16:06  Ca    8.6        01 Dec 2021 05:15  Ca    8.7        30 Nov 2021 17:27  Mg     2.4       30 Nov 2021 17:27    TPro  6.9    /  Alb  2.8    /  TBili  0.3    /  DBili  x      /  AST  36     /  ALT  32     /  AlkPhos  69     01 Dec 2021 05:15  TPro  6.7    /  Alb  2.6    /  TBili  0.5    /  DBili  x      /  AST  34     /  ALT  28     /  AlkPhos  63     30 Nov 2021 17:27    PTT - ( 02 Dec 2021 22:24 )  PTT:30.7 sec  CARDIAC MARKERS ( 02 Dec 2021 16:06 )  x     / x     / 37 U/L / x     / 1.5 ng/mL      Blood Culture:   11-30 @ 17:27  Pro Bnp [id number]    12-01 @ 05:15  TSH: 0.20      RADIOLOGY/EKG:    Attending Attestation:   20 minutes spent on total encounter; more than 50% of the visit was spent counseling and/or coordinating care by the attending physician.     ASSESSMENT AND PLAN

## 2021-12-03 NOTE — PROGRESS NOTE ADULT - SUBJECTIVE AND OBJECTIVE BOX
[facility]  INFECTIOUS DISEASES   =======================================================    [id number]  CHARLES GRAHAM     Follow up: COVID   Patient seen and examined, chart has been reviewed.   Doing well, no fever, comfortable, no respiratory distress, on o2 3L with NC the same amount she is on at home.   Due to a bump in creat, remdesivir was stopped.     PAST MEDICAL & SURGICAL HISTORY:  Hypertension  Depression  Overactive bladder  Gout  Osteoarthritis  Hypothyoid  HLD (hyperlipidemia)  Paroxysmal atrial fibrillation  Asthma  S/P cholecystectomy  S/P lumpectomy of breast  S/P hysterectomy  S/P lung surgery, follow-up exam  s/p removal of suspicious lesion, negative    Social Hx: no current smoking, ETOH or drugs     FAMILY HISTORY:  Family history of nasopharyngeal cancer (Child)  daughter    Family history of breast cancer (Mother)    Allergies  Tomas Cheese - Pt states the one time she had a mouthful of tomas cheese, her throat was closing and she required an epinephrine injection. (Anaphylaxis)  iodine (Anaphylaxis)  penicillin (Other)  shellfish (Short breath; Hives)  sulfa drugs (Hives)    Antibiotics:  dexAMETHasone  Injectable 6 milliGRAM(s) IV Push daily  remdesivir  IVPB 100 milliGRAM(s) IV Intermittent every 24 hours    REVIEW OF SYSTEMS:  CONSTITUTIONAL:  No Fever or chills  HEENT:  No diplopia or blurred vision.  No sore throat or runny nose.  CARDIOVASCULAR:  No chest pain or SOB.  RESPIRATORY:  +cough, +shortness of breath  GASTROINTESTINAL:  No nausea, vomiting or diarrhea.  GENITOURINARY:  No dysuria, frequency or urgency. No Blood in urine  MUSCULOSKELETAL:  no joint aches, no muscle pain  SKIN:  No change in skin, hair or nails.  NEUROLOGIC:  No paresthesias, fasciculations, seizures or weakness.  PSYCHIATRIC:  No disorder of thought or mood.  ENDOCRINE:  No heat or cold intolerance, polyuria or polydipsia.  HEMATOLOGICAL:  No easy bruising or bleeding.     Physical Exam:  Vital Signs Last 24 Hrs  T(C): 36.6 (03 Dec 2021 04:56), Max: 36.6 (03 Dec 2021 04:56)  T(F): 97.9 (03 Dec 2021 04:56), Max: 97.9 (03 Dec 2021 04:56)  HR: 67 (03 Dec 2021 04:56) (63 - 67)  BP: 134/62 (03 Dec 2021 04:56) (108/68 - 134/62)  BP(mean): --  RR: 18 (03 Dec 2021 04:56) (18 - 18)  SpO2: 93% (03 Dec 2021 04:56) (93% - 93%)  GEN: NAD, obese   HEENT: normocephalic and atraumatic. EOMI. PERRL.    NECK: Supple.  No lymphadenopathy   LUNGS: Some crackles in bases   HEART: Regular rate and rhythm   ABDOMEN: Soft, nontender, and nondistended.  Positive bowel sounds.    EXTREMITIES: 1+edema.  NEUROLOGIC: grossly intact.  PSYCHIATRIC: Appropriate affect .  SKIN: No rash    Labs:                        9.0    9.81  )-----------( 207      ( 03 Dec 2021 08:45 )             28.2     12-03    142  |  106  |  88<H>  ----------------------------<  132<H>  4.2   |  29  |  2.50<H>    Ca    7.7<L>      03 Dec 2021 08:46    WBC Count: 9.81 K/uL (12-03-21 @ 08:45)  WBC Count: 11.04 K/uL (12-02-21 @ 16:06)  WBC Count: 8.70 K/uL (12-01-21 @ 05:15)  WBC Count: 10.53 K/uL (11-30-21 @ 17:27)    Creatinine, Serum: 2.50 mg/dL (12-03-21 @ 08:46)  Creatinine, Serum: 2.90 mg/dL (12-02-21 @ 16:06)  Creatinine, Serum: 1.50 mg/dL (12-01-21 @ 05:15)  Creatinine, Serum: 1.10 mg/dL (11-30-21 @ 17:27)    Ferritin, Serum: 465 ng/mL (12-03-21 @ 00:59)  Ferritin, Serum: 366 ng/mL (12-01-21 @ 02:57)    Procalcitonin, Serum: 0.37 ng/mL (11-30-21 @ 21:17)     SARS-CoV-2: Detected (11-30-21 @ 18:08)  Rapid RVP Result: Detected (11-30-21 @ 18:08)    All imaging and other data have been reviewed.  < from: CT Chest No Cont (12.01.21 @ 02:32) >  IMPRESSION:  Left lower lobe pneumonia. Continued follow up to resolution is recommended.    Assessment and Plan:   89 yo woman with PMH of HTN, Paroxysmal A-Fib, Hypothyroidism, COPD (2-3 L nasal cannula at home) was admitted with shortness of breath and LE edema.  COVID diagnosis 11/30. Looks like has also pneumonia in LLL, opacity is different from viral pneumonia. Unclear if aspiration (more happens in RLL though) or other bacterial pneumonia, viral opacities are usually GGO.    Currently data and recommendations for COVID-19 treatment are rapidly changing, so this treatment plan is based on my clinical judgement with available information.     COVID 19 and possible bacterial/aspiration pneumonia   - BMP, CBC w diff, NLR. Procalcitonin, Ferritin, CRP, LDH and D dimer for the start and periodically can be repeated.   - Chest CT LLL opacity   - Remdesivir was stopped yesterday due to increase in Creat.   - Creat is trending down 2.9-->2.5  - Continue Dexamethasone 6mg po daily for 10days  - Watch O2 sat closely and taper as tolerated.   - Continue ceftriaxone 1gm daily and metronidazole 500mg q8h to cover for possible aspiration and CAP (allergic to PCN)  - If plan to discharge home, can switch ABx to oral (vantin + Metronidazole) to complete 5-7 days total.    - Prophylactic anticoagulation as per protocol   - When ready for discharge can switch to oral ABx     Will follow PRN.     Dr. Mable Simon   Infectious Diseases   Please call [phone] between 8am and 6pm, call [phone] after 6pm or weekends.

## 2021-12-03 NOTE — PROGRESS NOTE ADULT - ASSESSMENT
89 yo F PMHx Paroxysmal Atrial Fibrillation, HTN, Hypothyroidism, COPD (2-3 L nasal cannula at home) Hyper lipidemia admitted for COPD exacerbation 2/2 to COVID 19 pneumonia and CHF exacerbation.

## 2021-12-03 NOTE — PHARMACOTHERAPY INTERVENTION NOTE - COMMENTS
Patient is a 89 year old female with covid-19 ordered for remdesivir on 11/30. Patient's renal function declined from an eGFR or 43 on 11/30 to 13 on 12/2. discussed therapy with Dr. Simon and suggested discontinuing remdesivir in setting of significant drop in renal function, MD agreed.    Patient was ordered for Eliquis 2.5mg for afib. Spoke to Dr. Osborne and suggested switching the patient to full dose heparin in setting of significant drop in renal function. MD agreed. Patient is ordered for full-dose heparin as per nomogram and is timed for 5am on 12/3 as per MD request.
Patient is a 89 year old female ordered for Eliquis 2.5mg BID [timed for 6:00/18:00] for afib. Patient's renal function has improved from an eGFR of 13 on 12/2 to 16 on 12/3. Eliquis use is not recommended in patients with an eGFR < 15. Discussed Eliquis use given the patient's borderline renal function, MD is aware and would like to proceed with therapy. Suggested timing Eliquis for a STAT dose as patient did not receive anticoagulation since their AM eliquis dose on 12/2, MD agreed.

## 2021-12-03 NOTE — PROGRESS NOTE ADULT - PROBLEM SELECTOR PLAN 6
- continue home carvedilol, diltiazem  - eliquis on hold for joe as discussed with pharmacy and renal  - consider heparin ggt if cr not improved michelle - continue home carvedilol, diltiazem  - per renal, BRENDA improving, can restart home eliquis 2.5mg bid

## 2021-12-03 NOTE — PROGRESS NOTE ADULT - PROBLEM SELECTOR PLAN 2
suspect prerenal given recent IV diuresis  -cr rapidly uptrending 2.7 from 1.5 from 1.1  -r/o retention with bladder scan, if low PVR, will get US kidney/renal bladder   -straight cath as needed  -discussed with renal Dr Cornelius rec starting IVF, no diuretics  -monitor bmp  -send urine lytes suspect prerenal given recent IV diuresis  -cr rapidly improving 2.5 from 2.7 from 1.0 baseline  -PVR low  -can hold of on renal US per renal unless cr worsens michelle, will reorder it  -change IVF to 1/2 NS @ 80cc/hr  -monitor bmp

## 2021-12-03 NOTE — PROGRESS NOTE ADULT - PROBLEM SELECTOR PLAN 7
hgb 9.4 fluctuating   - unknown baseline  - no symptoms or signs of bleeding  - continue to monitor cbc   -iron panel noted hgb 9.0 fluctuating   - unknown baseline  - no symptoms or signs of bleeding  - continue to monitor cbc   -iron panel noted

## 2021-12-03 NOTE — PROGRESS NOTE ADULT - PROBLEM SELECTOR PLAN 11
dvt ppx: eliquis on hold through michelle given joe, venodynes    discussed with Dr Cornelius and daughter Angela  DNR/DNI  prognoses guarded dvt ppx: eliquis restarted    discussed with Dr Cornelius, john Martinez, RN, Dr Simon  DNR/DNI  prognoses guarded    Dispo: consider discharge home michelle If cr further improved

## 2021-12-03 NOTE — PROGRESS NOTE ADULT - ASSESSMENT
Pt. with acute respiratory failure due to COPD, LLL pneumonia due to COVID.  Improved today.   Paroxysmal at fib.  Hi troponin.  Agree with Remdesivir, steroids, melatonin.  Can discharge after last dose of remdesivir.   FU with me in office  Pepcid, Spiriva.  Fu CXR as outpt.

## 2021-12-03 NOTE — PROGRESS NOTE ADULT - PROBLEM SELECTOR PLAN 8
Hypertensive urgency on admission - now resolved  - will hold home losartan in setting of BRENDA  - continue home diltiazem and coreg  - monitor bp Hypertensive urgency on admission - now resolved  - will hold home losartan in setting of BRENDA  - holding hydralazine  - continue home diltiazem and coreg  - monitor bp

## 2021-12-03 NOTE — PROGRESS NOTE ADULT - SUBJECTIVE AND OBJECTIVE BOX
Patient is a 89y old  Female who presents with a chief complaint of SOB (03 Dec 2021 13:23)      SUBJECTIVE / OVERNIGHT EVENTS:    Tele reviewed:       ADDITIONAL REVIEW OF SYSTEMS: Negative except for above    MEDICATIONS  (STANDING):  ALBUTerol    90 MICROgram(s) HFA Inhaler 2 Puff(s) Inhalation every 6 hours  allopurinol 100 milliGRAM(s) Oral daily  apixaban 2.5 milliGRAM(s) Oral every 12 hours  aspirin enteric coated 81 milliGRAM(s) Oral daily  budesonide 160 MICROgram(s)/formoterol 4.5 MICROgram(s) Inhaler 2 Puff(s) Inhalation two times a day  carvedilol 6.25 milliGRAM(s) Oral every 12 hours  cefTRIAXone   IVPB 1000 milliGRAM(s) IV Intermittent every 24 hours  dexAMETHasone  Injectable 6 milliGRAM(s) IV Push daily  dextrose 40% Gel 15 Gram(s) Oral once  dextrose 5%. 1000 milliLiter(s) (50 mL/Hr) IV Continuous <Continuous>  dextrose 5%. 1000 milliLiter(s) (100 mL/Hr) IV Continuous <Continuous>  dextrose 50% Injectable 25 Gram(s) IV Push once  dextrose 50% Injectable 12.5 Gram(s) IV Push once  dextrose 50% Injectable 25 Gram(s) IV Push once  diltiazem    milliGRAM(s) Oral daily  famotidine    Tablet 20 milliGRAM(s) Oral daily  glucagon  Injectable 1 milliGRAM(s) IntraMuscular once  insulin lispro (ADMELOG) corrective regimen sliding scale   SubCutaneous at bedtime  insulin lispro (ADMELOG) corrective regimen sliding scale   SubCutaneous three times a day before meals  levothyroxine 75 MICROGram(s) Oral daily  metroNIDAZOLE    Tablet 500 milliGRAM(s) Oral every 8 hours  sodium chloride 0.45%. 1000 milliLiter(s) (80 mL/Hr) IV Continuous <Continuous>  tiotropium 18 MICROgram(s) Capsule 1 Capsule(s) Inhalation daily  venlafaxine XR. 37.5 milliGRAM(s) Oral daily    MEDICATIONS  (PRN):  acetaminophen     Tablet .. 650 milliGRAM(s) Oral every 6 hours PRN Temp greater or equal to 38C (100.4F), Mild Pain (1 - 3)  albuterol/ipratropium for Nebulization 3 milliLiter(s) Nebulizer every 6 hours PRN Shortness of Breath and/or Wheezing  benzonatate 100 milliGRAM(s) Oral three times a day PRN Cough  melatonin 3 milliGRAM(s) Oral at bedtime PRN Insomnia      CAPILLARY BLOOD GLUCOSE      POCT Blood Glucose.: 223 mg/dL (03 Dec 2021 11:34)  POCT Blood Glucose.: 121 mg/dL (03 Dec 2021 07:45)  POCT Blood Glucose.: 157 mg/dL (02 Dec 2021 21:39)    I&O's Summary      PHYSICAL EXAM:  Vital Signs Last 24 Hrs  T(C): 36.3 (03 Dec 2021 14:46), Max: 36.6 (03 Dec 2021 04:56)  T(F): 97.4 (03 Dec 2021 14:46), Max: 97.9 (03 Dec 2021 04:56)  HR: 52 (03 Dec 2021 14:46) (52 - 67)  BP: 117/61 (03 Dec 2021 14:46) (108/68 - 134/62)  BP(mean): --  RR: 17 (03 Dec 2021 14:46) (17 - 18)  SpO2: 96% (03 Dec 2021 14:46) (93% - 96%)    PHYSICAL EXAM:  GENERAL: NAD, well-developed  HEAD:  Atraumatic, Normocephalic  EYES:  conjunctiva and sclera clear  NECK: Supple, No JVD  CHEST/LUNG: Clear to auscultation bilaterally; No wheeze  HEART: Regular rate and rhythm; No murmurs, rubs, or gallops  ABDOMEN: Soft, Nontender, Nondistended; Bowel sounds present  EXTREMITIES:  2+ Peripheral Pulses, No clubbing, cyanosis, or edema  PSYCH: AAOx3  NEUROLOGY: non-focal  SKIN: No rashes or lesions      LABS:                        9.0    9.81  )-----------( 207      ( 03 Dec 2021 08:45 )             28.2     12-03    142  |  106  |  88<H>  ----------------------------<  132<H>  4.2   |  29  |  2.50<H>    Ca    7.7<L>      03 Dec 2021 08:46      PTT - ( 02 Dec 2021 22:24 )  PTT:30.7 sec  CARDIAC MARKERS ( 02 Dec 2021 16:06 )  x     / x     / 37 U/L / x     / 1.5 ng/mL      Urinalysis Basic - ( 03 Dec 2021 11:00 )    Color: Yellow / Appearance: Slightly Turbid / S.015 / pH: x  Gluc: x / Ketone: Negative  / Bili: Negative / Urobili: Negative   Blood: x / Protein: 30 mg/dL / Nitrite: Negative   Leuk Esterase: Trace / RBC: x / WBC 3-5   Sq Epi: x / Non Sq Epi: Few / Bacteria: Few          RADIOLOGY & ADDITIONAL TESTS:    Imaging Personally Reviewed:    Electrocardiogram Personally Reviewed:    COORDINATION OF CARE:  Care Discussed with Consultants/Other Providers [Y/N]:  Prior or Outpatient Records Reviewed [Y/N]:     Patient is a 89y old  Female who presents with a chief complaint of SOB (03 Dec 2021 13:23)      SUBJECTIVE / OVERNIGHT EVENTS: No On events. Feels well, denies cp, sob, santiago while using her home baseline 3L NC. Denies cough, abd pain, n/v. Wants to go home today. She asked me to call her grandson    ADDITIONAL REVIEW OF SYSTEMS: Negative except for above    MEDICATIONS  (STANDING):  ALBUTerol    90 MICROgram(s) HFA Inhaler 2 Puff(s) Inhalation every 6 hours  allopurinol 100 milliGRAM(s) Oral daily  apixaban 2.5 milliGRAM(s) Oral every 12 hours  aspirin enteric coated 81 milliGRAM(s) Oral daily  budesonide 160 MICROgram(s)/formoterol 4.5 MICROgram(s) Inhaler 2 Puff(s) Inhalation two times a day  carvedilol 6.25 milliGRAM(s) Oral every 12 hours  cefTRIAXone   IVPB 1000 milliGRAM(s) IV Intermittent every 24 hours  dexAMETHasone  Injectable 6 milliGRAM(s) IV Push daily  dextrose 40% Gel 15 Gram(s) Oral once  dextrose 5%. 1000 milliLiter(s) (50 mL/Hr) IV Continuous <Continuous>  dextrose 5%. 1000 milliLiter(s) (100 mL/Hr) IV Continuous <Continuous>  dextrose 50% Injectable 25 Gram(s) IV Push once  dextrose 50% Injectable 12.5 Gram(s) IV Push once  dextrose 50% Injectable 25 Gram(s) IV Push once  diltiazem    milliGRAM(s) Oral daily  famotidine    Tablet 20 milliGRAM(s) Oral daily  glucagon  Injectable 1 milliGRAM(s) IntraMuscular once  insulin lispro (ADMELOG) corrective regimen sliding scale   SubCutaneous at bedtime  insulin lispro (ADMELOG) corrective regimen sliding scale   SubCutaneous three times a day before meals  levothyroxine 75 MICROGram(s) Oral daily  metroNIDAZOLE    Tablet 500 milliGRAM(s) Oral every 8 hours  sodium chloride 0.45%. 1000 milliLiter(s) (80 mL/Hr) IV Continuous <Continuous>  tiotropium 18 MICROgram(s) Capsule 1 Capsule(s) Inhalation daily  venlafaxine XR. 37.5 milliGRAM(s) Oral daily    MEDICATIONS  (PRN):  acetaminophen     Tablet .. 650 milliGRAM(s) Oral every 6 hours PRN Temp greater or equal to 38C (100.4F), Mild Pain (1 - 3)  albuterol/ipratropium for Nebulization 3 milliLiter(s) Nebulizer every 6 hours PRN Shortness of Breath and/or Wheezing  benzonatate 100 milliGRAM(s) Oral three times a day PRN Cough  melatonin 3 milliGRAM(s) Oral at bedtime PRN Insomnia      CAPILLARY BLOOD GLUCOSE      POCT Blood Glucose.: 223 mg/dL (03 Dec 2021 11:34)  POCT Blood Glucose.: 121 mg/dL (03 Dec 2021 07:45)  POCT Blood Glucose.: 157 mg/dL (02 Dec 2021 21:39)    I&O's Summary      PHYSICAL EXAM:  Vital Signs Last 24 Hrs  T(C): 36.3 (03 Dec 2021 14:46), Max: 36.6 (03 Dec 2021 04:56)  T(F): 97.4 (03 Dec 2021 14:46), Max: 97.9 (03 Dec 2021 04:56)  HR: 52 (03 Dec 2021 14:46) (52 - 67)  BP: 117/61 (03 Dec 2021 14:46) (108/68 - 134/62)  BP(mean): --  RR: 17 (03 Dec 2021 14:46) (17 - 18)  SpO2: 96% (03 Dec 2021 14:46) (93% - 96%)    PHYSICAL EXAM:  GENERAL: NAD, on 3LNC comfortable (home baseline)  HEAD:  Atraumatic, Normocephalic  NECK: Supple, No JVD  CHEST/LUNG: on NC comfortable  HEART: Regular rate and rhythm;   ABDOMEN:  Nontender, Nondistended;   PSYCH: AAOx3      LABS:                        9.0    9.81  )-----------( 207      ( 03 Dec 2021 08:45 )             28.2     12-03    142  |  106  |  88<H>  ----------------------------<  132<H>  4.2   |  29  |  2.50<H>    Ca    7.7<L>      03 Dec 2021 08:46      PTT - ( 02 Dec 2021 22:24 )  PTT:30.7 sec  CARDIAC MARKERS ( 02 Dec 2021 16:06 )  x     / x     / 37 U/L / x     / 1.5 ng/mL      Urinalysis Basic - ( 03 Dec 2021 11:00 )    Color: Yellow / Appearance: Slightly Turbid / S.015 / pH: x  Gluc: x / Ketone: Negative  / Bili: Negative / Urobili: Negative   Blood: x / Protein: 30 mg/dL / Nitrite: Negative   Leuk Esterase: Trace / RBC: x / WBC 3-5   Sq Epi: x / Non Sq Epi: Few / Bacteria: Few          RADIOLOGY & ADDITIONAL TESTS:    Imaging Personally Reviewed:    Electrocardiogram Personally Reviewed:    COORDINATION OF CARE:  Care Discussed with Consultants/Other Providers [Y/N]:  Prior or Outpatient Records Reviewed [Y/N]:

## 2021-12-03 NOTE — PROGRESS NOTE ADULT - ASSESSMENT
89 yo F PMHx Paroxysmal Atrial Fibrillation, HTN, Hypothyroidism, COPD (2-3 L nasal cannula at home) Hyperlipidemia, presenting with shortness of breath and LE edema. Patient is short of breath at baseline but has been having difficulty standing up and walking a few steps for the last week. Patient lives with her son and his family, who hosted thanksgiving dinner. 1 guest at the dinner tested positive for COVID. Son bought an at home rapid test. He was positive, and patient was also positive. Her grandchildren and his wife were negative. Patient admits to a dry cough that started Monday and progressively worsening LE edema in the last few days. She is on bumetanide 1 mg daily for water retention denies any changes to dose or missed doses.     She does admit to a high salt diet and does not really watch what she eats.   She denies orthopnea, fever, chills.     - Acute hypoxemic respiratory failure due to COVID-19.  RLL pneumonia, Covid   Supplemental O2.  steroids, remdesivir.    Possible mild component of CHF.  ProBNP not significantly elevated when adjusted for age and AKD.    She also has chronic mild edema as baseline.    With rising creatinine, agree with holding diuresis.      -Elevated troponin.   Slight upward trend, probable demand ischemia.  EKG without acute change.    - Atrial fibrillation.   She has h/o PAF, usually in sinus rhythm.  In AF with RVR on admission.  Now converted to sinus rhythm. Continue carvedilol 6.25 mg BID and diltiazem  BRENDA,  eliquis 2.5 mg BID held    -Hypertension.   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  Now with relative hypotension.  Agree with holding losartan in setting of AKD.   Reduce hydralazine with high hold parameter.     Will follow.  89F with PAF, HTN, Hypothyroidism, COPD (2-3 L nasal cannula at home) Hyperlipidemia, presenting with shortness of breath and LE edema. Patient is short of breath at baseline but has been having difficulty standing up and walking a few steps for the last week. Patient lives with her son and his family, who hosted thanksgiving dinner. 1 guest at the dinner tested positive for COVID now with COVID and pretty decent oxygen saturations.    - Acute hypoxemic respiratory failure due to COVID-19.  RLL pneumonia, Covid   Supplemental O2.  steroids, remdesivir.    Possible mild component of CHF.  ProBNP not significantly elevated when adjusted for age and AKD.    She also has chronic mild edema as baseline.    With rising creatinine, agree with holding diuresis.      -Elevated troponin.   Slight upward trend, probable demand ischemia.  EKG without acute change.    - Atrial fibrillation.   She has h/o PAF, usually in sinus rhythm.  In AF with RVR on admission.  Now converted to sinus rhythm. Continue carvedilol 6.25 mg BID and diltiazem  BRENDA,  eliquis 2.5 mg BID held    -Hypertension.   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  Now with relative hypotension.  Agree with holding losartan in setting of AKD.   Reduce hydralazine with high hold parameter.     Will follow.

## 2021-12-03 NOTE — PROGRESS NOTE ADULT - SUBJECTIVE AND OBJECTIVE BOX
Patient is a 89y old  Female who presents with a chief complaint of SOB (02 Dec 2021 12:56)  Patient seen in follow up for BRENDA, CKD.        PAST MEDICAL HISTORY:  Hypertension    Depression    CVA (cerebral infarction)    Overactive bladder    Gout    Osteoarthritis    Hypothyroid    HLD (hyperlipidemia)    Paroxysmal atrial fibrillation    Asthma      MEDICATIONS  (STANDING):  ALBUTerol    90 MICROgram(s) HFA Inhaler 2 Puff(s) Inhalation every 6 hours  allopurinol 100 milliGRAM(s) Oral daily  aspirin enteric coated 81 milliGRAM(s) Oral daily  budesonide 160 MICROgram(s)/formoterol 4.5 MICROgram(s) Inhaler 2 Puff(s) Inhalation two times a day  carvedilol 6.25 milliGRAM(s) Oral every 12 hours  cefTRIAXone   IVPB 1000 milliGRAM(s) IV Intermittent every 24 hours  dexAMETHasone  Injectable 6 milliGRAM(s) IV Push daily  dextrose 40% Gel 15 Gram(s) Oral once  dextrose 5%. 1000 milliLiter(s) (50 mL/Hr) IV Continuous <Continuous>  dextrose 5%. 1000 milliLiter(s) (100 mL/Hr) IV Continuous <Continuous>  dextrose 50% Injectable 25 Gram(s) IV Push once  dextrose 50% Injectable 12.5 Gram(s) IV Push once  dextrose 50% Injectable 25 Gram(s) IV Push once  diltiazem    milliGRAM(s) Oral daily  famotidine    Tablet 20 milliGRAM(s) Oral daily  glucagon  Injectable 1 milliGRAM(s) IntraMuscular once  hydrALAZINE 25 milliGRAM(s) Oral three times a day  insulin lispro (ADMELOG) corrective regimen sliding scale   SubCutaneous three times a day before meals  insulin lispro (ADMELOG) corrective regimen sliding scale   SubCutaneous at bedtime  levothyroxine 75 MICROGram(s) Oral daily  metroNIDAZOLE    Tablet 500 milliGRAM(s) Oral every 8 hours  sodium chloride 0.9%. 1000 milliLiter(s) (80 mL/Hr) IV Continuous <Continuous>  tiotropium 18 MICROgram(s) Capsule 1 Capsule(s) Inhalation daily  venlafaxine XR. 37.5 milliGRAM(s) Oral daily    MEDICATIONS  (PRN):  acetaminophen     Tablet .. 650 milliGRAM(s) Oral every 6 hours PRN Temp greater or equal to 38C (100.4F), Mild Pain (1 - 3)  albuterol/ipratropium for Nebulization 3 milliLiter(s) Nebulizer every 6 hours PRN Shortness of Breath and/or Wheezing  benzonatate 100 milliGRAM(s) Oral three times a day PRN Cough  melatonin 3 milliGRAM(s) Oral at bedtime PRN Insomnia    T(C): 36.6 (12-03-21 @ 04:56), Max: 36.7 (12-02-21 @ 01:12)  HR: 67 (12-03-21 @ 04:56) (55 - 74)  BP: 134/62 (12-03-21 @ 04:56) (96/53 - 138/63)  RR: 18 (12-03-21 @ 04:56)  SpO2: 93% (12-03-21 @ 04:56)  Wt(kg): --  I&O's Detail            PHYSICAL EXAM:  General: No distress  Respiratory: b/l air entry  Cardiovascular: S1 S2  Gastrointestinal: soft  Extremities:  no edema                           LABORATORY:                        9.0    9.81  )-----------( 207      ( 03 Dec 2021 08:45 )             28.2     12-03    142  |  106  |  88<H>  ----------------------------<  132<H>  4.2   |  29  |  2.50<H>    Ca    7.7<L>      03 Dec 2021 08:46      Sodium, Serum: 142 mmol/L (12-03 @ 08:46)  Sodium, Serum: 142 mmol/L (12-02 @ 16:06)    Potassium, Serum: 4.2 mmol/L (12-03 @ 08:46)  Potassium, Serum: 4.7 mmol/L (12-02 @ 16:06)    Hemoglobin: 9.0 g/dL (12-03 @ 08:45)  Hemoglobin: 9.4 g/dL (12-02 @ 16:06)  Hemoglobin: 10.8 g/dL (12-01 @ 05:15)  Hemoglobin: 10.0 g/dL (11-30 @ 17:27)    Creatinine, Serum 2.50 (12-03 @ 08:46)  Creatinine, Serum 2.90 (12-02 @ 16:06)  Creatinine, Serum 1.50 (12-01 @ 05:15)  Creatinine, Serum 1.10 (11-30 @ 17:27)    CARDIAC MARKERS ( 02 Dec 2021 16:06 )  x     / x     / 37 U/L / x     / 1.5 ng/mL

## 2021-12-03 NOTE — PROGRESS NOTE ADULT - PROBLEM SELECTOR PLAN 3
s/p IV lasix now likely dry given BRENDA   -discussed with renal, hold IV lasix and rec start IV NS @ 80cc/hr  - Strict I&Os & daily weights   - F/u TTE   - Cardiology follwoing  - continue BB with hold parameters s/p IV lasix now likely dry given BRENDA   -c/w IVF as above per renal  - Strict I&Os & daily weights   - F/u TTE. Fu with cards if can be done outpatient if discharged home michelle  - Cardiology following  - continue BB with hold parameters

## 2021-12-03 NOTE — PROGRESS NOTE ADULT - PROBLEM SELECTOR PLAN 4
#COPD exacerbation  -Nebs, albuterol and spiriva  -on Steroids for COVID19  - remdesivir on hold as above  - pulm consult [facility] following #COPD exacerbation  -Nebs, albuterol and spiriva  -on Steroids for COVID19  - remdesivir on hold as above  - pulm  [facility] following

## 2021-12-03 NOTE — PROGRESS NOTE ADULT - ASSESSMENT
CKD 3  COVID +, Pneumonia  COPD  Hypertension    Improving renal indices. Continue IV hydration. Change IVF to half NS. Hold losartan. No evidence of fluid overload on CT scan. Rx for COVID pneumonia. Low BP. Will d/c hydralazine. Monitor BP trend. Titrate BP meds as needed. Salt restriction. Will follow electrolytes and renal function trend.  CKD 3  COVID +, Pneumonia  COPD  Hypertension    Improving renal indices. Continue IV hydration. Change IVF to half NS. Hold losartan. No evidence of fluid overload on CT scan. Rx for COVID pneumonia. Low BP. Will d/c hydralazine. Monitor BP trend. Titrate BP meds as needed. Salt restriction. Can resume eliquis.    Will follow electrolytes and renal function trend.

## 2021-12-03 NOTE — PROGRESS NOTE ADULT - SUBJECTIVE AND OBJECTIVE BOX
PULMONARY/CRITICAL CARE    Dos 12/3/21  Ambulated  Doing well, improved, less sob. No fever.     Patient is a 89y old  Female who presents with a chief complaint of SOB (30 Nov 2021 19:49)  Has COVID pneumonia, rapid atrial fib.    BRIEF HOSPITAL COURSE: ***89 yo F PMHx Paroxysmal Atrial Fibrillation, HTN, Hypothyroidism, COPD (2-3 L nasal cannula at home) Hyperlipidemia, presenting with shortness of breath and LE edema. Patient is short of breath at baseline but has been having difficulty standing up and walking a few steps for the last week. Patient lives with her son and his family, who hosted thanksgiving dinner. 1 guest at the dinner tested positive for COVID. Son bought an at home rapid test. He was positive, and patient was also positive. Her grandchildren and his wife were negative. Patient admits to a dry cough that started Monday and progressively worsening LE edema in the last few days. She is on bumetanide 1 mg daily for water retention denies any changes to dose or missed doses.     She does admit to a high salt diet and does not really watch what she eats.   She denies orthopnea, fever, chills, history of heart failure or CAD.   Son at bedside admits that she has a history of "mini stroke" in the past however, patient adamantly denies.     On Cardizem for Atrial fib.    Events last 24 hours: ***    PAST MEDICAL & SURGICAL HISTORY:  Hypertension    Depression    Overactive bladder    Gout    Osteoarthritis    Hypothyroid    HLD (hyperlipidemia)    Paroxysmal atrial fibrillation    Asthma    S/P cholecystectomy    S/P lumpectomy of breast    S/P hysterectomy    S/P lung surgery, follow-up exam  s/p removal of suspicious lesion, negative      Allergies    Tomas Cheese - Pt states the one time she had a mouthful of tomas cheese, her throat was closing and she required an epinephrine injection. (Anaphylaxis)  iodine (Anaphylaxis)  penicillin (Other)  shellfish (Short breath; Hives)  sulfa drugs (Hives)    Intolerances      FAMILY HISTORY social--no recent cigs, etoh.   Family history of nasopharyngeal cancer (Child)  daughter    Family history of breast cancer (Mother)          Medications:  remdesivir  IVPB   IV Intermittent   remdesivir  IVPB 100 milliGRAM(s) IV Intermittent every 24 hours    carvedilol 6.25 milliGRAM(s) Oral every 12 hours  furosemide   Injectable 40 milliGRAM(s) IV Push every 12 hours  hydrALAZINE 100 milliGRAM(s) Oral every 8 hours  losartan 100 milliGRAM(s) Oral daily    albuterol/ipratropium for Nebulization 3 milliLiter(s) Nebulizer every 6 hours PRN  albuterol/ipratropium for Nebulization. 3 milliLiter(s) Nebulizer once  benzonatate 100 milliGRAM(s) Oral three times a day PRN  budesonide 160 MICROgram(s)/formoterol 4.5 MICROgram(s) Inhaler 2 Puff(s) Inhalation two times a day  tiotropium 18 MICROgram(s) Capsule 1 Capsule(s) Inhalation daily    acetaminophen     Tablet .. 650 milliGRAM(s) Oral every 6 hours PRN  melatonin 3 milliGRAM(s) Oral at bedtime PRN  venlafaxine XR. 37.5 milliGRAM(s) Oral daily      apixaban 2.5 milliGRAM(s) Oral every 12 hours  aspirin enteric coated 81 milliGRAM(s) Oral daily    famotidine    Tablet 20 milliGRAM(s) Oral daily      allopurinol 100 milliGRAM(s) Oral daily  dexAMETHasone  Injectable 6 milliGRAM(s) IV Push daily  levothyroxine 75 MICROGram(s) Oral daily                  ICU Vital Signs Last 24 Hrs  T(C): 36.8 (01 Dec 2021 07:15), Max: 38.2 (30 Nov 2021 22:45)  T(F): 98.2 (01 Dec 2021 07:15), Max: 100.7 (30 Nov 2021 22:45)  HR: 50 (01 Dec 2021 07:15) (11 - 147)  BP: 114/54 (01 Dec 2021 07:15) (81/36 - 200/88)  BP(mean): --  ABP: --  ABP(mean): --  RR: 18 (01 Dec 2021 07:15) (17 - 181)  SpO2: 98% (01 Dec 2021 07:15) (77% - 100%)    Vital Signs Last 24 Hrs  T(C): 36.8 (01 Dec 2021 07:15), Max: 38.2 (30 Nov 2021 22:45)  T(F): 98.2 (01 Dec 2021 07:15), Max: 100.7 (30 Nov 2021 22:45)  HR: 50 (01 Dec 2021 07:15) (11 - 147)  BP: 114/54 (01 Dec 2021 07:15) (81/36 - 200/88)  BP(mean): --  RR: 18 (01 Dec 2021 07:15) (17 - 181)  SpO2: 98% (01 Dec 2021 07:15) (77% - 100%)        I&O's Detail        LABS:                        10.8   8.70  )-----------( 198      ( 01 Dec 2021 05:15 )             34.1     12-01    144  |  104  |  33<H>  ----------------------------<  139<H>  4.2   |  34<H>  |  1.50<H>    Ca    8.6      01 Dec 2021 05:15  Mg     2.4     11-30    TPro  6.9  /  Alb  2.8<L>  /  TBili  0.3  /  DBili  x   /  AST  36  /  ALT  32  /  AlkPhos  69  12-01      CARDIAC MARKERS ( 30 Nov 2021 17:27 )  x     / x     / x     / x     / <1.0 ng/mL      CAPILLARY BLOOD GLUCOSE        PT/INR - ( 30 Nov 2021 17:27 )   PT: 12.9 sec;   INR: 1.11 ratio         PTT - ( 30 Nov 2021 17:27 )  PTT:29.3 sec    CULTURES:      Physical Examination:    General: mild  acute distress.  elderly female    HEENT: Pupils equal, reactive to light.  Symmetric.    PULM: crackles 1/3 up bilat;  few rhonchi,wheezes bilat; No change percussion    CVS: irregular rate and rhythm, no murmurs, rubs, or gallops    ABD: Soft, nondistended, nontender, normoactive bowel sounds, no masses    EXT: No edema, nontender calves    SKIN: Warm and well perfused, no rashes noted.    NEURO: Alert, oriented, interactive, nonfocal    RADIOLOGY: ***  < from: CT Chest No Cont (12.01.21 @ 02:32) >  EXAM:  CT CHEST                            PROCEDURE DATE:  12/01/2021          INTERPRETATION:  CLINICAL INFORMATION: Shortness of breath, COPD, Covid.    COMPARISON: 11/4/2018    CONTRAST/COMPLICATIONS:  IV Contrast: NONE  Oral Contrast: NONE  Complications: None reported at time of study completion    PROCEDURE:  CT of the Chest was performed.  Sagittal and coronal reformats were performed.    FINDINGS:  Evaluation of solid organs and vascular structures is limited without intravenous contrast.    LUNGS AND AIRWAYS: Patent central airways. Right upper lobe wedge resection. 2 mm right upper lobe nodule (2, 37). Stable 3 mm right middle lobe nodule (2, 80). Stable 6 mm right lower lobe nodule (2, 74). Stable 5 mm nodule along the right major fissure likely reflecting intrapulmonary node (2, 81). Linear scarring/atelectasis in the right lower lobe. Subsegmental lingular and left lower lobe atelectasis. Patchy opacities in the left lower lobe.  PLEURA: No pleural effusion.  MEDIASTINUM AND FIDEL: 1 cm precarinal node. Subcentimeter other mediastinal nodes.  VESSELS: Aortic and coronary atherosclerosis.  HEART: Mild cardiomegaly. No pericardial effusion.  CHEST WALL AND LOWER NECK: Stable 9 mm hypodense right thyroid nodule.  VISUALIZED UPPER ABDOMEN: Cholecystectomy. Multiple cystic lesions within the pancreas, grossly stable, likely IPMNs or pseudocysts. Bilateral renal cysts and hyperdense lesions likely reflecting hemorrhagic/proteinaceous cyst. Stable appearance of right renalcyst containing internal clustered calcifications (2, 37).  BONES: Degenerative changes. Multiple chronic right rib deformities. Stable mild compression deformity of L2.    IMPRESSION:  Left lower lobe pneumonia. Continued follow up to resolution is recommended.        --- End of Report ---            VENU ZHANG MD; Attending Radiologist  This document has been electronically signed. Dec  1 2021  3:26AM    < end of copied text >  < from: US Duplex Venous Lower Ext Complete, Bilateral (12.01.21 @ 02:08) >  EXAM:  US DPLX LWR EXT VEINS COMPL BI                            PROCEDURE DATE:  12/01/2021          INTERPRETATION:  CLINICAL INDICATION: le edmea sob hypoxia.    TECHNIQUE: Grayscale, color Doppler and spectral Doppler ultrasound was utilized toevaluate bilateral lower extremity deep venous system.    COMPARISON: 9/14/2015.    FINDINGS: There is no thrombus in bilateral common femoral veins, femoral veins or popliteal veins. Visualized bilateral posterior tibial veins and peroneal veins arepatent. Bilateral gastrocnemius veins and soleal veins were not visualized.    IMPRESSION:    Bilateral gastrocnemius veins and soleal veins were not visualized, limiting complete evaluation. No obvious thrombus in the visualized bilateral lower extremity deep veins.    --- End of Report ---        < end of copied text >

## 2021-12-03 NOTE — PROGRESS NOTE ADULT - PROBLEM SELECTOR PLAN 1
-doing well on NC  -CT chest with LLL PNA, procal 0.37, will also treat for superimposed bacterial pna, c/w IV ceftriaxone and flagyl per ID  -discussed with renal and pharmacy, d/c remdesivir given low crcl <15  -c/w IV dexa 6mg daily, start FS and monitor FSS  -c/w albuterol inhaler q6hrs  -c/w NC, wean 02 as tolerated  -GOC: DNR/DNI MOLST in chart   - ID Alfred following  - d-dimer downtrending, trend inflam markers q48 hours  - wbc 11 up from 8 but on steroids, trend cbc  - afebrile, -doing well on 3L NC which is home baseline COPD amount, no santiago/sob  -CT chest with LLL PNA, procal 0.37, treating superimposed bacterial   - c/w IV ceftriaxone and flagyl while in house  - discussed with Dr Simno, can be discharged once renal clears, switch oral aBx to oral (vantin + Metronidazole) to complete 5-7 days total on discharge  -d/gianluca remdesivir given low crcl <15 per ID/pharamcy/renal   -c/w IV dexa 6mg daily for 10 day course   -c/w albuterol inhaler q6hrs  -GOC: DNR/DNI MOLST in chart   - ID Alfred following  - d-dimer downtrending, trend inflam markers q48 hours  - wbc resolved but on steroids, trend cbc  - afebrile,

## 2021-12-04 ENCOUNTER — TRANSCRIPTION ENCOUNTER (OUTPATIENT)
Age: 86
End: 2021-12-04

## 2021-12-04 LAB
CREAT ?TM UR-MCNC: 70 MG/DL — SIGNIFICANT CHANGE UP
HCT VFR BLD CALC: 29.1 % — LOW (ref 34.5–45)
HGB BLD-MCNC: 9 G/DL — LOW (ref 11.5–15.5)
MAGNESIUM SERPL-MCNC: 2.3 MG/DL — SIGNIFICANT CHANGE UP (ref 1.6–2.6)
MCHC RBC-ENTMCNC: 30.7 PG — SIGNIFICANT CHANGE UP (ref 27–34)
MCHC RBC-ENTMCNC: 30.9 GM/DL — LOW (ref 32–36)
MCV RBC AUTO: 99.3 FL — SIGNIFICANT CHANGE UP (ref 80–100)
NRBC # BLD: 0 /100 WBCS — SIGNIFICANT CHANGE UP (ref 0–0)
PHOSPHATE SERPL-MCNC: 3.8 MG/DL — SIGNIFICANT CHANGE UP (ref 2.5–4.5)
PLATELET # BLD AUTO: 219 K/UL — SIGNIFICANT CHANGE UP (ref 150–400)
RBC # BLD: 2.93 M/UL — LOW (ref 3.8–5.2)
RBC # FLD: 14.6 % — HIGH (ref 10.3–14.5)
SODIUM UR-SCNC: <20 MMOL/L — SIGNIFICANT CHANGE UP
UUN UR-MCNC: 763 MG/DL — SIGNIFICANT CHANGE UP
WBC # BLD: 7.72 K/UL — SIGNIFICANT CHANGE UP (ref 3.8–10.5)
WBC # FLD AUTO: 7.72 K/UL — SIGNIFICANT CHANGE UP (ref 3.8–10.5)

## 2021-12-04 PROCEDURE — 99232 SBSQ HOSP IP/OBS MODERATE 35: CPT

## 2021-12-04 PROCEDURE — 99233 SBSQ HOSP IP/OBS HIGH 50: CPT

## 2021-12-04 PROCEDURE — 93010 ELECTROCARDIOGRAM REPORT: CPT

## 2021-12-04 RX ORDER — DEXAMETHASONE 0.5 MG/5ML
1 ELIXIR ORAL
Qty: 6 | Refills: 0
Start: 2021-12-04 | End: 2021-12-09

## 2021-12-04 RX ORDER — TIOTROPIUM BROMIDE 18 UG/1
1 CAPSULE ORAL; RESPIRATORY (INHALATION)
Qty: 30 | Refills: 0
Start: 2021-12-04 | End: 2022-01-02

## 2021-12-04 RX ORDER — BUMETANIDE 0.25 MG/ML
1 INJECTION INTRAMUSCULAR; INTRAVENOUS
Qty: 0 | Refills: 0 | DISCHARGE

## 2021-12-04 RX ORDER — HYDRALAZINE HCL 50 MG
1 TABLET ORAL
Qty: 90 | Refills: 0
Start: 2021-12-04 | End: 2022-01-02

## 2021-12-04 RX ORDER — HYDRALAZINE HCL 50 MG
1 TABLET ORAL
Qty: 0 | Refills: 0 | DISCHARGE

## 2021-12-04 RX ORDER — METRONIDAZOLE 500 MG
1 TABLET ORAL
Qty: 9 | Refills: 0
Start: 2021-12-04 | End: 2021-12-06

## 2021-12-04 RX ORDER — LOSARTAN POTASSIUM 100 MG/1
1 TABLET, FILM COATED ORAL
Qty: 0 | Refills: 0 | DISCHARGE

## 2021-12-04 RX ORDER — CEFPODOXIME PROXETIL 100 MG
1 TABLET ORAL
Qty: 6 | Refills: 0
Start: 2021-12-04 | End: 2021-12-06

## 2021-12-04 RX ADMIN — ALBUTEROL 2 PUFF(S): 90 AEROSOL, METERED ORAL at 18:17

## 2021-12-04 RX ADMIN — APIXABAN 2.5 MILLIGRAM(S): 2.5 TABLET, FILM COATED ORAL at 23:05

## 2021-12-04 RX ADMIN — CARVEDILOL PHOSPHATE 6.25 MILLIGRAM(S): 80 CAPSULE, EXTENDED RELEASE ORAL at 16:52

## 2021-12-04 RX ADMIN — Medication 1: at 11:49

## 2021-12-04 RX ADMIN — Medication 500 MILLIGRAM(S): at 11:49

## 2021-12-04 RX ADMIN — ALBUTEROL 2 PUFF(S): 90 AEROSOL, METERED ORAL at 01:06

## 2021-12-04 RX ADMIN — TIOTROPIUM BROMIDE 1 CAPSULE(S): 18 CAPSULE ORAL; RESPIRATORY (INHALATION) at 07:09

## 2021-12-04 RX ADMIN — Medication 6 MILLIGRAM(S): at 05:36

## 2021-12-04 RX ADMIN — ALBUTEROL 2 PUFF(S): 90 AEROSOL, METERED ORAL at 14:00

## 2021-12-04 RX ADMIN — Medication 240 MILLIGRAM(S): at 05:35

## 2021-12-04 RX ADMIN — Medication 500 MILLIGRAM(S): at 21:02

## 2021-12-04 RX ADMIN — BUDESONIDE AND FORMOTEROL FUMARATE DIHYDRATE 2 PUFF(S): 160; 4.5 AEROSOL RESPIRATORY (INHALATION) at 07:08

## 2021-12-04 RX ADMIN — CEFTRIAXONE 100 MILLIGRAM(S): 500 INJECTION, POWDER, FOR SOLUTION INTRAMUSCULAR; INTRAVENOUS at 16:52

## 2021-12-04 RX ADMIN — Medication 100 MILLIGRAM(S): at 05:35

## 2021-12-04 RX ADMIN — APIXABAN 2.5 MILLIGRAM(S): 2.5 TABLET, FILM COATED ORAL at 00:03

## 2021-12-04 RX ADMIN — Medication 100 MILLIGRAM(S): at 21:02

## 2021-12-04 RX ADMIN — CARVEDILOL PHOSPHATE 6.25 MILLIGRAM(S): 80 CAPSULE, EXTENDED RELEASE ORAL at 05:35

## 2021-12-04 RX ADMIN — FAMOTIDINE 20 MILLIGRAM(S): 10 INJECTION INTRAVENOUS at 11:49

## 2021-12-04 RX ADMIN — Medication 37.5 MILLIGRAM(S): at 11:51

## 2021-12-04 RX ADMIN — Medication 75 MICROGRAM(S): at 05:36

## 2021-12-04 RX ADMIN — Medication 500 MILLIGRAM(S): at 05:35

## 2021-12-04 RX ADMIN — ALBUTEROL 2 PUFF(S): 90 AEROSOL, METERED ORAL at 07:08

## 2021-12-04 RX ADMIN — APIXABAN 2.5 MILLIGRAM(S): 2.5 TABLET, FILM COATED ORAL at 11:49

## 2021-12-04 RX ADMIN — BUDESONIDE AND FORMOTEROL FUMARATE DIHYDRATE 2 PUFF(S): 160; 4.5 AEROSOL RESPIRATORY (INHALATION) at 18:16

## 2021-12-04 RX ADMIN — Medication 100 MILLIGRAM(S): at 11:49

## 2021-12-04 RX ADMIN — Medication 81 MILLIGRAM(S): at 11:49

## 2021-12-04 NOTE — DISCHARGE NOTE PROVIDER - NSDCMRMEDTOKEN_GEN_ALL_CORE_FT
albuterol 2.5 mg/3 mL (0.083%) inhalation solution: 3 milliliter(s) inhaled every 6 hours, As Needed  Align 4 mg oral capsule: 1 cap(s) orally once a day  allopurinol 100 mg oral tablet: 1 tab(s) orally once a day  apixaban 2.5 mg oral tablet: 1 tab(s) orally every 12 hours  aspirin 81 mg oral tablet: 1 tab(s) orally once a day  budesonide-formoterol 160 mcg-4.5 mcg/inh inhalation aerosol: 2 puff(s) inhaled 2 times a day  bumetanide 2 mg oral tablet: 1 tab(s) orally once a day  carvedilol 6.25 mg oral tablet: 1 tab(s) orally 2 times a day  DilTIAZem (Eqv-Cardizem CD) 240 mg/24 hours oral capsule, extended release: 1 cap(s) orally once a day  Effexor 37.5 mg oral capsule, extended release: 1 cap(s) orally once a day  hydrALAZINE 100 mg oral tablet: 1 tab(s) orally 3 times a day  losartan 100 mg oral tablet: 1 tab(s) orally once a day  Multi-Day Plus Minerals Multiple Vitamins with Minerals oral tablet: 1 tab(s) orally once a day  Synthroid 75 mcg (0.075 mg) oral tablet: 1 tab(s) orally once a day   albuterol 2.5 mg/3 mL (0.083%) inhalation solution: 3 milliliter(s) inhaled every 6 hours, As Needed  Align 4 mg oral capsule: 1 cap(s) orally once a day  allopurinol 100 mg oral tablet: 1 tab(s) orally once a day  apixaban 2.5 mg oral tablet: 1 tab(s) orally every 12 hours  aspirin 81 mg oral tablet: 1 tab(s) orally once a day  benzonatate 100 mg oral capsule: 1 cap(s) orally 3 times a day, As needed, Cough  budesonide-formoterol 160 mcg-4.5 mcg/inh inhalation aerosol: 2 puff(s) inhaled 2 times a day  carvedilol 6.25 mg oral tablet: 1 tab(s) orally 2 times a day  cefpodoxime 200 mg oral tablet: 1 tab(s) orally 2 times a day   dexamethasone 6 mg oral tablet: 1 tab(s) orally once a day   DilTIAZem (Eqv-Cardizem CD) 240 mg/24 hours oral capsule, extended release: 1 cap(s) orally once a day  Effexor 37.5 mg oral capsule, extended release: 1 cap(s) orally once a day  Flagyl 500 mg oral tablet: 1 tab(s) orally 3 times a day   hydrALAZINE 25 mg oral tablet: 1 tab(s) orally 3 times a day   Multi-Day Plus Minerals Multiple Vitamins with Minerals oral tablet: 1 tab(s) orally once a day  Synthroid 75 mcg (0.075 mg) oral tablet: 1 tab(s) orally once a day  tiotropium 18 mcg inhalation capsule: 1 cap(s) inhaled once a day   albuterol 2.5 mg/3 mL (0.083%) inhalation solution: 3 milliliter(s) inhaled every 6 hours, As Needed  Align 4 mg oral capsule: 1 cap(s) orally once a day  allopurinol 100 mg oral tablet: 1 tab(s) orally once a day  apixaban 2.5 mg oral tablet: 1 tab(s) orally every 12 hours  aspirin 81 mg oral tablet: 1 tab(s) orally once a day  benzonatate 100 mg oral capsule: 1 cap(s) orally 3 times a day, As needed, Cough  budesonide-formoterol 160 mcg-4.5 mcg/inh inhalation aerosol: 2 puff(s) inhaled 2 times a day  carvedilol 6.25 mg oral tablet: 1 tab(s) orally 2 times a day  cefpodoxime 200 mg oral tablet: 1 tab(s) orally 2 times a day   dexamethasone 6 mg oral tablet: 1 tab(s) orally once a day   DilTIAZem (Eqv-Cardizem CD) 240 mg/24 hours oral capsule, extended release: 1 cap(s) orally once a day  Effexor 37.5 mg oral capsule, extended release: 1 cap(s) orally once a day  Flagyl 500 mg oral tablet: 1 tab(s) orally 3 times a day   hydrALAZINE 25 mg oral tablet: 1 tab(s) orally 3 times a day   Multi-Day Plus Minerals Multiple Vitamins with Minerals oral tablet: 1 tab(s) orally once a day  O2: PORTABLE OXYGEN  Synthroid 75 mcg (0.075 mg) oral tablet: 1 tab(s) orally once a day  tiotropium 18 mcg inhalation capsule: 1 cap(s) inhaled once a day

## 2021-12-04 NOTE — PROGRESS NOTE ADULT - PROBLEM SELECTOR PROBLEM 6
COPD exacerbation
Atrial fibrillation
COPD exacerbation
COPD exacerbation
Atrial fibrillation
Atrial fibrillation
Anemia

## 2021-12-04 NOTE — CHART NOTE - NSCHARTNOTEFT_GEN_A_CORE
Called by RN for pt HR in 140-150s. Pt has history of paroxysmal Afib, but also with PE risk factors including COVID positive, LE edema.    -EKG shows rapid Afib w/ RVR. On admission, EKG sinus tach 101, no ischemic changes  -Will give cardizem 10mg IVP x1  -D-dimer pending, will order CT Angio pending d-dimer results  -RN to notify for any changes in clinical status.
Patient with PMH of COPD was diagnosed and treated for COVID-19 Pneumonia and now requires continuous home O2 at 4 LPM. Patient is mobile within the home and will also require portability. Patient is in a chronic but stable state and ready for discharge.

## 2021-12-04 NOTE — PROGRESS NOTE ADULT - PROBLEM SELECTOR PLAN 8
Hypertensive urgency on admission - now resolved  - will hold home losartan in setting of BRENDA  - holding hydralazine  - continue home diltiazem and coreg  - monitor bp

## 2021-12-04 NOTE — PROGRESS NOTE ADULT - PROBLEM SELECTOR PROBLEM 5
Atrial fibrillation
Hypothyroidism
Elevated troponin
Hypothyroidism
Hypothyroidism
Elevated troponin
Elevated troponin

## 2021-12-04 NOTE — DISCHARGE NOTE PROVIDER - HOSPITAL COURSE
HPI:  89 yo F PMHx Paroxysmal Atrial Fibrillation, HTN, Hypothyroidism, COPD (2-3 L nasal cannula at home) Hyperlipidemia, presenting with shortness of breath and LE edema. Patient is short of breath at baseline but has been having difficulty standing up and walking a few steps for the last week. Patient lives with her son and his family, who hosted thanksgiving dinner. 1 guest at the dinner tested positive for COVID. Son bought an at home rapid test. He was positive, and patient was also positive. Her grandchildren and his wife were negative. Patient admits to a dry cough that started Monday and progressively worsening LE edema in the last few days. She is on bumetanide 1 mg daily for water retention denies any changes to dose or missed doses.   She does admit to a high salt diet and does not really watch what she eats.   She denies orthopnea, fever, chills, history of heart failure or CAD.   Son at bedside admits that she has a history of "mini stroke" in the past however, patient adamantly denies.     ED Course:   Vital Signs: 200/88. O2 77% RA, RR 22  EKG: rapid a fib @ 101 no ischemic changes   Labs significant for:  WBC 10.53, BUN/Cr: 24/1.10 H/H 10/30.3, BNP: 5036, CK-MB < 1, troponin 123.9   Imaging: Chest X ray: hazy opacities bilaterally in all lung fields, no clear consolidation, (awaiting official read)   Given: Lasix 40 mg IV X 1  (30 Nov 2021 19:49)    ---  HOSPITAL COURSE:   Patient was admitted to the general medical floor for further management. COVID-19 PCR was positive. She was treated with CTX Flagyl for possible aspiration PNA. She had BRENDA, Losartan and Bumex were held, and kidney function improved. Patient was managed with supportive treatment including supplemental O2. Patient's respiratory status was monitored closely and improved throughout hospital course. Patient was medically optimized and hemodynamically stable for discharge as d/w nephro Dr Arizmendi, patient's daughter and grandson, with close follow up as an outpatient.    PHYSICAL EXAM:  Vital Signs Last 24 Hrs  T(C): 36.6 (04 Dec 2021 13:37), Max: 36.7 (03 Dec 2021 21:05)  T(F): 97.8 (04 Dec 2021 13:37), Max: 98.1 (03 Dec 2021 21:05)  HR: 54 (04 Dec 2021 13:37) (52 - 71)  BP: 160/63 (04 Dec 2021 13:37) (117/61 - 168/69)  BP(mean): --  RR: 18 (04 Dec 2021 13:37) (17 - 18)  SpO2: 90% (04 Dec 2021 13:37) (90% - 96%)    GENERAL: NAD, on 3LNC comfortable (home baseline)  HEAD:  Atraumatic, Normocephalic  NECK: Supple, No JVD  CHEST/LUNG: on NC comfortable  HEART: Regular rate and rhythm;   ABDOMEN:  Nontender, Nondistended;   PSYCH: AAOx3    ---  CONSULTANTS:   GABRIELA Cornelius  Cardio Dr Leavitt    ---  TIME SPENT:  I, the attending physician, was physically present for the key portions of the evaluation and management (E/M) service provided. The total amount of time spent reviewing the hospital notes, laboratory values, imaging findings, assessing/counseling the patient, discussing with consultant physicians, social work, nursing staff was 35 minutes. HPI:  89 yo F PMHx Paroxysmal Atrial Fibrillation, HTN, Hypothyroidism, COPD (2-3 L nasal cannula at home) Hyperlipidemia, presenting with shortness of breath and LE edema. Patient is short of breath at baseline but has been having difficulty standing up and walking a few steps for the last week. Patient lives with her son and his family, who hosted thanksgiving dinner. 1 guest at the dinner tested positive for COVID. Son bought an at home rapid test. He was positive, and patient was also positive. Her grandchildren and his wife were negative. Patient admits to a dry cough that started Monday and progressively worsening LE edema in the last few days. She is on bumetanide 1 mg daily for water retention denies any changes to dose or missed doses.   She does admit to a high salt diet and does not really watch what she eats.   She denies orthopnea, fever, chills, history of heart failure or CAD.   Son at bedside admits that she has a history of "mini stroke" in the past however, patient adamantly denies.     ED Course:   Vital Signs: 200/88. O2 77% RA, RR 22  EKG: rapid a fib @ 101 no ischemic changes   Labs significant for:  WBC 10.53, BUN/Cr: 24/1.10 H/H 10/30.3, BNP: 5036, CK-MB < 1, troponin 123.9   Imaging: Chest X ray: hazy opacities bilaterally in all lung fields, no clear consolidation, (awaiting official read)   Given: Lasix 40 mg IV X 1  (30 Nov 2021 19:49)    ---  HOSPITAL COURSE:   Patient was admitted to the general medical floor for further management. COVID-19 PCR was positive. She was treated with CTX Flagyl for possible aspiration PNA. She had BRENDA, Losartan and Bumex were held, and kidney function improved. Patient was managed with supportive treatment including supplemental O2. Patient's respiratory status was monitored closely and improved throughout hospital course. Patient was medically optimized and hemodynamically stable for discharge as d/w nephro Dr Arizmendi, patient's daughter and grandson, with close follow up as an outpatient.    Patient seen and examined on day of discharge 12/5/21:    PHYSICAL EXAM:  Vital Signs Last 24 Hrs  T(C): 36.3 (05 Dec 2021 11:56), Max: 36.7 (05 Dec 2021 05:07)  T(F): 97.4 (05 Dec 2021 11:56), Max: 98.1 (05 Dec 2021 05:07)  HR: 58 (05 Dec 2021 11:56) (53 - 66)  BP: 162/53 (05 Dec 2021 12:46) (132/57 - 196/78)  RR: 16 (05 Dec 2021 11:56) (16 - 18)  SpO2: 96% (05 Dec 2021 11:56) (94% - 96%)    GENERAL: NAD, on 3LNC comfortable (home baseline)  HEAD:  Atraumatic, Normocephalic  NECK: Supple, No JVD  CHEST/LUNG: on NC comfortable  HEART: Regular rate and rhythm;   ABDOMEN:  Nontender, Nondistended;   PSYCH: AAOx3    ---  CONSULTANTS:   GABRIELA Cornelius  Cardio Dr Leavitt    ---  TIME SPENT:  I, the attending physician, was physically present for the key portions of the evaluation and management (E/M) service provided. The total amount of time spent reviewing the hospital notes, laboratory values, imaging findings, assessing/counseling the patient, discussing with consultant physicians, social work, nursing staff was 35 minutes.

## 2021-12-04 NOTE — PROGRESS NOTE ADULT - PROVIDER SPECIALTY LIST ADULT
Infectious Disease
Cardio-OB
Cardiology
Infectious Disease
Pulmonology
Infectious Disease
Nephrology
Nephrology
Cardiology
Hospitalist
Nephrology
Pulmonology
Pulmonology
Hospitalist

## 2021-12-04 NOTE — PROGRESS NOTE ADULT - PROBLEM SELECTOR PLAN 3
s/p IV lasix now likely dry given BRENDA   -c/w IVF as above per renal  - Strict I&Os & daily weights   - F/u TTE. Fu with cards if can be done outpatient if discharged home michelle  - Cardiology following  - continue BB with hold parameters

## 2021-12-04 NOTE — PROGRESS NOTE ADULT - SUBJECTIVE AND OBJECTIVE BOX
PULMONARY/CRITICAL CARE    Dos 12/4/21  Improved.  Ambulated  Doing well, less sob. No fever.     Patient is a 89y old  Female who presents with a chief complaint of SOB (30 Nov 2021 19:49)  Has COVID pneumonia, rapid atrial fib.    BRIEF HOSPITAL COURSE: ***89 yo F PMHx Paroxysmal Atrial Fibrillation, HTN, Hypothyroidism, COPD (2-3 L nasal cannula at home) Hyperlipidemia, presenting with shortness of breath and LE edema. Patient is short of breath at baseline but has been having difficulty standing up and walking a few steps for the last week. Patient lives with her son and his family, who hosted thanksgiving dinner. 1 guest at the dinner tested positive for COVID. Son bought an at home rapid test. He was positive, and patient was also positive. Her grandchildren and his wife were negative. Patient admits to a dry cough that started Monday and progressively worsening LE edema in the last few days. She is on bumetanide 1 mg daily for water retention denies any changes to dose or missed doses.     She does admit to a high salt diet and does not really watch what she eats.   She denies orthopnea, fever, chills, history of heart failure or CAD.   Son at bedside admits that she has a history of "mini stroke" in the past however, patient adamantly denies.     On Cardizem for Atrial fib.    Events last 24 hours: ***    PAST MEDICAL & SURGICAL HISTORY:  Hypertension    Depression    Overactive bladder    Gout    Osteoarthritis    Hypothyroid    HLD (hyperlipidemia)    Paroxysmal atrial fibrillation    Asthma    S/P cholecystectomy    S/P lumpectomy of breast    S/P hysterectomy    S/P lung surgery, follow-up exam  s/p removal of suspicious lesion, negative      Allergies    Tomas Cheese - Pt states the one time she had a mouthful of tomas cheese, her throat was closing and she required an epinephrine injection. (Anaphylaxis)  iodine (Anaphylaxis)  penicillin (Other)  shellfish (Short breath; Hives)  sulfa drugs (Hives)    Intolerances      FAMILY HISTORY social--no recent cigs, etoh.   Family history of nasopharyngeal cancer (Child)  daughter    Family history of breast cancer (Mother)          Medications:  remdesivir  IVPB   IV Intermittent   remdesivir  IVPB 100 milliGRAM(s) IV Intermittent every 24 hours    carvedilol 6.25 milliGRAM(s) Oral every 12 hours  furosemide   Injectable 40 milliGRAM(s) IV Push every 12 hours  hydrALAZINE 100 milliGRAM(s) Oral every 8 hours  losartan 100 milliGRAM(s) Oral daily    albuterol/ipratropium for Nebulization 3 milliLiter(s) Nebulizer every 6 hours PRN  albuterol/ipratropium for Nebulization. 3 milliLiter(s) Nebulizer once  benzonatate 100 milliGRAM(s) Oral three times a day PRN  budesonide 160 MICROgram(s)/formoterol 4.5 MICROgram(s) Inhaler 2 Puff(s) Inhalation two times a day  tiotropium 18 MICROgram(s) Capsule 1 Capsule(s) Inhalation daily    acetaminophen     Tablet .. 650 milliGRAM(s) Oral every 6 hours PRN  melatonin 3 milliGRAM(s) Oral at bedtime PRN  venlafaxine XR. 37.5 milliGRAM(s) Oral daily      apixaban 2.5 milliGRAM(s) Oral every 12 hours  aspirin enteric coated 81 milliGRAM(s) Oral daily    famotidine    Tablet 20 milliGRAM(s) Oral daily      allopurinol 100 milliGRAM(s) Oral daily  dexAMETHasone  Injectable 6 milliGRAM(s) IV Push daily  levothyroxine 75 MICROGram(s) Oral daily                  ICU Vital Signs Last 24 Hrs  T(C): 36.8 (01 Dec 2021 07:15), Max: 38.2 (30 Nov 2021 22:45)  T(F): 98.2 (01 Dec 2021 07:15), Max: 100.7 (30 Nov 2021 22:45)  HR: 50 (01 Dec 2021 07:15) (11 - 147)  BP: 114/54 (01 Dec 2021 07:15) (81/36 - 200/88)  BP(mean): --  ABP: --  ABP(mean): --  RR: 18 (01 Dec 2021 07:15) (17 - 181)  SpO2: 98% (01 Dec 2021 07:15) (77% - 100%)    Vital Signs Last 24 Hrs  T(C): 36.8 (01 Dec 2021 07:15), Max: 38.2 (30 Nov 2021 22:45)  T(F): 98.2 (01 Dec 2021 07:15), Max: 100.7 (30 Nov 2021 22:45)  HR: 50 (01 Dec 2021 07:15) (11 - 147)  BP: 114/54 (01 Dec 2021 07:15) (81/36 - 200/88)  BP(mean): --  RR: 18 (01 Dec 2021 07:15) (17 - 181)  SpO2: 98% (01 Dec 2021 07:15) (77% - 100%)        I&O's Detail        LABS:                        10.8   8.70  )-----------( 198      ( 01 Dec 2021 05:15 )             34.1     12-01    144  |  104  |  33<H>  ----------------------------<  139<H>  4.2   |  34<H>  |  1.50<H>    Ca    8.6      01 Dec 2021 05:15  Mg     2.4     11-30    TPro  6.9  /  Alb  2.8<L>  /  TBili  0.3  /  DBili  x   /  AST  36  /  ALT  32  /  AlkPhos  69  12-01      CARDIAC MARKERS ( 30 Nov 2021 17:27 )  x     / x     / x     / x     / <1.0 ng/mL      CAPILLARY BLOOD GLUCOSE        PT/INR - ( 30 Nov 2021 17:27 )   PT: 12.9 sec;   INR: 1.11 ratio         PTT - ( 30 Nov 2021 17:27 )  PTT:29.3 sec    CULTURES:      Physical Examination:    General: mild  acute distress.  elderly female    HEENT: Pupils equal, reactive to light.  Symmetric.    PULM: crackles 1/3 up bilat;  few rhonchi,wheezes bilat; No change percussion    CVS: irregular rate and rhythm, no murmurs, rubs, or gallops    ABD: Soft, nondistended, nontender, normoactive bowel sounds, no masses    EXT: No edema, nontender calves    SKIN: Warm and well perfused, no rashes noted.    NEURO: Alert, oriented, interactive, nonfocal    RADIOLOGY: ***  < from: CT Chest No Cont (12.01.21 @ 02:32) >  EXAM:  CT CHEST                            PROCEDURE DATE:  12/01/2021          INTERPRETATION:  CLINICAL INFORMATION: Shortness of breath, COPD, Covid.    COMPARISON: 11/4/2018    CONTRAST/COMPLICATIONS:  IV Contrast: NONE  Oral Contrast: NONE  Complications: None reported at time of study completion    PROCEDURE:  CT of the Chest was performed.  Sagittal and coronal reformats were performed.    FINDINGS:  Evaluation of solid organs and vascular structures is limited without intravenous contrast.    LUNGS AND AIRWAYS: Patent central airways. Right upper lobe wedge resection. 2 mm right upper lobe nodule (2, 37). Stable 3 mm right middle lobe nodule (2, 80). Stable 6 mm right lower lobe nodule (2, 74). Stable 5 mm nodule along the right major fissure likely reflecting intrapulmonary node (2, 81). Linear scarring/atelectasis in the right lower lobe. Subsegmental lingular and left lower lobe atelectasis. Patchy opacities in the left lower lobe.  PLEURA: No pleural effusion.  MEDIASTINUM AND FIDEL: 1 cm precarinal node. Subcentimeter other mediastinal nodes.  VESSELS: Aortic and coronary atherosclerosis.  HEART: Mild cardiomegaly. No pericardial effusion.  CHEST WALL AND LOWER NECK: Stable 9 mm hypodense right thyroid nodule.  VISUALIZED UPPER ABDOMEN: Cholecystectomy. Multiple cystic lesions within the pancreas, grossly stable, likely IPMNs or pseudocysts. Bilateral renal cysts and hyperdense lesions likely reflecting hemorrhagic/proteinaceous cyst. Stable appearance of right renalcyst containing internal clustered calcifications (2, 37).  BONES: Degenerative changes. Multiple chronic right rib deformities. Stable mild compression deformity of L2.    IMPRESSION:  Left lower lobe pneumonia. Continued follow up to resolution is recommended.        --- End of Report ---            VENU ZHANG MD; Attending Radiologist  This document has been electronically signed. Dec  1 2021  3:26AM    < end of copied text >  < from: US Duplex Venous Lower Ext Complete, Bilateral (12.01.21 @ 02:08) >  EXAM:  US DPLX LWR EXT VEINS COMPL BI                            PROCEDURE DATE:  12/01/2021          INTERPRETATION:  CLINICAL INDICATION: le edmea sob hypoxia.    TECHNIQUE: Grayscale, color Doppler and spectral Doppler ultrasound was utilized toevaluate bilateral lower extremity deep venous system.    COMPARISON: 9/14/2015.    FINDINGS: There is no thrombus in bilateral common femoral veins, femoral veins or popliteal veins. Visualized bilateral posterior tibial veins and peroneal veins arepatent. Bilateral gastrocnemius veins and soleal veins were not visualized.    IMPRESSION:    Bilateral gastrocnemius veins and soleal veins were not visualized, limiting complete evaluation. No obvious thrombus in the visualized bilateral lower extremity deep veins.    --- End of Report ---        < end of copied text >  ra< from: Xray Chest 1 View- PORTABLE-Routine (Xray Chest 1 View- PORTABLE-Routine in AM.) (12.02.21 @ 09:12) >  EXAM:  XR CHEST PORTABLE ROUTINE 1V                            PROCEDURE DATE:  12/02/2021          INTERPRETATION:  Follow-up. AP chest.    Prior 11/30/2021.    IMPRESSION: No change heart mediastinum. Suspect evolving small patchy infiltrate at the left base. No change small left pleural effusion. New small right pleural effusion.    --- End of Report ---            GARRISON NAIK MD; Attending Radiologist  This document has been electronically signed. De    < end of copied text >  < from: Xray Chest 1 View- PORTABLE-Routine (Xray Chest 1 View- PORTABLE-Routine in AM.) (12.02.21 @ 09:12) >  EXAM:  XR CHEST PORTABLE ROUTINE 1V                            PROCEDURE DATE:  12/02/2021          INTERPRETATION:  Follow-up. AP chest.    Prior 11/30/2021.    IMPRESSION: No change heart mediastinum. Suspect evolving small patchy infiltrate at the left base. No change small left pleural effusion. New small right pleural effusion.    --- End of Report ---            GARRISON NAIK MD; Attending Radiologist  This document has been electronically signed. De    < end of copied text >

## 2021-12-04 NOTE — PROGRESS NOTE ADULT - PROBLEM SELECTOR PROBLEM 1
Acute hypoxemic respiratory failure due to COVID-19

## 2021-12-04 NOTE — PROGRESS NOTE ADULT - SUBJECTIVE AND OBJECTIVE BOX
No distress    Vital Signs Last 24 Hrs  T(C): 36.6 (21 @ 19:53), Max: 36.6 (21 @ 13:37)  T(F): 97.8 (21 @ 19:53), Max: 97.8 (21 @ 13:37)  HR: 53 (21 @ 19:53) (53 - 71)  BP: 132/57 (21 @ 19:53) (132/57 - 172/66)  RR: 18 (21 @ 19:53) (18 - 18)  SpO2: 95% (21 @ 19:53) (90% - 95%)    Respiratory: b/l air entry  Cardiovascular: S1 S2  Gastrointestinal: soft, ND  Extremities:  no edema                        9.0    7.72  )-----------( 219      ( 04 Dec 2021 09:56 )             29.1     04 Dec 2021 08:47    143    |  109    |  80     ----------------------------<  129    4.3     |  26     |  1.90     Ca    8.4        04 Dec 2021 08:47  Phos  3.8       04 Dec 2021 08:47  Mg     2.3       04 Dec 2021 08:47    Urinalysis Basic - ( 03 Dec 2021 11:00 )    Color: Yellow / Appearance: Slightly Turbid / S.015 / pH: x  Gluc: x / Ketone: Negative  / Bili: Negative / Urobili: Negative   Blood: x / Protein: 30 mg/dL / Nitrite: Negative   Leuk Esterase: Trace / RBC: x / WBC 3-5   Sq Epi: x / Non Sq Epi: Few / Bacteria: Few    acetaminophen     Tablet .. 650 milliGRAM(s) Oral every 6 hours PRN  ALBUTerol    90 MICROgram(s) HFA Inhaler 2 Puff(s) Inhalation every 6 hours  albuterol/ipratropium for Nebulization 3 milliLiter(s) Nebulizer every 6 hours PRN  allopurinol 100 milliGRAM(s) Oral daily  apixaban 2.5 milliGRAM(s) Oral every 12 hours  aspirin enteric coated 81 milliGRAM(s) Oral daily  benzonatate 100 milliGRAM(s) Oral three times a day PRN  budesonide 160 MICROgram(s)/formoterol 4.5 MICROgram(s) Inhaler 2 Puff(s) Inhalation two times a day  carvedilol 6.25 milliGRAM(s) Oral every 12 hours  cefTRIAXone   IVPB 1000 milliGRAM(s) IV Intermittent every 24 hours  dexAMETHasone  Injectable 6 milliGRAM(s) IV Push daily  dextrose 40% Gel 15 Gram(s) Oral once  dextrose 5%. 1000 milliLiter(s) IV Continuous <Continuous>  dextrose 5%. 1000 milliLiter(s) IV Continuous <Continuous>  dextrose 50% Injectable 25 Gram(s) IV Push once  dextrose 50% Injectable 12.5 Gram(s) IV Push once  dextrose 50% Injectable 25 Gram(s) IV Push once  diltiazem    milliGRAM(s) Oral daily  famotidine    Tablet 20 milliGRAM(s) Oral daily  glucagon  Injectable 1 milliGRAM(s) IntraMuscular once  insulin lispro (ADMELOG) corrective regimen sliding scale   SubCutaneous three times a day before meals  insulin lispro (ADMELOG) corrective regimen sliding scale   SubCutaneous at bedtime  levothyroxine 75 MICROGram(s) Oral daily  melatonin 3 milliGRAM(s) Oral at bedtime PRN  metroNIDAZOLE    Tablet 500 milliGRAM(s) Oral every 8 hours  sodium chloride 0.45%. 1000 milliLiter(s) IV Continuous <Continuous>  tiotropium 18 MICROgram(s) Capsule 1 Capsule(s) Inhalation daily  venlafaxine XR. 37.5 milliGRAM(s) Oral daily    Assessment and Plan:   	  Hemodynamic BRENDA/CKD 3 (Cr 1.1 - 21)  COVID + Pneumonia  COPD  Hypertension  Cr is improving  No need for IVF  Avoid ACE/ARB, diuretics  Avoid nephrotoxins  F/u BMP  Renal f/u as op    [phone]

## 2021-12-04 NOTE — PROGRESS NOTE ADULT - SUBJECTIVE AND OBJECTIVE BOX
[facility]  INFECTIOUS DISEASES   =======================================================    [id number]  CHARLES GRAHAM     Follow up: COVID   Patient seen and examined, chart has been reviewed.   Doing well, no fever, comfortable, no respiratory distress, sitting on chair, on o2 3L with NC.     PAST MEDICAL & SURGICAL HISTORY:  Hypertension  Depression  Overactive bladder  Gout  Osteoarthritis  Hypothyoid  HLD (hyperlipidemia)  Paroxysmal atrial fibrillation  Asthma  S/P cholecystectomy  S/P lumpectomy of breast  S/P hysterectomy  S/P lung surgery, follow-up exam  s/p removal of suspicious lesion, negative    Social Hx: no current smoking, ETOH or drugs     FAMILY HISTORY:  Family history of nasopharyngeal cancer (Child)  daughter    Family history of breast cancer (Mother)    Allergies  Tomas Cheese - Pt states the one time she had a mouthful of tomas cheese, her throat was closing and she required an epinephrine injection. (Anaphylaxis)  iodine (Anaphylaxis)  penicillin (Other)  shellfish (Short breath; Hives)  sulfa drugs (Hives)    Antibiotics:  dexAMETHasone  Injectable 6 milliGRAM(s) IV Push daily    REVIEW OF SYSTEMS:  CONSTITUTIONAL:  No Fever or chills  HEENT:  No diplopia or blurred vision.  No sore throat or runny nose.  CARDIOVASCULAR:  No chest pain or SOB.  RESPIRATORY:  +cough, +shortness of breath  GASTROINTESTINAL:  No nausea, vomiting or diarrhea.  GENITOURINARY:  No dysuria, frequency or urgency. No Blood in urine  MUSCULOSKELETAL:  no joint aches, no muscle pain  SKIN:  No change in skin, hair or nails.  NEUROLOGIC:  No paresthesias, fasciculations, seizures or weakness.  PSYCHIATRIC:  No disorder of thought or mood.  ENDOCRINE:  No heat or cold intolerance, polyuria or polydipsia.  HEMATOLOGICAL:  No easy bruising or bleeding.     Physical Exam:  Vital Signs Last 24 Hrs  T(C): 36.6 (04 Dec 2021 13:37), Max: 36.7 (03 Dec 2021 21:05)  T(F): 97.8 (04 Dec 2021 13:37), Max: 98.1 (03 Dec 2021 21:05)  HR: 54 (04 Dec 2021 13:37) (52 - 71)  BP: 160/63 (04 Dec 2021 13:37) (117/61 - 168/69)  BP(mean): --  RR: 18 (04 Dec 2021 13:37) (17 - 18)  SpO2: 90% (04 Dec 2021 13:37) (90% - 96%)  GEN: NAD, obese   HEENT: normocephalic and atraumatic. EOMI. PERRL.    NECK: Supple.  No lymphadenopathy   LUNGS: Some crackles in bases   HEART: Regular rate and rhythm   ABDOMEN: Soft, nontender, and nondistended.  Positive bowel sounds.    EXTREMITIES: 1+edema.  NEUROLOGIC: grossly intact.  PSYCHIATRIC: Appropriate affect .  SKIN: No rash      Labs:                        9.0    7.72  )-----------( 219      ( 04 Dec 2021 09:56 )             29.1     12-04    143  |  109<H>  |  80<H>  ----------------------------<  129<H>  4.3   |  26  |  1.90<H>    Ca    8.4<L>      04 Dec 2021 08:47  Phos  3.8     12-04  Mg     2.3     12-04    WBC Count: 7.72 K/uL (12-04-21 @ 09:56)  WBC Count: 9.81 K/uL (12-03-21 @ 08:45)  WBC Count: 11.04 K/uL (12-02-21 @ 16:06)  WBC Count: 8.70 K/uL (12-01-21 @ 05:15)  WBC Count: 10.53 K/uL (11-30-21 @ 17:27)    Creatinine, Serum: 1.90 mg/dL (12-04-21 @ 08:47)  Creatinine, Serum: 2.50 mg/dL (12-03-21 @ 08:46)  Creatinine, Serum: 2.90 mg/dL (12-02-21 @ 16:06)  Creatinine, Serum: 1.50 mg/dL (12-01-21 @ 05:15)  Creatinine, Serum: 1.10 mg/dL (11-30-21 @ 17:27)    C-Reactive Protein, Serum: 85 mg/L (12-03-21 @ 14:03)    Ferritin, Serum: 451 ng/mL (12-03-21 @ 14:42)  Ferritin, Serum: 465 ng/mL (12-03-21 @ 00:59)  Ferritin, Serum: 366 ng/mL (12-01-21 @ 02:57)    Procalcitonin, Serum: 0.37 ng/mL (11-30-21 @ 21:17)     SARS-CoV-2: Detected (11-30-21 @ 18:08)  Rapid RVP Result: Detected (11-30-21 @ 18:08)    All imaging and other data have been reviewed.  < from: CT Chest No Cont (12.01.21 @ 02:32) >  IMPRESSION:  Left lower lobe pneumonia. Continued follow up to resolution is recommended.    Assessment and Plan:   89 yo woman with PMH of HTN, Paroxysmal A-Fib, Hypothyroidism, COPD (2-3 L nasal cannula at home) was admitted with shortness of breath and LE edema.  COVID diagnosis 11/30. Looks like has also pneumonia in LLL, opacity is different from viral pneumonia. Unclear if aspiration (more happens in RLL though) or other bacterial pneumonia, viral opacities are usually GGO.    Currently data and recommendations for COVID-19 treatment are rapidly changing, so this treatment plan is based on my clinical judgement with available information.     COVID 19 and possible bacterial/aspiration pneumonia   - BMP, CBC w diff, NLR. Procalcitonin, Ferritin, CRP, LDH and D dimer for the start and periodically can be repeated.   - Chest CT LLL opacity   - Remdesivir was stopped on 12/2 due to increase in Creat. Will not restart, doing well   - Creat is trending down 2.9-->1.9  - Continue Dexamethasone 6mg po daily for 10days  - Watch O2 sat closely and taper as tolerated.   - Continue ceftriaxone 1gm daily and metronidazole 500mg q8h to cover for possible aspiration and CAP (allergic to PCN)  - complete 5-7 days total.    - Prophylactic anticoagulation as per protocol   - When ready for discharge can switch to oral ABx     Will follow PRN.     Dr. Mable Simon   Infectious Diseases   Please call [phone] between 8am and 6pm, call [phone] after 6pm or weekends.

## 2021-12-04 NOTE — DISCHARGE NOTE PROVIDER - NSDCCPCAREPLAN_GEN_ALL_CORE_FT
PRINCIPAL DISCHARGE DIAGNOSIS  Diagnosis: Acute exacerbation of CHF (congestive heart failure)  Assessment and Plan of Treatment: Follow up with your primary care physician Dr. Varela for repeat bloodwork (BMP to check kidney function) and further management.      SECONDARY DISCHARGE DIAGNOSES  Diagnosis: Acute hypoxemic respiratory failure due to COVID-19  Assessment and Plan of Treatment: Continue oxygen support.  START Dexamethasone, Vantin and Flagyl as prescribed.    Diagnosis: BRENDA (acute kidney injury)  Assessment and Plan of Treatment: Your kidney function was elevated, this may be due to the COVID-19 infection and dehydration.  Maintain adequate water intake.  DON'T TAKE Losartan or Bumetanide until seeing Dr. Varela as these can worsen your kidney function.     PRINCIPAL DISCHARGE DIAGNOSIS  Diagnosis: Acute hypoxemic respiratory failure due to COVID-19  Assessment and Plan of Treatment: - Please remain on isolation while in rehab for quarantine period of 10 days from the date of your positive test (11/30/21). Avoid contact with house members, family, and friends for the duration of this quarantine.  - Continue oxygen support.   - START Dexamethasone, Vantin and Flagyl as prescribed.  - Follow up with your primary care physician Dr. Varela for repeat bloodwork (BMP to check kidney function) and further management.  - Follow up with cardiologist Dr. Leavitt and pulmonologist Dr. Anderson in 2 weeks.  - If you experience worsening or recurrence of your symptoms, particularly worsening or high fever, shortness of breath, extreme fatigue, or bloody cough, call [phone] immediately or report to the nearest Emergency Department.      SECONDARY DISCHARGE DIAGNOSES  Diagnosis: Hypertension  Assessment and Plan of Treatment: Your blood pressure regimen was adjusted:  -START taking a lower dose of Hydralazine 25 mg three times a day  -Continue Carvedilol and Diltiazem  -STOP taking Losartan or Bumetanide as these can worsen your kidney function. Discuss with Dr. Varela at your follow up appointment regarding restarting these medications.    Diagnosis: BRENDA (acute kidney injury)  Assessment and Plan of Treatment: Your kidney function was elevated, this may be due to the COVID-19 infection and dehydration.  Maintain adequate water intake.

## 2021-12-04 NOTE — PROGRESS NOTE ADULT - PROBLEM SELECTOR PLAN 1
-doing well on 3L NC which is home baseline COPD amount, no santiago/sob  -CT chest with LLL PNA, procal 0.37, treating superimposed bacterial   -c/w IV ceftriaxone and flagyl while in house  -discussed with Dr Simon, can be discharged once renal clears, switch oral ABx to oral (vantin + Metronidazole) to complete 5-7 days total on discharge  -d/gianluca remdesivir given low crcl <15 per ID/pharamcy/renal   -c/w IV dexa 6mg daily for 10 day course   -c/w albuterol inhaler q6hrs  -GOC: DNR/DNI MOLST in chart   - ID Alfred following  - d/w nephro Dr Arizmendi, ok to discharge today - to hold Losartan and Bumex on discharge, no fluid restriction, add back Hydralazine for BP control. Plan d/w patient's grandson who lives with her and will be picking her up  - d-dimer downtrending, trend inflam markers q48 hours  - wbc resolved but on steroids, trend cbc  - afebrile,

## 2021-12-04 NOTE — PROGRESS NOTE ADULT - ASSESSMENT
89F with PAF, HTN, Hypothyroidism, COPD (2-3 L nasal cannula at home) Hyperlipidemia, presenting with worsened baseline shortness of breath and LE edema a/w COVID PNA now with pretty decent oxygen saturations on supplimental O2.    Suggest:    1. Acute hypoxemic respiratory failure due to COVID-19.  RLL pneumonia, Covid   Supplemental O2.  steroids, remdesivir.    Possible mild component of CHF.  ProBNP not significantly elevated when adjusted for age and AKD.    She also has chronic mild edema as baseline.    With rising creatinine, agree with holding diuresis.      2. Elevated troponin.   Slight upward trend, probable demand ischemia.  EKG without acute change.  No plan for cardiac intervention at this time.  Medical management and get outpatient repeat nuclear stress test.    < from: TTE Echo Complete w/o Contrast w/ Doppler (12.03.21 @ 18:00) >    Impression:  1. This a technically limited study.  2. There is normal left ventricular size and left ventricular systolic function with an ejection fraction of approximately 60%.  3. There is diastolic dysfunction.  4. There is mild concentric left ventricular hypertrophy.  5. There is limited endocardial definition which makes wall motion interpretation difficult but appears globally normal.  6. There is mitral annular calcification with moderate to severe mitral regurgitation.  7. There is mild tricuspid regurgitation with a dilated IVC and overall right ventricular systolic pressure that is elevated at 50 to 55 mmHg consistent with moderate pulmonary hypertension.  8. There is a trileaflet aortic valve with trivial aortic insufficiency and no significant aortic valve stenosis.  9. There is trivial pulmonic insufficiency.  10. The right atrium and right ventricle are not well-visualized on this study..  11. There is no significant pericardial effusion.      3. Atrial fibrillation.   She has h/o PAF, usually in sinus rhythm.  In AF with RVR on admission.  Now converted to sinus rhythm. Continue carvedilol 6.25 mg BID and diltiazem  mg daily  BRENDA improving,  Eliquis 2.5 mg BID held.  Would resume    4. Hypertension.   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  Now with relative hypotension.  Agree with holding losartan in setting of BRENDA.  Would resume losartan when ok with nephro.  Reduce hydralazine with high hold parameter.     Will follow.

## 2021-12-04 NOTE — PROGRESS NOTE ADULT - PROBLEM SELECTOR PLAN 2
suspect prerenal given recent IV diuresis  -cr rapidly improving; from 1.0 baseline  -PVR low  -can hold of on renal US per renal unless cr worsens, will reorder it  -s/p IVF to 1/2 NS @ 80cc/hr  -monitor bmp

## 2021-12-04 NOTE — PROGRESS NOTE ADULT - ASSESSMENT
Pt. with acute respiratory failure due to COPD, LLL pneumonia due to COVID.  Improved today. Doing well.   Paroxysmal at fib.  Hi troponin.  Agree with Remdesivir, steroids, melatonin.  Can discharge after last dose of remdesivir.   FU with me in office  Pepcid Spiriva.  Fu CXR as outpt.

## 2021-12-04 NOTE — DISCHARGE NOTE PROVIDER - PROVIDER TOKENS
PROVIDER:[TOKEN:[[id number]]] PROVIDER:[TOKEN:[[id number]]],PROVIDER:[TOKEN:[[id number]]],PROVIDER:[TOKEN:[[id number]]]

## 2021-12-04 NOTE — PROGRESS NOTE ADULT - PROBLEM SELECTOR PROBLEM 8
Hypertension
Elevated troponin
Hypertension
Hypothyroidism
Hypertension

## 2021-12-04 NOTE — DISCHARGE NOTE NURSING/CASE MANAGEMENT/SOCIAL WORK - PATIENT PORTAL LINK FT
You can access the FollowMyHealth Patient Portal offered by [facility] by registering at the following website: [email or web address]. By joining [employer]’s FollowMyHealth portal, you will also be able to view your health information using other applications (apps) compatible with our system.

## 2021-12-04 NOTE — DISCHARGE NOTE PROVIDER - NSDCHHNEEDSERVICE_GEN_ALL_CORE
Medication teaching and assessment/Observation and assessment/Teaching and training/Other, specify...

## 2021-12-04 NOTE — PROGRESS NOTE ADULT - SUBJECTIVE AND OBJECTIVE BOX
ICS Cardiology Progress Note [phone] (Dr. Eastman, Dagmar, Adela, Luly)    CHIEF COMPLAINT: Patient is a 89y old  Female who presents with a chief complaint of SOB (04 Dec 2021 08:15)      Follow Up Today: The patient denies any chest discomfort or shortness of breath.    HPI:  89 yo F PMHx Paroxysmal Atrial Fibrillation, HTN, Hypothyroidism, COPD (2-3 L nasal cannula at home) Hyperlipidemia, presenting with shortness of breath and LE edema. Patient is short of breath at baseline but has been having difficulty standing up and walking a few steps for the last week. Patient lives with her son and his family, who hosted thanksgiving dinner. 1 guest at the dinner tested positive for COVID. Son bought an at home rapid test. He was positive, and patient was also positive. Her grandchildren and his wife were negative. Patient admits to a dry cough that started Monday and progressively worsening LE edema in the last few days. She is on bumetanide 1 mg daily for water retention denies any changes to dose or missed doses.     She does admit to a high salt diet and does not really watch what she eats.   She denies orthopnea, fever, chills, history of heart failure or CAD.   Son at bedside admits that she has a history of "mini stroke" in the past however, patient adamantly denies.     ED Course:   Vital Signs: 200/88. O2 77% RA, RR 22  EKG: rapid a fib @ 101 no ischemic changes   Labs significant for:  WBC 10.53, BUN/Cr: 24/1.10 H/H 10/30.3, BNP: 5036, CK-MB < 1, troponin 123.9   Imaging: Chest X ray: hazy opacities bilaterally in all lung fields, no clear consolidation, (awaiting official read)   Given: lasix 40 mg IV X 1  (30 Nov 2021 19:49)      PAST MEDICAL & SURGICAL HISTORY:  Hypertension    Depression    Overactive bladder    Gout    Osteoarthritis    Hypothyroid    HLD (hyperlipidemia)    Paroxysmal atrial fibrillation    Asthma    S/P cholecystectomy    S/P lumpectomy of breast    S/P hysterectomy    S/P lung surgery, follow-up exam  s/p removal of suspicious lesion, negative        MEDICATIONS  (STANDING):  ALBUTerol    90 MICROgram(s) HFA Inhaler 2 Puff(s) Inhalation every 6 hours  allopurinol 100 milliGRAM(s) Oral daily  apixaban 2.5 milliGRAM(s) Oral every 12 hours  aspirin enteric coated 81 milliGRAM(s) Oral daily  budesonide 160 MICROgram(s)/formoterol 4.5 MICROgram(s) Inhaler 2 Puff(s) Inhalation two times a day  carvedilol 6.25 milliGRAM(s) Oral every 12 hours  cefTRIAXone   IVPB 1000 milliGRAM(s) IV Intermittent every 24 hours  dexAMETHasone  Injectable 6 milliGRAM(s) IV Push daily  dextrose 40% Gel 15 Gram(s) Oral once  dextrose 5%. 1000 milliLiter(s) (50 mL/Hr) IV Continuous <Continuous>  dextrose 5%. 1000 milliLiter(s) (100 mL/Hr) IV Continuous <Continuous>  dextrose 50% Injectable 25 Gram(s) IV Push once  dextrose 50% Injectable 12.5 Gram(s) IV Push once  dextrose 50% Injectable 25 Gram(s) IV Push once  diltiazem    milliGRAM(s) Oral daily  famotidine    Tablet 20 milliGRAM(s) Oral daily  glucagon  Injectable 1 milliGRAM(s) IntraMuscular once  insulin lispro (ADMELOG) corrective regimen sliding scale   SubCutaneous three times a day before meals  insulin lispro (ADMELOG) corrective regimen sliding scale   SubCutaneous at bedtime  levothyroxine 75 MICROGram(s) Oral daily  metroNIDAZOLE    Tablet 500 milliGRAM(s) Oral every 8 hours  sodium chloride 0.45%. 1000 milliLiter(s) (80 mL/Hr) IV Continuous <Continuous>  tiotropium 18 MICROgram(s) Capsule 1 Capsule(s) Inhalation daily  venlafaxine XR. 37.5 milliGRAM(s) Oral daily    MEDICATIONS  (PRN):  acetaminophen     Tablet .. 650 milliGRAM(s) Oral every 6 hours PRN Temp greater or equal to 38C (100.4F), Mild Pain (1 - 3)  albuterol/ipratropium for Nebulization 3 milliLiter(s) Nebulizer every 6 hours PRN Shortness of Breath and/or Wheezing  benzonatate 100 milliGRAM(s) Oral three times a day PRN Cough  melatonin 3 milliGRAM(s) Oral at bedtime PRN Insomnia      Allergies    Tomas Cheese - Pt states the one time she had a mouthful of tomas cheese, her throat was closing and she required an epinephrine injection. (Anaphylaxis)  iodine (Anaphylaxis)  penicillin (Other)  shellfish (Short breath; Hives)  sulfa drugs (Hives)    Intolerances        REVIEW OF SYSTEMS:    All other review of systems is negative unless indicated above    Vital Signs Last 24 Hrs  T(C): 36.4 (04 Dec 2021 04:30), Max: 36.7 (03 Dec 2021 21:05)  T(F): 97.5 (04 Dec 2021 04:30), Max: 98.1 (03 Dec 2021 21:05)  HR: 71 (04 Dec 2021 04:30) (52 - 71)  BP: 168/69 (04 Dec 2021 04:30) (117/61 - 168/69)  BP(mean): --  RR: 18 (04 Dec 2021 04:30) (17 - 20)  SpO2: 94% (04 Dec 2021 04:30) (86% - 96%)    I&O's Summary    03 Dec 2021 07:01  -  04 Dec 2021 07:00  --------------------------------------------------------  IN: 360 mL / OUT: 0 mL / NET: 360 mL        PHYSICAL EXAM:  patient not examined this am in accordance with COVID guidelines to decrease exposure.    LABS: All Labs Reviewed:                        9.0    7.72  )-----------( 219      ( 04 Dec 2021 09:56 )             29.1                         9.0    9.81  )-----------( 207      ( 03 Dec 2021 08:45 )             28.2                         9.4    11.04 )-----------( 219      ( 02 Dec 2021 16:06 )             29.0     04 Dec 2021 08:47    143    |  109    |  80     ----------------------------<  129    4.3     |  26     |  1.90   03 Dec 2021 08:46    142    |  106    |  88     ----------------------------<  132    4.2     |  29     |  2.50   02 Dec 2021 16:06    142    |  102    |  83     ----------------------------<  141    4.7     |  30     |  2.90     Ca    8.4        04 Dec 2021 08:47  Ca    7.7        03 Dec 2021 08:46  Ca    8.0        02 Dec 2021 16:06  Phos  3.8       04 Dec 2021 08:47  Mg     2.3       04 Dec 2021 08:47      PTT - ( 02 Dec 2021 22:24 )  PTT:30.7 sec  CARDIAC MARKERS ( 02 Dec 2021 16:06 )  x     / x     / 37 U/L / x     / 1.5 ng/mL      Blood Culture:         RADIOLOGY/EKG:    Attending Attestation:   20 minutes spent on total encounter; more than 50% of the visit was spent counseling and/or coordinating care by the attending physician.     ASSESSMENT AND PLAN

## 2021-12-04 NOTE — DISCHARGE NOTE NURSING/CASE MANAGEMENT/SOCIAL WORK - NSDCPEFALRISK_GEN_ALL_CORE
For information on Fall & Injury Prevention, visit: [email or web address] OR  [email or web address] OR  https://www.cdc.gov/steadi/patient.html

## 2021-12-04 NOTE — DISCHARGE NOTE PROVIDER - CARE PROVIDERS DIRECT ADDRESSES
[email or web address]

## 2021-12-04 NOTE — PROGRESS NOTE ADULT - PROBLEM SELECTOR PLAN 4
COPD exacerbation  - Nebs, albuterol and spiriva  - on Steroids for COVID19  - remdesivir on hold as above  - pulm [facility] following

## 2021-12-04 NOTE — PROGRESS NOTE ADULT - PROBLEM SELECTOR PROBLEM 3
Atrial fibrillation
Atrial fibrillation
COPD exacerbation
Acute on chronic systolic congestive heart failure
Atrial fibrillation
Acute on chronic systolic congestive heart failure
Acute on chronic systolic congestive heart failure

## 2021-12-04 NOTE — PROGRESS NOTE ADULT - PROBLEM SELECTOR PROBLEM 2
Acute on chronic systolic congestive heart failure
BRENDA (acute kidney injury)
Acute on chronic systolic congestive heart failure
BRENDA (acute kidney injury)
BRENDA (acute kidney injury)

## 2021-12-04 NOTE — PROGRESS NOTE ADULT - PROBLEM SELECTOR PLAN 10
continue home Effexor 37.5 mg daily
full anticoagulated with eliquis 2.5 mg BID
continue home Effexor 37.5 mg daily
continue home Effexor 37.5 mg daily

## 2021-12-04 NOTE — PROGRESS NOTE ADULT - PROBLEM SELECTOR PLAN 5
- continue carvedilol, diltiazem  - continue home eliquis
no cp  - likely 2/2 to demand in the setting of joe, htn urgency on admit, demand  - trops downtrending  - TTE: LVEF 60%, diastolic dysfunction, mod pulm HTN  - cardio following
no cp  - likely 2/2 to demand in the setting of joe, htn urgency on admit, demand  - trops downtrending  - TTE pending  - cardio following
no cp  - likely 2/2 to demand in the setting of joe, htn urgency on admit, demand  - trops downtrending  - TTE pending  - cardio following

## 2021-12-04 NOTE — PROGRESS NOTE ADULT - PROBLEM SELECTOR PROBLEM 4
Hypertension
Elevated troponin
COPD exacerbation

## 2021-12-04 NOTE — PROGRESS NOTE ADULT - PROBLEM SELECTOR PLAN 11
dvt ppx: eliquis restarted    discussed with Dr Cornelius, john Martinez, RN, Dr Simon  DNR/DNI  prognoses guarded    Dispo: stable for discharge home however after arranging discharge patient reported she does not have portable home O2 (only has O2 that needs to be plugged in one room of the house) as she was using it PRN prior. D/w ANTONIA Benitez, arranging home O2 for safe discharge tomorrow 12-5

## 2021-12-04 NOTE — PROGRESS NOTE ADULT - PROBLEM SELECTOR PLAN 7
hgb 9.0 fluctuating   - unknown baseline  - no symptoms or signs of bleeding  - continue to monitor cbc   -iron panel noted

## 2021-12-04 NOTE — DISCHARGE NOTE NURSING/CASE MANAGEMENT/SOCIAL WORK - NSSCCONTNUM_GEN_ALL_CORE
Please contact the home care agency at  [phone] or  [phone] if you have not heard from them by 12 noon on the day after your hospital discharge.   Nurse to visit the day after hospital discharge; Rehabilitation Therapists to follow

## 2021-12-04 NOTE — PROGRESS NOTE ADULT - PROBLEM SELECTOR PLAN 6
- continue home carvedilol, diltiazem  - per renal, BRENDA improving, can restart home eliquis 2.5mg bid

## 2021-12-04 NOTE — PROGRESS NOTE ADULT - SUBJECTIVE AND OBJECTIVE BOX
Patient is a 89y old  Female who presents with a chief complaint of SOB (04 Dec 2021 22:19)    INTERVAL HPI/OVERNIGHT EVENTS:  Patient was seen and examined at bedside on 2021. No acute events overnight. Patient is feeling better and ready to go home.    MEDICATIONS  (STANDING):  ALBUTerol    90 MICROgram(s) HFA Inhaler 2 Puff(s) Inhalation every 6 hours  allopurinol 100 milliGRAM(s) Oral daily  apixaban 2.5 milliGRAM(s) Oral every 12 hours  aspirin enteric coated 81 milliGRAM(s) Oral daily  budesonide 160 MICROgram(s)/formoterol 4.5 MICROgram(s) Inhaler 2 Puff(s) Inhalation two times a day  carvedilol 6.25 milliGRAM(s) Oral every 12 hours  cefTRIAXone   IVPB 1000 milliGRAM(s) IV Intermittent every 24 hours  dexAMETHasone  Injectable 6 milliGRAM(s) IV Push daily  dextrose 40% Gel 15 Gram(s) Oral once  dextrose 5%. 1000 milliLiter(s) (50 mL/Hr) IV Continuous <Continuous>  dextrose 5%. 1000 milliLiter(s) (100 mL/Hr) IV Continuous <Continuous>  dextrose 50% Injectable 25 Gram(s) IV Push once  dextrose 50% Injectable 12.5 Gram(s) IV Push once  dextrose 50% Injectable 25 Gram(s) IV Push once  diltiazem    milliGRAM(s) Oral daily  famotidine    Tablet 20 milliGRAM(s) Oral daily  glucagon  Injectable 1 milliGRAM(s) IntraMuscular once  insulin lispro (ADMELOG) corrective regimen sliding scale   SubCutaneous three times a day before meals  insulin lispro (ADMELOG) corrective regimen sliding scale   SubCutaneous at bedtime  levothyroxine 75 MICROGram(s) Oral daily  metroNIDAZOLE    Tablet 500 milliGRAM(s) Oral every 8 hours  sodium chloride 0.45%. 1000 milliLiter(s) (80 mL/Hr) IV Continuous <Continuous>  tiotropium 18 MICROgram(s) Capsule 1 Capsule(s) Inhalation daily  venlafaxine XR. 37.5 milliGRAM(s) Oral daily    MEDICATIONS  (PRN):  acetaminophen     Tablet .. 650 milliGRAM(s) Oral every 6 hours PRN Temp greater or equal to 38C (100.4F), Mild Pain (1 - 3)  albuterol/ipratropium for Nebulization 3 milliLiter(s) Nebulizer every 6 hours PRN Shortness of Breath and/or Wheezing  benzonatate 100 milliGRAM(s) Oral three times a day PRN Cough  melatonin 3 milliGRAM(s) Oral at bedtime PRN Insomnia      Allergies    Tomas Cheese - Pt states the one time she had a mouthful of tomas cheese, her throat was closing and she required an epinephrine injection. (Anaphylaxis)  iodine (Anaphylaxis)  penicillin (Other)  shellfish (Short breath; Hives)  sulfa drugs (Hives)    Intolerances      REVIEW OF SYSTEMS:  CONSTITUTIONAL: No fever or chills  HEENT: No headache, no sore throat  RESPIRATORY: No cough, wheezing, or shortness of breath  CARDIOVASCULAR: No chest pain, palpitations  GASTROINTESTINAL: No abd pain, nausea, vomiting, or diarrhea  GENITOURINARY: No dysuria, frequency, or hematuria  NEUROLOGICAL: No focal weakness or dizziness  MUSCULOSKELETAL: No myalgias     PHYSICAL EXAM:  GENERAL: NAD  HEENT: Anicteric, moist mucous membranes  CHEST/LUNG: CTA b/l, no rales, wheezes, or rhonchi  HEART: RRR, S1, S2  ABDOMEN: BS+, soft, nontender, nondistended  EXTREMITIES: No edema, cyanosis, or calf tenderness  NERVOUS SYSTEM: Answers questions and follows commands appropriately    Vital Signs Last 24 Hrs  T(C): 36.6 (04 Dec 2021 19:53), Max: 36.6 (04 Dec 2021 13:37)  T(F): 97.8 (04 Dec 2021 19:53), Max: 97.8 (04 Dec 2021 13:37)  HR: 53 (04 Dec 2021 19:53) (53 - 71)  BP: 132/57 (04 Dec 2021 19:53) (132/57 - 172/66)  BP(mean): --  RR: 18 (04 Dec 2021 19:53) (18 - 18)  SpO2: 95% (04 Dec 2021 19:53) (90% - 95%)    LABS:                        9.0    7.72  )-----------( 219      ( 04 Dec 2021 09:56 )             29.1     CBC Full  -  ( 04 Dec 2021 09:56 )  WBC Count : 7.72 K/uL  Hemoglobin : 9.0 g/dL  Hematocrit : 29.1 %  Platelet Count - Automated : 219 K/uL  Mean Cell Volume : 99.3 fl  Mean Cell Hemoglobin : 30.7 pg  Mean Cell Hemoglobin Concentration : 30.9 gm/dL  Auto Neutrophil # : x  Auto Lymphocyte # : x  Auto Monocyte # : x  Auto Eosinophil # : x  Auto Basophil # : x  Auto Neutrophil % : x  Auto Lymphocyte % : x  Auto Monocyte % : x  Auto Eosinophil % : x  Auto Basophil % : x    04 Dec 2021 08:47    143    |  109    |  80     ----------------------------<  129    4.3     |  26     |  1.90     Ca    8.4        04 Dec 2021 08:47  Phos  3.8       04 Dec 2021 08:47  Mg     2.3       04 Dec 2021 08:47        Urinalysis Basic - ( 03 Dec 2021 11:00 )    Color: Yellow / Appearance: Slightly Turbid / S.015 / pH: x  Gluc: x / Ketone: Negative  / Bili: Negative / Urobili: Negative   Blood: x / Protein: 30 mg/dL / Nitrite: Negative   Leuk Esterase: Trace / RBC: x / WBC 3-5   Sq Epi: x / Non Sq Epi: Few / Bacteria: Few      CAPILLARY BLOOD GLUCOSE      POCT Blood Glucose.: 158 mg/dL (04 Dec 2021 20:51)  POCT Blood Glucose.: 147 mg/dL (04 Dec 2021 16:38)  POCT Blood Glucose.: 173 mg/dL (04 Dec 2021 11:16)  POCT Blood Glucose.: 132 mg/dL (04 Dec 2021 07:19)          RADIOLOGY & ADDITIONAL TESTS:    Personally reviewed.     Consultant(s) Notes Reviewed:  [x] YES  [ ] NO

## 2021-12-04 NOTE — DISCHARGE NOTE PROVIDER - CARE PROVIDER_API CALL
Manuel Varela)  Internal Medicine  [address] [place] [address]  Phone: [phone]  Fax: [phone]  Follow Up Time:    Manuel Varela)  Internal Medicine  [address] [facility] [address]  [place] [address]  Phone: [phone]  Fax: [phone]  Follow Up Time:     Pau Leavitt)  Cardiovascular Disease; Internal Medicine  [address] [place] [address]  Phone: [phone]  Fax: [phone]  Follow Up Time:     Juvenal Anderson)  Critical Care Medicine; Internal Medicine; Pulmonary Disease  [address] [place] [address]  Phone: [phone]  Fax: [phone]  Follow Up Time:

## 2021-12-05 VITALS — SYSTOLIC BLOOD PRESSURE: 155 MMHG | DIASTOLIC BLOOD PRESSURE: 70 MMHG | HEART RATE: 60 BPM

## 2021-12-05 LAB
ANION GAP SERPL CALC-SCNC: 6 MMOL/L — SIGNIFICANT CHANGE UP (ref 5–17)
BUN SERPL-MCNC: 78 MG/DL — HIGH (ref 7–23)
CALCIUM SERPL-MCNC: 8.6 MG/DL — SIGNIFICANT CHANGE UP (ref 8.5–10.1)
CHLORIDE SERPL-SCNC: 111 MMOL/L — HIGH (ref 96–108)
CO2 SERPL-SCNC: 30 MMOL/L — SIGNIFICANT CHANGE UP (ref 22–31)
CREAT SERPL-MCNC: 1.7 MG/DL — HIGH (ref 0.5–1.3)
GLUCOSE SERPL-MCNC: 127 MG/DL — HIGH (ref 70–99)
HCT VFR BLD CALC: 27.6 % — LOW (ref 34.5–45)
HGB BLD-MCNC: 8.8 G/DL — LOW (ref 11.5–15.5)
MCHC RBC-ENTMCNC: 31.1 PG — SIGNIFICANT CHANGE UP (ref 27–34)
MCHC RBC-ENTMCNC: 31.9 GM/DL — LOW (ref 32–36)
MCV RBC AUTO: 97.5 FL — SIGNIFICANT CHANGE UP (ref 80–100)
NRBC # BLD: 0 /100 WBCS — SIGNIFICANT CHANGE UP (ref 0–0)
PLATELET # BLD AUTO: 215 K/UL — SIGNIFICANT CHANGE UP (ref 150–400)
POTASSIUM SERPL-MCNC: 4.7 MMOL/L — SIGNIFICANT CHANGE UP (ref 3.5–5.3)
POTASSIUM SERPL-SCNC: 4.7 MMOL/L — SIGNIFICANT CHANGE UP (ref 3.5–5.3)
RBC # BLD: 2.83 M/UL — LOW (ref 3.8–5.2)
RBC # FLD: 14.6 % — HIGH (ref 10.3–14.5)
SODIUM SERPL-SCNC: 147 MMOL/L — HIGH (ref 135–145)
WBC # BLD: 6.71 K/UL — SIGNIFICANT CHANGE UP (ref 3.8–10.5)
WBC # FLD AUTO: 6.71 K/UL — SIGNIFICANT CHANGE UP (ref 3.8–10.5)

## 2021-12-05 PROCEDURE — 99239 HOSP IP/OBS DSCHRG MGMT >30: CPT

## 2021-12-05 RX ORDER — HYDRALAZINE HCL 50 MG
25 TABLET ORAL ONCE
Refills: 0 | Status: COMPLETED | OUTPATIENT
Start: 2021-12-05 | End: 2021-12-05

## 2021-12-05 RX ADMIN — Medication 500 MILLIGRAM(S): at 05:12

## 2021-12-05 RX ADMIN — Medication 100 MILLIGRAM(S): at 11:33

## 2021-12-05 RX ADMIN — Medication 100 MILLIGRAM(S): at 05:12

## 2021-12-05 RX ADMIN — Medication 2: at 11:33

## 2021-12-05 RX ADMIN — Medication 25 MILLIGRAM(S): at 13:00

## 2021-12-05 RX ADMIN — Medication 37.5 MILLIGRAM(S): at 11:33

## 2021-12-05 RX ADMIN — ALBUTEROL 2 PUFF(S): 90 AEROSOL, METERED ORAL at 00:45

## 2021-12-05 RX ADMIN — ALBUTEROL 2 PUFF(S): 90 AEROSOL, METERED ORAL at 06:29

## 2021-12-05 RX ADMIN — Medication 25 MILLIGRAM(S): at 11:45

## 2021-12-05 RX ADMIN — Medication 75 MICROGRAM(S): at 05:12

## 2021-12-05 RX ADMIN — Medication 500 MILLIGRAM(S): at 11:33

## 2021-12-05 RX ADMIN — BUDESONIDE AND FORMOTEROL FUMARATE DIHYDRATE 2 PUFF(S): 160; 4.5 AEROSOL RESPIRATORY (INHALATION) at 06:29

## 2021-12-05 RX ADMIN — FAMOTIDINE 20 MILLIGRAM(S): 10 INJECTION INTRAVENOUS at 11:33

## 2021-12-05 RX ADMIN — CARVEDILOL PHOSPHATE 6.25 MILLIGRAM(S): 80 CAPSULE, EXTENDED RELEASE ORAL at 05:12

## 2021-12-05 RX ADMIN — TIOTROPIUM BROMIDE 1 CAPSULE(S): 18 CAPSULE ORAL; RESPIRATORY (INHALATION) at 06:30

## 2021-12-05 RX ADMIN — Medication 81 MILLIGRAM(S): at 11:33

## 2021-12-05 RX ADMIN — Medication 6 MILLIGRAM(S): at 05:11

## 2021-12-05 RX ADMIN — Medication 240 MILLIGRAM(S): at 05:12

## 2021-12-05 RX ADMIN — APIXABAN 2.5 MILLIGRAM(S): 2.5 TABLET, FILM COATED ORAL at 11:33

## 2021-12-06 ENCOUNTER — INPATIENT (INPATIENT)
Facility: HOSPITAL | Age: 86
LOS: 6 days | Discharge: NURSING, REHAB OR LONG-TERM CARE | DRG: 177 | End: 2021-12-13
Attending: STUDENT IN AN ORGANIZED HEALTH CARE EDUCATION/TRAINING PROGRAM | Admitting: STUDENT IN AN ORGANIZED HEALTH CARE EDUCATION/TRAINING PROGRAM
Payer: MEDICARE

## 2021-12-06 VITALS
RESPIRATION RATE: 18 BRPM | DIASTOLIC BLOOD PRESSURE: 91 MMHG | OXYGEN SATURATION: 95 % | HEIGHT: 60 IN | HEART RATE: 123 BPM | SYSTOLIC BLOOD PRESSURE: 115 MMHG

## 2021-12-06 DIAGNOSIS — I50.23 ACUTE ON CHRONIC SYSTOLIC (CONGESTIVE) HEART FAILURE: ICD-10-CM

## 2021-12-06 DIAGNOSIS — E03.9 HYPOTHYROIDISM, UNSPECIFIED: ICD-10-CM

## 2021-12-06 DIAGNOSIS — Z98.89 OTHER SPECIFIED POSTPROCEDURAL STATES: Chronic | ICD-10-CM

## 2021-12-06 DIAGNOSIS — Z90.49 ACQUIRED ABSENCE OF OTHER SPECIFIED PARTS OF DIGESTIVE TRACT: Chronic | ICD-10-CM

## 2021-12-06 DIAGNOSIS — D72.829 ELEVATED WHITE BLOOD CELL COUNT, UNSPECIFIED: ICD-10-CM

## 2021-12-06 DIAGNOSIS — D64.9 ANEMIA, UNSPECIFIED: ICD-10-CM

## 2021-12-06 DIAGNOSIS — D63.8 ANEMIA IN OTHER CHRONIC DISEASES CLASSIFIED ELSEWHERE: ICD-10-CM

## 2021-12-06 DIAGNOSIS — R77.8 OTHER SPECIFIED ABNORMALITIES OF PLASMA PROTEINS: ICD-10-CM

## 2021-12-06 DIAGNOSIS — I48.91 UNSPECIFIED ATRIAL FIBRILLATION: ICD-10-CM

## 2021-12-06 DIAGNOSIS — U07.1 COVID-19: ICD-10-CM

## 2021-12-06 DIAGNOSIS — Z09 ENCOUNTER FOR FOLLOW-UP EXAMINATION AFTER COMPLETED TREATMENT FOR CONDITIONS OTHER THAN MALIGNANT NEOPLASM: Chronic | ICD-10-CM

## 2021-12-06 DIAGNOSIS — I10 ESSENTIAL (PRIMARY) HYPERTENSION: ICD-10-CM

## 2021-12-06 DIAGNOSIS — Z29.9 ENCOUNTER FOR PROPHYLACTIC MEASURES, UNSPECIFIED: ICD-10-CM

## 2021-12-06 DIAGNOSIS — Z90.710 ACQUIRED ABSENCE OF BOTH CERVIX AND UTERUS: Chronic | ICD-10-CM

## 2021-12-06 DIAGNOSIS — J96.01 ACUTE RESPIRATORY FAILURE WITH HYPOXIA: ICD-10-CM

## 2021-12-06 DIAGNOSIS — E87.0 HYPEROSMOLALITY AND HYPERNATREMIA: ICD-10-CM

## 2021-12-06 LAB
ALBUMIN SERPL ELPH-MCNC: 2.7 G/DL — LOW (ref 3.3–5)
ALP SERPL-CCNC: 60 U/L — SIGNIFICANT CHANGE UP (ref 40–120)
ALT FLD-CCNC: 31 U/L — SIGNIFICANT CHANGE UP (ref 12–78)
ANION GAP SERPL CALC-SCNC: 2 MMOL/L — LOW (ref 5–17)
APPEARANCE UR: CLEAR — SIGNIFICANT CHANGE UP
APTT BLD: 27.1 SEC — LOW (ref 27.5–35.5)
AST SERPL-CCNC: 21 U/L — SIGNIFICANT CHANGE UP (ref 15–37)
BACTERIA # UR AUTO: ABNORMAL
BASOPHILS # BLD AUTO: 0.02 K/UL — SIGNIFICANT CHANGE UP (ref 0–0.2)
BASOPHILS NFR BLD AUTO: 0.1 % — SIGNIFICANT CHANGE UP (ref 0–2)
BILIRUB SERPL-MCNC: 0.3 MG/DL — SIGNIFICANT CHANGE UP (ref 0.2–1.2)
BILIRUB UR-MCNC: NEGATIVE — SIGNIFICANT CHANGE UP
BUN SERPL-MCNC: 61 MG/DL — HIGH (ref 7–23)
CALCIUM SERPL-MCNC: 9.4 MG/DL — SIGNIFICANT CHANGE UP (ref 8.5–10.1)
CHLORIDE SERPL-SCNC: 111 MMOL/L — HIGH (ref 96–108)
CO2 SERPL-SCNC: 34 MMOL/L — HIGH (ref 22–31)
COLOR SPEC: YELLOW — SIGNIFICANT CHANGE UP
CREAT SERPL-MCNC: 1.3 MG/DL — SIGNIFICANT CHANGE UP (ref 0.5–1.3)
D DIMER BLD IA.RAPID-MCNC: 274 NG/ML DDU — HIGH
DIFF PNL FLD: NEGATIVE — SIGNIFICANT CHANGE UP
EOSINOPHIL # BLD AUTO: 0 K/UL — SIGNIFICANT CHANGE UP (ref 0–0.5)
EOSINOPHIL NFR BLD AUTO: 0 % — SIGNIFICANT CHANGE UP (ref 0–6)
EPI CELLS # UR: ABNORMAL
GLUCOSE SERPL-MCNC: 136 MG/DL — HIGH (ref 70–99)
GLUCOSE UR QL: NEGATIVE — SIGNIFICANT CHANGE UP
HCT VFR BLD CALC: 33.4 % — LOW (ref 34.5–45)
HGB BLD-MCNC: 10.6 G/DL — LOW (ref 11.5–15.5)
IMM GRANULOCYTES NFR BLD AUTO: 3.3 % — HIGH (ref 0–1.5)
INR BLD: 1.12 RATIO — SIGNIFICANT CHANGE UP (ref 0.88–1.16)
KETONES UR-MCNC: NEGATIVE — SIGNIFICANT CHANGE UP
LEUKOCYTE ESTERASE UR-ACNC: ABNORMAL
LYMPHOCYTES # BLD AUTO: 0.94 K/UL — LOW (ref 1–3.3)
LYMPHOCYTES # BLD AUTO: 7 % — LOW (ref 13–44)
MCHC RBC-ENTMCNC: 31.5 PG — SIGNIFICANT CHANGE UP (ref 27–34)
MCHC RBC-ENTMCNC: 31.7 GM/DL — LOW (ref 32–36)
MCV RBC AUTO: 99.1 FL — SIGNIFICANT CHANGE UP (ref 80–100)
MONOCYTES # BLD AUTO: 0.86 K/UL — SIGNIFICANT CHANGE UP (ref 0–0.9)
MONOCYTES NFR BLD AUTO: 6.4 % — SIGNIFICANT CHANGE UP (ref 2–14)
NEUTROPHILS # BLD AUTO: 11.26 K/UL — HIGH (ref 1.8–7.4)
NEUTROPHILS NFR BLD AUTO: 83.2 % — HIGH (ref 43–77)
NITRITE UR-MCNC: NEGATIVE — SIGNIFICANT CHANGE UP
NRBC # BLD: 0 /100 WBCS — SIGNIFICANT CHANGE UP (ref 0–0)
PH UR: 5 — SIGNIFICANT CHANGE UP (ref 5–8)
PLATELET # BLD AUTO: 344 K/UL — SIGNIFICANT CHANGE UP (ref 150–400)
POTASSIUM SERPL-MCNC: 4.9 MMOL/L — SIGNIFICANT CHANGE UP (ref 3.5–5.3)
POTASSIUM SERPL-SCNC: 4.9 MMOL/L — SIGNIFICANT CHANGE UP (ref 3.5–5.3)
PROCALCITONIN SERPL-MCNC: 0.18 NG/ML — HIGH (ref 0–0.04)
PROT SERPL-MCNC: 6.3 G/DL — SIGNIFICANT CHANGE UP (ref 6–8.3)
PROT UR-MCNC: 30 MG/DL
PROTHROM AB SERPL-ACNC: 13 SEC — SIGNIFICANT CHANGE UP (ref 10.6–13.6)
RBC # BLD: 3.37 M/UL — LOW (ref 3.8–5.2)
RBC # FLD: 14.7 % — HIGH (ref 10.3–14.5)
RBC CASTS # UR COMP ASSIST: SIGNIFICANT CHANGE UP /HPF (ref 0–4)
SARS-COV-2 RNA SPEC QL NAA+PROBE: DETECTED
SODIUM SERPL-SCNC: 147 MMOL/L — HIGH (ref 135–145)
SP GR SPEC: 1.01 — SIGNIFICANT CHANGE UP (ref 1.01–1.02)
TROPONIN I, HIGH SENSITIVITY RESULT: 77.7 NG/L — HIGH
UROBILINOGEN FLD QL: NEGATIVE — SIGNIFICANT CHANGE UP
WBC # BLD: 13.52 K/UL — HIGH (ref 3.8–10.5)
WBC # FLD AUTO: 13.52 K/UL — HIGH (ref 3.8–10.5)
WBC UR QL: SIGNIFICANT CHANGE UP

## 2021-12-06 PROCEDURE — 93010 ELECTROCARDIOGRAM REPORT: CPT

## 2021-12-06 PROCEDURE — 71045 X-RAY EXAM CHEST 1 VIEW: CPT | Mod: 26

## 2021-12-06 PROCEDURE — 99285 EMERGENCY DEPT VISIT HI MDM: CPT | Mod: CS

## 2021-12-06 PROCEDURE — 99223 1ST HOSP IP/OBS HIGH 75: CPT | Mod: GC

## 2021-12-06 RX ORDER — ALBUTEROL 90 UG/1
2.5 AEROSOL, METERED ORAL EVERY 6 HOURS
Refills: 0 | Status: DISCONTINUED | OUTPATIENT
Start: 2021-12-06 | End: 2021-12-07

## 2021-12-06 RX ORDER — DEXAMETHASONE 0.5 MG/5ML
6 ELIXIR ORAL DAILY
Refills: 0 | Status: COMPLETED | OUTPATIENT
Start: 2021-12-06 | End: 2021-12-10

## 2021-12-06 RX ORDER — ASPIRIN/CALCIUM CARB/MAGNESIUM 324 MG
81 TABLET ORAL DAILY
Refills: 0 | Status: DISCONTINUED | OUTPATIENT
Start: 2021-12-06 | End: 2021-12-13

## 2021-12-06 RX ORDER — VENLAFAXINE HCL 75 MG
37.5 CAPSULE, EXT RELEASE 24 HR ORAL DAILY
Refills: 0 | Status: DISCONTINUED | OUTPATIENT
Start: 2021-12-06 | End: 2021-12-13

## 2021-12-06 RX ORDER — FUROSEMIDE 40 MG
40 TABLET ORAL ONCE
Refills: 0 | Status: COMPLETED | OUTPATIENT
Start: 2021-12-06 | End: 2021-12-06

## 2021-12-06 RX ORDER — CARVEDILOL PHOSPHATE 80 MG/1
6.25 CAPSULE, EXTENDED RELEASE ORAL EVERY 12 HOURS
Refills: 0 | Status: DISCONTINUED | OUTPATIENT
Start: 2021-12-06 | End: 2021-12-13

## 2021-12-06 RX ORDER — APIXABAN 2.5 MG/1
2.5 TABLET, FILM COATED ORAL
Refills: 0 | Status: DISCONTINUED | OUTPATIENT
Start: 2021-12-06 | End: 2021-12-13

## 2021-12-06 RX ORDER — DILTIAZEM HCL 120 MG
5 CAPSULE, EXT RELEASE 24 HR ORAL
Qty: 125 | Refills: 0 | Status: DISCONTINUED | OUTPATIENT
Start: 2021-12-06 | End: 2021-12-07

## 2021-12-06 RX ORDER — BUDESONIDE AND FORMOTEROL FUMARATE DIHYDRATE 160; 4.5 UG/1; UG/1
2 AEROSOL RESPIRATORY (INHALATION)
Refills: 0 | Status: DISCONTINUED | OUTPATIENT
Start: 2021-12-06 | End: 2021-12-13

## 2021-12-06 RX ORDER — LEVOTHYROXINE SODIUM 125 MCG
75 TABLET ORAL DAILY
Refills: 0 | Status: DISCONTINUED | OUTPATIENT
Start: 2021-12-06 | End: 2021-12-07

## 2021-12-06 RX ORDER — HYDRALAZINE HCL 50 MG
25 TABLET ORAL THREE TIMES A DAY
Refills: 0 | Status: DISCONTINUED | OUTPATIENT
Start: 2021-12-06 | End: 2021-12-07

## 2021-12-06 RX ORDER — IPRATROPIUM/ALBUTEROL SULFATE 18-103MCG
3 AEROSOL WITH ADAPTER (GRAM) INHALATION ONCE
Refills: 0 | Status: COMPLETED | OUTPATIENT
Start: 2021-12-06 | End: 2021-12-06

## 2021-12-06 RX ORDER — TIOTROPIUM BROMIDE 18 UG/1
1 CAPSULE ORAL; RESPIRATORY (INHALATION) DAILY
Refills: 0 | Status: DISCONTINUED | OUTPATIENT
Start: 2021-12-06 | End: 2021-12-13

## 2021-12-06 RX ORDER — DILTIAZEM HCL 120 MG
15 CAPSULE, EXT RELEASE 24 HR ORAL ONCE
Refills: 0 | Status: COMPLETED | OUTPATIENT
Start: 2021-12-06 | End: 2021-12-06

## 2021-12-06 RX ORDER — METRONIDAZOLE 500 MG
500 TABLET ORAL EVERY 8 HOURS
Refills: 0 | Status: DISCONTINUED | OUTPATIENT
Start: 2021-12-06 | End: 2021-12-09

## 2021-12-06 RX ORDER — CEFPODOXIME PROXETIL 100 MG
200 TABLET ORAL EVERY 12 HOURS
Refills: 0 | Status: DISCONTINUED | OUTPATIENT
Start: 2021-12-06 | End: 2021-12-09

## 2021-12-06 RX ORDER — DILTIAZEM HCL 120 MG
10 CAPSULE, EXT RELEASE 24 HR ORAL ONCE
Refills: 0 | Status: COMPLETED | OUTPATIENT
Start: 2021-12-06 | End: 2021-12-06

## 2021-12-06 RX ORDER — ALLOPURINOL 300 MG
100 TABLET ORAL DAILY
Refills: 0 | Status: DISCONTINUED | OUTPATIENT
Start: 2021-12-06 | End: 2021-12-13

## 2021-12-06 RX ORDER — SODIUM CHLORIDE 9 MG/ML
1000 INJECTION INTRAMUSCULAR; INTRAVENOUS; SUBCUTANEOUS ONCE
Refills: 0 | Status: COMPLETED | OUTPATIENT
Start: 2021-12-06 | End: 2021-12-06

## 2021-12-06 RX ORDER — ALBUTEROL 90 UG/1
1 AEROSOL, METERED ORAL EVERY 4 HOURS
Refills: 0 | Status: DISCONTINUED | OUTPATIENT
Start: 2021-12-06 | End: 2021-12-13

## 2021-12-06 RX ADMIN — Medication 3 MILLILITER(S): at 20:05

## 2021-12-06 RX ADMIN — Medication 40 MILLIGRAM(S): at 22:03

## 2021-12-06 RX ADMIN — SODIUM CHLORIDE 1000 MILLILITER(S): 9 INJECTION INTRAMUSCULAR; INTRAVENOUS; SUBCUTANEOUS at 18:36

## 2021-12-06 RX ADMIN — Medication 5 MG/HR: at 23:37

## 2021-12-06 RX ADMIN — Medication 25 MILLIGRAM(S): at 23:37

## 2021-12-06 RX ADMIN — Medication 15 MILLIGRAM(S): at 20:51

## 2021-12-06 RX ADMIN — Medication 10 MILLIGRAM(S): at 19:26

## 2021-12-06 RX ADMIN — Medication 200 MILLIGRAM(S): at 23:37

## 2021-12-06 RX ADMIN — Medication 500 MILLIGRAM(S): at 23:37

## 2021-12-06 NOTE — CONSULT NOTE ADULT - ASSESSMENT
89 y/o F with a h/o Paroxysmal Atrial Fibrillation, HTN, Hypothyroidism, COPD (2-3 L nasal cannula at home, baseline dyspnea) Hyperlipidemia, with:    - Acute hypoxemic respiratory failure  - COVID-19 viral pneumonia  - r/o superimposed bacterial pneumonia  - AF with RVR  - hypernatremia    Patient does not require ICU level of care at this time. She is improving with current therapies and does not wish for escalation beyond NIPPV.    - CXR not typical of COVID-19 infection, consider RLL bacterial infiltrate  - maintain BiPAP for now, work of breathing much improved, weaned FiO2 to 40%  - titrate as necessary to maintain SpO2 > 88%  - expect she can be deescalated to HFNC soon  - keep HOB elevated > 30 degrees  - given her underlying advanced lung disease, age, and comorbidities, she is at increased risk of further respiratory deterioration, however she has stated that she has existing advanced directives (DNR/DNI) and wishes to carry these forward during this hospital admission. If she were to fail NIPPV, she should be transitioned to comfort measures as endotracheal intubation is not an option.  - empiric broad spectrum abx coverage  - IV dexamethasone  - elevated HR likely compensatory in nature given hypoxemia and respiratory distress, also with suspected hypovolemia component in setting of hypernatremia, pharmacologic rate control as indicated after underlying issues have been treated    Case to be discussed in detail with ICU physician, Dr. Li.

## 2021-12-06 NOTE — ED PROVIDER NOTE - CONSTITUTIONAL, MLM
normal... elderly, weak appearing awake, alert, oriented to person, place, time/situation and in mild resp distress

## 2021-12-06 NOTE — ED ADULT NURSE NOTE - OBJECTIVE STATEMENT
Pt BIBA from home - pt was d/gianluca from floor Sunday after 5 days admission for COVID - pt is still feeling short of breath - states she cannot do anything without feeling short of breath

## 2021-12-06 NOTE — H&P ADULT - ATTENDING COMMENTS
89 yo Female with PMHx of Paroxysmal Atrial Fibrillation, HTN, Hypothyroidism, COPD (2-3 L nasal cannula at home) Hyperlipidemia, presented to the ED with shortness of breath and was found to have A.Fib with RVR, is being admitted for Afib with RVR and acute hypoxic respiratory failure requiring NIPPV. Admit to telemetry. S/p IV cardizem without optimal response. Continue coreg and start on cardizem gtt. If rates are better in the AM, will resume home long acting cardizem. Was given IV fluids in the ED to help BP for rate control - became acute short of breath, suspect exacerbation of diastolic CHF. Given IV lasix and placed on BIPAP. Will defer further diuresis to cardiology (Dr. Eastman). Patient is DNR/DNI. ICU evaluation noted.    Agree with H&P as outlined above, edited where appropriate.

## 2021-12-06 NOTE — ED PROVIDER NOTE - CLINICAL SUMMARY MEDICAL DECISION MAKING FREE TEXT BOX
return for covid infection with increased sob, tachy, monitor, check labs, ivf, check ekg,  oxygen, readmit, consider steroids, consider abx. return for covid infection with increased sob, tachy, monitor, check labs, ivf, check ekg,  oxygen, readmit, consider steroids, consider abx.  admit tele, rate control afib

## 2021-12-06 NOTE — ED PROVIDER NOTE - ENMT, MLM
Airway patent, Nasal mucosa clear. Mm dry Throat has no vesicles, no oropharyngeal exudates and uvula is midline.

## 2021-12-06 NOTE — H&P ADULT - NSHPREVIEWOFSYSTEMS_GEN_ALL_CORE
CONSTITUTIONAL: denies fever, chills, fatigue, weakness  HEENT: denies blurred vision, sore throat  SKIN: denies new lesions, rash  CARDIOVASCULAR: denies chest pain, chest pressure, palpitations  RESPIRATORY:  Admits to shortness of breath, No sputum production  GASTROINTESTINAL: denies nausea, vomiting, diarrhea, abdominal pain  GENITOURINARY: denies dysuria, discharge  NEUROLOGICAL: denies numbness, headache, focal weakness  MUSCULOSKELETAL: denies new joint pain, muscle aches  HEMATOLOGIC: denies gross bleeding, bruising  LYMPHATICS: denies enlarged lymph nodes, extremity swelling  PSYCHIATRIC: denies recent changes in anxiety, depression  ENDOCRINOLOGIC: denies sweating, cold or heat intolerance

## 2021-12-06 NOTE — H&P ADULT - NSHPPHYSICALEXAM_GEN_ALL_CORE
Vital Signs Last 24 Hrs  T(C): --  T(F): --  HR: 123 (06 Dec 2021 16:54) (123 - 123)  BP: 115/91 (06 Dec 2021 16:54) (115/91 - 115/91)  BP(mean): --  RR: 18 (06 Dec 2021 16:54) (18 - 18)  SpO2: 95% (06 Dec 2021 16:54) (95% - 95%)    PHYSICAL EXAM:  GENERAL: Well developed female who is sitting up in bed and in mild distress.  HEAD:  Atraumatic, Normocephalic  EYES: EOMI, PERRLA, conjunctiva and sclera clear  ENT: Moist mucous membranes  NECK: Supple, No JVD  CHEST/LUNG:  Decreased breath sounds BL lung bases No rales, wheezing, or rubs. labored respirations  HEART: Irregular rate and rhythm; No murmurs, rubs, or gallops  ABDOMEN: Bowel sounds present; Soft, Nontender, Nondistended. No hepatomegaly  EXTREMITIES:  2+ Peripheral Pulses, brisk capillary refill. No clubbing, cyanosis, edema + BL LE  NERVOUS SYSTEM:  Alert & Oriented X3, speech clear. No deficits   MSK: FROM all 4 extremities, full and equal strength  SKIN: Warm and dry T(F): --  HR: 123 (06 Dec 2021 16:54) (123 - 123)  BP: 115/91 (06 Dec 2021 16:54) (115/91 - 115/91)  BP(mean): --  RR: 18 (06 Dec 2021 16:54) (18 - 18)  SpO2: 95% (06 Dec 2021 16:54) (95% - 95%)    PHYSICAL EXAM:  GENERAL: Well developed female who is sitting up in bed and in mild distress.  HEAD:  Atraumatic, Normocephalic  EYES: EOMI, PERRLA, conjunctiva and sclera clear  ENT: Moist mucous membranes  NECK: Supple, No JVD  CHEST/LUNG:  Decreased breath sounds BL lung bases No rales, wheezing, or rubs. labored respirations  HEART: Irregular rate and rhythm; No murmurs, rubs, or gallops  ABDOMEN: Bowel sounds present; Soft, Nontender, Nondistended. No hepatomegaly  EXTREMITIES:  2+ Peripheral Pulses, brisk capillary refill. No clubbing, cyanosis, edema + BL LE  NERVOUS SYSTEM:  Awake and alert, speech clear. No deficits   MSK: FROM all 4 extremities, full and equal strength  SKIN: Warm and dry

## 2021-12-06 NOTE — H&P ADULT - PROBLEM SELECTOR PLAN 7
- Mild evidence of  volume overload on admission with LE edema   - S/P Lasix 450 mg IV x1   - Strict I&Os & daily weights   - TTE 12/03: systolic function with an ejection fraction of approximately 60%. There is diastolic dysfunction. There is mild concentric left ventricular hypertrophy.  - Cardiology consulted Dr. Leavitt, will appreciate recs  - c/w BB with hold parameters. - Admission labs show elevated Troponin 77.7  - patient has no anginal symptoms, no concerning signs of acute ischemia on ECG  - likely 2/2 to demand in the setting of demand due to A.Fib  - Was elevated on recent admission and now is down trending.   - Will repeat Javier x 1 to check delta  - Will consult Cardiology Dr. oMrse group, appreciate recs.

## 2021-12-06 NOTE — H&P ADULT - REASON FOR ADMISSION
Acute Hypoxic respiratory failure and A.Fib with RVR Acute Hypoxic respiratory failure and AF with RVR

## 2021-12-06 NOTE — H&P ADULT - PROBLEM SELECTOR PLAN 4
- Admission labs show elevated Na at 147   - Likely due to prerenal in the setting of decreased oral in take and hypoxia   - will encourage oral intake  - Avoid IV fluids  - Follow up with daily BMP - Admission labs show elevated WBC's 13.52  - Patient remains afebrile  - Leukocytosis likely reactive - monitor for fevers  - continue to trend with daily CBC

## 2021-12-06 NOTE — H&P ADULT - PROBLEM SELECTOR PLAN 5
- Plan: H/H 10.6  - unknown baseline 10 on previous admission  - no symptoms or signs of bleeding  - continue to monitor with daily cbc   - f/u iron studies.   - Continue to monitor - Admission labs show elevated Na at 147   - Likely due to decreased oral intake  - will encourage oral intake  - Follow up with daily BMP

## 2021-12-06 NOTE — ED ADULT NURSE NOTE - CHIEF COMPLAINT QUOTE
Pt BIBA from home - was just discharged from floor this weekend - son states pt is still short of breath -

## 2021-12-06 NOTE — H&P ADULT - ASSESSMENT
Patient is a 89 yo Female with PMHx of Paroxysmal Atrial Fibrillation, HTN, Hypothyroidism, COPD (2-3 L nasal cannula at home) Hyperlipidemia, presented to the ED with shortness of breath and was found to have A.Fib with RVR, is being admitted for Afib with RVR and Dyspnia.  89 yo Female with PMHx of Paroxysmal Atrial Fibrillation, HTN, Hypothyroidism, COPD (2-3 L nasal cannula at home) Hyperlipidemia, presented to the ED with shortness of breath and was found to have A.Fib with RVR, is being admitted for Afib with RVR and acute hypoxic respiratory failure requiring NIPPV.

## 2021-12-06 NOTE — ED CLERICAL - NS ED CLERK NOTE PRE-ARRIVAL INFORMATION; ADDITIONAL PRE-ARRIVAL INFORMATION
This patient is eligible for (or currently enrolled in) an outpatient care management program available through [employer]. This program can coordinate outpatient follow up and assist the patient in accessing a variety of outpatient resources.  If discharged from the ED, the patient will be contacted to see if any additional resources are needed.

## 2021-12-06 NOTE — ED PROVIDER NOTE - OBJECTIVE STATEMENT
Pt is a 89 yo female extensive hx including afib. pt d/c yesterday after admit for covid and ? chf. pt today p/w co sob, pt denies cp, n/v/d. abd pain, pt otherwise poor historian.  pt d/c on dexamethasone. pt had vaccine x 2 but no booster    cards: nory

## 2021-12-06 NOTE — ED PROVIDER NOTE - PSYCHIATRIC, MLM
Alert and oriented to person, place, time/situation. pt seems to have impaired memory. no apparent risk to self or others.

## 2021-12-06 NOTE — H&P ADULT - PROBLEM SELECTOR PLAN 10
IMPROVE VTE Individual Risk Assessment          RISK                                                          Points  [  ] Previous VTE                                                3  [  ] Thrombophilia                                             2  [  ] Lower limb paralysis                                   2        (unable to hold up >15 seconds)    [  ] Current Cancer                                             2         (within 6 months)  [  ] Immobilization > 24 hrs                              1  [  ] ICU/CCU stay > 24 hours                             1  [ x ] Age > 60                                                         1    IMPROVE VTE Score:     Eliquis 2.5 bid.

## 2021-12-06 NOTE — H&P ADULT - PROBLEM SELECTOR PLAN 2
- Patient presented with SOB and decreased saturation and was found to be in A. Fib  - Patient with Hx of PAFib on Coreg and Cardizem for rate control and Eliquis for AC   - S/P Cardizem 10 mg IV x1 and 15 mg IV X 1  - Will start patient on Cardizem drip  - Hold home   - will consult Cardiology Dr. Leavitt/Orlin group consulted by ED, will appreciate recs  - Continue to monitor on remote tel - Patient presented with SOB and decreased saturation and was found to be in A. Fib  - Patient with Hx of PAFib on Coreg and Cardizem for rate control and Eliquis for AC   - Likely triggered by acute respiratory failure and hypoxia.   - S/P Cardizem 10 mg IV x1 and 15 mg IV X 1  - Will start patient on Cardizem drip  - Hold home   - will consult Cardiology Dr. Leavitt/Orlin group consulted by ED, will appreciate recs  - Continue to monitor on remote tel - Patient presented with SOB and decreased saturation and was found to be in A. Fib  - Patient with Hx of PAFib on Coreg and Cardizem for rate control and Eliquis for AC   - Likely triggered by acute respiratory failure and hypoxia.   - S/P Cardizem 10 mg IV x1 and 15 mg IV X 1  - Will start patient on Cardizem drip given uncontrolled rates  - will consult Cardiology (Dr. Eastman group consulted by ED - recs appreciated)  - Continue to monitor on telemetry

## 2021-12-06 NOTE — H&P ADULT - PROBLEM SELECTOR PLAN 6
- Admission labs show elevated Troponin 77.7  - patient has no anginal symptoms, no evidence of plaque rupture on EKG   - likely 2/2 to demand in the setting of demand due to A.Fib  - Was elevated on recent admission and now is down trending.   - No need to further trend the troponin as per last cardiology evaluation.   - Will consult Cardiology Dr. Morse group, appreciate recs. - Plan: H/H 10.6  - unknown baseline 10 on previous admission  - no symptoms or signs of bleeding  - continue to monitor with daily cbc   - f/u iron studies.   - Continue to monitor

## 2021-12-06 NOTE — H&P ADULT - PROBLEM SELECTOR PLAN 3
- Admission labs show elevated WBC's 13.52  - Patient remains afebrile,  - Leukocytosis likely de-marginalization due to demand of Afib with RVR  - continue to trend with daily CBC - Mild evidence of volume overload on admission with LE edema   - Received IVF and became acutely worse  - S/P Lasix 40 mg IV x1 in the ED   - Strict I&Os & daily weights   - TTE 12/03: systolic function with an ejection fraction of approximately 60%. There is diastolic dysfunction. There is mild concentric left ventricular hypertrophy.  - Cardiology consulted Dr. Leavitt, will appreciate recs  - c/w BB with hold parameters.

## 2021-12-06 NOTE — H&P ADULT - PROBLEM SELECTOR PLAN 1
- Patient presented with dyspnia and increased work of breathing.  - Was discharged home on 5L NC on 12/05  - on arrival to the ED patient was found to be saturating in low 80's   - Patient remained afebrile with mild leukocytosis.   - Patient was started on non rebreather with improved saturation, now on 4L NC with saturation above 92%  - Admit with remote tele and continues pulse ox  - C/w 4L NC, advance oxygen delivery based on oxygen saturation.   - c/w albuterol inhaler q6hrs  - GOC: DNR/DNI MOLST in chart   - Patient was COVID positive on the 11/30th  - d-dimer : 274 - Patient presented with dyspnia and increased work of breathing.  - Was discharged home on 5L NC on 12/05  - on arrival to the ED patient was found to be saturating in low 80's   - Patient remained afebrile with mild leukocytosis.   - Patient was started on non rebreather with improved saturation, now on 4L NC with saturation above 92%  - Admit with remote tele and continues pulse ox  - C/w 4L NC, advance oxygen delivery based on oxygen saturation.   - titrate as necessary to maintain SpO2 > 88%  - S/P Remdesavir during previous admission, may benefit from dexamethasone if unable to maintain saturation.   - c/w albuterol inhaler q6hrs  - GOC: DNR/DNI MOLST in chart   - Patient was COVID positive on the 11/30th  - d-dimer : 274 - Patient presented with dyspnia and increased work of breathing.  - Was discharged home on 5L NC on 12/05  - on arrival to the ED patient was found to be saturating in low 80's  - Patient remained afebrile with mild leukocytosis.   - Patient was started on non rebreather with improved saturation and switched to BIPAP  - Admit with remote tele and continues pulse ox  - C/w 4L NC, advance oxygen delivery based on oxygen saturation.   - titrate as necessary to maintain SpO2 > 88%  - S/P Remdesivir during previous admission, continue course of decadron  - c/w albuterol inhaler q6hrs  - GOC: DNR/DNI MOLST in chart   - Patient was COVID positive on the 11/30th  - d-dimer: 274  - Pulm Dr. Milner

## 2021-12-06 NOTE — H&P ADULT - HISTORY OF PRESENT ILLNESS
Patient is a 89 yo Female with PMHx of Paroxysmal Atrial Fibrillation, HTN, Hypothyroidism, COPD (2-3 L nasal cannula at home) Hyperlipidemia, presenting with shortness of breath. Patient was admitted at [facility] for acute hypoxic respiratory failure secondary to COVID infection and was discharged home yesterday. History obtained from Emerald Martinez who is also the HCP over the phone at [phone]. Emerald informs that after coming home yesterday patient remained lethargic, claims that she did not sleep well overnight and this morning at the breakfast was confused and dyspneic. Emerald informs that patient was getting tired and fatigued because of difficulty breathing so decided to bring her back to the hospital.  Patient is short of breath at baseline but claims that she is more dyspneic and tiered. Denies any chest pain or palpitations. No nausea or vomiting,     ED course:   Vitals:   BP: 115/91  HR: 123  Temp:   RR: 18, O2: 95% on 4L NC   Labs:   CBC: WBC:13.52 Hb: 10.06 , Platlets: 344 Neutrophils: 82.3   CMP:  , K+ 4.9-, Cl111- , BUN/Creatinine: 61/1.30, Procal: 0.18, D-Dimer: 274, APTT: 27.1, PT: 13, INR: 1.12  EKG: Atrial flutter with variable AV block, QT/QTc: 326/454,   Patient received: 1L NS x1, Cardizem 10mg IV x1, 15 mg IV X1 and Lasix 40mg IV x1 Duoneb X 1   Patient is a 89 yo Female with PMHx of Paroxysmal Atrial Fibrillation (on eliquis), HTN, Hypothyroidism, COPD (2-3 L nasal cannula at home) Hyperlipidemia, presenting with shortness of breath. Patient was admitted at [facility] for acute hypoxic respiratory failure secondary to COVID infection and was discharged home yesterday. History obtained from Emerald Martinez who is also the HCP over the phone at [phone]. Emerald informs that after coming home yesterday patient remained lethargic, claims that she did not sleep well overnight and this morning at the breakfast was confused and dyspneic. Emerald informs that patient was getting tired and fatigued because of difficulty breathing so decided to bring her back to the hospital.  Patient is short of breath at baseline but claims that she is more dyspneic and tiered. Denies any chest pain or palpitations. No nausea or vomiting,     ED course:   Vitals:   BP: 115/91  HR: 123  Temp:   RR: 18, O2: 95% on 4L NC   Labs:   CBC: WBC:13.52 Hb: 10.06 , Platlets: 344 Neutrophils: 82.3   CMP:  , K+ 4.9-, Cl111- , BUN/Creatinine: 61/1.30, Procal: 0.18, D-Dimer: 274, APTT: 27.1, PT: 13, INR: 1.12  CXR: bibasilar opacities, blunted CP angles on personal review  EKG: Atrial flutter with variable AV block, QT/QTc: 326/454,   Patient received: 1L NS x1, Cardizem 10mg IV x1, 15 mg IV X1 and Lasix 40mg IV x1 Duoneb X 1

## 2021-12-06 NOTE — H&P ADULT - NSHPSOCIALHISTORY_GEN_ALL_CORE
Tobacco: No  EtOH: No  recreational drug use: No  Lives with: Grandson   Ambulates: Assistance walker  ADLs: Needs assistance   Occupation: retired  Vaccinations: COVID x2 no booster

## 2021-12-06 NOTE — H&P ADULT - PROBLEM SELECTOR PLAN 8
- Chronic stable  - On Coreg 6.25 bid and hydralazine 25 mg tid, will continue with hold parameters. - Chronic stable  - On Coreg 6.25 bid, will continue with hold parameters.  - Hold hydralazine as patient on Cardizem gtt. - Chronic stable  - On Coreg 6.25 bid, will continue with hold parameters.  - Hold hydralazine to allow room for rate control and/or diuresis  - Monitor hemodynamics

## 2021-12-07 ENCOUNTER — TRANSCRIPTION ENCOUNTER (OUTPATIENT)
Age: 86
End: 2021-12-07

## 2021-12-07 DIAGNOSIS — I50.33 ACUTE ON CHRONIC DIASTOLIC (CONGESTIVE) HEART FAILURE: ICD-10-CM

## 2021-12-07 LAB
ALBUMIN SERPL ELPH-MCNC: 2.3 G/DL — LOW (ref 3.3–5)
ALP SERPL-CCNC: 52 U/L — SIGNIFICANT CHANGE UP (ref 40–120)
ALT FLD-CCNC: 26 U/L — SIGNIFICANT CHANGE UP (ref 12–78)
ANION GAP SERPL CALC-SCNC: 1 MMOL/L — LOW (ref 5–17)
ANION GAP SERPL CALC-SCNC: 2 MMOL/L — LOW (ref 5–17)
AST SERPL-CCNC: 19 U/L — SIGNIFICANT CHANGE UP (ref 15–37)
BASOPHILS # BLD AUTO: 0.02 K/UL — SIGNIFICANT CHANGE UP (ref 0–0.2)
BASOPHILS # BLD AUTO: 0.03 K/UL — SIGNIFICANT CHANGE UP (ref 0–0.2)
BASOPHILS NFR BLD AUTO: 0.2 % — SIGNIFICANT CHANGE UP (ref 0–2)
BASOPHILS NFR BLD AUTO: 0.2 % — SIGNIFICANT CHANGE UP (ref 0–2)
BILIRUB SERPL-MCNC: 0.3 MG/DL — SIGNIFICANT CHANGE UP (ref 0.2–1.2)
BUN SERPL-MCNC: 56 MG/DL — HIGH (ref 7–23)
BUN SERPL-MCNC: 57 MG/DL — HIGH (ref 7–23)
CALCIUM SERPL-MCNC: 8.8 MG/DL — SIGNIFICANT CHANGE UP (ref 8.5–10.1)
CALCIUM SERPL-MCNC: 9.3 MG/DL — SIGNIFICANT CHANGE UP (ref 8.5–10.1)
CHLORIDE SERPL-SCNC: 108 MMOL/L — SIGNIFICANT CHANGE UP (ref 96–108)
CHLORIDE SERPL-SCNC: 111 MMOL/L — HIGH (ref 96–108)
CK MB BLD-MCNC: 10 % — HIGH (ref 0–3.5)
CK MB BLD-MCNC: 11.4 % — HIGH (ref 0–3.5)
CK MB CFR SERPL CALC: 1.7 NG/ML — SIGNIFICANT CHANGE UP (ref 0–3.6)
CK MB CFR SERPL CALC: 2.4 NG/ML — SIGNIFICANT CHANGE UP (ref 0–3.6)
CK SERPL-CCNC: 17 U/L — LOW (ref 26–192)
CK SERPL-CCNC: 21 U/L — LOW (ref 26–192)
CO2 SERPL-SCNC: 35 MMOL/L — HIGH (ref 22–31)
CO2 SERPL-SCNC: 37 MMOL/L — HIGH (ref 22–31)
CREAT SERPL-MCNC: 1.1 MG/DL — SIGNIFICANT CHANGE UP (ref 0.5–1.3)
CREAT SERPL-MCNC: 1.2 MG/DL — SIGNIFICANT CHANGE UP (ref 0.5–1.3)
CRP SERPL-MCNC: 26 MG/L — HIGH
EOSINOPHIL # BLD AUTO: 0 K/UL — SIGNIFICANT CHANGE UP (ref 0–0.5)
EOSINOPHIL # BLD AUTO: 0.01 K/UL — SIGNIFICANT CHANGE UP (ref 0–0.5)
EOSINOPHIL NFR BLD AUTO: 0 % — SIGNIFICANT CHANGE UP (ref 0–6)
EOSINOPHIL NFR BLD AUTO: 0.1 % — SIGNIFICANT CHANGE UP (ref 0–6)
FERRITIN SERPL-MCNC: 438 NG/ML — HIGH (ref 15–150)
FERRITIN SERPL-MCNC: 516 NG/ML — HIGH (ref 15–150)
GLUCOSE SERPL-MCNC: 113 MG/DL — HIGH (ref 70–99)
GLUCOSE SERPL-MCNC: 146 MG/DL — HIGH (ref 70–99)
HCT VFR BLD CALC: 30.5 % — LOW (ref 34.5–45)
HCT VFR BLD CALC: 32.5 % — LOW (ref 34.5–45)
HGB BLD-MCNC: 10 G/DL — LOW (ref 11.5–15.5)
HGB BLD-MCNC: 9.5 G/DL — LOW (ref 11.5–15.5)
IMM GRANULOCYTES NFR BLD AUTO: 3.6 % — HIGH (ref 0–1.5)
IMM GRANULOCYTES NFR BLD AUTO: 3.8 % — HIGH (ref 0–1.5)
IRON SATN MFR SERPL: 28 % — SIGNIFICANT CHANGE UP (ref 14–50)
IRON SATN MFR SERPL: 61 UG/DL — SIGNIFICANT CHANGE UP (ref 30–160)
LYMPHOCYTES # BLD AUTO: 0.71 K/UL — LOW (ref 1–3.3)
LYMPHOCYTES # BLD AUTO: 1.13 K/UL — SIGNIFICANT CHANGE UP (ref 1–3.3)
LYMPHOCYTES # BLD AUTO: 10.2 % — LOW (ref 13–44)
LYMPHOCYTES # BLD AUTO: 5.2 % — LOW (ref 13–44)
MAGNESIUM SERPL-MCNC: 2.4 MG/DL — SIGNIFICANT CHANGE UP (ref 1.6–2.6)
MCHC RBC-ENTMCNC: 30.8 GM/DL — LOW (ref 32–36)
MCHC RBC-ENTMCNC: 30.8 PG — SIGNIFICANT CHANGE UP (ref 27–34)
MCHC RBC-ENTMCNC: 30.8 PG — SIGNIFICANT CHANGE UP (ref 27–34)
MCHC RBC-ENTMCNC: 31.1 GM/DL — LOW (ref 32–36)
MCV RBC AUTO: 100 FL — SIGNIFICANT CHANGE UP (ref 80–100)
MCV RBC AUTO: 99 FL — SIGNIFICANT CHANGE UP (ref 80–100)
MONOCYTES # BLD AUTO: 0.27 K/UL — SIGNIFICANT CHANGE UP (ref 0–0.9)
MONOCYTES # BLD AUTO: 0.89 K/UL — SIGNIFICANT CHANGE UP (ref 0–0.9)
MONOCYTES NFR BLD AUTO: 2 % — SIGNIFICANT CHANGE UP (ref 2–14)
MONOCYTES NFR BLD AUTO: 8 % — SIGNIFICANT CHANGE UP (ref 2–14)
NEUTROPHILS # BLD AUTO: 12.04 K/UL — HIGH (ref 1.8–7.4)
NEUTROPHILS # BLD AUTO: 8.62 K/UL — HIGH (ref 1.8–7.4)
NEUTROPHILS NFR BLD AUTO: 77.9 % — HIGH (ref 43–77)
NEUTROPHILS NFR BLD AUTO: 88.8 % — HIGH (ref 43–77)
NRBC # BLD: 0 /100 WBCS — SIGNIFICANT CHANGE UP (ref 0–0)
NRBC # BLD: 0 /100 WBCS — SIGNIFICANT CHANGE UP (ref 0–0)
NT-PROBNP SERPL-SCNC: HIGH PG/ML (ref 0–450)
PHOSPHATE SERPL-MCNC: 2.6 MG/DL — SIGNIFICANT CHANGE UP (ref 2.5–4.5)
PLATELET # BLD AUTO: 290 K/UL — SIGNIFICANT CHANGE UP (ref 150–400)
PLATELET # BLD AUTO: 303 K/UL — SIGNIFICANT CHANGE UP (ref 150–400)
POTASSIUM SERPL-MCNC: 4.3 MMOL/L — SIGNIFICANT CHANGE UP (ref 3.5–5.3)
POTASSIUM SERPL-MCNC: 5.2 MMOL/L — SIGNIFICANT CHANGE UP (ref 3.5–5.3)
POTASSIUM SERPL-SCNC: 4.3 MMOL/L — SIGNIFICANT CHANGE UP (ref 3.5–5.3)
POTASSIUM SERPL-SCNC: 5.2 MMOL/L — SIGNIFICANT CHANGE UP (ref 3.5–5.3)
PROT SERPL-MCNC: 5.7 G/DL — LOW (ref 6–8.3)
RBC # BLD: 3.08 M/UL — LOW (ref 3.8–5.2)
RBC # BLD: 3.25 M/UL — LOW (ref 3.8–5.2)
RBC # FLD: 14.7 % — HIGH (ref 10.3–14.5)
RBC # FLD: 14.8 % — HIGH (ref 10.3–14.5)
SODIUM SERPL-SCNC: 146 MMOL/L — HIGH (ref 135–145)
SODIUM SERPL-SCNC: 148 MMOL/L — HIGH (ref 135–145)
T3 SERPL-MCNC: 26 NG/DL — LOW (ref 80–200)
T4 AB SER-ACNC: 5.2 UG/DL — SIGNIFICANT CHANGE UP (ref 4.6–12)
TIBC SERPL-MCNC: 217 UG/DL — LOW (ref 220–430)
TROPONIN I, HIGH SENSITIVITY RESULT: 78.4 NG/L — HIGH
TROPONIN I, HIGH SENSITIVITY RESULT: 81 NG/L — HIGH
TSH SERPL-MCNC: 0.33 UIU/ML — LOW (ref 0.36–3.74)
UIBC SERPL-MCNC: 156 UG/DL — SIGNIFICANT CHANGE UP (ref 110–370)
WBC # BLD: 11.07 K/UL — HIGH (ref 3.8–10.5)
WBC # BLD: 13.57 K/UL — HIGH (ref 3.8–10.5)
WBC # FLD AUTO: 11.07 K/UL — HIGH (ref 3.8–10.5)
WBC # FLD AUTO: 13.57 K/UL — HIGH (ref 3.8–10.5)

## 2021-12-07 PROCEDURE — 71045 X-RAY EXAM CHEST 1 VIEW: CPT | Mod: 26

## 2021-12-07 PROCEDURE — 99233 SBSQ HOSP IP/OBS HIGH 50: CPT | Mod: GC

## 2021-12-07 PROCEDURE — 99223 1ST HOSP IP/OBS HIGH 75: CPT

## 2021-12-07 RX ORDER — AMIODARONE HYDROCHLORIDE 400 MG/1
200 TABLET ORAL DAILY
Refills: 0 | Status: DISCONTINUED | OUTPATIENT
Start: 2021-12-11 | End: 2021-12-13

## 2021-12-07 RX ORDER — AMIODARONE HYDROCHLORIDE 400 MG/1
400 TABLET ORAL EVERY 8 HOURS
Refills: 0 | Status: COMPLETED | OUTPATIENT
Start: 2021-12-07 | End: 2021-12-11

## 2021-12-07 RX ORDER — DILTIAZEM HCL 120 MG
240 CAPSULE, EXT RELEASE 24 HR ORAL DAILY
Refills: 0 | Status: DISCONTINUED | OUTPATIENT
Start: 2021-12-07 | End: 2021-12-08

## 2021-12-07 RX ORDER — AMIODARONE HYDROCHLORIDE 400 MG/1
TABLET ORAL
Refills: 0 | Status: DISCONTINUED | OUTPATIENT
Start: 2021-12-07 | End: 2021-12-13

## 2021-12-07 RX ORDER — TOCILIZUMAB 20 MG/ML
600 INJECTION, SOLUTION, CONCENTRATE INTRAVENOUS ONCE
Refills: 0 | Status: COMPLETED | OUTPATIENT
Start: 2021-12-07 | End: 2021-12-07

## 2021-12-07 RX ORDER — LEVOTHYROXINE SODIUM 125 MCG
50 TABLET ORAL DAILY
Refills: 0 | Status: DISCONTINUED | OUTPATIENT
Start: 2021-12-10 | End: 2021-12-13

## 2021-12-07 RX ORDER — FUROSEMIDE 40 MG
40 TABLET ORAL ONCE
Refills: 0 | Status: COMPLETED | OUTPATIENT
Start: 2021-12-07 | End: 2021-12-07

## 2021-12-07 RX ADMIN — Medication 500 MILLIGRAM(S): at 13:47

## 2021-12-07 RX ADMIN — CARVEDILOL PHOSPHATE 6.25 MILLIGRAM(S): 80 CAPSULE, EXTENDED RELEASE ORAL at 08:31

## 2021-12-07 RX ADMIN — Medication 100 MILLIGRAM(S): at 12:01

## 2021-12-07 RX ADMIN — Medication 40 MILLIGRAM(S): at 18:25

## 2021-12-07 RX ADMIN — APIXABAN 2.5 MILLIGRAM(S): 2.5 TABLET, FILM COATED ORAL at 18:26

## 2021-12-07 RX ADMIN — Medication 81 MILLIGRAM(S): at 12:01

## 2021-12-07 RX ADMIN — Medication 200 MILLIGRAM(S): at 08:31

## 2021-12-07 RX ADMIN — AMIODARONE HYDROCHLORIDE 400 MILLIGRAM(S): 400 TABLET ORAL at 13:52

## 2021-12-07 RX ADMIN — TOCILIZUMAB 100 MILLIGRAM(S): 20 INJECTION, SOLUTION, CONCENTRATE INTRAVENOUS at 13:47

## 2021-12-07 RX ADMIN — Medication 75 MICROGRAM(S): at 07:04

## 2021-12-07 RX ADMIN — TIOTROPIUM BROMIDE 1 CAPSULE(S): 18 CAPSULE ORAL; RESPIRATORY (INHALATION) at 12:16

## 2021-12-07 RX ADMIN — Medication 240 MILLIGRAM(S): at 08:31

## 2021-12-07 RX ADMIN — Medication 500 MILLIGRAM(S): at 08:32

## 2021-12-07 RX ADMIN — CARVEDILOL PHOSPHATE 6.25 MILLIGRAM(S): 80 CAPSULE, EXTENDED RELEASE ORAL at 18:26

## 2021-12-07 RX ADMIN — Medication 6 MILLIGRAM(S): at 08:31

## 2021-12-07 RX ADMIN — BUDESONIDE AND FORMOTEROL FUMARATE DIHYDRATE 2 PUFF(S): 160; 4.5 AEROSOL RESPIRATORY (INHALATION) at 12:17

## 2021-12-07 RX ADMIN — Medication 37.5 MILLIGRAM(S): at 12:01

## 2021-12-07 RX ADMIN — APIXABAN 2.5 MILLIGRAM(S): 2.5 TABLET, FILM COATED ORAL at 08:30

## 2021-12-07 RX ADMIN — Medication 200 MILLIGRAM(S): at 18:26

## 2021-12-07 NOTE — PROGRESS NOTE ADULT - ATTENDING COMMENTS
89 yo Female with PMHx of Paroxysmal Atrial Fibrillation (on eliquis), HTN, Hypothyroidism, COPD (2-3 L nasal cannula at home) Hyperlipidemia, presenting with shortness of breath. Patient was admitted at [facility] for acute hypoxic respiratory failure secondary to COVID infection and CHF exacerbation    -- Hypoxic resp failure due to fluid overload (2/2 chf exacerbation vs flash pulm shannon) and covid pneumonia   - covid: d/c on 5L NC 2 days ago, now on HFNC-> s/p tocilizumab 12/7, on decadron po   - fluid overload chf exacerb- s/plasix in ED 12/6, repeat cxr with some improvement (wet read), lasix ordered for evening 12/7. check i&os.    -- Afib w/ rvr   - cardio following, started omn amiod, cont cardiz. now rate controlled.    - on eliquis    -- Hypothyroidism  - TSH low 0.33 suggesting too much thyroxine supplementation. pt is on levo. Will hold for 48hrs and resume at lower dose of levo- check tsh in 4-6 weeks    -- PNA - pt discharged on PO cefpodoxime and flagyl on 12/5, abx ordered on admission, continue.

## 2021-12-07 NOTE — GOALS OF CARE CONVERSATION - ADVANCED CARE PLANNING - CONVERSATION DETAILS
Writer spoke with grandson/HCP Juan Snyder. Reviewed patient's medical and social history as well as events leading to patient's hospitalization. Writer discussed patient's current diagnosis (acute hypoxic resp. failure,A/F with RVR,HTN,hypothyroid, COPD, HLD asthma),  medical condition and management,   prognosis, and life expectancy. Inquired about patient's wishes regarding extent of medical care to be provided including escalation of medical care into the ICU and use of vasopressor support. In addition, the writer inquired about thoughts regarding cardiopulmnary resuscitation, artificial nutrition and hydration including use of feeding tubes and IVF, antibiotics, and further investigative studies such as blood draws and radiology.Juan  showed good  insight into medical condition. All questions answered. Writer recommended update MOLST. Patient consented to DNR/DNI,limited medical,send to hospital, no feeding tube,use antibiotics ,trial of IVF status, Juan consented to update. MOLST form to be brought in by Juan from home. Psychosocial support provided.

## 2021-12-07 NOTE — PROGRESS NOTE ADULT - PROBLEM SELECTOR PLAN 4
- Admission labs show elevated WBC's 13.52  - Patient remains afebrile  - Leukocytosis likely reactive - monitor for fevers  - continue to trend with daily CBC

## 2021-12-07 NOTE — PROGRESS NOTE ADULT - PROBLEM SELECTOR PLAN 5
- Admission labs show elevated Na at 147   - Likely due to decreased oral intake  - will encourage oral intake  - Follow up with daily BMP

## 2021-12-07 NOTE — DISCHARGE NOTE PROVIDER - CARE PROVIDER_API CALL
Manuel Varela)  Internal Medicine  [address] [place] [address]  Phone: [phone]  Fax: [phone]  Follow Up Time:    Manuel Varela)  Internal Medicine  [address] [facility] [address] [facility] [address]  [place] [address]  Phone: [phone]  Fax: [phone]  Follow Up Time:     Juvenal Anderson)  Critical Care Medicine; Internal Medicine; Pulmonary Disease  [address] [place] [address]  Phone: [phone]  Fax: [phone]  Follow Up Time: 1 week   Manuel Varela)  Internal Medicine  [address] [facility] [address]  [place] [address]  Phone: [phone]  Fax: [phone]  Follow Up Time: 1 week    Juvenal Anderson)  Critical Care Medicine; Internal Medicine; Pulmonary Disease  [address] [place] [address]  Phone: [phone]  Fax: [phone]  Follow Up Time: 1 week    Giovany Eastman (DO)  Cardiovascular Disease  [address] [facility] [address], [address]  [place] [address]  Phone: [phone]  Fax: [phone]  Follow Up Time: 1 week

## 2021-12-07 NOTE — PHARMACOTHERAPY INTERVENTION NOTE - COMMENTS
Patient on eliquis 2.5mg BID for afib. Discussed with cardio Dr. Eastman that patient does not meet 2/3 criteria for lower dosing however MD would like to continue at lower dose since patient elderly and clinical judgement.

## 2021-12-07 NOTE — PROGRESS NOTE ADULT - PROBLEM SELECTOR PLAN 1
- Patient presented with dyspnia and increased work of breathing.  - Was discharged home on 5L NC on 12/05  - on arrival to the ED patient was found to be saturating in low 80's  - Patient remained afebrile with mild leukocytosis.   - Patient was started on non rebreather with improved saturation and switched to BIPAP, then HFNC  - titrate as necessary to maintain SpO2 > 88%  - Continue remote tele and continues pulse ox  - One dose Toci 600mg today  - S/P Remdesivir during previous admission, continue course of decadron  - c/w albuterol inhaler q6hrs  - GOC: DNR/DNI MOLST in chart   - Patient was COVID positive on the 11/30th  - d-dimer: 274  - Pulm Dr. Milner  - ID Dr. Simon

## 2021-12-07 NOTE — PROGRESS NOTE ADULT - TIME BILLING
Chart review, examination, documentation, care coordination and counseling.  Phone call to daughter with number on chart - Claudio Miller - [phone] - > explained plan of care, she requests for social work consult as she is concerned pt lives alone and needs help.

## 2021-12-07 NOTE — PATIENT PROFILE ADULT - FALL HARM RISK - HARM RISK INTERVENTIONS

## 2021-12-07 NOTE — PROGRESS NOTE ADULT - SUBJECTIVE AND OBJECTIVE BOX
PULMONARY/CRITICAL CARE    Dos 12/7/21  Pt readmitted for sob--now on bipap. Has rapid atrial fib on cardizem    Patient is a 89y old  Female who presents with a chief complaint of SOB (30 Nov 2021 19:49)  Has COVID pneumonia, rapid atrial fib.    BRIEF HOSPITAL COURSE: ***89 yo F PMHx Paroxysmal Atrial Fibrillation, HTN, Hypothyroidism, COPD (2-3 L nasal cannula at home) Hyperlipidemia, presenting with shortness of breath and LE edema. Patient is short of breath at baseline but has been having difficulty standing up and walking a few steps for the last week. Patient lives with her son and his family, who hosted thanksgiving dinner. 1 guest at the dinner tested positive for COVID. Son bought an at home rapid test. He was positive, and patient was also positive. Her grandchildren and his wife were negative. Patient admits to a dry cough that started Monday and progressively worsening LE edema in the last few days. She is on bumetanide 1 mg daily for water retention denies any changes to dose or missed doses.     She does admit to a high salt diet and does not really watch what she eats.   She denies orthopnea, fever, chills, history of heart failure or CAD.   Son at bedside admits that she has a history of "mini stroke" in the past however, patient adamantly denies.     On Cardizem for Atrial fib.    Events last 24 hours: ***    PAST MEDICAL & SURGICAL HISTORY:  Hypertension    Depression    Overactive bladder    Gout    Osteoarthritis    Hypothyroid    HLD (hyperlipidemia)    Paroxysmal atrial fibrillation    Asthma    S/P cholecystectomy    S/P lumpectomy of breast    S/P hysterectomy    S/P lung surgery, follow-up exam  s/p removal of suspicious lesion, negative      Allergies    Tomas Cheese - Pt states the one time she had a mouthful of tomas cheese, her throat was closing and she required an epinephrine injection. (Anaphylaxis)  iodine (Anaphylaxis)  penicillin (Other)  shellfish (Short breath; Hives)  sulfa drugs (Hives)    Intolerances      FAMILY HISTORY social--no recent cigs, etoh.   Family history of nasopharyngeal cancer (Child)  daughter    Family history of breast cancer (Mother)          Medications:  remdesivir  IVPB   IV Intermittent   remdesivir  IVPB 100 milliGRAM(s) IV Intermittent every 24 hours    carvedilol 6.25 milliGRAM(s) Oral every 12 hours  furosemide   Injectable 40 milliGRAM(s) IV Push every 12 hours  hydrALAZINE 100 milliGRAM(s) Oral every 8 hours  losartan 100 milliGRAM(s) Oral daily    albuterol/ipratropium for Nebulization 3 milliLiter(s) Nebulizer every 6 hours PRN  albuterol/ipratropium for Nebulization. 3 milliLiter(s) Nebulizer once  benzonatate 100 milliGRAM(s) Oral three times a day PRN  budesonide 160 MICROgram(s)/formoterol 4.5 MICROgram(s) Inhaler 2 Puff(s) Inhalation two times a day  tiotropium 18 MICROgram(s) Capsule 1 Capsule(s) Inhalation daily    acetaminophen     Tablet .. 650 milliGRAM(s) Oral every 6 hours PRN  melatonin 3 milliGRAM(s) Oral at bedtime PRN  venlafaxine XR. 37.5 milliGRAM(s) Oral daily      apixaban 2.5 milliGRAM(s) Oral every 12 hours  aspirin enteric coated 81 milliGRAM(s) Oral daily    famotidine    Tablet 20 milliGRAM(s) Oral daily      allopurinol 100 milliGRAM(s) Oral daily  dexAMETHasone  Injectable 6 milliGRAM(s) IV Push daily  levothyroxine 75 MICROGram(s) Oral daily                  ICU Vital Signs Last 24 Hrs  T(C): 36.8 (01 Dec 2021 07:15), Max: 38.2 (30 Nov 2021 22:45)  T(F): 98.2 (01 Dec 2021 07:15), Max: 100.7 (30 Nov 2021 22:45)  HR: 50 (01 Dec 2021 07:15) (11 - 147)  BP: 114/54 (01 Dec 2021 07:15) (81/36 - 200/88)  BP(mean): --  ABP: --  ABP(mean): --  RR: 18 (01 Dec 2021 07:15) (17 - 181)  SpO2: 98% (01 Dec 2021 07:15) (77% - 100%)    Vital Signs Last 24 Hrs  T(C): 36.8 (01 Dec 2021 07:15), Max: 38.2 (30 Nov 2021 22:45)  T(F): 98.2 (01 Dec 2021 07:15), Max: 100.7 (30 Nov 2021 22:45)  HR: 50 (01 Dec 2021 07:15) (11 - 147)  BP: 114/54 (01 Dec 2021 07:15) (81/36 - 200/88)  BP(mean): --  RR: 18 (01 Dec 2021 07:15) (17 - 181)  SpO2: 98% (01 Dec 2021 07:15) (77% - 100%)        I&O's Detail        LABS:                        10.8   8.70  )-----------( 198      ( 01 Dec 2021 05:15 )             34.1     12-01    144  |  104  |  33<H>  ----------------------------<  139<H>  4.2   |  34<H>  |  1.50<H>    Ca    8.6      01 Dec 2021 05:15  Mg     2.4     11-30    TPro  6.9  /  Alb  2.8<L>  /  TBili  0.3  /  DBili  x   /  AST  36  /  ALT  32  /  AlkPhos  69  12-01      CARDIAC MARKERS ( 30 Nov 2021 17:27 )  x     / x     / x     / x     / <1.0 ng/mL      CAPILLARY BLOOD GLUCOSE        PT/INR - ( 30 Nov 2021 17:27 )   PT: 12.9 sec;   INR: 1.11 ratio         PTT - ( 30 Nov 2021 17:27 )  PTT:29.3 sec    CULTURES:      Physical Examination:    General: mild  acute distress.  elderly female on bipap    HEENT: Pupils equal, reactive to light.  Symmetric.    PULM: crackles 1/3 up bilat;  few rhonchi,wheezes bilat; No change percussion    CVS: irregular rate and rhythm, no murmurs, rubs, or gallops    ABD: Soft, nondistended, nontender, normoactive bowel sounds, no masses    EXT: No edema, nontender calves    SKIN: Warm and well perfused, no rashes noted.    NEURO: Alert, oriented, interactive, nonfocal    RADIOLOGY: ***  < from: CT Chest No Cont (12.01.21 @ 02:32) >  EXAM:  CT CHEST                            PROCEDURE DATE:  12/01/2021          INTERPRETATION:  CLINICAL INFORMATION: Shortness of breath, COPD, Covid.    COMPARISON: 11/4/2018    CONTRAST/COMPLICATIONS:  IV Contrast: NONE  Oral Contrast: NONE  Complications: None reported at time of study completion    PROCEDURE:  CT of the Chest was performed.  Sagittal and coronal reformats were performed.    FINDINGS:  Evaluation of solid organs and vascular structures is limited without intravenous contrast.    LUNGS AND AIRWAYS: Patent central airways. Right upper lobe wedge resection. 2 mm right upper lobe nodule (2, 37). Stable 3 mm right middle lobe nodule (2, 80). Stable 6 mm right lower lobe nodule (2, 74). Stable 5 mm nodule along the right major fissure likely reflecting intrapulmonary node (2, 81). Linear scarring/atelectasis in the right lower lobe. Subsegmental lingular and left lower lobe atelectasis. Patchy opacities in the left lower lobe.  PLEURA: No pleural effusion.  MEDIASTINUM AND FIDEL: 1 cm precarinal node. Subcentimeter other mediastinal nodes.  VESSELS: Aortic and coronary atherosclerosis.  HEART: Mild cardiomegaly. No pericardial effusion.  CHEST WALL AND LOWER NECK: Stable 9 mm hypodense right thyroid nodule.  VISUALIZED UPPER ABDOMEN: Cholecystectomy. Multiple cystic lesions within the pancreas, grossly stable, likely IPMNs or pseudocysts. Bilateral renal cysts and hyperdense lesions likely reflecting hemorrhagic/proteinaceous cyst. Stable appearance of right renalcyst containing internal clustered calcifications (2, 37).  BONES: Degenerative changes. Multiple chronic right rib deformities. Stable mild compression deformity of L2.    IMPRESSION:  Left lower lobe pneumonia. Continued follow up to resolution is recommended.        --- End of Report ---            VENU ZHANG MD; Attending Radiologist  This document has been electronically signed. Dec  1 2021  3:26AM    < end of copied text >  < from: US Duplex Venous Lower Ext Complete, Bilateral (12.01.21 @ 02:08) >  EXAM:  US DPLX LWR EXT VEINS COMPL BI                            PROCEDURE DATE:  12/01/2021          INTERPRETATION:  CLINICAL INDICATION: le edmea sob hypoxia.    TECHNIQUE: Grayscale, color Doppler and spectral Doppler ultrasound was utilized toevaluate bilateral lower extremity deep venous system.    COMPARISON: 9/14/2015.    FINDINGS: There is no thrombus in bilateral common femoral veins, femoral veins or popliteal veins. Visualized bilateral posterior tibial veins and peroneal veins arepatent. Bilateral gastrocnemius veins and soleal veins were not visualized.    IMPRESSION:    Bilateral gastrocnemius veins and soleal veins were not visualized, limiting complete evaluation. No obvious thrombus in the visualized bilateral lower extremity deep veins.    --- End of Report ---        < end of copied text >  ra< from: Xray Chest 1 View- PORTABLE-Routine (Xray Chest 1 View- PORTABLE-Routine in AM.) (12.02.21 @ 09:12) >  EXAM:  XR CHEST PORTABLE ROUTINE 1V                            PROCEDURE DATE:  12/02/2021          INTERPRETATION:  Follow-up. AP chest.    Prior 11/30/2021.    IMPRESSION: No change heart mediastinum. Suspect evolving small patchy infiltrate at the left base. No change small left pleural effusion. New small right pleural effusion.    --- End of Report ---            GARRISON NAIK MD; Attending Radiologist  This document has been electronically signed. De    < end of copied text >  < from: Xray Chest 1 View- PORTABLE-Routine (Xray Chest 1 View- PORTABLE-Routine in AM.) (12.02.21 @ 09:12) >  EXAM:  XR CHEST PORTABLE ROUTINE 1V                            PROCEDURE DATE:  12/02/2021          INTERPRETATION:  Follow-up. AP chest.    Prior 11/30/2021.    IMPRESSION: No change heart mediastinum. Suspect evolving small patchy infiltrate at the left base. No change small left pleural effusion. New small right pleural effusion.    --- End of Report ---            GARRISON NAIK MD; Attending Radiologist  This document has been electronically signed. De    < end of copied text >  CXR chf

## 2021-12-07 NOTE — CONSULT NOTE ADULT - SUBJECTIVE AND OBJECTIVE BOX
[facility] Cardiology Consult - Dagmar Eastman Luly Chapman [phone]    CHIEF COMPLAINT: Patient is a 89y old  Female who presents with a chief complaint of Acute Hypoxic respiratory failure and A.Fib with RVR (06 Dec 2021 23:02)      HPI:  Patient is a 89 yo Female with PMHx of Paroxysmal Atrial Fibrillation (on eliquis), HTN, Hypothyroidism, COPD (2-3 L nasal cannula at home) Hyperlipidemia, presenting with shortness of breath. Patient was admitted at [facility] for acute hypoxic respiratory failure secondary to COVID infection and was discharged home yesterday. History obtained from Emerald Martinez who is also the HCP over the phone at [phone]. Emerald informs that after coming home yesterday patient remained lethargic, claims that she did not sleep well overnight and this morning at the breakfast was confused and dyspneic. Emerald informs that patient was getting tired and fatigued because of difficulty breathing so decided to bring her back to the hospital.  Patient is short of breath at baseline but claims that she is more dyspneic and tiered. Denies any chest pain or palpitations. No nausea or vomiting,     ED course:   Vitals:   BP: 115/91  HR: 123  Temp:   RR: 18, O2: 95% on 4L NC   Labs:   CBC: WBC:13.52 Hb: 10.06 , Platlets: 344 Neutrophils: 82.3   CMP:  , K+ 4.9-, Cl111- , BUN/Creatinine: 61/1.30, Procal: 0.18, D-Dimer: 274, APTT: 27.1, PT: 13, INR: 1.12  CXR: bibasilar opacities, blunted CP angles on personal review  EKG: Atrial flutter with variable AV block, QT/QTc: 326/454,   Patient received: 1L NS x1, Cardizem 10mg IV x1, 15 mg IV X1 and Lasix 40mg IV x1 Duoneb X 1 (06 Dec 2021 21:25)      PAST MEDICAL & SURGICAL HISTORY:  Hypertension    Depression    Overactive bladder    Gout    Osteoarthritis    Hypothyroid    HLD (hyperlipidemia)    Paroxysmal atrial fibrillation    Asthma    S/P cholecystectomy    S/P lumpectomy of breast    S/P hysterectomy    S/P lung surgery, follow-up exam  s/p removal of suspicious lesion, negative        SOCIAL HISTORY: Alochol: Denied  Smoking: Nonsmoker  Drug Use: Denied  Marital Status:         FAMILY HISTORY: FAMILY HISTORY:  Family history of nasopharyngeal cancer (Child)  daughter    Family history of breast cancer (Mother)        MEDICATIONS  (STANDING):  allopurinol 100 milliGRAM(s) Oral daily  apixaban 2.5 milliGRAM(s) Oral two times a day  aspirin  chewable 81 milliGRAM(s) Oral daily  budesonide 160 MICROgram(s)/formoterol 4.5 MICROgram(s) Inhaler 2 Puff(s) Inhalation two times a day  carvedilol 6.25 milliGRAM(s) Oral every 12 hours  cefpodoxime 200 milliGRAM(s) Oral every 12 hours  dexAMETHasone     Tablet 6 milliGRAM(s) Oral daily  diltiazem Infusion 5 mG/Hr (5 mL/Hr) IV Continuous <Continuous>  levothyroxine 75 MICROGram(s) Oral daily  metroNIDAZOLE    Tablet 500 milliGRAM(s) Oral every 8 hours  tiotropium 18 MICROgram(s) Capsule 1 Capsule(s) Inhalation daily  venlafaxine XR. 37.5 milliGRAM(s) Oral daily    MEDICATIONS  (PRN):  ALBUTerol    0.083% 2.5 milliGRAM(s) Nebulizer every 6 hours PRN Shortness of Breath and/or Wheezing  ALBUTerol    90 MICROgram(s) HFA Inhaler 1 Puff(s) Inhalation every 4 hours PRN Shortness of Breath and/or Wheezing  benzonatate 100 milliGRAM(s) Oral three times a day PRN Cough      Allergies    Tomas Cheese - Pt states the one time she had a mouthful of tomas cheese, her throat was closing and she required an epinephrine injection. (Anaphylaxis)  iodine (Anaphylaxis)  penicillin (Other)  shellfish (Short breath; Hives)  sulfa drugs (Hives)    Intolerances        REVIEW OF SYSTEMS:  CONSTITUTIONAL: No weakness, fevers or chills  EYES/ENT: No visual changes;  No vertigo or throat pain   NECK: No pain or stiffness  RESPIRATORY: No cough, wheezing, hemoptysis; No shortness of breath  CARDIOVASCULAR: No chest pain or palpitations  GASTROINTESTINAL: No abdominal pain. No nausea, vomiting, or hematemesis; No diarrhea or constipation. No melena or hematochezia.  GENITOURINARY: No dysuria, frequency or hematuria  NEUROLOGICAL: No numbness or weakness  SKIN: No itching or rash  All other review of systems is negative unless indicated above    VITAL SIGNS:   Vital Signs Last 24 Hrs  T(C): --  T(F): --  HR: 112 (07 Dec 2021 02:00) (91 - 133)  BP: 181/99 (07 Dec 2021 02:00) (115/91 - 198/101)  BP(mean): --  RR: 25 (07 Dec 2021 02:00) (18 - 33)  SpO2: 99% (07 Dec 2021 02:00) (94% - 99%)    I&O's Summary      PHYSICAL EXAM:  Constitutional: Awake in NAD  HEENT:  ADA, EOMI  Neck: No JVD  Pulmonary: CTA B/L No R/R/W  Cardiovascular: PMI not palpable non-displaced Regular S1 and S2, no murmurs, rubs, gallops or clicks  Gastrointestinal: Bowel Sounds present, soft, nontender.   Extremities: Trace peripheral edema.   Neuro: No gross focal deficits    LABS: All Labs Reviewed:                        10.6   13.52 )-----------( 344      ( 06 Dec 2021 18:23 )             33.4                         8.8    6.71  )-----------( 215      ( 05 Dec 2021 08:48 )             27.6                         9.0    7.72  )-----------( 219      ( 04 Dec 2021 09:56 )             29.1     06 Dec 2021 18:23    147    |  111    |  61     ----------------------------<  136    4.9     |  34     |  1.30   05 Dec 2021 09:13    147    |  111    |  78     ----------------------------<  127    4.7     |  30     |  1.70   04 Dec 2021 08:47    143    |  109    |  80     ----------------------------<  129    4.3     |  26     |  1.90     Ca    9.4        06 Dec 2021 18:23  Ca    8.6        05 Dec 2021 09:13  Ca    8.4        04 Dec 2021 08:47  Phos  3.8       04 Dec 2021 08:47  Mg     2.3       04 Dec 2021 08:47    TPro  6.3    /  Alb  2.7    /  TBili  0.3    /  DBili  x      /  AST  21     /  ALT  31     /  AlkPhos  60     06 Dec 2021 18:23    PT/INR - ( 06 Dec 2021 18:23 )   PT: 13.0 sec;   INR: 1.12 ratio         PTT - ( 06 Dec 2021 18:23 )  PTT:27.1 sec  CARDIAC MARKERS ( 07 Dec 2021 00:40 )  x     / x     / 21 U/L / x     / 2.4 ng/mL      Blood Culture:   12-07 @ 00:40  Pro Bnp [id number]        RADIOLOGY/EKG:    
Patient is a 89y old  Female who presents with a chief complaint of Acute Hypoxic respiratory failure and A.Fib with RVR (06 Dec 2021 21:25)      BRIEF HOSPITAL COURSE: 89 y/o F with a h/o Paroxysmal Atrial Fibrillation, HTN, Hypothyroidism, COPD (2-3 L nasal cannula at home, baseline dyspnea) Hyperlipidemia, presents to the ED with shortness of breath. Patient was admitted at [facility] for acute hypoxic respiratory failure secondary to COVID infection and was discharged home yesterday. As per report, the patient was becoming fatigued because of difficulty breathing so decided to come back to the hospital.      Events last 24 hours: Patient escalated to BiPAP in the ED for increased work of breathing. She currently has an SpO2 of 99% on 40% FiO2 and reports feeling better and can speak at length without much difficulty. Furthermore, she states that she has advanced directives in place and does not wish to pursue intubation/mechanical ventilation if it becomes a life saving measure.      PAST MEDICAL & SURGICAL HISTORY:  Hypertension    Depression    Overactive bladder    Gout    Osteoarthritis    Hypothyroid    HLD (hyperlipidemia)    Paroxysmal atrial fibrillation    Asthma    S/P cholecystectomy    S/P lumpectomy of breast    S/P hysterectomy    S/P lung surgery, follow-up exam  s/p removal of suspicious lesion, negative      Allergies    Tomas Cheese - Pt states the one time she had a mouthful of tomas cheese, her throat was closing and she required an epinephrine injection. (Anaphylaxis)  iodine (Anaphylaxis)  penicillin (Other)  shellfish (Short breath; Hives)  sulfa drugs (Hives)    Intolerances      FAMILY HISTORY:  Family history of nasopharyngeal cancer (Child)  daughter    Family history of breast cancer (Mother)        Review of Systems:  CONSTITUTIONAL: No fever, chills, (+) fatigue  EYES: No eye pain, visual disturbances, or discharge  ENMT:  No difficulty hearing, tinnitus, vertigo; No sinus or throat pain  NECK: No pain or stiffness  RESPIRATORY: No cough, wheezing, chills or hemoptysis; (+) shortness of breath  CARDIOVASCULAR: No chest pain, palpitations, dizziness, or leg swelling  GASTROINTESTINAL: No abdominal or epigastric pain. No nausea, vomiting, or hematemesis; No diarrhea or constipation. No melena or hematochezia.  GENITOURINARY: No dysuria, frequency, hematuria, or incontinence  NEUROLOGICAL: No headaches, memory loss, loss of strength, numbness, or tremors  SKIN: No itching, burning, rashes, or lesions   MUSCULOSKELETAL: No joint pain or swelling; No muscle, back, or extremity pain  PSYCHIATRIC: No depression, anxiety, mood swings, or difficulty sleeping          Medications:  cefpodoxime 200 milliGRAM(s) Oral every 12 hours  metroNIDAZOLE    Tablet 500 milliGRAM(s) Oral every 8 hours  carvedilol 6.25 milliGRAM(s) Oral every 12 hours  diltiazem Infusion 5 mG/Hr IV Continuous <Continuous>  hydrALAZINE 25 milliGRAM(s) Oral three times a day  ALBUTerol    0.083% 2.5 milliGRAM(s) Nebulizer every 6 hours PRN  ALBUTerol    90 MICROgram(s) HFA Inhaler 1 Puff(s) Inhalation every 4 hours PRN  benzonatate 100 milliGRAM(s) Oral three times a day PRN  budesonide 160 MICROgram(s)/formoterol 4.5 MICROgram(s) Inhaler 2 Puff(s) Inhalation two times a day  tiotropium 18 MICROgram(s) Capsule 1 Capsule(s) Inhalation daily  venlafaxine XR. 37.5 milliGRAM(s) Oral daily  apixaban 2.5 milliGRAM(s) Oral two times a day  aspirin  chewable 81 milliGRAM(s) Oral daily  allopurinol 100 milliGRAM(s) Oral daily  dexAMETHasone     Tablet 6 milliGRAM(s) Oral daily  levothyroxine 75 MICROGram(s) Oral daily            ICU Vital Signs Last 24 Hrs  T(C): --  T(F): --  HR: 104 (06 Dec 2021 21:57) (104 - 123)  BP: 115/91 (06 Dec 2021 16:54) (115/91 - 115/91)  BP(mean): --  ABP: --  ABP(mean): --  RR: 18 (06 Dec 2021 16:54) (18 - 18)  SpO2: 99% (06 Dec 2021 21:57) (95% - 99%)    Vital Signs Last 24 Hrs  T(C): --  T(F): --  HR: 104 (06 Dec 2021 21:57) (104 - 123)  BP: 115/91 (06 Dec 2021 16:54) (115/91 - 115/91)  BP(mean): --  RR: 18 (06 Dec 2021 16:54) (18 - 18)  SpO2: 99% (06 Dec 2021 21:57) (95% - 99%)        I&O's Detail        LABS:                        10.6   13.52 )-----------( 344      ( 06 Dec 2021 18:23 )             33.4     12-    147<H>  |  111<H>  |  61<H>  ----------------------------<  136<H>  4.9   |  34<H>  |  1.30    Ca    9.4      06 Dec 2021 18:23    TPro  6.3  /  Alb  2.7<L>  /  TBili  0.3  /  DBili  x   /  AST  21  /  ALT  31  /  AlkPhos  60  12          CAPILLARY BLOOD GLUCOSE      POCT Blood Glucose.: 230 mg/dL (05 Dec 2021 11:22)    PT/INR - ( 06 Dec 2021 18:23 )   PT: 13.0 sec;   INR: 1.12 ratio         PTT - ( 06 Dec 2021 18:23 )  PTT:27.1 sec  Urinalysis Basic - ( 06 Dec 2021 20:19 )    Color: Yellow / Appearance: Clear / S.015 / pH: x  Gluc: x / Ketone: Negative  / Bili: Negative / Urobili: Negative   Blood: x / Protein: 30 mg/dL / Nitrite: Negative   Leuk Esterase: Trace / RBC: 0-2 /HPF / WBC 3-5   Sq Epi: x / Non Sq Epi: Moderate / Bacteria: Few      CULTURES:        Physical Examination:    General: No acute distress.  Alert, oriented, interactive, nonfocal, on NIPPV mask    HEENT: Pupils equal, reactive to light.  Symmetric.    PULM: diminished bilaterally, no significant sputum production    CVS: tachycardic,  irreg irreg rhythm, no murmurs, rubs, or gallops    ABD: Soft, nondistended, nontender, normoactive bowel sounds, no masses    EXT: No edema, nontender    SKIN: Warm and well perfused, no rashes noted.    NEURO: A&Ox3, strength 5/5 all extremities, cranial nerves grossly intact, no focal deficits      RADIOLOGY:  pending..        
[facility]  INFECTIOUS DISEASES   =======================================================    [id number]  CHARLES GRAHAM     CC: shortness of breath     HPI: HPI:  89 yo woman with HTN, Paroxysmal Atrial Fibrillation, Hypothyroidism and COPD on home o2 2-3 L NC was admitted with worsening shortness of breath. Patient was admitted at [facility] for acute hypoxic respiratory failure secondary to COVID infection and was discharged home on 12/5. Now comes again with SOB even though was on home o2 3L. She has been placed on HFNC in ED, feels better now, awake and alert, no fever. NO GI or  symptoms, no cough.     PAST MEDICAL & SURGICAL HISTORY:  Hypertension  Depression  Overactive bladder  Gout  Osteoarthritis  Hypothyoid  HLD (hyperlipidemia)  Paroxysmal atrial fibrillation  Asthma  S/P cholecystectomy  S/P lumpectomy of breast  S/P hysterectomy  S/P lung surgery, follow-up exam  s/p removal of suspicious lesion, negative    Social Hx: no current smoking, ETOH or drugs     FAMILY HISTORY:  Family history of nasopharyngeal cancer (Child)  daughter    Family history of breast cancer (Mother)    Allergies  Tomas Cheese - Pt states the one time she had a mouthful of tomas cheese, her throat was closing and she required an epinephrine injection. (Anaphylaxis)  iodine (Anaphylaxis)  penicillin (Other)  shellfish (Short breath; Hives)  sulfa drugs (Hives)    Antibiotics:  dexAMETHasone  Injectable 6 milliGRAM(s) IV Push daily    REVIEW OF SYSTEMS:  CONSTITUTIONAL:  No Fever or chills  HEENT:  No diplopia or blurred vision.  No sore throat or runny nose.  CARDIOVASCULAR:  No chest pain or SOB.  RESPIRATORY:  no cough, +shortness of breath  GASTROINTESTINAL:  No nausea, vomiting or diarrhea.  GENITOURINARY:  No dysuria, frequency or urgency. No Blood in urine  MUSCULOSKELETAL:  no joint aches, no muscle pain  SKIN:  No change in skin, hair or nails.  NEUROLOGIC:  No paresthesias, fasciculations, seizures or weakness.  PSYCHIATRIC:  No disorder of thought or mood.  ENDOCRINE:  No heat or cold intolerance, polyuria or polydipsia.  HEMATOLOGICAL:  No easy bruising or bleeding.     Physical Exam:  Vital Signs Last 24 Hrs  HR: 103 (07 Dec 2021 12:51) (90 - 133)  BP: 169/90 (07 Dec 2021 12:09) (115/91 - 198/101)  BP(mean): --  RR: 18 (07 Dec 2021 12:09) (18 - 33)  SpO2: 98% (07 Dec 2021 12:51) (94% - 99%)  GEN: NAD, obese   HEENT: normocephalic and atraumatic. EOMI. PERRL.    NECK: Supple.  No lymphadenopathy   LUNGS: Some crackles in bases   HEART: Regular rate and rhythm   ABDOMEN: Soft, nontender, and nondistended.  Positive bowel sounds.    EXTREMITIES: 1+edema.  NEUROLOGIC: grossly intact.  PSYCHIATRIC: Appropriate affect .  SKIN: No rash    Labs:                        9.5    11.07 )-----------( 290      ( 07 Dec 2021 07:08 )             30.5     12-07    148<H>  |  111<H>  |  56<H>  ----------------------------<  113<H>  4.3   |  35<H>  |  1.10    Ca    8.8      07 Dec 2021 07:08  Phos  2.6     12-07  Mg     2.4     12-07    TPro  5.7<L>  /  Alb  2.3<L>  /  TBili  0.3  /  DBili  x   /  AST  19  /  ALT  26  /  AlkPhos  52  12-07    WBC Count: 11.07 K/uL (12-07-21 @ 07:08)  WBC Count: 13.52 K/uL (12-06-21 @ 18:23)  WBC Count: 6.71 K/uL (12-05-21 @ 08:48)  WBC Count: 7.72 K/uL (12-04-21 @ 09:56)  WBC Count: 9.81 K/uL (12-03-21 @ 08:45)  WBC Count: 11.04 K/uL (12-02-21 @ 16:06)    Creatinine, Serum: 1.10 mg/dL (12-07-21 @ 07:08)  Creatinine, Serum: 1.30 mg/dL (12-06-21 @ 18:23)  Creatinine, Serum: 1.70 mg/dL (12-05-21 @ 09:13)  Creatinine, Serum: 1.90 mg/dL (12-04-21 @ 08:47)  Creatinine, Serum: 2.50 mg/dL (12-03-21 @ 08:46)  Creatinine, Serum: 2.90 mg/dL (12-02-21 @ 16:06)    C-Reactive Protein, Serum: 26 mg/L (12-06-21 @ 23:16)  C-Reactive Protein, Serum: 85 mg/L (12-03-21 @ 14:03)    Ferritin, Serum: 438 ng/mL (12-07-21 @ 12:10)  Ferritin, Serum: 516 ng/mL (12-07-21 @ 01:30)  Ferritin, Serum: 451 ng/mL (12-03-21 @ 14:42)  Ferritin, Serum: 465 ng/mL (12-03-21 @ 00:59)  Ferritin, Serum: 366 ng/mL (12-01-21 @ 02:57)    Procalcitonin, Serum: 0.18 ng/mL (12-06-21 @ 18:23)  Procalcitonin, Serum: 0.37 ng/mL (11-30-21 @ 21:17)     SARS-CoV-2: Detected (11-30-21 @ 18:08)  Rapid RVP Result: Detected (11-30-21 @ 18:08)    COVID-19 PCR: Detected (12-06-21 @ 18:23)      All imaging and other data have been reviewed.  < from: CT Chest No Cont (12.01.21 @ 02:32) >  IMPRESSION:  Left lower lobe pneumonia. Continued follow up to resolution is recommended.    Assessment and Plan:   89 yo woman with HTN, Paroxysmal Atrial Fibrillation, Hypothyroidism and COPD on home o2 2-3 L NC was admitted with worsening shortness of breath.  COVID diagnosis 11/30. She was also treated for possible pneumonia in .   Currently data and recommendations for COVID-19 treatment are rapidly changing, so this treatment plan is based on my clinical judgement with available information.   Since on HFNC and within 2 weeks of diagnosis we can try Tocilizumab.   The risks, benefits, and alternatives were discussed with the patient and/or surrogate and all questions were answered to the best of my ability.  Based on Abilio et al. (RECOVERY Trial), there was a survival benefit of tocilizumab arm compared to standard of care that was statistically significant.  Additionally,  Yohannes et al. (REMAP-CAP trial, [facility], 2021) noted improvement in organ support-free days with tocilizumab versus standard of care as well as in-hospital mortality.    The IDSA is endorsing the use of tocilizumab (in addition to the standard of care) in patients who have progressive, severe or critical illness due to COVID-19 and elevated markers of systemic inflammation.     COVID 19 and possible bacterial/aspiration pneumonia   - BMP, CBC w diff, NLR. Procalcitonin, Ferritin, CRP, LDH and D dimer for the start and periodically can be repeated.   - No need for Remdesivir last time caused increase in Creat    - Creat is trending down 2.9-->1.1  - Continue Dexamethasone 6mg po daily for 10days  - Watch O2 sat closely and taper as tolerated.   - Prophylactic anticoagulation as per protocol   - One dose of Tocilizumab 600mg today.     Thank you for courtesy of this consult.     Will follow.   Discussed with Primary team.     Dr. Mable Simon   Infectious Diseases   Please call [phone] between 8am and 6pm, call [phone] after 6pm or weekends.

## 2021-12-07 NOTE — PROGRESS NOTE ADULT - SUBJECTIVE AND OBJECTIVE BOX
Patient is a 89y old  Female who presents with a chief complaint of Acute Hypoxic respiratory failure and AF with RVR (07 Dec 2021 12:58)      INTERVAL HPI/OVERNIGHT EVENTS: Patient seen and examined at bedside. Overnight, patient required bipap. De-escalated to HFNC this am and tolerating well, saturating 96%. Patient continues to complain of increased shortness of breath. Denies fevers, chills, headache, lightheadedness, chest pain, dyspnea, abdominal pain, n/v/d/c.    MEDICATIONS  (STANDING):  allopurinol 100 milliGRAM(s) Oral daily  aMIOdarone    Tablet   Oral   aMIOdarone    Tablet 400 milliGRAM(s) Oral every 8 hours  apixaban 2.5 milliGRAM(s) Oral two times a day  aspirin  chewable 81 milliGRAM(s) Oral daily  budesonide 160 MICROgram(s)/formoterol 4.5 MICROgram(s) Inhaler 2 Puff(s) Inhalation two times a day  carvedilol 6.25 milliGRAM(s) Oral every 12 hours  cefpodoxime 200 milliGRAM(s) Oral every 12 hours  dexAMETHasone     Tablet 6 milliGRAM(s) Oral daily  diltiazem    milliGRAM(s) Oral daily  furosemide   Injectable 40 milliGRAM(s) IV Push once  levothyroxine 75 MICROGram(s) Oral daily  metroNIDAZOLE    Tablet 500 milliGRAM(s) Oral every 8 hours  tiotropium 18 MICROgram(s) Capsule 1 Capsule(s) Inhalation daily  venlafaxine XR. 37.5 milliGRAM(s) Oral daily    MEDICATIONS  (PRN):  ALBUTerol    90 MICROgram(s) HFA Inhaler 1 Puff(s) Inhalation every 4 hours PRN Shortness of Breath and/or Wheezing  benzonatate 100 milliGRAM(s) Oral three times a day PRN Cough      Allergies    Tomas Cheese - Pt states the one time she had a mouthful of tomas cheese, her throat was closing and she required an epinephrine injection. (Anaphylaxis)  iodine (Anaphylaxis)  penicillin (Other)  shellfish (Short breath; Hives)  sulfa drugs (Hives)    Intolerances        REVIEW OF SYSTEMS:  CONSTITUTIONAL: No fever or chills  HEENT:  No headache, no sore throat  RESPIRATORY: No cough, wheezing. + shortness of breath  CARDIOVASCULAR: No chest pain, palpitations  GASTROINTESTINAL: No abd pain, nausea, vomiting, or diarrhea  GENITOURINARY: No dysuria, frequency, or hematuria  NEUROLOGICAL: no focal weakness or dizziness  MUSCULOSKELETAL: no myalgias     Vital Signs Last 24 Hrs  T(C): --  T(F): --  HR: 103 (07 Dec 2021 12:51) (90 - 133)  BP: 169/90 (07 Dec 2021 12:09) (115/91 - 198/101)  BP(mean): --  RR: 18 (07 Dec 2021 12:09) (18 - 33)  SpO2: 98% (07 Dec 2021 12:51) (94% - 99%)    PHYSICAL EXAM:  GENERAL: Elderly female with HFNC, NAD  HEENT:  anicteric, moist mucous membranes  CHEST/LUNG:  Decreased breath sounds bilaterally, no rales, wheezes, or rhonchi  HEART:  Irregularly irregular rate and rhythm, no murmurs rubs or gallops  ABDOMEN:  BS+, soft, nontender, nondistended  EXTREMITIES: no edema, cyanosis, or calf tenderness  NERVOUS SYSTEM: answers questions and follows commands appropriately    LABS:                        10.0   13.57 )-----------( 303      ( 07 Dec 2021 14:04 )             32.5     CBC Full  -  ( 07 Dec 2021 14:04 )  WBC Count : 13.57 K/uL  Hemoglobin : 10.0 g/dL  Hematocrit : 32.5 %  Platelet Count - Automated : 303 K/uL  Mean Cell Volume : 100.0 fl  Mean Cell Hemoglobin : 30.8 pg  Mean Cell Hemoglobin Concentration : 30.8 gm/dL  Auto Neutrophil # : 12.04 K/uL  Auto Lymphocyte # : 0.71 K/uL  Auto Monocyte # : 0.27 K/uL  Auto Eosinophil # : 0.00 K/uL  Auto Basophil # : 0.03 K/uL  Auto Neutrophil % : 88.8 %  Auto Lymphocyte % : 5.2 %  Auto Monocyte % : 2.0 %  Auto Eosinophil % : 0.0 %  Auto Basophil % : 0.2 %    07 Dec 2021 14:04    146    |  108    |  57     ----------------------------<  146    5.2     |  37     |  1.20     Ca    9.3        07 Dec 2021 14:04  Phos  2.6       07 Dec 2021 07:08  Mg     2.4       07 Dec 2021 07:08    TPro  5.7    /  Alb  2.3    /  TBili  0.3    /  DBili  x      /  AST  19     /  ALT  26     /  AlkPhos  52     07 Dec 2021 07:08    PT/INR - ( 06 Dec 2021 18:23 )   PT: 13.0 sec;   INR: 1.12 ratio         PTT - ( 06 Dec 2021 18:23 )  PTT:27.1 sec  Urinalysis Basic - ( 06 Dec 2021 20:19 )    Color: Yellow / Appearance: Clear / S.015 / pH: x  Gluc: x / Ketone: Negative  / Bili: Negative / Urobili: Negative   Blood: x / Protein: 30 mg/dL / Nitrite: Negative   Leuk Esterase: Trace / RBC: 0-2 /HPF / WBC 3-5   Sq Epi: x / Non Sq Epi: Moderate / Bacteria: Few      CAPILLARY BLOOD GLUCOSE              RADIOLOGY & ADDITIONAL TESTS:    Personally reviewed.     Consultant(s) Notes Reviewed:  [x] YES  [ ] NO

## 2021-12-07 NOTE — CONSULT NOTE ADULT - ASSESSMENT
89F DNR/DNI with PAF, HTN, Hypothyroidism, COPD (2-3 L nasal cannula at home) Hyperlipidemia, recently discharged after a COVID admission with AF, RVR and shortness of breath/LE edema now returns with SOB back in rapid heart rates with hypoxia.  She was initially given IVF for possible dehydration but with wheezing and worsening saturation, was given IV lasix and seen by ICU.  She was placed on BiPAP with improved hypoxia and diuresis.    Suggest:    1. Acute hypoxemic respiratory failure due to recent COVID-19 pneumonia and probably acute on chronic diastolic dysfunction from AF with RVR   - Supplemental O2.   - Probable acute on chronic diastolic CHF with moderate to severe MR and rapid AF      2. Elevated troponin.   -Slight upward trend, probable demand ischemia.  -EKG without acute change.  -Medical management of CAD with rate control/betablocker/asa    3. Atrial fibrillation.   - Has h/o PAF, usually in sinus rhythm.  In AF with RVR on admission.  - Would look to restore/maintain sinus rhythm with beta blockers/cardizem and would add amiodarone.   - Eliquis 2.5 mg BID for primary stroke prevention.    4. Hypertension.   - Continue with cardizem/Carvediolol    Will follow.    89F DNR/DNI with PAF, HTN, Hypothyroidism, COPD (2-3 L nasal cannula at home) Hyperlipidemia, recently discharged after a COVID admission with AF, RVR and shortness of breath/LE edema now returns with SOB back in rapid heart rates with hypoxia.  She was initially given IVF for possible dehydration but with wheezing and worsening saturation, was given IV lasix and seen by ICU.  She was placed on BiPAP with improved hypoxia and diuresis.    Suggest:    1. Acute hypoxemic respiratory failure due to recent COVID-19 pneumonia and probably acute on chronic diastolic dysfunction from AF with RVR   - Supplemental O2.   - Probable acute on chronic diastolic CHF with moderate to severe MR and rapid AF    - Get repeat CXR today    2. Elevated troponin.   -Slight upward trend, probable demand ischemia.  -EKG without acute change.  -Medical management of CAD with rate control/betablocker/asa    3. Atrial fibrillation.   - Has h/o PAF, usually in sinus rhythm.  In AF with RVR on admission.  - Would look to restore/maintain sinus rhythm with beta blockers/cardizem and would add amiodarone.   - Eliquis 2.5 mg BID for primary stroke prevention.    4. Hypertension.   - Continue with cardizem/Carvedilol    Will follow.

## 2021-12-07 NOTE — CONSULT NOTE ADULT - REASON FOR ADMISSION
Acute Hypoxic respiratory failure and AF with RVR
Acute Hypoxic respiratory failure and A.Fib with RVR
Acute Hypoxic respiratory failure and AF with RVR

## 2021-12-07 NOTE — PROGRESS NOTE ADULT - ASSESSMENT
Pt. with acute respiratory failure due to probable CHF.  Recent Covid, COPD exac.  Cardio note apprec   Rapid at fib.  Hi troponin.  Wean off bipap.  FU with me in office    Fu CXR as outpt.

## 2021-12-07 NOTE — DISCHARGE NOTE PROVIDER - NSDCCPCAREPLAN_GEN_ALL_CORE_FT
PRINCIPAL DISCHARGE DIAGNOSIS  Diagnosis: COVID-19  Assessment and Plan of Treatment: You were found to test positive for COVID-19. It is important for you to monitor yourself for fever, shortness of breath, difficulty breathing, chest pain. If any of these symptoms occur please seek medical attention immediately.   You should quarantine youself at home for 2 weeks and follow up with your PCP afterwards for continued care.      SECONDARY DISCHARGE DIAGNOSES  Diagnosis: Atrial fibrillation  Assessment and Plan of Treatment: You have a known diagnosis of atrial fibrillation prior to your admission. START Amiodarone. Please continue to take Eliquis, Cardizem, Carvedilol to avoid developing blood clots and to lower your risk of stroke. Additionally, please follow up with your PCP (and/or cardiologist) on a regular basis to ensure that this condition does not require changes to the dose or type of medication.    Diagnosis: Hypertension  Assessment and Plan of Treatment: You have a known history of hypertension, the medical term for high blood pressure. Untreated high blood pressure increases the strain on the heart and arteries, eventually causing organ damage. High blood pressure also increases the risk of heart failure, heart attack (myocardial infarction), stroke, and kidney failure. Continue to take Cardizem and Carvedilol to prevent the possible complications of uncontrolled hypertension.   Please also follow up with your primary care physician on a regular basis to make sure your blood pressure continues to be well controlled.    Diagnosis: Acute on chronic diastolic congestive heart failure  Assessment and Plan of Treatment: Congestive heart failure is a chronic condition in which the heart has trouble pumping blood. In some cases of heart failure, fluid may back up into your lungs or you may have swelling (edema) in your lower legs.   Continue to take __________. Eat heart-healthy foods with low or no trans/saturated fats, cholesterol and salt. Weigh yourself every day for early recognition of fluid accumulation.    Diagnosis: Anemia  Assessment and Plan of Treatment: Anemia is the medical term for when you do not have enough Red Blood Cells. We measure this using Hemoglobin, which is a molecule in the Red Blood Cells use to carry Oxygen around the body. When you came to the hospital, your Hemoglobin was 10.6. Please follow up with your PCP for further surveillance.        Diagnosis: Hypernatremia  Assessment and Plan of Treatment: Hypernatremia is when the blood has too much salt (sodium) in it and not enough water. Water and salt must be balanced in the blood. Drink enough fluid to keep your pee (urine) clear or pale yellow. Avoid salty processed foods, including canned, priyank, frozen, or boxed foods. Contact health care provider if you develop fever, chills, nausea, vomiting, diarrhea, confusion, weakness.  Please follow up with your primary care provider in 1 week to repeat a basic metabolic panel to monitor your sodium level.    Diagnosis: Hypothyroidism  Assessment and Plan of Treatment: You have a known history of hypothyroidism. Continue to take Synthroid, as prescribed.     PRINCIPAL DISCHARGE DIAGNOSIS  Diagnosis: COVID-19  Assessment and Plan of Treatment: You were found to test positive for COVID-19. It is important for you to monitor yourself for fever, shortness of breath, difficulty breathing, chest pain. If any of these symptoms occur please seek medical attention immediately.   You should quarantine youself at home for 2 weeks and follow up with your PCP afterwards for continued care.      SECONDARY DISCHARGE DIAGNOSES  Diagnosis: Atrial fibrillation  Assessment and Plan of Treatment: You have a known diagnosis of atrial fibrillation prior to your admission. START Amiodarone. Please continue to take Eliquis, Cardizem, Carvedilol to avoid developing blood clots and to lower your risk of stroke. Additionally, please follow up with your PCP (and/or cardiologist) on a regular basis to ensure that this condition does not require changes to the dose or type of medication.    Diagnosis: Hypertension  Assessment and Plan of Treatment: You have a known history of hypertension, the medical term for high blood pressure. Untreated high blood pressure increases the strain on the heart and arteries, eventually causing organ damage. High blood pressure also increases the risk of heart failure, heart attack (myocardial infarction), stroke, and kidney failure. Continue to take Cardizem and Carvedilol to prevent the possible complications of uncontrolled hypertension.   Please also follow up with your primary care physician on a regular basis to make sure your blood pressure continues to be well controlled.    Diagnosis: Hypothyroidism  Assessment and Plan of Treatment: You have a known history of hypothyroidism. Continue to take Synthroid, as prescribed.    Diagnosis: Acute on chronic diastolic congestive heart failure  Assessment and Plan of Treatment: Congestive heart failure is a chronic condition in which the heart has trouble pumping blood. In some cases of heart failure, fluid may back up into your lungs or you may have swelling (edema) in your lower legs.   Eat heart-healthy foods with low or no trans/saturated fats, cholesterol and salt. Weigh yourself every day for early recognition of fluid accumulation.    Diagnosis: Anemia  Assessment and Plan of Treatment: Anemia is the medical term for when you do not have enough Red Blood Cells. We measure this using Hemoglobin, which is a molecule in the Red Blood Cells use to carry Oxygen around the body. When you came to the hospital, your Hemoglobin was 10.6. Please follow up with your PCP for further surveillance.        Diagnosis: Hypernatremia  Assessment and Plan of Treatment: Hypernatremia is when the blood has too much salt (sodium) in it and not enough water. Water and salt must be balanced in the blood. Drink enough fluid to keep your pee (urine) clear or pale yellow. Avoid salty processed foods, including canned, priyank, frozen, or boxed foods. Contact health care provider if you develop fever, chills, nausea, vomiting, diarrhea, confusion, weakness.  Please follow up with your primary care provider in 1 week to repeat a basic metabolic panel to monitor your sodium level.

## 2021-12-07 NOTE — DISCHARGE NOTE PROVIDER - HOSPITAL COURSE
ADMISSION H+P:    HPI:  Patient is a 89 yo Female with PMHx of Paroxysmal Atrial Fibrillation (on eliquis), HTN, Hypothyroidism, COPD (2-3 L nasal cannula at home) Hyperlipidemia, presenting with shortness of breath. Patient was admitted at [facility] for acute hypoxic respiratory failure secondary to COVID infection and was discharged home yesterday. History obtained from Emerald Martinez who is also the HCP over the phone at [phone]. Emerald informs that after coming home yesterday patient remained lethargic, claims that she did not sleep well overnight and this morning at the breakfast was confused and dyspneic. Emerald informs that patient was getting tired and fatigued because of difficulty breathing so decided to bring her back to the hospital.  Patient is short of breath at baseline but claims that she is more dyspneic and tiered. Denies any chest pain or palpitations. No nausea or vomiting,     ED course:   Vitals:   BP: 115/91  HR: 123  Temp:   RR: 18, O2: 95% on 4L NC   Labs:   CBC: WBC:13.52 Hb: 10.06 , Platlets: 344 Neutrophils: 82.3   CMP:  , K+ 4.9-, Cl111- , BUN/Creatinine: 61/1.30, Procal: 0.18, D-Dimer: 274, APTT: 27.1, PT: 13, INR: 1.12  CXR: bibasilar opacities, blunted CP angles on personal review  EKG: Atrial flutter with variable AV block, QT/QTc: 326/454,   Patient received: 1L NS x1, Cardizem 10mg IV x1, 15 mg IV X1 and Lasix 40mg IV x1 Duoneb X 1 (06 Dec 2021 21:25)      ---  HOSPITAL COURSE: Patient admitted to the general medical floor for symptoms consistent with COVID-19. Supplemental oxygen given as required to maintain adequate saturation. High flow O2, nebulizers or other interventions that would increase risk of aerosolization avoided. Prognostic lab value trended down during admission and patient's respiratory status was monitored closely. Patient was in AF with RVR on admission. Cardiology (Dr. Eastman) was consulted. Patient was started on Amiodarone. Troponin exhibited a slight upward trend, attributed to demand ischemia, in the setting of no acute changes on EKG. Patient likely had acute on chronic diastolic CHF likely from AF with RVR; respiratory status improved with BiPAP and diuresis.     Patient was medically optimized and improved clinically throughout hospital course. Patient seen and examined on day of discharge.    Vital Signs  T(C): --  T(F): --  HR: 98 (07 Dec 2021 05:45) (91 - 133)  BP: 181/99 (07 Dec 2021 02:00) (115/91 - 198/101)  RR: 25 (07 Dec 2021 02:00) (18 - 33)  SpO2: 99% (07 Dec 2021 05:45) (94% - 99%)    Physical Exam:  General: well-developed, well-nourished, NAD  HEENT: normocephalic, atraumatic, EOMI, moist mucous membranes   Neck: supple, non-tender, no masses  Neurology: AAOx3, sensation intact  Respiratory: clear to auscultation bilaterally; no wheezes, rhonchi, or rales  CV: regular rate and rhythm, soft S1/S2, no murmurs, rubs, or gallops  Abdominal: soft, non-tender, non-distended, bowel sounds present  Extremities: no clubbing, cyanosis, or edema; palpable peripheral pulses  Musculoskeletal: no joint erythema or warmth, no joint swelling   Skin: warm, dry, normal color    Patient is medically stable for discharge to ____ with outpatient follow up.  ---  CONSULTANTS:   Cardio: Dr. Eastman    ---  TIME SPENT:   I, the attending physician, was physically present for the key portions of the evaluation and management (E/M) service provided. The total amount of time spent reviewing the hospital course, laboratory values, imaging findings, assessing/counseling the patient, discussing with consultant physicians, social work, nursing staff was -- minutes.     ---  FINAL DISCHARGE DIAGNOSIS LIST:  Please see last daily progress note for final discharge diagnoses         ADMISSION H+P:    HPI:  Patient is a 89 yo Female with PMHx of Paroxysmal Atrial Fibrillation (on eliquis), HTN, Hypothyroidism, COPD (2-3 L nasal cannula at home) Hyperlipidemia, presenting with shortness of breath. Patient was admitted at [facility] for acute hypoxic respiratory failure secondary to COVID infection and was discharged home yesterday. History obtained from Emerald Martinez who is also the HCP over the phone at [phone]. Emerald informs that after coming home yesterday patient remained lethargic, claims that she did not sleep well overnight and this morning at the breakfast was confused and dyspneic. Emerald informs that patient was getting tired and fatigued because of difficulty breathing so decided to bring her back to the hospital.  Patient is short of breath at baseline but claims that she is more dyspneic and tiered. Denies any chest pain or palpitations. No nausea or vomiting,     ED course:   Vitals:   BP: 115/91  HR: 123  Temp:   RR: 18, O2: 95% on 4L NC   Labs:   CBC: WBC:13.52 Hb: 10.06 , Platlets: 344 Neutrophils: 82.3   CMP:  , K+ 4.9-, Cl111- , BUN/Creatinine: 61/1.30, Procal: 0.18, D-Dimer: 274, APTT: 27.1, PT: 13, INR: 1.12  CXR: bibasilar opacities, blunted CP angles on personal review  EKG: Atrial flutter with variable AV block, QT/QTc: 326/454,   Patient received: 1L NS x1, Cardizem 10mg IV x1, 15 mg IV X1 and Lasix 40mg IV x1 Duoneb X 1 (06 Dec 2021 21:25)      ---  HOSPITAL COURSE: Patient admitted to the general medical floor for symptoms consistent with COVID-19. Given 1 dose of Tocalizumab on 12/7/ Supplemental oxygen given as required to maintain adequate saturation. High flow O2, nebulizers or other interventions that would increase risk of aerosolization avoided. Prognostic lab value trended down during admission and patient's respiratory status was monitored closely. Patient was in AF with RVR on admission. Cardiology (Dr. Eastman) was consulted. Patient was started on Amiodarone and Cardizem infusion. Troponin exhibited a slight upward trend, attributed to demand ischemia, in the setting of no acute changes on EKG. Patient likely had acute on chronic diastolic CHF likely from AF with RVR. HR stabilized and patient transitioned from Cardizem infusion to PO cardizem. Respiratory status improved with BiPAP and diuresis. Patient completed course of Flagyl, Vantin, and Decadron and was weaned off BiPAP to NC with good oxygen saturation and no respiratory distress.     Patient was medically optimized and improved clinically throughout hospital course. Patient seen and examined on day of discharge.    T(C): 36.7 (12-13-21 @ 04:37), Max: 36.9 (12-12-21 @ 21:00)  HR: 85 (12-13-21 @ 09:57) (73 - 85)  BP: 172/77 (12-13-21 @ 04:37) (127/78 - 172/77)  RR: 17 (12-13-21 @ 04:37) (16 - 17)  SpO2: 94% (12-13-21 @ 09:57) (94% - 97%)    Physical Exam:  General: well-developed, well-nourished, NAD  HEENT: normocephalic, atraumatic, EOMI, moist mucous membranes   Neck: supple, non-tender, no masses  Neurology: AAOx3, sensation intact  Respiratory: mildly decreased bs; no wheezes, rhonchi, or rales, good inspiratory effort, no respiratory distress   CV: regular rate and rhythm, soft S1/S2, no murmurs, rubs, or gallops  Abdominal: soft, non-tender, non-distended, bowel sounds present  Extremities: no clubbing, cyanosis, b/l LE edema 2+ pitting, chronic appearing  Musculoskeletal: no joint erythema or warmth, no joint swelling   Skin: warm, dry, normal color    Patient is medically stable for discharge to rehab with outpatient follow up.  ---  CONSULTANTS:   Cardio: Dr. Eastman  Pulmonology: Dr. Anderson     ---  TIME SPENT:   I, the attending physician, was physically present for the key portions of the evaluation and management (E/M) service provided. The total amount of time spent reviewing the hospital course, laboratory values, imaging findings, assessing/counseling the patient, discussing with consultant physicians, social work, nursing staff was -- minutes.     ---  FINAL DISCHARGE DIAGNOSIS LIST:  Please see last daily progress note for final discharge diagnoses         ADMISSION H+P:    HPI:  Patient is a 89 yo Female with PMHx of Paroxysmal Atrial Fibrillation (on eliquis), HTN, Hypothyroidism, COPD (2-3 L nasal cannula at home) Hyperlipidemia, presenting with shortness of breath. Patient was admitted [facility] for acute hypoxic respiratory failure secondary to COVID infection and was discharged home yesterday. History obtained from Emerald Martinez who is also the HCP over the phone at [phone]. Emerald informs that after coming home yesterday patient remained lethargic, claims that she did not sleep well overnight and this morning at the breakfast was confused and dyspneic. Emerald informs that patient was getting tired and fatigued because of difficulty breathing so decided to bring her back to the hospital.  Patient is short of breath at baseline but claims that she is more dyspneic and tiered. Denies any chest pain or palpitations. No nausea or vomiting,     ---  HOSPITAL COURSE:   Patient admitted to the general medical floor for symptoms consistent with COVID-19. Given 1 dose of Tocilizumab on 12/7/21 Supplemental oxygen given as required to maintain adequate saturation. High flow O2, nebulizers or other interventions that would increase risk of aerosolization avoided. Inflammatory markers trended down during admission and patient's respiratory status was monitored closely. Patient was in AF with RVR on admission. Cardiology (Dr. Eastman) was consulted. Patient was started on Amiodarone and Cardizem infusion. Troponin exhibited a slight upward trend, attributed to demand ischemia, in the setting of no acute changes on EKG. Patient likely had acute on chronic diastolic CHF likely from AF with RVR. HR stabilized and patient transitioned from Cardizem infusion to PO cardizem. She was loaded w/ amio as well, will continue after dc. Respiratory status improved with BiPAP and diuresis. Patient completed course of Flagyl, Vantin, and Decadron and was weaned off BiPAP to NC with good oxygen saturation and no respiratory distress. No further steroids or antimicrobials recommended at this time. Medicall stable for dc    T(C): 36.7 (12-13-21 @ 04:37), Max: 36.9 (12-12-21 @ 21:00)  HR: 85 (12-13-21 @ 09:57) (73 - 85)  BP: 172/77 (12-13-21 @ 04:37) (127/78 - 172/77)  RR: 17 (12-13-21 @ 04:37) (16 - 17)  SpO2: 94% (12-13-21 @ 09:57) (94% - 97%)    Physical Exam:  General: nontoxic, comfortable, sitting in chair  HEENT: normocephalic, atraumatic, EOMI, moist mucous membranes   Neck: supple, non-tender, no masses  Neurology: AAOx3, sensation intact  Respiratory: mildly decreased bs; no wheezes, rhonchi, or rales, good inspiratory effort, no respiratory distress   CV: regular rate and rhythm, soft S1/S2, no murmurs, rubs, or gallops  Abdominal: soft, non-tender, non-distended, bowel sounds present  Extremities: no clubbing, cyanosis, b/l LE edema 2+ pitting, chronic appearing  Musculoskeletal: no joint erythema or warmth, no joint swelling      Patient is medically stable for discharge to rehab with outpatient follow up.  ---  CONSULTANTS:   Cardio: Dr. Eastman  Pulmonology: Dr. Anderson     ---  TIME SPENT:   I, the attending physician, was physically present for the key portions of the evaluation and management (E/M) service provided. The total amount of time spent reviewing the hospital course, laboratory values, imaging findings, assessing/counseling the patient, discussing with consultant physicians, social work, nursing staff was 42 minutes.     ---  FINAL DISCHARGE DIAGNOSIS LIST:  Please see last daily progress note for final discharge diagnoses    [facility] office made aware of plan for d/c home today and plan to transfer to [facility]

## 2021-12-07 NOTE — PROGRESS NOTE ADULT - PROBLEM SELECTOR PLAN 3
- Mild evidence of volume overload on admission with LE edema   - Received IVF and became acutely worse  - S/P Lasix 40 mg IV x1 in the ED   - Strict I&Os & daily weights   - Lasix 40mg IVP today @1600  - TTE 12/03: systolic function with an ejection fraction of approximately 60%. There is diastolic dysfunction. There is mild concentric left ventricular hypertrophy.  - Cardiology consulted Dr. Leavitt, appreciate recs  - c/w BB with hold parameters.

## 2021-12-07 NOTE — PROGRESS NOTE ADULT - ASSESSMENT
89 yo Female with PMHx of Paroxysmal Atrial Fibrillation, HTN, Hypothyroidism, COPD (2-3 L nasal cannula at home) Hyperlipidemia, presented to the ED with shortness of breath and was found to have A.Fib with RVR, is being admitted for Afib with RVR and acute hypoxic respiratory failure requiring NIPPV.

## 2021-12-07 NOTE — DISCHARGE NOTE PROVIDER - PROVIDER TOKENS
PROVIDER:[TOKEN:[[id number]]] PROVIDER:[TOKEN:[[id number]]],PROVIDER:[TOKEN:[[id number]],FOLLOWUP:[1 week]] PROVIDER:[TOKEN:[[id number]],FOLLOWUP:[1 week]],PROVIDER:[TOKEN:[[id number]],FOLLOWUP:[1 week]],PROVIDER:[TOKEN:[[id number]],FOLLOWUP:[1 week]]

## 2021-12-07 NOTE — DISCHARGE NOTE PROVIDER - CARE PROVIDERS DIRECT ADDRESSES
[email or web address] ,[email or web address],[email or web address],[email or web address]

## 2021-12-07 NOTE — DISCHARGE NOTE PROVIDER - NSDCMRMEDTOKEN_GEN_ALL_CORE_FT
albuterol 2.5 mg/3 mL (0.083%) inhalation solution: 3 milliliter(s) inhaled every 6 hours, As Needed  Align 4 mg oral capsule: 1 cap(s) orally once a day  allopurinol 100 mg oral tablet: 1 tab(s) orally once a day  apixaban 2.5 mg oral tablet: 1 tab(s) orally every 12 hours  aspirin 81 mg oral tablet: 1 tab(s) orally once a day  benzonatate 100 mg oral capsule: 1 cap(s) orally 3 times a day, As needed, Cough  budesonide-formoterol 160 mcg-4.5 mcg/inh inhalation aerosol: 2 puff(s) inhaled 2 times a day  carvedilol 6.25 mg oral tablet: 1 tab(s) orally 2 times a day  cefpodoxime 200 mg oral tablet: 1 tab(s) orally 2 times a day   dexamethasone 6 mg oral tablet: 1 tab(s) orally once a day   DilTIAZem (Eqv-Cardizem CD) 240 mg/24 hours oral capsule, extended release: 1 cap(s) orally once a day  Effexor 37.5 mg oral capsule, extended release: 1 cap(s) orally once a day  Flagyl 500 mg oral tablet: 1 tab(s) orally 3 times a day   hydrALAZINE 25 mg oral tablet: 1 tab(s) orally 3 times a day   Multi-Day Plus Minerals Multiple Vitamins with Minerals oral tablet: 1 tab(s) orally once a day  Synthroid 75 mcg (0.075 mg) oral tablet: 1 tab(s) orally once a day  tiotropium 18 mcg inhalation capsule: 1 cap(s) inhaled once a day   albuterol 2.5 mg/3 mL (0.083%) inhalation solution: 3 milliliter(s) inhaled every 6 hours, As Needed  Align 4 mg oral capsule: 1 cap(s) orally once a day  allopurinol 100 mg oral tablet: 1 tab(s) orally once a day  apixaban 2.5 mg oral tablet: 1 tab(s) orally every 12 hours  aspirin 81 mg oral tablet: 1 tab(s) orally once a day  benzonatate 100 mg oral capsule: 1 cap(s) orally 3 times a day, As needed, Cough  budesonide-formoterol 160 mcg-4.5 mcg/inh inhalation aerosol: 2 puff(s) inhaled 2 times a day  carvedilol 6.25 mg oral tablet: 1 tab(s) orally 2 times a day  DilTIAZem (Eqv-Cardizem CD) 240 mg/24 hours oral capsule, extended release: 1 cap(s) orally once a day  Effexor 37.5 mg oral capsule, extended release: 1 cap(s) orally once a day  hydrALAZINE 25 mg oral tablet: 1 tab(s) orally 3 times a day   Multi-Day Plus Minerals Multiple Vitamins with Minerals oral tablet: 1 tab(s) orally once a day  Pacerone 200 mg oral tablet: 1 tab(s) orally once a day  polyethylene glycol 3350 oral powder for reconstitution: 17 gram(s) orally once a day, As needed, Constipation  senna oral tablet: 2 tab(s) orally once a day (at bedtime)  Synthroid 75 mcg (0.075 mg) oral tablet: 1 tab(s) orally once a day  tiotropium 18 mcg inhalation capsule: 1 cap(s) inhaled once a day

## 2021-12-08 LAB
ALBUMIN SERPL ELPH-MCNC: 2.5 G/DL — LOW (ref 3.3–5)
ALP SERPL-CCNC: 53 U/L — SIGNIFICANT CHANGE UP (ref 40–120)
ALT FLD-CCNC: 24 U/L — SIGNIFICANT CHANGE UP (ref 12–78)
ANION GAP SERPL CALC-SCNC: 2 MMOL/L — LOW (ref 5–17)
AST SERPL-CCNC: 19 U/L — SIGNIFICANT CHANGE UP (ref 15–37)
BASOPHILS # BLD AUTO: 0 K/UL — SIGNIFICANT CHANGE UP (ref 0–0.2)
BASOPHILS NFR BLD AUTO: 0 % — SIGNIFICANT CHANGE UP (ref 0–2)
BILIRUB SERPL-MCNC: 0.4 MG/DL — SIGNIFICANT CHANGE UP (ref 0.2–1.2)
BUN SERPL-MCNC: 61 MG/DL — HIGH (ref 7–23)
CALCIUM SERPL-MCNC: 9.2 MG/DL — SIGNIFICANT CHANGE UP (ref 8.5–10.1)
CHLORIDE SERPL-SCNC: 105 MMOL/L — SIGNIFICANT CHANGE UP (ref 96–108)
CO2 SERPL-SCNC: 38 MMOL/L — HIGH (ref 22–31)
CREAT SERPL-MCNC: 1.3 MG/DL — SIGNIFICANT CHANGE UP (ref 0.5–1.3)
EOSINOPHIL # BLD AUTO: 0 K/UL — SIGNIFICANT CHANGE UP (ref 0–0.5)
EOSINOPHIL NFR BLD AUTO: 0 % — SIGNIFICANT CHANGE UP (ref 0–6)
GLUCOSE SERPL-MCNC: 143 MG/DL — HIGH (ref 70–99)
HCT VFR BLD CALC: 31 % — LOW (ref 34.5–45)
HGB BLD-MCNC: 9.9 G/DL — LOW (ref 11.5–15.5)
LYMPHOCYTES # BLD AUTO: 0.33 K/UL — LOW (ref 1–3.3)
LYMPHOCYTES # BLD AUTO: 4 % — LOW (ref 13–44)
MCHC RBC-ENTMCNC: 31.5 PG — SIGNIFICANT CHANGE UP (ref 27–34)
MCHC RBC-ENTMCNC: 31.9 GM/DL — LOW (ref 32–36)
MCV RBC AUTO: 98.7 FL — SIGNIFICANT CHANGE UP (ref 80–100)
MONOCYTES # BLD AUTO: 0.41 K/UL — SIGNIFICANT CHANGE UP (ref 0–0.9)
MONOCYTES NFR BLD AUTO: 5 % — SIGNIFICANT CHANGE UP (ref 2–14)
NEUTROPHILS # BLD AUTO: 7.18 K/UL — SIGNIFICANT CHANGE UP (ref 1.8–7.4)
NEUTROPHILS NFR BLD AUTO: 87 % — HIGH (ref 43–77)
NRBC # BLD: SIGNIFICANT CHANGE UP /100 WBCS (ref 0–0)
PLATELET # BLD AUTO: 307 K/UL — SIGNIFICANT CHANGE UP (ref 150–400)
POTASSIUM SERPL-MCNC: 5 MMOL/L — SIGNIFICANT CHANGE UP (ref 3.5–5.3)
POTASSIUM SERPL-SCNC: 5 MMOL/L — SIGNIFICANT CHANGE UP (ref 3.5–5.3)
PROT SERPL-MCNC: 5.6 G/DL — LOW (ref 6–8.3)
RBC # BLD: 3.14 M/UL — LOW (ref 3.8–5.2)
RBC # FLD: 14.9 % — HIGH (ref 10.3–14.5)
SODIUM SERPL-SCNC: 145 MMOL/L — SIGNIFICANT CHANGE UP (ref 135–145)
WBC # BLD: 8.16 K/UL — SIGNIFICANT CHANGE UP (ref 3.8–10.5)
WBC # FLD AUTO: 8.16 K/UL — SIGNIFICANT CHANGE UP (ref 3.8–10.5)

## 2021-12-08 PROCEDURE — 71045 X-RAY EXAM CHEST 1 VIEW: CPT | Mod: 26

## 2021-12-08 PROCEDURE — 99232 SBSQ HOSP IP/OBS MODERATE 35: CPT

## 2021-12-08 PROCEDURE — 99233 SBSQ HOSP IP/OBS HIGH 50: CPT | Mod: GC

## 2021-12-08 RX ORDER — DILTIAZEM HCL 120 MG
15 CAPSULE, EXT RELEASE 24 HR ORAL
Qty: 125 | Refills: 0 | Status: DISCONTINUED | OUTPATIENT
Start: 2021-12-08 | End: 2021-12-08

## 2021-12-08 RX ORDER — DILTIAZEM HCL 120 MG
15 CAPSULE, EXT RELEASE 24 HR ORAL
Qty: 125 | Refills: 0 | Status: DISCONTINUED | OUTPATIENT
Start: 2021-12-08 | End: 2021-12-09

## 2021-12-08 RX ADMIN — APIXABAN 2.5 MILLIGRAM(S): 2.5 TABLET, FILM COATED ORAL at 05:47

## 2021-12-08 RX ADMIN — Medication 100 MILLIGRAM(S): at 11:25

## 2021-12-08 RX ADMIN — Medication 240 MILLIGRAM(S): at 05:46

## 2021-12-08 RX ADMIN — Medication 81 MILLIGRAM(S): at 11:26

## 2021-12-08 RX ADMIN — Medication 37.5 MILLIGRAM(S): at 11:25

## 2021-12-08 RX ADMIN — Medication 500 MILLIGRAM(S): at 21:36

## 2021-12-08 RX ADMIN — Medication 200 MILLIGRAM(S): at 05:46

## 2021-12-08 RX ADMIN — BUDESONIDE AND FORMOTEROL FUMARATE DIHYDRATE 2 PUFF(S): 160; 4.5 AEROSOL RESPIRATORY (INHALATION) at 09:45

## 2021-12-08 RX ADMIN — AMIODARONE HYDROCHLORIDE 400 MILLIGRAM(S): 400 TABLET ORAL at 00:13

## 2021-12-08 RX ADMIN — TIOTROPIUM BROMIDE 1 CAPSULE(S): 18 CAPSULE ORAL; RESPIRATORY (INHALATION) at 09:45

## 2021-12-08 RX ADMIN — Medication 6 MILLIGRAM(S): at 05:47

## 2021-12-08 RX ADMIN — CARVEDILOL PHOSPHATE 6.25 MILLIGRAM(S): 80 CAPSULE, EXTENDED RELEASE ORAL at 17:16

## 2021-12-08 RX ADMIN — AMIODARONE HYDROCHLORIDE 400 MILLIGRAM(S): 400 TABLET ORAL at 13:00

## 2021-12-08 RX ADMIN — AMIODARONE HYDROCHLORIDE 400 MILLIGRAM(S): 400 TABLET ORAL at 05:46

## 2021-12-08 RX ADMIN — Medication 15 MG/HR: at 11:18

## 2021-12-08 RX ADMIN — CARVEDILOL PHOSPHATE 6.25 MILLIGRAM(S): 80 CAPSULE, EXTENDED RELEASE ORAL at 05:47

## 2021-12-08 RX ADMIN — Medication 500 MILLIGRAM(S): at 00:14

## 2021-12-08 RX ADMIN — BUDESONIDE AND FORMOTEROL FUMARATE DIHYDRATE 2 PUFF(S): 160; 4.5 AEROSOL RESPIRATORY (INHALATION) at 17:17

## 2021-12-08 RX ADMIN — Medication 15 MG/HR: at 22:00

## 2021-12-08 RX ADMIN — APIXABAN 2.5 MILLIGRAM(S): 2.5 TABLET, FILM COATED ORAL at 17:16

## 2021-12-08 RX ADMIN — Medication 500 MILLIGRAM(S): at 05:46

## 2021-12-08 RX ADMIN — AMIODARONE HYDROCHLORIDE 400 MILLIGRAM(S): 400 TABLET ORAL at 21:36

## 2021-12-08 RX ADMIN — Medication 200 MILLIGRAM(S): at 17:16

## 2021-12-08 RX ADMIN — Medication 500 MILLIGRAM(S): at 13:00

## 2021-12-08 NOTE — PROGRESS NOTE ADULT - PROBLEM SELECTOR PLAN 4
Admission labs show elevated WBC's 13.5  - WBC now normalized  - Patient remains afebrile  - Leukocytosis likely reactive - monitor for fevers  - continue to trend with daily CBC

## 2021-12-08 NOTE — PROGRESS NOTE ADULT - SUBJECTIVE AND OBJECTIVE BOX
PULMONARY/CRITICAL CARE    Dos 12/8/21  Less sob.   On HFNO  HR better--on Amio.   Pt readmitted for sob--now on bipap. Has rapid atrial fib on cardizem    Patient is a 89y old  Female who presents with a chief complaint of SOB (30 Nov 2021 19:49)  Has COVID pneumonia, rapid atrial fib.    BRIEF HOSPITAL COURSE: ***89 yo F PMHx Paroxysmal Atrial Fibrillation, HTN, Hypothyroidism, COPD (2-3 L nasal cannula at home) Hyperlipidemia, presenting with shortness of breath and LE edema. Patient is short of breath at baseline but has been having difficulty standing up and walking a few steps for the last week. Patient lives with her son and his family, who hosted thanksgiving dinner. 1 guest at the dinner tested positive for COVID. Son bought an at home rapid test. He was positive, and patient was also positive. Her grandchildren and his wife were negative. Patient admits to a dry cough that started Monday and progressively worsening LE edema in the last few days. She is on bumetanide 1 mg daily for water retention denies any changes to dose or missed doses.     She does admit to a high salt diet and does not really watch what she eats.   She denies orthopnea, fever, chills, history of heart failure or CAD.   Son at bedside admits that she has a history of "mini stroke" in the past however, patient adamantly denies.     On Cardizem for Atrial fib.    Events last 24 hours: ***    PAST MEDICAL & SURGICAL HISTORY:  Hypertension    Depression    Overactive bladder    Gout    Osteoarthritis    Hypothyroid    HLD (hyperlipidemia)    Paroxysmal atrial fibrillation    Asthma    S/P cholecystectomy    S/P lumpectomy of breast    S/P hysterectomy    S/P lung surgery, follow-up exam  s/p removal of suspicious lesion, negative      Allergies    Tomas Cheese - Pt states the one time she had a mouthful of tomas cheese, her throat was closing and she required an epinephrine injection. (Anaphylaxis)  iodine (Anaphylaxis)  penicillin (Other)  shellfish (Short breath; Hives)  sulfa drugs (Hives)    Intolerances      FAMILY HISTORY social--no recent cigs, etoh.   Family history of nasopharyngeal cancer (Child)  daughter    Family history of breast cancer (Mother)          Medications:  remdesivir  IVPB   IV Intermittent   remdesivir  IVPB 100 milliGRAM(s) IV Intermittent every 24 hours    carvedilol 6.25 milliGRAM(s) Oral every 12 hours  furosemide   Injectable 40 milliGRAM(s) IV Push every 12 hours  hydrALAZINE 100 milliGRAM(s) Oral every 8 hours  losartan 100 milliGRAM(s) Oral daily    albuterol/ipratropium for Nebulization 3 milliLiter(s) Nebulizer every 6 hours PRN  albuterol/ipratropium for Nebulization. 3 milliLiter(s) Nebulizer once  benzonatate 100 milliGRAM(s) Oral three times a day PRN  budesonide 160 MICROgram(s)/formoterol 4.5 MICROgram(s) Inhaler 2 Puff(s) Inhalation two times a day  tiotropium 18 MICROgram(s) Capsule 1 Capsule(s) Inhalation daily    acetaminophen     Tablet .. 650 milliGRAM(s) Oral every 6 hours PRN  melatonin 3 milliGRAM(s) Oral at bedtime PRN  venlafaxine XR. 37.5 milliGRAM(s) Oral daily      apixaban 2.5 milliGRAM(s) Oral every 12 hours  aspirin enteric coated 81 milliGRAM(s) Oral daily    famotidine    Tablet 20 milliGRAM(s) Oral daily      allopurinol 100 milliGRAM(s) Oral daily  dexAMETHasone  Injectable 6 milliGRAM(s) IV Push daily  levothyroxine 75 MICROGram(s) Oral daily                  ICU Vital Signs Last 24 Hrs  T(C): 36.8 (01 Dec 2021 07:15), Max: 38.2 (30 Nov 2021 22:45)  T(F): 98.2 (01 Dec 2021 07:15), Max: 100.7 (30 Nov 2021 22:45)  HR: 50 (01 Dec 2021 07:15) (11 - 147)  BP: 114/54 (01 Dec 2021 07:15) (81/36 - 200/88)  BP(mean): --  ABP: --  ABP(mean): --  RR: 18 (01 Dec 2021 07:15) (17 - 181)  SpO2: 98% (01 Dec 2021 07:15) (77% - 100%)    Vital Signs Last 24 Hrs  T(C): 36.8 (01 Dec 2021 07:15), Max: 38.2 (30 Nov 2021 22:45)  T(F): 98.2 (01 Dec 2021 07:15), Max: 100.7 (30 Nov 2021 22:45)  HR: 50 (01 Dec 2021 07:15) (11 - 147)  BP: 114/54 (01 Dec 2021 07:15) (81/36 - 200/88)  BP(mean): --  RR: 18 (01 Dec 2021 07:15) (17 - 181)  SpO2: 98% (01 Dec 2021 07:15) (77% - 100%)        I&O's Detail        LABS:                        10.8   8.70  )-----------( 198      ( 01 Dec 2021 05:15 )             34.1     12-01    144  |  104  |  33<H>  ----------------------------<  139<H>  4.2   |  34<H>  |  1.50<H>    Ca    8.6      01 Dec 2021 05:15  Mg     2.4     11-30    TPro  6.9  /  Alb  2.8<L>  /  TBili  0.3  /  DBili  x   /  AST  36  /  ALT  32  /  AlkPhos  69  12-01      CARDIAC MARKERS ( 30 Nov 2021 17:27 )  x     / x     / x     / x     / <1.0 ng/mL      CAPILLARY BLOOD GLUCOSE        PT/INR - ( 30 Nov 2021 17:27 )   PT: 12.9 sec;   INR: 1.11 ratio         PTT - ( 30 Nov 2021 17:27 )  PTT:29.3 sec    CULTURES:      Physical Examination:    General: no   acute distress.  elderly female     HEENT: Pupils equal, reactive to light.  Symmetric.    PULM: crackles 1/3 up bilat;  few rhonchi,wheezes bilat; No change percussion    CVS: irregular rate and rhythm, no murmurs, rubs, or gallops    ABD: Soft, nondistended, nontender, normoactive bowel sounds, no masses    EXT: No edema, nontender calves    SKIN: Warm and well perfused, no rashes noted.    NEURO: Alert, oriented, interactive, nonfocal    RADIOLOGY: ***  < from: CT Chest No Cont (12.01.21 @ 02:32) >  EXAM:  CT CHEST                            PROCEDURE DATE:  12/01/2021          INTERPRETATION:  CLINICAL INFORMATION: Shortness of breath, COPD, Covid.    COMPARISON: 11/4/2018    CONTRAST/COMPLICATIONS:  IV Contrast: NONE  Oral Contrast: NONE  Complications: None reported at time of study completion    PROCEDURE:  CT of the Chest was performed.  Sagittal and coronal reformats were performed.    FINDINGS:  Evaluation of solid organs and vascular structures is limited without intravenous contrast.    LUNGS AND AIRWAYS: Patent central airways. Right upper lobe wedge resection. 2 mm right upper lobe nodule (2, 37). Stable 3 mm right middle lobe nodule (2, 80). Stable 6 mm right lower lobe nodule (2, 74). Stable 5 mm nodule along the right major fissure likely reflecting intrapulmonary node (2, 81). Linear scarring/atelectasis in the right lower lobe. Subsegmental lingular and left lower lobe atelectasis. Patchy opacities in the left lower lobe.  PLEURA: No pleural effusion.  MEDIASTINUM AND FIDEL: 1 cm precarinal node. Subcentimeter other mediastinal nodes.  VESSELS: Aortic and coronary atherosclerosis.  HEART: Mild cardiomegaly. No pericardial effusion.  CHEST WALL AND LOWER NECK: Stable 9 mm hypodense right thyroid nodule.  VISUALIZED UPPER ABDOMEN: Cholecystectomy. Multiple cystic lesions within the pancreas, grossly stable, likely IPMNs or pseudocysts. Bilateral renal cysts and hyperdense lesions likely reflecting hemorrhagic/proteinaceous cyst. Stable appearance of right renalcyst containing internal clustered calcifications (2, 37).  BONES: Degenerative changes. Multiple chronic right rib deformities. Stable mild compression deformity of L2.    IMPRESSION:  Left lower lobe pneumonia. Continued follow up to resolution is recommended.        --- End of Report ---            VENU ZHANG MD; Attending Radiologist  This document has been electronically signed. Dec  1 2021  3:26AM    < end of copied text >  < from: US Duplex Venous Lower Ext Complete, Bilateral (12.01.21 @ 02:08) >  EXAM:  US DPLX LWR EXT VEINS COMPL BI                            PROCEDURE DATE:  12/01/2021          INTERPRETATION:  CLINICAL INDICATION: le edmea sob hypoxia.    TECHNIQUE: Grayscale, color Doppler and spectral Doppler ultrasound was utilized toevaluate bilateral lower extremity deep venous system.    COMPARISON: 9/14/2015.    FINDINGS: There is no thrombus in bilateral common femoral veins, femoral veins or popliteal veins. Visualized bilateral posterior tibial veins and peroneal veins arepatent. Bilateral gastrocnemius veins and soleal veins were not visualized.    IMPRESSION:    Bilateral gastrocnemius veins and soleal veins were not visualized, limiting complete evaluation. No obvious thrombus in the visualized bilateral lower extremity deep veins.    --- End of Report ---        < end of copied text >  ra< from: Xray Chest 1 View- PORTABLE-Routine (Xray Chest 1 View- PORTABLE-Routine in AM.) (12.02.21 @ 09:12) >  EXAM:  XR CHEST PORTABLE ROUTINE 1V                            PROCEDURE DATE:  12/02/2021          INTERPRETATION:  Follow-up. AP chest.    Prior 11/30/2021.    IMPRESSION: No change heart mediastinum. Suspect evolving small patchy infiltrate at the left base. No change small left pleural effusion. New small right pleural effusion.    --- End of Report ---            GARRISON NAIK MD; Attending Radiologist  This document has been electronically signed. De    < end of copied text >  < from: Xray Chest 1 View- PORTABLE-Routine (Xray Chest 1 View- PORTABLE-Routine in AM.) (12.02.21 @ 09:12) >  EXAM:  XR CHEST PORTABLE ROUTINE 1V                            PROCEDURE DATE:  12/02/2021          INTERPRETATION:  Follow-up. AP chest.    Prior 11/30/2021.    IMPRESSION: No change heart mediastinum. Suspect evolving small patchy infiltrate at the left base. No change small left pleural effusion. New small right pleural effusion.    --- End of Report ---            GARRISON NAIK MD; Attending Radiologist  This document has been electronically signed. De    < end of copied text >  CXR chf

## 2021-12-08 NOTE — PROGRESS NOTE ADULT - ASSESSMENT
Pt. with acute respiratory failure due to probable CHF.  Improved today.   Recent Covid, COPD exac.  Cardio note apprec   Rapid at fib.  Hi troponin.  Wean off HFNO  FU with me in office    Fu CXR as outpt.

## 2021-12-08 NOTE — PROGRESS NOTE ADULT - SUBJECTIVE AND OBJECTIVE BOX
[facility] Cardiology Progress Note [phone] (Dr. Eastman, Dagmar, Adela, Luly)    CHIEF COMPLAINT: Patient is a 89y old  Female who presents with a chief complaint of Acute Hypoxic respiratory failure and AF with RVR (07 Dec 2021 15:26)      Follow Up Today: The patient denies any chest discomfort or shortness of breath.    HPI:  Patient is a 89 yo Female with PMHx of Paroxysmal Atrial Fibrillation (on eliquis), HTN, Hypothyroidism, COPD (2-3 L nasal cannula at home) Hyperlipidemia, presenting with shortness of breath. Patient was admitted at [facility] for acute hypoxic respiratory failure secondary to COVID infection and was discharged home yesterday. History obtained from Emerald Martinez who is also the HCP over the phone at [phone]. Emerald informs that after coming home yesterday patient remained lethargic, claims that she did not sleep well overnight and this morning at the breakfast was confused and dyspneic. Emerald informs that patient was getting tired and fatigued because of difficulty breathing so decided to bring her back to the hospital.  Patient is short of breath at baseline but claims that she is more dyspneic and tiered. Denies any chest pain or palpitations. No nausea or vomiting,     ED course:   Vitals:   BP: 115/91  HR: 123  Temp:   RR: 18, O2: 95% on 4L NC   Labs:   CBC: WBC:13.52 Hb: 10.06 , Platlets: 344 Neutrophils: 82.3   CMP:  , K+ 4.9-, Cl111- , BUN/Creatinine: 61/1.30, Procal: 0.18, D-Dimer: 274, APTT: 27.1, PT: 13, INR: 1.12  CXR: bibasilar opacities, blunted CP angles on personal review  EKG: Atrial flutter with variable AV block, QT/QTc: 326/454,   Patient received: 1L NS x1, Cardizem 10mg IV x1, 15 mg IV X1 and Lasix 40mg IV x1 Duoneb X 1 (06 Dec 2021 21:25)      PAST MEDICAL & SURGICAL HISTORY:  Hypertension    Depression    Overactive bladder    Gout    Osteoarthritis    Hypothyroid    HLD (hyperlipidemia)    Paroxysmal atrial fibrillation    Asthma    S/P cholecystectomy    S/P lumpectomy of breast    S/P hysterectomy    S/P lung surgery, follow-up exam  s/p removal of suspicious lesion, negative        MEDICATIONS  (STANDING):  allopurinol 100 milliGRAM(s) Oral daily  aMIOdarone    Tablet   Oral   aMIOdarone    Tablet 400 milliGRAM(s) Oral every 8 hours  apixaban 2.5 milliGRAM(s) Oral two times a day  aspirin  chewable 81 milliGRAM(s) Oral daily  budesonide 160 MICROgram(s)/formoterol 4.5 MICROgram(s) Inhaler 2 Puff(s) Inhalation two times a day  carvedilol 6.25 milliGRAM(s) Oral every 12 hours  cefpodoxime 200 milliGRAM(s) Oral every 12 hours  dexAMETHasone     Tablet 6 milliGRAM(s) Oral daily  diltiazem    milliGRAM(s) Oral daily  metroNIDAZOLE    Tablet 500 milliGRAM(s) Oral every 8 hours  tiotropium 18 MICROgram(s) Capsule 1 Capsule(s) Inhalation daily  venlafaxine XR. 37.5 milliGRAM(s) Oral daily    MEDICATIONS  (PRN):  ALBUTerol    90 MICROgram(s) HFA Inhaler 1 Puff(s) Inhalation every 4 hours PRN Shortness of Breath and/or Wheezing  benzonatate 100 milliGRAM(s) Oral three times a day PRN Cough      Allergies    Tomas Cheese - Pt states the one time she had a mouthful of tomas cheese, her throat was closing and she required an epinephrine injection. (Anaphylaxis)  iodine (Anaphylaxis)  penicillin (Other)  shellfish (Short breath; Hives)  sulfa drugs (Hives)    Intolerances        REVIEW OF SYSTEMS:    All other review of systems is negative unless indicated above    Vital Signs Last 24 Hrs  T(C): 36.4 (08 Dec 2021 04:31), Max: 36.7 (07 Dec 2021 17:44)  T(F): 97.5 (08 Dec 2021 04:31), Max: 98 (07 Dec 2021 17:44)  HR: 80 (08 Dec 2021 04:31) (69 - 110)  BP: 165/84 (08 Dec 2021 04:31) (160/80 - 179/103)  BP(mean): --  RR: 19 (08 Dec 2021 04:31) (18 - 20)  SpO2: 98% (08 Dec 2021 04:31) (95% - 100%)    I&O's Summary    07 Dec 2021 07:01  -  08 Dec 2021 07:00  --------------------------------------------------------  IN: 0 mL / OUT: 1100 mL / NET: -1100 mL        PHYSICAL EXAM:    Constitutional: NAD, awake and alert, well-developed  Eyes:  EOMI,  Pupils round, No oral cyanosis.  HEENT: No exudate or erythema  Pulmonary: Non-labored, breath sounds are clear bilaterally, No wheezing, rales or rhonchi  Cardiovascular: Regular, S1 and S2, No murmurs, rubs, gallops oir clicks  Gastrointestinal: Bowel Sounds present, soft, nontender.   Ext: No significant LE edema with good pulses x 4  Neurological: Alert, no gross focal motor deficits  Skin: No rashes.  Psych:  Mood & affect appropriate    LABS: All Labs Reviewed:                        10.0   13.57 )-----------( 303      ( 07 Dec 2021 14:04 )             32.5                         9.5    11.07 )-----------( 290      ( 07 Dec 2021 07:08 )             30.5                         10.6   13.52 )-----------( 344      ( 06 Dec 2021 18:23 )             33.4     07 Dec 2021 14:04    146    |  108    |  57     ----------------------------<  146    5.2     |  37     |  1.20   07 Dec 2021 07:08    148    |  111    |  56     ----------------------------<  113    4.3     |  35     |  1.10   06 Dec 2021 18:23    147    |  111    |  61     ----------------------------<  136    4.9     |  34     |  1.30     Ca    9.3        07 Dec 2021 14:04  Ca    8.8        07 Dec 2021 07:08  Ca    9.4        06 Dec 2021 18:23  Phos  2.6       07 Dec 2021 07:08  Mg     2.4       07 Dec 2021 07:08    TPro  5.7    /  Alb  2.3    /  TBili  0.3    /  DBili  x      /  AST  19     /  ALT  26     /  AlkPhos  52     07 Dec 2021 07:08  TPro  6.3    /  Alb  2.7    /  TBili  0.3    /  DBili  x      /  AST  21     /  ALT  31     /  AlkPhos  60     06 Dec 2021 18:23    PT/INR - ( 06 Dec 2021 18:23 )   PT: 13.0 sec;   INR: 1.12 ratio         PTT - ( 06 Dec 2021 18:23 )  PTT:27.1 sec  CARDIAC MARKERS ( 07 Dec 2021 07:08 )  x     / x     / 17 U/L / x     / 1.7 ng/mL  CARDIAC MARKERS ( 07 Dec 2021 00:40 )  x     / x     / 21 U/L / x     / 2.4 ng/mL      Blood Culture:   12-07 @ 00:40  Pro Bnp [id number]    12-07 @ 10:22  TSH: 0.33      RADIOLOGY/EKG:    Attending Attestation:   20 minutes spent on total encounter; more than 50% of the visit was spent counseling and/or coordinating care by the attending physician.     ASSESSMENT AND PLAN [facility] Progress Note [phone] (Dr. Eastman, Dagmar, Adela, Luly)    CHIEF COMPLAINT: Patient is a 89y old  Female who presents with a chief complaint of Acute Hypoxic respiratory failure and AF with RVR (07 Dec 2021 15:26)      Follow Up Today: The patient denies any chest discomfort but still significant dyspnea. Tele monitor consistent with elevated heart rates 90 - 110bpm    HPI:  Patient is a 89 yo Female with PMHx of Paroxysmal Atrial Fibrillation (on eliquis), HTN, Hypothyroidism, COPD (2-3 L nasal cannula at home) Hyperlipidemia, presenting with shortness of breath. Patient was admitted at [facility] for acute hypoxic respiratory failure secondary to COVID infection and was discharged home yesterday. History obtained from Emerald Martinez who is also the HCP over the phone at [phone]. Emerald informs that after coming home yesterday patient remained lethargic, claims that she did not sleep well overnight and this morning at the breakfast was confused and dyspneic. Emerald informs that patient was getting tired and fatigued because of difficulty breathing so decided to bring her back to the hospital.  Patient is short of breath at baseline but claims that she is more dyspneic and tiered. Denies any chest pain or palpitations. No nausea or vomiting,     ED course:   Vitals:   BP: 115/91  HR: 123  Temp:   RR: 18, O2: 95% on 4L NC   Labs:   CBC: WBC:13.52 Hb: 10.06 , Platlets: 344 Neutrophils: 82.3   CMP:  , K+ 4.9-, Cl111- , BUN/Creatinine: 61/1.30, Procal: 0.18, D-Dimer: 274, APTT: 27.1, PT: 13, INR: 1.12  CXR: bibasilar opacities, blunted CP angles on personal review  EKG: Atrial flutter with variable AV block, QT/QTc: 326/454,   Patient received: 1L NS x1, Cardizem 10mg IV x1, 15 mg IV X1 and Lasix 40mg IV x1 Duoneb X 1 (06 Dec 2021 21:25)      PAST MEDICAL & SURGICAL HISTORY:  Hypertension    Depression    Overactive bladder    Gout    Osteoarthritis    Hypothyroid    HLD (hyperlipidemia)    Paroxysmal atrial fibrillation    Asthma    S/P cholecystectomy    S/P lumpectomy of breast    S/P hysterectomy    S/P lung surgery, follow-up exam  s/p removal of suspicious lesion, negative        MEDICATIONS  (STANDING):  allopurinol 100 milliGRAM(s) Oral daily  aMIOdarone    Tablet   Oral   aMIOdarone    Tablet 400 milliGRAM(s) Oral every 8 hours  apixaban 2.5 milliGRAM(s) Oral two times a day  aspirin  chewable 81 milliGRAM(s) Oral daily  budesonide 160 MICROgram(s)/formoterol 4.5 MICROgram(s) Inhaler 2 Puff(s) Inhalation two times a day  carvedilol 6.25 milliGRAM(s) Oral every 12 hours  cefpodoxime 200 milliGRAM(s) Oral every 12 hours  dexAMETHasone     Tablet 6 milliGRAM(s) Oral daily  diltiazem    milliGRAM(s) Oral daily  metroNIDAZOLE    Tablet 500 milliGRAM(s) Oral every 8 hours  tiotropium 18 MICROgram(s) Capsule 1 Capsule(s) Inhalation daily  venlafaxine XR. 37.5 milliGRAM(s) Oral daily    MEDICATIONS  (PRN):  ALBUTerol    90 MICROgram(s) HFA Inhaler 1 Puff(s) Inhalation every 4 hours PRN Shortness of Breath and/or Wheezing  benzonatate 100 milliGRAM(s) Oral three times a day PRN Cough      Allergies    Tomas Cheese - Pt states the one time she had a mouthful of tomas cheese, her throat was closing and she required an epinephrine injection. (Anaphylaxis)  iodine (Anaphylaxis)  penicillin (Other)  shellfish (Short breath; Hives)  sulfa drugs (Hives)    Intolerances        REVIEW OF SYSTEMS:    All other review of systems is negative unless indicated above    Vital Signs Last 24 Hrs  T(C): 36.4 (08 Dec 2021 04:31), Max: 36.7 (07 Dec 2021 17:44)  T(F): 97.5 (08 Dec 2021 04:31), Max: 98 (07 Dec 2021 17:44)  HR: 80 (08 Dec 2021 04:31) (69 - 110)  BP: 165/84 (08 Dec 2021 04:31) (160/80 - 179/103)  BP(mean): --  RR: 19 (08 Dec 2021 04:31) (18 - 20)  SpO2: 98% (08 Dec 2021 04:31) (95% - 100%)    I&O's Summary    07 Dec 2021 07:01  -  08 Dec 2021 07:00  --------------------------------------------------------  IN: 0 mL / OUT: 1100 mL / NET: -1100 mL        PHYSICAL EXAM:    Constitutional: NAD, awake and alert, well-developed  Eyes:  EOMI,  Pupils round, No oral cyanosis.  HEENT: No exudate or erythema  Pulmonary: Non-labored, breath sounds are clear bilaterally, No wheezing, rales or rhonchi  Cardiovascular: Irregularly irregular, S1 and S2, no murmur  Gastrointestinal: Bowel Sounds present, soft, nontender.   Ext: No significant LE edema   Neurological: Alert, no gross focal motor deficits  Skin: No rashes.  Psych:  Mood & affect appropriate    LABS: All Labs Reviewed:                        10.0   13.57 )-----------( 303      ( 07 Dec 2021 14:04 )             32.5                         9.5    11.07 )-----------( 290      ( 07 Dec 2021 07:08 )             30.5                         10.6   13.52 )-----------( 344      ( 06 Dec 2021 18:23 )             33.4     07 Dec 2021 14:04    146    |  108    |  57     ----------------------------<  146    5.2     |  37     |  1.20   07 Dec 2021 07:08    148    |  111    |  56     ----------------------------<  113    4.3     |  35     |  1.10   06 Dec 2021 18:23    147    |  111    |  61     ----------------------------<  136    4.9     |  34     |  1.30     Ca    9.3        07 Dec 2021 14:04  Ca    8.8        07 Dec 2021 07:08  Ca    9.4        06 Dec 2021 18:23  Phos  2.6       07 Dec 2021 07:08  Mg     2.4       07 Dec 2021 07:08    TPro  5.7    /  Alb  2.3    /  TBili  0.3    /  DBili  x      /  AST  19     /  ALT  26     /  AlkPhos  52     07 Dec 2021 07:08  TPro  6.3    /  Alb  2.7    /  TBili  0.3    /  DBili  x      /  AST  21     /  ALT  31     /  AlkPhos  60     06 Dec 2021 18:23    PT/INR - ( 06 Dec 2021 18:23 )   PT: 13.0 sec;   INR: 1.12 ratio         PTT - ( 06 Dec 2021 18:23 )  PTT:27.1 sec  CARDIAC MARKERS ( 07 Dec 2021 07:08 )  x     / x     / 17 U/L / x     / 1.7 ng/mL  CARDIAC MARKERS ( 07 Dec 2021 00:40 )  x     / x     / 21 U/L / x     / 2.4 ng/mL      Blood Culture:   12-07 @ 00:40  Pro Bnp [id number]    12-07 @ 10:22  TSH: 0.33      RADIOLOGY/EKG:    Attending Attestation:   20 minutes spent on total encounter; more than 50% of the visit was spent counseling and/or coordinating care by the attending physician.     ASSESSMENT AND PLAN

## 2021-12-08 NOTE — PROGRESS NOTE ADULT - PROBLEM SELECTOR PLAN 3
Mild evidence of volume overload on admission with LE edema   - Received IVF and became acutely worse  - S/P Lasix 40 mg IV x 2  - Strict I&Os & daily weights   - TTE 12/03: systolic function with an ejection fraction of approximately 60%. There is diastolic dysfunction. There is mild concentric left ventricular hypertrophy.  - c/w carvedilol with hold parameters. Mild evidence of volume overload on admission with LE edema   - Received IVF and became acutely worse  - S/P Lasix 40mg IV x2  - Strict I&Os & daily weights   - TTE 12/03: systolic function with an ejection fraction of approximately 60%. There is diastolic dysfunction. There is mild concentric left ventricular hypertrophy.  - c/w carvedilol with hold parameters.

## 2021-12-08 NOTE — PROGRESS NOTE ADULT - SUBJECTIVE AND OBJECTIVE BOX
[facility]  INFECTIOUS DISEASES   =======================================================    [id number]  CHARLES GRAHAM     Follow up: shortness of breath, COVID    Doing better, HFNC 30L/30% with good sat.   No fever. No overnight issue.     PAST MEDICAL & SURGICAL HISTORY:  Hypertension  Depression  Overactive bladder  Gout  Osteoarthritis  Hypothyoid  HLD (hyperlipidemia)  Paroxysmal atrial fibrillation  Asthma  S/P cholecystectomy  S/P lumpectomy of breast  S/P hysterectomy  S/P lung surgery, follow-up exam  s/p removal of suspicious lesion, negative    Social Hx: no current smoking, ETOH or drugs     FAMILY HISTORY:  Family history of nasopharyngeal cancer (Child)  daughter    Family history of breast cancer (Mother)    Allergies  Tomas Cheese - Pt states the one time she had a mouthful of tomas cheese, her throat was closing and she required an epinephrine injection. (Anaphylaxis)  iodine (Anaphylaxis)  penicillin (Other)  shellfish (Short breath; Hives)  sulfa drugs (Hives)    Antibiotics:  dexAMETHasone  Injectable 6 milliGRAM(s) IV Push daily    REVIEW OF SYSTEMS:  CONSTITUTIONAL:  No Fever or chills  HEENT:  No diplopia or blurred vision.  No sore throat or runny nose.  CARDIOVASCULAR:  No chest pain or SOB.  RESPIRATORY:  no cough, +shortness of breath  GASTROINTESTINAL:  No nausea, vomiting or diarrhea.  GENITOURINARY:  No dysuria, frequency or urgency. No Blood in urine  MUSCULOSKELETAL:  no joint aches, no muscle pain  SKIN:  No change in skin, hair or nails.  NEUROLOGIC:  No paresthesias, fasciculations, seizures or weakness.  PSYCHIATRIC:  No disorder of thought or mood.  ENDOCRINE:  No heat or cold intolerance, polyuria or polydipsia.  HEMATOLOGICAL:  No easy bruising or bleeding.     Physical Exam:  Vital Signs Last 24 Hrs  T(C): 36.9 (08 Dec 2021 12:23), Max: 36.9 (08 Dec 2021 12:23)  T(F): 98.4 (08 Dec 2021 12:23), Max: 98.4 (08 Dec 2021 12:23)  HR: 90 (08 Dec 2021 12:23) (59 - 110)  BP: 126/77 (08 Dec 2021 12:23) (120/66 - 179/103)  BP(mean): --  RR: 18 (08 Dec 2021 12:23) (18 - 20)  SpO2: 94% (08 Dec 2021 12:23) (94% - 100%)  GEN: NAD, obese   HEENT: normocephalic and atraumatic. EOMI. PERRL.    NECK: Supple.  No lymphadenopathy   LUNGS: Some crackles in bases   HEART: Regular rate and rhythm   ABDOMEN: Soft, nontender, and nondistended.  Positive bowel sounds.    EXTREMITIES: 1+edema.  NEUROLOGIC: grossly intact.  PSYCHIATRIC: Appropriate affect .  SKIN: No rash    Labs:                        9.9    8.16  )-----------( 307      ( 08 Dec 2021 08:08 )             31.0     12-08    145  |  105  |  61<H>  ----------------------------<  143<H>  5.0   |  38<H>  |  1.30    Ca    9.2      08 Dec 2021 08:08  Phos  2.6     12-07  Mg     2.4     12-07    TPro  5.6<L>  /  Alb  2.5<L>  /  TBili  0.4  /  DBili  x   /  AST  19  /  ALT  24  /  AlkPhos  53  12-08    WBC Count: 8.16 K/uL (12-08-21 @ 08:08)  WBC Count: 13.57 K/uL (12-07-21 @ 14:04)  WBC Count: 11.07 K/uL (12-07-21 @ 07:08)  WBC Count: 13.52 K/uL (12-06-21 @ 18:23)  WBC Count: 6.71 K/uL (12-05-21 @ 08:48)  WBC Count: 7.72 K/uL (12-04-21 @ 09:56)    Creatinine, Serum: 1.30 mg/dL (12-08-21 @ 08:08)  Creatinine, Serum: 1.20 mg/dL (12-07-21 @ 14:04)  Creatinine, Serum: 1.10 mg/dL (12-07-21 @ 07:08)  Creatinine, Serum: 1.30 mg/dL (12-06-21 @ 18:23)  Creatinine, Serum: 1.70 mg/dL (12-05-21 @ 09:13)  Creatinine, Serum: 1.90 mg/dL (12-04-21 @ 08:47)    C-Reactive Protein, Serum: 26 mg/L (12-06-21 @ 23:16)  C-Reactive Protein, Serum: 85 mg/L (12-03-21 @ 14:03)    Ferritin, Serum: 438 ng/mL (12-07-21 @ 12:10)  Ferritin, Serum: 516 ng/mL (12-07-21 @ 01:30)  Ferritin, Serum: 451 ng/mL (12-03-21 @ 14:42)  Ferritin, Serum: 465 ng/mL (12-03-21 @ 00:59)  Ferritin, Serum: 366 ng/mL (12-01-21 @ 02:57)    Procalcitonin, Serum: 0.18 ng/mL (12-06-21 @ 18:23)  Procalcitonin, Serum: 0.37 ng/mL (11-30-21 @ 21:17)     SARS-CoV-2: Detected (11-30-21 @ 18:08)  Rapid RVP Result: Detected (11-30-21 @ 18:08)    COVID-19 PCR: Detected (12-06-21 @ 18:23)    All imaging and other data have been reviewed.  < from: CT Chest No Cont (12.01.21 @ 02:32) >  IMPRESSION:  Left lower lobe pneumonia. Continued follow up to resolution is recommended.    Assessment and Plan:   89 yo woman with HTN, Paroxysmal Atrial Fibrillation, Hypothyroidism and COPD on home o2 2-3 L NC was admitted with worsening shortness of breath.  COVID diagnosis 11/30. She was also treated for possible pneumonia in .   Currently data and recommendations for COVID-19 treatment are rapidly changing, so this treatment plan is based on my clinical judgement with available information.   Since on HFNC and within 2 weeks of diagnosis we can try Tocilizumab.   The risks, benefits, and alternatives were discussed with the patient and/or surrogate and all questions were answered to the best of my ability.  Based on Abilio et al. (RECOVERY Trial), there was a survival benefit of tocilizumab arm compared to standard of care that was statistically significant.  Additionally,  Yohannes et al. (REMAP-CAP trial, NEJM, 2021) noted improvement in organ support-free days with tocilizumab versus standard of care as well as in-hospital mortality.    The IDSA is endorsing the use of tocilizumab (in addition to the standard of care) in patients who have progressive, severe or critical illness due to COVID-19 and elevated markers of systemic inflammation.     COVID 19 and possible bacterial/aspiration pneumonia   - BMP, CBC w diff, NLR. Procalcitonin, Ferritin, CRP, LDH and D dimer for the start and periodically can be repeated.   - No need for Remdesivir last time caused increase in Creat    - Creat is trending down 2.9-->1.3  - Continue Dexamethasone 6mg po daily for 10days  - Watch O2 sat closely and taper as tolerated.   - Prophylactic anticoagulation as per protocol   - One dose of Tocilizumab 600mg on 12/7    Thank you for courtesy of this consult.     Will follow.   Discussed with Primary team.     Dr. Mable Simon   Infectious Diseases   Please call [phone] between 8am and 6pm, call [phone] after 6pm or weekends.

## 2021-12-08 NOTE — PROGRESS NOTE ADULT - PROBLEM SELECTOR PLAN 2
Patient presented with SOB and decreased saturation and was found to be in A. Fib  - Patient with Hx of PAFib on Coreg and Cardizem for rate control and Eliquis for AC will continue inpatient  - Likely triggered by acute respiratory failure and hypoxia.   - S/P Cardizem 10 mg IV x1 and 15 mg IV X 1  - Cardiology consulted (Dr. Eastman group consulted by ED - recs appreciated)  - Started on amiodarone loading dose per cardio recs with goal HR 50-60s   - Will also start Cardizem drip as per Cardio recs   - Continue to monitor on telemetry Patient presented with SOB and decreased saturation and was found to be in A. Fib  - Patient with Hx of PAFib on Coreg and Cardizem for rate control and Eliquis for AC will continue inpatient  - Likely triggered by acute respiratory failure and hypoxia.   - S/P Cardizem 10mg IV x1 and 15 mg IV X 1  - Cardiology consulted (Dr. Eastman group consulted by ED - recs appreciated)  - Started on amiodarone loading dose per cardio recs with goal HR 50-60s   - Will also start Cardizem drip as per Cardio recs   - Continue to monitor on telemetry

## 2021-12-08 NOTE — PROGRESS NOTE ADULT - PROBLEM SELECTOR PLAN 1
Patient presented with dyspnea and increased work of breathing, COVID + on 11/30 on prior admission  - Was discharged home on 5L NC on 12/05 as well as PO flagyl and Vantin  - on arrival to the ED patient was found to be saturating in low 80's  - Patient remained afebrile with mild leukocytosis.   - Patient was started on non rebreather with improved saturation and switched to BIPAP, then HFNC  - titrate as necessary to maintain SpO2 > 88%  - Continue remote tele and continues pulse ox  - One dose Toci 600mg 12/7  - S/P Remdesivir during previous admission  - continue course of decadron  - c/w albuterol inhaler q6hrs  - GOC: DNR/DNI MOLST in chart   - patient with calf tenderness, d-dimer: 274, low suspicion for DVT and patient already anticoagulated  - Pulm Dr. Milner; ID Dr. Simon Patient presented with dyspnea and increased work of breathing, COVID + on 11/30 on prior admission  - Was discharged home on 5L NC on 12/05 as well as PO flagyl and Vantin  - on arrival to the ED patient was found to be saturating in low 80's  - Patient was started on non rebreather with improved saturation and switched to BIPAP, then HFNC  - One dose Toci 600mg 12/7  - S/P Remdesivir during previous admission, continue course of decadron, c/w albuterol inhaler q6hrs  - GOC: DNR/DNI MOLST in chart   - patient with calf tenderness, d-dimer: 274, low suspicion for DVT and patient already anticoagulated  - Pulm Dr. Milner; ID Dr. Simon Patient presented with dyspnea and increased work of breathing, COVID + on 11/30 on prior admission  - Was discharged home on 5L NC on 12/05 as well as PO flagyl and Vantin  - on arrival to the ED patient was found to be saturating in low 80's  - on vantin/flagyl for aspiration pna. completing therapy diagnosed from prior admission  - Patient was started on non rebreather with improved saturation and switched to BIPAP, then HFNC  - One dose Toci 600mg 12/7  - S/P Remdesivir during previous admission, continue course of decadron, c/w albuterol inhaler q6hrs  - GOC: DNR/DNI MOLST in chart   - patient with calf tenderness, d-dimer: 274, low suspicion for DVT and patient already anticoagulated  - Pulm Dr. Milner; ID Dr. Simon

## 2021-12-08 NOTE — PROGRESS NOTE ADULT - SUBJECTIVE AND OBJECTIVE BOX
Patient is a 89y old  Female who presents with a chief complaint of Acute Hypoxic respiratory failure and AF with RVR (08 Dec 2021 12:37)      INTERVAL HPI/OVERNIGHT EVENTS: No acute events overnight. patient seen and examined at bedside this morning. Denies any acute complaints. Reports SOB has improved. Denies chest pain, fever, chills, sob, abdominal pain, or any other symptoms. Reports she does feel lonely and depressed being in the hospital.     MEDICATIONS  (STANDING):  allopurinol 100 milliGRAM(s) Oral daily  aMIOdarone    Tablet   Oral   aMIOdarone    Tablet 400 milliGRAM(s) Oral every 8 hours  apixaban 2.5 milliGRAM(s) Oral two times a day  aspirin  chewable 81 milliGRAM(s) Oral daily  budesonide 160 MICROgram(s)/formoterol 4.5 MICROgram(s) Inhaler 2 Puff(s) Inhalation two times a day  carvedilol 6.25 milliGRAM(s) Oral every 12 hours  cefpodoxime 200 milliGRAM(s) Oral every 12 hours  dexAMETHasone     Tablet 6 milliGRAM(s) Oral daily  diltiazem Infusion 15 mG/Hr (15 mL/Hr) IV Continuous <Continuous>  metroNIDAZOLE    Tablet 500 milliGRAM(s) Oral every 8 hours  tiotropium 18 MICROgram(s) Capsule 1 Capsule(s) Inhalation daily  venlafaxine XR. 37.5 milliGRAM(s) Oral daily    MEDICATIONS  (PRN):  ALBUTerol    90 MICROgram(s) HFA Inhaler 1 Puff(s) Inhalation every 4 hours PRN Shortness of Breath and/or Wheezing  benzonatate 100 milliGRAM(s) Oral three times a day PRN Cough      Allergies    Tomas Cheese - Pt states the one time she had a mouthful of tomas cheese, her throat was closing and she required an epinephrine injection. (Anaphylaxis)  iodine (Anaphylaxis)  penicillin (Other)  shellfish (Short breath; Hives)  sulfa drugs (Hives)    Intolerances      REVIEW OF SYSTEMS:  CONSTITUTIONAL: No fever or chills  HEENT:  No headache, no sore throat  RESPIRATORY: No cough, wheezing, or shortness of breath  CARDIOVASCULAR: No chest pain, palpitations  GASTROINTESTINAL: No abd pain, nausea, vomiting, or diarrhea  GENITOURINARY: No dysuria, frequency, or hematuria  NEUROLOGICAL: no focal weakness or dizziness  MUSCULOSKELETAL: no myalgias     Vital Signs Last 24 Hrs  T(C): 36.9 (08 Dec 2021 12:23), Max: 36.9 (08 Dec 2021 12:23)  T(F): 98.4 (08 Dec 2021 12:23), Max: 98.4 (08 Dec 2021 12:23)  HR: 90 (08 Dec 2021 12:23) (59 - 110)  BP: 126/77 (08 Dec 2021 12:23) (120/66 - 179/103)  BP(mean): --  RR: 18 (08 Dec 2021 12:23) (18 - 20)  SpO2: 94% (08 Dec 2021 12:23) (94% - 100%)    PHYSICAL EXAM:  GENERAL: NAD  HEENT:  anicteric, moist mucous membranes  CHEST/LUNG:  decreased breath sounds, no rales, wheezes, or rhonchi  HEART:  RRR, S1, S2  ABDOMEN:  BS+, soft, nontender, nondistended  EXTREMITIES: no edema, cyanosis, bilateral calf tenderness to deep palpation   NERVOUS SYSTEM: answers questions and follows commands appropriately    LABS:                        9.9    8.16  )-----------( 307      ( 08 Dec 2021 08:08 )             31.0     CBC Full  -  ( 08 Dec 2021 08:08 )  WBC Count : 8.16 K/uL  Hemoglobin : 9.9 g/dL  Hematocrit : 31.0 %  Platelet Count - Automated : 307 K/uL  Mean Cell Volume : 98.7 fl  Mean Cell Hemoglobin : 31.5 pg  Mean Cell Hemoglobin Concentration : 31.9 gm/dL  Auto Neutrophil # : 7.18 K/uL  Auto Lymphocyte # : 0.33 K/uL  Auto Monocyte # : 0.41 K/uL  Auto Eosinophil # : 0.00 K/uL  Auto Basophil # : 0.00 K/uL  Auto Neutrophil % : 87.0 %  Auto Lymphocyte % : 4.0 %  Auto Monocyte % : 5.0 %  Auto Eosinophil % : 0.0 %  Auto Basophil % : 0.0 %    08 Dec 2021 08:08    145    |  105    |  61     ----------------------------<  143    5.0     |  38     |  1.30     Ca    9.2        08 Dec 2021 08:08    TPro  5.6    /  Alb  2.5    /  TBili  0.4    /  DBili  x      /  AST  19     /  ALT  24     /  AlkPhos  53     08 Dec 2021 08:08    PT/INR - ( 06 Dec 2021 18:23 )   PT: 13.0 sec;   INR: 1.12 ratio         PTT - ( 06 Dec 2021 18:23 )  PTT:27.1 sec  Urinalysis Basic - ( 06 Dec 2021 20:19 )    Color: Yellow / Appearance: Clear / S.015 / pH: x  Gluc: x / Ketone: Negative  / Bili: Negative / Urobili: Negative   Blood: x / Protein: 30 mg/dL / Nitrite: Negative   Leuk Esterase: Trace / RBC: 0-2 /HPF / WBC 3-5   Sq Epi: x / Non Sq Epi: Moderate / Bacteria: Few      CAPILLARY BLOOD GLUCOSE              RADIOLOGY & ADDITIONAL TESTS:    Personally reviewed.     Consultant(s) Notes Reviewed:  [x] YES  [ ] NO     Patient is a 89y old  Female who presents with a chief complaint of Acute Hypoxic respiratory failure and AF with RVR (08 Dec 2021 12:37)      INTERVAL HPI/OVERNIGHT EVENTS: No acute events overnight. patient seen and examined at bedside this morning. Denies any acute complaints. Reports SOB is improving but is persistent. She reports feeling down about her progress which has been slow per her assessment. She feels tired and "beat up". Denies chest pain, fever, chills, sob, abdominal pain, or any other symptoms. Reports she does feel lonely and depressed being in the hospital.     MEDICATIONS  (STANDING):  allopurinol 100 milliGRAM(s) Oral daily  aMIOdarone    Tablet   Oral   aMIOdarone    Tablet 400 milliGRAM(s) Oral every 8 hours  apixaban 2.5 milliGRAM(s) Oral two times a day  aspirin  chewable 81 milliGRAM(s) Oral daily  budesonide 160 MICROgram(s)/formoterol 4.5 MICROgram(s) Inhaler 2 Puff(s) Inhalation two times a day  carvedilol 6.25 milliGRAM(s) Oral every 12 hours  cefpodoxime 200 milliGRAM(s) Oral every 12 hours  dexAMETHasone     Tablet 6 milliGRAM(s) Oral daily  diltiazem Infusion 15 mG/Hr (15 mL/Hr) IV Continuous <Continuous>  metroNIDAZOLE    Tablet 500 milliGRAM(s) Oral every 8 hours  tiotropium 18 MICROgram(s) Capsule 1 Capsule(s) Inhalation daily  venlafaxine XR. 37.5 milliGRAM(s) Oral daily    MEDICATIONS  (PRN):  ALBUTerol    90 MICROgram(s) HFA Inhaler 1 Puff(s) Inhalation every 4 hours PRN Shortness of Breath and/or Wheezing  benzonatate 100 milliGRAM(s) Oral three times a day PRN Cough      Allergies    Tomas Cheese - Pt states the one time she had a mouthful of tomas cheese, her throat was closing and she required an epinephrine injection. (Anaphylaxis)  iodine (Anaphylaxis)  penicillin (Other)  shellfish (Short breath; Hives)  sulfa drugs (Hives)    Intolerances      REVIEW OF SYSTEMS:  CONSTITUTIONAL: No fever or chills  HEENT:  No headache, no sore throat  RESPIRATORY: No cough, wheezing, or shortness of breath  CARDIOVASCULAR: No chest pain, palpitations  GASTROINTESTINAL: No abd pain, nausea, vomiting, or diarrhea  GENITOURINARY: No dysuria, frequency, or hematuria  NEUROLOGICAL: no focal weakness or dizziness  MUSCULOSKELETAL: no myalgias or arthralgias    Vital Signs Last 24 Hrs  T(C): 36.9 (08 Dec 2021 12:23), Max: 36.9 (08 Dec 2021 12:23)  T(F): 98.4 (08 Dec 2021 12:23), Max: 98.4 (08 Dec 2021 12:23)  HR: 90 (08 Dec 2021 12:23) (59 - 110)  BP: 126/77 (08 Dec 2021 12:23) (120/66 - 179/103)  BP(mean): --  RR: 18 (08 Dec 2021 12:23) (18 - 20)  SpO2: 94% (08 Dec 2021 12:) (94% - 100%)    PHYSICAL EXAM:  GENERAL: NAD, awake, interactive  HEENT:  anicteric, moist mucous membranes  CHEST/LUNG:  decreased breath sounds, no rales, wheezes, or rhonchi  HEART:  RRR, S1, S2  ABDOMEN:  BS active, soft, nontender, nondistended  EXTREMITIES: no edema, cyanosis, bilateral calf tenderness to deep palpation   NERVOUS SYSTEM: answers questions and follows commands appropriately    LABS:                        9.9    8.16  )-----------( 307      ( 08 Dec 2021 08:08 )             31.0     CBC Full  -  ( 08 Dec 2021 08:08 )  WBC Count : 8.16 K/uL  Hemoglobin : 9.9 g/dL  Hematocrit : 31.0 %  Platelet Count - Automated : 307 K/uL  Mean Cell Volume : 98.7 fl  Mean Cell Hemoglobin : 31.5 pg  Mean Cell Hemoglobin Concentration : 31.9 gm/dL  Auto Neutrophil # : 7.18 K/uL  Auto Lymphocyte # : 0.33 K/uL  Auto Monocyte # : 0.41 K/uL  Auto Eosinophil # : 0.00 K/uL  Auto Basophil # : 0.00 K/uL  Auto Neutrophil % : 87.0 %  Auto Lymphocyte % : 4.0 %  Auto Monocyte % : 5.0 %  Auto Eosinophil % : 0.0 %  Auto Basophil % : 0.0 %    08 Dec 2021 08:08    145    |  105    |  61     ----------------------------<  143    5.0     |  38     |  1.30     Ca    9.2        08 Dec 2021 08:08    TPro  5.6    /  Alb  2.5    /  TBili  0.4    /  DBili  x      /  AST  19     /  ALT  24     /  AlkPhos  53     08 Dec 2021 08:08    PT/INR - ( 06 Dec 2021 18:23 )   PT: 13.0 sec;   INR: 1.12 ratio         PTT - ( 06 Dec 2021 18:23 )  PTT:27.1 sec  Urinalysis Basic - ( 06 Dec 2021 20:19 )    Color: Yellow / Appearance: Clear / S.015 / pH: x  Gluc: x / Ketone: Negative  / Bili: Negative / Urobili: Negative   Blood: x / Protein: 30 mg/dL / Nitrite: Negative   Leuk Esterase: Trace / RBC: 0-2 /HPF / WBC 3-5   Sq Epi: x / Non Sq Epi: Moderate / Bacteria: Few      CAPILLARY BLOOD GLUCOSE              RADIOLOGY & ADDITIONAL TESTS:    Personally reviewed.     Consultant(s) Notes Reviewed:  [x] YES  [ ] NO

## 2021-12-08 NOTE — PROGRESS NOTE ADULT - ASSESSMENT
89F DNR/DNI with PAF, HTN, Hypothyroidism, COPD (2-3 L nasal cannula at home) Hyperlipidemia, recently discharged after a COVID admission with AF, RVR and shortness of breath/LE edema now returns with SOB back in rapid heart rates with hypoxia.  She was initially given IVF for possible dehydration but with wheezing and worsening saturation, was given IV lasix and seen by ICU.  She was placed on BiPAP with improved hypoxia and diuresis.    Suggest:    1. Acute hypoxemic respiratory failure due to recent COVID-19 pneumonia and probably acute on chronic diastolic dysfunction from AF with RVR   - Supplemental O2.   - Probable acute on chronic diastolic CHF with moderate to severe MR and rapid AF    - Get repeat CXR today    2. Elevated troponin.   -Slight upward trend, probable demand ischemia.  -EKG without acute change.  -Medical management of CAD with rate control/betablocker/asa    3. Atrial fibrillation.   - Has h/o PAF, usually in sinus rhythm.  In AF with RVR on admission.  - Would look to restore/maintain sinus rhythm with beta blockers/cardizem and would add amiodarone.   - Eliquis 2.5 mg BID for primary stroke prevention.    4. Hypertension.   - Continue with cardizem/Carvedilol    Will follow.  89F DNR/DNI with PAF, HTN, Hypothyroidism, COPD (2-3 L nasal cannula at home) Hyperlipidemia, recently discharged after a COVID admission with AF, RVR and shortness of breath/LE edema now returns with SOB back in rapid heart rates with hypoxia.  She was initially given IVF for possible dehydration but with wheezing and worsening saturation, was given IV lasix and seen by ICU.  She was placed on BiPAP with improved hypoxia and diuresis. Still with dyspnea and heart rates are still elevated in afib    Suggest:    1. Acute hypoxemic respiratory failure due to recent COVID-19 pneumonia and probably acute on chronic diastolic dysfunction from AF with RVR   - Supplemental O2.   - Probable acute on chronic diastolic CHF with moderate to severe MR and rapid AF    - Get repeat CXR today    2. Elevated troponin.   -Slight upward trend, probable demand ischemia.  -EKG without acute change.  -Medical management of CAD with rate control/betablocker/asa    3. Atrial fibrillation.   - Has h/o PAF, usually in sinus rhythm.  In AF with RVR on admission.  - Would look to restore/maintain sinus rhythm with beta blockers/cardizem and continue with amiodarone for rate/rhythm control.   Restart IV cardizem gtt and go to 15mg/ hr for rate control   - Eliquis 2.5 mg BID for primary stroke prevention.    4. Hypertension.   - Continue with cardizem/Carvedilol    Will follow.

## 2021-12-09 ENCOUNTER — TRANSCRIPTION ENCOUNTER (OUTPATIENT)
Age: 86
End: 2021-12-09

## 2021-12-09 LAB
ALBUMIN SERPL ELPH-MCNC: 2.6 G/DL — LOW (ref 3.3–5)
ALP SERPL-CCNC: 56 U/L — SIGNIFICANT CHANGE UP (ref 40–120)
ALT FLD-CCNC: 24 U/L — SIGNIFICANT CHANGE UP (ref 12–78)
ANION GAP SERPL CALC-SCNC: 3 MMOL/L — LOW (ref 5–17)
AST SERPL-CCNC: 20 U/L — SIGNIFICANT CHANGE UP (ref 15–37)
BASOPHILS # BLD AUTO: 0.03 K/UL — SIGNIFICANT CHANGE UP (ref 0–0.2)
BASOPHILS NFR BLD AUTO: 0.3 % — SIGNIFICANT CHANGE UP (ref 0–2)
BILIRUB SERPL-MCNC: 0.5 MG/DL — SIGNIFICANT CHANGE UP (ref 0.2–1.2)
BUN SERPL-MCNC: 66 MG/DL — HIGH (ref 7–23)
CALCIUM SERPL-MCNC: 9 MG/DL — SIGNIFICANT CHANGE UP (ref 8.5–10.1)
CHLORIDE SERPL-SCNC: 103 MMOL/L — SIGNIFICANT CHANGE UP (ref 96–108)
CO2 SERPL-SCNC: 37 MMOL/L — HIGH (ref 22–31)
CREAT SERPL-MCNC: 1.4 MG/DL — HIGH (ref 0.5–1.3)
EOSINOPHIL # BLD AUTO: 0 K/UL — SIGNIFICANT CHANGE UP (ref 0–0.5)
EOSINOPHIL NFR BLD AUTO: 0 % — SIGNIFICANT CHANGE UP (ref 0–6)
GLUCOSE SERPL-MCNC: 141 MG/DL — HIGH (ref 70–99)
HCT VFR BLD CALC: 30.9 % — LOW (ref 34.5–45)
HGB BLD-MCNC: 9.9 G/DL — LOW (ref 11.5–15.5)
IMM GRANULOCYTES NFR BLD AUTO: 5.2 % — HIGH (ref 0–1.5)
LYMPHOCYTES # BLD AUTO: 0.79 K/UL — LOW (ref 1–3.3)
LYMPHOCYTES # BLD AUTO: 7.2 % — LOW (ref 13–44)
MAGNESIUM SERPL-MCNC: 2.2 MG/DL — SIGNIFICANT CHANGE UP (ref 1.6–2.6)
MCHC RBC-ENTMCNC: 30.9 PG — SIGNIFICANT CHANGE UP (ref 27–34)
MCHC RBC-ENTMCNC: 32 GM/DL — SIGNIFICANT CHANGE UP (ref 32–36)
MCV RBC AUTO: 96.6 FL — SIGNIFICANT CHANGE UP (ref 80–100)
MONOCYTES # BLD AUTO: 0.87 K/UL — SIGNIFICANT CHANGE UP (ref 0–0.9)
MONOCYTES NFR BLD AUTO: 7.9 % — SIGNIFICANT CHANGE UP (ref 2–14)
NEUTROPHILS # BLD AUTO: 8.77 K/UL — HIGH (ref 1.8–7.4)
NEUTROPHILS NFR BLD AUTO: 79.4 % — HIGH (ref 43–77)
NRBC # BLD: 0 /100 WBCS — SIGNIFICANT CHANGE UP (ref 0–0)
PHOSPHATE SERPL-MCNC: 2.5 MG/DL — SIGNIFICANT CHANGE UP (ref 2.5–4.5)
PLATELET # BLD AUTO: 344 K/UL — SIGNIFICANT CHANGE UP (ref 150–400)
POTASSIUM SERPL-MCNC: 4.2 MMOL/L — SIGNIFICANT CHANGE UP (ref 3.5–5.3)
POTASSIUM SERPL-SCNC: 4.2 MMOL/L — SIGNIFICANT CHANGE UP (ref 3.5–5.3)
PROT SERPL-MCNC: 5.8 G/DL — LOW (ref 6–8.3)
RBC # BLD: 3.2 M/UL — LOW (ref 3.8–5.2)
RBC # FLD: 15 % — HIGH (ref 10.3–14.5)
SODIUM SERPL-SCNC: 143 MMOL/L — SIGNIFICANT CHANGE UP (ref 135–145)
WBC # BLD: 11.03 K/UL — HIGH (ref 3.8–10.5)
WBC # FLD AUTO: 11.03 K/UL — HIGH (ref 3.8–10.5)

## 2021-12-09 PROCEDURE — 99232 SBSQ HOSP IP/OBS MODERATE 35: CPT

## 2021-12-09 PROCEDURE — 71045 X-RAY EXAM CHEST 1 VIEW: CPT | Mod: 26

## 2021-12-09 PROCEDURE — 99233 SBSQ HOSP IP/OBS HIGH 50: CPT | Mod: GC

## 2021-12-09 RX ORDER — DILTIAZEM HCL 120 MG
60 CAPSULE, EXT RELEASE 24 HR ORAL EVERY 6 HOURS
Refills: 0 | Status: DISCONTINUED | OUTPATIENT
Start: 2021-12-09 | End: 2021-12-10

## 2021-12-09 RX ADMIN — CARVEDILOL PHOSPHATE 6.25 MILLIGRAM(S): 80 CAPSULE, EXTENDED RELEASE ORAL at 05:38

## 2021-12-09 RX ADMIN — BUDESONIDE AND FORMOTEROL FUMARATE DIHYDRATE 2 PUFF(S): 160; 4.5 AEROSOL RESPIRATORY (INHALATION) at 19:30

## 2021-12-09 RX ADMIN — AMIODARONE HYDROCHLORIDE 400 MILLIGRAM(S): 400 TABLET ORAL at 13:29

## 2021-12-09 RX ADMIN — AMIODARONE HYDROCHLORIDE 400 MILLIGRAM(S): 400 TABLET ORAL at 05:38

## 2021-12-09 RX ADMIN — CARVEDILOL PHOSPHATE 6.25 MILLIGRAM(S): 80 CAPSULE, EXTENDED RELEASE ORAL at 17:26

## 2021-12-09 RX ADMIN — Medication 100 MILLIGRAM(S): at 13:01

## 2021-12-09 RX ADMIN — BUDESONIDE AND FORMOTEROL FUMARATE DIHYDRATE 2 PUFF(S): 160; 4.5 AEROSOL RESPIRATORY (INHALATION) at 07:19

## 2021-12-09 RX ADMIN — TIOTROPIUM BROMIDE 1 CAPSULE(S): 18 CAPSULE ORAL; RESPIRATORY (INHALATION) at 08:34

## 2021-12-09 RX ADMIN — Medication 200 MILLIGRAM(S): at 05:38

## 2021-12-09 RX ADMIN — Medication 15 MG/HR: at 07:16

## 2021-12-09 RX ADMIN — Medication 37.5 MILLIGRAM(S): at 13:02

## 2021-12-09 RX ADMIN — AMIODARONE HYDROCHLORIDE 400 MILLIGRAM(S): 400 TABLET ORAL at 21:31

## 2021-12-09 RX ADMIN — Medication 6 MILLIGRAM(S): at 05:38

## 2021-12-09 RX ADMIN — Medication 500 MILLIGRAM(S): at 05:38

## 2021-12-09 RX ADMIN — Medication 60 MILLIGRAM(S): at 21:44

## 2021-12-09 RX ADMIN — APIXABAN 2.5 MILLIGRAM(S): 2.5 TABLET, FILM COATED ORAL at 17:26

## 2021-12-09 RX ADMIN — Medication 81 MILLIGRAM(S): at 13:00

## 2021-12-09 RX ADMIN — APIXABAN 2.5 MILLIGRAM(S): 2.5 TABLET, FILM COATED ORAL at 05:38

## 2021-12-09 NOTE — PROGRESS NOTE ADULT - ASSESSMENT
89 yo Female with PMHx of Paroxysmal Atrial Fibrillation, HTN, Hypothyroidism, COPD (2-3 L nasal cannula at home) Hyperlipidemia, presented to the ED with shortness of breath and was found to have A.Fib with RVR, is being admitted for Afib with RVR and acute hypoxic respiratory failure requiring NIPPV.

## 2021-12-09 NOTE — PROGRESS NOTE ADULT - TIME BILLING
as above. at risk for abrupt decompensation. spoke to daughter at length. updated her on progress. all in agreement w/ tx plan at this time as above

## 2021-12-09 NOTE — PROGRESS NOTE ADULT - SUBJECTIVE AND OBJECTIVE BOX
[facility]  INFECTIOUS DISEASES   =======================================================    [id number]  CHARLES GRAHAM     Follow up: shortness of breath, COVID    Doing better, This morning has been switched to NC from HFNC, good sat and comfortable.   No fever. No overnight issue.     PAST MEDICAL & SURGICAL HISTORY:  Hypertension  Depression  Overactive bladder  Gout  Osteoarthritis  Hypothyoid  HLD (hyperlipidemia)  Paroxysmal atrial fibrillation  Asthma  S/P cholecystectomy  S/P lumpectomy of breast  S/P hysterectomy  S/P lung surgery, follow-up exam  s/p removal of suspicious lesion, negative    Social Hx: no current smoking, ETOH or drugs     FAMILY HISTORY:  Family history of nasopharyngeal cancer (Child)  daughter    Family history of breast cancer (Mother)    Allergies  Tomas Cheese - Pt states the one time she had a mouthful of tomas cheese, her throat was closing and she required an epinephrine injection. (Anaphylaxis)  iodine (Anaphylaxis)  penicillin (Other)  shellfish (Short breath; Hives)  sulfa drugs (Hives)    Antibiotics:  dexAMETHasone  Injectable 6 milliGRAM(s) IV Push daily    REVIEW OF SYSTEMS:  CONSTITUTIONAL:  No Fever or chills  HEENT:  No diplopia or blurred vision.  No sore throat or runny nose.  CARDIOVASCULAR:  No chest pain or SOB.  RESPIRATORY:  no cough, +shortness of breath  GASTROINTESTINAL:  No nausea, vomiting or diarrhea.  GENITOURINARY:  No dysuria, frequency or urgency. No Blood in urine  MUSCULOSKELETAL:  no joint aches, no muscle pain  SKIN:  No change in skin, hair or nails.  NEUROLOGIC:  No paresthesias, fasciculations, seizures or weakness.  PSYCHIATRIC:  No disorder of thought or mood.  ENDOCRINE:  No heat or cold intolerance, polyuria or polydipsia.  HEMATOLOGICAL:  No easy bruising or bleeding.     Physical Exam:  Vital Signs Last 24 Hrs  T(C): 36.6 (09 Dec 2021 13:10), Max: 36.7 (08 Dec 2021 23:58)  T(F): 97.9 (09 Dec 2021 13:10), Max: 98 (08 Dec 2021 23:58)  HR: 56 (09 Dec 2021 13:10) (56 - 88)  BP: 101/59 (09 Dec 2021 13:10) (101/59 - 146/81)  BP(mean): --  RR: 16 (09 Dec 2021 13:10) (16 - 19)  SpO2: 97% (09 Dec 2021 13:10) (92% - 97%)  GEN: NAD, obese   HEENT: normocephalic and atraumatic. EOMI. PERRL.    NECK: Supple.  No lymphadenopathy   LUNGS: Some crackles in bases   HEART: Regular rate and rhythm   ABDOMEN: Soft, nontender, and nondistended.  Positive bowel sounds.    EXTREMITIES: 1+edema.  NEUROLOGIC: grossly intact.  PSYCHIATRIC: Appropriate affect .  SKIN: No rash    Labs:                        9.9    11.03 )-----------( 344      ( 09 Dec 2021 06:52 )             30.9     12-09    143  |  103  |  66<H>  ----------------------------<  141<H>  4.2   |  37<H>  |  1.40<H>    Ca    9.0      09 Dec 2021 06:52  Phos  2.5     12-09  Mg     2.2     12-09    TPro  5.8<L>  /  Alb  2.6<L>  /  TBili  0.5  /  DBili  x   /  AST  20  /  ALT  24  /  AlkPhos  56  12-09    WBC Count: 11.03 K/uL (12-09-21 @ 06:52)  WBC Count: 8.16 K/uL (12-08-21 @ 08:08)  WBC Count: 13.57 K/uL (12-07-21 @ 14:04)  WBC Count: 11.07 K/uL (12-07-21 @ 07:08)  WBC Count: 13.52 K/uL (12-06-21 @ 18:23)  WBC Count: 6.71 K/uL (12-05-21 @ 08:48)    Creatinine, Serum: 1.40 mg/dL (12-09-21 @ 06:52)  Creatinine, Serum: 1.30 mg/dL (12-08-21 @ 08:08)  Creatinine, Serum: 1.20 mg/dL (12-07-21 @ 14:04)  Creatinine, Serum: 1.10 mg/dL (12-07-21 @ 07:08)  Creatinine, Serum: 1.30 mg/dL (12-06-21 @ 18:23)  Creatinine, Serum: 1.70 mg/dL (12-05-21 @ 09:13)    C-Reactive Protein, Serum: 26 mg/L (12-06-21 @ 23:16)  C-Reactive Protein, Serum: 85 mg/L (12-03-21 @ 14:03)    Ferritin, Serum: 438 ng/mL (12-07-21 @ 12:10)  Ferritin, Serum: 516 ng/mL (12-07-21 @ 01:30)  Ferritin, Serum: 451 ng/mL (12-03-21 @ 14:42)  Ferritin, Serum: 465 ng/mL (12-03-21 @ 00:59)  Ferritin, Serum: 366 ng/mL (12-01-21 @ 02:57)    Procalcitonin, Serum: 0.18 ng/mL (12-06-21 @ 18:23)  Procalcitonin, Serum: 0.37 ng/mL (11-30-21 @ 21:17)     SARS-CoV-2: Detected (11-30-21 @ 18:08)  Rapid RVP Result: Detected (11-30-21 @ 18:08)    COVID-19 PCR: Detected (12-06-21 @ 18:23)    All imaging and other data have been reviewed.  < from: CT Chest No Cont (12.01.21 @ 02:32) >  IMPRESSION:  Left lower lobe pneumonia. Continued follow up to resolution is recommended.    Assessment and Plan:   89 yo woman with HTN, Paroxysmal Atrial Fibrillation, Hypothyroidism and COPD on home o2 2-3 L NC was admitted with worsening shortness of breath.  COVID diagnosis 11/30. She was also treated for possible pneumonia in .   Currently data and recommendations for COVID-19 treatment are rapidly changing, so this treatment plan is based on my clinical judgement with available information.   Since on HFNC and within 2 weeks of diagnosis we can try Tocilizumab.   The risks, benefits, and alternatives were discussed with the patient and/or surrogate and all questions were answered to the best of my ability.  Based on Abilio et al. (RECOVERY Trial), there was a survival benefit of tocilizumab arm compared to standard of care that was statistically significant.  Additionally,  Yohannes et al. (REMAP-CAP trial, [facility], 2021) noted improvement in organ support-free days with tocilizumab versus standard of care as well as in-hospital mortality.    The IDSA is endorsing the use of tocilizumab (in addition to the standard of care) in patients who have progressive, severe or critical illness due to COVID-19 and elevated markers of systemic inflammation.     COVID 19 and possible bacterial/aspiration pneumonia   - BMP, CBC w diff, NLR. Procalcitonin, Ferritin, CRP, LDH and D dimer for the start and periodically can be repeated.   - No need for Remdesivir last time caused increase in Creat    - Creat is trending down 2.9-->1.3  - Continue Dexamethasone 6mg po daily for 10days  - Completed 7days of ceftriaxone (vantin)+metronidazole   - Watch O2 sat closely and taper as tolerated.   - Prophylactic anticoagulation as per protocol   - One dose of Tocilizumab 600mg on 12/7    Will follow PRN.     Dr. Mable Simon   Infectious Diseases   Please call [phone] between 8am and 6pm, call [phone] after 6pm or weekends.

## 2021-12-09 NOTE — PROGRESS NOTE ADULT - SUBJECTIVE AND OBJECTIVE BOX
PULMONARY/CRITICAL CARE    Dos 12/9/21  Less sob. Somewhat improved. OOB in chair  On HFNO  HR better--on Amio.     Patient is a 89y old  Female who presents with a chief complaint of SOB (30 Nov 2021 19:49)  Has COVID pneumonia, rapid atrial fib.    BRIEF HOSPITAL COURSE: ***89 yo F PMHx Paroxysmal Atrial Fibrillation, HTN, Hypothyroidism, COPD (2-3 L nasal cannula at home) Hyperlipidemia, presenting with shortness of breath and LE edema. Patient is short of breath at baseline but has been having difficulty standing up and walking a few steps for the last week. Patient lives with her son and his family, who hosted thanksgiving dinner. 1 guest at the dinner tested positive for COVID. Son bought an at home rapid test. He was positive, and patient was also positive. Her grandchildren and his wife were negative. Patient admits to a dry cough that started Monday and progressively worsening LE edema in the last few days. She is on bumetanide 1 mg daily for water retention denies any changes to dose or missed doses.     She does admit to a high salt diet and does not really watch what she eats.   She denies orthopnea, fever, chills, history of heart failure or CAD.   Son at bedside admits that she has a history of "mini stroke" in the past however, patient adamantly denies.     On Cardizem for Atrial fib.    Events last 24 hours: ***    PAST MEDICAL & SURGICAL HISTORY:  Hypertension    Depression    Overactive bladder    Gout    Osteoarthritis    Hypothyroid    HLD (hyperlipidemia)    Paroxysmal atrial fibrillation    Asthma    S/P cholecystectomy    S/P lumpectomy of breast    S/P hysterectomy    S/P lung surgery, follow-up exam  s/p removal of suspicious lesion, negative      Allergies    Tomas Cheese - Pt states the one time she had a mouthful of tomas cheese, her throat was closing and she required an epinephrine injection. (Anaphylaxis)  iodine (Anaphylaxis)  penicillin (Other)  shellfish (Short breath; Hives)  sulfa drugs (Hives)    Intolerances      FAMILY HISTORY social--no recent cigs, etoh.   Family history of nasopharyngeal cancer (Child)  daughter    Family history of breast cancer (Mother)          Medications:  remdesivir  IVPB   IV Intermittent   remdesivir  IVPB 100 milliGRAM(s) IV Intermittent every 24 hours    carvedilol 6.25 milliGRAM(s) Oral every 12 hours  furosemide   Injectable 40 milliGRAM(s) IV Push every 12 hours  hydrALAZINE 100 milliGRAM(s) Oral every 8 hours  losartan 100 milliGRAM(s) Oral daily    albuterol/ipratropium for Nebulization 3 milliLiter(s) Nebulizer every 6 hours PRN  albuterol/ipratropium for Nebulization. 3 milliLiter(s) Nebulizer once  benzonatate 100 milliGRAM(s) Oral three times a day PRN  budesonide 160 MICROgram(s)/formoterol 4.5 MICROgram(s) Inhaler 2 Puff(s) Inhalation two times a day  tiotropium 18 MICROgram(s) Capsule 1 Capsule(s) Inhalation daily    acetaminophen     Tablet .. 650 milliGRAM(s) Oral every 6 hours PRN  melatonin 3 milliGRAM(s) Oral at bedtime PRN  venlafaxine XR. 37.5 milliGRAM(s) Oral daily      apixaban 2.5 milliGRAM(s) Oral every 12 hours  aspirin enteric coated 81 milliGRAM(s) Oral daily    famotidine    Tablet 20 milliGRAM(s) Oral daily      allopurinol 100 milliGRAM(s) Oral daily  dexAMETHasone  Injectable 6 milliGRAM(s) IV Push daily  levothyroxine 75 MICROGram(s) Oral daily                  ICU Vital Signs Last 24 Hrs  T(C): 36.8 (01 Dec 2021 07:15), Max: 38.2 (30 Nov 2021 22:45)  T(F): 98.2 (01 Dec 2021 07:15), Max: 100.7 (30 Nov 2021 22:45)  HR: 50 (01 Dec 2021 07:15) (11 - 147)  BP: 114/54 (01 Dec 2021 07:15) (81/36 - 200/88)  BP(mean): --  ABP: --  ABP(mean): --  RR: 18 (01 Dec 2021 07:15) (17 - 181)  SpO2: 98% (01 Dec 2021 07:15) (77% - 100%)    Vital Signs Last 24 Hrs  T(C): 36.8 (01 Dec 2021 07:15), Max: 38.2 (30 Nov 2021 22:45)  T(F): 98.2 (01 Dec 2021 07:15), Max: 100.7 (30 Nov 2021 22:45)  HR: 50 (01 Dec 2021 07:15) (11 - 147)  BP: 114/54 (01 Dec 2021 07:15) (81/36 - 200/88)  BP(mean): --  RR: 18 (01 Dec 2021 07:15) (17 - 181)  SpO2: 98% (01 Dec 2021 07:15) (77% - 100%)        I&O's Detail        LABS:                        10.8   8.70  )-----------( 198      ( 01 Dec 2021 05:15 )             34.1     12-01    144  |  104  |  33<H>  ----------------------------<  139<H>  4.2   |  34<H>  |  1.50<H>    Ca    8.6      01 Dec 2021 05:15  Mg     2.4     11-30    TPro  6.9  /  Alb  2.8<L>  /  TBili  0.3  /  DBili  x   /  AST  36  /  ALT  32  /  AlkPhos  69  12-01      CARDIAC MARKERS ( 30 Nov 2021 17:27 )  x     / x     / x     / x     / <1.0 ng/mL      CAPILLARY BLOOD GLUCOSE        PT/INR - ( 30 Nov 2021 17:27 )   PT: 12.9 sec;   INR: 1.11 ratio         PTT - ( 30 Nov 2021 17:27 )  PTT:29.3 sec    CULTURES:      Physical Examination:    General: no   acute distress.  elderly female     HEENT: Pupils equal, reactive to light.  Symmetric.    PULM: crackles 1/3 up bilat;  less rhonchi,wheezes bilat; No change percussion    CVS: irregular rate and rhythm, no murmurs, rubs, or gallops    ABD: Soft, nondistended, nontender, normoactive bowel sounds, no masses    EXT: No edema, nontender calves    SKIN: Warm and well perfused, no rashes noted.    NEURO: Alert, oriented, interactive, nonfocal    RADIOLOGY: ***  < from: CT Chest No Cont (12.01.21 @ 02:32) >  EXAM:  CT CHEST                            PROCEDURE DATE:  12/01/2021          INTERPRETATION:  CLINICAL INFORMATION: Shortness of breath, COPD, Covid.    COMPARISON: 11/4/2018    CONTRAST/COMPLICATIONS:  IV Contrast: NONE  Oral Contrast: NONE  Complications: None reported at time of study completion    PROCEDURE:  CT of the Chest was performed.  Sagittal and coronal reformats were performed.    FINDINGS:  Evaluation of solid organs and vascular structures is limited without intravenous contrast.    LUNGS AND AIRWAYS: Patent central airways. Right upper lobe wedge resection. 2 mm right upper lobe nodule (2, 37). Stable 3 mm right middle lobe nodule (2, 80). Stable 6 mm right lower lobe nodule (2, 74). Stable 5 mm nodule along the right major fissure likely reflecting intrapulmonary node (2, 81). Linear scarring/atelectasis in the right lower lobe. Subsegmental lingular and left lower lobe atelectasis. Patchy opacities in the left lower lobe.  PLEURA: No pleural effusion.  MEDIASTINUM AND FIDEL: 1 cm precarinal node. Subcentimeter other mediastinal nodes.  VESSELS: Aortic and coronary atherosclerosis.  HEART: Mild cardiomegaly. No pericardial effusion.  CHEST WALL AND LOWER NECK: Stable 9 mm hypodense right thyroid nodule.  VISUALIZED UPPER ABDOMEN: Cholecystectomy. Multiple cystic lesions within the pancreas, grossly stable, likely IPMNs or pseudocysts. Bilateral renal cysts and hyperdense lesions likely reflecting hemorrhagic/proteinaceous cyst. Stable appearance of right renalcyst containing internal clustered calcifications (2, 37).  BONES: Degenerative changes. Multiple chronic right rib deformities. Stable mild compression deformity of L2.    IMPRESSION:  Left lower lobe pneumonia. Continued follow up to resolution is recommended.        --- End of Report ---            VENU ZHANG MD; Attending Radiologist  This document has been electronically signed. Dec  1 2021  3:26AM    < end of copied text >  < from: US Duplex Venous Lower Ext Complete, Bilateral (12.01.21 @ 02:08) >  EXAM:  US DPLX LWR EXT VEINS COMPL BI                            PROCEDURE DATE:  12/01/2021          INTERPRETATION:  CLINICAL INDICATION: le edmea sob hypoxia.    TECHNIQUE: Grayscale, color Doppler and spectral Doppler ultrasound was utilized toevaluate bilateral lower extremity deep venous system.    COMPARISON: 9/14/2015.    FINDINGS: There is no thrombus in bilateral common femoral veins, femoral veins or popliteal veins. Visualized bilateral posterior tibial veins and peroneal veins arepatent. Bilateral gastrocnemius veins and soleal veins were not visualized.    IMPRESSION:    Bilateral gastrocnemius veins and soleal veins were not visualized, limiting complete evaluation. No obvious thrombus in the visualized bilateral lower extremity deep veins.    --- End of Report ---        < end of copied text >  ra< from: Xray Chest 1 View- PORTABLE-Routine (Xray Chest 1 View- PORTABLE-Routine in AM.) (12.02.21 @ 09:12) >  EXAM:  XR CHEST PORTABLE ROUTINE 1V                            PROCEDURE DATE:  12/02/2021          INTERPRETATION:  Follow-up. AP chest.    Prior 11/30/2021.    IMPRESSION: No change heart mediastinum. Suspect evolving small patchy infiltrate at the left base. No change small left pleural effusion. New small right pleural effusion.    --- End of Report ---            GARRISON NAIK MD; Attending Radiologist  This document has been electronically signed. De    < end of copied text >  < from: Xray Chest 1 View- PORTABLE-Routine (Xray Chest 1 View- PORTABLE-Routine in AM.) (12.02.21 @ 09:12) >  EXAM:  XR CHEST PORTABLE ROUTINE 1V                            PROCEDURE DATE:  12/02/2021          INTERPRETATION:  Follow-up. AP chest.    Prior 11/30/2021.    IMPRESSION: No change heart mediastinum. Suspect evolving small patchy infiltrate at the left base. No change small left pleural effusion. New small right pleural effusion.    --- End of Report ---            GARRISON NAIK MD; Attending Radiologist  This document has been electronically signed. De    < end of copied text >  CXR chf improved

## 2021-12-09 NOTE — PROGRESS NOTE ADULT - PROBLEM SELECTOR PLAN 1
Patient presented with dyspnea and increased work of breathing, COVID + on 11/30 on prior admission  - Was discharged home on 5L NC on 12/05 as well as PO flagyl and Vantin for asp pna  - on arrival to the ED patient was found to be saturating in low 80's  - Patient was started on non rebreather with improved saturation and switched to BIPAP, then HFNC  - One dose Toci 600mg 12/7, continued on flagyl and vantin  - S/P Remdesivir during previous admission, continue course of decadron, c/w albuterol inhaler q6hrs  - GOC: DNR/DNI MOLST in chart   - d-dimer: 274, low suspicion for DVT and patient already anticoagulated  - Pulm Dr. Milner; ID Dr. Simon  - weaned off HFNC today to NC Patient presented with dyspnea and increased work of breathing, COVID + on 11/30 on prior admission  - Was discharged home on 5L NC on 12/05 as well as PO flagyl and Vantin for asp pna - she completed her course of abx  - One dose Toci 600mg 12/7   - S/P Remdesivir during previous admission, continue course of decadron, c/w albuterol inhaler q6hrs  - GOC: DNR/DNI MOLST in chart   - d-dimer: 274, low suspicion for DVT and patient already anticoagulated  - Pulm Dr. Milner; ID Dr. Simon  - weaned off HFNC today to NC and tolerating 6L of flow

## 2021-12-09 NOTE — DISCHARGE NOTE NURSING/CASE MANAGEMENT/SOCIAL WORK - NSDCPEFALRISK_GEN_ALL_CORE
For information on Fall & Injury Prevention, visit: [email or web address] OR  [email or web address] OR  https://www.cdc.gov/steadi/patient.html

## 2021-12-09 NOTE — PROGRESS NOTE ADULT - ATTENDING COMMENTS
The patient was seen and evaluated independently by the attending physician.  - pt seen assessed. she felt weak and "turned wrong" while ambulating w/ the aide. She said she felt weak and tired and had to sit down. She states that the strenght in her legs simply "gave out" and she had to sit down. She had no chair behind her and she had to sit down on the floor. The aide assisted her to the ground. The patient suffered no trauma during the episode and she denies all pain. She entirely dismissed the episode after it happened and she said "everything is fine".   - rates on monitor much better controlled in 60's - 80's but persistently in afib  - otherwise, pt reports feeling weak and tired w/ a persistent cough. cough is dry. no wheezing. no chest pain  - exam essentially the same. appears weak and tired but is calm and nontoxic. respiratory rate is calm and even around 22 breaths per minute. no noted accessory muscle use. no wheezing. no rhonchi. lungs sound clear. calf nontener to palpation b/l.   - continue w/ current tx plan. cardizem drip. amio load. supportive care for covid. weaning o2. emotional support, education provided regarding expected hospital course  - inflammatory markers are all pending for today, will f/u and review w/ house staff. The patient was seen and evaluated independently by the attending physician.  - she's improving. improving O2 reuirements. her mood is better and energy level is better. she also consume more calories today and her appetite is better. supporitve care. emotional support.. spoke w/ daughter at length and updated her on progress.

## 2021-12-09 NOTE — PROGRESS NOTE ADULT - PROBLEM SELECTOR PLAN 3
Mild evidence of volume overload on admission with LE edema   - Received IVF and became acutely worse  - S/P Lasix 40mg IV x2  - Strict I&Os & daily weights   - TTE 12/03: systolic function with an ejection fraction of approximately 60%. There is diastolic dysfunction. There is mild concentric left ventricular hypertrophy.  - c/w carvedilol with hold parameters.

## 2021-12-09 NOTE — PROGRESS NOTE ADULT - SUBJECTIVE AND OBJECTIVE BOX
FULL NOTE TO FOLLOW: Patient is a 89y old  Female who presents with a chief complaint of Acute Hypoxic respiratory failure and AF with RVR (09 Dec 2021 08:23)      INTERVAL HPI/OVERNIGHT EVENTS: no acute events overnight. pt seen and examined at bedside this morning. pt reports she feels much better than yesterday. reports breathing has improved. denies fever, chills, cp, sob, or any other acute symptoms.     MEDICATIONS  (STANDING):  allopurinol 100 milliGRAM(s) Oral daily  aMIOdarone    Tablet   Oral   aMIOdarone    Tablet 400 milliGRAM(s) Oral every 8 hours  apixaban 2.5 milliGRAM(s) Oral two times a day  aspirin  chewable 81 milliGRAM(s) Oral daily  budesonide 160 MICROgram(s)/formoterol 4.5 MICROgram(s) Inhaler 2 Puff(s) Inhalation two times a day  carvedilol 6.25 milliGRAM(s) Oral every 12 hours  dexAMETHasone     Tablet 6 milliGRAM(s) Oral daily  diltiazem Infusion 15 mG/Hr (15 mL/Hr) IV Continuous <Continuous>  tiotropium 18 MICROgram(s) Capsule 1 Capsule(s) Inhalation daily  venlafaxine XR. 37.5 milliGRAM(s) Oral daily    MEDICATIONS  (PRN):  ALBUTerol    90 MICROgram(s) HFA Inhaler 1 Puff(s) Inhalation every 4 hours PRN Shortness of Breath and/or Wheezing  benzonatate 100 milliGRAM(s) Oral three times a day PRN Cough      Allergies    Tomas Cheese - Pt states the one time she had a mouthful of tomas cheese, her throat was closing and she required an epinephrine injection. (Anaphylaxis)  iodine (Anaphylaxis)  penicillin (Other)  shellfish (Short breath; Hives)  sulfa drugs (Hives)    Intolerances        REVIEW OF SYSTEMS:  CONSTITUTIONAL: No fever or chills  HEENT:  No headache, no sore throat  RESPIRATORY: No cough, wheezing, or shortness of breath  CARDIOVASCULAR: No chest pain, palpitations  GASTROINTESTINAL: No abd pain, nausea, vomiting, or diarrhea  GENITOURINARY: No dysuria, frequency, or hematuria  NEUROLOGICAL: no focal weakness or dizziness  MUSCULOSKELETAL: no myalgias     Vital Signs Last 24 Hrs  T(C): 36.6 (09 Dec 2021 05:06), Max: 36.9 (08 Dec 2021 12:23)  T(F): 97.8 (09 Dec 2021 05:06), Max: 98.4 (08 Dec 2021 12:23)  HR: 72 (09 Dec 2021 08:15) (58 - 93)  BP: 122/73 (09 Dec 2021 05:06) (122/73 - 146/81)  BP(mean): --  RR: 17 (09 Dec 2021 05:06) (17 - 19)  SpO2: 94% (09 Dec 2021 08:15) (92% - 97%)    PHYSICAL EXAM:  GENERAL: NAD, awake, interactive  HEENT:  anicteric, moist mucous membranes  CHEST/LUNG:  decreased breath sounds, no rales, wheezes, or rhonchi  HEART:  RRR, S1, S2  ABDOMEN:  BS active, soft, nontender, nondistended  EXTREMITIES: no edema, cyanosis, bilateral pitting edema   NERVOUS SYSTEM: answers questions and follows commands appropriately    LABS:                        9.9    11.03 )-----------( 344      ( 09 Dec 2021 06:52 )             30.9     CBC Full  -  ( 09 Dec 2021 06:52 )  WBC Count : 11.03 K/uL  Hemoglobin : 9.9 g/dL  Hematocrit : 30.9 %  Platelet Count - Automated : 344 K/uL  Mean Cell Volume : 96.6 fl  Mean Cell Hemoglobin : 30.9 pg  Mean Cell Hemoglobin Concentration : 32.0 gm/dL  Auto Neutrophil # : 8.77 K/uL  Auto Lymphocyte # : 0.79 K/uL  Auto Monocyte # : 0.87 K/uL  Auto Eosinophil # : 0.00 K/uL  Auto Basophil # : 0.03 K/uL  Auto Neutrophil % : 79.4 %  Auto Lymphocyte % : 7.2 %  Auto Monocyte % : 7.9 %  Auto Eosinophil % : 0.0 %  Auto Basophil % : 0.3 %    09 Dec 2021 06:52    143    |  103    |  66     ----------------------------<  141    4.2     |  37     |  1.40     Ca    9.0        09 Dec 2021 06:52  Phos  2.5       09 Dec 2021 06:52  Mg     2.2       09 Dec 2021 06:52    TPro  5.8    /  Alb  2.6    /  TBili  0.5    /  DBili  x      /  AST  20     /  ALT  24     /  AlkPhos  56     09 Dec 2021 06:52        CAPILLARY BLOOD GLUCOSE              RADIOLOGY & ADDITIONAL TESTS:    Personally reviewed.     Consultant(s) Notes Reviewed:  [x] YES  [ ] NO     Patient is a 89y old  Female who presents with a chief complaint of Acute Hypoxic respiratory failure and AF with RVR (09 Dec 2021 08:23)      INTERVAL HPI/OVERNIGHT EVENTS: no acute events overnight. pt seen and examined at bedside this morning. pt reports she feels much better than yesterday. reports breathing has improved. denies tightness in her chest. She's overall feeling "okay" and is eager for dc planning. she reports feeeling marked improvement as compared to yesterday. denies fever, chills, cp, sob, or any other acute symptoms.     MEDICATIONS  (STANDING):  allopurinol 100 milliGRAM(s) Oral daily  aMIOdarone    Tablet   Oral   aMIOdarone    Tablet 400 milliGRAM(s) Oral every 8 hours  apixaban 2.5 milliGRAM(s) Oral two times a day  aspirin  chewable 81 milliGRAM(s) Oral daily  budesonide 160 MICROgram(s)/formoterol 4.5 MICROgram(s) Inhaler 2 Puff(s) Inhalation two times a day  carvedilol 6.25 milliGRAM(s) Oral every 12 hours  dexAMETHasone     Tablet 6 milliGRAM(s) Oral daily  diltiazem Infusion 15 mG/Hr (15 mL/Hr) IV Continuous <Continuous>  tiotropium 18 MICROgram(s) Capsule 1 Capsule(s) Inhalation daily  venlafaxine XR. 37.5 milliGRAM(s) Oral daily    MEDICATIONS  (PRN):  ALBUTerol    90 MICROgram(s) HFA Inhaler 1 Puff(s) Inhalation every 4 hours PRN Shortness of Breath and/or Wheezing  benzonatate 100 milliGRAM(s) Oral three times a day PRN Cough      Allergies    Tomas Cheese - Pt states the one time she had a mouthful of tomas cheese, her throat was closing and she required an epinephrine injection. (Anaphylaxis)  iodine (Anaphylaxis)  penicillin (Other)  shellfish (Short breath; Hives)  sulfa drugs (Hives)    Intolerances        REVIEW OF SYSTEMS:  CONSTITUTIONAL: No fever or chills  HEENT:  No headache, no sore throat  RESPIRATORY: No cough, wheezing, or shortness of breath  CARDIOVASCULAR: No chest pain, palpitations  GASTROINTESTINAL: No abd pain, nausea, vomiting, or diarrhea  GENITOURINARY: No dysuria, frequency, or hematuria  NEUROLOGICAL: no focal weakness or dizziness  MUSCULOSKELETAL: no myalgias, no arthralgias    Vital Signs Last 24 Hrs  T(C): 36.6 (09 Dec 2021 05:06), Max: 36.9 (08 Dec 2021 12:23)  T(F): 97.8 (09 Dec 2021 05:06), Max: 98.4 (08 Dec 2021 12:23)  HR: 72 (09 Dec 2021 08:15) (58 - 93)  BP: 122/73 (09 Dec 2021 05:06) (122/73 - 146/81)  BP(mean): --  RR: 17 (09 Dec 2021 05:06) (17 - 19)  SpO2: 94% (09 Dec 2021 08:15) (92% - 97%)    PHYSICAL EXAM:  GENERAL: NAD, awake, interactive  HEENT:  anicteric, moist mucous membranes  CHEST/LUNG:  decreased breath sounds, no rales, wheezes, or rhonchi  HEART:  RRR, S1, S2  ABDOMEN:  BS active, soft, nontender, nondistended  EXTREMITIES: no edema, cyanosis, bilateral pitting edema   NERVOUS SYSTEM: answers questions and follows commands appropriately    LABS:                        9.9    11.03 )-----------( 344      ( 09 Dec 2021 06:52 )             30.9     CBC Full  -  ( 09 Dec 2021 06:52 )  WBC Count : 11.03 K/uL  Hemoglobin : 9.9 g/dL  Hematocrit : 30.9 %  Platelet Count - Automated : 344 K/uL  Mean Cell Volume : 96.6 fl  Mean Cell Hemoglobin : 30.9 pg  Mean Cell Hemoglobin Concentration : 32.0 gm/dL  Auto Neutrophil # : 8.77 K/uL  Auto Lymphocyte # : 0.79 K/uL  Auto Monocyte # : 0.87 K/uL  Auto Eosinophil # : 0.00 K/uL  Auto Basophil # : 0.03 K/uL  Auto Neutrophil % : 79.4 %  Auto Lymphocyte % : 7.2 %  Auto Monocyte % : 7.9 %  Auto Eosinophil % : 0.0 %  Auto Basophil % : 0.3 %    09 Dec 2021 06:52    143    |  103    |  66     ----------------------------<  141    4.2     |  37     |  1.40     Ca    9.0        09 Dec 2021 06:52  Phos  2.5       09 Dec 2021 06:52  Mg     2.2       09 Dec 2021 06:52    TPro  5.8    /  Alb  2.6    /  TBili  0.5    /  DBili  x      /  AST  20     /  ALT  24     /  AlkPhos  56     09 Dec 2021 06:52        CAPILLARY BLOOD GLUCOSE              RADIOLOGY & ADDITIONAL TESTS:    Personally reviewed.     Consultant(s) Notes Reviewed:  [x] YES  [ ] NO

## 2021-12-09 NOTE — DISCHARGE NOTE NURSING/CASE MANAGEMENT/SOCIAL WORK - ADDITIONAL RESOURCE TYPE OF SERVICE
[facility] transport to [facility]. Both [facility] company and [facility] are aware that pt is COVID Positive.

## 2021-12-09 NOTE — PROGRESS NOTE ADULT - PROBLEM SELECTOR PLAN 2
Patient presented with SOB and decreased saturation and was found to be in A. Fib  - Patient with Hx of PAFib on Coreg and Cardizem for rate control and Eliquis for AC will continue inpatient  - Likely triggered by acute respiratory failure and hypoxia.   - S/P Cardizem 10mg IV x1 and 15 mg IV X 1  - Cardiology consulted (Dr. Eastman group consulted by ED - recs appreciated)  - amiodarone loading dose per cardio recs with goal HR 50-60s   - Cardizem drip as per Cardio recs  - Continue to monitor on telemetry Patient presented with SOB and decreased saturation and was found to be in A. Fib  - Patient with Hx of PAFib on Coreg and Cardizem for rate control and Eliquis for AC will continue inpatient  - Likely triggered by acute respiratory failure and hypoxia.   - S/P Cardizem 10mg IV x1 and 15 mg IV X 1  - Cardiology consulted (Dr. Eastman group consulted by ED - recs appreciated)  - amiodarone loading dose per cardio recs with goal HR 50-60s   - c/w cardizem infusion for now  - Continue to monitor on telemetry

## 2021-12-09 NOTE — DISCHARGE NOTE NURSING/CASE MANAGEMENT/SOCIAL WORK - PATIENT PORTAL LINK FT
You can access the FollowMyHealth Patient Portal offered by [facility] by registering at the following website: [email or web address]. By joining [facility]’s FollowMyHealth portal, you will also be able to view your health information using other applications (apps) compatible with our system.

## 2021-12-09 NOTE — PROGRESS NOTE ADULT - ASSESSMENT
89F DNR/DNI with PAF, HTN, Hypothyroidism, COPD (2-3 L nasal cannula at home) Hyperlipidemia, recently discharged after a COVID admission with AF, RVR and shortness of breath/LE edema now returns with SOB back in rapid heart rates with hypoxia.  She was initially given IVF for possible dehydration but with wheezing and worsening saturation, was given IV lasix and seen by ICU.  She was placed on BiPAP with improved hypoxia and diuresis. Still with dyspnea and heart rates are still elevated in afib    Suggest:    1. Acute hypoxemic respiratory failure due to recent COVID-19 pneumonia and probably acute on chronic diastolic dysfunction from AF with RVR   - Supplemental O2.   - Probable acute on chronic diastolic CHF with moderate to severe MR and rapid AF    - Get repeat CXR today    2. Elevated troponin.   -Slight upward trend, probable demand ischemia.  -EKG without acute change.  -Medical management of CAD with rate control/betablocker/asa    3. Atrial fibrillation.   - Has h/o PAF, usually in sinus rhythm.  In AF with RVR on admission.  - Would look to restore/maintain sinus rhythm with beta blockers/cardizem and continue with amiodarone for rate/rhythm control.   On IV cardizem gtt and rate is trending down.  Can switch to po cardizem 60 mg q6 with hold parameters.   - Eliquis 2.5 mg BID for primary stroke prevention.    4. Hypertension.   - Continue with cardizem/Carvedilol    Will follow.

## 2021-12-09 NOTE — PROGRESS NOTE ADULT - SUBJECTIVE AND OBJECTIVE BOX
Patient is a 89y Female with a known history of :  Acute respiratory failure with hypoxia [J96.01]    Atrial fibrillation [I48.91]    Leukocytosis [D72.829]    Anemia of chronic disease [D63.8]    Hypernatremia [E87.0]    Anemia [D64.9]    Elevated troponin [R77.8]    Acute on chronic systolic congestive heart failure [I50.23]    Hypertension [I10]    Hypothyroidism [E03.9]    Need for prophylactic measure [Z29.9]    Acute on chronic diastolic congestive heart failure [I50.33]      HPI:  Patient is a 89 yo Female with PMHx of Paroxysmal Atrial Fibrillation (on eliquis), HTN, Hypothyroidism, COPD (2-3 L nasal cannula at home) Hyperlipidemia, presenting with shortness of breath. Patient was admitted at [facility] for acute hypoxic respiratory failure secondary to COVID infection and was discharged home yesterday. History obtained from Emerald Martinez who is also the HCP over the phone at [phone]. Emerald informs that after coming home yesterday patient remained lethargic, claims that she did not sleep well overnight and this morning at the breakfast was confused and dyspneic. Emerald informs that patient was getting tired and fatigued because of difficulty breathing so decided to bring her back to the hospital.  Patient is short of breath at baseline but claims that she is more dyspneic and tiered. Denies any chest pain or palpitations. No nausea or vomiting,     ED course:   Vitals:   BP: 115/91  HR: 123  Temp:   RR: 18, O2: 95% on 4L NC   Labs:   CBC: WBC:13.52 Hb: 10.06 , Platlets: 344 Neutrophils: 82.3   CMP:  , K+ 4.9-, Cl111- , BUN/Creatinine: 61/1.30, Procal: 0.18, D-Dimer: 274, APTT: 27.1, PT: 13, INR: 1.12  CXR: bibasilar opacities, blunted CP angles on personal review  EKG: Atrial flutter with variable AV block, QT/QTc: 326/454,   Patient received: 1L NS x1, Cardizem 10mg IV x1, 15 mg IV X1 and Lasix 40mg IV x1 Duoneb X 1 (06 Dec 2021 21:25)      REVIEW OF SYSTEMS:    All other review of systems is negative unless indicated above      MEDICATIONS  (STANDING):  allopurinol 100 milliGRAM(s) Oral daily  aMIOdarone    Tablet   Oral   aMIOdarone    Tablet 400 milliGRAM(s) Oral every 8 hours  apixaban 2.5 milliGRAM(s) Oral two times a day  aspirin  chewable 81 milliGRAM(s) Oral daily  budesonide 160 MICROgram(s)/formoterol 4.5 MICROgram(s) Inhaler 2 Puff(s) Inhalation two times a day  carvedilol 6.25 milliGRAM(s) Oral every 12 hours  dexAMETHasone     Tablet 6 milliGRAM(s) Oral daily  diltiazem Infusion 15 mG/Hr (15 mL/Hr) IV Continuous <Continuous>  tiotropium 18 MICROgram(s) Capsule 1 Capsule(s) Inhalation daily  venlafaxine XR. 37.5 milliGRAM(s) Oral daily    MEDICATIONS  (PRN):  ALBUTerol    90 MICROgram(s) HFA Inhaler 1 Puff(s) Inhalation every 4 hours PRN Shortness of Breath and/or Wheezing  benzonatate 100 milliGRAM(s) Oral three times a day PRN Cough      ALLERGIES: Tomas Cheese - Pt states the one time she had a mouthful of tomas cheese, her throat was closing and she required an epinephrine injection. (Anaphylaxis)  iodine (Anaphylaxis)  penicillin (Other)  shellfish (Short breath; Hives)  sulfa drugs (Hives)      FAMILY HISTORY:  Family history of nasopharyngeal cancer (Child)  daughter    Family history of breast cancer (Mother)        PHYSICAL EXAMINATION:  -----------------------------  T(C): 36.6 (12-09-21 @ 13:10), Max: 36.7 (12-08-21 @ 23:58)  HR: 56 (12-09-21 @ 13:10) (56 - 88)  BP: 101/59 (12-09-21 @ 13:10) (101/59 - 146/81)  RR: 16 (12-09-21 @ 13:10) (16 - 19)  SpO2: 97% (12-09-21 @ 13:10) (92% - 97%)  Wt(kg): --    12-08 @ 07:01  -  12-09 @ 07:00  --------------------------------------------------------  IN:    Diltiazem: 125 mL  Total IN: 125 mL    OUT:    Voided (mL): 200 mL  Total OUT: 200 mL    Total NET: -75 mL              LABS:   --------  12-09    143  |  103  |  66<H>  ----------------------------<  141<H>  4.2   |  37<H>  |  1.40<H>    Ca    9.0      09 Dec 2021 06:52  Phos  2.5     12-09  Mg     2.2     12-09    TPro  5.8<L>  /  Alb  2.6<L>  /  TBili  0.5  /  DBili  x   /  AST  20  /  ALT  24  /  AlkPhos  56  12-09                         9.9    11.03 )-----------( 344      ( 09 Dec 2021 06:52 )             30.9

## 2021-12-10 LAB
ALBUMIN SERPL ELPH-MCNC: 2.4 G/DL — LOW (ref 3.3–5)
ALP SERPL-CCNC: 63 U/L — SIGNIFICANT CHANGE UP (ref 40–120)
ALT FLD-CCNC: 26 U/L — SIGNIFICANT CHANGE UP (ref 12–78)
ANION GAP SERPL CALC-SCNC: 5 MMOL/L — SIGNIFICANT CHANGE UP (ref 5–17)
AST SERPL-CCNC: 17 U/L — SIGNIFICANT CHANGE UP (ref 15–37)
BASOPHILS # BLD AUTO: 0.06 K/UL — SIGNIFICANT CHANGE UP (ref 0–0.2)
BASOPHILS NFR BLD AUTO: 0.3 % — SIGNIFICANT CHANGE UP (ref 0–2)
BILIRUB SERPL-MCNC: 0.4 MG/DL — SIGNIFICANT CHANGE UP (ref 0.2–1.2)
BUN SERPL-MCNC: 89 MG/DL — HIGH (ref 7–23)
CALCIUM SERPL-MCNC: 8.7 MG/DL — SIGNIFICANT CHANGE UP (ref 8.5–10.1)
CHLORIDE SERPL-SCNC: 105 MMOL/L — SIGNIFICANT CHANGE UP (ref 96–108)
CO2 SERPL-SCNC: 32 MMOL/L — HIGH (ref 22–31)
CREAT SERPL-MCNC: 1.9 MG/DL — HIGH (ref 0.5–1.3)
EOSINOPHIL # BLD AUTO: 0 K/UL — SIGNIFICANT CHANGE UP (ref 0–0.5)
EOSINOPHIL NFR BLD AUTO: 0 % — SIGNIFICANT CHANGE UP (ref 0–6)
GLUCOSE SERPL-MCNC: 125 MG/DL — HIGH (ref 70–99)
HCT VFR BLD CALC: 31.6 % — LOW (ref 34.5–45)
HGB BLD-MCNC: 10.2 G/DL — LOW (ref 11.5–15.5)
IMM GRANULOCYTES NFR BLD AUTO: 6.6 % — HIGH (ref 0–1.5)
LYMPHOCYTES # BLD AUTO: 1.07 K/UL — SIGNIFICANT CHANGE UP (ref 1–3.3)
LYMPHOCYTES # BLD AUTO: 6.1 % — LOW (ref 13–44)
MAGNESIUM SERPL-MCNC: 2.5 MG/DL — SIGNIFICANT CHANGE UP (ref 1.6–2.6)
MCHC RBC-ENTMCNC: 31.5 PG — SIGNIFICANT CHANGE UP (ref 27–34)
MCHC RBC-ENTMCNC: 32.3 GM/DL — SIGNIFICANT CHANGE UP (ref 32–36)
MCV RBC AUTO: 97.5 FL — SIGNIFICANT CHANGE UP (ref 80–100)
MONOCYTES # BLD AUTO: 1.04 K/UL — HIGH (ref 0–0.9)
MONOCYTES NFR BLD AUTO: 6 % — SIGNIFICANT CHANGE UP (ref 2–14)
NEUTROPHILS # BLD AUTO: 14.09 K/UL — HIGH (ref 1.8–7.4)
NEUTROPHILS NFR BLD AUTO: 81 % — HIGH (ref 43–77)
NRBC # BLD: 0 /100 WBCS — SIGNIFICANT CHANGE UP (ref 0–0)
PHOSPHATE SERPL-MCNC: 3.3 MG/DL — SIGNIFICANT CHANGE UP (ref 2.5–4.5)
PLATELET # BLD AUTO: 317 K/UL — SIGNIFICANT CHANGE UP (ref 150–400)
POTASSIUM SERPL-MCNC: 4.7 MMOL/L — SIGNIFICANT CHANGE UP (ref 3.5–5.3)
POTASSIUM SERPL-SCNC: 4.7 MMOL/L — SIGNIFICANT CHANGE UP (ref 3.5–5.3)
PROT SERPL-MCNC: 5.7 G/DL — LOW (ref 6–8.3)
RBC # BLD: 3.24 M/UL — LOW (ref 3.8–5.2)
RBC # FLD: 15 % — HIGH (ref 10.3–14.5)
SODIUM SERPL-SCNC: 142 MMOL/L — SIGNIFICANT CHANGE UP (ref 135–145)
WBC # BLD: 17.4 K/UL — HIGH (ref 3.8–10.5)
WBC # FLD AUTO: 17.4 K/UL — HIGH (ref 3.8–10.5)

## 2021-12-10 PROCEDURE — 99232 SBSQ HOSP IP/OBS MODERATE 35: CPT

## 2021-12-10 PROCEDURE — 99233 SBSQ HOSP IP/OBS HIGH 50: CPT | Mod: GC

## 2021-12-10 RX ORDER — DILTIAZEM HCL 120 MG
60 CAPSULE, EXT RELEASE 24 HR ORAL EVERY 6 HOURS
Refills: 0 | Status: DISCONTINUED | OUTPATIENT
Start: 2021-12-10 | End: 2021-12-11

## 2021-12-10 RX ORDER — ALPRAZOLAM 0.25 MG
0.25 TABLET ORAL ONCE
Refills: 0 | Status: DISCONTINUED | OUTPATIENT
Start: 2021-12-10 | End: 2021-12-10

## 2021-12-10 RX ADMIN — CARVEDILOL PHOSPHATE 6.25 MILLIGRAM(S): 80 CAPSULE, EXTENDED RELEASE ORAL at 05:38

## 2021-12-10 RX ADMIN — Medication 100 MILLIGRAM(S): at 11:29

## 2021-12-10 RX ADMIN — Medication 0.25 MILLIGRAM(S): at 14:56

## 2021-12-10 RX ADMIN — TIOTROPIUM BROMIDE 1 CAPSULE(S): 18 CAPSULE ORAL; RESPIRATORY (INHALATION) at 05:38

## 2021-12-10 RX ADMIN — Medication 50 MICROGRAM(S): at 05:38

## 2021-12-10 RX ADMIN — AMIODARONE HYDROCHLORIDE 400 MILLIGRAM(S): 400 TABLET ORAL at 05:38

## 2021-12-10 RX ADMIN — BUDESONIDE AND FORMOTEROL FUMARATE DIHYDRATE 2 PUFF(S): 160; 4.5 AEROSOL RESPIRATORY (INHALATION) at 05:38

## 2021-12-10 RX ADMIN — Medication 6 MILLIGRAM(S): at 05:38

## 2021-12-10 RX ADMIN — CARVEDILOL PHOSPHATE 6.25 MILLIGRAM(S): 80 CAPSULE, EXTENDED RELEASE ORAL at 17:39

## 2021-12-10 RX ADMIN — AMIODARONE HYDROCHLORIDE 400 MILLIGRAM(S): 400 TABLET ORAL at 14:57

## 2021-12-10 RX ADMIN — Medication 37.5 MILLIGRAM(S): at 11:31

## 2021-12-10 RX ADMIN — BUDESONIDE AND FORMOTEROL FUMARATE DIHYDRATE 2 PUFF(S): 160; 4.5 AEROSOL RESPIRATORY (INHALATION) at 22:05

## 2021-12-10 RX ADMIN — Medication 60 MILLIGRAM(S): at 17:38

## 2021-12-10 RX ADMIN — AMIODARONE HYDROCHLORIDE 400 MILLIGRAM(S): 400 TABLET ORAL at 22:04

## 2021-12-10 RX ADMIN — APIXABAN 2.5 MILLIGRAM(S): 2.5 TABLET, FILM COATED ORAL at 05:38

## 2021-12-10 RX ADMIN — APIXABAN 2.5 MILLIGRAM(S): 2.5 TABLET, FILM COATED ORAL at 17:40

## 2021-12-10 RX ADMIN — Medication 81 MILLIGRAM(S): at 11:32

## 2021-12-10 NOTE — PROGRESS NOTE ADULT - SUBJECTIVE AND OBJECTIVE BOX
[facility] Cardiology    CHIEF COMPLAINT: Patient is a 89y old  Female who presents with a chief complaint of Acute Hypoxic respiratory failure and AF with RVR (10 Dec 2021 08:38)      Follow Up: [ ] Chest Pain      [ ] Dyspnea     [ ] Palpitations    [ ] Atrial Fibrillation     [ ] Ventricular Dysrhythmia    [ ] Abnormal EKG                      [ ] Abnormal Cardiac Enzymes     [ ] Valvular Disease    HPI:  Patient is a 89 yo Female with PMHx of Paroxysmal Atrial Fibrillation (on eliquis), HTN, Hypothyroidism, COPD (2-3 L nasal cannula at home) Hyperlipidemia, presenting with shortness of breath. Patient was admitted at [facility] for acute hypoxic respiratory failure secondary to COVID infection and was discharged home yesterday. History obtained from Emerald Martinez who is also the HCP over the phone at [phone]. Emerald informs that after coming home yesterday patient remained lethargic, claims that she did not sleep well overnight and this morning at the breakfast was confused and dyspneic. Emerald informs that patient was getting tired and fatigued because of difficulty breathing so decided to bring her back to the hospital.  Patient is short of breath at baseline but claims that she is more dyspneic and tiered. Denies any chest pain or palpitations. No nausea or vomiting,     ED course:   Vitals:   BP: 115/91  HR: 123  Temp:   RR: 18, O2: 95% on 4L NC   Labs:   CBC: WBC:13.52 Hb: 10.06 , Platlets: 344 Neutrophils: 82.3   CMP:  , K+ 4.9-, Cl111- , BUN/Creatinine: 61/1.30, Procal: 0.18, D-Dimer: 274, APTT: 27.1, PT: 13, INR: 1.12  CXR: bibasilar opacities, blunted CP angles on personal review  EKG: Atrial flutter with variable AV block, QT/QTc: 326/454,   Patient received: 1L NS x1, Cardizem 10mg IV x1, 15 mg IV X1 and Lasix 40mg IV x1 Duoneb X 1 (06 Dec 2021 21:25)    PAST MEDICAL & SURGICAL HISTORY:  Hypertension    Depression    Overactive bladder    Gout    Osteoarthritis    Hypothyroid    HLD (hyperlipidemia)    Paroxysmal atrial fibrillation    Asthma    S/P cholecystectomy    S/P lumpectomy of breast    S/P hysterectomy    S/P lung surgery, follow-up exam  s/p removal of suspicious lesion, negative      MEDICATIONS  (STANDING):  allopurinol 100 milliGRAM(s) Oral daily  aMIOdarone    Tablet   Oral   aMIOdarone    Tablet 400 milliGRAM(s) Oral every 8 hours  apixaban 2.5 milliGRAM(s) Oral two times a day  aspirin  chewable 81 milliGRAM(s) Oral daily  budesonide 160 MICROgram(s)/formoterol 4.5 MICROgram(s) Inhaler 2 Puff(s) Inhalation two times a day  carvedilol 6.25 milliGRAM(s) Oral every 12 hours  diltiazem    Tablet 60 milliGRAM(s) Oral every 6 hours  levothyroxine 50 MICROGram(s) Oral daily  tiotropium 18 MICROgram(s) Capsule 1 Capsule(s) Inhalation daily  venlafaxine XR. 37.5 milliGRAM(s) Oral daily    MEDICATIONS  (PRN):  ALBUTerol    90 MICROgram(s) HFA Inhaler 1 Puff(s) Inhalation every 4 hours PRN Shortness of Breath and/or Wheezing  benzonatate 100 milliGRAM(s) Oral three times a day PRN Cough    Allergies    Tomas Cheese - Pt states the one time she had a mouthful of tomas cheese, her throat was closing and she required an epinephrine injection. (Anaphylaxis)  iodine (Anaphylaxis)  penicillin (Other)  shellfish (Short breath; Hives)  sulfa drugs (Hives)    Intolerances        REVIEW OF SYSTEMS:    CONSTITUTIONAL: No weakness, fevers or chills.   EYES/ENT: No visual changes;    NECK: No pain or stiffness  RESPIRATORY: No cough, wheezing, No shortness of breath  CARDIOVASCULAR: No chest pain or palpitations  GASTROINTESTINAL: No abdominal pain, or hematochezia.  GENITOURINARY: No dysuria orhematuria  NEUROLOGICAL: No numbness or weakness  SKIN: No itching, burning, rashes  All other review of systems is negative unless indicated above    Vital Signs Last 24 Hrs  T(C): 36.6 (10 Dec 2021 04:57), Max: 36.6 (09 Dec 2021 13:10)  T(F): 97.9 (10 Dec 2021 04:57), Max: 97.9 (09 Dec 2021 13:10)  HR: 64 (10 Dec 2021 07:57) (50 - 77)  BP: 133/70 (10 Dec 2021 04:57) (101/59 - 133/70)  BP(mean): --  RR: 17 (10 Dec 2021 04:57) (16 - 17)  SpO2: 97% (10 Dec 2021 07:57) (97% - 99%)  I&O's Summary    09 Dec 2021 07:01  -  10 Dec 2021 07:00  --------------------------------------------------------  IN: 0 mL / OUT: 300 mL / NET: -300 mL        PHYSICAL EXAM:  Constitutional: NAD  Neurological: Alert, no focal deficits  HEENT: no JVD, EOMI  pt sitting up in bed  will defer full exam to pulm/med team  EXT:  no peripheral edema  Skin: No rashes.  Psych:  Mood calm  LABS: All Labs Reviewed:                          10.2   17.40 )-----------( 317      ( 10 Dec 2021 08:16 )             31.6     12-10    142  |  105  |  89<H>  ----------------------------<  125<H>  4.7   |  32<H>  |  1.90<H>    Ca    8.7      10 Dec 2021 08:16  Phos  3.3     12-10  Mg     2.5     12-10    TPro  5.7<L>  /  Alb  2.4<L>  /  TBili  0.4  /  DBili  x   /  AST  17  /  ALT  26  /  AlkPhos  63  12-10    · Assessment	  89F DNR/DNI with PAF, HTN, Hypothyroidism, COPD (2-3 L nasal cannula at home) Hyperlipidemia, recently discharged after a COVID admission with AF, RVR and shortness of breath/LE edema now returns with SOB back in rapid heart rates with hypoxia.  She was initially given IVF for possible dehydration but with wheezing and worsening saturation, was given IV lasix and seen by ICU.  She was placed on BiPAP with improved hypoxia and diuresis. Still with dyspnea and heart rates are still elevated in afib    Suggest:    1. Acute hypoxemic respiratory failure due to recent COVID-19 pneumonia and probably acute on chronic diastolic dysfunction from AF with RVR   - Supplemental O2.   - Probable acute on chronic diastolic CHF with moderate to severe MR and rapid AF    -last CXR, no pleural effusion    2. Elevated troponin.   -Slight upward trend, probable demand ischemia.  -EKG without acute change.  -Medical management of CAD with rate control/betablocker/asa    3. Atrial fibrillation.   - Has h/o PAF, usually in sinus rhythm.  In AF with RVR on admission.  - beta blockers/cardizem and continue with amiodarone for rate/rhythm control.   - po cardizem 60 mg q6 with hold parameters.   - Eliquis 2.5 mg BID for primary stroke prevention.    4. Hypertension.   - Continue with cardizem/Carvedilol    Will follow PRN  outpt f/u with Dr Leavitt [phone]

## 2021-12-10 NOTE — PROGRESS NOTE ADULT - ASSESSMENT
Pt. with acute respiratory failure due to probable CHF.  Improved today--on NC.  Problem ambulating due to weakness, deconditioning.   Recent Covid, COPD exac.  Cardio note apprec   Rapid at fib better on AMIO  Hi troponin.    DC planning  FU with me in office    Fu CXR as outpt.

## 2021-12-10 NOTE — PROGRESS NOTE ADULT - PROBLEM SELECTOR PLAN 2
Patient presented with SOB and decreased saturation and was found to be in A. Fib  - Patient with Hx of PAFib on Coreg and Cardizem for rate control and Eliquis for AC will continue inpatient  - Likely triggered by acute respiratory failure and hypoxia.   - S/P Cardizem 10mg IV x1 and 15 mg IV X 1  - Cardiology consulted (Dr. Eastman group consulted by ED - recs appreciated)  - amiodarone loading dose per cardio recs with goal HR 50-60s   - cardizem infusion transitioned into PO cardizem 60mg q6h  - Continue to monitor on telemetry

## 2021-12-10 NOTE — PROGRESS NOTE ADULT - SUBJECTIVE AND OBJECTIVE BOX
Patient is a 89y old  Female who presents with a chief complaint of Acute Hypoxic respiratory failure and AF with RVR (10 Dec 2021 09:50)      INTERVAL HPI/OVERNIGHT EVENTS: no acute events overnight. pt seen and examined at bedside this am. pt denies chest pain, fever, chills, sob. only reports slight worsening of lower extremity edema and that her legs feel weaker and is requesting PT to see her.     MEDICATIONS  (STANDING):  allopurinol 100 milliGRAM(s) Oral daily  aMIOdarone    Tablet   Oral   aMIOdarone    Tablet 400 milliGRAM(s) Oral every 8 hours  apixaban 2.5 milliGRAM(s) Oral two times a day  aspirin  chewable 81 milliGRAM(s) Oral daily  budesonide 160 MICROgram(s)/formoterol 4.5 MICROgram(s) Inhaler 2 Puff(s) Inhalation two times a day  carvedilol 6.25 milliGRAM(s) Oral every 12 hours  diltiazem    Tablet 60 milliGRAM(s) Oral every 6 hours  levothyroxine 50 MICROGram(s) Oral daily  tiotropium 18 MICROgram(s) Capsule 1 Capsule(s) Inhalation daily  venlafaxine XR. 37.5 milliGRAM(s) Oral daily    MEDICATIONS  (PRN):  ALBUTerol    90 MICROgram(s) HFA Inhaler 1 Puff(s) Inhalation every 4 hours PRN Shortness of Breath and/or Wheezing  benzonatate 100 milliGRAM(s) Oral three times a day PRN Cough      Allergies    Tomas Cheese - Pt states the one time she had a mouthful of tomas cheese, her throat was closing and she required an epinephrine injection. (Anaphylaxis)  iodine (Anaphylaxis)  penicillin (Other)  shellfish (Short breath; Hives)  sulfa drugs (Hives)    Intolerances        REVIEW OF SYSTEMS:  CONSTITUTIONAL: No fever or chills  HEENT:  No headache, no sore throat  RESPIRATORY: No cough, wheezing, or shortness of breath  CARDIOVASCULAR: No chest pain, palpitations  GASTROINTESTINAL: No abd pain, nausea, vomiting, or diarrhea  GENITOURINARY: No dysuria, frequency, or hematuria  NEUROLOGICAL: no focal weakness or dizziness  MUSCULOSKELETAL: no myalgias, lower extremity swelling bilaterally with weakness    Vital Signs Last 24 Hrs  T(C): 36.6 (10 Dec 2021 04:57), Max: 36.6 (09 Dec 2021 13:10)  T(F): 97.9 (10 Dec 2021 04:57), Max: 97.9 (09 Dec 2021 13:10)  HR: 67 (10 Dec 2021 11:41) (50 - 77)  BP: 133/70 (10 Dec 2021 04:57) (101/59 - 133/70)  BP(mean): --  RR: 17 (10 Dec 2021 04:57) (16 - 17)  SpO2: 99% (10 Dec 2021 11:41) (97% - 99%)    PHYSICAL EXAM:  GENERAL: NAD  HEENT:  anicteric, moist mucous membranes  CHEST/LUNG:  dec bs, no rales, wheezes, or rhonchi  HEART:  RRR, S1, S2  ABDOMEN:  BS+, soft, nontender, nondistended  EXTREMITIES: b/l LE edema  NERVOUS SYSTEM: answers questions and follows commands appropriately    LABS:                        10.2   17.40 )-----------( 317      ( 10 Dec 2021 08:16 )             31.6     CBC Full  -  ( 10 Dec 2021 08:16 )  WBC Count : 17.40 K/uL  Hemoglobin : 10.2 g/dL  Hematocrit : 31.6 %  Platelet Count - Automated : 317 K/uL  Mean Cell Volume : 97.5 fl  Mean Cell Hemoglobin : 31.5 pg  Mean Cell Hemoglobin Concentration : 32.3 gm/dL  Auto Neutrophil # : 14.09 K/uL  Auto Lymphocyte # : 1.07 K/uL  Auto Monocyte # : 1.04 K/uL  Auto Eosinophil # : 0.00 K/uL  Auto Basophil # : 0.06 K/uL  Auto Neutrophil % : 81.0 %  Auto Lymphocyte % : 6.1 %  Auto Monocyte % : 6.0 %  Auto Eosinophil % : 0.0 %  Auto Basophil % : 0.3 %    10 Dec 2021 08:16    142    |  105    |  89     ----------------------------<  125    4.7     |  32     |  1.90     Ca    8.7        10 Dec 2021 08:16  Phos  3.3       10 Dec 2021 08:16  Mg     2.5       10 Dec 2021 08:16    TPro  5.7    /  Alb  2.4    /  TBili  0.4    /  DBili  x      /  AST  17     /  ALT  26     /  AlkPhos  63     10 Dec 2021 08:16        CAPILLARY BLOOD GLUCOSE              RADIOLOGY & ADDITIONAL TESTS:    Personally reviewed.     Consultant(s) Notes Reviewed:  [x] YES  [ ] NO     Patient is a 89y old  Female who presents with a chief complaint of Acute Hypoxic respiratory failure and AF with RVR (10 Dec 2021 09:50)      INTERVAL HPI/OVERNIGHT EVENTS: no acute events overnight. pt seen and examined at bedside this am. pt denies chest pain, fever, chills, sob. only reports slight worsening of lower extremity edema and that her legs feel weaker and is requesting PT to see her. She feels down and depressed. She's not drinking much water despite my counseling. Otherwise, no complaints     MEDICATIONS  (STANDING):  allopurinol 100 milliGRAM(s) Oral daily  aMIOdarone    Tablet   Oral   aMIOdarone    Tablet 400 milliGRAM(s) Oral every 8 hours  apixaban 2.5 milliGRAM(s) Oral two times a day  aspirin  chewable 81 milliGRAM(s) Oral daily  budesonide 160 MICROgram(s)/formoterol 4.5 MICROgram(s) Inhaler 2 Puff(s) Inhalation two times a day  carvedilol 6.25 milliGRAM(s) Oral every 12 hours  diltiazem    Tablet 60 milliGRAM(s) Oral every 6 hours  levothyroxine 50 MICROGram(s) Oral daily  tiotropium 18 MICROgram(s) Capsule 1 Capsule(s) Inhalation daily  venlafaxine XR. 37.5 milliGRAM(s) Oral daily    MEDICATIONS  (PRN):  ALBUTerol    90 MICROgram(s) HFA Inhaler 1 Puff(s) Inhalation every 4 hours PRN Shortness of Breath and/or Wheezing  benzonatate 100 milliGRAM(s) Oral three times a day PRN Cough      Allergies    Tomas Cheese - Pt states the one time she had a mouthful of tomas cheese, her throat was closing and she required an epinephrine injection. (Anaphylaxis)  iodine (Anaphylaxis)  penicillin (Other)  shellfish (Short breath; Hives)  sulfa drugs (Hives)    Intolerances        REVIEW OF SYSTEMS:  CONSTITUTIONAL: No fever or chills  HEENT:  No headache, no sore throat  RESPIRATORY: No cough, wheezing, or shortness of breath  CARDIOVASCULAR: No chest pain, palpitations  GASTROINTESTINAL: No abd pain, nausea, vomiting, or diarrhea  GENITOURINARY: No dysuria, frequency, or hematuria  NEUROLOGICAL: no focal weakness or dizziness  MUSCULOSKELETAL: no myalgias, lower extremity swelling bilaterally with weakness    Vital Signs Last 24 Hrs  T(C): 36.6 (10 Dec 2021 04:57), Max: 36.6 (09 Dec 2021 13:10)  T(F): 97.9 (10 Dec 2021 04:57), Max: 97.9 (09 Dec 2021 13:10)  HR: 67 (10 Dec 2021 11:41) (50 - 77)  BP: 133/70 (10 Dec 2021 04:57) (101/59 - 133/70)  BP(mean): --  RR: 17 (10 Dec 2021 04:57) (16 - 17)  SpO2: 99% (10 Dec 2021 11:41) (97% - 99%)    PHYSICAL EXAM:  GENERAL: NAD, nontoxic  HEENT:  anicteric, moist mucous membranes  CHEST/LUNG:  dec bs, no rales, wheezes, or rhonchi  HEART:  RRR, S1, S2  ABDOMEN:  BS active, soft, nontender, nondistended  EXTREMITIES: b/l LE edema, pitting, chronic appearing  NERVOUS SYSTEM: answers questions and follows commands appropriately    LABS:                        10.2   17.40 )-----------( 317      ( 10 Dec 2021 08:16 )             31.6     CBC Full  -  ( 10 Dec 2021 08:16 )  WBC Count : 17.40 K/uL  Hemoglobin : 10.2 g/dL  Hematocrit : 31.6 %  Platelet Count - Automated : 317 K/uL  Mean Cell Volume : 97.5 fl  Mean Cell Hemoglobin : 31.5 pg  Mean Cell Hemoglobin Concentration : 32.3 gm/dL  Auto Neutrophil # : 14.09 K/uL  Auto Lymphocyte # : 1.07 K/uL  Auto Monocyte # : 1.04 K/uL  Auto Eosinophil # : 0.00 K/uL  Auto Basophil # : 0.06 K/uL  Auto Neutrophil % : 81.0 %  Auto Lymphocyte % : 6.1 %  Auto Monocyte % : 6.0 %  Auto Eosinophil % : 0.0 %  Auto Basophil % : 0.3 %    10 Dec 2021 08:16    142    |  105    |  89     ----------------------------<  125    4.7     |  32     |  1.90     Ca    8.7        10 Dec 2021 08:16  Phos  3.3       10 Dec 2021 08:16  Mg     2.5       10 Dec 2021 08:16    TPro  5.7    /  Alb  2.4    /  TBili  0.4    /  DBili  x      /  AST  17     /  ALT  26     /  AlkPhos  63     10 Dec 2021 08:16        CAPILLARY BLOOD GLUCOSE              RADIOLOGY & ADDITIONAL TESTS:    Personally reviewed.     Consultant(s) Notes Reviewed:  [x] YES  [ ] NO

## 2021-12-10 NOTE — PROGRESS NOTE ADULT - ATTENDING COMMENTS
The patient was seen and evaluated independently by the attending physician.  - worsening renal indices. suspect intravascular volume depletion from poor po intake. she has significant dependent leg edema b/l so supplemental ivf will likely serve to worsen her edema. encourage po hydration. pt resistant but receptive to cnseling and education provided.   - spoke to grandson. updated on progrnosis. updated on dispo timeline. dinh early next week. all in agrement.

## 2021-12-10 NOTE — PROGRESS NOTE ADULT - PROBLEM SELECTOR PLAN 1
Patient presented with dyspnea and increased work of breathing, COVID + on 11/30 on prior admission  - Was discharged home on 5L NC on 12/05 as well as PO flagyl and Vantin for asp pna - she completed her course of abx  - One dose Toci 600mg 12/7   - S/P Remdesivir during previous admission, complated 10d course fo decadron  - c/w albuterol inhaler q6hrs  - GOC: DNR/DNI MOLST in chart   - d-dimer: 274, low suspicion for DVT and patient already anticoagulated  - Pulm Dr. Minler; ID Dr. Simon  - weaned off HFNC yesterday to 6L NC yesterday, today tolerating 3L of NC

## 2021-12-10 NOTE — PROGRESS NOTE ADULT - SUBJECTIVE AND OBJECTIVE BOX
PULMONARY/CRITICAL CARE    Dos 12/10/21  Weakness walking.   Less sob. Somewhat improved. OOB in chair  On NC  HR better--on Amio.     Patient is a 89y old  Female who presents with a chief complaint of SOB (30 Nov 2021 19:49)  Has COVID pneumonia, rapid atrial fib.    BRIEF HOSPITAL COURSE: ***89 yo F PMHx Paroxysmal Atrial Fibrillation, HTN, Hypothyroidism, COPD (2-3 L nasal cannula at home) Hyperlipidemia, presenting with shortness of breath and LE edema. Patient is short of breath at baseline but has been having difficulty standing up and walking a few steps for the last week. Patient lives with her son and his family, who hosted thanksgiving dinner. 1 guest at the dinner tested positive for COVID. Son bought an at home rapid test. He was positive, and patient was also positive. Her grandchildren and his wife were negative. Patient admits to a dry cough that started Monday and progressively worsening LE edema in the last few days. She is on bumetanide 1 mg daily for water retention denies any changes to dose or missed doses.     She does admit to a high salt diet and does not really watch what she eats.   She denies orthopnea, fever, chills, history of heart failure or CAD.   Son at bedside admits that she has a history of "mini stroke" in the past however, patient adamantly denies.     On Cardizem for Atrial fib.    Events last 24 hours: ***    PAST MEDICAL & SURGICAL HISTORY:  Hypertension    Depression    Overactive bladder    Gout    Osteoarthritis    Hypothyroid    HLD (hyperlipidemia)    Paroxysmal atrial fibrillation    Asthma    S/P cholecystectomy    S/P lumpectomy of breast    S/P hysterectomy    S/P lung surgery, follow-up exam  s/p removal of suspicious lesion, negative      Allergies    Tomas Cheese - Pt states the one time she had a mouthful of tomas cheese, her throat was closing and she required an epinephrine injection. (Anaphylaxis)  iodine (Anaphylaxis)  penicillin (Other)  shellfish (Short breath; Hives)  sulfa drugs (Hives)    Intolerances      FAMILY HISTORY social--no recent cigs, etoh.   Family history of nasopharyngeal cancer (Child)  daughter    Family history of breast cancer (Mother)          Medications:  remdesivir  IVPB   IV Intermittent   remdesivir  IVPB 100 milliGRAM(s) IV Intermittent every 24 hours    carvedilol 6.25 milliGRAM(s) Oral every 12 hours  furosemide   Injectable 40 milliGRAM(s) IV Push every 12 hours  hydrALAZINE 100 milliGRAM(s) Oral every 8 hours  losartan 100 milliGRAM(s) Oral daily    albuterol/ipratropium for Nebulization 3 milliLiter(s) Nebulizer every 6 hours PRN  albuterol/ipratropium for Nebulization. 3 milliLiter(s) Nebulizer once  benzonatate 100 milliGRAM(s) Oral three times a day PRN  budesonide 160 MICROgram(s)/formoterol 4.5 MICROgram(s) Inhaler 2 Puff(s) Inhalation two times a day  tiotropium 18 MICROgram(s) Capsule 1 Capsule(s) Inhalation daily    acetaminophen     Tablet .. 650 milliGRAM(s) Oral every 6 hours PRN  melatonin 3 milliGRAM(s) Oral at bedtime PRN  venlafaxine XR. 37.5 milliGRAM(s) Oral daily      apixaban 2.5 milliGRAM(s) Oral every 12 hours  aspirin enteric coated 81 milliGRAM(s) Oral daily    famotidine    Tablet 20 milliGRAM(s) Oral daily      allopurinol 100 milliGRAM(s) Oral daily  dexAMETHasone  Injectable 6 milliGRAM(s) IV Push daily  levothyroxine 75 MICROGram(s) Oral daily                  ICU Vital Signs Last 24 Hrs  T(C): 36.8 (01 Dec 2021 07:15), Max: 38.2 (30 Nov 2021 22:45)  T(F): 98.2 (01 Dec 2021 07:15), Max: 100.7 (30 Nov 2021 22:45)  HR: 50 (01 Dec 2021 07:15) (11 - 147)  BP: 114/54 (01 Dec 2021 07:15) (81/36 - 200/88)  BP(mean): --  ABP: --  ABP(mean): --  RR: 18 (01 Dec 2021 07:15) (17 - 181)  SpO2: 98% (01 Dec 2021 07:15) (77% - 100%)    Vital Signs Last 24 Hrs  T(C): 36.8 (01 Dec 2021 07:15), Max: 38.2 (30 Nov 2021 22:45)  T(F): 98.2 (01 Dec 2021 07:15), Max: 100.7 (30 Nov 2021 22:45)  HR: 50 (01 Dec 2021 07:15) (11 - 147)  BP: 114/54 (01 Dec 2021 07:15) (81/36 - 200/88)  BP(mean): --  RR: 18 (01 Dec 2021 07:15) (17 - 181)  SpO2: 98% (01 Dec 2021 07:15) (77% - 100%)        I&O's Detail        LABS:                        10.8   8.70  )-----------( 198      ( 01 Dec 2021 05:15 )             34.1     12-01    144  |  104  |  33<H>  ----------------------------<  139<H>  4.2   |  34<H>  |  1.50<H>    Ca    8.6      01 Dec 2021 05:15  Mg     2.4     11-30    TPro  6.9  /  Alb  2.8<L>  /  TBili  0.3  /  DBili  x   /  AST  36  /  ALT  32  /  AlkPhos  69  12-01      CARDIAC MARKERS ( 30 Nov 2021 17:27 )  x     / x     / x     / x     / <1.0 ng/mL      CAPILLARY BLOOD GLUCOSE        PT/INR - ( 30 Nov 2021 17:27 )   PT: 12.9 sec;   INR: 1.11 ratio         PTT - ( 30 Nov 2021 17:27 )  PTT:29.3 sec    CULTURES:      Physical Examination:    General: no   acute distress.  elderly female     HEENT: Pupils equal, reactive to light.  Symmetric.    PULM: crackles 1/3 up bilat;  less rhonchi,wheezes bilat; No change percussion    CVS: irregular rate and rhythm, no murmurs, rubs, or gallops    ABD: Soft, nondistended, nontender, normoactive bowel sounds, no masses    EXT: No edema, nontender calves    SKIN: Warm and well perfused, no rashes noted.    NEURO: Alert, oriented, interactive, nonfocal    RADIOLOGY: ***  < from: CT Chest No Cont (12.01.21 @ 02:32) >  EXAM:  CT CHEST                            PROCEDURE DATE:  12/01/2021          INTERPRETATION:  CLINICAL INFORMATION: Shortness of breath, COPD, Covid.    COMPARISON: 11/4/2018    CONTRAST/COMPLICATIONS:  IV Contrast: NONE  Oral Contrast: NONE  Complications: None reported at time of study completion    PROCEDURE:  CT of the Chest was performed.  Sagittal and coronal reformats were performed.    FINDINGS:  Evaluation of solid organs and vascular structures is limited without intravenous contrast.    LUNGS AND AIRWAYS: Patent central airways. Right upper lobe wedge resection. 2 mm right upper lobe nodule (2, 37). Stable 3 mm right middle lobe nodule (2, 80). Stable 6 mm right lower lobe nodule (2, 74). Stable 5 mm nodule along the right major fissure likely reflecting intrapulmonary node (2, 81). Linear scarring/atelectasis in the right lower lobe. Subsegmental lingular and left lower lobe atelectasis. Patchy opacities in the left lower lobe.  PLEURA: No pleural effusion.  MEDIASTINUM AND FIDEL: 1 cm precarinal node. Subcentimeter other mediastinal nodes.  VESSELS: Aortic and coronary atherosclerosis.  HEART: Mild cardiomegaly. No pericardial effusion.  CHEST WALL AND LOWER NECK: Stable 9 mm hypodense right thyroid nodule.  VISUALIZED UPPER ABDOMEN: Cholecystectomy. Multiple cystic lesions within the pancreas, grossly stable, likely IPMNs or pseudocysts. Bilateral renal cysts and hyperdense lesions likely reflecting hemorrhagic/proteinaceous cyst. Stable appearance of right renalcyst containing internal clustered calcifications (2, 37).  BONES: Degenerative changes. Multiple chronic right rib deformities. Stable mild compression deformity of L2.    IMPRESSION:  Left lower lobe pneumonia. Continued follow up to resolution is recommended.        --- End of Report ---            VENU ZHANG MD; Attending Radiologist  This document has been electronically signed. Dec  1 2021  3:26AM    < end of copied text >  < from: US Duplex Venous Lower Ext Complete, Bilateral (12.01.21 @ 02:08) >  EXAM:  US DPLX LWR EXT VEINS COMPL BI                            PROCEDURE DATE:  12/01/2021          INTERPRETATION:  CLINICAL INDICATION: le edmea sob hypoxia.    TECHNIQUE: Grayscale, color Doppler and spectral Doppler ultrasound was utilized toevaluate bilateral lower extremity deep venous system.    COMPARISON: 9/14/2015.    FINDINGS: There is no thrombus in bilateral common femoral veins, femoral veins or popliteal veins. Visualized bilateral posterior tibial veins and peroneal veins arepatent. Bilateral gastrocnemius veins and soleal veins were not visualized.    IMPRESSION:    Bilateral gastrocnemius veins and soleal veins were not visualized, limiting complete evaluation. No obvious thrombus in the visualized bilateral lower extremity deep veins.    --- End of Report ---        < end of copied text >  ra< from: Xray Chest 1 View- PORTABLE-Routine (Xray Chest 1 View- PORTABLE-Routine in AM.) (12.02.21 @ 09:12) >  EXAM:  XR CHEST PORTABLE ROUTINE 1V                            PROCEDURE DATE:  12/02/2021          INTERPRETATION:  Follow-up. AP chest.    Prior 11/30/2021.    IMPRESSION: No change heart mediastinum. Suspect evolving small patchy infiltrate at the left base. No change small left pleural effusion. New small right pleural effusion.    --- End of Report ---            GARRISON NAIK MD; Attending Radiologist  This document has been electronically signed. De    < end of copied text >  < from: Xray Chest 1 View- PORTABLE-Routine (Xray Chest 1 View- PORTABLE-Routine in AM.) (12.02.21 @ 09:12) >  EXAM:  XR CHEST PORTABLE ROUTINE 1V                            PROCEDURE DATE:  12/02/2021          INTERPRETATION:  Follow-up. AP chest.    Prior 11/30/2021.    IMPRESSION: No change heart mediastinum. Suspect evolving small patchy infiltrate at the left base. No change small left pleural effusion. New small right pleural effusion.    --- End of Report ---            GARRISON NAIK MD; Attending Radiologist  This document has been electronically signed. De    < end of copied text >  CXR chf improved

## 2021-12-10 NOTE — PROGRESS NOTE ADULT - SUBJECTIVE AND OBJECTIVE BOX
[facility]  INFECTIOUS DISEASES   =======================================================    [id number]  CHARLES GRAHAM     Follow up: shortness of breath, COVID    On O2 3L with NC, acceptable o2 sat and comfortable.   No fever. No overnight issue.     PAST MEDICAL & SURGICAL HISTORY:  Hypertension  Depression  Overactive bladder  Gout  Osteoarthritis  Hypothyoid  HLD (hyperlipidemia)  Paroxysmal atrial fibrillation  Asthma  S/P cholecystectomy  S/P lumpectomy of breast  S/P hysterectomy  S/P lung surgery, follow-up exam  s/p removal of suspicious lesion, negative    Social Hx: no current smoking, ETOH or drugs     FAMILY HISTORY:  Family history of nasopharyngeal cancer (Child)  daughter    Family history of breast cancer (Mother)    Allergies  Tomas Cheese - Pt states the one time she had a mouthful of tomas cheese, her throat was closing and she required an epinephrine injection. (Anaphylaxis)  iodine (Anaphylaxis)  penicillin (Other)  shellfish (Short breath; Hives)  sulfa drugs (Hives)    Antibiotics:  dexAMETHasone  Injectable 6 milliGRAM(s) IV Push daily    REVIEW OF SYSTEMS:  CONSTITUTIONAL:  No Fever or chills  HEENT:  No diplopia or blurred vision.  No sore throat or runny nose.  CARDIOVASCULAR:  No chest pain or SOB.  RESPIRATORY:  no cough, +shortness of breath  GASTROINTESTINAL:  No nausea, vomiting or diarrhea.  GENITOURINARY:  No dysuria, frequency or urgency. No Blood in urine  MUSCULOSKELETAL:  no joint aches, no muscle pain  SKIN:  No change in skin, hair or nails.  NEUROLOGIC:  No paresthesias, fasciculations, seizures or weakness.  PSYCHIATRIC:  No disorder of thought or mood.  ENDOCRINE:  No heat or cold intolerance, polyuria or polydipsia.  HEMATOLOGICAL:  No easy bruising or bleeding.     Physical Exam:  Vital Signs Last 24 Hrs  T(C): 36.6 (10 Dec 2021 12:43), Max: 36.6 (09 Dec 2021 20:22)  T(F): 97.9 (10 Dec 2021 12:43), Max: 97.9 (10 Dec 2021 04:57)  HR: 65 (10 Dec 2021 12:43) (50 - 77)  BP: 119/67 (10 Dec 2021 12:43) (119/67 - 133/70)  BP(mean): --  RR: 18 (10 Dec 2021 12:43) (16 - 18)  SpO2: 92% (10 Dec 2021 12:43) (92% - 99%)  GEN: NAD, obese   HEENT: normocephalic and atraumatic. EOMI. PERRL.    NECK: Supple.  No lymphadenopathy   LUNGS: Some crackles in bases   HEART: Regular rate and rhythm   ABDOMEN: Soft, nontender, and nondistended.  Positive bowel sounds.    EXTREMITIES: 1+edema.  NEUROLOGIC: grossly intact.  PSYCHIATRIC: Appropriate affect .  SKIN: No rash      Labs:                        10.2   17.40 )-----------( 317      ( 10 Dec 2021 08:16 )             31.6     12-10    142  |  105  |  89<H>  ----------------------------<  125<H>  4.7   |  32<H>  |  1.90<H>    Ca    8.7      10 Dec 2021 08:16  Phos  3.3     12-10  Mg     2.5     12-10    TPro  5.7<L>  /  Alb  2.4<L>  /  TBili  0.4  /  DBili  x   /  AST  17  /  ALT  26  /  AlkPhos  63  12-10    WBC Count: 17.40 K/uL (12-10-21 @ 08:16)  WBC Count: 11.03 K/uL (12-09-21 @ 06:52)  WBC Count: 8.16 K/uL (12-08-21 @ 08:08)  WBC Count: 13.57 K/uL (12-07-21 @ 14:04)  WBC Count: 11.07 K/uL (12-07-21 @ 07:08)  WBC Count: 13.52 K/uL (12-06-21 @ 18:23)    Creatinine, Serum: 1.90 mg/dL (12-10-21 @ 08:16)  Creatinine, Serum: 1.40 mg/dL (12-09-21 @ 06:52)  Creatinine, Serum: 1.30 mg/dL (12-08-21 @ 08:08)  Creatinine, Serum: 1.20 mg/dL (12-07-21 @ 14:04)  Creatinine, Serum: 1.10 mg/dL (12-07-21 @ 07:08)  Creatinine, Serum: 1.30 mg/dL (12-06-21 @ 18:23)    C-Reactive Protein, Serum: 26 mg/L (12-06-21 @ 23:16)  C-Reactive Protein, Serum: 85 mg/L (12-03-21 @ 14:03)    Ferritin, Serum: 438 ng/mL (12-07-21 @ 12:10)  Ferritin, Serum: 516 ng/mL (12-07-21 @ 01:30)  Ferritin, Serum: 451 ng/mL (12-03-21 @ 14:42)  Ferritin, Serum: 465 ng/mL (12-03-21 @ 00:59)  Ferritin, Serum: 366 ng/mL (12-01-21 @ 02:57)    Procalcitonin, Serum: 0.18 ng/mL (12-06-21 @ 18:23)  Procalcitonin, Serum: 0.37 ng/mL (11-30-21 @ 21:17)     SARS-CoV-2: Detected (11-30-21 @ 18:08)  Rapid RVP Result: Detected (11-30-21 @ 18:08)    COVID-19 PCR: Detected (12-06-21 @ 18:23)    All imaging and other data have been reviewed.  < from: CT Chest No Cont (12.01.21 @ 02:32) >  IMPRESSION:  Left lower lobe pneumonia. Continued follow up to resolution is recommended.    Assessment and Plan:   89 yo woman with HTN, Paroxysmal Atrial Fibrillation, Hypothyroidism and COPD on home o2 2-3 L NC was admitted with worsening shortness of breath.  COVID diagnosis 11/30. She was also treated for possible pneumonia in .   Currently data and recommendations for COVID-19 treatment are rapidly changing, so this treatment plan is based on my clinical judgement with available information.   Since on HFNC and within 2 weeks of diagnosis we can try Tocilizumab.   The risks, benefits, and alternatives were discussed with the patient and/or surrogate and all questions were answered to the best of my ability.  Based on Abilio et al. (RECOVERY Trial), there was a survival benefit of tocilizumab arm compared to standard of care that was statistically significant.  Additionally,  Yohannes et al. (REMAP-CAP trial, [facility], 2021) noted improvement in organ support-free days with tocilizumab versus standard of care as well as in-hospital mortality.    The IDSA is endorsing the use of tocilizumab (in addition to the standard of care) in patients who have progressive, severe or critical illness due to COVID-19 and elevated markers of systemic inflammation.     COVID 19 and possible bacterial/aspiration pneumonia   - BMP, CBC w diff, NLR. Procalcitonin, Ferritin, CRP, LDH and D dimer for the start and periodically can be repeated.   - No need for Remdesivir last time caused increase in Creat    - Creat is trending down 2.9-->1.3  - Continue Dexamethasone 6mg po daily for 10days  - Completed 7days of ceftriaxone (vantin)+metronidazole   - Watch O2 sat closely and taper as tolerated.   - Prophylactic anticoagulation as per protocol   - One dose of Tocilizumab 600mg on 12/7  - Follow Creat, mild worsening.   - Leukocytosis worsening possibly due to steroid (dexa last dose today)     Will follow on Monday if remains inpatient, if any question over hte weekend please call Dr. Freeman.     Dr. Mable Simon   Infectious Diseases   Please call [phone] between 8am and 6pm, call [phone] after 6pm or weekends.  [facility]  INFECTIOUS DISEASES   =======================================================    [id number]  CHARLES GRAHAM     Follow up: shortness of breath, COVID    On O2 3L with NC, acceptable o2 sat and comfortable.   No fever. No overnight issue.     PAST MEDICAL & SURGICAL HISTORY:  Hypertension  Depression  Overactive bladder  Gout  Osteoarthritis  Hypothyoid  HLD (hyperlipidemia)  Paroxysmal atrial fibrillation  Asthma  S/P cholecystectomy  S/P lumpectomy of breast  S/P hysterectomy  S/P lung surgery, follow-up exam  s/p removal of suspicious lesion, negative    Social Hx: no current smoking, ETOH or drugs     FAMILY HISTORY:  Family history of nasopharyngeal cancer (Child)  daughter    Family history of breast cancer (Mother)    Allergies  Tomas Cheese - Pt states the one time she had a mouthful of tomas cheese, her throat was closing and she required an epinephrine injection. (Anaphylaxis)  iodine (Anaphylaxis)  penicillin (Other)  shellfish (Short breath; Hives)  sulfa drugs (Hives)    Antibiotics:  dexAMETHasone  Injectable 6 milliGRAM(s) IV Push daily    REVIEW OF SYSTEMS:  CONSTITUTIONAL:  No Fever or chills  HEENT:  No diplopia or blurred vision.  No sore throat or runny nose.  CARDIOVASCULAR:  No chest pain or SOB.  RESPIRATORY:  no cough, +shortness of breath  GASTROINTESTINAL:  No nausea, vomiting or diarrhea.  GENITOURINARY:  No dysuria, frequency or urgency. No Blood in urine  MUSCULOSKELETAL:  no joint aches, no muscle pain  SKIN:  No change in skin, hair or nails.  NEUROLOGIC:  No paresthesias, fasciculations, seizures or weakness.  PSYCHIATRIC:  No disorder of thought or mood.  ENDOCRINE:  No heat or cold intolerance, polyuria or polydipsia.  HEMATOLOGICAL:  No easy bruising or bleeding.     Physical Exam:  Vital Signs Last 24 Hrs  T(C): 36.6 (10 Dec 2021 12:43), Max: 36.6 (09 Dec 2021 20:22)  T(F): 97.9 (10 Dec 2021 12:43), Max: 97.9 (10 Dec 2021 04:57)  HR: 65 (10 Dec 2021 12:43) (50 - 77)  BP: 119/67 (10 Dec 2021 12:43) (119/67 - 133/70)  BP(mean): --  RR: 18 (10 Dec 2021 12:43) (16 - 18)  SpO2: 92% (10 Dec 2021 12:43) (92% - 99%)  GEN: NAD, obese   HEENT: normocephalic and atraumatic. EOMI. PERRL.    NECK: Supple.  No lymphadenopathy   LUNGS: Some crackles in bases   HEART: Regular rate and rhythm   ABDOMEN: Soft, nontender, and nondistended.  Positive bowel sounds.    EXTREMITIES: 1+edema.  NEUROLOGIC: grossly intact.  PSYCHIATRIC: Appropriate affect .  SKIN: No rash      Labs:                        10.2   17.40 )-----------( 317      ( 10 Dec 2021 08:16 )             31.6     12-10    142  |  105  |  89<H>  ----------------------------<  125<H>  4.7   |  32<H>  |  1.90<H>    Ca    8.7      10 Dec 2021 08:16  Phos  3.3     12-10  Mg     2.5     12-10    TPro  5.7<L>  /  Alb  2.4<L>  /  TBili  0.4  /  DBili  x   /  AST  17  /  ALT  26  /  AlkPhos  63  12-10    WBC Count: 17.40 K/uL (12-10-21 @ 08:16)  WBC Count: 11.03 K/uL (12-09-21 @ 06:52)  WBC Count: 8.16 K/uL (12-08-21 @ 08:08)  WBC Count: 13.57 K/uL (12-07-21 @ 14:04)  WBC Count: 11.07 K/uL (12-07-21 @ 07:08)  WBC Count: 13.52 K/uL (12-06-21 @ 18:23)    Creatinine, Serum: 1.90 mg/dL (12-10-21 @ 08:16)  Creatinine, Serum: 1.40 mg/dL (12-09-21 @ 06:52)  Creatinine, Serum: 1.30 mg/dL (12-08-21 @ 08:08)  Creatinine, Serum: 1.20 mg/dL (12-07-21 @ 14:04)  Creatinine, Serum: 1.10 mg/dL (12-07-21 @ 07:08)  Creatinine, Serum: 1.30 mg/dL (12-06-21 @ 18:23)    C-Reactive Protein, Serum: 26 mg/L (12-06-21 @ 23:16)  C-Reactive Protein, Serum: 85 mg/L (12-03-21 @ 14:03)    Ferritin, Serum: 438 ng/mL (12-07-21 @ 12:10)  Ferritin, Serum: 516 ng/mL (12-07-21 @ 01:30)  Ferritin, Serum: 451 ng/mL (12-03-21 @ 14:42)  Ferritin, Serum: 465 ng/mL (12-03-21 @ 00:59)  Ferritin, Serum: 366 ng/mL (12-01-21 @ 02:57)    Procalcitonin, Serum: 0.18 ng/mL (12-06-21 @ 18:23)  Procalcitonin, Serum: 0.37 ng/mL (11-30-21 @ 21:17)     SARS-CoV-2: Detected (11-30-21 @ 18:08)  Rapid RVP Result: Detected (11-30-21 @ 18:08)    COVID-19 PCR: Detected (12-06-21 @ 18:23)    All imaging and other data have been reviewed.  < from: CT Chest No Cont (12.01.21 @ 02:32) >  IMPRESSION:  Left lower lobe pneumonia. Continued follow up to resolution is recommended.    Assessment and Plan:   89 yo woman with HTN, Paroxysmal Atrial Fibrillation, Hypothyroidism and COPD on home o2 2-3 L NC was admitted with worsening shortness of breath.  COVID diagnosis 11/30. She was also treated for possible pneumonia in .   Currently data and recommendations for COVID-19 treatment are rapidly changing, so this treatment plan is based on my clinical judgement with available information.   Since on HFNC and within 2 weeks of diagnosis we can try Tocilizumab.   The risks, benefits, and alternatives were discussed with the patient and/or surrogate and all questions were answered to the best of my ability.  Based on Abilio et al. (RECOVERY Trial), there was a survival benefit of tocilizumab arm compared to standard of care that was statistically significant.  Additionally,  Yohannes et al. (REMAP-CAP trial, [facility], 2021) noted improvement in organ support-free days with tocilizumab versus standard of care as well as in-hospital mortality.    The IDSA is endorsing the use of tocilizumab (in addition to the standard of care) in patients who have progressive, severe or critical illness due to COVID-19 and elevated markers of systemic inflammation.     COVID 19 and possible bacterial/aspiration pneumonia   - BMP, CBC w diff, NLR. Procalcitonin, Ferritin, CRP, LDH and D dimer for the start and periodically can be repeated.   - No need for Remdesivir last time caused increase in Creat    - Continue Dexamethasone 6mg po daily for 10days  - Completed 7days of ceftriaxone (vantin)+metronidazole   - Watch O2 sat closely and taper as tolerated.   - Prophylactic anticoagulation as per protocol   - One dose of Tocilizumab 600mg on 12/7  - Follow Creat, mild worsening.   - Leukocytosis worsening possibly due to steroid (dexa last dose today)     Will sing off please call with any question.      Dr. Mable Simon   Infectious Diseases   Please call [phone] between 8am and 6pm, call [phone] after 6pm or weekends.

## 2021-12-10 NOTE — PHYSICAL THERAPY INITIAL EVALUATION ADULT - ADDITIONAL COMMENTS
Patient lives in a private home with 5 steps to enter, B rails. Patient uses 1 hands on one rail to climb steps. Grandson lives upstairs. Patient states she gave up driving. Daughter assist with IADLs. Patient household ambulator.

## 2021-12-10 NOTE — PROGRESS NOTE ADULT - PROBLEM SELECTOR PLAN 4
Admission labs show elevated WBC's 13.5  - WBC 17 today  - Patient remains afebrile  - Leukocytosis likely reactive, completed course of steroids yesterday - monitor for fevers  - continue to trend with daily CBC

## 2021-12-11 LAB
ALBUMIN SERPL ELPH-MCNC: 2.3 G/DL — LOW (ref 3.3–5)
ALP SERPL-CCNC: 57 U/L — SIGNIFICANT CHANGE UP (ref 40–120)
ALT FLD-CCNC: 21 U/L — SIGNIFICANT CHANGE UP (ref 12–78)
ANION GAP SERPL CALC-SCNC: 4 MMOL/L — LOW (ref 5–17)
AST SERPL-CCNC: 18 U/L — SIGNIFICANT CHANGE UP (ref 15–37)
BASOPHILS # BLD AUTO: 0.02 K/UL — SIGNIFICANT CHANGE UP (ref 0–0.2)
BASOPHILS NFR BLD AUTO: 0.1 % — SIGNIFICANT CHANGE UP (ref 0–2)
BILIRUB SERPL-MCNC: 0.4 MG/DL — SIGNIFICANT CHANGE UP (ref 0.2–1.2)
BUN SERPL-MCNC: 81 MG/DL — HIGH (ref 7–23)
CALCIUM SERPL-MCNC: 8.8 MG/DL — SIGNIFICANT CHANGE UP (ref 8.5–10.1)
CHLORIDE SERPL-SCNC: 104 MMOL/L — SIGNIFICANT CHANGE UP (ref 96–108)
CO2 SERPL-SCNC: 33 MMOL/L — HIGH (ref 22–31)
CREAT SERPL-MCNC: 1.7 MG/DL — HIGH (ref 0.5–1.3)
EOSINOPHIL # BLD AUTO: 0 K/UL — SIGNIFICANT CHANGE UP (ref 0–0.5)
EOSINOPHIL NFR BLD AUTO: 0 % — SIGNIFICANT CHANGE UP (ref 0–6)
GLUCOSE SERPL-MCNC: 122 MG/DL — HIGH (ref 70–99)
HCT VFR BLD CALC: 29.1 % — LOW (ref 34.5–45)
HGB BLD-MCNC: 9.4 G/DL — LOW (ref 11.5–15.5)
IMM GRANULOCYTES NFR BLD AUTO: 6.9 % — HIGH (ref 0–1.5)
LYMPHOCYTES # BLD AUTO: 0.82 K/UL — LOW (ref 1–3.3)
LYMPHOCYTES # BLD AUTO: 4.6 % — LOW (ref 13–44)
MAGNESIUM SERPL-MCNC: 2.1 MG/DL — SIGNIFICANT CHANGE UP (ref 1.6–2.6)
MCHC RBC-ENTMCNC: 31.8 PG — SIGNIFICANT CHANGE UP (ref 27–34)
MCHC RBC-ENTMCNC: 32.3 GM/DL — SIGNIFICANT CHANGE UP (ref 32–36)
MCV RBC AUTO: 98.3 FL — SIGNIFICANT CHANGE UP (ref 80–100)
MONOCYTES # BLD AUTO: 0.9 K/UL — SIGNIFICANT CHANGE UP (ref 0–0.9)
MONOCYTES NFR BLD AUTO: 5.1 % — SIGNIFICANT CHANGE UP (ref 2–14)
NEUTROPHILS # BLD AUTO: 14.7 K/UL — HIGH (ref 1.8–7.4)
NEUTROPHILS NFR BLD AUTO: 83.3 % — HIGH (ref 43–77)
NRBC # BLD: 0 /100 WBCS — SIGNIFICANT CHANGE UP (ref 0–0)
PHOSPHATE SERPL-MCNC: 3.6 MG/DL — SIGNIFICANT CHANGE UP (ref 2.5–4.5)
PLATELET # BLD AUTO: 283 K/UL — SIGNIFICANT CHANGE UP (ref 150–400)
POTASSIUM SERPL-MCNC: 4.5 MMOL/L — SIGNIFICANT CHANGE UP (ref 3.5–5.3)
POTASSIUM SERPL-SCNC: 4.5 MMOL/L — SIGNIFICANT CHANGE UP (ref 3.5–5.3)
PROT SERPL-MCNC: 5.1 G/DL — LOW (ref 6–8.3)
RBC # BLD: 2.96 M/UL — LOW (ref 3.8–5.2)
RBC # FLD: 14.9 % — HIGH (ref 10.3–14.5)
SODIUM SERPL-SCNC: 141 MMOL/L — SIGNIFICANT CHANGE UP (ref 135–145)
WBC # BLD: 17.65 K/UL — HIGH (ref 3.8–10.5)
WBC # FLD AUTO: 17.65 K/UL — HIGH (ref 3.8–10.5)

## 2021-12-11 PROCEDURE — 99233 SBSQ HOSP IP/OBS HIGH 50: CPT | Mod: GC

## 2021-12-11 RX ORDER — DILTIAZEM HCL 120 MG
60 CAPSULE, EXT RELEASE 24 HR ORAL EVERY 6 HOURS
Refills: 0 | Status: COMPLETED | OUTPATIENT
Start: 2021-12-11 | End: 2021-12-12

## 2021-12-11 RX ORDER — DILTIAZEM HCL 120 MG
240 CAPSULE, EXT RELEASE 24 HR ORAL DAILY
Refills: 0 | Status: DISCONTINUED | OUTPATIENT
Start: 2021-12-12 | End: 2021-12-13

## 2021-12-11 RX ADMIN — Medication 60 MILLIGRAM(S): at 18:26

## 2021-12-11 RX ADMIN — Medication 60 MILLIGRAM(S): at 00:50

## 2021-12-11 RX ADMIN — AMIODARONE HYDROCHLORIDE 400 MILLIGRAM(S): 400 TABLET ORAL at 05:54

## 2021-12-11 RX ADMIN — CARVEDILOL PHOSPHATE 6.25 MILLIGRAM(S): 80 CAPSULE, EXTENDED RELEASE ORAL at 05:53

## 2021-12-11 RX ADMIN — CARVEDILOL PHOSPHATE 6.25 MILLIGRAM(S): 80 CAPSULE, EXTENDED RELEASE ORAL at 18:26

## 2021-12-11 RX ADMIN — Medication 50 MICROGRAM(S): at 05:53

## 2021-12-11 RX ADMIN — APIXABAN 2.5 MILLIGRAM(S): 2.5 TABLET, FILM COATED ORAL at 05:53

## 2021-12-11 RX ADMIN — APIXABAN 2.5 MILLIGRAM(S): 2.5 TABLET, FILM COATED ORAL at 18:26

## 2021-12-11 RX ADMIN — Medication 100 MILLIGRAM(S): at 13:24

## 2021-12-11 RX ADMIN — Medication 60 MILLIGRAM(S): at 05:53

## 2021-12-11 RX ADMIN — Medication 37.5 MILLIGRAM(S): at 13:25

## 2021-12-11 RX ADMIN — Medication 81 MILLIGRAM(S): at 13:24

## 2021-12-11 RX ADMIN — BUDESONIDE AND FORMOTEROL FUMARATE DIHYDRATE 2 PUFF(S): 160; 4.5 AEROSOL RESPIRATORY (INHALATION) at 19:35

## 2021-12-11 NOTE — PROGRESS NOTE ADULT - PROBLEM SELECTOR PLAN 2
Patient presented with SOB and decreased saturation and was found to be in A. Fib  - Patient with Hx of PAFib on Coreg and Cardizem for rate control and Eliquis for AC will continue inpatient  - Likely triggered by acute respiratory failure and hypoxia.   - S/P Cardizem 10mg IV x1 and 15 mg IV X 1  - Cardiology consulted (Dr. Eastman group consulted by ED - recs appreciated)  - amiodarone loading dose per cardio recs with goal HR 50-60s   - cardizem infusion transitioned into PO cardizem 60mg q6h  - will transition to sustained release cardizem for prep to dc to rehab monday; will start sustained release cardizem 240mg at 6am tomorrow   - Continue to monitor on telemetry

## 2021-12-11 NOTE — PROGRESS NOTE ADULT - ASSESSMENT
Pt. with acute respiratory failure due to probable CHF.  Improved today--on NC.  Problem ambulating due to weakness, deconditioning.   Rehab?  Recent Covid, COPD exac.  Cardio note apprec   Rapid at fib better on AMIO  Hi troponin.    FU with me in office    Fu CXR as outpt.

## 2021-12-11 NOTE — PROGRESS NOTE ADULT - ATTENDING COMMENTS
The patient was seen and evaluated independently by the attending physician.  - pt is improving overall. no acute events today. improved po hydration last 24hrs. if renal indices improving, will discuss dispo planning for monday or tuesday (48hrs from now)

## 2021-12-11 NOTE — PROGRESS NOTE ADULT - PROBLEM SELECTOR PLAN 1
Patient presented with dyspnea and increased work of breathing, COVID + on 11/30 on prior admission  - Was discharged home on 5L NC on 12/05 as well as PO flagyl and Vantin for asp pna - she completed her course of abx  - One dose Toci 600mg 12/7   - S/P Remdesivir during previous admission, complated 10d course fo decadron  - c/w albuterol inhaler q6hrs  - GOC: DNR/DNI MOLST in chart   - d-dimer: 274, low suspicion for DVT and patient already anticoagulated  - Pulm Dr. Milner; ID Dr. Simon  - weaned off HFNC yesterday to 6L NC yesterday, today tolerating 3L of NC

## 2021-12-11 NOTE — PROGRESS NOTE ADULT - SUBJECTIVE AND OBJECTIVE BOX
PULMONARY/CRITICAL CARE    Dos 12/11/21  Unchanged.   Weakness walking.   Less sob. Somewhat improved. OOB in chair  On NC  HR better--on Amio.     Patient is a 89y old  Female who presents with a chief complaint of SOB (30 Nov 2021 19:49)  Has COVID pneumonia, rapid atrial fib.    BRIEF HOSPITAL COURSE: ***89 yo F PMHx Paroxysmal Atrial Fibrillation, HTN, Hypothyroidism, COPD (2-3 L nasal cannula at home) Hyperlipidemia, presenting with shortness of breath and LE edema. Patient is short of breath at baseline but has been having difficulty standing up and walking a few steps for the last week. Patient lives with her son and his family, who hosted thanksgiving dinner. 1 guest at the dinner tested positive for COVID. Son bought an at home rapid test. He was positive, and patient was also positive. Her grandchildren and his wife were negative. Patient admits to a dry cough that started Monday and progressively worsening LE edema in the last few days. She is on bumetanide 1 mg daily for water retention denies any changes to dose or missed doses.     She does admit to a high salt diet and does not really watch what she eats.   She denies orthopnea, fever, chills, history of heart failure or CAD.   Son at bedside admits that she has a history of "mini stroke" in the past however, patient adamantly denies.     On Cardizem for Atrial fib.    Events last 24 hours: ***    PAST MEDICAL & SURGICAL HISTORY:  Hypertension    Depression    Overactive bladder    Gout    Osteoarthritis    Hypothyroid    HLD (hyperlipidemia)    Paroxysmal atrial fibrillation    Asthma    S/P cholecystectomy    S/P lumpectomy of breast    S/P hysterectomy    S/P lung surgery, follow-up exam  s/p removal of suspicious lesion, negative      Allergies    Tomas Cheese - Pt states the one time she had a mouthful of tomas cheese, her throat was closing and she required an epinephrine injection. (Anaphylaxis)  iodine (Anaphylaxis)  penicillin (Other)  shellfish (Short breath; Hives)  sulfa drugs (Hives)    Intolerances      FAMILY HISTORY social--no recent cigs, etoh.   Family history of nasopharyngeal cancer (Child)  daughter    Family history of breast cancer (Mother)          Medications:  remdesivir  IVPB   IV Intermittent   remdesivir  IVPB 100 milliGRAM(s) IV Intermittent every 24 hours    carvedilol 6.25 milliGRAM(s) Oral every 12 hours  furosemide   Injectable 40 milliGRAM(s) IV Push every 12 hours  hydrALAZINE 100 milliGRAM(s) Oral every 8 hours  losartan 100 milliGRAM(s) Oral daily    albuterol/ipratropium for Nebulization 3 milliLiter(s) Nebulizer every 6 hours PRN  albuterol/ipratropium for Nebulization. 3 milliLiter(s) Nebulizer once  benzonatate 100 milliGRAM(s) Oral three times a day PRN  budesonide 160 MICROgram(s)/formoterol 4.5 MICROgram(s) Inhaler 2 Puff(s) Inhalation two times a day  tiotropium 18 MICROgram(s) Capsule 1 Capsule(s) Inhalation daily    acetaminophen     Tablet .. 650 milliGRAM(s) Oral every 6 hours PRN  melatonin 3 milliGRAM(s) Oral at bedtime PRN  venlafaxine XR. 37.5 milliGRAM(s) Oral daily      apixaban 2.5 milliGRAM(s) Oral every 12 hours  aspirin enteric coated 81 milliGRAM(s) Oral daily    famotidine    Tablet 20 milliGRAM(s) Oral daily      allopurinol 100 milliGRAM(s) Oral daily  dexAMETHasone  Injectable 6 milliGRAM(s) IV Push daily  levothyroxine 75 MICROGram(s) Oral daily                  ICU Vital Signs Last 24 Hrs  T(C): 36.8 (01 Dec 2021 07:15), Max: 38.2 (30 Nov 2021 22:45)  T(F): 98.2 (01 Dec 2021 07:15), Max: 100.7 (30 Nov 2021 22:45)  HR: 50 (01 Dec 2021 07:15) (11 - 147)  BP: 114/54 (01 Dec 2021 07:15) (81/36 - 200/88)  BP(mean): --  ABP: --  ABP(mean): --  RR: 18 (01 Dec 2021 07:15) (17 - 181)  SpO2: 98% (01 Dec 2021 07:15) (77% - 100%)    Vital Signs Last 24 Hrs  T(C): 36.8 (01 Dec 2021 07:15), Max: 38.2 (30 Nov 2021 22:45)  T(F): 98.2 (01 Dec 2021 07:15), Max: 100.7 (30 Nov 2021 22:45)  HR: 50 (01 Dec 2021 07:15) (11 - 147)  BP: 114/54 (01 Dec 2021 07:15) (81/36 - 200/88)  BP(mean): --  RR: 18 (01 Dec 2021 07:15) (17 - 181)  SpO2: 98% (01 Dec 2021 07:15) (77% - 100%)        I&O's Detail        LABS:                        10.8   8.70  )-----------( 198      ( 01 Dec 2021 05:15 )             34.1     12-01    144  |  104  |  33<H>  ----------------------------<  139<H>  4.2   |  34<H>  |  1.50<H>    Ca    8.6      01 Dec 2021 05:15  Mg     2.4     11-30    TPro  6.9  /  Alb  2.8<L>  /  TBili  0.3  /  DBili  x   /  AST  36  /  ALT  32  /  AlkPhos  69  12-01      CARDIAC MARKERS ( 30 Nov 2021 17:27 )  x     / x     / x     / x     / <1.0 ng/mL      CAPILLARY BLOOD GLUCOSE        PT/INR - ( 30 Nov 2021 17:27 )   PT: 12.9 sec;   INR: 1.11 ratio         PTT - ( 30 Nov 2021 17:27 )  PTT:29.3 sec    CULTURES:      Physical Examination:    General: no   acute distress.  elderly female     HEENT: Pupils equal, reactive to light.  Symmetric.    PULM: crackles 1/3 up bilat;  less rhonchi,wheezes bilat; No change percussion    CVS: irregular rate and rhythm, no murmurs, rubs, or gallops    ABD: Soft, nondistended, nontender, normoactive bowel sounds, no masses    EXT: No edema, nontender calves    SKIN: Warm and well perfused, no rashes noted.    NEURO: Alert, oriented, interactive, nonfocal    RADIOLOGY: ***  < from: CT Chest No Cont (12.01.21 @ 02:32) >  EXAM:  CT CHEST                            PROCEDURE DATE:  12/01/2021          INTERPRETATION:  CLINICAL INFORMATION: Shortness of breath, COPD, Covid.    COMPARISON: 11/4/2018    CONTRAST/COMPLICATIONS:  IV Contrast: NONE  Oral Contrast: NONE  Complications: None reported at time of study completion    PROCEDURE:  CT of the Chest was performed.  Sagittal and coronal reformats were performed.    FINDINGS:  Evaluation of solid organs and vascular structures is limited without intravenous contrast.    LUNGS AND AIRWAYS: Patent central airways. Right upper lobe wedge resection. 2 mm right upper lobe nodule (2, 37). Stable 3 mm right middle lobe nodule (2, 80). Stable 6 mm right lower lobe nodule (2, 74). Stable 5 mm nodule along the right major fissure likely reflecting intrapulmonary node (2, 81). Linear scarring/atelectasis in the right lower lobe. Subsegmental lingular and left lower lobe atelectasis. Patchy opacities in the left lower lobe.  PLEURA: No pleural effusion.  MEDIASTINUM AND FIDEL: 1 cm precarinal node. Subcentimeter other mediastinal nodes.  VESSELS: Aortic and coronary atherosclerosis.  HEART: Mild cardiomegaly. No pericardial effusion.  CHEST WALL AND LOWER NECK: Stable 9 mm hypodense right thyroid nodule.  VISUALIZED UPPER ABDOMEN: Cholecystectomy. Multiple cystic lesions within the pancreas, grossly stable, likely IPMNs or pseudocysts. Bilateral renal cysts and hyperdense lesions likely reflecting hemorrhagic/proteinaceous cyst. Stable appearance of right renalcyst containing internal clustered calcifications (2, 37).  BONES: Degenerative changes. Multiple chronic right rib deformities. Stable mild compression deformity of L2.    IMPRESSION:  Left lower lobe pneumonia. Continued follow up to resolution is recommended.        --- End of Report ---            VENU ZHANG MD; Attending Radiologist  This document has been electronically signed. Dec  1 2021  3:26AM    < end of copied text >  < from: US Duplex Venous Lower Ext Complete, Bilateral (12.01.21 @ 02:08) >  EXAM:  US DPLX LWR EXT VEINS COMPL BI                            PROCEDURE DATE:  12/01/2021          INTERPRETATION:  CLINICAL INDICATION: le edmea sob hypoxia.    TECHNIQUE: Grayscale, color Doppler and spectral Doppler ultrasound was utilized toevaluate bilateral lower extremity deep venous system.    COMPARISON: 9/14/2015.    FINDINGS: There is no thrombus in bilateral common femoral veins, femoral veins or popliteal veins. Visualized bilateral posterior tibial veins and peroneal veins arepatent. Bilateral gastrocnemius veins and soleal veins were not visualized.    IMPRESSION:    Bilateral gastrocnemius veins and soleal veins were not visualized, limiting complete evaluation. No obvious thrombus in the visualized bilateral lower extremity deep veins.    --- End of Report ---        < end of copied text >  ra< from: Xray Chest 1 View- PORTABLE-Routine (Xray Chest 1 View- PORTABLE-Routine in AM.) (12.02.21 @ 09:12) >  EXAM:  XR CHEST PORTABLE ROUTINE 1V                            PROCEDURE DATE:  12/02/2021          INTERPRETATION:  Follow-up. AP chest.    Prior 11/30/2021.    IMPRESSION: No change heart mediastinum. Suspect evolving small patchy infiltrate at the left base. No change small left pleural effusion. New small right pleural effusion.    --- End of Report ---            GARRISON NAIK MD; Attending Radiologist  This document has been electronically signed. De    < end of copied text >  < from: Xray Chest 1 View- PORTABLE-Routine (Xray Chest 1 View- PORTABLE-Routine in AM.) (12.02.21 @ 09:12) >  EXAM:  XR CHEST PORTABLE ROUTINE 1V                            PROCEDURE DATE:  12/02/2021          INTERPRETATION:  Follow-up. AP chest.    Prior 11/30/2021.    IMPRESSION: No change heart mediastinum. Suspect evolving small patchy infiltrate at the left base. No change small left pleural effusion. New small right pleural effusion.    --- End of Report ---            GARRISON NAIK MD; Attending Radiologist  This document has been electronically signed. De    < end of copied text >  CXR chf improved

## 2021-12-11 NOTE — PROGRESS NOTE ADULT - SUBJECTIVE AND OBJECTIVE BOX
Patient is a 89y old  Female who presents with a chief complaint of Acute Hypoxic respiratory failure and AF with RVR (11 Dec 2021 08:02)      INTERVAL HPI/OVERNIGHT EVENTS: no acute events overnight. pt seen and examined at bedside this am. denies fever, chills, cp, sob, abd pain, n/v/d, only reports leg weakness non improved. States that her LE edema feels the same as yesterday.     MEDICATIONS  (STANDING):  allopurinol 100 milliGRAM(s) Oral daily  aMIOdarone    Tablet   Oral   aMIOdarone    Tablet 200 milliGRAM(s) Oral daily  apixaban 2.5 milliGRAM(s) Oral two times a day  aspirin  chewable 81 milliGRAM(s) Oral daily  budesonide 160 MICROgram(s)/formoterol 4.5 MICROgram(s) Inhaler 2 Puff(s) Inhalation two times a day  carvedilol 6.25 milliGRAM(s) Oral every 12 hours  diltiazem    Tablet 60 milliGRAM(s) Oral every 6 hours  levothyroxine 50 MICROGram(s) Oral daily  tiotropium 18 MICROgram(s) Capsule 1 Capsule(s) Inhalation daily  venlafaxine XR. 37.5 milliGRAM(s) Oral daily    MEDICATIONS  (PRN):  ALBUTerol    90 MICROgram(s) HFA Inhaler 1 Puff(s) Inhalation every 4 hours PRN Shortness of Breath and/or Wheezing  benzonatate 100 milliGRAM(s) Oral three times a day PRN Cough      Allergies    Tomas Cheese - Pt states the one time she had a mouthful of tomas cheese, her throat was closing and she required an epinephrine injection. (Anaphylaxis)  iodine (Anaphylaxis)  penicillin (Other)  shellfish (Short breath; Hives)  sulfa drugs (Hives)    Intolerances        REVIEW OF SYSTEMS:  CONSTITUTIONAL: No fever or chills, LE weakness   HEENT:  No headache, no sore throat  RESPIRATORY: No cough, wheezing, or shortness of breath  CARDIOVASCULAR: No chest pain, palpitations  GASTROINTESTINAL: No abd pain, nausea, vomiting, or diarrhea  GENITOURINARY: No dysuria, frequency, or hematuria  NEUROLOGICAL: no focal weakness or dizziness  MUSCULOSKELETAL: no myalgias, LE edema     Vital Signs Last 24 Hrs  T(C): 36.4 (11 Dec 2021 05:25), Max: 36.6 (10 Dec 2021 12:43)  T(F): 97.5 (11 Dec 2021 05:25), Max: 97.9 (10 Dec 2021 12:43)  HR: 77 (11 Dec 2021 05:25) (65 - 77)  BP: 129/71 (11 Dec 2021 05:25) (108/66 - 161/77)  BP(mean): --  RR: 18 (11 Dec 2021 05:25) (18 - 18)  SpO2: 98% (11 Dec 2021 05:25) (92% - 98%)    PHYSICAL EXAM:  GENERAL: NAD, nontoxic  HEENT:  anicteric, moist mucous membranes  CHEST/LUNG:  dec bs, no rales, wheezes, or rhonchi  HEART:  RRR, S1, S2  ABDOMEN:  BS active, soft, nontender, nondistended  EXTREMITIES: b/l LE edema, pitting, chronic appearing  NERVOUS SYSTEM: answers questions and follows commands appropriately    LABS:                        9.4    17.65 )-----------( 283      ( 11 Dec 2021 08:24 )             29.1     CBC Full  -  ( 11 Dec 2021 08:24 )  WBC Count : 17.65 K/uL  Hemoglobin : 9.4 g/dL  Hematocrit : 29.1 %  Platelet Count - Automated : 283 K/uL  Mean Cell Volume : 98.3 fl  Mean Cell Hemoglobin : 31.8 pg  Mean Cell Hemoglobin Concentration : 32.3 gm/dL  Auto Neutrophil # : 14.70 K/uL  Auto Lymphocyte # : 0.82 K/uL  Auto Monocyte # : 0.90 K/uL  Auto Eosinophil # : 0.00 K/uL  Auto Basophil # : 0.02 K/uL  Auto Neutrophil % : 83.3 %  Auto Lymphocyte % : 4.6 %  Auto Monocyte % : 5.1 %  Auto Eosinophil % : 0.0 %  Auto Basophil % : 0.1 %    11 Dec 2021 08:24    141    |  104    |  81     ----------------------------<  122    4.5     |  33     |  1.70     Ca    8.8        11 Dec 2021 08:24  Phos  3.6       11 Dec 2021 08:24  Mg     2.1       11 Dec 2021 08:24    TPro  5.1    /  Alb  2.3    /  TBili  0.4    /  DBili  x      /  AST  18     /  ALT  21     /  AlkPhos  57     11 Dec 2021 08:24        CAPILLARY BLOOD GLUCOSE              RADIOLOGY & ADDITIONAL TESTS:    Personally reviewed.     Consultant(s) Notes Reviewed:  [x] YES  [ ] NO

## 2021-12-12 LAB
ALBUMIN SERPL ELPH-MCNC: 2.4 G/DL — LOW (ref 3.3–5)
ALP SERPL-CCNC: 62 U/L — SIGNIFICANT CHANGE UP (ref 40–120)
ALT FLD-CCNC: 25 U/L — SIGNIFICANT CHANGE UP (ref 12–78)
ANION GAP SERPL CALC-SCNC: 1 MMOL/L — LOW (ref 5–17)
AST SERPL-CCNC: 25 U/L — SIGNIFICANT CHANGE UP (ref 15–37)
BASOPHILS # BLD AUTO: 0.07 K/UL — SIGNIFICANT CHANGE UP (ref 0–0.2)
BASOPHILS NFR BLD AUTO: 0.4 % — SIGNIFICANT CHANGE UP (ref 0–2)
BILIRUB SERPL-MCNC: 0.4 MG/DL — SIGNIFICANT CHANGE UP (ref 0.2–1.2)
BUN SERPL-MCNC: 73 MG/DL — HIGH (ref 7–23)
CALCIUM SERPL-MCNC: 8.7 MG/DL — SIGNIFICANT CHANGE UP (ref 8.5–10.1)
CHLORIDE SERPL-SCNC: 105 MMOL/L — SIGNIFICANT CHANGE UP (ref 96–108)
CO2 SERPL-SCNC: 36 MMOL/L — HIGH (ref 22–31)
CREAT SERPL-MCNC: 1.6 MG/DL — HIGH (ref 0.5–1.3)
EOSINOPHIL # BLD AUTO: 0.04 K/UL — SIGNIFICANT CHANGE UP (ref 0–0.5)
EOSINOPHIL NFR BLD AUTO: 0.2 % — SIGNIFICANT CHANGE UP (ref 0–6)
GLUCOSE SERPL-MCNC: 97 MG/DL — SIGNIFICANT CHANGE UP (ref 70–99)
HCT VFR BLD CALC: 30.8 % — LOW (ref 34.5–45)
HGB BLD-MCNC: 9.9 G/DL — LOW (ref 11.5–15.5)
IMM GRANULOCYTES NFR BLD AUTO: 7.4 % — HIGH (ref 0–1.5)
LYMPHOCYTES # BLD AUTO: 1.32 K/UL — SIGNIFICANT CHANGE UP (ref 1–3.3)
LYMPHOCYTES # BLD AUTO: 7.5 % — LOW (ref 13–44)
MAGNESIUM SERPL-MCNC: 2.3 MG/DL — SIGNIFICANT CHANGE UP (ref 1.6–2.6)
MCHC RBC-ENTMCNC: 31.5 PG — SIGNIFICANT CHANGE UP (ref 27–34)
MCHC RBC-ENTMCNC: 32.1 GM/DL — SIGNIFICANT CHANGE UP (ref 32–36)
MCV RBC AUTO: 98.1 FL — SIGNIFICANT CHANGE UP (ref 80–100)
MONOCYTES # BLD AUTO: 1.3 K/UL — HIGH (ref 0–0.9)
MONOCYTES NFR BLD AUTO: 7.4 % — SIGNIFICANT CHANGE UP (ref 2–14)
NEUTROPHILS # BLD AUTO: 13.51 K/UL — HIGH (ref 1.8–7.4)
NEUTROPHILS NFR BLD AUTO: 77.1 % — HIGH (ref 43–77)
NRBC # BLD: 0 /100 WBCS — SIGNIFICANT CHANGE UP (ref 0–0)
PHOSPHATE SERPL-MCNC: 2.9 MG/DL — SIGNIFICANT CHANGE UP (ref 2.5–4.5)
PLATELET # BLD AUTO: 319 K/UL — SIGNIFICANT CHANGE UP (ref 150–400)
POTASSIUM SERPL-MCNC: 4.9 MMOL/L — SIGNIFICANT CHANGE UP (ref 3.5–5.3)
POTASSIUM SERPL-SCNC: 4.9 MMOL/L — SIGNIFICANT CHANGE UP (ref 3.5–5.3)
PROT SERPL-MCNC: 5.2 G/DL — LOW (ref 6–8.3)
RBC # BLD: 3.14 M/UL — LOW (ref 3.8–5.2)
RBC # FLD: 15.3 % — HIGH (ref 10.3–14.5)
SARS-COV-2 RNA SPEC QL NAA+PROBE: DETECTED
SODIUM SERPL-SCNC: 142 MMOL/L — SIGNIFICANT CHANGE UP (ref 135–145)
WBC # BLD: 17.54 K/UL — HIGH (ref 3.8–10.5)
WBC # FLD AUTO: 17.54 K/UL — HIGH (ref 3.8–10.5)

## 2021-12-12 PROCEDURE — 99233 SBSQ HOSP IP/OBS HIGH 50: CPT | Mod: GC

## 2021-12-12 RX ORDER — SENNA PLUS 8.6 MG/1
2 TABLET ORAL AT BEDTIME
Refills: 0 | Status: DISCONTINUED | OUTPATIENT
Start: 2021-12-12 | End: 2021-12-13

## 2021-12-12 RX ORDER — POLYETHYLENE GLYCOL 3350 17 G/17G
17 POWDER, FOR SOLUTION ORAL DAILY
Refills: 0 | Status: DISCONTINUED | OUTPATIENT
Start: 2021-12-12 | End: 2021-12-13

## 2021-12-12 RX ADMIN — BUDESONIDE AND FORMOTEROL FUMARATE DIHYDRATE 2 PUFF(S): 160; 4.5 AEROSOL RESPIRATORY (INHALATION) at 18:09

## 2021-12-12 RX ADMIN — Medication 100 MILLIGRAM(S): at 11:43

## 2021-12-12 RX ADMIN — Medication 240 MILLIGRAM(S): at 06:16

## 2021-12-12 RX ADMIN — Medication 37.5 MILLIGRAM(S): at 11:42

## 2021-12-12 RX ADMIN — Medication 81 MILLIGRAM(S): at 11:43

## 2021-12-12 RX ADMIN — SENNA PLUS 2 TABLET(S): 8.6 TABLET ORAL at 22:00

## 2021-12-12 RX ADMIN — AMIODARONE HYDROCHLORIDE 200 MILLIGRAM(S): 400 TABLET ORAL at 06:16

## 2021-12-12 RX ADMIN — TIOTROPIUM BROMIDE 1 CAPSULE(S): 18 CAPSULE ORAL; RESPIRATORY (INHALATION) at 06:16

## 2021-12-12 RX ADMIN — CARVEDILOL PHOSPHATE 6.25 MILLIGRAM(S): 80 CAPSULE, EXTENDED RELEASE ORAL at 17:22

## 2021-12-12 RX ADMIN — APIXABAN 2.5 MILLIGRAM(S): 2.5 TABLET, FILM COATED ORAL at 17:22

## 2021-12-12 RX ADMIN — APIXABAN 2.5 MILLIGRAM(S): 2.5 TABLET, FILM COATED ORAL at 06:17

## 2021-12-12 RX ADMIN — BUDESONIDE AND FORMOTEROL FUMARATE DIHYDRATE 2 PUFF(S): 160; 4.5 AEROSOL RESPIRATORY (INHALATION) at 06:17

## 2021-12-12 RX ADMIN — CARVEDILOL PHOSPHATE 6.25 MILLIGRAM(S): 80 CAPSULE, EXTENDED RELEASE ORAL at 06:16

## 2021-12-12 RX ADMIN — Medication 50 MICROGRAM(S): at 06:16

## 2021-12-12 RX ADMIN — Medication 60 MILLIGRAM(S): at 00:45

## 2021-12-12 NOTE — PROGRESS NOTE ADULT - PROBLEM SELECTOR PLAN 4
Admission labs show elevated WBC's 13.5  - WBC 17 today  - Patient remains afebrile  - Leukocytosis likely reactive, completed course of steroids yesterday - monitor for fevers  - continue to trend with daily CBC Admission labs show elevated WBC's 13.5  - WBC elevated but stable  - Patient remains afebrile  - Leukocytosis likely reactive, completed course of steroids 12/10- monitor for fevers  - continue to trend with daily CBC

## 2021-12-12 NOTE — PROGRESS NOTE ADULT - ATTENDING COMMENTS
The patient was seen and evaluated independently by the attending physician.  - dc planning   - medically ready to contnue dispo planning to ALEX at facility accepting COVID positive pt's outside the suspected contagious window. she's on her home O2 requirements and her exam continues to improve q daily.

## 2021-12-12 NOTE — PROGRESS NOTE ADULT - SUBJECTIVE AND OBJECTIVE BOX
Patient is a 89y old  Female who presents with a chief complaint of Acute Hypoxic respiratory failure and AF with RVR (11 Dec 2021 12:03)      INTERVAL HPI/OVERNIGHT EVENTS: No acute events overnight. Patient seen and examined at bedside. Patient denies any  chest pain, dyspnea, abdominal pain, dysuria. Endorses leg swelling but states this is chronic for her. Complaining of constipation.     MEDICATIONS  (STANDING):  allopurinol 100 milliGRAM(s) Oral daily  aMIOdarone    Tablet 200 milliGRAM(s) Oral daily  aMIOdarone    Tablet   Oral   apixaban 2.5 milliGRAM(s) Oral two times a day  aspirin  chewable 81 milliGRAM(s) Oral daily  budesonide 160 MICROgram(s)/formoterol 4.5 MICROgram(s) Inhaler 2 Puff(s) Inhalation two times a day  carvedilol 6.25 milliGRAM(s) Oral every 12 hours  diltiazem    milliGRAM(s) Oral daily  levothyroxine 50 MICROGram(s) Oral daily  tiotropium 18 MICROgram(s) Capsule 1 Capsule(s) Inhalation daily  venlafaxine XR. 37.5 milliGRAM(s) Oral daily    MEDICATIONS  (PRN):  ALBUTerol    90 MICROgram(s) HFA Inhaler 1 Puff(s) Inhalation every 4 hours PRN Shortness of Breath and/or Wheezing  benzonatate 100 milliGRAM(s) Oral three times a day PRN Cough      Allergies    Tomas Cheese - Pt states the one time she had a mouthful of tomas cheese, her throat was closing and she required an epinephrine injection. (Anaphylaxis)  iodine (Anaphylaxis)  penicillin (Other)  shellfish (Short breath; Hives)  sulfa drugs (Hives)    Intolerances        REVIEW OF SYSTEMS:  CONSTITUTIONAL: No fever or chills  HEENT:  No headache, no sore throat  RESPIRATORY: No cough, wheezing, or shortness of breath  CARDIOVASCULAR: No chest pain, palpitations  GASTROINTESTINAL: No abd pain, nausea, vomiting, or diarrhea  GENITOURINARY: No dysuria, frequency, or hematuria  NEUROLOGICAL: no focal weakness or dizziness  MUSCULOSKELETAL: no myalgias     Vital Signs Last 24 Hrs  T(C): 36.4 (12 Dec 2021 05:13), Max: 36.4 (11 Dec 2021 12:42)  T(F): 97.6 (12 Dec 2021 05:13), Max: 97.6 (11 Dec 2021 12:42)  HR: 69 (12 Dec 2021 05:13) (68 - 71)  BP: 155/72 (12 Dec 2021 05:13) (122/70 - 163/80)  BP(mean): --  RR: 17 (12 Dec 2021 05:13) (17 - 18)  SpO2: 96% (12 Dec 2021 07:38) (92% - 97%)    PHYSICAL EXAM:  GENERAL: NAD  HEENT:  anicteric, moist mucous membranes  CHEST/LUNG:  CTA b/l, no rales, wheezes, or rhonchi  HEART:  RRR, S1, S2  ABDOMEN:  BS+, soft, nontender, nondistended  EXTREMITIES: no edema, cyanosis, or calf tenderness  NERVOUS SYSTEM: answers questions and follows commands appropriately    LABS:                        9.9    17.54 )-----------( 319      ( 12 Dec 2021 08:19 )             30.8     CBC Full  -  ( 12 Dec 2021 08:19 )  WBC Count : 17.54 K/uL  Hemoglobin : 9.9 g/dL  Hematocrit : 30.8 %  Platelet Count - Automated : 319 K/uL  Mean Cell Volume : 98.1 fl  Mean Cell Hemoglobin : 31.5 pg  Mean Cell Hemoglobin Concentration : 32.1 gm/dL  Auto Neutrophil # : x  Auto Lymphocyte # : x  Auto Monocyte # : x  Auto Eosinophil # : x  Auto Basophil # : x  Auto Neutrophil % : x  Auto Lymphocyte % : x  Auto Monocyte % : x  Auto Eosinophil % : x  Auto Basophil % : x      Ca    8.8        11 Dec 2021 08:24          CAPILLARY BLOOD GLUCOSE              RADIOLOGY & ADDITIONAL TESTS:    Personally reviewed.     Consultant(s) Notes Reviewed:  [x] YES  [ ] NO     Patient is a 89y old  Female who presents with a chief complaint of Acute Hypoxic respiratory failure and AF with RVR (11 Dec 2021 12:03)      INTERVAL HPI/OVERNIGHT EVENTS: No acute events overnight. Patient seen and examined at bedside. Patient denies any  chest pain, dyspnea, abdominal pain, dysuria. Endorses leg swelling but states this is chronic for her. Complaining of constipation.     MEDICATIONS  (STANDING):  allopurinol 100 milliGRAM(s) Oral daily  aMIOdarone    Tablet 200 milliGRAM(s) Oral daily  aMIOdarone    Tablet   Oral   apixaban 2.5 milliGRAM(s) Oral two times a day  aspirin  chewable 81 milliGRAM(s) Oral daily  budesonide 160 MICROgram(s)/formoterol 4.5 MICROgram(s) Inhaler 2 Puff(s) Inhalation two times a day  carvedilol 6.25 milliGRAM(s) Oral every 12 hours  diltiazem    milliGRAM(s) Oral daily  levothyroxine 50 MICROGram(s) Oral daily  tiotropium 18 MICROgram(s) Capsule 1 Capsule(s) Inhalation daily  venlafaxine XR. 37.5 milliGRAM(s) Oral daily    MEDICATIONS  (PRN):  ALBUTerol    90 MICROgram(s) HFA Inhaler 1 Puff(s) Inhalation every 4 hours PRN Shortness of Breath and/or Wheezing  benzonatate 100 milliGRAM(s) Oral three times a day PRN Cough      Allergies    Tomas Cheese - Pt states the one time she had a mouthful of tomas cheese, her throat was closing and she required an epinephrine injection. (Anaphylaxis)  iodine (Anaphylaxis)  penicillin (Other)  shellfish (Short breath; Hives)  sulfa drugs (Hives)    Intolerances        REVIEW OF SYSTEMS:  per hpi    Vital Signs Last 24 Hrs  T(C): 36.4 (12 Dec 2021 05:13), Max: 36.4 (11 Dec 2021 12:42)  T(F): 97.6 (12 Dec 2021 05:13), Max: 97.6 (11 Dec 2021 12:42)  HR: 69 (12 Dec 2021 05:13) (68 - 71)  BP: 155/72 (12 Dec 2021 05:13) (122/70 - 163/80)  BP(mean): --  RR: 17 (12 Dec 2021 05:13) (17 - 18)  SpO2: 96% (12 Dec 2021 07:38) (92% - 97%)    PHYSICAL EXAM:  GENERAL: NAD, nontoxic  HEENT:  anicteric, moist mucous membranes  CHEST/LUNG:  dec bs, no rales, wheezes, or rhonchi  HEART:  RRR, S1, S2  ABDOMEN:  BS active, soft, nontender, nondistended  EXTREMITIES: b/l LE edema 2+ pitting, chronic appearing  NERVOUS SYSTEM: answers questions and follows commands appropriately    LABS:                        9.9    17.54 )-----------( 319      ( 12 Dec 2021 08:19 )             30.8     CBC Full  -  ( 12 Dec 2021 08:19 )  WBC Count : 17.54 K/uL  Hemoglobin : 9.9 g/dL  Hematocrit : 30.8 %  Platelet Count - Automated : 319 K/uL  Mean Cell Volume : 98.1 fl  Mean Cell Hemoglobin : 31.5 pg  Mean Cell Hemoglobin Concentration : 32.1 gm/dL  Auto Neutrophil # : x  Auto Lymphocyte # : x  Auto Monocyte # : x  Auto Eosinophil # : x  Auto Basophil # : x  Auto Neutrophil % : x  Auto Lymphocyte % : x  Auto Monocyte % : x  Auto Eosinophil % : x  Auto Basophil % : x      Ca    8.8        11 Dec 2021 08:24          CAPILLARY BLOOD GLUCOSE              RADIOLOGY & ADDITIONAL TESTS:    Personally reviewed.     Consultant(s) Notes Reviewed:  [x] YES  [ ] NO     Patient is a 89y old  Female who presents with a chief complaint of Acute Hypoxic respiratory failure and AF with RVR (11 Dec 2021 12:03)      INTERVAL HPI/OVERNIGHT EVENTS: No acute events overnight. Patient seen and examined at bedside. Patient denies any chest pain, dyspnea, abdominal pain, dysuria. Endorses leg swelling but states this is chronic for her. Complaining of constipation. She has been drinking more water and her energy level is better per pt report.   I spoke w/ family over the phone.     MEDICATIONS  (STANDING):  allopurinol 100 milliGRAM(s) Oral daily  aMIOdarone    Tablet 200 milliGRAM(s) Oral daily  aMIOdarone    Tablet   Oral   apixaban 2.5 milliGRAM(s) Oral two times a day  aspirin  chewable 81 milliGRAM(s) Oral daily  budesonide 160 MICROgram(s)/formoterol 4.5 MICROgram(s) Inhaler 2 Puff(s) Inhalation two times a day  carvedilol 6.25 milliGRAM(s) Oral every 12 hours  diltiazem    milliGRAM(s) Oral daily  levothyroxine 50 MICROGram(s) Oral daily  tiotropium 18 MICROgram(s) Capsule 1 Capsule(s) Inhalation daily  venlafaxine XR. 37.5 milliGRAM(s) Oral daily    MEDICATIONS  (PRN):  ALBUTerol    90 MICROgram(s) HFA Inhaler 1 Puff(s) Inhalation every 4 hours PRN Shortness of Breath and/or Wheezing  benzonatate 100 milliGRAM(s) Oral three times a day PRN Cough      Allergies    Tomas Cheese - Pt states the one time she had a mouthful of tomas cheese, her throat was closing and she required an epinephrine injection. (Anaphylaxis)  iodine (Anaphylaxis)  penicillin (Other)  shellfish (Short breath; Hives)  sulfa drugs (Hives)    Intolerances        REVIEW OF SYSTEMS:  per hpi    Vital Signs Last 24 Hrs  T(C): 36.4 (12 Dec 2021 05:13), Max: 36.4 (11 Dec 2021 12:42)  T(F): 97.6 (12 Dec 2021 05:13), Max: 97.6 (11 Dec 2021 12:42)  HR: 69 (12 Dec 2021 05:13) (68 - 71)  BP: 155/72 (12 Dec 2021 05:13) (122/70 - 163/80)  BP(mean): --  RR: 17 (12 Dec 2021 05:13) (17 - 18)  SpO2: 96% (12 Dec 2021 07:38) (92% - 97%)    PHYSICAL EXAM:  GENERAL: NAD, nontoxic  HEENT:  anicteric, moist mucous membranes  CHEST/LUNG:  dec bs, no rales, wheezes, or rhonchi  HEART:  RRR, S1, S2  ABDOMEN:  BS active, soft, nontender, nondistended  EXTREMITIES: b/l LE edema 2+ pitting, chronic appearing  NERVOUS SYSTEM: answers questions and follows commands appropriately    LABS:                        9.9    17.54 )-----------( 319      ( 12 Dec 2021 08:19 )             30.8     CBC Full  -  ( 12 Dec 2021 08:19 )  WBC Count : 17.54 K/uL  Hemoglobin : 9.9 g/dL  Hematocrit : 30.8 %  Platelet Count - Automated : 319 K/uL  Mean Cell Volume : 98.1 fl  Mean Cell Hemoglobin : 31.5 pg  Mean Cell Hemoglobin Concentration : 32.1 gm/dL  Auto Neutrophil # : x  Auto Lymphocyte # : x  Auto Monocyte # : x  Auto Eosinophil # : x  Auto Basophil # : x  Auto Neutrophil % : x  Auto Lymphocyte % : x  Auto Monocyte % : x  Auto Eosinophil % : x  Auto Basophil % : x      Ca    8.8        11 Dec 2021 08:24          CAPILLARY BLOOD GLUCOSE              RADIOLOGY & ADDITIONAL TESTS:    Personally reviewed.     Consultant(s) Notes Reviewed:  [x] YES  [ ] NO

## 2021-12-12 NOTE — PROGRESS NOTE ADULT - PROBLEM SELECTOR PLAN 2
Patient presented with SOB and decreased saturation and was found to be in A. Fib  - Patient with Hx of PAFib on Coreg and Cardizem for rate control and Eliquis for AC will continue inpatient  - Likely triggered by acute respiratory failure and hypoxia.   - S/P Cardizem 10mg IV x1 and 15 mg IV X 1  - Cardiology consulted (Dr. Eastman group consulted by ED - recs appreciated)  - amiodarone loading dose per cardio recs with goal HR 50-60s   - cardizem infusion transitioned into PO cardizem 60mg q6h  - Continue to monitor on telemetry Patient presented with SOB and decreased saturation and was found to be in A. Fib  - Patient with Hx of PAFib on Coreg and Cardizem for rate control and Eliquis for AC will continue inpatient  - Likely triggered by acute respiratory failure and hypoxia.   - S/P Cardizem 10mg IV x1 and 15 mg IV X 1  - Cardiology consulted (Dr. Eastman group consulted by ED - recs appreciated)  - amiodarone loading dose per cardio recs with goal HR 50-60s   - cardizem 60mg q6h changed to cardizem 240mg qdaily, continue  - Continue to monitor on telemetry

## 2021-12-12 NOTE — PROGRESS NOTE ADULT - PROBLEM SELECTOR PLAN 7
- Admission labs show elevated Troponin 77.7  - patient has no anginal symptoms, no concerning signs of acute ischemia on ECG  - likely 2/2 to demand in the setting of demand due to A.Fib  - Was elevated on recent admission and now is down trending.   - Cardiology Dr. Morse group following

## 2021-12-12 NOTE — PROGRESS NOTE ADULT - ASSESSMENT
89 yo Female with PMHx of Paroxysmal Atrial Fibrillation, HTN, Hypothyroidism, COPD (2-3 L nasal cannula at home) Hyperlipidemia, presented to the ED with shortness of breath and was found to have A.Fib with RVR, is being admitted for Afib with RVR and acute hypoxic respiratory failure requiring NIPPV. 89 yo Female with PMHx of Paroxysmal Atrial Fibrillation, HTN, Hypothyroidism, COPD (2-3 L nasal cannula at home) Hyperlipidemia, presented to the ED with shortness of breath and was found to have A.Fib with RVR, is being admitted for Afib with RVR and acute hypoxic respiratory failure requiring NIPPV. Respiratory status clinically at baseline, discharge planning in progress for 12/13.

## 2021-12-12 NOTE — PROGRESS NOTE ADULT - PROBLEM SELECTOR PLAN 3
Mild evidence of volume overload on admission with LE edema   - Received IVF and became acutely worse  - S/P Lasix 40mg IV x2  - Strict I&Os & daily weights   - TTE 12/03: systolic function with an ejection fraction of approximately 60%. There is diastolic dysfunction. There is mild concentric left ventricular hypertrophy.  - c/w carvedilol with hold parameters. Mild evidence of volume overload on admission with LE edema   - S/P diureses with IV lasix  - Strict I&Os & daily weights   - TTE 12/03: systolic function with an ejection fraction of approximately 60%. There is diastolic dysfunction. There is mild concentric left ventricular hypertrophy.  - c/w carvedilol with hold parameters.

## 2021-12-12 NOTE — PROVIDER CONTACT NOTE (OTHER) - ASSESSMENT
upon entering shift pt was in  non-sustaing aflutter. PP aflutter twice at this time per angle in tele room upon entering shift pt was in  non-sustaing aflutter. Pt aflutter twice at this time per angle in tele room. pt asymptomatic with no chest pain at this time. pt resting comfortably,

## 2021-12-12 NOTE — PROGRESS NOTE ADULT - PROBLEM SELECTOR PLAN 5
- Admission labs show elevated Na at 147   - Likely due to decreased oral intake  - will encourage oral intake  - Follow up with daily BMP - Admission labs show elevated Na at 147   RESOLVED  - Likely due to decreased oral intake  - will encourage oral intake  - Follow up with daily BMP

## 2021-12-12 NOTE — PROGRESS NOTE ADULT - PROBLEM SELECTOR PLAN 8
- Chronic stable  - On Coreg 6.25 bid, will continue with hold parameters.  - Hold home hydralazine to allow room for rate control and/or diuresis  - Monitor hemodynamics
- Chronic stable  - On Coreg 6.25 bid, will continue with hold parameters.  - Hold hydralazine to allow room for rate control and/or diuresis  - Monitor hemodynamics
- Chronic stable  - On Coreg 6.25 bid, will continue with hold parameters.  - Hold home hydralazine to allow room for rate control and/or diuresis  - Monitor hemodynamics

## 2021-12-12 NOTE — PROGRESS NOTE ADULT - PROBLEM SELECTOR PLAN 6
Hemoglobin 10 on admission  - unknown baseline, stable now   - no symptoms or signs of bleeding  - continue to monitor with daily cbc   - Iron studies noted, continue monitoring
- Plan: H/H 10.6  - unknown baseline 10 on previous admission  - no symptoms or signs of bleeding  - continue to monitor with daily cbc   - Iron studies noted  - Continue to monitor
Hemoglobin 10 on admission  - unknown baseline, stable now   - no symptoms or signs of bleeding  - continue to monitor with daily cbc   - Iron studies noted, continue monitoring
Hemoglobin 10 on admission  - unknown baseline, stable now   - no symptoms or signs of bleeding  - continue to monitor with daily cbc   - Iron studies noted, continue monitoring
Hemoglobin 10 on admission  - unknown baseline   - no symptoms or signs of bleeding  - continue to monitor with daily cbc   - Iron studies noted  - Stable Hgb, Continue to monitor
Plan: H/H 10.6  - unknown baseline 10 on previous admission  - no symptoms or signs of bleeding  - continue to monitor with daily cbc   - Iron studies noted  - Continue to monitor

## 2021-12-12 NOTE — PROGRESS NOTE ADULT - PROBLEM SELECTOR PLAN 9
chronic, on synthroid 75mcg at home  - TSH 0.33  - Will hold for two days and restart synthroid at 50mcg
chronic, on synthroid 75mcg at home  - TSH 0.33  - lowered to synthroid at 50mcg
chronic, on synthroid 75mcg at home  - TSH 0.33  - lowered to synthroid at 50mcg
chronic, on synthroid 75mcg at home  - TSH 0.33  - Will hold for two days and restart synthroid at 50mcg
chronic, on synthroid 75mcg at home  - TSH 0.33  - lowered to synthroid at 50mcg
chronic, on synthroid 75mcg at home  - TSH 0.33  - Will hold for two days and restart synthroid at 50mcg

## 2021-12-13 VITALS — HEART RATE: 85 BPM | OXYGEN SATURATION: 94 %

## 2021-12-13 PROCEDURE — 99239 HOSP IP/OBS DSCHRG MGMT >30: CPT

## 2021-12-13 RX ORDER — POLYETHYLENE GLYCOL 3350 17 G/17G
17 POWDER, FOR SOLUTION ORAL
Qty: 0 | Refills: 0 | DISCHARGE
Start: 2021-12-13

## 2021-12-13 RX ORDER — SENNA PLUS 8.6 MG/1
2 TABLET ORAL
Qty: 0 | Refills: 0 | DISCHARGE
Start: 2021-12-13

## 2021-12-13 RX ORDER — AMIODARONE HYDROCHLORIDE 400 MG/1
1 TABLET ORAL
Qty: 0 | Refills: 0 | DISCHARGE
Start: 2021-12-13

## 2021-12-13 RX ADMIN — AMIODARONE HYDROCHLORIDE 200 MILLIGRAM(S): 400 TABLET ORAL at 05:24

## 2021-12-13 RX ADMIN — Medication 81 MILLIGRAM(S): at 11:59

## 2021-12-13 RX ADMIN — BUDESONIDE AND FORMOTEROL FUMARATE DIHYDRATE 2 PUFF(S): 160; 4.5 AEROSOL RESPIRATORY (INHALATION) at 06:04

## 2021-12-13 RX ADMIN — APIXABAN 2.5 MILLIGRAM(S): 2.5 TABLET, FILM COATED ORAL at 05:24

## 2021-12-13 RX ADMIN — Medication 50 MICROGRAM(S): at 05:24

## 2021-12-13 RX ADMIN — Medication 240 MILLIGRAM(S): at 05:24

## 2021-12-13 RX ADMIN — Medication 100 MILLIGRAM(S): at 11:59

## 2021-12-13 RX ADMIN — TIOTROPIUM BROMIDE 1 CAPSULE(S): 18 CAPSULE ORAL; RESPIRATORY (INHALATION) at 06:04

## 2021-12-13 RX ADMIN — Medication 37.5 MILLIGRAM(S): at 11:59

## 2021-12-13 RX ADMIN — CARVEDILOL PHOSPHATE 6.25 MILLIGRAM(S): 80 CAPSULE, EXTENDED RELEASE ORAL at 05:24

## 2021-12-13 NOTE — PROGRESS NOTE ADULT - REASON FOR ADMISSION
Acute Hypoxic respiratory failure and AF with RVR

## 2021-12-13 NOTE — PROGRESS NOTE ADULT - ASSESSMENT
89F DNR/DNI with PAF, HTN, Hypothyroidism, COPD (2-3 L nasal cannula at home) Hyperlipidemia, recently discharged after a COVID admission with AF, RVR and shortness of breath/LE edema now returns with SOB back in rapid heart rates with hypoxia.  She was initially given IVF for possible dehydration but with wheezing and worsening saturation, was given IV lasix and seen by ICU.  She was placed on BiPAP with improved hypoxia and diuresis. Still with dyspnea and heart rates are still elevated in afib    Suggest:    1. Acute hypoxemic respiratory failure due to recent COVID-19 pneumonia and probably acute on chronic diastolic dysfunction from AF with RVR   - Supplemental O2.   - Probable acute on chronic diastolic CHF with moderate to severe MR and rapid AF    -last CXR, no pleural effusion    2. Elevated troponin.   -Slight upward trend, probable demand ischemia.  -EKG without acute change.  -Medical management of CAD with rate control/betablocker/asa    3. Atrial fibrillation.   - Has h/o PAF, usually in sinus rhythm.  In AF with RVR on admission.  - beta blockers/cardizem and continue with amiodarone for rate/rhythm control.   - po cardizem 60 mg q6 with hold parameters.   - Eliquis 2.5 mg BID for primary stroke prevention.    4. Hypertension.   - Continue with cardizem/Carvedilol    Will follow PRN  outpt f/u with Dr Leavitt [phone]

## 2021-12-13 NOTE — PROGRESS NOTE ADULT - SUBJECTIVE AND OBJECTIVE BOX
[facility] Cardiology Progress Note [phone] (Dr. Eastman, Dagmar, Adela, Luly)    CHIEF COMPLAINT: Patient is a 89y old  Female who presents with a chief complaint of Acute Hypoxic respiratory failure and AF with RVR (13 Dec 2021 07:40)      Follow Up Today: The patient denies any chest discomfort or shortness of breath.    HPI:  Patient is a 89 yo Female with PMHx of Paroxysmal Atrial Fibrillation (on eliquis), HTN, Hypothyroidism, COPD (2-3 L nasal cannula at home) Hyperlipidemia, presenting with shortness of breath. Patient was admitted at [facility] for acute hypoxic respiratory failure secondary to COVID infection and was discharged home yesterday. History obtained from Emerald Martinez who is also the HCP over the phone at [phone]. Emerald informs that after coming home yesterday patient remained lethargic, claims that she did not sleep well overnight and this morning at the breakfast was confused and dyspneic. Emerald informs that patient was getting tired and fatigued because of difficulty breathing so decided to bring her back to the hospital.  Patient is short of breath at baseline but claims that she is more dyspneic and tiered. Denies any chest pain or palpitations. No nausea or vomiting,     ED course:   Vitals:   BP: 115/91  HR: 123  Temp:   RR: 18, O2: 95% on 4L NC   Labs:   CBC: WBC:13.52 Hb: 10.06 , Platlets: 344 Neutrophils: 82.3   CMP:  , K+ 4.9-, Cl111- , BUN/Creatinine: 61/1.30, Procal: 0.18, D-Dimer: 274, APTT: 27.1, PT: 13, INR: 1.12  CXR: bibasilar opacities, blunted CP angles on personal review  EKG: Atrial flutter with variable AV block, QT/QTc: 326/454,   Patient received: 1L NS x1, Cardizem 10mg IV x1, 15 mg IV X1 and Lasix 40mg IV x1 Duoneb X 1 (06 Dec 2021 21:25)      PAST MEDICAL & SURGICAL HISTORY:  Hypertension    Depression    Overactive bladder    Gout    Osteoarthritis    Hypothyroid    HLD (hyperlipidemia)    Paroxysmal atrial fibrillation    Asthma    S/P cholecystectomy    S/P lumpectomy of breast    S/P hysterectomy    S/P lung surgery, follow-up exam  s/p removal of suspicious lesion, negative        MEDICATIONS  (STANDING):  allopurinol 100 milliGRAM(s) Oral daily  aMIOdarone    Tablet 200 milliGRAM(s) Oral daily  aMIOdarone    Tablet   Oral   apixaban 2.5 milliGRAM(s) Oral two times a day  aspirin  chewable 81 milliGRAM(s) Oral daily  budesonide 160 MICROgram(s)/formoterol 4.5 MICROgram(s) Inhaler 2 Puff(s) Inhalation two times a day  carvedilol 6.25 milliGRAM(s) Oral every 12 hours  diltiazem    milliGRAM(s) Oral daily  levothyroxine 50 MICROGram(s) Oral daily  senna 2 Tablet(s) Oral at bedtime  tiotropium 18 MICROgram(s) Capsule 1 Capsule(s) Inhalation daily  venlafaxine XR. 37.5 milliGRAM(s) Oral daily    MEDICATIONS  (PRN):  ALBUTerol    90 MICROgram(s) HFA Inhaler 1 Puff(s) Inhalation every 4 hours PRN Shortness of Breath and/or Wheezing  benzonatate 100 milliGRAM(s) Oral three times a day PRN Cough  polyethylene glycol 3350 17 Gram(s) Oral daily PRN Constipation      Allergies    Tomas Cheese - Pt states the one time she had a mouthful of tomas cheese, her throat was closing and she required an epinephrine injection. (Anaphylaxis)  iodine (Anaphylaxis)  penicillin (Other)  shellfish (Short breath; Hives)  sulfa drugs (Hives)    Intolerances        REVIEW OF SYSTEMS:    All other review of systems is negative unless indicated above    Vital Signs Last 24 Hrs  T(C): 36.7 (13 Dec 2021 04:37), Max: 36.9 (12 Dec 2021 21:00)  T(F): 98 (13 Dec 2021 04:37), Max: 98.4 (12 Dec 2021 21:00)  HR: 76 (13 Dec 2021 04:37) (73 - 76)  BP: 172/77 (13 Dec 2021 04:37) (127/78 - 172/77)  BP(mean): --  RR: 17 (13 Dec 2021 04:37) (16 - 17)  SpO2: 96% (13 Dec 2021 04:37) (95% - 97%)    I&O's Summary    12 Dec 2021 07:01  -  13 Dec 2021 07:00  --------------------------------------------------------  IN: 0 mL / OUT: 850 mL / NET: -850 mL        PHYSICAL EXAM:    Constitutional: NAD, awake and alert, well-developed  Eyes:  EOMI,  Pupils round, No oral cyanosis.  HEENT: No exudate or erythema  Pulmonary: Non-labored, breath sounds are clear bilaterally, No wheezing, rales or rhonchi  Cardiovascular: Regular, S1 and S2, No murmurs, rubs, gallops oir clicks  Gastrointestinal: Bowel Sounds present, soft, nontender.   Ext: No significant LE edema with good pulses x 4  Neurological: Alert, no gross focal motor deficits  Skin: No rashes.  Psych:  Mood & affect appropriate    LABS: All Labs Reviewed:                        9.9    17.54 )-----------( 319      ( 12 Dec 2021 08:19 )             30.8                         9.4    17.65 )-----------( 283      ( 11 Dec 2021 08:24 )             29.1     12 Dec 2021 08:19    142    |  105    |  73     ----------------------------<  97     4.9     |  36     |  1.60   11 Dec 2021 08:24    141    |  104    |  81     ----------------------------<  122    4.5     |  33     |  1.70     Ca    8.7        12 Dec 2021 08:19  Ca    8.8        11 Dec 2021 08:24  Phos  2.9       12 Dec 2021 08:19  Phos  3.6       11 Dec 2021 08:24  Mg     2.3       12 Dec 2021 08:19  Mg     2.1       11 Dec 2021 08:24    TPro  5.2    /  Alb  2.4    /  TBili  0.4    /  DBili  x      /  AST  25     /  ALT  25     /  AlkPhos  62     12 Dec 2021 08:19  TPro  5.1    /  Alb  2.3    /  TBili  0.4    /  DBili  x      /  AST  18     /  ALT  21     /  AlkPhos  57     11 Dec 2021 08:24          Blood Culture:         RADIOLOGY/EKG:    Attending Attestation:   20 minutes spent on total encounter; more than 50% of the visit was spent counseling and/or coordinating care by the attending physician.     ASSESSMENT AND PLAN

## 2021-12-13 NOTE — PROGRESS NOTE ADULT - PROBLEM SELECTOR PROBLEM 1
Acute hypoxemic respiratory failure due to COVID-19
Acute hypoxemic respiratory failure due to COVID-19
Acute respiratory failure with hypoxia
Acute hypoxemic respiratory failure due to COVID-19
Acute respiratory failure with hypoxia

## 2021-12-13 NOTE — PROGRESS NOTE ADULT - SUBJECTIVE AND OBJECTIVE BOX
PULMONARY/CRITICAL CARE    Dos 12/13/21  feels better.   Unchanged.   Weakness walking.   Less sob. Somewhat improved. OOB in chair  On NC  HR better--on Amio.     Patient is a 89y old  Female who presents with a chief complaint of SOB (30 Nov 2021 19:49)  Has COVID pneumonia, rapid atrial fib.    BRIEF HOSPITAL COURSE: ***89 yo F PMHx Paroxysmal Atrial Fibrillation, HTN, Hypothyroidism, COPD (2-3 L nasal cannula at home) Hyperlipidemia, presenting with shortness of breath and LE edema. Patient is short of breath at baseline but has been having difficulty standing up and walking a few steps for the last week. Patient lives with her son and his family, who hosted thanksgiving dinner. 1 guest at the dinner tested positive for COVID. Son bought an at home rapid test. He was positive, and patient was also positive. Her grandchildren and his wife were negative. Patient admits to a dry cough that started Monday and progressively worsening LE edema in the last few days. She is on bumetanide 1 mg daily for water retention denies any changes to dose or missed doses.     She does admit to a high salt diet and does not really watch what she eats.   She denies orthopnea, fever, chills, history of heart failure or CAD.   Son at bedside admits that she has a history of "mini stroke" in the past however, patient adamantly denies.     On Cardizem for Atrial fib.    Events last 24 hours: ***    PAST MEDICAL & SURGICAL HISTORY:  Hypertension    Depression    Overactive bladder    Gout    Osteoarthritis    Hypothyroid    HLD (hyperlipidemia)    Paroxysmal atrial fibrillation    Asthma    S/P cholecystectomy    S/P lumpectomy of breast    S/P hysterectomy    S/P lung surgery, follow-up exam  s/p removal of suspicious lesion, negative      Allergies    Tomas Cheese - Pt states the one time she had a mouthful of tomas cheese, her throat was closing and she required an epinephrine injection. (Anaphylaxis)  iodine (Anaphylaxis)  penicillin (Other)  shellfish (Short breath; Hives)  sulfa drugs (Hives)    Intolerances      FAMILY HISTORY social--no recent cigs, etoh.   Family history of nasopharyngeal cancer (Child)  daughter    Family history of breast cancer (Mother)          Medications:  remdesivir  IVPB   IV Intermittent   remdesivir  IVPB 100 milliGRAM(s) IV Intermittent every 24 hours    carvedilol 6.25 milliGRAM(s) Oral every 12 hours  furosemide   Injectable 40 milliGRAM(s) IV Push every 12 hours  hydrALAZINE 100 milliGRAM(s) Oral every 8 hours  losartan 100 milliGRAM(s) Oral daily    albuterol/ipratropium for Nebulization 3 milliLiter(s) Nebulizer every 6 hours PRN  albuterol/ipratropium for Nebulization. 3 milliLiter(s) Nebulizer once  benzonatate 100 milliGRAM(s) Oral three times a day PRN  budesonide 160 MICROgram(s)/formoterol 4.5 MICROgram(s) Inhaler 2 Puff(s) Inhalation two times a day  tiotropium 18 MICROgram(s) Capsule 1 Capsule(s) Inhalation daily    acetaminophen     Tablet .. 650 milliGRAM(s) Oral every 6 hours PRN  melatonin 3 milliGRAM(s) Oral at bedtime PRN  venlafaxine XR. 37.5 milliGRAM(s) Oral daily      apixaban 2.5 milliGRAM(s) Oral every 12 hours  aspirin enteric coated 81 milliGRAM(s) Oral daily    famotidine    Tablet 20 milliGRAM(s) Oral daily      allopurinol 100 milliGRAM(s) Oral daily  dexAMETHasone  Injectable 6 milliGRAM(s) IV Push daily  levothyroxine 75 MICROGram(s) Oral daily                  ICU Vital Signs Last 24 Hrs  T(C): 36.8 (01 Dec 2021 07:15), Max: 38.2 (30 Nov 2021 22:45)  T(F): 98.2 (01 Dec 2021 07:15), Max: 100.7 (30 Nov 2021 22:45)  HR: 50 (01 Dec 2021 07:15) (11 - 147)  BP: 114/54 (01 Dec 2021 07:15) (81/36 - 200/88)  BP(mean): --  ABP: --  ABP(mean): --  RR: 18 (01 Dec 2021 07:15) (17 - 181)  SpO2: 98% (01 Dec 2021 07:15) (77% - 100%)    Vital Signs Last 24 Hrs  T(C): 36.8 (01 Dec 2021 07:15), Max: 38.2 (30 Nov 2021 22:45)  T(F): 98.2 (01 Dec 2021 07:15), Max: 100.7 (30 Nov 2021 22:45)  HR: 50 (01 Dec 2021 07:15) (11 - 147)  BP: 114/54 (01 Dec 2021 07:15) (81/36 - 200/88)  BP(mean): --  RR: 18 (01 Dec 2021 07:15) (17 - 181)  SpO2: 98% (01 Dec 2021 07:15) (77% - 100%)        I&O's Detail        LABS:                        10.8   8.70  )-----------( 198      ( 01 Dec 2021 05:15 )             34.1     12-01    144  |  104  |  33<H>  ----------------------------<  139<H>  4.2   |  34<H>  |  1.50<H>    Ca    8.6      01 Dec 2021 05:15  Mg     2.4     11-30    TPro  6.9  /  Alb  2.8<L>  /  TBili  0.3  /  DBili  x   /  AST  36  /  ALT  32  /  AlkPhos  69  12-01      CARDIAC MARKERS ( 30 Nov 2021 17:27 )  x     / x     / x     / x     / <1.0 ng/mL      CAPILLARY BLOOD GLUCOSE        PT/INR - ( 30 Nov 2021 17:27 )   PT: 12.9 sec;   INR: 1.11 ratio         PTT - ( 30 Nov 2021 17:27 )  PTT:29.3 sec    CULTURES:      Physical Examination:    General: no   acute distress.  elderly female     HEENT: Pupils equal, reactive to light.  Symmetric.    PULM: crackles 1/4 up bilat;  less rhonchi,wheezes bilat; No change percussion    CVS: irregular rate and rhythm, no murmurs, rubs, or gallops    ABD: Soft, nondistended, nontender, normoactive bowel sounds, no masses    EXT: No edema, nontender calves    SKIN: Warm and well perfused, no rashes noted.    NEURO: Alert, oriented, interactive, nonfocal    RADIOLOGY: ***  < from: CT Chest No Cont (12.01.21 @ 02:32) >  EXAM:  CT CHEST                            PROCEDURE DATE:  12/01/2021          INTERPRETATION:  CLINICAL INFORMATION: Shortness of breath, COPD, Covid.    COMPARISON: 11/4/2018    CONTRAST/COMPLICATIONS:  IV Contrast: NONE  Oral Contrast: NONE  Complications: None reported at time of study completion    PROCEDURE:  CT of the Chest was performed.  Sagittal and coronal reformats were performed.    FINDINGS:  Evaluation of solid organs and vascular structures is limited without intravenous contrast.    LUNGS AND AIRWAYS: Patent central airways. Right upper lobe wedge resection. 2 mm right upper lobe nodule (2, 37). Stable 3 mm right middle lobe nodule (2, 80). Stable 6 mm right lower lobe nodule (2, 74). Stable 5 mm nodule along the right major fissure likely reflecting intrapulmonary node (2, 81). Linear scarring/atelectasis in the right lower lobe. Subsegmental lingular and left lower lobe atelectasis. Patchy opacities in the left lower lobe.  PLEURA: No pleural effusion.  MEDIASTINUM AND FIDEL: 1 cm precarinal node. Subcentimeter other mediastinal nodes.  VESSELS: Aortic and coronary atherosclerosis.  HEART: Mild cardiomegaly. No pericardial effusion.  CHEST WALL AND LOWER NECK: Stable 9 mm hypodense right thyroid nodule.  VISUALIZED UPPER ABDOMEN: Cholecystectomy. Multiple cystic lesions within the pancreas, grossly stable, likely IPMNs or pseudocysts. Bilateral renal cysts and hyperdense lesions likely reflecting hemorrhagic/proteinaceous cyst. Stable appearance of right renalcyst containing internal clustered calcifications (2, 37).  BONES: Degenerative changes. Multiple chronic right rib deformities. Stable mild compression deformity of L2.    IMPRESSION:  Left lower lobe pneumonia. Continued follow up to resolution is recommended.        --- End of Report ---            VENU ZHANG MD; Attending Radiologist  This document has been electronically signed. Dec  1 2021  3:26AM    < end of copied text >  < from: US Duplex Venous Lower Ext Complete, Bilateral (12.01.21 @ 02:08) >  EXAM:  US DPLX LWR EXT VEINS COMPL BI                            PROCEDURE DATE:  12/01/2021          INTERPRETATION:  CLINICAL INDICATION: le edmea sob hypoxia.    TECHNIQUE: Grayscale, color Doppler and spectral Doppler ultrasound was utilized toevaluate bilateral lower extremity deep venous system.    COMPARISON: 9/14/2015.    FINDINGS: There is no thrombus in bilateral common femoral veins, femoral veins or popliteal veins. Visualized bilateral posterior tibial veins and peroneal veins arepatent. Bilateral gastrocnemius veins and soleal veins were not visualized.    IMPRESSION:    Bilateral gastrocnemius veins and soleal veins were not visualized, limiting complete evaluation. No obvious thrombus in the visualized bilateral lower extremity deep veins.    --- End of Report ---        < end of copied text >  ra< from: Xray Chest 1 View- PORTABLE-Routine (Xray Chest 1 View- PORTABLE-Routine in AM.) (12.02.21 @ 09:12) >  EXAM:  XR CHEST PORTABLE ROUTINE 1V                            PROCEDURE DATE:  12/02/2021          INTERPRETATION:  Follow-up. AP chest.    Prior 11/30/2021.    IMPRESSION: No change heart mediastinum. Suspect evolving small patchy infiltrate at the left base. No change small left pleural effusion. New small right pleural effusion.    --- End of Report ---            GARRISON NAIK MD; Attending Radiologist  This document has been electronically signed. De    < end of copied text >  < from: Xray Chest 1 View- PORTABLE-Routine (Xray Chest 1 View- PORTABLE-Routine in AM.) (12.02.21 @ 09:12) >  EXAM:  XR CHEST PORTABLE ROUTINE 1V                            PROCEDURE DATE:  12/02/2021          INTERPRETATION:  Follow-up. AP chest.    Prior 11/30/2021.    IMPRESSION: No change heart mediastinum. Suspect evolving small patchy infiltrate at the left base. No change small left pleural effusion. New small right pleural effusion.    --- End of Report ---            GARRISON NAIK MD; Attending Radiologist  This document has been electronically signed. De    < end of copied text >  CXR chf improved

## 2021-12-13 NOTE — PROGRESS NOTE ADULT - PROBLEM SELECTOR PROBLEM 3
Acute on chronic diastolic congestive heart failure
Atrial fibrillation
Acute on chronic diastolic congestive heart failure
Atrial fibrillation
Atrial fibrillation
Acute on chronic diastolic congestive heart failure
Atrial fibrillation
Acute on chronic diastolic congestive heart failure

## 2021-12-13 NOTE — PROGRESS NOTE ADULT - PROBLEM SELECTOR PROBLEM 6
Anemia
COPD exacerbation
COPD exacerbation
Anemia
COPD exacerbation
Anemia

## 2021-12-13 NOTE — PROGRESS NOTE ADULT - PROBLEM SELECTOR PROBLEM 5
Hypothyroidism
Hypernatremia
Hypothyroidism
Hypothyroidism
Hypernatremia
Hypothyroidism
Hypernatremia

## 2021-12-13 NOTE — PROGRESS NOTE ADULT - PROBLEM SELECTOR PROBLEM 7
Major depression
Elevated troponin
Elevated troponin
Major depression
Major depression
Elevated troponin
Major depression
Elevated troponin

## 2021-12-13 NOTE — PROGRESS NOTE ADULT - PROBLEM SELECTOR PROBLEM 9
Anemia
Anemia
Hypothyroidism
Anemia
Anemia
Hypothyroidism
Hypothyroidism
Anemia
Anemia
Hypothyroidism

## 2021-12-13 NOTE — PROGRESS NOTE ADULT - PROBLEM SELECTOR PROBLEM 4
Leukocytosis
Hypertension
Hypertension
Leukocytosis
Hypertension
Leukocytosis
Hypertension
Leukocytosis

## 2021-12-13 NOTE — PROGRESS NOTE ADULT - PROVIDER SPECIALTY LIST ADULT
Cardiology
Infectious Disease
Pulmonology
Hospitalist
Hospitalist
Pulmonology
Hospitalist
Pulmonology
Pulmonology
Hospitalist

## 2021-12-13 NOTE — PROGRESS NOTE ADULT - ASSESSMENT
Pt. with acute respiratory failure due to probable CHF.  Stable today--on NC.  Problem ambulating due to weakness, deconditioning.   Rehab?  Persistence of positive covid test not unexpected.   Recent Covid, COPD exac.  Cardio note apprec   Rapid at fib better on AMIO  Hi troponin.    FU with me in office    Fu CXR as outpt.

## 2021-12-13 NOTE — PROGRESS NOTE ADULT - PROBLEM SELECTOR PROBLEM 2
Acute on chronic systolic congestive heart failure
Acute on chronic systolic congestive heart failure
Atrial fibrillation
Acute on chronic systolic congestive heart failure
Atrial fibrillation

## 2021-12-13 NOTE — PROGRESS NOTE ADULT - PROBLEM SELECTOR PROBLEM 8
Elevated troponin
Hypertension
Hypertension
Elevated troponin
Hypertension

## 2021-12-22 PROCEDURE — 85379 FIBRIN DEGRADATION QUANT: CPT

## 2021-12-22 PROCEDURE — 82553 CREATINE MB FRACTION: CPT

## 2021-12-22 PROCEDURE — 99285 EMERGENCY DEPT VISIT HI MDM: CPT

## 2021-12-22 PROCEDURE — 82728 ASSAY OF FERRITIN: CPT

## 2021-12-22 PROCEDURE — 0225U NFCT DS DNA&RNA 21 SARSCOV2: CPT

## 2021-12-22 PROCEDURE — 93306 TTE W/DOPPLER COMPLETE: CPT

## 2021-12-22 PROCEDURE — 82746 ASSAY OF FOLIC ACID SERUM: CPT

## 2021-12-22 PROCEDURE — 82550 ASSAY OF CK (CPK): CPT

## 2021-12-22 PROCEDURE — 84145 PROCALCITONIN (PCT): CPT

## 2021-12-22 PROCEDURE — 83036 HEMOGLOBIN GLYCOSYLATED A1C: CPT

## 2021-12-22 PROCEDURE — 83540 ASSAY OF IRON: CPT

## 2021-12-22 PROCEDURE — 87449 NOS EACH ORGANISM AG IA: CPT

## 2021-12-22 PROCEDURE — 84480 ASSAY TRIIODOTHYRONINE (T3): CPT

## 2021-12-22 PROCEDURE — 84300 ASSAY OF URINE SODIUM: CPT

## 2021-12-22 PROCEDURE — 71045 X-RAY EXAM CHEST 1 VIEW: CPT

## 2021-12-22 PROCEDURE — 94640 AIRWAY INHALATION TREATMENT: CPT

## 2021-12-22 PROCEDURE — 83550 IRON BINDING TEST: CPT

## 2021-12-22 PROCEDURE — 85027 COMPLETE CBC AUTOMATED: CPT

## 2021-12-22 PROCEDURE — 85610 PROTHROMBIN TIME: CPT

## 2021-12-22 PROCEDURE — 84484 ASSAY OF TROPONIN QUANT: CPT

## 2021-12-22 PROCEDURE — 36415 COLL VENOUS BLD VENIPUNCTURE: CPT

## 2021-12-22 PROCEDURE — 80053 COMPREHEN METABOLIC PANEL: CPT

## 2021-12-22 PROCEDURE — 84436 ASSAY OF TOTAL THYROXINE: CPT

## 2021-12-22 PROCEDURE — 83880 ASSAY OF NATRIURETIC PEPTIDE: CPT

## 2021-12-22 PROCEDURE — 80048 BASIC METABOLIC PNL TOTAL CA: CPT

## 2021-12-22 PROCEDURE — 81001 URINALYSIS AUTO W/SCOPE: CPT

## 2021-12-22 PROCEDURE — 85730 THROMBOPLASTIN TIME PARTIAL: CPT

## 2021-12-22 PROCEDURE — 93005 ELECTROCARDIOGRAM TRACING: CPT

## 2021-12-22 PROCEDURE — 84540 ASSAY OF URINE/UREA-N: CPT

## 2021-12-22 PROCEDURE — 93970 EXTREMITY STUDY: CPT

## 2021-12-22 PROCEDURE — 86140 C-REACTIVE PROTEIN: CPT

## 2021-12-22 PROCEDURE — 84100 ASSAY OF PHOSPHORUS: CPT

## 2021-12-22 PROCEDURE — 85025 COMPLETE CBC W/AUTO DIFF WBC: CPT

## 2021-12-22 PROCEDURE — 82962 GLUCOSE BLOOD TEST: CPT

## 2021-12-22 PROCEDURE — 71250 CT THORAX DX C-: CPT

## 2021-12-22 PROCEDURE — 84443 ASSAY THYROID STIM HORMONE: CPT

## 2021-12-22 PROCEDURE — 82570 ASSAY OF URINE CREATININE: CPT

## 2021-12-22 PROCEDURE — 83735 ASSAY OF MAGNESIUM: CPT

## 2021-12-22 PROCEDURE — 82607 VITAMIN B-12: CPT

## 2021-12-30 PROCEDURE — 85379 FIBRIN DEGRADATION QUANT: CPT

## 2021-12-30 PROCEDURE — 80048 BASIC METABOLIC PNL TOTAL CA: CPT

## 2021-12-30 PROCEDURE — 80053 COMPREHEN METABOLIC PANEL: CPT

## 2021-12-30 PROCEDURE — 85610 PROTHROMBIN TIME: CPT

## 2021-12-30 PROCEDURE — 84480 ASSAY TRIIODOTHYRONINE (T3): CPT

## 2021-12-30 PROCEDURE — 82550 ASSAY OF CK (CPK): CPT

## 2021-12-30 PROCEDURE — 84484 ASSAY OF TROPONIN QUANT: CPT

## 2021-12-30 PROCEDURE — 94760 N-INVAS EAR/PLS OXIMETRY 1: CPT

## 2021-12-30 PROCEDURE — 96376 TX/PRO/DX INJ SAME DRUG ADON: CPT

## 2021-12-30 PROCEDURE — 83550 IRON BINDING TEST: CPT

## 2021-12-30 PROCEDURE — 82728 ASSAY OF FERRITIN: CPT

## 2021-12-30 PROCEDURE — 71045 X-RAY EXAM CHEST 1 VIEW: CPT

## 2021-12-30 PROCEDURE — 83735 ASSAY OF MAGNESIUM: CPT

## 2021-12-30 PROCEDURE — 96374 THER/PROPH/DIAG INJ IV PUSH: CPT

## 2021-12-30 PROCEDURE — 85025 COMPLETE CBC W/AUTO DIFF WBC: CPT

## 2021-12-30 PROCEDURE — 86140 C-REACTIVE PROTEIN: CPT

## 2021-12-30 PROCEDURE — 84436 ASSAY OF TOTAL THYROXINE: CPT

## 2021-12-30 PROCEDURE — 82553 CREATINE MB FRACTION: CPT

## 2021-12-30 PROCEDURE — 93005 ELECTROCARDIOGRAM TRACING: CPT

## 2021-12-30 PROCEDURE — 36415 COLL VENOUS BLD VENIPUNCTURE: CPT

## 2021-12-30 PROCEDURE — 94640 AIRWAY INHALATION TREATMENT: CPT

## 2021-12-30 PROCEDURE — U0005: CPT

## 2021-12-30 PROCEDURE — 84100 ASSAY OF PHOSPHORUS: CPT

## 2021-12-30 PROCEDURE — 97163 PT EVAL HIGH COMPLEX 45 MIN: CPT

## 2021-12-30 PROCEDURE — 94660 CPAP INITIATION&MGMT: CPT

## 2021-12-30 PROCEDURE — 83880 ASSAY OF NATRIURETIC PEPTIDE: CPT

## 2021-12-30 PROCEDURE — U0003: CPT

## 2021-12-30 PROCEDURE — 84443 ASSAY THYROID STIM HORMONE: CPT

## 2021-12-30 PROCEDURE — 81001 URINALYSIS AUTO W/SCOPE: CPT

## 2021-12-30 PROCEDURE — 85730 THROMBOPLASTIN TIME PARTIAL: CPT

## 2021-12-30 PROCEDURE — 83540 ASSAY OF IRON: CPT

## 2021-12-30 PROCEDURE — 87635 SARS-COV-2 COVID-19 AMP PRB: CPT

## 2021-12-30 PROCEDURE — 99285 EMERGENCY DEPT VISIT HI MDM: CPT | Mod: 25

## 2021-12-30 PROCEDURE — 84145 PROCALCITONIN (PCT): CPT

## 2022-01-01 NOTE — ED PROVIDER NOTE - TEMPLATE, MLM
fingers/toes warm to touch/no paresthesia/no cyanosis of extremity/capillary refill time < 2 seconds
Cardiac

## 2022-01-03 ENCOUNTER — INPATIENT (INPATIENT)
Facility: HOSPITAL | Age: 87
LOS: 6 days | Discharge: NURSING, REHAB OR LONG-TERM CARE | DRG: 602 | End: 2022-01-10
Attending: HOSPITALIST | Admitting: HOSPITALIST
Payer: MEDICARE

## 2022-01-03 VITALS
TEMPERATURE: 98 F | WEIGHT: 160.06 LBS | OXYGEN SATURATION: 98 % | RESPIRATION RATE: 22 BRPM | DIASTOLIC BLOOD PRESSURE: 72 MMHG | SYSTOLIC BLOOD PRESSURE: 134 MMHG | HEART RATE: 111 BPM | HEIGHT: 60 IN

## 2022-01-03 DIAGNOSIS — Z90.49 ACQUIRED ABSENCE OF OTHER SPECIFIED PARTS OF DIGESTIVE TRACT: Chronic | ICD-10-CM

## 2022-01-03 DIAGNOSIS — I50.9 HEART FAILURE, UNSPECIFIED: ICD-10-CM

## 2022-01-03 DIAGNOSIS — I50.31 ACUTE DIASTOLIC (CONGESTIVE) HEART FAILURE: ICD-10-CM

## 2022-01-03 DIAGNOSIS — E03.9 HYPOTHYROIDISM, UNSPECIFIED: ICD-10-CM

## 2022-01-03 DIAGNOSIS — Z98.89 OTHER SPECIFIED POSTPROCEDURAL STATES: Chronic | ICD-10-CM

## 2022-01-03 DIAGNOSIS — Z09 ENCOUNTER FOR FOLLOW-UP EXAMINATION AFTER COMPLETED TREATMENT FOR CONDITIONS OTHER THAN MALIGNANT NEOPLASM: Chronic | ICD-10-CM

## 2022-01-03 DIAGNOSIS — N18.30 CHRONIC KIDNEY DISEASE, STAGE 3 UNSPECIFIED: ICD-10-CM

## 2022-01-03 DIAGNOSIS — Z90.710 ACQUIRED ABSENCE OF BOTH CERVIX AND UTERUS: Chronic | ICD-10-CM

## 2022-01-03 DIAGNOSIS — M10.9 GOUT, UNSPECIFIED: ICD-10-CM

## 2022-01-03 DIAGNOSIS — L03.90 CELLULITIS, UNSPECIFIED: ICD-10-CM

## 2022-01-03 DIAGNOSIS — I10 ESSENTIAL (PRIMARY) HYPERTENSION: ICD-10-CM

## 2022-01-03 DIAGNOSIS — Z29.9 ENCOUNTER FOR PROPHYLACTIC MEASURES, UNSPECIFIED: ICD-10-CM

## 2022-01-03 LAB
ALBUMIN SERPL ELPH-MCNC: 2.8 G/DL — LOW (ref 3.3–5)
ALP SERPL-CCNC: 59 U/L — SIGNIFICANT CHANGE UP (ref 30–120)
ALT FLD-CCNC: 20 U/L DA — SIGNIFICANT CHANGE UP (ref 10–60)
ANION GAP SERPL CALC-SCNC: 1 MMOL/L — LOW (ref 5–17)
APPEARANCE UR: CLEAR — SIGNIFICANT CHANGE UP
APTT BLD: 29.5 SEC — SIGNIFICANT CHANGE UP (ref 27.5–35.5)
AST SERPL-CCNC: 27 U/L — SIGNIFICANT CHANGE UP (ref 10–40)
BACTERIA # UR AUTO: ABNORMAL
BASOPHILS # BLD AUTO: 0 K/UL — SIGNIFICANT CHANGE UP (ref 0–0.2)
BASOPHILS NFR BLD AUTO: 0 % — SIGNIFICANT CHANGE UP (ref 0–2)
BILIRUB SERPL-MCNC: 0.4 MG/DL — SIGNIFICANT CHANGE UP (ref 0.2–1.2)
BILIRUB UR-MCNC: NEGATIVE — SIGNIFICANT CHANGE UP
BUN SERPL-MCNC: 44 MG/DL — HIGH (ref 7–23)
CALCIUM SERPL-MCNC: 8.3 MG/DL — LOW (ref 8.4–10.5)
CHLORIDE SERPL-SCNC: 102 MMOL/L — SIGNIFICANT CHANGE UP (ref 96–108)
CO2 SERPL-SCNC: 38 MMOL/L — HIGH (ref 22–31)
COLOR SPEC: YELLOW — SIGNIFICANT CHANGE UP
CREAT SERPL-MCNC: 1.89 MG/DL — HIGH (ref 0.5–1.3)
D DIMER BLD IA.RAPID-MCNC: <150 NG/ML DDU — SIGNIFICANT CHANGE UP
DIFF PNL FLD: NEGATIVE — SIGNIFICANT CHANGE UP
EOSINOPHIL # BLD AUTO: 0.23 K/UL — SIGNIFICANT CHANGE UP (ref 0–0.5)
EOSINOPHIL NFR BLD AUTO: 3 % — SIGNIFICANT CHANGE UP (ref 0–6)
EPI CELLS # UR: ABNORMAL
GLUCOSE SERPL-MCNC: 107 MG/DL — HIGH (ref 70–99)
GLUCOSE UR QL: NEGATIVE MG/DL — SIGNIFICANT CHANGE UP
HCT VFR BLD CALC: 30.7 % — LOW (ref 34.5–45)
HGB BLD-MCNC: 9.5 G/DL — LOW (ref 11.5–15.5)
INR BLD: 1.22 RATIO — HIGH (ref 0.88–1.16)
KETONES UR-MCNC: NEGATIVE — SIGNIFICANT CHANGE UP
LACTATE SERPL-SCNC: 1.2 MMOL/L — SIGNIFICANT CHANGE UP (ref 0.7–2)
LEUKOCYTE ESTERASE UR-ACNC: ABNORMAL
LYMPHOCYTES # BLD AUTO: 2.86 K/UL — SIGNIFICANT CHANGE UP (ref 1–3.3)
LYMPHOCYTES # BLD AUTO: 38 % — SIGNIFICANT CHANGE UP (ref 13–44)
MACROCYTES BLD QL: SLIGHT — SIGNIFICANT CHANGE UP
MANUAL SMEAR VERIFICATION: SIGNIFICANT CHANGE UP
MCHC RBC-ENTMCNC: 30.9 GM/DL — LOW (ref 32–36)
MCHC RBC-ENTMCNC: 32.6 PG — SIGNIFICANT CHANGE UP (ref 27–34)
MCV RBC AUTO: 105.5 FL — HIGH (ref 80–100)
MONOCYTES # BLD AUTO: 0.9 K/UL — SIGNIFICANT CHANGE UP (ref 0–0.9)
MONOCYTES NFR BLD AUTO: 12 % — SIGNIFICANT CHANGE UP (ref 2–14)
NEUTROPHILS # BLD AUTO: 3.53 K/UL — SIGNIFICANT CHANGE UP (ref 1.8–7.4)
NEUTROPHILS NFR BLD AUTO: 44 % — SIGNIFICANT CHANGE UP (ref 43–77)
NEUTS BAND # BLD: 3 % — SIGNIFICANT CHANGE UP (ref 0–8)
NITRITE UR-MCNC: NEGATIVE — SIGNIFICANT CHANGE UP
NRBC # BLD: 0 — SIGNIFICANT CHANGE UP
NRBC # BLD: SIGNIFICANT CHANGE UP /100 WBCS (ref 0–0)
NT-PROBNP SERPL-SCNC: 3464 PG/ML — HIGH (ref 0–450)
PH UR: 5 — SIGNIFICANT CHANGE UP (ref 5–8)
PLAT MORPH BLD: NORMAL — SIGNIFICANT CHANGE UP
PLATELET # BLD AUTO: 239 K/UL — SIGNIFICANT CHANGE UP (ref 150–400)
POTASSIUM SERPL-MCNC: 3.4 MMOL/L — LOW (ref 3.5–5.3)
POTASSIUM SERPL-SCNC: 3.4 MMOL/L — LOW (ref 3.5–5.3)
PROT SERPL-MCNC: 5.8 G/DL — LOW (ref 6–8.3)
PROT UR-MCNC: 15 MG/DL
PROTHROM AB SERPL-ACNC: 14.6 SEC — HIGH (ref 10.6–13.6)
RBC # BLD: 2.91 M/UL — LOW (ref 3.8–5.2)
RBC # FLD: 19.2 % — HIGH (ref 10.3–14.5)
RBC BLD AUTO: NORMAL — SIGNIFICANT CHANGE UP
RBC CASTS # UR COMP ASSIST: NEGATIVE /HPF — SIGNIFICANT CHANGE UP (ref 0–4)
SARS-COV-2 RNA SPEC QL NAA+PROBE: DETECTED
SODIUM SERPL-SCNC: 141 MMOL/L — SIGNIFICANT CHANGE UP (ref 135–145)
SP GR SPEC: 1.01 — SIGNIFICANT CHANGE UP (ref 1.01–1.02)
TROPONIN I, HIGH SENSITIVITY RESULT: 40 NG/L — SIGNIFICANT CHANGE UP
UROBILINOGEN FLD QL: NEGATIVE MG/DL — SIGNIFICANT CHANGE UP
WBC # BLD: 7.52 K/UL — SIGNIFICANT CHANGE UP (ref 3.8–10.5)
WBC # FLD AUTO: 7.52 K/UL — SIGNIFICANT CHANGE UP (ref 3.8–10.5)
WBC UR QL: ABNORMAL

## 2022-01-03 PROCEDURE — 71045 X-RAY EXAM CHEST 1 VIEW: CPT | Mod: 26

## 2022-01-03 PROCEDURE — 99223 1ST HOSP IP/OBS HIGH 75: CPT

## 2022-01-03 PROCEDURE — 99285 EMERGENCY DEPT VISIT HI MDM: CPT | Mod: CS

## 2022-01-03 PROCEDURE — 93970 EXTREMITY STUDY: CPT | Mod: 26

## 2022-01-03 RX ORDER — POLYETHYLENE GLYCOL 3350 17 G/17G
17 POWDER, FOR SOLUTION ORAL DAILY
Refills: 0 | Status: DISCONTINUED | OUTPATIENT
Start: 2022-01-03 | End: 2022-01-10

## 2022-01-03 RX ORDER — CARVEDILOL PHOSPHATE 80 MG/1
1 CAPSULE, EXTENDED RELEASE ORAL
Qty: 0 | Refills: 0 | DISCHARGE

## 2022-01-03 RX ORDER — SENNA PLUS 8.6 MG/1
2 TABLET ORAL AT BEDTIME
Refills: 0 | Status: DISCONTINUED | OUTPATIENT
Start: 2022-01-03 | End: 2022-01-10

## 2022-01-03 RX ORDER — LEVOTHYROXINE SODIUM 125 MCG
75 TABLET ORAL DAILY
Refills: 0 | Status: DISCONTINUED | OUTPATIENT
Start: 2022-01-03 | End: 2022-01-10

## 2022-01-03 RX ORDER — ACETAMINOPHEN 500 MG
650 TABLET ORAL EVERY 6 HOURS
Refills: 0 | Status: DISCONTINUED | OUTPATIENT
Start: 2022-01-03 | End: 2022-01-10

## 2022-01-03 RX ORDER — DILTIAZEM HCL 120 MG
1 CAPSULE, EXT RELEASE 24 HR ORAL
Qty: 0 | Refills: 0 | DISCHARGE

## 2022-01-03 RX ORDER — CARVEDILOL PHOSPHATE 80 MG/1
6.25 CAPSULE, EXTENDED RELEASE ORAL EVERY 12 HOURS
Refills: 0 | Status: DISCONTINUED | OUTPATIENT
Start: 2022-01-03 | End: 2022-01-10

## 2022-01-03 RX ORDER — FAMOTIDINE 10 MG/ML
20 INJECTION INTRAVENOUS DAILY
Refills: 0 | Status: DISCONTINUED | OUTPATIENT
Start: 2022-01-03 | End: 2022-01-10

## 2022-01-03 RX ORDER — VENLAFAXINE HCL 75 MG
37.5 CAPSULE, EXT RELEASE 24 HR ORAL DAILY
Refills: 0 | Status: DISCONTINUED | OUTPATIENT
Start: 2022-01-03 | End: 2022-01-04

## 2022-01-03 RX ORDER — ALUMINUM ZIRCONIUM TRICHLOROHYDREX GLY 0.2 G/G
1 STICK TOPICAL
Qty: 0 | Refills: 0 | DISCHARGE

## 2022-01-03 RX ORDER — BUDESONIDE AND FORMOTEROL FUMARATE DIHYDRATE 160; 4.5 UG/1; UG/1
2 AEROSOL RESPIRATORY (INHALATION)
Refills: 0 | Status: DISCONTINUED | OUTPATIENT
Start: 2022-01-03 | End: 2022-01-10

## 2022-01-03 RX ORDER — POTASSIUM CHLORIDE 20 MEQ
40 PACKET (EA) ORAL EVERY 4 HOURS
Refills: 0 | Status: COMPLETED | OUTPATIENT
Start: 2022-01-03 | End: 2022-01-03

## 2022-01-03 RX ORDER — ASPIRIN/CALCIUM CARB/MAGNESIUM 324 MG
81 TABLET ORAL DAILY
Refills: 0 | Status: DISCONTINUED | OUTPATIENT
Start: 2022-01-03 | End: 2022-01-10

## 2022-01-03 RX ORDER — VANCOMYCIN HCL 1 G
1000 VIAL (EA) INTRAVENOUS ONCE
Refills: 0 | Status: COMPLETED | OUTPATIENT
Start: 2022-01-03 | End: 2022-01-03

## 2022-01-03 RX ORDER — HYDRALAZINE HCL 50 MG
25 TABLET ORAL EVERY 8 HOURS
Refills: 0 | Status: DISCONTINUED | OUTPATIENT
Start: 2022-01-03 | End: 2022-01-10

## 2022-01-03 RX ORDER — ALLOPURINOL 300 MG
100 TABLET ORAL DAILY
Refills: 0 | Status: DISCONTINUED | OUTPATIENT
Start: 2022-01-03 | End: 2022-01-10

## 2022-01-03 RX ORDER — DILTIAZEM HCL 120 MG
240 CAPSULE, EXT RELEASE 24 HR ORAL DAILY
Refills: 0 | Status: DISCONTINUED | OUTPATIENT
Start: 2022-01-03 | End: 2022-01-10

## 2022-01-03 RX ORDER — CEFAZOLIN SODIUM 1 G
1000 VIAL (EA) INJECTION EVERY 8 HOURS
Refills: 0 | Status: COMPLETED | OUTPATIENT
Start: 2022-01-03 | End: 2022-01-05

## 2022-01-03 RX ORDER — AMIODARONE HYDROCHLORIDE 400 MG/1
200 TABLET ORAL DAILY
Refills: 0 | Status: DISCONTINUED | OUTPATIENT
Start: 2022-01-03 | End: 2022-01-10

## 2022-01-03 RX ORDER — APIXABAN 2.5 MG/1
2.5 TABLET, FILM COATED ORAL
Refills: 0 | Status: DISCONTINUED | OUTPATIENT
Start: 2022-01-03 | End: 2022-01-10

## 2022-01-03 RX ORDER — ALLOPURINOL 300 MG
1 TABLET ORAL
Qty: 0 | Refills: 0 | DISCHARGE

## 2022-01-03 RX ORDER — BUMETANIDE 0.25 MG/ML
1 INJECTION INTRAMUSCULAR; INTRAVENOUS EVERY 12 HOURS
Refills: 0 | Status: DISCONTINUED | OUTPATIENT
Start: 2022-01-04 | End: 2022-01-06

## 2022-01-03 RX ORDER — MULTIVIT-MIN/FERROUS GLUCONATE 9 MG/15 ML
1 LIQUID (ML) ORAL DAILY
Refills: 0 | Status: DISCONTINUED | OUTPATIENT
Start: 2022-01-03 | End: 2022-01-10

## 2022-01-03 RX ORDER — TIOTROPIUM BROMIDE 18 UG/1
1 CAPSULE ORAL; RESPIRATORY (INHALATION) DAILY
Refills: 0 | Status: DISCONTINUED | OUTPATIENT
Start: 2022-01-03 | End: 2022-01-10

## 2022-01-03 RX ORDER — ALBUTEROL 90 UG/1
3 AEROSOL, METERED ORAL
Qty: 0 | Refills: 0 | DISCHARGE

## 2022-01-03 RX ORDER — FUROSEMIDE 40 MG
40 TABLET ORAL EVERY 12 HOURS
Refills: 0 | Status: DISCONTINUED | OUTPATIENT
Start: 2022-01-03 | End: 2022-01-03

## 2022-01-03 RX ORDER — ALPRAZOLAM 0.25 MG
0.25 TABLET ORAL AT BEDTIME
Refills: 0 | Status: DISCONTINUED | OUTPATIENT
Start: 2022-01-03 | End: 2022-01-09

## 2022-01-03 RX ADMIN — Medication 25 MILLIGRAM(S): at 22:20

## 2022-01-03 RX ADMIN — Medication 0.25 MILLIGRAM(S): at 22:20

## 2022-01-03 RX ADMIN — Medication 40 MILLIEQUIVALENT(S): at 13:25

## 2022-01-03 RX ADMIN — Medication 40 MILLIGRAM(S): at 13:25

## 2022-01-03 RX ADMIN — CARVEDILOL PHOSPHATE 6.25 MILLIGRAM(S): 80 CAPSULE, EXTENDED RELEASE ORAL at 19:24

## 2022-01-03 RX ADMIN — TIOTROPIUM BROMIDE 1 CAPSULE(S): 18 CAPSULE ORAL; RESPIRATORY (INHALATION) at 19:15

## 2022-01-03 RX ADMIN — Medication 100 MILLIGRAM(S): at 22:21

## 2022-01-03 RX ADMIN — BUDESONIDE AND FORMOTEROL FUMARATE DIHYDRATE 2 PUFF(S): 160; 4.5 AEROSOL RESPIRATORY (INHALATION) at 19:20

## 2022-01-03 RX ADMIN — Medication 250 MILLIGRAM(S): at 14:30

## 2022-01-03 RX ADMIN — APIXABAN 2.5 MILLIGRAM(S): 2.5 TABLET, FILM COATED ORAL at 19:24

## 2022-01-03 RX ADMIN — Medication 40 MILLIEQUIVALENT(S): at 19:17

## 2022-01-03 NOTE — H&P ADULT - ASSESSMENT
89F PMHx Paroxysmal Atrial Fibrillation, HTN, Hypothyroidism, COPD (2-3 L nasal cannula at home) Hyperlipidemia, presenting with Bilateral LE edema and right LE wounds with cellulitis.

## 2022-01-03 NOTE — ED ADULT TRIAGE NOTE - CHIEF COMPLAINT QUOTE
both legs are swollen for several weeks, right worse than left. Have blisters on the right lower leg.

## 2022-01-03 NOTE — ED ADULT NURSE NOTE - ASSOCIATED SYMPTOMS
b/l Leg edema with some blisters for several weeks. Was in [place] for same. Recently discharged.

## 2022-01-03 NOTE — ED PROVIDER NOTE - OBJECTIVE STATEMENT
89 yo F with hx of CHF, [facility] for eval of rt leg swelling and redness. Pt is poor historian and cannot give history. Denies fever , pain, cough, CP, or other symptom. PCP Nichole VERDUZCO at [facility] is [place].

## 2022-01-03 NOTE — H&P ADULT - HISTORY OF PRESENT ILLNESS
89F PMHx Paroxysmal Atrial Fibrillation, HTN, Hypothyroidism, COPD (2-3 L nasal cannula at home) Hyperlipidemia, presenting with Bilateral LE edema and right LE wounds.  Patient was admitted to [facility] 11/30-12/5 with Covid pneumonia and 12/6-12/13 for acute CHF and Afib with RVR.  She was discharged to [facility] for ALEX.  While there her edema was increasing, and a little more than a week ago she developed a blister on her right ankle and lower leg that both burst.  She has pain in her leg especially at night.  No fevers or chills, no further SOB.  Her oxygen needs have not changed.  Her grandson became more and more concerned about her leg and felt that staff at [facility] was not addressing it so they requested that she be sent to [place] [facility] for evaluation.

## 2022-01-03 NOTE — ED PROVIDER NOTE - CLINICAL SUMMARY MEDICAL DECISION MAKING FREE TEXT BOX
89 F CHF with cellulitis of rt leg. Plan - labs, CXR, likely admission for further eval and treatment.

## 2022-01-03 NOTE — H&P ADULT - NSICDXPASTMEDICALHX_GEN_ALL_CORE_FT
PAST MEDICAL HISTORY:  Asthma     Chronic obstructive pulmonary disease (COPD) on home oxygen 2.5-3L NC    Depression     Gout     HLD (hyperlipidemia)     Hypertension     Hypothyroid     Osteoarthritis     Overactive bladder     Paroxysmal atrial fibrillation

## 2022-01-03 NOTE — ED PROVIDER NOTE - MUSCULOSKELETAL, MLM
Spine appears normal, range of motion is not limited, no muscle or joint tenderness. Massive pitting edema in both lower legs, rt worse than left.

## 2022-01-03 NOTE — H&P ADULT - PROBLEM SELECTOR PLAN 7
DVT proph: Eliquis 2.5mg po BID    PT eval, will likely need to go to [facility] upon DC however Grandson does not want [facility] again.  Confirmed DNR/DNI status with patient and grandson.

## 2022-01-03 NOTE — H&P ADULT - PROBLEM SELECTOR PLAN 2
IV Abx - allergy to PCN but tolerated cephalosporin in the past  MRSA/MSSA screen  ID consult - Dr Sorto to cover Dr Simon  wound care - Dr Santos

## 2022-01-03 NOTE — ED ADULT NURSE NOTE - NSIMPLEMENTINTERV_GEN_ALL_ED
Implemented All Fall Risk Interventions:  [place] to call system. Call bell, personal items and telephone within reach. Instruct patient to call for assistance. Room bathroom lighting operational. Non-slip footwear when patient is off stretcher. Physically safe environment: no spills, clutter or unnecessary equipment. Stretcher in lowest position, wheels locked, appropriate side rails in place. Provide visual cue, wrist band, yellow gown, etc. Monitor gait and stability. Monitor for mental status changes and reorient to person, place, and time. Review medications for side effects contributing to fall risk. Reinforce activity limits and safety measures with patient and family.

## 2022-01-03 NOTE — H&P ADULT - NSHPPHYSICALEXAM_GEN_ALL_CORE
PHYSICAL EXAM:  Vital Signs Last 24 Hrs  T(C): 36.6 (03 Jan 2022 11:29), Max: 36.6 (03 Jan 2022 11:29)  T(F): 97.9 (03 Jan 2022 11:29), Max: 97.9 (03 Jan 2022 11:29)  HR: 100 (03 Jan 2022 13:30) (98 - 111)  BP: 140/74 (03 Jan 2022 13:30) (128/70 - 140/74)  BP(mean): --  RR: 24 (03 Jan 2022 13:30) (20 - 24)  SpO2: 98% (03 Jan 2022 13:30) (98% - 98%)    GENERAL: NAD, well-groomed, well-developed  HEAD:  Atraumatic, Normocephalic  EYES:  conjunctiva and sclera clear  ENMT: Moist mucous membranes  NECK: Supple  NERVOUS SYSTEM:  Alert & Oriented X3, Good concentration; Motor Strength 5/5 B/L upper and lower extremities;  CHEST/LUNG: Decreased air entry bilaterally.   HEART: Regular rate and rhythm; No murmurs, rubs, or gallops  ABDOMEN: Soft, Nontender, Nondistended; Bowel sounds present  EXTREMITIES:  2+ Peripheral Pulses, 4+ LE pitting edema bilaterally  right leg lateral ankle and about intermediate between ankle and knee are broken blisters with surrounding erythema.

## 2022-01-03 NOTE — H&P ADULT - NSHPLABSRESULTS_GEN_ALL_CORE
Labs:                        9.5    7.52  )-----------( 239      ( 2022 12:27 )             30.7       141  |  102  |  44<H>  ----------------------------<  107<H>  3.4<L>   |  38<H>  |  1.89<H>    Ca    8.3<L>      2022 12:27    TPro  5.8<L>  /  Alb  2.8<L>  /  TBili  0.4  /  DBili  x   /  AST  27  /  ALT  20  /  AlkPhos  59          Urinalysis Basic - ( 2022 14:26 )  Color: Yellow / Appearance: Clear / S.015 / pH: x  Gluc: x / Ketone: Negative  / Bili: Negative / Urobili: Negative mg/dL   Blood: x / Protein: 15 mg/dL / Nitrite: Negative   Leuk Esterase: Trace / RBC: Negative /HPF / WBC 6-10   Sq Epi: x / Non Sq Epi: Moderate / Bacteria: Few    PT/INR - ( 2022 12:27 )   PT: 14.6 sec;   INR: 1.22 ratio    PTT - ( 2022 12:27 )  PTT:29.5 sec    Lactate Trend   @ 12:27 Lactate:1.2     CAPILLARY BLOOD GLUCOSE    EKG: Afib rate 100  Personally Reviewed:  [x ] YES     Imaging:   CXR: Personally Reviewed: Again noted is blunting of the bilateral costophrenic angles which may reflect small pleural effusions and/or pleural thickening. The lungs are mildly hyperinflated with increased bronchovascular markings.  Again noted postsurgical changes with sutures right midlung zone.  LE US:  No DVT  TTE 12/3/21: There is normal left ventricular size and left ventricular systolic function with an ejection fraction of approximately 60%. There is diastolic dysfunction. There is mild concentric left ventricular hypertrophy. There is limited endocardial definition which makes wall motion interpretation difficult but appears globally normal. There is mitral annular calcification with moderate to severe mitral regurgitation. There is mild tricuspid regurgitation with a dilated IVC and overall right ventricular systolic pressure that is elevated at 50 to 55 mmHg consistent with moderate pulmonary hypertension. There is a trileaflet aortic valve with trivial aortic insufficiency and no significant aortic valve stenosis.

## 2022-01-03 NOTE — H&P ADULT - PROBLEM SELECTOR PLAN 3
with mild creatinine elevation  monitor through diuresis  avoid nephrotoxic agents  if increases further consider renal evaluation

## 2022-01-03 NOTE — H&P ADULT - PROBLEM SELECTOR PLAN 1
Cardiology lori appreciated  IV diuresis  recent echo at [facility] no need to repeat  continue cardiac meds.

## 2022-01-04 DIAGNOSIS — F41.9 ANXIETY DISORDER, UNSPECIFIED: ICD-10-CM

## 2022-01-04 LAB
ALBUMIN SERPL ELPH-MCNC: 2.7 G/DL — LOW (ref 3.3–5)
ALP SERPL-CCNC: 55 U/L — SIGNIFICANT CHANGE UP (ref 30–120)
ALT FLD-CCNC: 17 U/L DA — SIGNIFICANT CHANGE UP (ref 10–60)
ANION GAP SERPL CALC-SCNC: 5 MMOL/L — SIGNIFICANT CHANGE UP (ref 5–17)
AST SERPL-CCNC: 24 U/L — SIGNIFICANT CHANGE UP (ref 10–40)
BASOPHILS # BLD AUTO: 0.03 K/UL — SIGNIFICANT CHANGE UP (ref 0–0.2)
BASOPHILS NFR BLD AUTO: 0.4 % — SIGNIFICANT CHANGE UP (ref 0–2)
BILIRUB SERPL-MCNC: 0.3 MG/DL — SIGNIFICANT CHANGE UP (ref 0.2–1.2)
BUN SERPL-MCNC: 41 MG/DL — HIGH (ref 7–23)
CALCIUM SERPL-MCNC: 8.3 MG/DL — LOW (ref 8.4–10.5)
CHLORIDE SERPL-SCNC: 99 MMOL/L — SIGNIFICANT CHANGE UP (ref 96–108)
CO2 SERPL-SCNC: 38 MMOL/L — HIGH (ref 22–31)
CREAT SERPL-MCNC: 1.58 MG/DL — HIGH (ref 0.5–1.3)
EOSINOPHIL # BLD AUTO: 0.14 K/UL — SIGNIFICANT CHANGE UP (ref 0–0.5)
EOSINOPHIL NFR BLD AUTO: 1.7 % — SIGNIFICANT CHANGE UP (ref 0–6)
GLUCOSE SERPL-MCNC: 137 MG/DL — HIGH (ref 70–99)
HCT VFR BLD CALC: 30.1 % — LOW (ref 34.5–45)
HGB BLD-MCNC: 9.2 G/DL — LOW (ref 11.5–15.5)
IMM GRANULOCYTES NFR BLD AUTO: 2.3 % — HIGH (ref 0–1.5)
LYMPHOCYTES # BLD AUTO: 2.07 K/UL — SIGNIFICANT CHANGE UP (ref 1–3.3)
LYMPHOCYTES # BLD AUTO: 24.9 % — SIGNIFICANT CHANGE UP (ref 13–44)
MAGNESIUM SERPL-MCNC: 1.6 MG/DL — SIGNIFICANT CHANGE UP (ref 1.6–2.6)
MCHC RBC-ENTMCNC: 30.6 GM/DL — LOW (ref 32–36)
MCHC RBC-ENTMCNC: 32.5 PG — SIGNIFICANT CHANGE UP (ref 27–34)
MCV RBC AUTO: 106.4 FL — HIGH (ref 80–100)
MONOCYTES # BLD AUTO: 1.02 K/UL — HIGH (ref 0–0.9)
MONOCYTES NFR BLD AUTO: 12.3 % — SIGNIFICANT CHANGE UP (ref 2–14)
NEUTROPHILS # BLD AUTO: 4.85 K/UL — SIGNIFICANT CHANGE UP (ref 1.8–7.4)
NEUTROPHILS NFR BLD AUTO: 58.4 % — SIGNIFICANT CHANGE UP (ref 43–77)
NRBC # BLD: 0 /100 WBCS — SIGNIFICANT CHANGE UP (ref 0–0)
PLATELET # BLD AUTO: 265 K/UL — SIGNIFICANT CHANGE UP (ref 150–400)
POTASSIUM SERPL-MCNC: 3.4 MMOL/L — LOW (ref 3.5–5.3)
POTASSIUM SERPL-SCNC: 3.4 MMOL/L — LOW (ref 3.5–5.3)
PROCALCITONIN SERPL-MCNC: 0.15 NG/ML — HIGH (ref 0.02–0.1)
PROT SERPL-MCNC: 5.7 G/DL — LOW (ref 6–8.3)
RBC # BLD: 2.83 M/UL — LOW (ref 3.8–5.2)
RBC # FLD: 18.7 % — HIGH (ref 10.3–14.5)
SODIUM SERPL-SCNC: 142 MMOL/L — SIGNIFICANT CHANGE UP (ref 135–145)
WBC # BLD: 8.3 K/UL — SIGNIFICANT CHANGE UP (ref 3.8–10.5)
WBC # FLD AUTO: 8.3 K/UL — SIGNIFICANT CHANGE UP (ref 3.8–10.5)

## 2022-01-04 PROCEDURE — 99232 SBSQ HOSP IP/OBS MODERATE 35: CPT

## 2022-01-04 RX ORDER — VENLAFAXINE HCL 75 MG
75 CAPSULE, EXT RELEASE 24 HR ORAL DAILY
Refills: 0 | Status: DISCONTINUED | OUTPATIENT
Start: 2022-01-04 | End: 2022-01-10

## 2022-01-04 RX ORDER — MUPIROCIN 20 MG/G
1 OINTMENT TOPICAL
Refills: 0 | Status: DISCONTINUED | OUTPATIENT
Start: 2022-01-04 | End: 2022-01-05

## 2022-01-04 RX ORDER — POTASSIUM CHLORIDE 20 MEQ
20 PACKET (EA) ORAL
Refills: 0 | Status: COMPLETED | OUTPATIENT
Start: 2022-01-04 | End: 2022-01-04

## 2022-01-04 RX ORDER — SOD,AMMONIUM,POTASSIUM LACTATE
1 CREAM (GRAM) TOPICAL
Refills: 0 | Status: DISCONTINUED | OUTPATIENT
Start: 2022-01-04 | End: 2022-01-10

## 2022-01-04 RX ORDER — INFLUENZA VIRUS VACCINE 15; 15; 15; 15 UG/.5ML; UG/.5ML; UG/.5ML; UG/.5ML
0.7 SUSPENSION INTRAMUSCULAR ONCE
Refills: 0 | Status: DISCONTINUED | OUTPATIENT
Start: 2022-01-04 | End: 2022-01-10

## 2022-01-04 RX ADMIN — Medication 25 MILLIGRAM(S): at 15:05

## 2022-01-04 RX ADMIN — Medication 0.25 MILLIGRAM(S): at 22:09

## 2022-01-04 RX ADMIN — SENNA PLUS 2 TABLET(S): 8.6 TABLET ORAL at 22:08

## 2022-01-04 RX ADMIN — Medication 650 MILLIGRAM(S): at 15:05

## 2022-01-04 RX ADMIN — APIXABAN 2.5 MILLIGRAM(S): 2.5 TABLET, FILM COATED ORAL at 06:59

## 2022-01-04 RX ADMIN — Medication 100 MILLIGRAM(S): at 11:46

## 2022-01-04 RX ADMIN — Medication 75 MICROGRAM(S): at 07:00

## 2022-01-04 RX ADMIN — BUMETANIDE 1 MILLIGRAM(S): 0.25 INJECTION INTRAMUSCULAR; INTRAVENOUS at 11:46

## 2022-01-04 RX ADMIN — Medication 25 MILLIGRAM(S): at 22:08

## 2022-01-04 RX ADMIN — Medication 240 MILLIGRAM(S): at 07:00

## 2022-01-04 RX ADMIN — TIOTROPIUM BROMIDE 1 CAPSULE(S): 18 CAPSULE ORAL; RESPIRATORY (INHALATION) at 08:30

## 2022-01-04 RX ADMIN — Medication 37.5 MILLIGRAM(S): at 11:49

## 2022-01-04 RX ADMIN — APIXABAN 2.5 MILLIGRAM(S): 2.5 TABLET, FILM COATED ORAL at 17:42

## 2022-01-04 RX ADMIN — Medication 20 MILLIEQUIVALENT(S): at 15:05

## 2022-01-04 RX ADMIN — Medication 100 MILLIGRAM(S): at 06:59

## 2022-01-04 RX ADMIN — Medication 25 MILLIGRAM(S): at 07:00

## 2022-01-04 RX ADMIN — AMIODARONE HYDROCHLORIDE 200 MILLIGRAM(S): 400 TABLET ORAL at 06:59

## 2022-01-04 RX ADMIN — CARVEDILOL PHOSPHATE 6.25 MILLIGRAM(S): 80 CAPSULE, EXTENDED RELEASE ORAL at 17:42

## 2022-01-04 RX ADMIN — BUDESONIDE AND FORMOTEROL FUMARATE DIHYDRATE 2 PUFF(S): 160; 4.5 AEROSOL RESPIRATORY (INHALATION) at 08:29

## 2022-01-04 RX ADMIN — Medication 1 TABLET(S): at 11:48

## 2022-01-04 RX ADMIN — BUMETANIDE 1 MILLIGRAM(S): 0.25 INJECTION INTRAMUSCULAR; INTRAVENOUS at 22:09

## 2022-01-04 RX ADMIN — Medication 1 APPLICATION(S): at 17:42

## 2022-01-04 RX ADMIN — MUPIROCIN 1 APPLICATION(S): 20 OINTMENT TOPICAL at 17:42

## 2022-01-04 RX ADMIN — Medication 100 MILLIGRAM(S): at 22:09

## 2022-01-04 RX ADMIN — CARVEDILOL PHOSPHATE 6.25 MILLIGRAM(S): 80 CAPSULE, EXTENDED RELEASE ORAL at 06:59

## 2022-01-04 RX ADMIN — Medication 81 MILLIGRAM(S): at 11:46

## 2022-01-04 RX ADMIN — Medication 650 MILLIGRAM(S): at 15:55

## 2022-01-04 RX ADMIN — Medication 100 MILLIGRAM(S): at 15:04

## 2022-01-04 RX ADMIN — FAMOTIDINE 20 MILLIGRAM(S): 10 INJECTION INTRAVENOUS at 11:48

## 2022-01-04 RX ADMIN — Medication 20 MILLIEQUIVALENT(S): at 17:42

## 2022-01-04 NOTE — PROGRESS NOTE ADULT - SUBJECTIVE AND OBJECTIVE BOX
Chief Complaint: Leg swelling    Interval Events: No events overnight. Sleeping.    Review of Systems:  General: No fevers, chills, weight gain  Skin: No rashes, color changes  Cardiovascular: No chest pain, orthopnea  Respiratory: No shortness of breath, cough  Gastrointestinal: No nausea, abdominal pain  Genitourinary: No incontinence, pain with urination  Musculoskeletal: No pain, swelling, decreased range of motion  Neurological: No headache, weakness  Psychiatric: No depression, anxiety  Endocrine: No weight gain, increased thirst  All other systems are comprehensively negative.    Physical Exam:  Vitals:        Vital Signs Last 24 Hrs  T(C): 36.5 (04 Jan 2022 05:26), Max: 36.7 (03 Jan 2022 15:00)  T(F): 97.7 (04 Jan 2022 05:26), Max: 98 (03 Jan 2022 15:00)  HR: 63 (04 Jan 2022 05:26) (63 - 111)  BP: 130/64 (04 Jan 2022 05:26) (109/61 - 142/80)  BP(mean): --  RR: 18 (04 Jan 2022 05:26) (18 - 25)  SpO2: 100% (04 Jan 2022 05:26) (93% - 100%)  General: NAD  HEENT: MMM  Neck: No JVD, no carotid bruit  Lungs: CTAB  CV: RRR, nl S1/S2, no M/R/G  Abdomen: S/NT/ND, +BS  Extremities: 2+ LE edema, no cyanosis  Neuro: AAOx3, non-focal  Skin: Open leg wound    Labs:                        9.2    8.30  )-----------( 265      ( 04 Jan 2022 09:48 )             30.1     01-03    141  |  102  |  44<H>  ----------------------------<  107<H>  3.4<L>   |  38<H>  |  1.89<H>    Ca    8.3<L>      03 Jan 2022 12:27    TPro  5.8<L>  /  Alb  2.8<L>  /  TBili  0.4  /  DBili  x   /  AST  27  /  ALT  20  /  AlkPhos  59  01-03        PT/INR - ( 03 Jan 2022 12:27 )   PT: 14.6 sec;   INR: 1.22 ratio         PTT - ( 03 Jan 2022 12:27 )  PTT:29.5 sec    Telemetry: AF

## 2022-01-04 NOTE — CONSULT NOTE ADULT - PROBLEM SELECTOR RECOMMENDATION 9
ordered Bactroban dressing changes twice daily right lower leg with dry sterile dressings  cont local wound care   cont IV ABX as per ID  recommend elevation of lower extremities on two pillow to help reduce edema  will cont to follow and discuss with all attendings

## 2022-01-04 NOTE — PHYSICAL THERAPY INITIAL EVALUATION ADULT - IMPAIRMENTS FOUND, PT EVAL
aerobic capacity/endurance/circulation/gait, locomotion, and balance/muscle strength/ventilation and respiration/gas exchange

## 2022-01-04 NOTE — PHYSICAL THERAPY INITIAL EVALUATION ADULT - ADDITIONAL COMMENTS
Pt from [facility]. Pt states she was ambulating short distances with RW with assist.

## 2022-01-04 NOTE — PATIENT PROFILE ADULT - FALL HARM RISK - HARM RISK INTERVENTIONS

## 2022-01-04 NOTE — CONSULT NOTE ADULT - ASSESSMENT
Pt. well known to me with COPD, recent covid, presents with increased pedal edema and cellulitis of legs/ ulcerations.  Suggest wound care, antibiotics, diurese.
The patient is a 89 year old female with a history of HTN, HL, atrial fibrillation, COPD on home O2 who presents with leg swelling and leg wounds.    Plan:  - ECG and telemetry with unclear rhythm - atrial flutter vs. sinus rhythm with PACs  - Patient has a history of atrial fibrillation  - Echo 12/21 with normal LV systolic function, mod pulm HTN  - Continue diltiazem  mg daily  - Continue carvedilol 6.25 mg bid  - Continue amiodarone 200 mg daily  - If patient becomes tachycardic, increase diltiazem or carvedilol  - Continue apixaban 2.5 mg bid  - CXR with small pleural effusions  - Start furosemide 40 mg IV q12h  - Wound care  - Antibiotics for cellulitis
ulceration right lower extremity with cellulitis  venous insufficiency bilateral lower extremities

## 2022-01-04 NOTE — PHYSICAL THERAPY INITIAL EVALUATION ADULT - ACTIVE RANGE OF MOTION EXAMINATION, REHAB EVAL
right LE decreased at ankle foot and knee due to pain /edema/bilateral upper extremity Active ROM was WFL (within functional limits)/Left LE Active ROM was WFL (within functional limits)/deficits as listed below

## 2022-01-04 NOTE — PROGRESS NOTE ADULT - SUBJECTIVE AND OBJECTIVE BOX
CHARLES GRAHAM is a 89yFemale , patient examined and chart reviewed.    INTERVAL HPI/ OVERNIGHT EVENTS:   Afebrile. No events.    PAST MEDICAL & SURGICAL HISTORY:  Hypertension  Depression  Overactive bladder  Gout  Osteoarthritis  Hypothyroid  HLD (hyperlipidemia)  Paroxysmal atrial fibrillation  Asthma  Chronic obstructive pulmonary disease (COPD)  on home oxygen 2.5-3L NC  S/P cholecystectomy  S/P lumpectomy of breast  S/P hysterectomy  S/P lung surgery, follow-up exam  s/p removal of suspicious lesion, negative    For details regarding the patient's social history, family history, and other miscellaneous elements, please refer the initial infectious diseases consultation and/or the admitting history and physical examination for this admission.       ROS:  CONSTITUTIONAL:  Negative fever or chills  EYES:  Negative  blurry vision or double vision  CARDIOVASCULAR:  Negative for chest pain or palpitations  RESPIRATORY:  Negative for cough, wheezing, or SOB   GASTROINTESTINAL:  Negative for nausea, vomiting, diarrhea, constipation, or abdominal pain  GENITOURINARY:  Negative frequency, urgency or dysuria  NEUROLOGIC:  No headache, confusion, dizziness, lightheadedness  All other systems were reviewed and are negative     ALLERGIES  Tomas Cheese - Pt states the one time she had a mouthful of tomas cheese, her throat was closing and she required an epinephrine injection. (Anaphylaxis)  iodine (Anaphylaxis)  penicillin (Other)  shellfish (Short breath; Hives)  sulfa drugs (Hives)      Current inpatient medications :    ANTIBIOTICS/RELEVANT:  ceFAZolin   IVPB 1000 milliGRAM(s) IV Intermittent every 8 hours      acetaminophen     Tablet .. 650 milliGRAM(s) Oral every 6 hours PRN  allopurinol 100 milliGRAM(s) Oral daily  ALPRAZolam 0.25 milliGRAM(s) Oral at bedtime  aMIOdarone    Tablet 200 milliGRAM(s) Oral daily  ammonium lactate 12% Lotion 1 Application(s) Topical two times a day  apixaban 2.5 milliGRAM(s) Oral two times a day  aspirin enteric coated 81 milliGRAM(s) Oral daily  benzonatate 100 milliGRAM(s) Oral every 8 hours PRN  budesonide 160 MICROgram(s)/formoterol 4.5 MICROgram(s) Inhaler 2 Puff(s) Inhalation two times a day  buMETAnide Injectable 1 milliGRAM(s) IV Push every 12 hours  carvedilol 6.25 milliGRAM(s) Oral every 12 hours  diltiazem    milliGRAM(s) Oral daily  famotidine    Tablet 20 milliGRAM(s) Oral daily  hydrALAZINE 25 milliGRAM(s) Oral every 8 hours  levothyroxine 75 MICROGram(s) Oral daily  multivitamin/minerals 1 Tablet(s) Oral daily  mupirocin 2% Nasal 1 Application(s) Nasal two times a day  polyethylene glycol 3350 17 Gram(s) Oral daily PRN  potassium chloride    Tablet ER 20 milliEquivalent(s) Oral every 2 hours  senna 2 Tablet(s) Oral at bedtime  tiotropium 18 MICROgram(s) Capsule 1 Capsule(s) Inhalation daily  venlafaxine XR. 75 milliGRAM(s) Oral daily      Objective:     @ 07:01  -   @ 07:00  --------------------------------------------------------  IN: 0 mL / OUT: 950 mL / NET: -950 mL      T(C): 36.9 (22 @ 10:48), Max: 36.9 (22 @ 10:48)  HR: 88 (22 @ 10:48) (63 - 105)  BP: 119/66 (22 @ 10:48) (109/61 - 142/80)  RR: 18 (22 @ 10:48) (18 - 25)  SpO2: 100% (22 @ 10:48) (93% - 100%)      Physical Exam:  General: no acute distress  Neck: supple, trachea midline  Lungs: clear, no wheeze/rhonchi  Cardiovascular: regular rate and rhythm, S1 S2  Abdomen: soft, nontender,  bowel sounds normal  Neurological: alert and oriented x3  Skin: no rash  Extremities: +3 edema  right leg venous ulcers Minimal lower leg erythema        LABS:                          9.2    8.30  )-----------( 265      ( 2022 09:48 )             30.1           142  |  99  |  41<H>  ----------------------------<  137<H>  3.4<L>   |  38<H>  |  1.58<H>    Ca    8.3<L>      2022 09:48  Mg     1.6         TPro  5.7<L>  /  Alb  2.7<L>  /  TBili  0.3  /  DBili  x   /  AST  24  /  ALT  17  /  AlkPhos  55  -      PT/INR - ( 2022 12:27 )   PT: 14.6 sec;   INR: 1.22 ratio         PTT - ( 2022 12:27 )  PTT:29.5 sec  Urinalysis Basic - ( 2022 14:26 )    Color: Yellow / Appearance: Clear / S.015 / pH: x  Gluc: x / Ketone: Negative  / Bili: Negative / Urobili: Negative mg/dL   Blood: x / Protein: 15 mg/dL / Nitrite: Negative   Leuk Esterase: Trace / RBC: Negative /HPF / WBC 6-10   Sq Epi: x / Non Sq Epi: Moderate / Bacteria: Few      MICROBIOLOGY:  COVID-19 PCR (22 @ 13:36)    COVID-19 PCR: Detected    RADIOLOGY & ADDITIONAL STUDIES:  ACC: [id number] EXAM:  US DPLX LWR EXT VEINS COMPL BI                          PROCEDURE DATE:  2022          INTERPRETATION:  CLINICAL INFORMATION: Bilateral leg swelling.    COMPARISON: Doppler venous sonogram 2021.    TECHNIQUE: Duplex sonography of the BILATERAL LOWER extremity veins with   color and spectral Doppler, with and without compression.    FINDINGS:    RIGHT:  Normal compressibility of the RIGHT common femoral, femoral and popliteal   veins.  Doppler examination shows normal spontaneous and phasic flow.  No RIGHT calf vein thrombosis is detected.    LEFT:  Normal compressibility of the LEFT common femoral, femoral and popliteal   veins.  Doppler examination shows normal spontaneous and phasic flow.  No LEFT calf veinthrombosis is detected.    IMPRESSION:    No evidence of deep venous thrombosis in either lower extremity.        ACC: [id number] EXAM:  XR CHEST PORTABLE URGENT 1V                          PROCEDURE DATE:  2022          INTERPRETATION:  Chest portable.    Clinical History: Shortness of breath. Bilateral leg edema.    Comparison: 2021.    Single AP view submitted.    The evaluation of the cardiomediastinal silhouette is limited on portable   technique.  Calcified aorta.    Again noted is blunting of the bilateral costophrenic angles which may   reflect small pleural effusions and/or pleural thickening.  The lungs are mildly hyperinflated with increased bronchovascular   markings.  Again noted postsurgical changes with sutures right midlung zone      Assessment :   89F PMHx Paroxysmal Atrial Fibrillation, HTN, Hypothyroidism, COPD (2-3 L nasal cannula at home) Hyperlipidemia recent admission to [facility] - with Covid pneumonia and - for acute CHF and Afib with RVR discharged to [facility] for ALEX who presented with worsening Bilateral LE edema and right LE wounds with erythema - mild cellulitis  Acute on Chronic CHF  COVID 19 + likely sec viral shedding rather than acute COVID 19 infection {recent admission to [facility] - with Covid pneumonia )    Plan :   Cont Ancef  Fu cultures  Trend temps and cbc  Diurese per cardiology  Pulm toileting  Elevate leg  Can dc airborne/contact isolation     D/w Dr Mayfield    Continue with present regiment.  Appropriate use of antibiotics and adverse effects reviewed.      > 35 minutes were spent in direct patient care reviewing notes, medications ,labs data/ imaging , discussion with multidisciplinary team.    Thank you for allowing me to participate in care of your patient .    Kate Sorto MD  Infectious Disease  [phone]

## 2022-01-04 NOTE — PROGRESS NOTE ADULT - SUBJECTIVE AND OBJECTIVE BOX
Patient is a 89y old  Female who presents with a chief complaint of     INTERVAL HPI/OVERNIGHT EVENTS:    MEDICATIONS  (STANDING):  allopurinol 100 milliGRAM(s) Oral daily  ALPRAZolam 0.25 milliGRAM(s) Oral at bedtime  aMIOdarone    Tablet 200 milliGRAM(s) Oral daily  ammonium lactate 12% Lotion 1 Application(s) Topical two times a day  apixaban 2.5 milliGRAM(s) Oral two times a day  aspirin enteric coated 81 milliGRAM(s) Oral daily  budesonide 160 MICROgram(s)/formoterol 4.5 MICROgram(s) Inhaler 2 Puff(s) Inhalation two times a day  buMETAnide Injectable 1 milliGRAM(s) IV Push every 12 hours  carvedilol 6.25 milliGRAM(s) Oral every 12 hours  ceFAZolin   IVPB 1000 milliGRAM(s) IV Intermittent every 8 hours  diltiazem    milliGRAM(s) Oral daily  famotidine    Tablet 20 milliGRAM(s) Oral daily  hydrALAZINE 25 milliGRAM(s) Oral every 8 hours  influenza  Vaccine (HIGH DOSE) 0.7 milliLiter(s) IntraMuscular once  levothyroxine 75 MICROGram(s) Oral daily  multivitamin/minerals 1 Tablet(s) Oral daily  mupirocin 2% Nasal 1 Application(s) Nasal two times a day  potassium chloride    Tablet ER 20 milliEquivalent(s) Oral every 2 hours  senna 2 Tablet(s) Oral at bedtime  tiotropium 18 MICROgram(s) Capsule 1 Capsule(s) Inhalation daily  venlafaxine XR. 75 milliGRAM(s) Oral daily    MEDICATIONS  (PRN):  acetaminophen     Tablet .. 650 milliGRAM(s) Oral every 6 hours PRN Temp greater or equal to 38C (100.4F), Mild Pain (1 - 3)  benzonatate 100 milliGRAM(s) Oral every 8 hours PRN Cough  polyethylene glycol 3350 17 Gram(s) Oral daily PRN Constipation      Allergies    Tomas Cheese - Pt states the one time she had a mouthful of tomas cheese, her throat was closing and she required an epinephrine injection. (Anaphylaxis)  iodine (Anaphylaxis)  penicillin (Other)  shellfish (Short breath; Hives)  sulfa drugs (Hives)    Intolerances        REVIEW OF SYSTEMS:  CONSTITUTIONAL: No fever, weight loss, or fatigue  EYES: No eye pain, visual disturbances, or discharge  ENMT:  No difficulty hearing, tinnitus, vertigo; No sinus or throat pain  NECK: No pain or stiffness  BREASTS: No pain, masses, or nipple discharge  RESPIRATORY: No cough, wheezing, chills or hemoptysis; No shortness of breath  CARDIOVASCULAR: No chest pain, palpitations, lightheadedness, or leg swelling  GASTROINTESTINAL: No abdominal or epigastric pain. No nausea, vomiting, or hematemesis; No diarrhea or constipation. No melena or hematochezia.  GENITOURINARY: No dysuria, frequency, hematuria, or incontinence  NEUROLOGICAL: No headaches, memory loss, vertigo, loss of strength, numbness, or tremors  SKIN: No itching, burning, rashes, or lesions   LYMPH NODES: No enlarged glands  ENDOCRINE: No heat or cold intolerance; No hair loss; No polydipsia or polyuria  MUSCULOSKELETAL: No joint pain or swelling; No muscle, back, or extremity pain  PSYCHIATRIC: No depression, anxiety, or mood swings  HEME/LYMPH: No easy bruising, or bleeding gums  ALLERGY AND IMMUNOLOGIC: No hives or eczema    Vital Signs Last 24 Hrs  T(C): 36.9 (2022 10:48), Max: 36.9 (2022 10:48)  T(F): 98.4 (2022 10:48), Max: 98.4 (2022 10:48)  HR: 88 (2022 10:48) (63 - 105)  BP: 119/66 (2022 10:48) (109/61 - 142/80)  BP(mean): --  RR: 18 (2022 10:48) (18 - 25)  SpO2: 100% (2022 10:48) (93% - 100%)    PHYSICAL EXAM:  GENERAL: NAD, well-groomed, well-developed  HEAD:  Atraumatic, Normocephalic  EYES: EOMI, PERRLA, conjunctiva and sclera clear  ENMT: Moist mucous membranes, Good dentition, No lesions; No tonsillar erythema, exudates, or enlargement  NECK: Supple, No JVD, Normal thyroid  NERVOUS SYSTEM:  Alert & Oriented X3, Good concentration; All 4 extremities mobile, no gross sensory deficits.   CHEST/LUNG: Clear to auscultation bilaterally; No rales, rhonchi, wheezing, or rubs  HEART: Regular rate and rhythm; No murmurs, rubs, or gallops  ABDOMEN: Soft, Nontender, Nondistended; Bowel sounds present  EXTREMITIES:  2+ Peripheral Pulses, No clubbing, cyanosis, or edema  LYMPH: No lymphadenopathy noted  SKIN: No rashes or lesions    LABS:                        9.2    8.30  )-----------( 265      ( 2022 09:48 )             30.1     2022 09:48    142    |  99     |  41     ----------------------------<  137    3.4     |  38     |  1.58     Ca    8.3        2022 09:48  Mg     1.6       2022 09:48    TPro  5.7    /  Alb  2.7    /  TBili  0.3    /  DBili  x      /  AST  24     /  ALT  17     /  AlkPhos  55     2022 09:48    PT/INR - ( 2022 12:27 )   PT: 14.6 sec;   INR: 1.22 ratio         PTT - ( 2022 12:27 )  PTT:29.5 sec  Urinalysis Basic - ( 2022 14:26 )    Color: Yellow / Appearance: Clear / S.015 / pH: x  Gluc: x / Ketone: Negative  / Bili: Negative / Urobili: Negative mg/dL   Blood: x / Protein: 15 mg/dL / Nitrite: Negative   Leuk Esterase: Trace / RBC: Negative /HPF / WBC 6-10   Sq Epi: x / Non Sq Epi: Moderate / Bacteria: Few      CAPILLARY BLOOD GLUCOSE          RADIOLOGY & ADDITIONAL TESTS:    Imaging Personally Reviewed:  [ ] YES     Consultant(s) Notes Reviewed:      Care Discussed with Consultants/Other Providers:    Advanced Directives: [ ] DNR  [ ] No feeding tube  [ ] MOLST in chart  [ ] MOLST completed today  [ ] Unknown   Patient is a 89y old  Female who presents with a chief complaint of leg pain    INTERVAL HPI/OVERNIGHT EVENTS: breathing feels normal, slight improvement in swelling and pain.   pt also reporting she has been feeling more depressed over the last few weeks.  being sick and needing to be taken care of is new for her as she has always been the caretaker in her family.     MEDICATIONS  (STANDING):  allopurinol 100 milliGRAM(s) Oral daily  ALPRAZolam 0.25 milliGRAM(s) Oral at bedtime  aMIOdarone    Tablet 200 milliGRAM(s) Oral daily  ammonium lactate 12% Lotion 1 Application(s) Topical two times a day  apixaban 2.5 milliGRAM(s) Oral two times a day  aspirin enteric coated 81 milliGRAM(s) Oral daily  budesonide 160 MICROgram(s)/formoterol 4.5 MICROgram(s) Inhaler 2 Puff(s) Inhalation two times a day  buMETAnide Injectable 1 milliGRAM(s) IV Push every 12 hours  carvedilol 6.25 milliGRAM(s) Oral every 12 hours  ceFAZolin   IVPB 1000 milliGRAM(s) IV Intermittent every 8 hours  diltiazem    milliGRAM(s) Oral daily  famotidine    Tablet 20 milliGRAM(s) Oral daily  hydrALAZINE 25 milliGRAM(s) Oral every 8 hours  influenza  Vaccine (HIGH DOSE) 0.7 milliLiter(s) IntraMuscular once  levothyroxine 75 MICROGram(s) Oral daily  multivitamin/minerals 1 Tablet(s) Oral daily  mupirocin 2% Nasal 1 Application(s) Nasal two times a day  potassium chloride    Tablet ER 20 milliEquivalent(s) Oral every 2 hours  senna 2 Tablet(s) Oral at bedtime  tiotropium 18 MICROgram(s) Capsule 1 Capsule(s) Inhalation daily  venlafaxine XR. 75 milliGRAM(s) Oral daily    MEDICATIONS  (PRN):  acetaminophen     Tablet .. 650 milliGRAM(s) Oral every 6 hours PRN Temp greater or equal to 38C (100.4F), Mild Pain (1 - 3)  benzonatate 100 milliGRAM(s) Oral every 8 hours PRN Cough  polyethylene glycol 3350 17 Gram(s) Oral daily PRN Constipation      Allergies  Tomas Cheese - Pt states the one time she had a mouthful of tomas cheese, her throat was closing and she required an epinephrine injection. (Anaphylaxis)  iodine (Anaphylaxis)  penicillin (Other)  shellfish (Short breath; Hives)  sulfa drugs (Hives)      REVIEW OF SYSTEMS:  CONSTITUTIONAL: No fever, weight loss, or fatigue  EYES: No eye pain, visual disturbances, or discharge  ENMT:  No difficulty hearing, tinnitus, vertigo; No sinus or throat pain  NECK: No pain or stiffness  BREASTS: No pain, masses, or nipple discharge  RESPIRATORY: No cough, wheezing, chills or hemoptysis; No shortness of breath  CARDIOVASCULAR: No chest pain, palpitations, lightheadedness, or leg swelling  GASTROINTESTINAL: No abdominal or epigastric pain. No nausea, vomiting, or hematemesis; No diarrhea or constipation. No melena or hematochezia.  GENITOURINARY: No dysuria, frequency, hematuria, or incontinence  NEUROLOGICAL: No headaches, memory loss, vertigo, loss of strength, numbness, or tremors  SKIN: No itching, burning, rashes, or lesions   LYMPH NODES: No enlarged glands  ENDOCRINE: No heat or cold intolerance; No hair loss; No polydipsia or polyuria  MUSCULOSKELETAL: No joint pain or swelling; No muscle, back, or extremity pain  PSYCHIATRIC: No depression, anxiety, or mood swings  HEME/LYMPH: No easy bruising, or bleeding gums  ALLERGY AND IMMUNOLOGIC: No hives or eczema    Vital Signs Last 24 Hrs  T(C): 36.9 (2022 10:48), Max: 36.9 (2022 10:48)  T(F): 98.4 (2022 10:48), Max: 98.4 (2022 10:48)  HR: 88 (2022 10:48) (63 - 105)  BP: 119/66 (2022 10:48) (109/61 - 142/80)  BP(mean): --  RR: 18 (2022 10:48) (18 - 25)  SpO2: 100% (2022 10:48) (93% - 100%)    PHYSICAL EXAM:  GENERAL: NAD, well-groomed, well-developed  HEAD:  Atraumatic, Normocephalic  EYES: conjunctiva and sclera clear  ENMT: Moist mucous membranes  NECK: Supple, No JVD, Normal thyroid  NERVOUS SYSTEM:  Alert & Oriented X3, Good concentration; All 4 extremities mobile, no gross sensory deficits.   CHEST/LUNG: Clear to auscultation bilaterally; No rales, rhonchi, wheezing, or rubs  HEART: Regular rate and rhythm; No murmurs, rubs, or gallops  ABDOMEN: Soft, Nontender, Nondistended; Bowel sounds present  EXTREMITIES:  2+ Peripheral Pulses, No clubbing, cyanosis, or edema  LYMPH: No lymphadenopathy noted  SKIN: No rashes or lesions    LABS:                        9.2    8.30  )-----------( 265      ( 2022 09:48 )             30.1     2022 09:48    142    |  99     |  41     ----------------------------<  137    3.4     |  38     |  1.58     Ca    8.3        2022 09:48  Mg     1.6       2022 09:48    TPro  5.7    /  Alb  2.7    /  TBili  0.3    /  DBili  x      /  AST  24     /  ALT  17     /  AlkPhos  55     2022 09:48    PT/INR - ( 2022 12:27 )   PT: 14.6 sec;   INR: 1.22 ratio         PTT - ( 2022 12:27 )  PTT:29.5 sec  Urinalysis Basic - ( 2022 14:26 )    Color: Yellow / Appearance: Clear / S.015 / pH: x  Gluc: x / Ketone: Negative  / Bili: Negative / Urobili: Negative mg/dL   Blood: x / Protein: 15 mg/dL / Nitrite: Negative   Leuk Esterase: Trace / RBC: Negative /HPF / WBC 6-10   Sq Epi: x / Non Sq Epi: Moderate / Bacteria: Few      CAPILLARY BLOOD GLUCOSE          RADIOLOGY & ADDITIONAL TESTS:    Imaging Personally Reviewed:  [ ] YES     Consultant(s) Notes Reviewed:  pulm    Care Discussed with Consultants/Other Providers: podiatry, id    Advanced Directives: [ ] DNR  [ ] No feeding tube  [ ] MOLST in chart  [ ] MOLST completed today  [ ] Unknown

## 2022-01-04 NOTE — PROGRESS NOTE ADULT - SUBJECTIVE AND OBJECTIVE BOX
Father advised that child was sent here by the health department for a Covid swab. Family member has been ill with Covid. Patient is alert and cooperative. Skin warm and dry. Color normal for ethnicity.      Yashira Marquez RN  11/11/21 6816 PULMONARY/CRITICAL CARE  DOS 21  Doing well. Some wheeze. No fever.   Pt. well known to me.    Patient is a 89y old  Female who presents with a chief complaint of cellulitis of legs  BRIEF HOSPITAL COURSE: ***89F PMHx Paroxysmal Atrial Fibrillation, HTN, Hypothyroidism, COPD (2-3 L nasal cannula at home) Hyperlipidemia, presenting with Bilateral LE edema and right LE wounds.  Patient was admitted to [facility] - with Covid pneumonia and - for acute CHF and Afib with RVR.  She was discharged to [facility] for ALEX.  While there her edema was increasing, and a little more than a week ago she developed a blister on her right ankle and lower leg that both burst.  She has pain in her leg especially at night.  No fevers or chills, no further SOB.  Her oxygen needs have not changed.  Her grandson became more and more concerned about her leg and felt that staff at [facility] was not addressing it so they requested that she be sent to [place] ED for evaluation.    Events last 24 hours: ***    PAST MEDICAL & SURGICAL HISTORY:  Hypertension    Depression    Overactive bladder    Gout    Osteoarthritis    Hypothyroid    HLD (hyperlipidemia)    Paroxysmal atrial fibrillation    Asthma    Chronic obstructive pulmonary disease (COPD)  on home oxygen 2.5-3L NC    S/P cholecystectomy    S/P lumpectomy of breast    S/P hysterectomy    S/P lung surgery, follow-up exam  s/p removal of suspicious lesion, negative      Allergies    Tomas Cheese - Pt states the one time she had a mouthful of tomas cheese, her throat was closing and she required an epinephrine injection. (Anaphylaxis)  iodine (Anaphylaxis)  penicillin (Other)  shellfish (Short breath; Hives)  sulfa drugs (Hives)    Intolerances      FAMILY HISTORY:  Family history of nasopharyngeal cancer (Child)  daughter    Family history of breast cancer (Mother)          Medications:  ceFAZolin   IVPB 1000 milliGRAM(s) IV Intermittent every 8 hours    aMIOdarone    Tablet 200 milliGRAM(s) Oral daily  carvedilol 6.25 milliGRAM(s) Oral every 12 hours  diltiazem    milliGRAM(s) Oral daily  hydrALAZINE 25 milliGRAM(s) Oral every 8 hours    benzonatate 100 milliGRAM(s) Oral every 8 hours PRN  budesonide 160 MICROgram(s)/formoterol 4.5 MICROgram(s) Inhaler 2 Puff(s) Inhalation two times a day  tiotropium 18 MICROgram(s) Capsule 1 Capsule(s) Inhalation daily    acetaminophen     Tablet .. 650 milliGRAM(s) Oral every 6 hours PRN  ALPRAZolam 0.25 milliGRAM(s) Oral at bedtime  venlafaxine XR. 37.5 milliGRAM(s) Oral daily      apixaban 2.5 milliGRAM(s) Oral two times a day  aspirin enteric coated 81 milliGRAM(s) Oral daily    famotidine    Tablet 20 milliGRAM(s) Oral daily  polyethylene glycol 3350 17 Gram(s) Oral daily PRN  senna 2 Tablet(s) Oral at bedtime      allopurinol 100 milliGRAM(s) Oral daily  levothyroxine 75 MICROGram(s) Oral daily    multivitamin/minerals 1 Tablet(s) Oral daily  potassium chloride    Tablet ER 40 milliEquivalent(s) Oral every 4 hours                ICU Vital Signs Last 24 Hrs  T(C): 36.7 (2022 15:00), Max: 36.7 (2022 15:00)  T(F): 98 (2022 15:00), Max: 98 (2022 15:00)  HR: 82 (2022 15:00) (82 - 111)  BP: 142/80 (2022 15:00) (128/70 - 142/80)  BP(mean): --  ABP: --  ABP(mean): --  RR: 25 (2022 15:00) (20 - 25)  SpO2: 98% (2022 15:00) (98% - 98%)    Vital Signs Last 24 Hrs  T(C): 36.7 (2022 15:00), Max: 36.7 (2022 15:00)  T(F): 98 (2022 15:00), Max: 98 (2022 15:00)  HR: 82 (2022 15:00) (82 - 111)  BP: 142/80 (2022 15:00) (128/70 - 142/80)  BP(mean): --  RR: 25 (2022 15:00) (20 - 25)  SpO2: 98% (2022 15:00) (98% - 98%)        I&O's Detail        LABS:                        9.5    7.52  )-----------( 239      ( 2022 12:27 )             30.7         141  |  102  |  44<H>  ----------------------------<  107<H>  3.4<L>   |  38<H>  |  1.89<H>    Ca    8.3<L>      2022 12:27    TPro  5.8<L>  /  Alb  2.8<L>  /  TBili  0.4  /  DBili  x   /  AST  27  /  ALT  20  /  AlkPhos  59            CAPILLARY BLOOD GLUCOSE        PT/INR - ( 2022 12:27 )   PT: 14.6 sec;   INR: 1.22 ratio         PTT - ( 2022 12:27 )  PTT:29.5 sec  Urinalysis Basic - ( 2022 14:26 )    Color: Yellow / Appearance: Clear / S.015 / pH: x  Gluc: x / Ketone: Negative  / Bili: Negative / Urobili: Negative mg/dL   Blood: x / Protein: 15 mg/dL / Nitrite: Negative   Leuk Esterase: Trace / RBC: Negative /HPF / WBC 6-10   Sq Epi: x / Non Sq Epi: Moderate / Bacteria: Few      CULTURES:      Physical Examination:    General: No acute distress.  elderly female    HEENT: Pupils equal, reactive to light.  Symmetric.    PULM: few wheeze no  rhonchi rales    CVS: Regular rate and rhythm, no murmurs, rubs, or gallops    ABD: Soft, nondistended, nontender, normoactive bowel sounds, no masses    EXT: 3 plus leg edema, with erythema; bandaged  lower leg    SKIN: Warm and well perfused, no rashes noted.    NEURO: Alert, oriented, interactive, nonfocal    RADIOLOGY: ***rad< from: US Duplex Venous Lower Ext Complete, Bilateral (22 @ 12:55) >  ACC: [id number] EXAM:  US DPLX LWR EXT VEINS COMPL BI                          PROCEDURE DATE:  2022          INTERPRETATION:  CLINICAL INFORMATION: Bilateral leg swelling.    COMPARISON: Doppler venous sonogram 2021.    TECHNIQUE: Duplex sonography of the BILATERAL LOWER extremity veins with   color and spectral Doppler, with and without compression.    FINDINGS:    RIGHT:  Normal compressibility of the RIGHT common femoral, femoral and popliteal   veins.  Doppler examination shows normal spontaneous and phasic flow.  No RIGHT calf vein thrombosis is detected.    LEFT:  Normal compressibility of the LEFT common femoral, femoral and popliteal   veins.  Doppler examination shows normal spontaneous and phasic flow.  No LEFT calf veinthrombosis is detected.    IMPRESSION:    No evidence of deep venous thrombosis in either lower extremity.          --- End of Report ---    < end of copied text >  < from: Xray Chest 1 View- PORTABLE-Urgent (Xray Chest 1 View- PORTABLE-Urgent .) (22 @ 11:58) >  ACC: [id number] EXAM:  XR CHEST PORTABLE URGENT 1V                          PROCEDURE DATE:  2022          INTERPRETATION:  Chest portable.    Clinical History: Shortness of breath. Bilateral leg edema.    Comparison: 2021.    Single AP view submitted.    The evaluation of the cardiomediastinal silhouette is limited on portable   technique.  Calcified aorta.    Again noted is blunting of the bilateral costophrenic angles which may   reflect small pleural effusions and/or pleural thickening.  The lungs are mildly hyperinflated with increased bronchovascular   markings.  Again noted postsurgical changes with sutures right midlung zone.    Impression:    Findings as discussed above.    < end of copied text >      CRITICAL CARE TIME SPENT: ***

## 2022-01-05 LAB
ANION GAP SERPL CALC-SCNC: 0 MMOL/L — LOW (ref 5–17)
APPEARANCE UR: CLEAR — SIGNIFICANT CHANGE UP
BACTERIA # UR AUTO: NEGATIVE — SIGNIFICANT CHANGE UP
BILIRUB UR-MCNC: NEGATIVE — SIGNIFICANT CHANGE UP
BUN SERPL-MCNC: 45 MG/DL — HIGH (ref 7–23)
CALCIUM SERPL-MCNC: 8.5 MG/DL — SIGNIFICANT CHANGE UP (ref 8.4–10.5)
CHLORIDE SERPL-SCNC: 103 MMOL/L — SIGNIFICANT CHANGE UP (ref 96–108)
CO2 SERPL-SCNC: 42 MMOL/L — HIGH (ref 22–31)
COLOR SPEC: YELLOW — SIGNIFICANT CHANGE UP
CREAT SERPL-MCNC: 1.78 MG/DL — HIGH (ref 0.5–1.3)
DIFF PNL FLD: NEGATIVE — SIGNIFICANT CHANGE UP
EPI CELLS # UR: NEGATIVE — SIGNIFICANT CHANGE UP
FOLATE SERPL-MCNC: >20 NG/ML — SIGNIFICANT CHANGE UP
GLUCOSE SERPL-MCNC: 113 MG/DL — HIGH (ref 70–99)
GLUCOSE UR QL: NEGATIVE MG/DL — SIGNIFICANT CHANGE UP
HCT VFR BLD CALC: 27.4 % — LOW (ref 34.5–45)
HGB BLD-MCNC: 8.3 G/DL — LOW (ref 11.5–15.5)
KETONES UR-MCNC: NEGATIVE — SIGNIFICANT CHANGE UP
LEUKOCYTE ESTERASE UR-ACNC: ABNORMAL
MCHC RBC-ENTMCNC: 30.3 GM/DL — LOW (ref 32–36)
MCHC RBC-ENTMCNC: 32.3 PG — SIGNIFICANT CHANGE UP (ref 27–34)
MCV RBC AUTO: 106.6 FL — HIGH (ref 80–100)
NITRITE UR-MCNC: NEGATIVE — SIGNIFICANT CHANGE UP
NRBC # BLD: 0 /100 WBCS — SIGNIFICANT CHANGE UP (ref 0–0)
PH UR: 5 — SIGNIFICANT CHANGE UP (ref 5–8)
PLATELET # BLD AUTO: 259 K/UL — SIGNIFICANT CHANGE UP (ref 150–400)
POTASSIUM SERPL-MCNC: 4.2 MMOL/L — SIGNIFICANT CHANGE UP (ref 3.5–5.3)
POTASSIUM SERPL-SCNC: 4.2 MMOL/L — SIGNIFICANT CHANGE UP (ref 3.5–5.3)
PROT UR-MCNC: 15 MG/DL
RBC # BLD: 2.57 M/UL — LOW (ref 3.8–5.2)
RBC # FLD: 18.8 % — HIGH (ref 10.3–14.5)
RBC CASTS # UR COMP ASSIST: NEGATIVE /HPF — SIGNIFICANT CHANGE UP (ref 0–4)
SODIUM SERPL-SCNC: 145 MMOL/L — SIGNIFICANT CHANGE UP (ref 135–145)
SP GR SPEC: 1.01 — SIGNIFICANT CHANGE UP (ref 1.01–1.02)
UROBILINOGEN FLD QL: NEGATIVE MG/DL — SIGNIFICANT CHANGE UP
VIT B12 SERPL-MCNC: 885 PG/ML — SIGNIFICANT CHANGE UP (ref 232–1245)
WBC # BLD: 8.38 K/UL — SIGNIFICANT CHANGE UP (ref 3.8–10.5)
WBC # FLD AUTO: 8.38 K/UL — SIGNIFICANT CHANGE UP (ref 3.8–10.5)
WBC UR QL: SIGNIFICANT CHANGE UP

## 2022-01-05 PROCEDURE — 99232 SBSQ HOSP IP/OBS MODERATE 35: CPT

## 2022-01-05 RX ORDER — MUPIROCIN 20 MG/G
1 OINTMENT TOPICAL
Refills: 0 | Status: DISCONTINUED | OUTPATIENT
Start: 2022-01-05 | End: 2022-01-10

## 2022-01-05 RX ORDER — POTASSIUM CHLORIDE 20 MEQ
20 PACKET (EA) ORAL ONCE
Refills: 0 | Status: COMPLETED | OUTPATIENT
Start: 2022-01-05 | End: 2022-01-05

## 2022-01-05 RX ORDER — ALPRAZOLAM 0.25 MG
0.25 TABLET ORAL ONCE
Refills: 0 | Status: DISCONTINUED | OUTPATIENT
Start: 2022-01-05 | End: 2022-01-05

## 2022-01-05 RX ORDER — CEPHALEXIN 500 MG
500 CAPSULE ORAL EVERY 8 HOURS
Refills: 0 | Status: DISCONTINUED | OUTPATIENT
Start: 2022-01-06 | End: 2022-01-07

## 2022-01-05 RX ADMIN — BUDESONIDE AND FORMOTEROL FUMARATE DIHYDRATE 2 PUFF(S): 160; 4.5 AEROSOL RESPIRATORY (INHALATION) at 19:23

## 2022-01-05 RX ADMIN — TIOTROPIUM BROMIDE 1 CAPSULE(S): 18 CAPSULE ORAL; RESPIRATORY (INHALATION) at 06:27

## 2022-01-05 RX ADMIN — APIXABAN 2.5 MILLIGRAM(S): 2.5 TABLET, FILM COATED ORAL at 06:22

## 2022-01-05 RX ADMIN — Medication 1 APPLICATION(S): at 17:43

## 2022-01-05 RX ADMIN — Medication 100 MILLIGRAM(S): at 15:45

## 2022-01-05 RX ADMIN — Medication 650 MILLIGRAM(S): at 04:26

## 2022-01-05 RX ADMIN — BUDESONIDE AND FORMOTEROL FUMARATE DIHYDRATE 2 PUFF(S): 160; 4.5 AEROSOL RESPIRATORY (INHALATION) at 06:27

## 2022-01-05 RX ADMIN — AMIODARONE HYDROCHLORIDE 200 MILLIGRAM(S): 400 TABLET ORAL at 06:23

## 2022-01-05 RX ADMIN — SENNA PLUS 2 TABLET(S): 8.6 TABLET ORAL at 22:52

## 2022-01-05 RX ADMIN — Medication 75 MICROGRAM(S): at 06:23

## 2022-01-05 RX ADMIN — BUMETANIDE 1 MILLIGRAM(S): 0.25 INJECTION INTRAMUSCULAR; INTRAVENOUS at 08:39

## 2022-01-05 RX ADMIN — BUMETANIDE 1 MILLIGRAM(S): 0.25 INJECTION INTRAMUSCULAR; INTRAVENOUS at 20:46

## 2022-01-05 RX ADMIN — Medication 0.25 MILLIGRAM(S): at 15:44

## 2022-01-05 RX ADMIN — Medication 75 MILLIGRAM(S): at 11:41

## 2022-01-05 RX ADMIN — FAMOTIDINE 20 MILLIGRAM(S): 10 INJECTION INTRAVENOUS at 11:40

## 2022-01-05 RX ADMIN — CARVEDILOL PHOSPHATE 6.25 MILLIGRAM(S): 80 CAPSULE, EXTENDED RELEASE ORAL at 17:42

## 2022-01-05 RX ADMIN — Medication 25 MILLIGRAM(S): at 15:44

## 2022-01-05 RX ADMIN — Medication 0.25 MILLIGRAM(S): at 22:52

## 2022-01-05 RX ADMIN — Medication 81 MILLIGRAM(S): at 11:40

## 2022-01-05 RX ADMIN — APIXABAN 2.5 MILLIGRAM(S): 2.5 TABLET, FILM COATED ORAL at 17:42

## 2022-01-05 RX ADMIN — MUPIROCIN 1 APPLICATION(S): 20 OINTMENT TOPICAL at 06:21

## 2022-01-05 RX ADMIN — Medication 100 MILLIGRAM(S): at 11:41

## 2022-01-05 RX ADMIN — Medication 1 APPLICATION(S): at 06:20

## 2022-01-05 RX ADMIN — Medication 100 MILLIGRAM(S): at 06:19

## 2022-01-05 RX ADMIN — MUPIROCIN 1 APPLICATION(S): 20 OINTMENT TOPICAL at 17:43

## 2022-01-05 RX ADMIN — Medication 650 MILLIGRAM(S): at 03:56

## 2022-01-05 RX ADMIN — Medication 1 TABLET(S): at 11:41

## 2022-01-05 RX ADMIN — Medication 20 MILLIEQUIVALENT(S): at 08:39

## 2022-01-05 RX ADMIN — Medication 240 MILLIGRAM(S): at 06:22

## 2022-01-05 RX ADMIN — CARVEDILOL PHOSPHATE 6.25 MILLIGRAM(S): 80 CAPSULE, EXTENDED RELEASE ORAL at 06:21

## 2022-01-05 RX ADMIN — Medication 25 MILLIGRAM(S): at 06:22

## 2022-01-05 RX ADMIN — Medication 100 MILLIGRAM(S): at 22:52

## 2022-01-05 NOTE — DIETITIAN INITIAL EVALUATION ADULT. - PROBLEM SELECTOR PLAN 1
Cardiology lori appreciated  IV diuresis  recent echo at [facility] no need to repeat  continue cardiac meds.

## 2022-01-05 NOTE — PROGRESS NOTE ADULT - SUBJECTIVE AND OBJECTIVE BOX
Patient is a 89y old  Female who presents with a chief complaint of leg pain    INTERVAL HPI/OVERNIGHT EVENTS:  feeling a bit anxious today,  no new complaints. legs feel better.  breathing at baseline    MEDICATIONS  (STANDING):  allopurinol 100 milliGRAM(s) Oral daily  ALPRAZolam 0.25 milliGRAM(s) Oral at bedtime  ALPRAZolam 0.25 milliGRAM(s) Oral once  aMIOdarone    Tablet 200 milliGRAM(s) Oral daily  ammonium lactate 12% Lotion 1 Application(s) Topical two times a day  apixaban 2.5 milliGRAM(s) Oral two times a day  aspirin enteric coated 81 milliGRAM(s) Oral daily  budesonide 160 MICROgram(s)/formoterol 4.5 MICROgram(s) Inhaler 2 Puff(s) Inhalation two times a day  buMETAnide Injectable 1 milliGRAM(s) IV Push every 12 hours  carvedilol 6.25 milliGRAM(s) Oral every 12 hours  ceFAZolin   IVPB 1000 milliGRAM(s) IV Intermittent every 8 hours  diltiazem    milliGRAM(s) Oral daily  famotidine    Tablet 20 milliGRAM(s) Oral daily  hydrALAZINE 25 milliGRAM(s) Oral every 8 hours  influenza  Vaccine (HIGH DOSE) 0.7 milliLiter(s) IntraMuscular once  levothyroxine 75 MICROGram(s) Oral daily  multivitamin/minerals 1 Tablet(s) Oral daily  mupirocin 2% Nasal 1 Application(s) Nasal two times a day  senna 2 Tablet(s) Oral at bedtime  tiotropium 18 MICROgram(s) Capsule 1 Capsule(s) Inhalation daily  venlafaxine XR. 75 milliGRAM(s) Oral daily    MEDICATIONS  (PRN):  acetaminophen     Tablet .. 650 milliGRAM(s) Oral every 6 hours PRN Temp greater or equal to 38C (100.4F), Mild Pain (1 - 3)  benzonatate 100 milliGRAM(s) Oral every 8 hours PRN Cough  polyethylene glycol 3350 17 Gram(s) Oral daily PRN Constipation      Allergies  Tomas Cheese - Pt states the one time she had a mouthful of tomas cheese, her throat was closing and she required an epinephrine injection. (Anaphylaxis)  iodine (Anaphylaxis)  penicillin (Other)  shellfish (Short breath; Hives)  sulfa drugs (Hives)        REVIEW OF SYSTEMS:  CONSTITUTIONAL: No fever, weight loss, or fatigue  EYES: No eye pain, visual disturbances, or discharge  ENMT:  No difficulty hearing, tinnitus, vertigo; No sinus or throat pain  NECK: No pain or stiffness  BREASTS: No pain, masses, or nipple discharge  RESPIRATORY: No cough, wheezing, chills or hemoptysis; No shortness of breath  CARDIOVASCULAR: No chest pain, palpitations, lightheadedness, or leg swelling  GASTROINTESTINAL: No abdominal or epigastric pain. No nausea, vomiting, or hematemesis; No diarrhea or constipation. No melena or hematochezia.  GENITOURINARY: No dysuria, frequency, hematuria, or incontinence  NEUROLOGICAL: No headaches, memory loss, vertigo, loss of strength, numbness, or tremors  SKIN: No itching, burning, rashes, or lesions   LYMPH NODES: No enlarged glands  ENDOCRINE: No heat or cold intolerance; No hair loss; No polydipsia or polyuria  MUSCULOSKELETAL: see hpi  PSYCHIATRIC: No depression, anxiety, or mood swings  HEME/LYMPH: No easy bruising, or bleeding gums  ALLERGY AND IMMUNOLOGIC: No hives or eczema    Vital Signs Last 24 Hrs  T(C): 36.4 (05 Jan 2022 08:36), Max: 37 (05 Jan 2022 05:30)  T(F): 97.5 (05 Jan 2022 08:36), Max: 98.6 (05 Jan 2022 05:30)  HR: 64 (05 Jan 2022 08:36) (64 - 114)  BP: 121/60 (05 Jan 2022 08:36) (116/72 - 149/78)  BP(mean): --  RR: 18 (05 Jan 2022 08:36) (18 - 18)  SpO2: 98% (05 Jan 2022 08:36) (98% - 100%)    PHYSICAL EXAM:  GENERAL: NAD, well-groomed, well-developed  HEAD:  Atraumatic, Normocephalic  EYES: EOMI, PERRLA, conjunctiva and sclera clear  ENMT: Moist mucous membranes, Good dentition, No lesions; No tonsillar erythema, exudates, or enlargement  NECK: Supple, No JVD, Normal thyroid  NERVOUS SYSTEM:  Alert & Oriented X3, Good concentration; All 4 extremities mobile, no gross sensory deficits.   CHEST/LUNG: Clear to auscultation bilaterally; No rales, rhonchi, wheezing, or rubs  HEART: Regular rate and rhythm; No murmurs, rubs, or gallops  ABDOMEN: Soft, Nontender, Nondistended; Bowel sounds present  EXTREMITIES:  2+ Peripheral Pulses, No clubbing, cyanosis, or edema  LYMPH: No lymphadenopathy noted  SKIN: No rashes or lesions    LABS:                        8.3    8.38  )-----------( 259      ( 05 Jan 2022 08:29 )             27.4     05 Jan 2022 08:29    145    |  103    |  45     ----------------------------<  113    4.2     |  42     |  1.78     Ca    8.5        05 Jan 2022 08:29          CAPILLARY BLOOD GLUCOSE          RADIOLOGY & ADDITIONAL TESTS:    Imaging Personally Reviewed:  [ ] YES     Consultant(s) Notes Reviewed:      Care Discussed with Consultants/Other Providers:    Advanced Directives: [ ] DNR  [ ] No feeding tube  [ ] MOLST in chart  [ ] MOLST completed today  [ ] Unknown

## 2022-01-05 NOTE — CONSULT NOTE ADULT - SUBJECTIVE AND OBJECTIVE BOX
History of Present Illness: The patient is a 89 year old female with a history of HTN, HL, atrial fibrillation, COPD on home O2 who presents with leg swelling and ulceration. She was recently hospitalized at [facility] for COVID-19. She was discharged to [facility]. While there, her legs became more swollen with open wounds and redness. No worsening in shortness of breath.    Past Medical/Surgical History:  HTN, HL, atrial fibrillation, COPD on home O2    Medications:  Home Medications:  albuterol 2.5 mg/3 mL (0.083%) inhalation solution: 3 milliliter(s) inhaled every 6 hours, As Needed (03 Jan 2022 11:41)  Align 4 mg oral capsule: 1 cap(s) orally once a day (03 Jan 2022 11:41)  allopurinol 100 mg oral tablet: 1 tab(s) orally once a day (03 Jan 2022 11:41)  aspirin 81 mg oral tablet: 1 tab(s) orally once a day (03 Jan 2022 11:41)  budesonide-formoterol 160 mcg-4.5 mcg/inh inhalation aerosol: 2 puff(s) inhaled 2 times a day (03 Jan 2022 11:41)  carvedilol 6.25 mg oral tablet: 1 tab(s) orally 2 times a day (03 Jan 2022 11:41)  DilTIAZem (Eqv-Cardizem CD) 240 mg/24 hours oral capsule, extended release: 1 cap(s) orally once a day (03 Jan 2022 11:41)  Effexor 37.5 mg oral capsule, extended release: 1 cap(s) orally once a day (03 Jan 2022 11:41)  Multi-Day Plus Minerals Multiple Vitamins with Minerals oral tablet: 1 tab(s) orally once a day (03 Jan 2022 11:41)  Pacerone 200 mg oral tablet: 1 tab(s) orally once a day (03 Jan 2022 11:41)  polyethylene glycol 3350 oral powder for reconstitution: 17 gram(s) orally once a day, As needed, Constipation (03 Jan 2022 11:41)  senna oral tablet: 2 tab(s) orally once a day (at bedtime) (03 Jan 2022 11:41)  Synthroid 75 mcg (0.075 mg) oral tablet: 1 tab(s) orally once a day (03 Jan 2022 11:41)      Family History: Non-contributory family history of premature cardiovascular atherosclerotic disease    Social History: No tobacco, alcohol or drug use    Review of Systems:  General: No fevers, chills, weight gain  Skin: No rashes, color changes  Cardiovascular: No chest pain, orthopnea  Respiratory: No shortness of breath, cough  Gastrointestinal: No nausea, abdominal pain  Genitourinary: No incontinence, pain with urination  Musculoskeletal: No pain, swelling, decreased range of motion  Neurological: No headache, weakness  Psychiatric: No depression, anxiety  Endocrine: No weight gain, increased thirst  All other systems are comprehensively negative.    Physical Exam:  Vitals:        Vital Signs Last 24 Hrs  T(C): 36.6 (03 Jan 2022 11:29), Max: 36.6 (03 Jan 2022 11:29)  T(F): 97.9 (03 Jan 2022 11:29), Max: 97.9 (03 Jan 2022 11:29)  HR: 111 (03 Jan 2022 11:29) (111 - 111)  BP: 134/72 (03 Jan 2022 11:29) (134/72 - 134/72)  BP(mean): --  RR: 22 (03 Jan 2022 11:29) (22 - 22)  SpO2: 98% (03 Jan 2022 11:29) (98% - 98%)  General: NAD  HEENT: MMM  Neck: No JVD, no carotid bruit  Lungs: CTAB  CV: RRR, nl S1/S2, no M/R/G  Abdomen: S/NT/ND, +BS  Extremities: No LE edema, no cyanosis  Neuro: AAOx3, non-focal  Skin: No rash    Labs:                        9.5    7.52  )-----------( 239      ( 03 Jan 2022 12:27 )             30.7     01-03    141  |  102  |  44<H>  ----------------------------<  107<H>  3.4<L>   |  38<H>  |  1.89<H>    Ca    8.3<L>      03 Jan 2022 12:27          PT/INR - ( 03 Jan 2022 12:27 )   PT: 14.6 sec;   INR: 1.22 ratio         PTT - ( 03 Jan 2022 12:27 )  PTT:29.5 sec    ECG: Poor baseline, atrial flutter vs. NSR with PACs    Telemetry: Poor baseline, atrial flutter vs. NSR with PACs    
  HPI:  89F PMHx Paroxysmal Atrial Fibrillation, HTN, Hypothyroidism, COPD (2-3 L nasal cannula at home) Hyperlipidemia recent admission to  [facility] 11/30-12/5 with Covid pneumonia and 12/6-12/13 for acute CHF and Afib with RVR discharged to [facility] for ALEX who presented with worsening Bilateral LE edema and right LE wounds with erythema  No fevers chills, cough SOB n/v/d CP abd pain urinary symptoms.     Infectious Disease consult was called to evaluate pt and for antibiotic management.      Past Medical & Surgical Hx:  PAST MEDICAL & SURGICAL HISTORY:  Hypertension  Depression  Overactive bladder  Gout  Osteoarthritis  Hypothyroid  HLD (hyperlipidemia)  Paroxysmal atrial fibrillation  Asthma  Chronic obstructive pulmonary disease (COPD)  on home oxygen 2.5-3L NC  S/P cholecystectomy  S/P lumpectomy of breast  S/P hysterectomy  S/P lung surgery, follow-up exam  s/p removal of suspicious lesion, negative        Social History--  EtOH: denies   Tobacco: denies  Drug Use: denies       FAMILY HISTORY:  Family history of nasopharyngeal cancer (Child)  daughter  Family history of breast cancer (Mother)        Allergies  Tomas Cheese - Pt states the one time she had a mouthful of tomas cheese, her throat was closing and she required an epinephrine injection. (Anaphylaxis)  iodine (Anaphylaxis)  penicillin (Other)  shellfish (Short breath; Hives)  sulfa drugs (Hives)    Intolerances  NONE      Home Medications:  albuterol 2.5 mg/3 mL (0.083%) inhalation solution: 3 milliliter(s) inhaled every 6 hours, As Needed (03 Jan 2022 11:41)  Align 4 mg oral capsule: 1 cap(s) orally once a day (03 Jan 2022 11:41)  allopurinol 100 mg oral tablet: 1 tab(s) orally once a day (03 Jan 2022 11:41)  aspirin 81 mg oral tablet: 1 tab(s) orally once a day (03 Jan 2022 11:41)  budesonide-formoterol 160 mcg-4.5 mcg/inh inhalation aerosol: 2 puff(s) inhaled 2 times a day (03 Jan 2022 11:41)  carvedilol 6.25 mg oral tablet: 1 tab(s) orally 2 times a day (03 Jan 2022 11:41)  DilTIAZem (Eqv-Cardizem CD) 240 mg/24 hours oral capsule, extended release: 1 cap(s) orally once a day (03 Jan 2022 11:41)  Effexor 37.5 mg oral capsule, extended release: 1 cap(s) orally once a day (03 Jan 2022 11:41)  Multi-Day Plus Minerals Multiple Vitamins with Minerals oral tablet: 1 tab(s) orally once a day (03 Jan 2022 11:41)  Pacerone 200 mg oral tablet: 1 tab(s) orally once a day (03 Jan 2022 11:41)  polyethylene glycol 3350 oral powder for reconstitution: 17 gram(s) orally once a day, As needed, Constipation (03 Jan 2022 11:41)  senna oral tablet: 2 tab(s) orally once a day (at bedtime) (03 Jan 2022 11:41)  Synthroid 75 mcg (0.075 mg) oral tablet: 1 tab(s) orally once a day (03 Jan 2022 11:41)      Current Inpatient Medications :    ANTIBIOTICS:   ceFAZolin   IVPB 1000 milliGRAM(s) IV Intermittent every 8 hours      OTHER RELEVANT MEDICATIONS :  acetaminophen     Tablet .. 650 milliGRAM(s) Oral every 6 hours PRN  allopurinol 100 milliGRAM(s) Oral daily  ALPRAZolam 0.25 milliGRAM(s) Oral at bedtime  aMIOdarone    Tablet 200 milliGRAM(s) Oral daily  apixaban 2.5 milliGRAM(s) Oral two times a day  aspirin enteric coated 81 milliGRAM(s) Oral daily  benzonatate 100 milliGRAM(s) Oral every 8 hours PRN  budesonide 160 MICROgram(s)/formoterol 4.5 MICROgram(s) Inhaler 2 Puff(s) Inhalation two times a day  carvedilol 6.25 milliGRAM(s) Oral every 12 hours  diltiazem    milliGRAM(s) Oral daily  famotidine    Tablet 20 milliGRAM(s) Oral daily  hydrALAZINE 25 milliGRAM(s) Oral every 8 hours  levothyroxine 75 MICROGram(s) Oral daily  multivitamin/minerals 1 Tablet(s) Oral daily  polyethylene glycol 3350 17 Gram(s) Oral daily PRN  senna 2 Tablet(s) Oral at bedtime  tiotropium 18 MICROgram(s) Capsule 1 Capsule(s) Inhalation daily  venlafaxine XR. 37.5 milliGRAM(s) Oral daily    ROS:  CONSTITUTIONAL:  Negative fever or chills, feels well, good appetite  EYES:  Negative  blurry vision or double vision  CARDIOVASCULAR:  Negative for chest pain or palpitations  RESPIRATORY:  Negative for cough, wheezing, or SOB   GASTROINTESTINAL:  Negative for nausea, vomiting, diarrhea, constipation, or abdominal pain  GENITOURINARY:  Negative frequency, urgency , dysuria or hematuria   NEUROLOGIC:  No headache, confusion, dizziness, lightheadedness  All other systems were reviewed and are negative      I&O's Detail    03 Jan 2022 07:01  -  03 Jan 2022 22:04  --------------------------------------------------------  IN:  Total IN: 0 mL    OUT:    Voided (mL): 950 mL  Total OUT: 950 mL    Total NET: -950 mL          Physical Exam:  Vital Signs Last 24 Hrs  T(C): 36.7 (03 Jan 2022 15:00), Max: 36.7 (03 Jan 2022 15:00)  T(F): 98 (03 Jan 2022 15:00), Max: 98 (03 Jan 2022 15:00)  HR: 82 (03 Jan 2022 19:15) (82 - 111)  BP: 109/61 (03 Jan 2022 19:15) (109/61 - 142/80)  RR: 22 (03 Jan 2022 19:15) (20 - 25)  SpO2: 100% (03 Jan 2022 19:15) (98% - 100%)  Height (cm): 152.4 (01-03 @ 11:29)  Weight (kg): 72.6 (01-03 @ 11:29)  BMI (kg/m2): 31.3 (01-03 @ 11:29)  BSA (m2): 1.7 (01-03 @ 11:29)    General: well developed well nourished, in no acute distress  Neck: supple, trachea midline  Lungs: clear, no wheeze/rhonchi  Cardiovascular: regular rate and rhythm, S1 S2  Abdomen: soft, nontender, ND, bowel sounds normal  Neurological:  alert and oriented x3  Skin: no rash  Extremities: +3 edema  Right LE venous ulcers mild erythema lower leg and foot    Labs:                         9.5    7.52  )-----------( 239      ( 03 Jan 2022 12:27 )             30.7   01-03    141  |  102  |  44<H>  ----------------------------<  107<H>  3.4<L>   |  38<H>  |  1.89<H>    Ca    8.3<L>      03 Jan 2022 12:27    TPro  5.8<L>  /  Alb  2.8<L>  /  TBili  0.4  /  DBili  x   /  AST  27  /  ALT  20  /  AlkPhos  59  01-03      RECENT CULTURES:          RADIOLOGY & ADDITIONAL STUDIES:    ACC: [id number] EXAM:  US DPLX LWR EXT VEINS COMPL BI                          PROCEDURE DATE:  01/03/2022          INTERPRETATION:  CLINICAL INFORMATION: Bilateral leg swelling.    COMPARISON: Doppler venous sonogram 12/1/2021.    TECHNIQUE: Duplex sonography of the BILATERAL LOWER extremity veins with   color and spectral Doppler, with and without compression.    FINDINGS:    RIGHT:  Normal compressibility of the RIGHT common femoral, femoral and popliteal   veins.  Doppler examination shows normal spontaneous and phasic flow.  No RIGHT calf vein thrombosis is detected.    LEFT:  Normal compressibility of the LEFT common femoral, femoral and popliteal   veins.  Doppler examination shows normal spontaneous and phasic flow.  No LEFT calf veinthrombosis is detected.    IMPRESSION:    No evidence of deep venous thrombosis in either lower extremity.        ACC: [id number] EXAM:  XR CHEST PORTABLE URGENT 1V                          PROCEDURE DATE:  01/03/2022          INTERPRETATION:  Chest portable.    Clinical History: Shortness of breath. Bilateral leg edema.    Comparison: 12/9/2021.    Single AP view submitted.    The evaluation of the cardiomediastinal silhouette is limited on portable   technique.  Calcified aorta.    Again noted is blunting of the bilateral costophrenic angles which may   reflect small pleural effusions and/or pleural thickening.  The lungs are mildly hyperinflated with increased bronchovascular   markings.  Again noted postsurgical changes with sutures right midlung zone      Assessment :   89F PMHx Paroxysmal Atrial Fibrillation, HTN, Hypothyroidism, COPD (2-3 L nasal cannula at home) Hyperlipidemia recent admission to [facility] 11/30-12/5 with Covid pneumonia and 12/6-12/13 for acute CHF and Afib with RVR discharged to [facility] for ALEX who presented with worsening Bilateral LE edema and right LE wounds with erythema - mild cellulitis  Acute on Chronic CHF  COVID 19 + likely sec viral shedding rather than acute COVID 19 infection {recent admission to [facility] 11/30-12/5 with Covid pneumonia }    Plan :   Cont Ancef  Fu cultures  Trend temps and cbc  Diurese per cardiology  Pulm toileting  Elevate leg  Wound care consult     Continue with present regiment .  Approptiate use of antibiotics and adverse effects reviewed.      > 45 minutes spent in direct patient care reviewing  the notes, lab data/ imaging , discussion with multidisciplinary team. All questions were addressed and answered to the best of my capacity .    Thank you for allowing me to participate in the care of your patient .      Kate Sorto MD  Infectious Disease  [phone]
HPI:  Patient is a 89y old  Female with Hx of CKD, variable Cr 1.1-1.7 depending on volume status, diastolic dysfx, mod to severe MR, recent COVID 19 who was admitted here few days ago with decompensated HF.   Noted Cr flux 1.5-1.9 associated with worsening metabolic alkalosis.   She is awake and alert. C/o dec in dyspnea.         PAST MEDICAL & SURGICAL HISTORY:  Hypertension    Depression    Overactive bladder    Gout    Osteoarthritis    Hypothyroid    HLD (hyperlipidemia)    Paroxysmal atrial fibrillation    Asthma    Chronic obstructive pulmonary disease (COPD)  on home oxygen 2.5-3L NC    S/P cholecystectomy    S/P lumpectomy of breast    S/P hysterectomy    S/P lung surgery, follow-up exam  s/p removal of suspicious lesion, negative        FAMILY HISTORY:  Family history of nasopharyngeal cancer (Child)  daughter    Family history of breast cancer (Mother)        Allergies    Tomas Cheese - Pt states the one time she had a mouthful of tomas cheese, her throat was closing and she required an epinephrine injection. (Anaphylaxis)  iodine (Anaphylaxis)  penicillin (Other)  shellfish (Short breath; Hives)  sulfa drugs (Hives)          MEDICATIONS  (STANDING):  allopurinol 100 milliGRAM(s) Oral daily  ALPRAZolam 0.25 milliGRAM(s) Oral at bedtime  ALPRAZolam 0.25 milliGRAM(s) Oral once  aMIOdarone    Tablet 200 milliGRAM(s) Oral daily  ammonium lactate 12% Lotion 1 Application(s) Topical two times a day  apixaban 2.5 milliGRAM(s) Oral two times a day  aspirin enteric coated 81 milliGRAM(s) Oral daily  budesonide 160 MICROgram(s)/formoterol 4.5 MICROgram(s) Inhaler 2 Puff(s) Inhalation two times a day  buMETAnide Injectable 1 milliGRAM(s) IV Push every 12 hours  carvedilol 6.25 milliGRAM(s) Oral every 12 hours  ceFAZolin   IVPB 1000 milliGRAM(s) IV Intermittent every 8 hours  diltiazem    milliGRAM(s) Oral daily  famotidine    Tablet 20 milliGRAM(s) Oral daily  hydrALAZINE 25 milliGRAM(s) Oral every 8 hours  influenza  Vaccine (HIGH DOSE) 0.7 milliLiter(s) IntraMuscular once  levothyroxine 75 MICROGram(s) Oral daily  multivitamin/minerals 1 Tablet(s) Oral daily  mupirocin 2% Nasal 1 Application(s) Nasal two times a day  senna 2 Tablet(s) Oral at bedtime  tiotropium 18 MICROgram(s) Capsule 1 Capsule(s) Inhalation daily  venlafaxine XR. 75 milliGRAM(s) Oral daily    MEDICATIONS  (PRN):  acetaminophen     Tablet .. 650 milliGRAM(s) Oral every 6 hours PRN Temp greater or equal to 38C (100.4F), Mild Pain (1 - 3)  benzonatate 100 milliGRAM(s) Oral every 8 hours PRN Cough  polyethylene glycol 3350 17 Gram(s) Oral daily PRN Constipation      Daily     Daily     Drug Dosing Weight  Height (cm): 152.4 (2022 11:29)  Weight (kg): 72.6 (2022 11:29)  BMI (kg/m2): 31.3 (2022 11:29)  BSA (m2): 1.7 (2022 11:29)      REVIEW OF SYSTEMS:    Per HPI.             I&O's Detail    2022 07:01  -  2022 07:00  --------------------------------------------------------  IN:  Total IN: 0 mL    OUT:    Voided (mL): 1300 mL  Total OUT: 1300 mL    Total NET: -1300 mL          -04 @ 07:01  -  01-05 @ 07:00  --------------------------------------------------------  IN: 0 mL / OUT: 1300 mL / NET: -1300 mL        PHYSICAL EXAM:    GENERAL: NAD  HEAD:  Atraumatic, normocephalic  EYES: EOMI, PERRLA. Conjunctiva and sclera clear  ENMT: moist mucous membranes.   NECK: Supple. POS increase in JVP  NERVOUS SYSTEM:  Alert & Oriented X3. Motor Strength 5/5 B/L upper and lower extremities; DTRs 2+ intact and symmetric  CHEST/LUNG: crackles  HEART: Regular rate and rhythm. No murmurs, rubs, or gallops  ABDOMEN: Soft, Nontender, Nondistended. POS BS  EXTREMITIES:  2+ edema b/l, R shin dressed.     LABS:  CBC Full  -  ( 2022 08:29 )  WBC Count : 8.38 K/uL  RBC Count : 2.57 M/uL  Hemoglobin : 8.3 g/dL  Hematocrit : 27.4 %  Platelet Count - Automated : 259 K/uL  Mean Cell Volume : 106.6 fl  Mean Cell Hemoglobin : 32.3 pg  Mean Cell Hemoglobin Concentration : 30.3 gm/dL  Auto Neutrophil # : x  Auto Lymphocyte # : x  Auto Monocyte # : x  Auto Eosinophil # : x  Auto Basophil # : x  Auto Neutrophil % : x  Auto Lymphocyte % : x  Auto Monocyte % : x  Auto Eosinophil % : x  Auto Basophil % : x        145  |  103  |  45<H>  ----------------------------<  113<H>  4.2   |  42<H>  |  1.78<H>    Ca    8.5      2022 08:29  Mg     1.6         TPro  5.7<L>  /  Alb  2.7<L>  /  TBili  0.3  /  DBili  x   /  AST  24  /  ALT  17  /  AlkPhos  55      CAPILLARY BLOOD GLUCOSE          Urinalysis Basic - ( 2022 14:26 )    Color: Yellow / Appearance: Clear / S.015 / pH: x  Gluc: x / Ketone: Negative  / Bili: Negative / Urobili: Negative mg/dL   Blood: x / Protein: 15 mg/dL / Nitrite: Negative   Leuk Esterase: Trace / RBC: Negative /HPF / WBC 6-10   Sq Epi: x / Non Sq Epi: Moderate / Bacteria: Few          Culture Results:   10,000 - 49,000 CFU/mL Gram positive organisms ( @ 00:55)  Culture Results:   No growth to date. ( @ 16:15)  Culture Results:   No growth to date. ( @ 16:15)        Impression:  * CKD3-4. Mild Cr flux around baseline due to CRS1-2  * Met alkalosis -- contraction, post-hypercapnic.   * Decompensated diastolic, valvular HF  * Diastolic CM, mod to severe MR    Recommendations:   * Cont mandated diuresis  * Consider acetazolamide   * BMP daiily
S : 89y year old Female seen at bedside for Right lower leg ulceration with cellulitis.   Patient relates to having the ulceration for  aprox one week.     Chief Complaint : Patient is a 89y old  Female who presents with a chief complaint of   HPI : HPI:  89F PMHx Paroxysmal Atrial Fibrillation, HTN, Hypothyroidism, COPD (2-3 L nasal cannula at home) Hyperlipidemia, presenting with Bilateral LE edema and right LE wounds.  Patient was admitted to [facility] 11/30-12/5 with Covid pneumonia and 12/6-12/13 for acute CHF and Afib with RVR.  She was discharged to [facility] for ALEX.  While there her edema was increasing, and a little more than a week ago she developed a blister on her right ankle and lower leg that both burst.  She has pain in her leg especially at night.  No fevers or chills, no further SOB.  Her oxygen needs have not changed.  Her grandson became more and more concerned about her leg and felt that staff at [facility] was not addressing it so they requested that she be sent to [place] ED for evaluation.   (03 Jan 2022 16:13)      Patient admits to  (-) Fevers, (-) Chills, (-) Nausea, (-) Vomiting, (-) Shortness of Breath      PMH: Hypertension    Depression    CVA (cerebral infarction)    Overactive bladder    Gout    Osteoarthritis    Hypothyroid    HLD (hyperlipidemia)    Paroxysmal atrial fibrillation    Asthma    Chronic obstructive pulmonary disease (COPD)      PSH:No significant past surgical history    S/P cholecystectomy    S/P lumpectomy of breast    S/P hysterectomy    S/P lung surgery, follow-up exam        Allergies:Tomas Cheese - Pt states the one time she had a mouthful of tomas cheese, her throat was closing and she required an epinephrine injection. (Anaphylaxis)  iodine (Anaphylaxis)  penicillin (Other)  shellfish (Short breath; Hives)  sulfa drugs (Hives)      Labs:                          9.5    7.52  )-----------( 239      ( 03 Jan 2022 12:27 )             30.7     WBC Trend  7.52 Date (01-03 @ 12:27)  17.54<H> Date (12-12 @ 08:19)  17.65<H> Date (12-11 @ 08:24)      Chem  01-03    141  |  102  |  44<H>  ----------------------------<  107<H>  3.4<L>   |  38<H>  |  1.89<H>    Ca    8.3<L>      03 Jan 2022 12:27    TPro  5.8<L>  /  Alb  2.8<L>  /  TBili  0.4  /  DBili  x   /  AST  27  /  ALT  20  /  AlkPhos  59  01-03          T(F): 97.7 (01-04-22 @ 05:26), Max: 98 (01-03-22 @ 15:00)  HR: 63 (01-04-22 @ 05:26) (63 - 111)  BP: 130/64 (01-04-22 @ 05:26) (109/61 - 142/80)  RR: 18 (01-04-22 @ 05:26) (18 - 25)  SpO2: 100% (01-04-22 @ 05:26) (93% - 100%)  Wt(kg): --    O:   General: Pleasant  female NAD & AOX3.    Integument:  Skin warm, dry and supple bilateral.    Ulcerations Right lower legs multiple secondary to venous insufficiency and water retention, - hyperkeratotic border, wound base Granular  wound size 3 cm X 3 cm X .5cm) + edema, + ludmila-wound erythema, - purulence, - fluctuance, - tracking/tunneling, - probe to bone.  There is positive localized cellulitis noted. The patient states that this is improving with the current care plan. There is positive pitting edema noted about the bilateral lower extremities   Vascular: Dorsalis Pedis and Posterior Tibial pulses 1/4.  Capillary re-fill time less then 2 seconds digits 1-5 bilateral.    Neuro: Protective sensation intact to the level of the digits bilateral.  MSK: Muscle strength 5/5 all major muscle groups bilateral.  Deformity:  A: Right lower leg  ulcerations with celluiltis       venous insufficiency bilateral lower extremities            P:   Chart reviewed and Patient evaluated  Discussed diagnosis and treatment with patient  ordered Bactroban dressing changes twice daily with dry sterile dressings right lower leg   Applied  dry sterile dressing  Continue with IV antibiotics As Per ID  recommend elevation of legs on two pillow to reduce edema bilateral lower extremities  Weight bearing as tolerated   Offloading to bilateral Heels.   Discussed importance of daily foot examinations and proper shoe gear  Podiatry will follow while in house.   will discuss care plan  with all  Attendings
PULMONARY/CRITICAL CARE    Pt. well known to me.    Patient is a 89y old  Female who presents with a chief complaint of cellulitis of legs  BRIEF HOSPITAL COURSE: ***89F PMHx Paroxysmal Atrial Fibrillation, HTN, Hypothyroidism, COPD (2-3 L nasal cannula at home) Hyperlipidemia, presenting with Bilateral LE edema and right LE wounds.  Patient was admitted to [facility] - with Covid pneumonia and - for acute CHF and Afib with RVR.  She was discharged to [facility] for ALEX.  While there her edema was increasing, and a little more than a week ago she developed a blister on her right ankle and lower leg that both burst.  She has pain in her leg especially at night.  No fevers or chills, no further SOB.  Her oxygen needs have not changed.  Her grandson became more and more concerned about her leg and felt that staff at [facility] was not addressing it so they requested that she be sent to [place] ED for evaluation.    Events last 24 hours: ***    PAST MEDICAL & SURGICAL HISTORY:  Hypertension    Depression    Overactive bladder    Gout    Osteoarthritis    Hypothyroid    HLD (hyperlipidemia)    Paroxysmal atrial fibrillation    Asthma    Chronic obstructive pulmonary disease (COPD)  on home oxygen 2.5-3L NC    S/P cholecystectomy    S/P lumpectomy of breast    S/P hysterectomy    S/P lung surgery, follow-up exam  s/p removal of suspicious lesion, negative      Allergies    Tomas Cheese - Pt states the one time she had a mouthful of tomas cheese, her throat was closing and she required an epinephrine injection. (Anaphylaxis)  iodine (Anaphylaxis)  penicillin (Other)  shellfish (Short breath; Hives)  sulfa drugs (Hives)    Intolerances      FAMILY HISTORY:  Family history of nasopharyngeal cancer (Child)  daughter    Family history of breast cancer (Mother)        Review of Systems:  CONSTITUTIONAL: No fever, chills, or fatigue  EYES: No eye pain, visual disturbances, or discharge  ENMT:  No difficulty hearing, tinnitus, vertigo; No sinus or throat pain  NECK: No pain or stiffness  RESPIRATORY: No cough, wheezing, chills or hemoptysis; No shortness of breath  on continuous O2  CARDIOVASCULAR: No chest pain, palpitations, dizziness, Has leg swelling and ulcer  GASTROINTESTINAL: No abdominal or epigastric pain. No nausea, vomiting, or hematemesis; No diarrhea or constipation. No melena or hematochezia.  GENITOURINARY: No dysuria, frequency, hematuria, or incontinence  NEUROLOGICAL: No headaches, memory loss, loss of strength, numbness, or tremors  SKIN: No itching, burning, rashes, or lesions   MUSCULOSKELETAL: No joint pain or swelling; No muscle, back, or extremity pain  PSYCHIATRIC: No depression, anxiety, mood swings, or difficulty sleeping      Medications:  ceFAZolin   IVPB 1000 milliGRAM(s) IV Intermittent every 8 hours    aMIOdarone    Tablet 200 milliGRAM(s) Oral daily  carvedilol 6.25 milliGRAM(s) Oral every 12 hours  diltiazem    milliGRAM(s) Oral daily  hydrALAZINE 25 milliGRAM(s) Oral every 8 hours    benzonatate 100 milliGRAM(s) Oral every 8 hours PRN  budesonide 160 MICROgram(s)/formoterol 4.5 MICROgram(s) Inhaler 2 Puff(s) Inhalation two times a day  tiotropium 18 MICROgram(s) Capsule 1 Capsule(s) Inhalation daily    acetaminophen     Tablet .. 650 milliGRAM(s) Oral every 6 hours PRN  ALPRAZolam 0.25 milliGRAM(s) Oral at bedtime  venlafaxine XR. 37.5 milliGRAM(s) Oral daily      apixaban 2.5 milliGRAM(s) Oral two times a day  aspirin enteric coated 81 milliGRAM(s) Oral daily    famotidine    Tablet 20 milliGRAM(s) Oral daily  polyethylene glycol 3350 17 Gram(s) Oral daily PRN  senna 2 Tablet(s) Oral at bedtime      allopurinol 100 milliGRAM(s) Oral daily  levothyroxine 75 MICROGram(s) Oral daily    multivitamin/minerals 1 Tablet(s) Oral daily  potassium chloride    Tablet ER 40 milliEquivalent(s) Oral every 4 hours                ICU Vital Signs Last 24 Hrs  T(C): 36.7 (2022 15:00), Max: 36.7 (2022 15:00)  T(F): 98 (2022 15:00), Max: 98 (2022 15:00)  HR: 82 (2022 15:00) (82 - 111)  BP: 142/80 (2022 15:00) (128/70 - 142/80)  BP(mean): --  ABP: --  ABP(mean): --  RR: 25 (2022 15:00) (20 - 25)  SpO2: 98% (2022 15:00) (98% - 98%)    Vital Signs Last 24 Hrs  T(C): 36.7 (2022 15:00), Max: 36.7 (2022 15:00)  T(F): 98 (2022 15:00), Max: 98 (2022 15:00)  HR: 82 (2022 15:00) (82 - 111)  BP: 142/80 (2022 15:00) (128/70 - 142/80)  BP(mean): --  RR: 25 (2022 15:00) (20 - 25)  SpO2: 98% (2022 15:00) (98% - 98%)        I&O's Detail        LABS:                        9.5    7.52  )-----------( 239      ( 2022 12:27 )             30.7     01-03    141  |  102  |  44<H>  ----------------------------<  107<H>  3.4<L>   |  38<H>  |  1.89<H>    Ca    8.3<L>      2022 12:27    TPro  5.8<L>  /  Alb  2.8<L>  /  TBili  0.4  /  DBili  x   /  AST  27  /  ALT  20  /  AlkPhos  59  01-03          CAPILLARY BLOOD GLUCOSE        PT/INR - ( 2022 12:27 )   PT: 14.6 sec;   INR: 1.22 ratio         PTT - ( 2022 12:27 )  PTT:29.5 sec  Urinalysis Basic - ( 2022 14:26 )    Color: Yellow / Appearance: Clear / S.015 / pH: x  Gluc: x / Ketone: Negative  / Bili: Negative / Urobili: Negative mg/dL   Blood: x / Protein: 15 mg/dL / Nitrite: Negative   Leuk Esterase: Trace / RBC: Negative /HPF / WBC 6-10   Sq Epi: x / Non Sq Epi: Moderate / Bacteria: Few      CULTURES:      Physical Examination:    General: No acute distress.  elderly female    HEENT: Pupils equal, reactive to light.  Symmetric.    PULM: Clear to auscultation bilaterally, no wheeze rhonchi rales    CVS: Regular rate and rhythm, no murmurs, rubs, or gallops    ABD: Soft, nondistended, nontender, normoactive bowel sounds, no masses    EXT: 3 plus leg edema, with erythema; bandaged left lower leg    SKIN: Warm and well perfused, no rashes noted.    NEURO: Alert, oriented, interactive, nonfocal    RADIOLOGY: ***rad< from: US Duplex Venous Lower Ext Complete, Bilateral (22 @ 12:55) >  ACC: [id number] EXAM:  US DPLX LWR EXT VEINS COMPL BI                          PROCEDURE DATE:  2022          INTERPRETATION:  CLINICAL INFORMATION: Bilateral leg swelling.    COMPARISON: Doppler venous sonogram 2021.    TECHNIQUE: Duplex sonography of the BILATERAL LOWER extremity veins with   color and spectral Doppler, with and without compression.    FINDINGS:    RIGHT:  Normal compressibility of the RIGHT common femoral, femoral and popliteal   veins.  Doppler examination shows normal spontaneous and phasic flow.  No RIGHT calf vein thrombosis is detected.    LEFT:  Normal compressibility of the LEFT common femoral, femoral and popliteal   veins.  Doppler examination shows normal spontaneous and phasic flow.  No LEFT calf veinthrombosis is detected.    IMPRESSION:    No evidence of deep venous thrombosis in either lower extremity.          --- End of Report ---    < end of copied text >  < from: Xray Chest 1 View- PORTABLE-Urgent (Xray Chest 1 View- PORTABLE-Urgent .) (22 @ 11:58) >  ACC: [id number] EXAM:  XR CHEST PORTABLE URGENT 1V                          PROCEDURE DATE:  2022          INTERPRETATION:  Chest portable.    Clinical History: Shortness of breath. Bilateral leg edema.    Comparison: 2021.    Single AP view submitted.    The evaluation of the cardiomediastinal silhouette is limited on portable   technique.  Calcified aorta.    Again noted is blunting of the bilateral costophrenic angles which may   reflect small pleural effusions and/or pleural thickening.  The lungs are mildly hyperinflated with increased bronchovascular   markings.  Again noted postsurgical changes with sutures right midlung zone.    Impression:    Findings as discussed above.    < end of copied text >      CRITICAL CARE TIME SPENT: ***

## 2022-01-05 NOTE — DIETITIAN INITIAL EVALUATION ADULT. - PERTINENT MEDS FT
MEDICATIONS  (STANDING):  allopurinol 100 milliGRAM(s) Oral daily  ALPRAZolam 0.25 milliGRAM(s) Oral at bedtime  aMIOdarone    Tablet 200 milliGRAM(s) Oral daily  ammonium lactate 12% Lotion 1 Application(s) Topical two times a day  apixaban 2.5 milliGRAM(s) Oral two times a day  aspirin enteric coated 81 milliGRAM(s) Oral daily  budesonide 160 MICROgram(s)/formoterol 4.5 MICROgram(s) Inhaler 2 Puff(s) Inhalation two times a day  buMETAnide Injectable 1 milliGRAM(s) IV Push every 12 hours  carvedilol 6.25 milliGRAM(s) Oral every 12 hours  ceFAZolin   IVPB 1000 milliGRAM(s) IV Intermittent every 8 hours  diltiazem    milliGRAM(s) Oral daily  famotidine    Tablet 20 milliGRAM(s) Oral daily  hydrALAZINE 25 milliGRAM(s) Oral every 8 hours  influenza  Vaccine (HIGH DOSE) 0.7 milliLiter(s) IntraMuscular once  levothyroxine 75 MICROGram(s) Oral daily  multivitamin/minerals 1 Tablet(s) Oral daily  mupirocin 2% Nasal 1 Application(s) Nasal two times a day  senna 2 Tablet(s) Oral at bedtime  tiotropium 18 MICROgram(s) Capsule 1 Capsule(s) Inhalation daily  venlafaxine XR. 75 milliGRAM(s) Oral daily    MEDICATIONS  (PRN):  acetaminophen     Tablet .. 650 milliGRAM(s) Oral every 6 hours PRN Temp greater or equal to 38C (100.4F), Mild Pain (1 - 3)  benzonatate 100 milliGRAM(s) Oral every 8 hours PRN Cough  polyethylene glycol 3350 17 Gram(s) Oral daily PRN Constipation

## 2022-01-05 NOTE — PROGRESS NOTE ADULT - SUBJECTIVE AND OBJECTIVE BOX
CHARLES GRAHAM is a 89yFemale , patient examined and chart reviewed.    INTERVAL HPI/ OVERNIGHT EVENTS:   Afebrile. No events.  NAD.    PAST MEDICAL & SURGICAL HISTORY:  Hypertension  Depression  Overactive bladder  Gout  Osteoarthritis  Hypothyroid  HLD (hyperlipidemia)  Paroxysmal atrial fibrillation  Asthma  Chronic obstructive pulmonary disease (COPD)  on home oxygen 2.5-3L NC  S/P cholecystectomy  S/P lumpectomy of breast  S/P hysterectomy  S/P lung surgery, follow-up exam  s/p removal of suspicious lesion, negative    For details regarding the patient's social history, family history, and other miscellaneous elements, please refer the initial infectious diseases consultation and/or the admitting history and physical examination for this admission.       ROS:  CONSTITUTIONAL:  Negative fever or chills  EYES:  Negative  blurry vision or double vision  CARDIOVASCULAR:  Negative for chest pain or palpitations  RESPIRATORY:  Negative for cough, wheezing, or SOB   GASTROINTESTINAL:  Negative for nausea, vomiting, diarrhea, constipation, or abdominal pain  GENITOURINARY:  Negative frequency, urgency or dysuria  NEUROLOGIC:  No headache, confusion, dizziness, lightheadedness  All other systems were reviewed and are negative     ALLERGIES  Tomas Cheese - Pt states the one time she had a mouthful of tomas cheese, her throat was closing and she required an epinephrine injection. (Anaphylaxis)  iodine (Anaphylaxis)  penicillin (Other)  shellfish (Short breath; Hives)  sulfa drugs (Hives)      Current inpatient medications :    ANTIBIOTICS/RELEVANT:  ceFAZolin   IVPB 1000 milliGRAM(s) IV Intermittent every 8 hours      MEDICATIONS  (STANDING):  allopurinol 100 milliGRAM(s) Oral daily  ALPRAZolam 0.25 milliGRAM(s) Oral at bedtime  aMIOdarone    Tablet 200 milliGRAM(s) Oral daily  ammonium lactate 12% Lotion 1 Application(s) Topical two times a day  apixaban 2.5 milliGRAM(s) Oral two times a day  aspirin enteric coated 81 milliGRAM(s) Oral daily  budesonide 160 MICROgram(s)/formoterol 4.5 MICROgram(s) Inhaler 2 Puff(s) Inhalation two times a day  buMETAnide Injectable 1 milliGRAM(s) IV Push every 12 hours  carvedilol 6.25 milliGRAM(s) Oral every 12 hours  diltiazem    milliGRAM(s) Oral daily  famotidine    Tablet 20 milliGRAM(s) Oral daily  hydrALAZINE 25 milliGRAM(s) Oral every 8 hours  influenza  Vaccine (HIGH DOSE) 0.7 milliLiter(s) IntraMuscular once  levothyroxine 75 MICROGram(s) Oral daily  multivitamin/minerals 1 Tablet(s) Oral daily  mupirocin 2% Ointment 1 Application(s) Topical two times a day  senna 2 Tablet(s) Oral at bedtime  tiotropium 18 MICROgram(s) Capsule 1 Capsule(s) Inhalation daily  venlafaxine XR. 75 milliGRAM(s) Oral daily    MEDICATIONS  (PRN):  acetaminophen     Tablet .. 650 milliGRAM(s) Oral every 6 hours PRN Temp greater or equal to 38C (100.4F), Mild Pain (1 - 3)  benzonatate 100 milliGRAM(s) Oral every 8 hours PRN Cough  polyethylene glycol 3350 17 Gram(s) Oral daily PRN Constipation      Objective:  Vital Signs Last 24 Hrs  T(C): 36.8 (2022 17:40), Max: 37 (2022 05:30)  T(F): 98.3 (2022 17:40), Max: 98.6 (2022 05:30)  HR: 98 (2022 20:35) (64 - 114)  BP: 120/62 (2022 20:35) (116/72 - 149/78)  RR: 18 (2022 17:40) (18 - 18)  SpO2: 97% (2022 17:40) (97% - 100%)      Physical Exam:  General: no acute distress  Neck: supple, trachea midline  Lungs: clear, no wheeze/rhonchi  Cardiovascular: regular rate and rhythm, S1 S2  Abdomen: soft, nontender,  bowel sounds normal  Neurological: alert and oriented x3  Skin: no rash  Extremities: +3 edema  right leg venous ulcers Minimal lower leg erythema        LABS:                        8.3    8.38  )-----------( 259      ( 2022 08:29 )             27.4   -    145  |  103  |  45<H>  ----------------------------<  113<H>  4.2   |  42<H>  |  1.78<H>    Ca    8.5      2022 08:29  Mg     1.6         TPro  5.7<L>  /  Alb  2.7<L>  /  TBili  0.3  /  DBili  x   /  AST  24  /  ALT  17  /  AlkPhos  55  -    Urinalysis Basic - ( 2022 14:26 )    Color: Yellow / Appearance: Clear / S.015 / pH: x  Gluc: x / Ketone: Negative  / Bili: Negative / Urobili: Negative mg/dL   Blood: x / Protein: 15 mg/dL / Nitrite: Negative   Leuk Esterase: Trace / RBC: Negative /HPF / WBC 6-10   Sq Epi: x / Non Sq Epi: Moderate / Bacteria: Few      MICROBIOLOGY:  COVID-19 PCR (22 @ 13:36)    COVID-19 PCR: Detected    Culture - Urine (collected 2022 00:55)  Source: Catheterized Catheterized  Preliminary Report (2022 22:09):    10,000 - 49,000 CFU/mL Gram positive organisms    Culture - Blood (collected 2022 16:15)  Source: .Blood Blood  Preliminary Report (2022 17:02):    No growth to date.    Culture - Blood (collected 2022 16:15)  Source: .Blood Blood  Preliminary Report (2022 17:02):    No growth to date.      RADIOLOGY & ADDITIONAL STUDIES:  ACC: [id number] EXAM:  US DPLX LWR EXT VEINS COMPL BI                          PROCEDURE DATE:  2022          INTERPRETATION:  CLINICAL INFORMATION: Bilateral leg swelling.    COMPARISON: Doppler venous sonogram 2021.    TECHNIQUE: Duplex sonography of the BILATERAL LOWER extremity veins with   color and spectral Doppler, with and without compression.    FINDINGS:    RIGHT:  Normal compressibility of the RIGHT common femoral, femoral and popliteal   veins.  Doppler examination shows normal spontaneous and phasic flow.  No RIGHT calf vein thrombosis is detected.    LEFT:  Normal compressibility of the LEFT common femoral, femoral and popliteal   veins.  Doppler examination shows normal spontaneous and phasic flow.  No LEFT calf veinthrombosis is detected.    IMPRESSION:    No evidence of deep venous thrombosis in either lower extremity.        ACC: [id number] EXAM:  XR CHEST PORTABLE URGENT 1V                          PROCEDURE DATE:  2022          INTERPRETATION:  Chest portable.    Clinical History: Shortness of breath. Bilateral leg edema.    Comparison: 2021.    Single AP view submitted.    The evaluation of the cardiomediastinal silhouette is limited on portable   technique.  Calcified aorta.    Again noted is blunting of the bilateral costophrenic angles which may   reflect small pleural effusions and/or pleural thickening.  The lungs are mildly hyperinflated with increased bronchovascular   markings.  Again noted postsurgical changes with sutures right midlung zone      Assessment :   89F PMHx Paroxysmal Atrial Fibrillation, HTN, Hypothyroidism, COPD (2-3 L nasal cannula at home) Hyperlipidemia recent admission to [facility] - with Covid pneumonia and - for acute CHF and Afib with RVR discharged to [facility] for ALEX who presented with worsening Bilateral LE edema and right LE wounds with erythema - mild cellulitis  Acute on Chronic CHF  COVID 19 + likely sec viral shedding rather than acute COVID 19 infection {recent admission to [facility] - with Covid pneumonia )  Asymptomatic bacteruria    Plan :   On Ancef day 2  Will change to po Keflex x 5 days in am  Trend temps and cbc  Diurese per cardiology  Pulm toileting  Elevate leg  Increase activity/OOB to chair    Continue with present regiment.  Appropriate use of antibiotics and adverse effects reviewed.      > 35 minutes were spent in direct patient care reviewing notes, medications ,labs data/ imaging , discussion with multidisciplinary team.    Thank you for allowing me to participate in care of your patient .    Kate Sorto MD  Infectious Disease  [phone]

## 2022-01-05 NOTE — DIETITIAN INITIAL EVALUATION ADULT. - PROBLEM SELECTOR PLAN 7
DVT proph: Eliquis 2.5mg po BID    PT eval, will likely need to go to [facility] upon DC however Grandson does not want [facility] again.  Confirmed DNR/DNI status with patient and grandson.

## 2022-01-05 NOTE — PROGRESS NOTE ADULT - SUBJECTIVE AND OBJECTIVE BOX
PULMONARY/CRITICAL CARE  DOS 21  Feels better. Less leg discomfort.   Doing well. No  wheeze. No fever.   Pt. well known to me.    Patient is a 89y old  Female who presents with a chief complaint of cellulitis of legs  BRIEF HOSPITAL COURSE: ***89F PMHx Paroxysmal Atrial Fibrillation, HTN, Hypothyroidism, COPD (2-3 L nasal cannula at home) Hyperlipidemia, presenting with Bilateral LE edema and right LE wounds.  Patient was admitted to [facility] - with Covid pneumonia and - for acute CHF and Afib with RVR.  She was discharged to [facility] for ALEX.  While there her edema was increasing, and a little more than a week ago she developed a blister on her right ankle and lower leg that both burst.  She has pain in her leg especially at night.  No fevers or chills, no further SOB.  Her oxygen needs have not changed.  Her grandson became more and more concerned about her leg and felt that staff at [facility] was not addressing it so they requested that she be sent to [place] [facility] for evaluation.    Events last 24 hours: ***    PAST MEDICAL & SURGICAL HISTORY:  Hypertension    Depression    Overactive bladder    Gout    Osteoarthritis    Hypothyroid    HLD (hyperlipidemia)    Paroxysmal atrial fibrillation    Asthma    Chronic obstructive pulmonary disease (COPD)  on home oxygen 2.5-3L NC    S/P cholecystectomy    S/P lumpectomy of breast    S/P hysterectomy    S/P lung surgery, follow-up exam  s/p removal of suspicious lesion, negative      Allergies    Tomas Cheese - Pt states the one time she had a mouthful of tomas cheese, her throat was closing and she required an epinephrine injection. (Anaphylaxis)  iodine (Anaphylaxis)  penicillin (Other)  shellfish (Short breath; Hives)  sulfa drugs (Hives)    Intolerances      FAMILY HISTORY:  Family history of nasopharyngeal cancer (Child)  daughter    Family history of breast cancer (Mother)          Medications:  ceFAZolin   IVPB 1000 milliGRAM(s) IV Intermittent every 8 hours    aMIOdarone    Tablet 200 milliGRAM(s) Oral daily  carvedilol 6.25 milliGRAM(s) Oral every 12 hours  diltiazem    milliGRAM(s) Oral daily  hydrALAZINE 25 milliGRAM(s) Oral every 8 hours    benzonatate 100 milliGRAM(s) Oral every 8 hours PRN  budesonide 160 MICROgram(s)/formoterol 4.5 MICROgram(s) Inhaler 2 Puff(s) Inhalation two times a day  tiotropium 18 MICROgram(s) Capsule 1 Capsule(s) Inhalation daily    acetaminophen     Tablet .. 650 milliGRAM(s) Oral every 6 hours PRN  ALPRAZolam 0.25 milliGRAM(s) Oral at bedtime  venlafaxine XR. 37.5 milliGRAM(s) Oral daily      apixaban 2.5 milliGRAM(s) Oral two times a day  aspirin enteric coated 81 milliGRAM(s) Oral daily    famotidine    Tablet 20 milliGRAM(s) Oral daily  polyethylene glycol 3350 17 Gram(s) Oral daily PRN  senna 2 Tablet(s) Oral at bedtime      allopurinol 100 milliGRAM(s) Oral daily  levothyroxine 75 MICROGram(s) Oral daily    multivitamin/minerals 1 Tablet(s) Oral daily  potassium chloride    Tablet ER 40 milliEquivalent(s) Oral every 4 hours                ICU Vital Signs Last 24 Hrs  T(C): 36.7 (2022 15:00), Max: 36.7 (2022 15:00)  T(F): 98 (2022 15:00), Max: 98 (2022 15:00)  HR: 82 (2022 15:00) (82 - 111)  BP: 142/80 (2022 15:00) (128/70 - 142/80)  BP(mean): --  ABP: --  ABP(mean): --  RR: 25 (2022 15:00) (20 - 25)  SpO2: 98% (2022 15:00) (98% - 98%)    Vital Signs Last 24 Hrs  T(C): 36.7 (2022 15:00), Max: 36.7 (2022 15:00)  T(F): 98 (2022 15:00), Max: 98 (2022 15:00)  HR: 82 (2022 15:00) (82 - 111)  BP: 142/80 (2022 15:00) (128/70 - 142/80)  BP(mean): --  RR: 25 (2022 15:00) (20 - 25)  SpO2: 98% (2022 15:00) (98% - 98%)        I&O's Detail        LABS:                        9.5    7.52  )-----------( 239      ( 2022 12:27 )             30.7         141  |  102  |  44<H>  ----------------------------<  107<H>  3.4<L>   |  38<H>  |  1.89<H>    Ca    8.3<L>      2022 12:27    TPro  5.8<L>  /  Alb  2.8<L>  /  TBili  0.4  /  DBili  x   /  AST  27  /  ALT  20  /  AlkPhos  59  03          CAPILLARY BLOOD GLUCOSE        PT/INR - ( 2022 12:27 )   PT: 14.6 sec;   INR: 1.22 ratio         PTT - ( 2022 12:27 )  PTT:29.5 sec  Urinalysis Basic - ( 2022 14:26 )    Color: Yellow / Appearance: Clear / S.015 / pH: x  Gluc: x / Ketone: Negative  / Bili: Negative / Urobili: Negative mg/dL   Blood: x / Protein: 15 mg/dL / Nitrite: Negative   Leuk Esterase: Trace / RBC: Negative /HPF / WBC 6-10   Sq Epi: x / Non Sq Epi: Moderate / Bacteria: Few      CULTURES:      Physical Examination:    General: No acute distress.  elderly female    HEENT: Pupils equal, reactive to light.  Symmetric.    PULM: clear no wheeze no  rhonchi rales    CVS: Regular rate and rhythm, no murmurs, rubs, or gallops    ABD: Soft, nondistended, nontender, normoactive bowel sounds, no masses    EXT: 3 plus leg edema, with erythema; bandaged  lower leg    SKIN: Warm and well perfused, no rashes noted.    NEURO: Alert, oriented, interactive, nonfocal    RADIOLOGY: ***rad< from: US Duplex Venous Lower Ext Complete, Bilateral (22 @ 12:55) >  ACC: [id number] EXAM:  US DPLX LWR EXT VEINS COMPL BI                          PROCEDURE DATE:  2022          INTERPRETATION:  CLINICAL INFORMATION: Bilateral leg swelling.    COMPARISON: Doppler venous sonogram 2021.    TECHNIQUE: Duplex sonography of the BILATERAL LOWER extremity veins with   color and spectral Doppler, with and without compression.    FINDINGS:    RIGHT:  Normal compressibility of the RIGHT common femoral, femoral and popliteal   veins.  Doppler examination shows normal spontaneous and phasic flow.  No RIGHT calf vein thrombosis is detected.    LEFT:  Normal compressibility of the LEFT common femoral, femoral and popliteal   veins.  Doppler examination shows normal spontaneous and phasic flow.  No LEFT calf veinthrombosis is detected.    IMPRESSION:    No evidence of deep venous thrombosis in either lower extremity.          --- End of Report ---    < end of copied text >  < from: Xray Chest 1 View- PORTABLE-Urgent (Xray Chest 1 View- PORTABLE-Urgent .) (22 @ 11:58) >  ACC: [id number] EXAM:  XR CHEST PORTABLE URGENT 1V                          PROCEDURE DATE:  2022          INTERPRETATION:  Chest portable.    Clinical History: Shortness of breath. Bilateral leg edema.    Comparison: 2021.    Single AP view submitted.    The evaluation of the cardiomediastinal silhouette is limited on portable   technique.  Calcified aorta.    Again noted is blunting of the bilateral costophrenic angles which may   reflect small pleural effusions and/or pleural thickening.  The lungs are mildly hyperinflated with increased bronchovascular   markings.  Again noted postsurgical changes with sutures right midlung zone.    Impression:    Findings as discussed above.    < end of copied text >      CRITICAL CARE TIME SPENT: ***

## 2022-01-05 NOTE — PROGRESS NOTE ADULT - SUBJECTIVE AND OBJECTIVE BOX
Chief Complaint: Leg swelling    Interval Events: No events overnight.    Review of Systems:  General: No fevers, chills, weight gain  Skin: No rashes, color changes  Cardiovascular: No chest pain, orthopnea  Respiratory: No shortness of breath, cough  Gastrointestinal: No nausea, abdominal pain  Genitourinary: No incontinence, pain with urination  Musculoskeletal: No pain, swelling, decreased range of motion  Neurological: No headache, weakness  Psychiatric: No depression, anxiety  Endocrine: No weight gain, increased thirst  All other systems are comprehensively negative.    Physical Exam:  Vitals:        Vital Signs Last 24 Hrs  T(C): 36.5 (04 Jan 2022 05:26), Max: 36.7 (03 Jan 2022 15:00)  T(F): 97.7 (04 Jan 2022 05:26), Max: 98 (03 Jan 2022 15:00)  HR: 63 (04 Jan 2022 05:26) (63 - 111)  BP: 130/64 (04 Jan 2022 05:26) (109/61 - 142/80)  BP(mean): --  RR: 18 (04 Jan 2022 05:26) (18 - 25)  SpO2: 100% (04 Jan 2022 05:26) (93% - 100%)  General: NAD  HEENT: MMM  Neck: No JVD, no carotid bruit  Lungs: CTAB  CV: RRR, nl S1/S2, no M/R/G  Abdomen: S/NT/ND, +BS  Extremities: 1+ LE edema, no cyanosis  Neuro: AAOx3, non-focal  Skin: Open leg wound    Labs:                        9.2    8.30  )-----------( 265      ( 04 Jan 2022 09:48 )             30.1     01-03    141  |  102  |  44<H>  ----------------------------<  107<H>  3.4<L>   |  38<H>  |  1.89<H>    Ca    8.3<L>      03 Jan 2022 12:27    TPro  5.8<L>  /  Alb  2.8<L>  /  TBili  0.4  /  DBili  x   /  AST  27  /  ALT  20  /  AlkPhos  59  01-03        PT/INR - ( 03 Jan 2022 12:27 )   PT: 14.6 sec;   INR: 1.22 ratio         PTT - ( 03 Jan 2022 12:27 )  PTT:29.5 sec    Telemetry: AF

## 2022-01-05 NOTE — DIETITIAN INITIAL EVALUATION ADULT. - OTHER INFO
Per H&P, Pt is a "89F PMHx Paroxysmal Atrial Fibrillation, HTN, Hypothyroidism, COPD (2-3 L nasal cannula at home) Hyperlipidemia, presenting with Bilateral LE edema and right LE wounds.  Patient was admitted to [facility] 11/30-12/5 with Covid pneumonia and 12/6-12/13 for acute CHF and Afib with RVR.  She was discharged to [facility] for [facility].  While there her edema was increasing, and a little more than a week ago she developed a blister on her right ankle and lower leg that both burst.  She has pain in her leg especially at night.  No fevers or chills, no further SOB.  Her oxygen needs have not changed.  Her grandson became more and more concerned about her leg and felt that staff at [facility] was not addressing it so they requested that she be sent to [place] ED for evaluation."    Pt seen for nutrition assessment secondary to CHF. Visited pt this afternoon, pt resting in bed during time of visit. Pt reports good appetite. No po intakes documented thus far, pt reports good po intake. Pt feeds self, minimal assistance needed w/ tray set-up. Reports no chewing/swallowing difficulties. Pt wears partial dentures. Allergy to blue cheese and shellfish, diet office aware. Denies N/V/D/C. Last BM 2 days ago per pt report. CBW on admission 160#. Unsure of accuracy of adm wt, bed scale wt of 180# obtained during time of visit. BL ankle (3+) and BL foot (3+) edema noted. Skin: wound; R leg. Reviewed transfer documents from [facility], noted wt of 175.3#, ht of 68in. Pt was on a ARLEN, regular consistency diet. PTA pt reports good appetite, consumes 3 meals/day. Prior to [facility] for [facility], pt lived alone and prepared her own meals. Follows low salt diet. Pt presently on DASH/TLC diet. Provided verbal low-sodium diet education. Meal preferences to be obtained daily to optimize po intake and tolerance. RD to follow-up and will continue to monitor pt's nutritional status.

## 2022-01-05 NOTE — CONSULT NOTE ADULT - CONSULT REASON
Right LE cellulitis
BRENDA, CKD
Acute diastolic heart failure, atrial fibrillation
ulcerations with cellulitis right lower leg
Cellulitis of legs  COPD  S/p covid

## 2022-01-06 ENCOUNTER — TRANSCRIPTION ENCOUNTER (OUTPATIENT)
Age: 87
End: 2022-01-06

## 2022-01-06 LAB
-  AMPICILLIN: SIGNIFICANT CHANGE UP
-  CIPROFLOXACIN: SIGNIFICANT CHANGE UP
-  LEVOFLOXACIN: SIGNIFICANT CHANGE UP
-  NITROFURANTOIN: SIGNIFICANT CHANGE UP
-  TETRACYCLINE: SIGNIFICANT CHANGE UP
-  VANCOMYCIN: SIGNIFICANT CHANGE UP
ANION GAP SERPL CALC-SCNC: 2 MMOL/L — LOW (ref 5–17)
BUN SERPL-MCNC: 43 MG/DL — HIGH (ref 7–23)
CALCIUM SERPL-MCNC: 8.3 MG/DL — LOW (ref 8.4–10.5)
CHLORIDE SERPL-SCNC: 103 MMOL/L — SIGNIFICANT CHANGE UP (ref 96–108)
CO2 SERPL-SCNC: 40 MMOL/L — HIGH (ref 22–31)
CREAT SERPL-MCNC: 1.66 MG/DL — HIGH (ref 0.5–1.3)
CULTURE RESULTS: SIGNIFICANT CHANGE UP
GLUCOSE SERPL-MCNC: 108 MG/DL — HIGH (ref 70–99)
HCT VFR BLD CALC: 29.1 % — LOW (ref 34.5–45)
HGB BLD-MCNC: 8.9 G/DL — LOW (ref 11.5–15.5)
MCHC RBC-ENTMCNC: 30.6 GM/DL — LOW (ref 32–36)
MCHC RBC-ENTMCNC: 32.6 PG — SIGNIFICANT CHANGE UP (ref 27–34)
MCV RBC AUTO: 106.6 FL — HIGH (ref 80–100)
METHOD TYPE: SIGNIFICANT CHANGE UP
MRSA PCR RESULT.: SIGNIFICANT CHANGE UP
NRBC # BLD: 0 /100 WBCS — SIGNIFICANT CHANGE UP (ref 0–0)
ORGANISM # SPEC MICROSCOPIC CNT: SIGNIFICANT CHANGE UP
ORGANISM # SPEC MICROSCOPIC CNT: SIGNIFICANT CHANGE UP
PLATELET # BLD AUTO: 288 K/UL — SIGNIFICANT CHANGE UP (ref 150–400)
POTASSIUM SERPL-MCNC: 3.7 MMOL/L — SIGNIFICANT CHANGE UP (ref 3.5–5.3)
POTASSIUM SERPL-SCNC: 3.7 MMOL/L — SIGNIFICANT CHANGE UP (ref 3.5–5.3)
RBC # BLD: 2.73 M/UL — LOW (ref 3.8–5.2)
RBC # FLD: 18.9 % — HIGH (ref 10.3–14.5)
S AUREUS DNA NOSE QL NAA+PROBE: SIGNIFICANT CHANGE UP
SARS-COV-2 RNA SPEC QL NAA+PROBE: SIGNIFICANT CHANGE UP
SODIUM SERPL-SCNC: 145 MMOL/L — SIGNIFICANT CHANGE UP (ref 135–145)
SPECIMEN SOURCE: SIGNIFICANT CHANGE UP
WBC # BLD: 8.19 K/UL — SIGNIFICANT CHANGE UP (ref 3.8–10.5)
WBC # FLD AUTO: 8.19 K/UL — SIGNIFICANT CHANGE UP (ref 3.8–10.5)

## 2022-01-06 PROCEDURE — 99232 SBSQ HOSP IP/OBS MODERATE 35: CPT

## 2022-01-06 RX ORDER — ASPIRIN/CALCIUM CARB/MAGNESIUM 324 MG
1 TABLET ORAL
Qty: 0 | Refills: 0 | DISCHARGE

## 2022-01-06 RX ORDER — CX-024414 0.2 MG/ML
0.25 INJECTION, SUSPENSION INTRAMUSCULAR ONCE
Refills: 0 | Status: DISCONTINUED | OUTPATIENT
Start: 2022-01-06 | End: 2022-01-10

## 2022-01-06 RX ORDER — SOD,AMMONIUM,POTASSIUM LACTATE
1 CREAM (GRAM) TOPICAL
Qty: 0 | Refills: 0 | DISCHARGE
Start: 2022-01-06

## 2022-01-06 RX ORDER — FAMOTIDINE 10 MG/ML
1 INJECTION INTRAVENOUS
Qty: 0 | Refills: 0 | DISCHARGE
Start: 2022-01-06

## 2022-01-06 RX ORDER — ASPIRIN/CALCIUM CARB/MAGNESIUM 324 MG
1 TABLET ORAL
Qty: 0 | Refills: 0 | DISCHARGE
Start: 2022-01-06

## 2022-01-06 RX ORDER — ALPRAZOLAM 0.25 MG
1 TABLET ORAL
Qty: 0 | Refills: 0 | DISCHARGE
Start: 2022-01-06

## 2022-01-06 RX ORDER — BUMETANIDE 0.25 MG/ML
1 INJECTION INTRAMUSCULAR; INTRAVENOUS
Refills: 0 | Status: DISCONTINUED | OUTPATIENT
Start: 2022-01-06 | End: 2022-01-10

## 2022-01-06 RX ORDER — MUPIROCIN 20 MG/G
1 OINTMENT TOPICAL
Qty: 0 | Refills: 0 | DISCHARGE
Start: 2022-01-06

## 2022-01-06 RX ORDER — ACETAMINOPHEN 500 MG
2 TABLET ORAL
Qty: 0 | Refills: 0 | DISCHARGE
Start: 2022-01-06

## 2022-01-06 RX ORDER — BUMETANIDE 0.25 MG/ML
1 INJECTION INTRAMUSCULAR; INTRAVENOUS
Qty: 0 | Refills: 0 | DISCHARGE
Start: 2022-01-06

## 2022-01-06 RX ADMIN — Medication 75 MILLIGRAM(S): at 11:00

## 2022-01-06 RX ADMIN — SENNA PLUS 2 TABLET(S): 8.6 TABLET ORAL at 21:53

## 2022-01-06 RX ADMIN — FAMOTIDINE 20 MILLIGRAM(S): 10 INJECTION INTRAVENOUS at 11:00

## 2022-01-06 RX ADMIN — Medication 1 APPLICATION(S): at 05:38

## 2022-01-06 RX ADMIN — MUPIROCIN 1 APPLICATION(S): 20 OINTMENT TOPICAL at 05:39

## 2022-01-06 RX ADMIN — CARVEDILOL PHOSPHATE 6.25 MILLIGRAM(S): 80 CAPSULE, EXTENDED RELEASE ORAL at 05:38

## 2022-01-06 RX ADMIN — BUMETANIDE 1 MILLIGRAM(S): 0.25 INJECTION INTRAMUSCULAR; INTRAVENOUS at 18:47

## 2022-01-06 RX ADMIN — BUMETANIDE 1 MILLIGRAM(S): 0.25 INJECTION INTRAMUSCULAR; INTRAVENOUS at 08:58

## 2022-01-06 RX ADMIN — MUPIROCIN 1 APPLICATION(S): 20 OINTMENT TOPICAL at 21:53

## 2022-01-06 RX ADMIN — Medication 81 MILLIGRAM(S): at 11:00

## 2022-01-06 RX ADMIN — CARVEDILOL PHOSPHATE 6.25 MILLIGRAM(S): 80 CAPSULE, EXTENDED RELEASE ORAL at 18:47

## 2022-01-06 RX ADMIN — Medication 500 MILLIGRAM(S): at 05:37

## 2022-01-06 RX ADMIN — BUDESONIDE AND FORMOTEROL FUMARATE DIHYDRATE 2 PUFF(S): 160; 4.5 AEROSOL RESPIRATORY (INHALATION) at 05:43

## 2022-01-06 RX ADMIN — Medication 500 MILLIGRAM(S): at 21:53

## 2022-01-06 RX ADMIN — Medication 1 TABLET(S): at 11:02

## 2022-01-06 RX ADMIN — Medication 500 MILLIGRAM(S): at 14:47

## 2022-01-06 RX ADMIN — Medication 75 MICROGRAM(S): at 05:38

## 2022-01-06 RX ADMIN — APIXABAN 2.5 MILLIGRAM(S): 2.5 TABLET, FILM COATED ORAL at 18:47

## 2022-01-06 RX ADMIN — TIOTROPIUM BROMIDE 1 CAPSULE(S): 18 CAPSULE ORAL; RESPIRATORY (INHALATION) at 05:43

## 2022-01-06 RX ADMIN — AMIODARONE HYDROCHLORIDE 200 MILLIGRAM(S): 400 TABLET ORAL at 05:38

## 2022-01-06 RX ADMIN — Medication 1 APPLICATION(S): at 18:48

## 2022-01-06 RX ADMIN — Medication 25 MILLIGRAM(S): at 21:53

## 2022-01-06 RX ADMIN — APIXABAN 2.5 MILLIGRAM(S): 2.5 TABLET, FILM COATED ORAL at 05:37

## 2022-01-06 RX ADMIN — Medication 0.25 MILLIGRAM(S): at 21:53

## 2022-01-06 RX ADMIN — Medication 100 MILLIGRAM(S): at 11:00

## 2022-01-06 NOTE — DISCHARGE NOTE PROVIDER - NSDCMRMEDTOKEN_GEN_ALL_CORE_FT
acetaminophen 325 mg oral tablet: 2 tab(s) orally every 6 hours, As needed, Temp greater or equal to 38C (100.4F), Mild Pain (1 - 3)  albuterol 2.5 mg/3 mL (0.083%) inhalation solution: 3 milliliter(s) inhaled every 6 hours, As Needed  Align 4 mg oral capsule: 1 cap(s) orally once a day  allopurinol 100 mg oral tablet: 1 tab(s) orally once a day  ALPRAZolam 0.25 mg oral tablet: 1 tab(s) orally once a day (at bedtime)  ammonium lactate 12% topical lotion: 1 application topically 2 times a day  apixaban 2.5 mg oral tablet: 1 tab(s) orally every 12 hours  aspirin 81 mg oral delayed release tablet: 1 tab(s) orally once a day  benzonatate 100 mg oral capsule: 1 cap(s) orally 3 times a day, As needed, Cough  budesonide-formoterol 160 mcg-4.5 mcg/inh inhalation aerosol: 2 puff(s) inhaled 2 times a day  bumetanide 1 mg oral tablet: 1 tab(s) orally 2 times a day  carvedilol 6.25 mg oral tablet: 1 tab(s) orally 2 times a day  DilTIAZem (Eqv-Cardizem CD) 240 mg/24 hours oral capsule, extended release: 1 cap(s) orally once a day  Effexor 37.5 mg oral capsule, extended release: 1 cap(s) orally once a day  famotidine 20 mg oral tablet: 1 tab(s) orally once a day  hydrALAZINE 25 mg oral tablet: 1 tab(s) orally 3 times a day   Multi-Day Plus Minerals Multiple Vitamins with Minerals oral tablet: 1 tab(s) orally once a day  mupirocin 2% topical ointment: 1 application topically 2 times a day  Pacerone 200 mg oral tablet: 1 tab(s) orally once a day  polyethylene glycol 3350 oral powder for reconstitution: 17 gram(s) orally once a day, As needed, Constipation  senna oral tablet: 2 tab(s) orally once a day (at bedtime)  Synthroid 75 mcg (0.075 mg) oral tablet: 1 tab(s) orally once a day  tiotropium 18 mcg inhalation capsule: 1 cap(s) inhaled once a day   acetaminophen 325 mg oral tablet: 2 tab(s) orally every 6 hours, As needed, Temp greater or equal to 38C (100.4F), Mild Pain (1 - 3)  albuterol 2.5 mg/3 mL (0.083%) inhalation solution: 3 milliliter(s) inhaled every 6 hours, As Needed  Align 4 mg oral capsule: 1 cap(s) orally once a day  allopurinol 100 mg oral tablet: 1 tab(s) orally once a day  ALPRAZolam 0.25 mg oral tablet: 1 tab(s) orally once a day (at bedtime)  ammonium lactate 12% topical lotion: 1 application topically 2 times a day  apixaban 2.5 mg oral tablet: 1 tab(s) orally every 12 hours  aspirin 81 mg oral delayed release tablet: 1 tab(s) orally once a day  benzonatate 100 mg oral capsule: 1 cap(s) orally 3 times a day, As needed, Cough  budesonide-formoterol 160 mcg-4.5 mcg/inh inhalation aerosol: 2 puff(s) inhaled 2 times a day  bumetanide 1 mg oral tablet: 1 tab(s) orally 2 times a day  carvedilol 6.25 mg oral tablet: 1 tab(s) orally 2 times a day  DilTIAZem (Eqv-Cardizem CD) 240 mg/24 hours oral capsule, extended release: 1 cap(s) orally once a day  famotidine 20 mg oral tablet: 1 tab(s) orally once a day  hydrALAZINE 25 mg oral tablet: 1 tab(s) orally 3 times a day   Multi-Day Plus Minerals Multiple Vitamins with Minerals oral tablet: 1 tab(s) orally once a day  mupirocin 2% topical ointment: 1 application topically 2 times a day  Pacerone 200 mg oral tablet: 1 tab(s) orally once a day  polyethylene glycol 3350 oral powder for reconstitution: 17 gram(s) orally once a day, As needed, Constipation  senna oral tablet: 2 tab(s) orally once a day (at bedtime)  Synthroid 75 mcg (0.075 mg) oral tablet: 1 tab(s) orally once a day  tiotropium 18 mcg inhalation capsule: 1 cap(s) inhaled once a day  venlafaxine 75 mg oral capsule, extended release: 1 cap(s) orally once a day

## 2022-01-06 NOTE — PROGRESS NOTE ADULT - SUBJECTIVE AND OBJECTIVE BOX
Chief Complaint: Leg swelling    Interval Events: No events overnight.    Review of Systems:  General: No fevers, chills, weight gain  Skin: No rashes, color changes  Cardiovascular: No chest pain, orthopnea  Respiratory: No shortness of breath, cough  Gastrointestinal: No nausea, abdominal pain  Genitourinary: No incontinence, pain with urination  Musculoskeletal: No pain, swelling, decreased range of motion  Neurological: No headache, weakness  Psychiatric: No depression, anxiety  Endocrine: No weight gain, increased thirst  All other systems are comprehensively negative.    Physical Exam:  Vital Signs Last 24 Hrs  T(C): 36.8 (06 Jan 2022 06:53), Max: 36.8 (05 Jan 2022 17:40)  T(F): 98.2 (06 Jan 2022 06:53), Max: 98.3 (05 Jan 2022 17:40)  HR: 94 (06 Jan 2022 09:08) (75 - 101)  BP: 123/65 (06 Jan 2022 09:08) (104/60 - 145/75)  BP(mean): --  RR: 17 (06 Jan 2022 09:08) (17 - 18)  SpO2: 100% (06 Jan 2022 09:08) (97% - 100%)  General: NAD  HEENT: MMM  Neck: No JVD, no carotid bruit  Lungs: CTAB  CV: RRR, nl S1/S2, no M/R/G  Abdomen: S/NT/ND, +BS  Extremities: 1+ LE edema, no cyanosis  Neuro: AAOx3, non-focal  Skin: Open leg wound    Labs:             01-06    145  |  103  |  43<H>  ----------------------------<  108<H>  3.7   |  40<H>  |  1.66<H>    Ca    8.3<L>      06 Jan 2022 07:12  Mg     1.6     01-04    TPro  5.7<L>  /  Alb  2.7<L>  /  TBili  0.3  /  DBili  x   /  AST  24  /  ALT  17  /  AlkPhos  55  01-04                        8.9    8.19  )-----------( 288      ( 06 Jan 2022 07:12 )             29.1       Telemetry: AF

## 2022-01-06 NOTE — DISCHARGE NOTE PROVIDER - HOSPITAL COURSE
89F PMHx Paroxysmal Atrial Fibrillation, HFpEF (EF 60% 12/2021), HTN, Hypothyroidism, COPD (2-3 L nasal cannula at home) Hyperlipidemia, presenting with Bilateral LE edema and right LE wounds.  Patient was admitted to [facility] 11/30-12/5 with Covid pneumonia and 12/6-12/13 for acute CHF and Afib with RVR.  She was discharged to [facility] for ALEX.  While there her edema was increasing, and a little more than a week ago she developed a blister on her right ankle and lower leg that both burst.  She has pain in her leg especially at night.  No fevers or chills, no further SOB.  Her oxygen needs have not changed.  Her grandson became more and more concerned about her leg and felt that staff at [facility] was not addressing it so they requested that she be sent to [place] ED for evaluation.    Patient admitted to telemetry with Acute CHF and cellulitis of right leg wounds.     Patient seen by Cardiology, Dr Israel, Started on IV Bumex. Continued on her other cardiac medications.  patient diuresed well and transitioned to oral bumex.     Seen by Dr Sorto from ID and Dr Santos from podiatry.  Treated with IV Ancef and responded well.  Can complete abx orally.  Continue wound care per podiatry.     Can dc to [facility].     Dr Carrasco updated. 89F PMHx Paroxysmal Atrial Fibrillation, HFpEF (EF 60% 12/2021), HTN, Hypothyroidism, COPD (2-3 L nasal cannula at home) Hyperlipidemia, presenting with Bilateral LE edema and right LE wounds.  Patient was admitted to [facility] 11/30-12/5 with Covid pneumonia and 12/6-12/13 for acute CHF and Afib with RVR.  She was discharged to [facility] for ALEX.  While there her edema was increasing, and a little more than a week ago she developed a blister on her right ankle and lower leg that both burst.  She has pain in her leg especially at night.  No fevers or chills, no further SOB.  Her oxygen needs have not changed.  Her grandson became more and more concerned about her leg and felt that staff at [facility] was not addressing it so they requested that she be sent to [place] ED for evaluation.    Patient admitted to telemetry with Acute CHF and cellulitis of right leg wounds.     Patient seen by Cardiology, Dr Israel, Started on IV Bumex. Continued on her other cardiac medications.  patient diuresed well and transitioned to oral bumex.     Seen by Dr Sorto from ID and Dr Santos from podiatry.  Treated with IV Ancef and responded well.  Completed antibiotics.  Continue wound care per podiatry.     Can dc to [facility].     Dr Carrasco updated.

## 2022-01-06 NOTE — DISCHARGE NOTE PROVIDER - PROVIDER TOKENS
PROVIDER:[TOKEN:[[id number]]],PROVIDER:[TOKEN:[[id number]]],PROVIDER:[TOKEN:[[id number]]],PROVIDER:[TOKEN:[[id number]]]

## 2022-01-06 NOTE — PROGRESS NOTE ADULT - SUBJECTIVE AND OBJECTIVE BOX
Subjective: dec SOB. PT in progress.       MEDICATIONS  (STANDING):  allopurinol 100 milliGRAM(s) Oral daily  ALPRAZolam 0.25 milliGRAM(s) Oral at bedtime  aMIOdarone    Tablet 200 milliGRAM(s) Oral daily  ammonium lactate 12% Lotion 1 Application(s) Topical two times a day  apixaban 2.5 milliGRAM(s) Oral two times a day  aspirin enteric coated 81 milliGRAM(s) Oral daily  budesonide 160 MICROgram(s)/formoterol 4.5 MICROgram(s) Inhaler 2 Puff(s) Inhalation two times a day  buMETAnide 1 milliGRAM(s) Oral two times a day  carvedilol 6.25 milliGRAM(s) Oral every 12 hours  cephalexin 500 milliGRAM(s) Oral every 8 hours  diltiazem    milliGRAM(s) Oral daily  famotidine    Tablet 20 milliGRAM(s) Oral daily  hydrALAZINE 25 milliGRAM(s) Oral every 8 hours  influenza  Vaccine (HIGH DOSE) 0.7 milliLiter(s) IntraMuscular once  levothyroxine 75 MICROGram(s) Oral daily  multivitamin/minerals 1 Tablet(s) Oral daily  mupirocin 2% Ointment 1 Application(s) Topical two times a day  senna 2 Tablet(s) Oral at bedtime  tiotropium 18 MICROgram(s) Capsule 1 Capsule(s) Inhalation daily  venlafaxine XR. 75 milliGRAM(s) Oral daily    MEDICATIONS  (PRN):  acetaminophen     Tablet .. 650 milliGRAM(s) Oral every 6 hours PRN Temp greater or equal to 38C (100.4F), Mild Pain (1 - 3)  benzonatate 100 milliGRAM(s) Oral every 8 hours PRN Cough  polyethylene glycol 3350 17 Gram(s) Oral daily PRN Constipation          T(C): 36.7 (22 @ 12:25), Max: 36.8 (22 @ 17:40)  HR: 109 (22 @ 12:25) (75 - 109)  BP: 108/70 (22 @ 12:25) (104/60 - 145/75)  RR: 18 (22 @ 12:25) (17 - 18)  SpO2: 96% (22 @ 14:13) (96% - 100%)  Wt(kg): --        I&O's Detail    2022 07:01  -  2022 07:00  --------------------------------------------------------  IN:  Total IN: 0 mL    OUT:    Voided (mL): 750 mL  Total OUT: 750 mL    Total NET: -750 mL      2022 07:01  -  2022 14:30  --------------------------------------------------------  IN:  Total IN: 0 mL    OUT:    Voided (mL): 700 mL  Total OUT: 700 mL    Total NET: -700 mL               PHYSICAL EXAM:    GENERAL: NAD  NECK: Supple, no inc in JVP  CHEST/LUNG: Clear  HEART: S1S2, pos M  ABDOMEN: Soft, Nontender, Nondistended; Bowel sounds present  EXTREMITIES:  pos edema  NEURO: no asterixis      LABS:  CBC Full  -  ( 2022 07:12 )  WBC Count : 8.19 K/uL  RBC Count : 2.73 M/uL  Hemoglobin : 8.9 g/dL  Hematocrit : 29.1 %  Platelet Count - Automated : 288 K/uL  Mean Cell Volume : 106.6 fl  Mean Cell Hemoglobin : 32.6 pg  Mean Cell Hemoglobin Concentration : 30.6 gm/dL  Auto Neutrophil # : x  Auto Lymphocyte # : x  Auto Monocyte # : x  Auto Eosinophil # : x  Auto Basophil # : x  Auto Neutrophil % : x  Auto Lymphocyte % : x  Auto Monocyte % : x  Auto Eosinophil % : x  Auto Basophil % : x        145  |  103  |  43<H>  ----------------------------<  108<H>  3.7   |  40<H>  |  1.66<H>    Ca    8.3<L>      2022 07:12        Urinalysis Basic - ( 2022 15:38 )    Color: Yellow / Appearance: Clear / S.015 / pH: x  Gluc: x / Ketone: Negative  / Bili: Negative / Urobili: Negative mg/dL   Blood: x / Protein: 15 mg/dL / Nitrite: Negative   Leuk Esterase: Trace / RBC: Negative /HPF / WBC 0-2   Sq Epi: x / Non Sq Epi: Negative / Bacteria: Negative            Impression:  * CKD3-4. Mild Cr flux around baseline due to CRS1-2  * Met alkalosis -- contraction, post-hypercapnic.   * Decompensated diastolic, valvular HF  * Diastolic CM, mod to severe MR    Recommendations:   * Cont mandated diuresis  * Consider acetazolamide   * BMP daiily

## 2022-01-06 NOTE — PROGRESS NOTE ADULT - SUBJECTIVE AND OBJECTIVE BOX
PROGRESS NOTE   Patient is a 89y old  Female who presents with a chief complaint of  ulcerations with cellulitis right lower extremity.     HPI:  89F PMHx Paroxysmal Atrial Fibrillation, HTN, Hypothyroidism, COPD (2-3 L nasal cannula at home) Hyperlipidemia, presenting with Bilateral LE edema and right LE wounds.  Patient was admitted to [facility] 11/30-12/5 with Covid pneumonia and 12/6-12/13 for acute CHF and Afib with RVR.  She was discharged to [facility] for ALEX.  While there her edema was increasing, and a little more than a week ago she developed a blister on her right ankle and lower leg that both burst.  She has pain in her leg especially at night.  No fevers or chills, no further SOB.  Her oxygen needs have not changed.  Her grandson became more and more concerned about her leg and felt that staff at [facility] was not addressing it so they requested that she be sent to [place] ED for evaluation.   (03 Jan 2022 16:13)      Vital Signs Last 24 Hrs  T(C): 36.8 (05 Jan 2022 22:35), Max: 36.8 (05 Jan 2022 17:40)  T(F): 98.3 (05 Jan 2022 22:35), Max: 98.3 (05 Jan 2022 17:40)  HR: 77 (05 Jan 2022 22:35) (64 - 98)  BP: 104/60 (05 Jan 2022 22:35) (104/60 - 145/75)  BP(mean): --  RR: 18 (05 Jan 2022 22:35) (18 - 18)  SpO2: 99% (05 Jan 2022 22:35) (97% - 100%)                          8.3    8.38  )-----------( 259      ( 05 Jan 2022 08:29 )             27.4               01-05    145  |  103  |  45<H>  ----------------------------<  113<H>  4.2   |  42<H>  |  1.78<H>    Ca    8.5      05 Jan 2022 08:29  Mg     1.6     01-04    TPro  5.7<L>  /  Alb  2.7<L>  /  TBili  0.3  /  DBili  x   /  AST  24  /  ALT  17  /  AlkPhos  55  01-04      PHYSICAL EXAM  GEN: CHARLES GRAHAM is a pleasant well-nourished, well developed 89y Female in no acute distress, alert awake, and oriented to person, place and time.   LE Focused:   noted the ulcerations cont to improve and are responding favorable to the current care plan. There is decreased erythema, edema and calor noted. the cellulitis continues to resolve.

## 2022-01-06 NOTE — PROGRESS NOTE ADULT - SUBJECTIVE AND OBJECTIVE BOX
PULMONARY/CRITICAL CARE  DOS 21  Doing well.   Feels better. Less leg discomfort.   No  wheeze. No fever.   Pt. well known to me.    Patient is a 89y old  Female who presents with a chief complaint of cellulitis of legs  BRIEF HOSPITAL COURSE: ***89F PMHx Paroxysmal Atrial Fibrillation, HTN, Hypothyroidism, COPD (2-3 L nasal cannula at home) Hyperlipidemia, presenting with Bilateral LE edema and right LE wounds.  Patient was admitted to [facility] - with Covid pneumonia and - for acute CHF and Afib with RVR.  She was discharged to [facility] for ALEX.  While there her edema was increasing, and a little more than a week ago she developed a blister on her right ankle and lower leg that both burst.  She has pain in her leg especially at night.  No fevers or chills, no further SOB.  Her oxygen needs have not changed.  Her grandson became more and more concerned about her leg and felt that staff at [facility] was not addressing it so they requested that she be sent to [place] ED for evaluation.    Events last 24 hours: ***    PAST MEDICAL & SURGICAL HISTORY:  Hypertension    Depression    Overactive bladder    Gout    Osteoarthritis    Hypothyroid    HLD (hyperlipidemia)    Paroxysmal atrial fibrillation    Asthma    Chronic obstructive pulmonary disease (COPD)  on home oxygen 2.5-3L NC    S/P cholecystectomy    S/P lumpectomy of breast    S/P hysterectomy    S/P lung surgery, follow-up exam  s/p removal of suspicious lesion, negative      Allergies    Tomas Cheese - Pt states the one time she had a mouthful of tomas cheese, her throat was closing and she required an epinephrine injection. (Anaphylaxis)  iodine (Anaphylaxis)  penicillin (Other)  shellfish (Short breath; Hives)  sulfa drugs (Hives)    Intolerances      FAMILY HISTORY:  Family history of nasopharyngeal cancer (Child)  daughter    Family history of breast cancer (Mother)          Medications:  ceFAZolin   IVPB 1000 milliGRAM(s) IV Intermittent every 8 hours    aMIOdarone    Tablet 200 milliGRAM(s) Oral daily  carvedilol 6.25 milliGRAM(s) Oral every 12 hours  diltiazem    milliGRAM(s) Oral daily  hydrALAZINE 25 milliGRAM(s) Oral every 8 hours    benzonatate 100 milliGRAM(s) Oral every 8 hours PRN  budesonide 160 MICROgram(s)/formoterol 4.5 MICROgram(s) Inhaler 2 Puff(s) Inhalation two times a day  tiotropium 18 MICROgram(s) Capsule 1 Capsule(s) Inhalation daily    acetaminophen     Tablet .. 650 milliGRAM(s) Oral every 6 hours PRN  ALPRAZolam 0.25 milliGRAM(s) Oral at bedtime  venlafaxine XR. 37.5 milliGRAM(s) Oral daily      apixaban 2.5 milliGRAM(s) Oral two times a day  aspirin enteric coated 81 milliGRAM(s) Oral daily    famotidine    Tablet 20 milliGRAM(s) Oral daily  polyethylene glycol 3350 17 Gram(s) Oral daily PRN  senna 2 Tablet(s) Oral at bedtime      allopurinol 100 milliGRAM(s) Oral daily  levothyroxine 75 MICROGram(s) Oral daily    multivitamin/minerals 1 Tablet(s) Oral daily  potassium chloride    Tablet ER 40 milliEquivalent(s) Oral every 4 hours                ICU Vital Signs Last 24 Hrs  T(C): 36.7 (2022 15:00), Max: 36.7 (2022 15:00)  T(F): 98 (2022 15:00), Max: 98 (2022 15:00)  HR: 82 (2022 15:00) (82 - 111)  BP: 142/80 (2022 15:00) (128/70 - 142/80)  BP(mean): --  ABP: --  ABP(mean): --  RR: 25 (2022 15:00) (20 - 25)  SpO2: 98% (2022 15:00) (98% - 98%)    Vital Signs Last 24 Hrs  T(C): 36.7 (2022 15:00), Max: 36.7 (2022 15:00)  T(F): 98 (2022 15:00), Max: 98 (2022 15:00)  HR: 82 (2022 15:00) (82 - 111)  BP: 142/80 (2022 15:00) (128/70 - 142/80)  BP(mean): --  RR: 25 (2022 15:00) (20 - 25)  SpO2: 98% (2022 15:00) (98% - 98%)        I&O's Detail        LABS:                        9.5    7.52  )-----------( 239      ( 2022 12:27 )             30.7         141  |  102  |  44<H>  ----------------------------<  107<H>  3.4<L>   |  38<H>  |  1.89<H>    Ca    8.3<L>      2022 12:27    TPro  5.8<L>  /  Alb  2.8<L>  /  TBili  0.4  /  DBili  x   /  AST  27  /  ALT  20  /  AlkPhos  59  03          CAPILLARY BLOOD GLUCOSE        PT/INR - ( 2022 12:27 )   PT: 14.6 sec;   INR: 1.22 ratio         PTT - ( 2022 12:27 )  PTT:29.5 sec  Urinalysis Basic - ( 2022 14:26 )    Color: Yellow / Appearance: Clear / S.015 / pH: x  Gluc: x / Ketone: Negative  / Bili: Negative / Urobili: Negative mg/dL   Blood: x / Protein: 15 mg/dL / Nitrite: Negative   Leuk Esterase: Trace / RBC: Negative /HPF / WBC 6-10   Sq Epi: x / Non Sq Epi: Moderate / Bacteria: Few      CULTURES:      Physical Examination:    General: No acute distress.  elderly female    HEENT: Pupils equal, reactive to light.  Symmetric.    PULM: clear no wheeze no  rhonchi rales    CVS: Regular rate and rhythm, no murmurs, rubs, or gallops    ABD: Soft, nondistended, nontender, normoactive bowel sounds, no masses    EXT: 3 plus leg edema, with erythema; bandaged  lower leg    SKIN: Warm and well perfused, no rashes noted.    NEURO: Alert, oriented, interactive, nonfocal    RADIOLOGY: ***rad< from: US Duplex Venous Lower Ext Complete, Bilateral (22 @ 12:55) >  ACC: [id number] EXAM:  US DPLX LWR EXT VEINS COMPL BI                          PROCEDURE DATE:  2022          INTERPRETATION:  CLINICAL INFORMATION: Bilateral leg swelling.    COMPARISON: Doppler venous sonogram 2021.    TECHNIQUE: Duplex sonography of the BILATERAL LOWER extremity veins with   color and spectral Doppler, with and without compression.    FINDINGS:    RIGHT:  Normal compressibility of the RIGHT common femoral, femoral and popliteal   veins.  Doppler examination shows normal spontaneous and phasic flow.  No RIGHT calf vein thrombosis is detected.    LEFT:  Normal compressibility of the LEFT common femoral, femoral and popliteal   veins.  Doppler examination shows normal spontaneous and phasic flow.  No LEFT calf veinthrombosis is detected.    IMPRESSION:    No evidence of deep venous thrombosis in either lower extremity.          --- End of Report ---    < end of copied text >  < from: Xray Chest 1 View- PORTABLE-Urgent (Xray Chest 1 View- PORTABLE-Urgent .) (22 @ 11:58) >  ACC: [id number] EXAM:  XR CHEST PORTABLE URGENT 1V                          PROCEDURE DATE:  2022          INTERPRETATION:  Chest portable.    Clinical History: Shortness of breath. Bilateral leg edema.    Comparison: 2021.    Single AP view submitted.    The evaluation of the cardiomediastinal silhouette is limited on portable   technique.  Calcified aorta.    Again noted is blunting of the bilateral costophrenic angles which may   reflect small pleural effusions and/or pleural thickening.  The lungs are mildly hyperinflated with increased bronchovascular   markings.  Again noted postsurgical changes with sutures right midlung zone.    Impression:    Findings as discussed above.    < end of copied text >      CRITICAL CARE TIME SPENT: ***

## 2022-01-06 NOTE — PROGRESS NOTE ADULT - SUBJECTIVE AND OBJECTIVE BOX
CHARLES GRAHAM is a 89yFemale , patient examined and chart reviewed.    INTERVAL HPI/ OVERNIGHT EVENTS:   Afebrile. No events. In chair.  NAD.    PAST MEDICAL & SURGICAL HISTORY:  Hypertension  Depression  Overactive bladder  Gout  Osteoarthritis  Hypothyroid  HLD (hyperlipidemia)  Paroxysmal atrial fibrillation  Asthma  Chronic obstructive pulmonary disease (COPD)  on home oxygen 2.5-3L NC  S/P cholecystectomy  S/P lumpectomy of breast  S/P hysterectomy  S/P lung surgery, follow-up exam  s/p removal of suspicious lesion, negative    For details regarding the patient's social history, family history, and other miscellaneous elements, please refer the initial infectious diseases consultation and/or the admitting history and physical examination for this admission.       ROS:  CONSTITUTIONAL:  Negative fever or chills  EYES:  Negative  blurry vision or double vision  CARDIOVASCULAR:  Negative for chest pain or palpitations  RESPIRATORY:  Negative for cough, wheezing, or SOB   GASTROINTESTINAL:  Negative for nausea, vomiting, diarrhea, constipation, or abdominal pain  GENITOURINARY:  Negative frequency, urgency or dysuria  NEUROLOGIC:  No headache, confusion, dizziness, lightheadedness  All other systems were reviewed and are negative     ALLERGIES  Tomas Cheese - Pt states the one time she had a mouthful of tomas cheese, her throat was closing and she required an epinephrine injection. (Anaphylaxis)  iodine (Anaphylaxis)  penicillin (Other)  shellfish (Short breath; Hives)  sulfa drugs (Hives)      Current inpatient medications :    ANTIBIOTICS/RELEVANT:  cephalexin 500 milliGRAM(s) Oral every 8 hours    MEDICATIONS  (STANDING):  allopurinol 100 milliGRAM(s) Oral daily  ALPRAZolam 0.25 milliGRAM(s) Oral at bedtime  aMIOdarone    Tablet 200 milliGRAM(s) Oral daily  ammonium lactate 12% Lotion 1 Application(s) Topical two times a day  apixaban 2.5 milliGRAM(s) Oral two times a day  aspirin enteric coated 81 milliGRAM(s) Oral daily  budesonide 160 MICROgram(s)/formoterol 4.5 MICROgram(s) Inhaler 2 Puff(s) Inhalation two times a day  buMETAnide 1 milliGRAM(s) Oral two times a day  carvedilol 6.25 milliGRAM(s) Oral every 12 hours  diltiazem    milliGRAM(s) Oral daily  famotidine    Tablet 20 milliGRAM(s) Oral daily  hydrALAZINE 25 milliGRAM(s) Oral every 8 hours  influenza  Vaccine (HIGH DOSE) 0.7 milliLiter(s) IntraMuscular once  levothyroxine 75 MICROGram(s) Oral daily  multivitamin/minerals 1 Tablet(s) Oral daily  mupirocin 2% Ointment 1 Application(s) Topical two times a day  senna 2 Tablet(s) Oral at bedtime  tiotropium 18 MICROgram(s) Capsule 1 Capsule(s) Inhalation daily  venlafaxine XR. 75 milliGRAM(s) Oral daily    MEDICATIONS  (PRN):  acetaminophen     Tablet .. 650 milliGRAM(s) Oral every 6 hours PRN Temp greater or equal to 38C (100.4F), Mild Pain (1 - 3)  benzonatate 100 milliGRAM(s) Oral every 8 hours PRN Cough  polyethylene glycol 3350 17 Gram(s) Oral daily PRN Constipation        Objective:  Vital Signs Last 24 Hrs  T(C): 36.8 (06 Jan 2022 06:53), Max: 36.8 (05 Jan 2022 17:40)  T(F): 98.2 (06 Jan 2022 06:53), Max: 98.3 (05 Jan 2022 17:40)  HR: 94 (06 Jan 2022 09:08) (75 - 101)  BP: 123/65 (06 Jan 2022 09:08) (104/60 - 145/75)  RR: 17 (06 Jan 2022 09:08) (17 - 18)  SpO2: 100% (06 Jan 2022 09:08) (97% - 100%)      Physical Exam:  General: no acute distress  Neck: supple, trachea midline  Lungs: clear, no wheeze/rhonchi  Cardiovascular: regular rate and rhythm, S1 S2  Abdomen: soft, nontender,  bowel sounds normal  Neurological: alert and oriented x3  Skin: no rash  Extremities: +3 edema  right leg venous ulcers Minimal lower leg erythema resolving      LABS:                        8.9    8.19  )-----------( 288      ( 06 Jan 2022 07:12 )             29.1   01-06    145  |  103  |  43<H>  ----------------------------<  108<H>  3.7   |  40<H>  |  1.66<H>    Ca    8.3<L>      06 Jan 2022 07:12      MICROBIOLOGY:  COVID-19 PCR (01.03.22 @ 13:36)    COVID-19 PCR: Detected    Culture - Urine (01.04.22 @ 00:55)    Specimen Source: Catheterized Catheterized    Culture Results:   10,000 - 49,000 CFU/mL Gram positive organisms    Culture - Blood (collected 03 Jan 2022 16:15)  Source: .Blood Blood  Preliminary Report (04 Jan 2022 17:02):    No growth to date.    Culture - Blood (collected 03 Jan 2022 16:15)  Source: .Blood Blood  Preliminary Report (04 Jan 2022 17:02):    No growth to date.      RADIOLOGY & ADDITIONAL STUDIES:  ACC: [id number] EXAM:  US DPLX LWR EXT VEINS COMPL BI                          PROCEDURE DATE:  01/03/2022          INTERPRETATION:  CLINICAL INFORMATION: Bilateral leg swelling.    COMPARISON: Doppler venous sonogram 12/1/2021.    TECHNIQUE: Duplex sonography of the BILATERAL LOWER extremity veins with   color and spectral Doppler, with and without compression.    FINDINGS:    RIGHT:  Normal compressibility of the RIGHT common femoral, femoral and popliteal   veins.  Doppler examination shows normal spontaneous and phasic flow.  No RIGHT calf vein thrombosis is detected.    LEFT:  Normal compressibility of the LEFT common femoral, femoral and popliteal   veins.  Doppler examination shows normal spontaneous and phasic flow.  No LEFT calf veinthrombosis is detected.    IMPRESSION:    No evidence of deep venous thrombosis in either lower extremity.        ACC: [id number] EXAM:  XR CHEST PORTABLE URGENT 1V                          PROCEDURE DATE:  01/03/2022          INTERPRETATION:  Chest portable.    Clinical History: Shortness of breath. Bilateral leg edema.    Comparison: 12/9/2021.    Single AP view submitted.    The evaluation of the cardiomediastinal silhouette is limited on portable   technique.  Calcified aorta.    Again noted is blunting of the bilateral costophrenic angles which may   reflect small pleural effusions and/or pleural thickening.  The lungs are mildly hyperinflated with increased bronchovascular   markings.  Again noted postsurgical changes with sutures right midlung zone      Assessment :   89F PMHx Paroxysmal Atrial Fibrillation, HTN, Hypothyroidism, COPD (2-3 L nasal cannula at home) Hyperlipidemia recent admission to [facility] 11/30-12/5 with Covid pneumonia and 12/6-12/13 for acute CHF and Afib with RVR discharged to [facility] for ALEX who presented with worsening Bilateral LE edema and right LE wounds with erythema - mild cellulitis  Acute on Chronic CHF  COVID 19 + likely sec viral shedding rather than acute COVID 19 infection (recent admission to [facility] 11/30-12/5 with Covid pneumonia )  Asymptomatic bacteruria  On 3L NC ( home 02 )    Plan :   Off Ancef   Finish course of po Keflex x 5 days   Trend temps and cbc  Diurese per cardiology  Pulm toileting  Elevate leg  Increase activity/OOB to chair  Dc planning    Continue with present regiment.  Appropriate use of antibiotics and adverse effects reviewed.      > 35 minutes were spent in direct patient care reviewing notes, medications ,labs data/ imaging , discussion with multidisciplinary team.    Thank you for allowing me to participate in care of your patient .    Kate Sorto MD  Infectious Disease  [phone]

## 2022-01-06 NOTE — DISCHARGE NOTE PROVIDER - NSDCCPCAREPLAN_GEN_ALL_CORE_FT
PRINCIPAL DISCHARGE DIAGNOSIS  Diagnosis: Acute CHF  Assessment and Plan of Treatment: continue bumex      SECONDARY DISCHARGE DIAGNOSES  Diagnosis: Cellulitis of lower leg  Assessment and Plan of Treatment: complete abx.   wound care:    Diagnosis: Chronic obstructive pulmonary disease, unspecified COPD type  Assessment and Plan of Treatment: chronic oxygen 2.5-3L NC     PRINCIPAL DISCHARGE DIAGNOSIS  Diagnosis: Acute CHF  Assessment and Plan of Treatment: continue bumex      SECONDARY DISCHARGE DIAGNOSES  Diagnosis: Cellulitis of lower leg  Assessment and Plan of Treatment: complete abx.   wound care:  Bactroban and dry dressing 2x day    Diagnosis: Chronic obstructive pulmonary disease, unspecified COPD type  Assessment and Plan of Treatment: chronic oxygen 2.5-3L NC

## 2022-01-06 NOTE — DISCHARGE NOTE PROVIDER - CARE PROVIDER_API CALL
Manuel Varela)  Internal Medicine  [address]  [place] [address]  Phone: [phone]  Fax: [phone]  Follow Up Time:     Juvenal Anderson)  Critical Care Medicine; Internal Medicine; Pulmonary Disease  [address] [place] [address]  Phone: [phone]  Fax: [phone]  Follow Up Time:     Pau Leavitt)  Cardiovascular Disease; Internal Medicine  [address] [place] [address]  Phone: [phone]  Fax: [phone]  Follow Up Time:     Esau Santos)  Podiatric Medicine and Surgery  [address] [place] [address]  Phone: [phone]  Fax: [phone]  Follow Up Time:

## 2022-01-06 NOTE — PROGRESS NOTE ADULT - SUBJECTIVE AND OBJECTIVE BOX
Patient is a 89y old  Female who presents with a chief complaint of cellulitis right leg (06 Jan 2022 06:45)    INTERVAL HPI/OVERNIGHT EVENTS:  feeling well, no complaints.  no pain.     MEDICATIONS  (STANDING):  allopurinol 100 milliGRAM(s) Oral daily  ALPRAZolam 0.25 milliGRAM(s) Oral at bedtime  aMIOdarone    Tablet 200 milliGRAM(s) Oral daily  ammonium lactate 12% Lotion 1 Application(s) Topical two times a day  apixaban 2.5 milliGRAM(s) Oral two times a day  aspirin enteric coated 81 milliGRAM(s) Oral daily  budesonide 160 MICROgram(s)/formoterol 4.5 MICROgram(s) Inhaler 2 Puff(s) Inhalation two times a day  buMETAnide 1 milliGRAM(s) Oral two times a day  carvedilol 6.25 milliGRAM(s) Oral every 12 hours  cephalexin 500 milliGRAM(s) Oral every 8 hours  diltiazem    milliGRAM(s) Oral daily  famotidine    Tablet 20 milliGRAM(s) Oral daily  hydrALAZINE 25 milliGRAM(s) Oral every 8 hours  influenza  Vaccine (HIGH DOSE) 0.7 milliLiter(s) IntraMuscular once  levothyroxine 75 MICROGram(s) Oral daily  multivitamin/minerals 1 Tablet(s) Oral daily  mupirocin 2% Ointment 1 Application(s) Topical two times a day  senna 2 Tablet(s) Oral at bedtime  tiotropium 18 MICROgram(s) Capsule 1 Capsule(s) Inhalation daily  venlafaxine XR. 75 milliGRAM(s) Oral daily    MEDICATIONS  (PRN):  acetaminophen     Tablet .. 650 milliGRAM(s) Oral every 6 hours PRN Temp greater or equal to 38C (100.4F), Mild Pain (1 - 3)  benzonatate 100 milliGRAM(s) Oral every 8 hours PRN Cough  polyethylene glycol 3350 17 Gram(s) Oral daily PRN Constipation      Allergies  Tomas Cheese - Pt states the one time she had a mouthful of tomas cheese, her throat was closing and she required an epinephrine injection. (Anaphylaxis)  iodine (Anaphylaxis)  penicillin (Other)  shellfish (Short breath; Hives)  sulfa drugs (Hives)      REVIEW OF SYSTEMS:  CONSTITUTIONAL: No fever, weight loss, or fatigue  EYES: No eye pain, visual disturbances, or discharge  ENMT:  No difficulty hearing, tinnitus, vertigo; No sinus or throat pain  NECK: No pain or stiffness  BREASTS: No pain, masses, or nipple discharge  RESPIRATORY: No cough, wheezing, chills or hemoptysis; No shortness of breath  CARDIOVASCULAR: No chest pain, palpitations, lightheadedness  GASTROINTESTINAL: No abdominal or epigastric pain. No nausea, vomiting, or hematemesis; No diarrhea or constipation. No melena or hematochezia.  GENITOURINARY: No dysuria, frequency, hematuria, or incontinence  NEUROLOGICAL: No headaches, memory loss, vertigo, loss of strength, numbness, or tremors  SKIN: No itching, burning, rashes, or lesions   LYMPH NODES: No enlarged glands  ENDOCRINE: No heat or cold intolerance; No hair loss; No polydipsia or polyuria  MUSCULOSKELETAL: No joint pain or swelling; No muscle, back, or extremity pain  PSYCHIATRIC: No depression, anxiety, or mood swings  HEME/LYMPH: No easy bruising, or bleeding gums  ALLERGY AND IMMUNOLOGIC: No hives or eczema    Vital Signs Last 24 Hrs  T(C): 36.7 (06 Jan 2022 12:25), Max: 36.8 (05 Jan 2022 17:40)  T(F): 98 (06 Jan 2022 12:25), Max: 98.3 (05 Jan 2022 17:40)  HR: 109 (06 Jan 2022 12:25) (75 - 109)  BP: 108/70 (06 Jan 2022 12:25) (104/60 - 145/75)  BP(mean): --  RR: 18 (06 Jan 2022 12:25) (17 - 18)  SpO2: 99% (06 Jan 2022 12:25) (97% - 100%)    PHYSICAL EXAM:  GENERAL: NAD, well-groomed, well-developed  HEAD:  Atraumatic, Normocephalic  EYES:  conjunctiva and sclera clear  ENMT: Moist mucous membranes  NECK: Supple, No JVD  NERVOUS SYSTEM:  Alert & Oriented X3, Good concentration; All 4 extremities mobile, no gross sensory deficits.   CHEST/LUNG: Clear to auscultation bilaterally; No rales, rhonchi, wheezing, or rubs  HEART: Regular rate and rhythm; No murmurs, rubs, or gallops  ABDOMEN: Soft, Nontender, Nondistended; Bowel sounds present  EXTREMITIES:  2+ Peripheral Pulses, No clubbing, cyanosis, or edema  LYMPH: No lymphadenopathy noted  SKIN: No rashes or lesions    LABS:                        8.9    8.19  )-----------( 288      ( 06 Jan 2022 07:12 )             29.1     06 Jan 2022 07:12    145    |  103    |  43     ----------------------------<  108    3.7     |  40     |  1.66     Ca    8.3        06 Jan 2022 07:12          CAPILLARY BLOOD GLUCOSE          RADIOLOGY & ADDITIONAL TESTS:    Imaging Personally Reviewed:  [ ] YES     Consultant(s) Notes Reviewed:      Care Discussed with Consultants/Other Providers:    Advanced Directives: [ ] DNR  [ ] No feeding tube  [ ] MOLST in chart  [ ] MOLST completed today  [ ] Unknown

## 2022-01-06 NOTE — DISCHARGE NOTE PROVIDER - CARE PROVIDERS DIRECT ADDRESSES
,[email or web address],DirectAddress_Unknown,DirectAddress_Unknown,DirectAddress_Unknown

## 2022-01-07 LAB
ALBUMIN SERPL ELPH-MCNC: 2.8 G/DL — LOW (ref 3.3–5)
ALP SERPL-CCNC: 71 U/L — SIGNIFICANT CHANGE UP (ref 30–120)
ALT FLD-CCNC: <10 U/L DA — LOW (ref 10–60)
ANION GAP SERPL CALC-SCNC: 0 MMOL/L — LOW (ref 5–17)
AST SERPL-CCNC: 22 U/L — SIGNIFICANT CHANGE UP (ref 10–40)
BILIRUB SERPL-MCNC: 0.2 MG/DL — SIGNIFICANT CHANGE UP (ref 0.2–1.2)
BUN SERPL-MCNC: 48 MG/DL — HIGH (ref 7–23)
CALCIUM SERPL-MCNC: 8.4 MG/DL — SIGNIFICANT CHANGE UP (ref 8.4–10.5)
CHLORIDE SERPL-SCNC: 103 MMOL/L — SIGNIFICANT CHANGE UP (ref 96–108)
CO2 SERPL-SCNC: 40 MMOL/L — HIGH (ref 22–31)
CREAT SERPL-MCNC: 1.63 MG/DL — HIGH (ref 0.5–1.3)
GLUCOSE SERPL-MCNC: 134 MG/DL — HIGH (ref 70–99)
HCT VFR BLD CALC: 28 % — LOW (ref 34.5–45)
HGB BLD-MCNC: 8.5 G/DL — LOW (ref 11.5–15.5)
LACTATE SERPL-SCNC: 0.7 MMOL/L — SIGNIFICANT CHANGE UP (ref 0.7–2)
MAGNESIUM SERPL-MCNC: 1.6 MG/DL — SIGNIFICANT CHANGE UP (ref 1.6–2.6)
MCHC RBC-ENTMCNC: 30.4 GM/DL — LOW (ref 32–36)
MCHC RBC-ENTMCNC: 32.3 PG — SIGNIFICANT CHANGE UP (ref 27–34)
MCV RBC AUTO: 106.5 FL — HIGH (ref 80–100)
NRBC # BLD: 0 /100 WBCS — SIGNIFICANT CHANGE UP (ref 0–0)
PHOSPHATE SERPL-MCNC: 4.2 MG/DL — SIGNIFICANT CHANGE UP (ref 2.5–4.5)
PLATELET # BLD AUTO: 324 K/UL — SIGNIFICANT CHANGE UP (ref 150–400)
POTASSIUM SERPL-MCNC: 3.9 MMOL/L — SIGNIFICANT CHANGE UP (ref 3.5–5.3)
POTASSIUM SERPL-SCNC: 3.9 MMOL/L — SIGNIFICANT CHANGE UP (ref 3.5–5.3)
PROT SERPL-MCNC: 5.8 G/DL — LOW (ref 6–8.3)
RBC # BLD: 2.63 M/UL — LOW (ref 3.8–5.2)
RBC # FLD: 18.9 % — HIGH (ref 10.3–14.5)
SARS-COV-2 RNA SPEC QL NAA+PROBE: SIGNIFICANT CHANGE UP
SODIUM SERPL-SCNC: 143 MMOL/L — SIGNIFICANT CHANGE UP (ref 135–145)
WBC # BLD: 11.13 K/UL — HIGH (ref 3.8–10.5)
WBC # FLD AUTO: 11.13 K/UL — HIGH (ref 3.8–10.5)

## 2022-01-07 PROCEDURE — 71045 X-RAY EXAM CHEST 1 VIEW: CPT | Mod: 26

## 2022-01-07 PROCEDURE — 99232 SBSQ HOSP IP/OBS MODERATE 35: CPT

## 2022-01-07 RX ORDER — CEFAZOLIN SODIUM 1 G
1000 VIAL (EA) INJECTION EVERY 8 HOURS
Refills: 0 | Status: COMPLETED | OUTPATIENT
Start: 2022-01-08 | End: 2022-01-10

## 2022-01-07 RX ORDER — CEFAZOLIN SODIUM 1 G
VIAL (EA) INJECTION
Refills: 0 | Status: COMPLETED | OUTPATIENT
Start: 2022-01-07 | End: 2022-01-10

## 2022-01-07 RX ORDER — CEFAZOLIN SODIUM 1 G
1000 VIAL (EA) INJECTION ONCE
Refills: 0 | Status: COMPLETED | OUTPATIENT
Start: 2022-01-07 | End: 2022-01-07

## 2022-01-07 RX ADMIN — Medication 240 MILLIGRAM(S): at 07:00

## 2022-01-07 RX ADMIN — TIOTROPIUM BROMIDE 1 CAPSULE(S): 18 CAPSULE ORAL; RESPIRATORY (INHALATION) at 11:43

## 2022-01-07 RX ADMIN — BUMETANIDE 1 MILLIGRAM(S): 0.25 INJECTION INTRAMUSCULAR; INTRAVENOUS at 06:58

## 2022-01-07 RX ADMIN — Medication 650 MILLIGRAM(S): at 23:11

## 2022-01-07 RX ADMIN — POLYETHYLENE GLYCOL 3350 17 GRAM(S): 17 POWDER, FOR SOLUTION ORAL at 15:42

## 2022-01-07 RX ADMIN — APIXABAN 2.5 MILLIGRAM(S): 2.5 TABLET, FILM COATED ORAL at 06:58

## 2022-01-07 RX ADMIN — Medication 500 MILLIGRAM(S): at 21:26

## 2022-01-07 RX ADMIN — Medication 25 MILLIGRAM(S): at 06:58

## 2022-01-07 RX ADMIN — BUDESONIDE AND FORMOTEROL FUMARATE DIHYDRATE 2 PUFF(S): 160; 4.5 AEROSOL RESPIRATORY (INHALATION) at 11:43

## 2022-01-07 RX ADMIN — MUPIROCIN 1 APPLICATION(S): 20 OINTMENT TOPICAL at 06:59

## 2022-01-07 RX ADMIN — BUMETANIDE 1 MILLIGRAM(S): 0.25 INJECTION INTRAMUSCULAR; INTRAVENOUS at 17:46

## 2022-01-07 RX ADMIN — MUPIROCIN 1 APPLICATION(S): 20 OINTMENT TOPICAL at 17:46

## 2022-01-07 RX ADMIN — Medication 1 TABLET(S): at 11:42

## 2022-01-07 RX ADMIN — CARVEDILOL PHOSPHATE 6.25 MILLIGRAM(S): 80 CAPSULE, EXTENDED RELEASE ORAL at 17:46

## 2022-01-07 RX ADMIN — Medication 75 MICROGRAM(S): at 06:58

## 2022-01-07 RX ADMIN — Medication 25 MILLIGRAM(S): at 21:26

## 2022-01-07 RX ADMIN — FAMOTIDINE 20 MILLIGRAM(S): 10 INJECTION INTRAVENOUS at 11:40

## 2022-01-07 RX ADMIN — Medication 500 MILLIGRAM(S): at 06:58

## 2022-01-07 RX ADMIN — Medication 100 MILLIGRAM(S): at 23:11

## 2022-01-07 RX ADMIN — Medication 81 MILLIGRAM(S): at 11:42

## 2022-01-07 RX ADMIN — CARVEDILOL PHOSPHATE 6.25 MILLIGRAM(S): 80 CAPSULE, EXTENDED RELEASE ORAL at 06:58

## 2022-01-07 RX ADMIN — Medication 25 MILLIGRAM(S): at 15:42

## 2022-01-07 RX ADMIN — AMIODARONE HYDROCHLORIDE 200 MILLIGRAM(S): 400 TABLET ORAL at 07:00

## 2022-01-07 RX ADMIN — Medication 0.25 MILLIGRAM(S): at 21:26

## 2022-01-07 RX ADMIN — Medication 100 MILLIGRAM(S): at 11:42

## 2022-01-07 RX ADMIN — Medication 75 MILLIGRAM(S): at 11:42

## 2022-01-07 RX ADMIN — Medication 1 APPLICATION(S): at 17:46

## 2022-01-07 RX ADMIN — APIXABAN 2.5 MILLIGRAM(S): 2.5 TABLET, FILM COATED ORAL at 17:46

## 2022-01-07 RX ADMIN — Medication 500 MILLIGRAM(S): at 15:42

## 2022-01-07 RX ADMIN — Medication 1 APPLICATION(S): at 06:58

## 2022-01-07 NOTE — PROGRESS NOTE ADULT - SUBJECTIVE AND OBJECTIVE BOX
Patient is a 89y old  Female who presents with a chief complaint of cellulitis right leg (2022 06:45)    INTERVAL HPI/OVERNIGHT EVENTS:  feeling stuck, she wants to get up and get moving so she can go home but is feeling stuck.     MEDICATIONS  (STANDING):  allopurinol 100 milliGRAM(s) Oral daily  ALPRAZolam 0.25 milliGRAM(s) Oral at bedtime  aMIOdarone    Tablet 200 milliGRAM(s) Oral daily  ammonium lactate 12% Lotion 1 Application(s) Topical two times a day  apixaban 2.5 milliGRAM(s) Oral two times a day  aspirin enteric coated 81 milliGRAM(s) Oral daily  budesonide 160 MICROgram(s)/formoterol 4.5 MICROgram(s) Inhaler 2 Puff(s) Inhalation two times a day  buMETAnide 1 milliGRAM(s) Oral two times a day  carvedilol 6.25 milliGRAM(s) Oral every 12 hours  cephalexin 500 milliGRAM(s) Oral every 8 hours  coronavirus (EUA) Booster Vaccine (MODERNA) 0.25 milliLiter(s) IntraMuscular once  diltiazem    milliGRAM(s) Oral daily  famotidine    Tablet 20 milliGRAM(s) Oral daily  hydrALAZINE 25 milliGRAM(s) Oral every 8 hours  influenza  Vaccine (HIGH DOSE) 0.7 milliLiter(s) IntraMuscular once  levothyroxine 75 MICROGram(s) Oral daily  multivitamin/minerals 1 Tablet(s) Oral daily  mupirocin 2% Ointment 1 Application(s) Topical two times a day  senna 2 Tablet(s) Oral at bedtime  tiotropium 18 MICROgram(s) Capsule 1 Capsule(s) Inhalation daily  venlafaxine XR. 75 milliGRAM(s) Oral daily    MEDICATIONS  (PRN):  acetaminophen     Tablet .. 650 milliGRAM(s) Oral every 6 hours PRN Temp greater or equal to 38C (100.4F), Mild Pain (1 - 3)  benzonatate 100 milliGRAM(s) Oral every 8 hours PRN Cough  polyethylene glycol 3350 17 Gram(s) Oral daily PRN Constipation      Allergies  Tomas Cheese - Pt states the one time she had a mouthful of tomas cheese, her throat was closing and she required an epinephrine injection. (Anaphylaxis)  iodine (Anaphylaxis)  penicillin (Other)  shellfish (Short breath; Hives)  sulfa drugs (Hives)      REVIEW OF SYSTEMS:  CONSTITUTIONAL: No fever, weight loss, or fatigue  EYES: No eye pain, visual disturbances, or discharge  ENMT:  No difficulty hearing, tinnitus, vertigo; No sinus or throat pain  NECK: No pain or stiffness  BREASTS: No pain, masses, or nipple discharge  RESPIRATORY: No cough, wheezing, chills or hemoptysis; No shortness of breath  CARDIOVASCULAR: No chest pain, palpitations, lightheadedness  GASTROINTESTINAL: No abdominal or epigastric pain. No nausea, vomiting, or hematemesis; No diarrhea or constipation. No melena or hematochezia.  GENITOURINARY: No dysuria, frequency, hematuria, or incontinence  NEUROLOGICAL: No headaches, memory loss, vertigo, loss of strength, numbness, or tremors  SKIN: No itching, burning, rashes, or lesions   LYMPH NODES: No enlarged glands  ENDOCRINE: No heat or cold intolerance; No hair loss; No polydipsia or polyuria  MUSCULOSKELETAL: No joint pain or swelling; No muscle, back, or extremity pain  PSYCHIATRIC: No depression, anxiety, or mood swings  HEME/LYMPH: No easy bruising, or bleeding gums  ALLERGY AND IMMUNOLOGIC: No hives or eczema    Vital Signs Last 24 Hrs  T(C): 36.6 (2022 11:33), Max: 37.1 (2022 07:19)  T(F): 97.9 (2022 11:33), Max: 98.7 (2022 07:19)  HR: 80 (2022 11:33) (80 - 126)  BP: 101/56 (2022 11:33) (101/56 - 139/72)  BP(mean): --  RR: 18 (2022 11:33) (16 - 18)  SpO2: 97% (2022 11:33) (96% - 99%)    PHYSICAL EXAM:  GENERAL: NAD, well-groomed, well-developed  HEAD:  Atraumatic, Normocephalic  EYES:  conjunctiva and sclera clear  ENMT: Moist mucous membranes  NECK: Supple, No JVD  NERVOUS SYSTEM:  Alert & Oriented X3, Good concentration; All 4 extremities mobile, no gross sensory deficits.   CHEST/LUNG: Clear to auscultation bilaterally; No rales, rhonchi, wheezing, or rubs  HEART: Regular rate and rhythm; No murmurs, rubs, or gallops  ABDOMEN: Soft, Nontender, Nondistended; Bowel sounds present  EXTREMITIES:  2+ Peripheral Pulses, bilateral LE edema, dressing on wound on right leg.     LABS:      Ca    8.3        2022 07:12        Urinalysis Basic - ( 2022 15:38 )    Color: Yellow / Appearance: Clear / S.015 / pH: x  Gluc: x / Ketone: Negative  / Bili: Negative / Urobili: Negative mg/dL   Blood: x / Protein: 15 mg/dL / Nitrite: Negative   Leuk Esterase: Trace / RBC: Negative /HPF / WBC 0-2   Sq Epi: x / Non Sq Epi: Negative / Bacteria: Negative      CAPILLARY BLOOD GLUCOSE          RADIOLOGY & ADDITIONAL TESTS:    Imaging Personally Reviewed:  [ ] YES     Consultant(s) Notes Reviewed:      Care Discussed with Consultants/Other Providers:    Advanced Directives: [ ] DNR  [ ] No feeding tube  [ ] MOLST in chart  [ ] MOLST completed today  [ ] Unknown

## 2022-01-07 NOTE — PROGRESS NOTE ADULT - SUBJECTIVE AND OBJECTIVE BOX
Chief Complaint: Leg swelling    Interval Events: No events overnight.    Review of Systems:  General: No fevers, chills, weight gain  Skin: No rashes, color changes  Cardiovascular: No chest pain, orthopnea  Respiratory: No shortness of breath, cough  Gastrointestinal: No nausea, abdominal pain  Genitourinary: No incontinence, pain with urination  Musculoskeletal: No pain, swelling, decreased range of motion  Neurological: No headache, weakness  Psychiatric: No depression, anxiety  Endocrine: No weight gain, increased thirst  All other systems are comprehensively negative.    Physical Exam:  Vital Signs Last 24 Hrs  T(C): 37.1 (07 Jan 2022 07:19), Max: 37.1 (07 Jan 2022 07:19)  T(F): 98.7 (07 Jan 2022 07:19), Max: 98.7 (07 Jan 2022 07:19)  HR: 126 (07 Jan 2022 07:19) (89 - 126)  BP: 124/77 (07 Jan 2022 07:19) (108/70 - 139/72)  BP(mean): --  RR: 17 (07 Jan 2022 07:19) (16 - 18)  SpO2: 96% (07 Jan 2022 07:19) (96% - 99%)  General: NAD  HEENT: MMM  Neck: No JVD, no carotid bruit  Lungs: CTAB  CV: RRR, nl S1/S2, no M/R/G  Abdomen: S/NT/ND, +BS  Extremities: 1+ LE edema, no cyanosis  Neuro: AAOx3, non-focal  Skin: Open leg wound    Labs:             01-06    145  |  103  |  43<H>  ----------------------------<  108<H>  3.7   |  40<H>  |  1.66<H>    Ca    8.3<L>      06 Jan 2022 07:12                          8.9    8.19  )-----------( 288      ( 06 Jan 2022 07:12 )             29.1       Telemetry: AF

## 2022-01-07 NOTE — PROGRESS NOTE ADULT - SUBJECTIVE AND OBJECTIVE BOX
PULMONARY/CRITICAL CARE  DOS 21  Unchanged.  Feels better. Less leg discomfort.   No  wheeze. No fever.   Pt. well known to me.    Patient is a 89y old  Female who presents with a chief complaint of cellulitis of legs  BRIEF HOSPITAL COURSE: ***89F PMHx Paroxysmal Atrial Fibrillation, HTN, Hypothyroidism, COPD (2-3 L nasal cannula at home) Hyperlipidemia, presenting with Bilateral LE edema and right LE wounds.  Patient was admitted to [facility] - with Covid pneumonia and - for acute CHF and Afib with RVR.  She was discharged to [facility] for ALEX.  While there her edema was increasing, and a little more than a week ago she developed a blister on her right ankle and lower leg that both burst.  She has pain in her leg especially at night.  No fevers or chills, no further SOB.  Her oxygen needs have not changed.  Her grandson became more and more concerned about her leg and felt that staff at [facility] was not addressing it so they requested that she be sent to [place] ED for evaluation.    Events last 24 hours: ***    PAST MEDICAL & SURGICAL HISTORY:  Hypertension    Depression    Overactive bladder    Gout    Osteoarthritis    Hypothyroid    HLD (hyperlipidemia)    Paroxysmal atrial fibrillation    Asthma    Chronic obstructive pulmonary disease (COPD)  on home oxygen 2.5-3L NC    S/P cholecystectomy    S/P lumpectomy of breast    S/P hysterectomy    S/P lung surgery, follow-up exam  s/p removal of suspicious lesion, negative      Allergies    Tomas Cheese - Pt states the one time she had a mouthful of tomas cheese, her throat was closing and she required an epinephrine injection. (Anaphylaxis)  iodine (Anaphylaxis)  penicillin (Other)  shellfish (Short breath; Hives)  sulfa drugs (Hives)    Intolerances      FAMILY HISTORY:  Family history of nasopharyngeal cancer (Child)  daughter    Family history of breast cancer (Mother)          Medications:  ceFAZolin   IVPB 1000 milliGRAM(s) IV Intermittent every 8 hours    aMIOdarone    Tablet 200 milliGRAM(s) Oral daily  carvedilol 6.25 milliGRAM(s) Oral every 12 hours  diltiazem    milliGRAM(s) Oral daily  hydrALAZINE 25 milliGRAM(s) Oral every 8 hours    benzonatate 100 milliGRAM(s) Oral every 8 hours PRN  budesonide 160 MICROgram(s)/formoterol 4.5 MICROgram(s) Inhaler 2 Puff(s) Inhalation two times a day  tiotropium 18 MICROgram(s) Capsule 1 Capsule(s) Inhalation daily    acetaminophen     Tablet .. 650 milliGRAM(s) Oral every 6 hours PRN  ALPRAZolam 0.25 milliGRAM(s) Oral at bedtime  venlafaxine XR. 37.5 milliGRAM(s) Oral daily      apixaban 2.5 milliGRAM(s) Oral two times a day  aspirin enteric coated 81 milliGRAM(s) Oral daily    famotidine    Tablet 20 milliGRAM(s) Oral daily  polyethylene glycol 3350 17 Gram(s) Oral daily PRN  senna 2 Tablet(s) Oral at bedtime      allopurinol 100 milliGRAM(s) Oral daily  levothyroxine 75 MICROGram(s) Oral daily    multivitamin/minerals 1 Tablet(s) Oral daily  potassium chloride    Tablet ER 40 milliEquivalent(s) Oral every 4 hours                ICU Vital Signs Last 24 Hrs  T(C): 36.7 (2022 15:00), Max: 36.7 (2022 15:00)  T(F): 98 (2022 15:00), Max: 98 (2022 15:00)  HR: 82 (2022 15:00) (82 - 111)  BP: 142/80 (2022 15:00) (128/70 - 142/80)  BP(mean): --  ABP: --  ABP(mean): --  RR: 25 (2022 15:00) (20 - 25)  SpO2: 98% (2022 15:00) (98% - 98%)    Vital Signs Last 24 Hrs  T(C): 36.7 (2022 15:00), Max: 36.7 (2022 15:00)  T(F): 98 (2022 15:00), Max: 98 (2022 15:00)  HR: 82 (2022 15:00) (82 - 111)  BP: 142/80 (2022 15:00) (128/70 - 142/80)  BP(mean): --  RR: 25 (2022 15:00) (20 - 25)  SpO2: 98% (2022 15:00) (98% - 98%)        I&O's Detail        LABS:                        9.5    7.52  )-----------( 239      ( 2022 12:27 )             30.7         141  |  102  |  44<H>  ----------------------------<  107<H>  3.4<L>   |  38<H>  |  1.89<H>    Ca    8.3<L>      2022 12:27    TPro  5.8<L>  /  Alb  2.8<L>  /  TBili  0.4  /  DBili  x   /  AST  27  /  ALT  20  /  AlkPhos  59            CAPILLARY BLOOD GLUCOSE        PT/INR - ( 2022 12:27 )   PT: 14.6 sec;   INR: 1.22 ratio         PTT - ( 2022 12:27 )  PTT:29.5 sec  Urinalysis Basic - ( 2022 14:26 )    Color: Yellow / Appearance: Clear / S.015 / pH: x  Gluc: x / Ketone: Negative  / Bili: Negative / Urobili: Negative mg/dL   Blood: x / Protein: 15 mg/dL / Nitrite: Negative   Leuk Esterase: Trace / RBC: Negative /HPF / WBC 6-10   Sq Epi: x / Non Sq Epi: Moderate / Bacteria: Few      CULTURES:      Physical Examination:    General: No acute distress.  elderly female    HEENT: Pupils equal, reactive to light.  Symmetric.    PULM: clear no wheeze no  rhonchi rales    CVS: Regular rate and rhythm, no murmurs, rubs, or gallops    ABD: Soft, nondistended, nontender, normoactive bowel sounds, no masses    EXT: 3 plus leg edema, with erythema; bandaged  lower leg    SKIN: Warm and well perfused, no rashes noted.    NEURO: Alert, oriented, interactive, nonfocal    RADIOLOGY: ***rad< from: US Duplex Venous Lower Ext Complete, Bilateral (22 @ 12:55) >  ACC: [id number] EXAM:  US DPLX LWR EXT VEINS COMPL BI                          PROCEDURE DATE:  2022          INTERPRETATION:  CLINICAL INFORMATION: Bilateral leg swelling.    COMPARISON: Doppler venous sonogram 2021.    TECHNIQUE: Duplex sonography of the BILATERAL LOWER extremity veins with   color and spectral Doppler, with and without compression.    FINDINGS:    RIGHT:  Normal compressibility of the RIGHT common femoral, femoral and popliteal   veins.  Doppler examination shows normal spontaneous and phasic flow.  No RIGHT calf vein thrombosis is detected.    LEFT:  Normal compressibility of the LEFT common femoral, femoral and popliteal   veins.  Doppler examination shows normal spontaneous and phasic flow.  No LEFT calf veinthrombosis is detected.    IMPRESSION:    No evidence of deep venous thrombosis in either lower extremity.          --- End of Report ---    < end of copied text >  < from: Xray Chest 1 View- PORTABLE-Urgent (Xray Chest 1 View- PORTABLE-Urgent .) (22 @ 11:58) >  ACC: [id number] EXAM:  XR CHEST PORTABLE URGENT 1V                          PROCEDURE DATE:  2022          INTERPRETATION:  Chest portable.    Clinical History: Shortness of breath. Bilateral leg edema.    Comparison: 2021.    Single AP view submitted.    The evaluation of the cardiomediastinal silhouette is limited on portable   technique.  Calcified aorta.    Again noted is blunting of the bilateral costophrenic angles which may   reflect small pleural effusions and/or pleural thickening.  The lungs are mildly hyperinflated with increased bronchovascular   markings.  Again noted postsurgical changes with sutures right midlung zone.    Impression:    Findings as discussed above.    < end of copied text >      CRITICAL CARE TIME SPENT: ***

## 2022-01-07 NOTE — PROVIDER CONTACT NOTE (OTHER) - ASSESSMENT
AxO x4, not in distress. CHF at baseline, mild SPEAR, but no resp distress at rest. Other VS: 02 98% on 3L NC, HR 82, /62. Recently COVID+ on admission 1/3, cleared to be off isolation per Dr. Sorto ID, COVID negative result yesterday 1/6. On PO Keflex

## 2022-01-07 NOTE — CHART NOTE - NSCHARTNOTEFT_GEN_A_CORE
Called regarding new fevers - T 101.2F.  Other vitals WNL.  Patient was admitted for cellulitis (treated with Ancef, now on Keflex) and acute CHF.  Will order blood culutres, UA, CXR, lactate, CBC, CMP, COVID swab.  Will also change abx back to Ancef.

## 2022-01-07 NOTE — PROGRESS NOTE ADULT - SUBJECTIVE AND OBJECTIVE BOX
Subjective: pos SPEAR      MEDICATIONS  (STANDING):  allopurinol 100 milliGRAM(s) Oral daily  ALPRAZolam 0.25 milliGRAM(s) Oral at bedtime  aMIOdarone    Tablet 200 milliGRAM(s) Oral daily  ammonium lactate 12% Lotion 1 Application(s) Topical two times a day  apixaban 2.5 milliGRAM(s) Oral two times a day  aspirin enteric coated 81 milliGRAM(s) Oral daily  budesonide 160 MICROgram(s)/formoterol 4.5 MICROgram(s) Inhaler 2 Puff(s) Inhalation two times a day  buMETAnide 1 milliGRAM(s) Oral two times a day  carvedilol 6.25 milliGRAM(s) Oral every 12 hours  cephalexin 500 milliGRAM(s) Oral every 8 hours  coronavirus (EUA) Booster Vaccine (MODERNA) 0.25 milliLiter(s) IntraMuscular once  diltiazem    milliGRAM(s) Oral daily  famotidine    Tablet 20 milliGRAM(s) Oral daily  hydrALAZINE 25 milliGRAM(s) Oral every 8 hours  influenza  Vaccine (HIGH DOSE) 0.7 milliLiter(s) IntraMuscular once  levothyroxine 75 MICROGram(s) Oral daily  multivitamin/minerals 1 Tablet(s) Oral daily  mupirocin 2% Ointment 1 Application(s) Topical two times a day  senna 2 Tablet(s) Oral at bedtime  tiotropium 18 MICROgram(s) Capsule 1 Capsule(s) Inhalation daily  venlafaxine XR. 75 milliGRAM(s) Oral daily    MEDICATIONS  (PRN):  acetaminophen     Tablet .. 650 milliGRAM(s) Oral every 6 hours PRN Temp greater or equal to 38C (100.4F), Mild Pain (1 - 3)  benzonatate 100 milliGRAM(s) Oral every 8 hours PRN Cough  polyethylene glycol 3350 17 Gram(s) Oral daily PRN Constipation          T(C): 37.1 (22 @ 07:19), Max: 37.1 (22 @ 07:19)  HR: 126 (22 @ 07:19) (89 - 126)  BP: 124/77 (22 @ 07:19) (108/70 - 139/72)  RR: 17 (22 @ 07:19) (16 - 18)  SpO2: 96% (22 @ 07:19) (96% - 99%)  Wt(kg): --        I&O's Detail    2022 07:01  -  2022 07:00  --------------------------------------------------------  IN:  Total IN: 0 mL    OUT:    Voided (mL): 850 mL  Total OUT: 850 mL    Total NET: -850 mL               PHYSICAL EXAM:    GENERAL: NAD  NECK: Supple, no inc in JVP  CHEST/LUNG: dec BS at bases  HEART: S1S2, M  ABDOMEN: Soft, Nontender, Nondistended; Bowel sounds present  EXTREMITIES: pos edema      LABS:  CBC Full  -  ( 2022 07:12 )  WBC Count : 8.19 K/uL  RBC Count : 2.73 M/uL  Hemoglobin : 8.9 g/dL  Hematocrit : 29.1 %  Platelet Count - Automated : 288 K/uL  Mean Cell Volume : 106.6 fl  Mean Cell Hemoglobin : 32.6 pg  Mean Cell Hemoglobin Concentration : 30.6 gm/dL  Auto Neutrophil # : x  Auto Lymphocyte # : x  Auto Monocyte # : x  Auto Eosinophil # : x  Auto Basophil # : x  Auto Neutrophil % : x  Auto Lymphocyte % : x  Auto Monocyte % : x  Auto Eosinophil % : x  Auto Basophil % : x        145  |  103  |  43<H>  ----------------------------<  108<H>  3.7   |  40<H>  |  1.66<H>    Ca    8.3<L>      2022 07:12        Urinalysis Basic - ( 2022 15:38 )    Color: Yellow / Appearance: Clear / S.015 / pH: x  Gluc: x / Ketone: Negative  / Bili: Negative / Urobili: Negative mg/dL   Blood: x / Protein: 15 mg/dL / Nitrite: Negative   Leuk Esterase: Trace / RBC: Negative /HPF / WBC 0-2   Sq Epi: x / Non Sq Epi: Negative / Bacteria: Negative          Impression:  * CKD3-4. Mild Cr flux around baseline due to CRS1-2  * Met alkalosis -- contraction, post-hypercapnic.   * Decompensated diastolic, valvular HF  * Diastolic CM, mod to severe MR    Recommendations:   * Cont mandated diuresis  * Consider acetazolamide   * No objection to dc

## 2022-01-07 NOTE — PROGRESS NOTE ADULT - SUBJECTIVE AND OBJECTIVE BOX
CHARLES GRAHAM is a 89yFemale , patient examined and chart reviewed.    INTERVAL HPI/ OVERNIGHT EVENTS:   Afebrile. In chair.  NAD.    PAST MEDICAL & SURGICAL HISTORY:  Hypertension  Depression  Overactive bladder  Gout  Osteoarthritis  Hypothyroid  HLD (hyperlipidemia)  Paroxysmal atrial fibrillation  Asthma  Chronic obstructive pulmonary disease (COPD)  on home oxygen 2.5-3L NC  S/P cholecystectomy  S/P lumpectomy of breast  S/P hysterectomy  S/P lung surgery, follow-up exam  s/p removal of suspicious lesion, negative    For details regarding the patient's social history, family history, and other miscellaneous elements, please refer the initial infectious diseases consultation and/or the admitting history and physical examination for this admission.       ROS:  CONSTITUTIONAL:  Negative fever or chills  EYES:  Negative  blurry vision or double vision  CARDIOVASCULAR:  Negative for chest pain or palpitations  RESPIRATORY:  Negative for cough, wheezing, or SOB   GASTROINTESTINAL:  Negative for nausea, vomiting, diarrhea, constipation, or abdominal pain  GENITOURINARY:  Negative frequency, urgency or dysuria  NEUROLOGIC:  No headache, confusion, dizziness, lightheadedness  All other systems were reviewed and are negative     ALLERGIES  Tomas Cheese - Pt states the one time she had a mouthful of tomas cheese, her throat was closing and she required an epinephrine injection. (Anaphylaxis)  iodine (Anaphylaxis)  penicillin (Other)  shellfish (Short breath; Hives)  sulfa drugs (Hives)      Current inpatient medications :    ANTIBIOTICS/RELEVANT:  cephalexin 500 milliGRAM(s) Oral every 8 hours    MEDICATIONS  (STANDING):  allopurinol 100 milliGRAM(s) Oral daily  ALPRAZolam 0.25 milliGRAM(s) Oral at bedtime  aMIOdarone    Tablet 200 milliGRAM(s) Oral daily  ammonium lactate 12% Lotion 1 Application(s) Topical two times a day  apixaban 2.5 milliGRAM(s) Oral two times a day  aspirin enteric coated 81 milliGRAM(s) Oral daily  budesonide 160 MICROgram(s)/formoterol 4.5 MICROgram(s) Inhaler 2 Puff(s) Inhalation two times a day  buMETAnide 1 milliGRAM(s) Oral two times a day  carvedilol 6.25 milliGRAM(s) Oral every 12 hours  cephalexin 500 milliGRAM(s) Oral every 8 hours  coronavirus (EUA) Booster Vaccine (MODERNA) 0.25 milliLiter(s) IntraMuscular once  diltiazem    milliGRAM(s) Oral daily  famotidine    Tablet 20 milliGRAM(s) Oral daily  hydrALAZINE 25 milliGRAM(s) Oral every 8 hours  influenza  Vaccine (HIGH DOSE) 0.7 milliLiter(s) IntraMuscular once  levothyroxine 75 MICROGram(s) Oral daily  multivitamin/minerals 1 Tablet(s) Oral daily  mupirocin 2% Ointment 1 Application(s) Topical two times a day  senna 2 Tablet(s) Oral at bedtime  tiotropium 18 MICROgram(s) Capsule 1 Capsule(s) Inhalation daily  venlafaxine XR. 75 milliGRAM(s) Oral daily    MEDICATIONS  (PRN):  acetaminophen     Tablet .. 650 milliGRAM(s) Oral every 6 hours PRN Temp greater or equal to 38C (100.4F), Mild Pain (1 - 3)  benzonatate 100 milliGRAM(s) Oral every 8 hours PRN Cough  polyethylene glycol 3350 17 Gram(s) Oral daily PRN Constipation        Objective:  Vital Signs Last 24 Hrs  T(C): 36.6 (07 Jan 2022 11:33), Max: 37.1 (07 Jan 2022 07:19)  T(F): 97.9 (07 Jan 2022 11:33), Max: 98.7 (07 Jan 2022 07:19)  HR: 80 (07 Jan 2022 11:33) (80 - 126)  BP: 101/56 (07 Jan 2022 11:33) (101/56 - 139/72)  RR: 18 (07 Jan 2022 11:33) (16 - 18)  SpO2: 97% (07 Jan 2022 11:33) (96% - 99%)      Physical Exam:  General: no acute distress  Neck: supple, trachea midline  Lungs: clear, no wheeze/rhonchi  Cardiovascular: regular rate and rhythm, S1 S2  Abdomen: soft, nontender,  bowel sounds normal  Neurological: alert and oriented x3  Skin: no rash  Extremities: +3 edema  right leg venous ulcers Minimal lower leg erythema resolving      LABS:                        8.9    8.19  )-----------( 288      ( 06 Jan 2022 07:12 )             29.1   01-06    145  |  103  |  43<H>  ----------------------------<  108<H>  3.7   |  40<H>  |  1.66<H>    Ca    8.3<L>      06 Jan 2022 07:12      MICROBIOLOGY:  COVID-19 PCR (01.03.22 @ 13:36)    COVID-19 PCR: Detected    Culture - Urine (01.04.22 @ 00:55)    Specimen Source: Catheterized Catheterized    Culture Results:   10,000 - 49,000 CFU/mL Gram positive organisms    Culture - Blood (collected 03 Jan 2022 16:15)  Source: .Blood Blood  Preliminary Report (04 Jan 2022 17:02):    No growth to date.    Culture - Blood (collected 03 Jan 2022 16:15)  Source: .Blood Blood  Preliminary Report (04 Jan 2022 17:02):    No growth to date.      RADIOLOGY & ADDITIONAL STUDIES:  ACC: [id number] EXAM:  US DPLX LWR EXT VEINS COMPL BI                          PROCEDURE DATE:  01/03/2022          INTERPRETATION:  CLINICAL INFORMATION: Bilateral leg swelling.    COMPARISON: Doppler venous sonogram 12/1/2021.    TECHNIQUE: Duplex sonography of the BILATERAL LOWER extremity veins with   color and spectral Doppler, with and without compression.    FINDINGS:    RIGHT:  Normal compressibility of the RIGHT common femoral, femoral and popliteal   veins.  Doppler examination shows normal spontaneous and phasic flow.  No RIGHT calf vein thrombosis is detected.    LEFT:  Normal compressibility of the LEFT common femoral, femoral and popliteal   veins.  Doppler examination shows normal spontaneous and phasic flow.  No LEFT calf veinthrombosis is detected.    IMPRESSION:    No evidence of deep venous thrombosis in either lower extremity.        ACC: [id number] EXAM:  XR CHEST PORTABLE URGENT 1V                          PROCEDURE DATE:  01/03/2022          INTERPRETATION:  Chest portable.    Clinical History: Shortness of breath. Bilateral leg edema.    Comparison: 12/9/2021.    Single AP view submitted.    The evaluation of the cardiomediastinal silhouette is limited on portable   technique.  Calcified aorta.    Again noted is blunting of the bilateral costophrenic angles which may   reflect small pleural effusions and/or pleural thickening.  The lungs are mildly hyperinflated with increased bronchovascular   markings.  Again noted postsurgical changes with sutures right midlung zone      Assessment :   89F PMHx Paroxysmal Atrial Fibrillation, HTN, Hypothyroidism, COPD (2-3 L nasal cannula at home) Hyperlipidemia recent admission to [facility] 11/30-12/5 with Covid pneumonia and 12/6-12/13 for acute CHF and Afib with RVR discharged to [facility] for ALEX who presented with worsening Bilateral LE edema and right LE wounds with erythema - mild cellulitis  Acute on Chronic CHF  COVID 19 + likely sec viral shedding rather than acute COVID 19 infection (recent admission to [facility] 11/30-12/5 with Covid pneumonia )  Asymptomatic bacteruria  On 3L NC ( home 02 )    Plan :   Off Ancef   Finish course of po Keflex x 5 days   Trend temps and cbc  Diurese per cardiology  Pulm toileting  Elevate leg  Increase activity/OOB to chair  Dc planning    Continue with present regiment.  Appropriate use of antibiotics and adverse effects reviewed.      > 35 minutes were spent in direct patient care reviewing notes, medications ,labs data/ imaging , discussion with multidisciplinary team.    Thank you for allowing me to participate in care of your patient .    Kate Sorto MD  Infectious Disease  [phone]

## 2022-01-08 LAB
ALBUMIN SERPL ELPH-MCNC: 2.8 G/DL — LOW (ref 3.3–5)
ALP SERPL-CCNC: 67 U/L — SIGNIFICANT CHANGE UP (ref 30–120)
ALT FLD-CCNC: <10 U/L DA — LOW (ref 10–60)
ANION GAP SERPL CALC-SCNC: 5 MMOL/L — SIGNIFICANT CHANGE UP (ref 5–17)
APPEARANCE UR: CLEAR — SIGNIFICANT CHANGE UP
AST SERPL-CCNC: 23 U/L — SIGNIFICANT CHANGE UP (ref 10–40)
BACTERIA # UR AUTO: ABNORMAL
BILIRUB SERPL-MCNC: 0.2 MG/DL — SIGNIFICANT CHANGE UP (ref 0.2–1.2)
BILIRUB UR-MCNC: NEGATIVE — SIGNIFICANT CHANGE UP
BUN SERPL-MCNC: 51 MG/DL — HIGH (ref 7–23)
CALCIUM SERPL-MCNC: 8.8 MG/DL — SIGNIFICANT CHANGE UP (ref 8.4–10.5)
CHLORIDE SERPL-SCNC: 101 MMOL/L — SIGNIFICANT CHANGE UP (ref 96–108)
CO2 SERPL-SCNC: 35 MMOL/L — HIGH (ref 22–31)
COLOR SPEC: YELLOW — SIGNIFICANT CHANGE UP
CREAT SERPL-MCNC: 1.68 MG/DL — HIGH (ref 0.5–1.3)
CULTURE RESULTS: SIGNIFICANT CHANGE UP
CULTURE RESULTS: SIGNIFICANT CHANGE UP
DIFF PNL FLD: NEGATIVE — SIGNIFICANT CHANGE UP
EPI CELLS # UR: SIGNIFICANT CHANGE UP
GLUCOSE SERPL-MCNC: 92 MG/DL — SIGNIFICANT CHANGE UP (ref 70–99)
GLUCOSE UR QL: NEGATIVE MG/DL — SIGNIFICANT CHANGE UP
HCT VFR BLD CALC: 29.7 % — LOW (ref 34.5–45)
HGB BLD-MCNC: 8.9 G/DL — LOW (ref 11.5–15.5)
KETONES UR-MCNC: NEGATIVE — SIGNIFICANT CHANGE UP
LEUKOCYTE ESTERASE UR-ACNC: ABNORMAL
MCHC RBC-ENTMCNC: 30 GM/DL — LOW (ref 32–36)
MCHC RBC-ENTMCNC: 32.4 PG — SIGNIFICANT CHANGE UP (ref 27–34)
MCV RBC AUTO: 108 FL — HIGH (ref 80–100)
NITRITE UR-MCNC: NEGATIVE — SIGNIFICANT CHANGE UP
NRBC # BLD: 0 /100 WBCS — SIGNIFICANT CHANGE UP (ref 0–0)
PH UR: 5 — SIGNIFICANT CHANGE UP (ref 5–8)
PLATELET # BLD AUTO: 341 K/UL — SIGNIFICANT CHANGE UP (ref 150–400)
POTASSIUM SERPL-MCNC: 4.3 MMOL/L — SIGNIFICANT CHANGE UP (ref 3.5–5.3)
POTASSIUM SERPL-SCNC: 4.3 MMOL/L — SIGNIFICANT CHANGE UP (ref 3.5–5.3)
PROT SERPL-MCNC: 6 G/DL — SIGNIFICANT CHANGE UP (ref 6–8.3)
PROT UR-MCNC: 15 MG/DL
RBC # BLD: 2.75 M/UL — LOW (ref 3.8–5.2)
RBC # FLD: 18.8 % — HIGH (ref 10.3–14.5)
RBC CASTS # UR COMP ASSIST: SIGNIFICANT CHANGE UP /HPF (ref 0–4)
SODIUM SERPL-SCNC: 141 MMOL/L — SIGNIFICANT CHANGE UP (ref 135–145)
SP GR SPEC: 1.02 — SIGNIFICANT CHANGE UP (ref 1.01–1.02)
SPECIMEN SOURCE: SIGNIFICANT CHANGE UP
SPECIMEN SOURCE: SIGNIFICANT CHANGE UP
UROBILINOGEN FLD QL: NEGATIVE MG/DL — SIGNIFICANT CHANGE UP
WBC # BLD: 11.16 K/UL — HIGH (ref 3.8–10.5)
WBC # FLD AUTO: 11.16 K/UL — HIGH (ref 3.8–10.5)
WBC UR QL: SIGNIFICANT CHANGE UP

## 2022-01-08 PROCEDURE — 71045 X-RAY EXAM CHEST 1 VIEW: CPT | Mod: 26

## 2022-01-08 PROCEDURE — 99233 SBSQ HOSP IP/OBS HIGH 50: CPT

## 2022-01-08 RX ADMIN — Medication 240 MILLIGRAM(S): at 05:50

## 2022-01-08 RX ADMIN — BUDESONIDE AND FORMOTEROL FUMARATE DIHYDRATE 2 PUFF(S): 160; 4.5 AEROSOL RESPIRATORY (INHALATION) at 22:32

## 2022-01-08 RX ADMIN — Medication 100 MILLIGRAM(S): at 22:33

## 2022-01-08 RX ADMIN — Medication 81 MILLIGRAM(S): at 11:07

## 2022-01-08 RX ADMIN — Medication 75 MICROGRAM(S): at 05:50

## 2022-01-08 RX ADMIN — MUPIROCIN 1 APPLICATION(S): 20 OINTMENT TOPICAL at 17:21

## 2022-01-08 RX ADMIN — MUPIROCIN 1 APPLICATION(S): 20 OINTMENT TOPICAL at 05:52

## 2022-01-08 RX ADMIN — CARVEDILOL PHOSPHATE 6.25 MILLIGRAM(S): 80 CAPSULE, EXTENDED RELEASE ORAL at 05:51

## 2022-01-08 RX ADMIN — Medication 1 APPLICATION(S): at 05:52

## 2022-01-08 RX ADMIN — Medication 25 MILLIGRAM(S): at 15:30

## 2022-01-08 RX ADMIN — BUMETANIDE 1 MILLIGRAM(S): 0.25 INJECTION INTRAMUSCULAR; INTRAVENOUS at 17:20

## 2022-01-08 RX ADMIN — Medication 0.25 MILLIGRAM(S): at 22:32

## 2022-01-08 RX ADMIN — Medication 100 MILLIGRAM(S): at 11:07

## 2022-01-08 RX ADMIN — Medication 75 MILLIGRAM(S): at 11:07

## 2022-01-08 RX ADMIN — Medication 1 TABLET(S): at 11:07

## 2022-01-08 RX ADMIN — BUDESONIDE AND FORMOTEROL FUMARATE DIHYDRATE 2 PUFF(S): 160; 4.5 AEROSOL RESPIRATORY (INHALATION) at 11:07

## 2022-01-08 RX ADMIN — SENNA PLUS 2 TABLET(S): 8.6 TABLET ORAL at 22:32

## 2022-01-08 RX ADMIN — CARVEDILOL PHOSPHATE 6.25 MILLIGRAM(S): 80 CAPSULE, EXTENDED RELEASE ORAL at 17:21

## 2022-01-08 RX ADMIN — FAMOTIDINE 20 MILLIGRAM(S): 10 INJECTION INTRAVENOUS at 11:07

## 2022-01-08 RX ADMIN — Medication 25 MILLIGRAM(S): at 22:32

## 2022-01-08 RX ADMIN — APIXABAN 2.5 MILLIGRAM(S): 2.5 TABLET, FILM COATED ORAL at 17:21

## 2022-01-08 RX ADMIN — APIXABAN 2.5 MILLIGRAM(S): 2.5 TABLET, FILM COATED ORAL at 05:50

## 2022-01-08 RX ADMIN — AMIODARONE HYDROCHLORIDE 200 MILLIGRAM(S): 400 TABLET ORAL at 05:50

## 2022-01-08 RX ADMIN — Medication 25 MILLIGRAM(S): at 05:51

## 2022-01-08 RX ADMIN — Medication 1 APPLICATION(S): at 17:21

## 2022-01-08 RX ADMIN — Medication 100 MILLIGRAM(S): at 15:30

## 2022-01-08 RX ADMIN — Medication 100 MILLIGRAM(S): at 05:51

## 2022-01-08 RX ADMIN — TIOTROPIUM BROMIDE 1 CAPSULE(S): 18 CAPSULE ORAL; RESPIRATORY (INHALATION) at 05:51

## 2022-01-08 RX ADMIN — BUMETANIDE 1 MILLIGRAM(S): 0.25 INJECTION INTRAMUSCULAR; INTRAVENOUS at 05:51

## 2022-01-08 NOTE — PROGRESS NOTE ADULT - SUBJECTIVE AND OBJECTIVE BOX
Chief Complaint: Leg swelling    Interval Events: No events overnight.    Review of Systems:  General: No fevers, chills, weight gain  Skin: No rashes, color changes  Cardiovascular: No chest pain, orthopnea  Respiratory: No shortness of breath, cough  Gastrointestinal: No nausea, abdominal pain  Genitourinary: No incontinence, pain with urination  Musculoskeletal: No pain, swelling, decreased range of motion  Neurological: No headache, weakness  Psychiatric: No depression, anxiety  Endocrine: No weight gain, increased thirst  All other systems are comprehensively negative.    Physical Exam:  Vital Signs Last 24 Hrs  T(C): 36.5 (08 Jan 2022 11:09), Max: 38.4 (07 Jan 2022 22:21)  T(F): 97.7 (08 Jan 2022 11:09), Max: 101.2 (07 Jan 2022 22:21)  HR: 65 (08 Jan 2022 11:09) (65 - 120)  BP: 128/76 (08 Jan 2022 11:09) (101/56 - 135/86)  BP(mean): --  RR: 16 (08 Jan 2022 11:09) (16 - 18)  SpO2: 98% (08 Jan 2022 11:09) (95% - 98%)  General: NAD  HEENT: MMM  Neck: No JVD, no carotid bruit  Lungs: CTAB  CV: RRR, nl S1/S2, no M/R/G  Abdomen: S/NT/ND, +BS  Extremities: 1+ LE edema, no cyanosis  Neuro: AAOx3, non-focal  Skin: Open leg wound    Labs:             01-07    143  |  103  |  48<H>  ----------------------------<  134<H>  3.9   |  40<H>  |  1.63<H>    Ca    8.4      07 Jan 2022 23:05  Phos  4.2     01-07  Mg     1.6     01-07    TPro  5.8<L>  /  Alb  2.8<L>  /  TBili  0.2  /  DBili  x   /  AST  22  /  ALT  <10<L>  /  AlkPhos  71  01-07                        8.9    11.16 )-----------( 341      ( 08 Jan 2022 10:22 )             29.7         Telemetry: AF

## 2022-01-08 NOTE — PROGRESS NOTE ADULT - SUBJECTIVE AND OBJECTIVE BOX
PULMONARY/CRITICAL CARE  DOS 22  Had fever spike  Unchanged.  Feels better. Less leg discomfort.   No  wheeze. No fever.   Pt. well known to me.    Patient is a 89y old  Female who presents with a chief complaint of cellulitis of legs  BRIEF HOSPITAL COURSE: ***89F PMHx Paroxysmal Atrial Fibrillation, HTN, Hypothyroidism, COPD (2-3 L nasal cannula at home) Hyperlipidemia, presenting with Bilateral LE edema and right LE wounds.  Patient was admitted to [facility] - with Covid pneumonia and - for acute CHF and Afib with RVR.  She was discharged to [facility] for ALEX.  While there her edema was increasing, and a little more than a week ago she developed a blister on her right ankle and lower leg that both burst.  She has pain in her leg especially at night.  No fevers or chills, no further SOB.  Her oxygen needs have not changed.  Her grandson became more and more concerned about her leg and felt that staff at [facility] was not addressing it so they requested that she be sent to [facility] for evaluation.    Events last 24 hours: ***    PAST MEDICAL & SURGICAL HISTORY:  Hypertension    Depression    Overactive bladder    Gout    Osteoarthritis    Hypothyroid    HLD (hyperlipidemia)    Paroxysmal atrial fibrillation    Asthma    Chronic obstructive pulmonary disease (COPD)  on home oxygen 2.5-3L NC    S/P cholecystectomy    S/P lumpectomy of breast    S/P hysterectomy    S/P lung surgery, follow-up exam  s/p removal of suspicious lesion, negative      Allergies    Tomas Cheese - Pt states the one time she had a mouthful of tomas cheese, her throat was closing and she required an epinephrine injection. (Anaphylaxis)  iodine (Anaphylaxis)  penicillin (Other)  shellfish (Short breath; Hives)  sulfa drugs (Hives)    Intolerances      FAMILY HISTORY:  Family history of nasopharyngeal cancer (Child)  daughter    Family history of breast cancer (Mother)          Medications:  ceFAZolin   IVPB 1000 milliGRAM(s) IV Intermittent every 8 hours    aMIOdarone    Tablet 200 milliGRAM(s) Oral daily  carvedilol 6.25 milliGRAM(s) Oral every 12 hours  diltiazem    milliGRAM(s) Oral daily  hydrALAZINE 25 milliGRAM(s) Oral every 8 hours    benzonatate 100 milliGRAM(s) Oral every 8 hours PRN  budesonide 160 MICROgram(s)/formoterol 4.5 MICROgram(s) Inhaler 2 Puff(s) Inhalation two times a day  tiotropium 18 MICROgram(s) Capsule 1 Capsule(s) Inhalation daily    acetaminophen     Tablet .. 650 milliGRAM(s) Oral every 6 hours PRN  ALPRAZolam 0.25 milliGRAM(s) Oral at bedtime  venlafaxine XR. 37.5 milliGRAM(s) Oral daily      apixaban 2.5 milliGRAM(s) Oral two times a day  aspirin enteric coated 81 milliGRAM(s) Oral daily    famotidine    Tablet 20 milliGRAM(s) Oral daily  polyethylene glycol 3350 17 Gram(s) Oral daily PRN  senna 2 Tablet(s) Oral at bedtime      allopurinol 100 milliGRAM(s) Oral daily  levothyroxine 75 MICROGram(s) Oral daily    multivitamin/minerals 1 Tablet(s) Oral daily  potassium chloride    Tablet ER 40 milliEquivalent(s) Oral every 4 hours                ICU Vital Signs Last 24 Hrs  T(C): 36.7 (2022 15:00), Max: 36.7 (2022 15:00)  T(F): 98 (2022 15:00), Max: 98 (2022 15:00)  HR: 82 (2022 15:00) (82 - 111)  BP: 142/80 (2022 15:00) (128/70 - 142/80)  BP(mean): --  ABP: --  ABP(mean): --  RR: 25 (2022 15:00) (20 - 25)  SpO2: 98% (2022 15:00) (98% - 98%)    Vital Signs Last 24 Hrs  T(C): 36.7 (2022 15:00), Max: 36.7 (2022 15:00)  T(F): 98 (2022 15:00), Max: 98 (2022 15:00)  HR: 82 (2022 15:00) (82 - 111)  BP: 142/80 (2022 15:00) (128/70 - 142/80)  BP(mean): --  RR: 25 (2022 15:00) (20 - 25)  SpO2: 98% (2022 15:00) (98% - 98%)        I&O's Detail        LABS:                        9.5    7.52  )-----------( 239      ( 2022 12:27 )             30.7         141  |  102  |  44<H>  ----------------------------<  107<H>  3.4<L>   |  38<H>  |  1.89<H>    Ca    8.3<L>      2022 12:27    TPro  5.8<L>  /  Alb  2.8<L>  /  TBili  0.4  /  DBili  x   /  AST  27  /  ALT  20  /  AlkPhos  59  03          CAPILLARY BLOOD GLUCOSE        PT/INR - ( 2022 12:27 )   PT: 14.6 sec;   INR: 1.22 ratio         PTT - ( 2022 12:27 )  PTT:29.5 sec  Urinalysis Basic - ( 2022 14:26 )    Color: Yellow / Appearance: Clear / S.015 / pH: x  Gluc: x / Ketone: Negative  / Bili: Negative / Urobili: Negative mg/dL   Blood: x / Protein: 15 mg/dL / Nitrite: Negative   Leuk Esterase: Trace / RBC: Negative /HPF / WBC 6-10   Sq Epi: x / Non Sq Epi: Moderate / Bacteria: Few      CULTURES:      Physical Examination:    General: No acute distress.  elderly female    HEENT: Pupils equal, reactive to light.  Symmetric.    PULM: clear no wheeze no  rhonchi rales    CVS: Regular rate and rhythm, no murmurs, rubs, or gallops    ABD: Soft, nondistended, nontender, normoactive bowel sounds, no masses    EXT: 3 plus leg edema, with erythema; bandaged  lower leg    SKIN: Warm and well perfused, no rashes noted.    NEURO: Alert, oriented, interactive, nonfocal    RADIOLOGY: ***rad< from: US Duplex Venous Lower Ext Complete, Bilateral (22 @ 12:55) >  ACC: [id number] EXAM:  US DPLX LWR EXT VEINS COMPL BI                          PROCEDURE DATE:  2022          INTERPRETATION:  CLINICAL INFORMATION: Bilateral leg swelling.    COMPARISON: Doppler venous sonogram 2021.    TECHNIQUE: Duplex sonography of the BILATERAL LOWER extremity veins with   color and spectral Doppler, with and without compression.    FINDINGS:    RIGHT:  Normal compressibility of the RIGHT common femoral, femoral and popliteal   veins.  Doppler examination shows normal spontaneous and phasic flow.  No RIGHT calf vein thrombosis is detected.    LEFT:  Normal compressibility of the LEFT common femoral, femoral and popliteal   veins.  Doppler examination shows normal spontaneous and phasic flow.  No LEFT calf veinthrombosis is detected.    IMPRESSION:    No evidence of deep venous thrombosis in either lower extremity.          --- End of Report ---    < end of copied text >  < from: Xray Chest 1 View- PORTABLE-Urgent (Xray Chest 1 View- PORTABLE-Urgent .) (22 @ 11:58) >  ACC: [id number] EXAM:  XR CHEST PORTABLE URGENT 1V                          PROCEDURE DATE:  2022          INTERPRETATION:  Chest portable.    Clinical History: Shortness of breath. Bilateral leg edema.    Comparison: 2021.    Single AP view submitted.    The evaluation of the cardiomediastinal silhouette is limited on portable   technique.  Calcified aorta.    Again noted is blunting of the bilateral costophrenic angles which may   reflect small pleural effusions and/or pleural thickening.  The lungs are mildly hyperinflated with increased bronchovascular   markings.  Again noted postsurgical changes with sutures right midlung zone.    Impression:    Findings as discussed above.    < end of copied text >      CRITICAL CARE TIME SPENT: ***

## 2022-01-08 NOTE — PROGRESS NOTE ADULT - SUBJECTIVE AND OBJECTIVE BOX
CHARLES GRAHAM is a 89yFemale , patient examined and chart reviewed.    INTERVAL HPI/ OVERNIGHT EVENTS:   Events noted Febrile last night Tmax 101.2.   No further fevers.  NAD.    PAST MEDICAL & SURGICAL HISTORY:  Hypertension  Depression  Overactive bladder  Gout  Osteoarthritis  Hypothyroid  HLD (hyperlipidemia)  Paroxysmal atrial fibrillation  Asthma  Chronic obstructive pulmonary disease (COPD)  on home oxygen 2.5-3L NC  S/P cholecystectomy  S/P lumpectomy of breast  S/P hysterectomy  S/P lung surgery, follow-up exam  s/p removal of suspicious lesion, negative    For details regarding the patient's social history, family history, and other miscellaneous elements, please refer the initial infectious diseases consultation and/or the admitting history and physical examination for this admission.       ROS:  CONSTITUTIONAL:  + fever no chills  EYES:  Negative  blurry vision or double vision  CARDIOVASCULAR:  Negative for chest pain or palpitations  RESPIRATORY:  Negative for cough, wheezing, or SOB   GASTROINTESTINAL:  Negative for nausea, vomiting, diarrhea, constipation, or abdominal pain  GENITOURINARY:  Negative frequency, urgency or dysuria  NEUROLOGIC:  No headache, confusion, dizziness, lightheadedness  All other systems were reviewed and are negative     ALLERGIES  Tomas Cheese - Pt states the one time she had a mouthful of tomas cheese, her throat was closing and she required an epinephrine injection. (Anaphylaxis)  iodine (Anaphylaxis)  penicillin (Other)  shellfish (Short breath; Hives)  sulfa drugs (Hives)      Current inpatient medications :    ANTIBIOTICS/RELEVANT:  ceFAZolin   IVPB 1000 milliGRAM(s) IV Intermittent every 8 hours    MEDICATIONS  (STANDING):  allopurinol 100 milliGRAM(s) Oral daily  ALPRAZolam 0.25 milliGRAM(s) Oral at bedtime  aMIOdarone    Tablet 200 milliGRAM(s) Oral daily  ammonium lactate 12% Lotion 1 Application(s) Topical two times a day  apixaban 2.5 milliGRAM(s) Oral two times a day  aspirin enteric coated 81 milliGRAM(s) Oral daily  budesonide 160 MICROgram(s)/formoterol 4.5 MICROgram(s) Inhaler 2 Puff(s) Inhalation two times a day  buMETAnide 1 milliGRAM(s) Oral two times a day  carvedilol 6.25 milliGRAM(s) Oral every 12 hours  coronavirus (EUA) Booster Vaccine (MODERNA) 0.25 milliLiter(s) IntraMuscular once  diltiazem    milliGRAM(s) Oral daily  famotidine    Tablet 20 milliGRAM(s) Oral daily  hydrALAZINE 25 milliGRAM(s) Oral every 8 hours  influenza  Vaccine (HIGH DOSE) 0.7 milliLiter(s) IntraMuscular once  levothyroxine 75 MICROGram(s) Oral daily  multivitamin/minerals 1 Tablet(s) Oral daily  mupirocin 2% Ointment 1 Application(s) Topical two times a day  senna 2 Tablet(s) Oral at bedtime  tiotropium 18 MICROgram(s) Capsule 1 Capsule(s) Inhalation daily  venlafaxine XR. 75 milliGRAM(s) Oral daily    MEDICATIONS  (PRN):  acetaminophen     Tablet .. 650 milliGRAM(s) Oral every 6 hours PRN Temp greater or equal to 38C (100.4F), Mild Pain (1 - 3)  benzonatate 100 milliGRAM(s) Oral every 8 hours PRN Cough  polyethylene glycol 3350 17 Gram(s) Oral daily PRN Constipation          Objective:  Vital Signs Last 24 Hrs  T(C): 36.4 (2022 22:46), Max: 36.7 (2022 05:36)  T(F): 97.6 (2022 22:46), Max: 98 (2022 05:36)  HR: 97 (2022 22:46) (65 - 97)  BP: 132/66 (2022 22:46) (128/76 - 141/61)  RR: 17 (2022 22:46) (16 - 18)  SpO2: 93% (2022 22:46) (93% - 98%)    Physical Exam:  General: no acute distress  Neck: supple, trachea midline  Lungs: clear, no wheeze/rhonchi  Cardiovascular: regular rate and rhythm, S1 S2  Abdomen: soft, nontender,  bowel sounds normal  Neurological: alert and oriented x3  Skin: no rash  Extremities: +3 edema  right leg venous ulcers Minimal lower leg erythema resolving      LABS:                        8.9    11.16 )-----------( 341      ( 2022 10:22 )             29.7   01-08    141  |  101  |  51<H>  ----------------------------<  92  4.3   |  35<H>  |  1.68<H>    Ca    8.8      2022 10:22  Phos  4.2       Mg     1.6         TPro  6.0  /  Alb  2.8<L>  /  TBili  0.2  /  DBili  x   /  AST  23  /  ALT  <10<L>  /  AlkPhos  67      Urinalysis Basic - ( 2022 19:32 )    Color: Yellow / Appearance: Clear / S.020 / pH: x  Gluc: x / Ketone: Negative  / Bili: Negative / Urobili: Negative mg/dL   Blood: x / Protein: 15 mg/dL / Nitrite: Negative   Leuk Esterase: Small / RBC: 0-2 /HPF / WBC 0-2   Sq Epi: x / Non Sq Epi: Few / Bacteria: Few      MICROBIOLOGY:  COVID-19 PCR . (22 @ 23:05)    COVID-19 PCR: NotDetec    Culture - Urine (22 @ 00:55)    -  Ampicillin: S <=2 Predicts results to ampicillin/sulbactam, amoxacillin-clavulanate and  piperacillin-tazobactam.    -  Ciprofloxacin: S <=1    -  Levofloxacin: S <=1    -  Nitrofurantoin: S <=32 Should not be used to treat pyelonephritis.    -  Tetra/Doxy: R >8    -  Vancomycin: S 2    Specimen Source: Catheterized Catheterized    Culture Results:   10,000 - 49,000 CFU/mL Enterococcus faecalis    Organism Identification: Enterococcus faecalis    Organism: Enterococcus faecalis    Method Type: BRAVO    Culture - Blood (collected 2022 16:15)  Source: .Blood Blood  Preliminary Report (2022 17:02):    No growth to date.    Culture - Blood (collected 2022 16:15)  Source: .Blood Blood  Preliminary Report (2022 17:02):    No growth to date.      RADIOLOGY & ADDITIONAL STUDIES:  ACC: [id number] EXAM:  US DPLX LWR EXT VEINS COMPL BI                          PROCEDURE DATE:  2022          INTERPRETATION:  CLINICAL INFORMATION: Bilateral leg swelling.    COMPARISON: Doppler venous sonogram 2021.    TECHNIQUE: Duplex sonography of the BILATERAL LOWER extremity veins with   color and spectral Doppler, with and without compression.    FINDINGS:    RIGHT:  Normal compressibility of the RIGHT common femoral, femoral and popliteal   veins.  Doppler examination shows normal spontaneous and phasic flow.  No RIGHT calf vein thrombosis is detected.    LEFT:  Normal compressibility of the LEFT common femoral, femoral and popliteal   veins.  Doppler examination shows normal spontaneous and phasic flow.  No LEFT calf veinthrombosis is detected.    IMPRESSION:    No evidence of deep venous thrombosis in either lower extremity.        ACC: [id number] EXAM:  XR CHEST PORTABLE URGENT 1V                          PROCEDURE DATE:  2022          INTERPRETATION:  Chest portable.    Clinical History: Shortness of breath. Bilateral leg edema.    Comparison: 2021.    Single AP view submitted.    The evaluation of the cardiomediastinal silhouette is limited on portable   technique.  Calcified aorta.    Again noted is blunting of the bilateral costophrenic angles which may   reflect small pleural effusions and/or pleural thickening.  The lungs are mildly hyperinflated with increased bronchovascular   markings.  Again noted postsurgical changes with sutures right midlung zone      Assessment :   89F PMHx Paroxysmal Atrial Fibrillation, HTN, Hypothyroidism, COPD (2-3 L nasal cannula at home) Hyperlipidemia recent admission to [facility] - with Covid pneumonia and - for acute CHF and Afib with RVR discharged to [facility] for ALEX who presented with worsening Bilateral LE edema and right LE wounds with erythema - mild cellulitis  Acute on Chronic CHF  COVID 19 + likely sec viral shedding rather than acute COVID 19 infection (recent admission to [facility] - with Covid pneumonia )  Asymptomatic bacteruria  On 3L NC ( home 02 )  New fever with leukocytosis etiology unclear- CXR shows improvement with no new pna. Ua neg  Doubt sec cellulitis    Plan :   Cont Ancef   Fu repeat cultures  Bladder scan  Trend temps and cbc  Diurese per cardiology  Pulm toileting  Elevate leg  Increase activity/OOB to chair      Continue with present regiment.  Appropriate use of antibiotics and adverse effects reviewed.      > 35 minutes were spent in direct patient care reviewing notes, medications ,labs data/ imaging , discussion with multidisciplinary team.    Thank you for allowing me to participate in care of your patient .    Kate Sorto MD  Infectious Disease  [phone]

## 2022-01-08 NOTE — PROGRESS NOTE ADULT - SUBJECTIVE AND OBJECTIVE BOX
Patient is a 89y old  Female who presents with a chief complaint of cellulitis right leg (06 Jan 2022 06:45)      INTERVAL HPI/OVERNIGHT EVENTS: Patient seen and examined at bedside. Febrile last night, sepsis workup sent. Abx changed to Ancef     MEDICATIONS  (STANDING):  allopurinol 100 milliGRAM(s) Oral daily  ALPRAZolam 0.25 milliGRAM(s) Oral at bedtime  aMIOdarone    Tablet 200 milliGRAM(s) Oral daily  ammonium lactate 12% Lotion 1 Application(s) Topical two times a day  apixaban 2.5 milliGRAM(s) Oral two times a day  aspirin enteric coated 81 milliGRAM(s) Oral daily  budesonide 160 MICROgram(s)/formoterol 4.5 MICROgram(s) Inhaler 2 Puff(s) Inhalation two times a day  buMETAnide 1 milliGRAM(s) Oral two times a day  carvedilol 6.25 milliGRAM(s) Oral every 12 hours  ceFAZolin   IVPB      ceFAZolin   IVPB 1000 milliGRAM(s) IV Intermittent every 8 hours  coronavirus (EUA) Booster Vaccine (MODERNA) 0.25 milliLiter(s) IntraMuscular once  diltiazem    milliGRAM(s) Oral daily  famotidine    Tablet 20 milliGRAM(s) Oral daily  hydrALAZINE 25 milliGRAM(s) Oral every 8 hours  influenza  Vaccine (HIGH DOSE) 0.7 milliLiter(s) IntraMuscular once  levothyroxine 75 MICROGram(s) Oral daily  multivitamin/minerals 1 Tablet(s) Oral daily  mupirocin 2% Ointment 1 Application(s) Topical two times a day  senna 2 Tablet(s) Oral at bedtime  tiotropium 18 MICROgram(s) Capsule 1 Capsule(s) Inhalation daily  venlafaxine XR. 75 milliGRAM(s) Oral daily    MEDICATIONS  (PRN):  acetaminophen     Tablet .. 650 milliGRAM(s) Oral every 6 hours PRN Temp greater or equal to 38C (100.4F), Mild Pain (1 - 3)  benzonatate 100 milliGRAM(s) Oral every 8 hours PRN Cough  polyethylene glycol 3350 17 Gram(s) Oral daily PRN Constipation      Allergies    Tomas Cheese - Pt states the one time she had a mouthful of tomas cheese, her throat was closing and she required an epinephrine injection. (Anaphylaxis)  iodine (Anaphylaxis)  penicillin (Other)  shellfish (Short breath; Hives)  sulfa drugs (Hives)    Intolerances        REVIEW OF SYSTEMS:  CONSTITUTIONAL: No fever or chills  HEENT:  No headache, no sore throat  RESPIRATORY: No cough, wheezing, or shortness of breath  CARDIOVASCULAR: No chest pain, palpitations, or leg swelling  GASTROINTESTINAL: No abd pain, nausea, vomiting, or diarrhea  GENITOURINARY: No dysuria, frequency, or hematuria  NEUROLOGICAL: no focal weakness or dizziness  MUSCULOSKELETAL: no myalgias     Vital Signs Last 24 Hrs  T(C): 36.7 (08 Jan 2022 05:36), Max: 38.4 (07 Jan 2022 22:21)  T(F): 98 (08 Jan 2022 05:36), Max: 101.2 (07 Jan 2022 22:21)  HR: 75 (08 Jan 2022 05:36) (75 - 120)  BP: 135/86 (08 Jan 2022 05:36) (101/56 - 135/86)  BP(mean): --  RR: 18 (08 Jan 2022 05:36) (18 - 18)  SpO2: 95% (08 Jan 2022 05:36) (95% - 98%)    PHYSICAL EXAM:  GENERAL: NAD, elderly   HEENT:  EOMI, moist mucous membranes  CHEST/LUNG:  CTA b/l, no rales, wheezes, or rhonchi  HEART:  RRR, S1, S2  ABDOMEN:  BS+, soft, nontender, nondistended  EXTREMITIES: no edema, cyanosis, or calf tenderness. 2+ bilateral trace LE edema, dressing on wound on right leg.   NERVOUS SYSTEM: AA&Ox3    LABS:                        8.5    11.13 )-----------( 324      ( 07 Jan 2022 23:05 )             28.0     CBC Full  -  ( 07 Jan 2022 23:05 )  WBC Count : 11.13 K/uL  Hemoglobin : 8.5 g/dL  Hematocrit : 28.0 %  Platelet Count - Automated : 324 K/uL  Mean Cell Volume : 106.5 fl  Mean Cell Hemoglobin : 32.3 pg  Mean Cell Hemoglobin Concentration : 30.4 gm/dL  Auto Neutrophil # : x  Auto Lymphocyte # : x  Auto Monocyte # : x  Auto Eosinophil # : x  Auto Basophil # : x  Auto Neutrophil % : x  Auto Lymphocyte % : x  Auto Monocyte % : x  Auto Eosinophil % : x  Auto Basophil % : x    07 Jan 2022 23:05    143    |  103    |  48     ----------------------------<  134    3.9     |  40     |  1.63     Ca    8.4        07 Jan 2022 23:05  Phos  4.2       07 Jan 2022 23:05  Mg     1.6       07 Jan 2022 23:05    TPro  5.8    /  Alb  2.8    /  TBili  0.2    /  DBili  x      /  AST  22     /  ALT  <10    /  AlkPhos  71     07 Jan 2022 23:05        CAPILLARY BLOOD GLUCOSE            Culture - Urine (collected 01-04-22 @ 00:55)  Source: Catheterized Catheterized  Final Report (01-06-22 @ 16:10):    10,000 - 49,000 CFU/mL Enterococcus faecalis  Organism: Enterococcus faecalis (01-06-22 @ 16:10)  Organism: Enterococcus faecalis (01-06-22 @ 16:10)      -  Ampicillin: S <=2 Predicts results to ampicillin/sulbactam, amoxacillin-clavulanate and  piperacillin-tazobactam.      -  Ciprofloxacin: S <=1      -  Levofloxacin: S <=1      -  Nitrofurantoin: S <=32 Should not be used to treat pyelonephritis.      -  Tetra/Doxy: R >8      -  Vancomycin: S 2      Method Type: BRAVO    Culture - Blood (collected 01-03-22 @ 16:15)  Source: .Blood Blood  Preliminary Report (01-04-22 @ 17:02):    No growth to date.    Culture - Blood (collected 01-03-22 @ 16:15)  Source: .Blood Blood  Preliminary Report (01-04-22 @ 17:02):    No growth to date.        RADIOLOGY & ADDITIONAL TESTS:     Consultant(s) Notes Reviewed:  [x] YES  [ ] NO    Care Discussed with [x] Consultants  [x] Patient  [ ] Family  [ ]      [ x] Other; RN  DVT ppx

## 2022-01-09 DIAGNOSIS — D53.9 NUTRITIONAL ANEMIA, UNSPECIFIED: ICD-10-CM

## 2022-01-09 LAB
ANION GAP SERPL CALC-SCNC: 2 MMOL/L — LOW (ref 5–17)
BASOPHILS # BLD AUTO: 0.04 K/UL — SIGNIFICANT CHANGE UP (ref 0–0.2)
BASOPHILS NFR BLD AUTO: 0.4 % — SIGNIFICANT CHANGE UP (ref 0–2)
BUN SERPL-MCNC: 54 MG/DL — HIGH (ref 7–23)
CALCIUM SERPL-MCNC: 8.6 MG/DL — SIGNIFICANT CHANGE UP (ref 8.4–10.5)
CHLORIDE SERPL-SCNC: 101 MMOL/L — SIGNIFICANT CHANGE UP (ref 96–108)
CO2 SERPL-SCNC: 40 MMOL/L — HIGH (ref 22–31)
CREAT SERPL-MCNC: 1.62 MG/DL — HIGH (ref 0.5–1.3)
CULTURE RESULTS: SIGNIFICANT CHANGE UP
EOSINOPHIL # BLD AUTO: 0.14 K/UL — SIGNIFICANT CHANGE UP (ref 0–0.5)
EOSINOPHIL NFR BLD AUTO: 1.3 % — SIGNIFICANT CHANGE UP (ref 0–6)
GLUCOSE SERPL-MCNC: 111 MG/DL — HIGH (ref 70–99)
HCT VFR BLD CALC: 27.3 % — LOW (ref 34.5–45)
HGB BLD-MCNC: 8.1 G/DL — LOW (ref 11.5–15.5)
IMM GRANULOCYTES NFR BLD AUTO: 1.8 % — HIGH (ref 0–1.5)
LYMPHOCYTES # BLD AUTO: 19.8 % — SIGNIFICANT CHANGE UP (ref 13–44)
LYMPHOCYTES # BLD AUTO: 2.15 K/UL — SIGNIFICANT CHANGE UP (ref 1–3.3)
MCHC RBC-ENTMCNC: 29.7 GM/DL — LOW (ref 32–36)
MCHC RBC-ENTMCNC: 31.6 PG — SIGNIFICANT CHANGE UP (ref 27–34)
MCV RBC AUTO: 106.6 FL — HIGH (ref 80–100)
MONOCYTES # BLD AUTO: 1.22 K/UL — HIGH (ref 0–0.9)
MONOCYTES NFR BLD AUTO: 11.2 % — SIGNIFICANT CHANGE UP (ref 2–14)
NEUTROPHILS # BLD AUTO: 7.12 K/UL — SIGNIFICANT CHANGE UP (ref 1.8–7.4)
NEUTROPHILS NFR BLD AUTO: 65.5 % — SIGNIFICANT CHANGE UP (ref 43–77)
NRBC # BLD: 0 /100 WBCS — SIGNIFICANT CHANGE UP (ref 0–0)
PLATELET # BLD AUTO: 342 K/UL — SIGNIFICANT CHANGE UP (ref 150–400)
POTASSIUM SERPL-MCNC: 3.9 MMOL/L — SIGNIFICANT CHANGE UP (ref 3.5–5.3)
POTASSIUM SERPL-SCNC: 3.9 MMOL/L — SIGNIFICANT CHANGE UP (ref 3.5–5.3)
PROCALCITONIN SERPL-MCNC: 0.19 NG/ML — HIGH (ref 0.02–0.1)
PROCALCITONIN SERPL-MCNC: 0.22 NG/ML — HIGH (ref 0.02–0.1)
RBC # BLD: 2.56 M/UL — LOW (ref 3.8–5.2)
RBC # FLD: 18.4 % — HIGH (ref 10.3–14.5)
SODIUM SERPL-SCNC: 143 MMOL/L — SIGNIFICANT CHANGE UP (ref 135–145)
SPECIMEN SOURCE: SIGNIFICANT CHANGE UP
WBC # BLD: 10.87 K/UL — HIGH (ref 3.8–10.5)
WBC # FLD AUTO: 10.87 K/UL — HIGH (ref 3.8–10.5)

## 2022-01-09 PROCEDURE — 99232 SBSQ HOSP IP/OBS MODERATE 35: CPT

## 2022-01-09 RX ORDER — ALPRAZOLAM 0.25 MG
0.25 TABLET ORAL AT BEDTIME
Refills: 0 | Status: DISCONTINUED | OUTPATIENT
Start: 2022-01-09 | End: 2022-01-10

## 2022-01-09 RX ORDER — ALPRAZOLAM 0.25 MG
0.25 TABLET ORAL ONCE
Refills: 0 | Status: DISCONTINUED | OUTPATIENT
Start: 2022-01-09 | End: 2022-01-09

## 2022-01-09 RX ADMIN — Medication 100 MILLIGRAM(S): at 21:53

## 2022-01-09 RX ADMIN — Medication 75 MICROGRAM(S): at 05:33

## 2022-01-09 RX ADMIN — Medication 25 MILLIGRAM(S): at 13:50

## 2022-01-09 RX ADMIN — BUDESONIDE AND FORMOTEROL FUMARATE DIHYDRATE 2 PUFF(S): 160; 4.5 AEROSOL RESPIRATORY (INHALATION) at 18:40

## 2022-01-09 RX ADMIN — BUDESONIDE AND FORMOTEROL FUMARATE DIHYDRATE 2 PUFF(S): 160; 4.5 AEROSOL RESPIRATORY (INHALATION) at 05:32

## 2022-01-09 RX ADMIN — Medication 0.25 MILLIGRAM(S): at 21:53

## 2022-01-09 RX ADMIN — Medication 240 MILLIGRAM(S): at 05:33

## 2022-01-09 RX ADMIN — Medication 25 MILLIGRAM(S): at 21:53

## 2022-01-09 RX ADMIN — CARVEDILOL PHOSPHATE 6.25 MILLIGRAM(S): 80 CAPSULE, EXTENDED RELEASE ORAL at 05:33

## 2022-01-09 RX ADMIN — AMIODARONE HYDROCHLORIDE 200 MILLIGRAM(S): 400 TABLET ORAL at 05:33

## 2022-01-09 RX ADMIN — Medication 100 MILLIGRAM(S): at 13:50

## 2022-01-09 RX ADMIN — CARVEDILOL PHOSPHATE 6.25 MILLIGRAM(S): 80 CAPSULE, EXTENDED RELEASE ORAL at 18:39

## 2022-01-09 RX ADMIN — Medication 100 MILLIGRAM(S): at 12:39

## 2022-01-09 RX ADMIN — FAMOTIDINE 20 MILLIGRAM(S): 10 INJECTION INTRAVENOUS at 12:39

## 2022-01-09 RX ADMIN — APIXABAN 2.5 MILLIGRAM(S): 2.5 TABLET, FILM COATED ORAL at 18:38

## 2022-01-09 RX ADMIN — BUMETANIDE 1 MILLIGRAM(S): 0.25 INJECTION INTRAMUSCULAR; INTRAVENOUS at 18:38

## 2022-01-09 RX ADMIN — Medication 1 TABLET(S): at 12:39

## 2022-01-09 RX ADMIN — Medication 0.25 MILLIGRAM(S): at 13:50

## 2022-01-09 RX ADMIN — MUPIROCIN 1 APPLICATION(S): 20 OINTMENT TOPICAL at 18:39

## 2022-01-09 RX ADMIN — Medication 25 MILLIGRAM(S): at 05:33

## 2022-01-09 RX ADMIN — Medication 75 MILLIGRAM(S): at 12:39

## 2022-01-09 RX ADMIN — Medication 100 MILLIGRAM(S): at 05:32

## 2022-01-09 RX ADMIN — BUMETANIDE 1 MILLIGRAM(S): 0.25 INJECTION INTRAMUSCULAR; INTRAVENOUS at 05:33

## 2022-01-09 RX ADMIN — Medication 81 MILLIGRAM(S): at 12:39

## 2022-01-09 RX ADMIN — MUPIROCIN 1 APPLICATION(S): 20 OINTMENT TOPICAL at 05:32

## 2022-01-09 RX ADMIN — Medication 1 APPLICATION(S): at 05:34

## 2022-01-09 RX ADMIN — TIOTROPIUM BROMIDE 1 CAPSULE(S): 18 CAPSULE ORAL; RESPIRATORY (INHALATION) at 05:33

## 2022-01-09 RX ADMIN — APIXABAN 2.5 MILLIGRAM(S): 2.5 TABLET, FILM COATED ORAL at 05:33

## 2022-01-09 NOTE — PROGRESS NOTE ADULT - SUBJECTIVE AND OBJECTIVE BOX
CHARLES GRAHAM is a 89yFemale , patient examined and chart reviewed.    INTERVAL HPI/ OVERNIGHT EVENTS:   In chair. No events.  No further fevers.  NAD.    PAST MEDICAL & SURGICAL HISTORY:  Hypertension  Depression  Overactive bladder  Gout  Osteoarthritis  Hypothyroid  HLD (hyperlipidemia)  Paroxysmal atrial fibrillation  Asthma  Chronic obstructive pulmonary disease (COPD)  on home oxygen 2.5-3L NC  S/P cholecystectomy  S/P lumpectomy of breast  S/P hysterectomy  S/P lung surgery, follow-up exam  s/p removal of suspicious lesion, negative    For details regarding the patient's social history, family history, and other miscellaneous elements, please refer the initial infectious diseases consultation and/or the admitting history and physical examination for this admission.       ROS:  CONSTITUTIONAL:  no fever no chills  EYES:  Negative  blurry vision or double vision  CARDIOVASCULAR:  Negative for chest pain or palpitations  RESPIRATORY:  Negative for cough, wheezing, or SOB   GASTROINTESTINAL:  Negative for nausea, vomiting, diarrhea, constipation, or abdominal pain  GENITOURINARY:  Negative frequency, urgency or dysuria  NEUROLOGIC:  No headache, confusion, dizziness, lightheadedness  All other systems were reviewed and are negative     ALLERGIES  Tomas Cheese - Pt states the one time she had a mouthful of tomas cheese, her throat was closing and she required an epinephrine injection. (Anaphylaxis)  iodine (Anaphylaxis)  penicillin (Other)  shellfish (Short breath; Hives)  sulfa drugs (Hives)      Current inpatient medications :    ANTIBIOTICS/RELEVANT:  ceFAZolin   IVPB 1000 milliGRAM(s) IV Intermittent every 8 hours    MEDICATIONS  (STANDING):  allopurinol 100 milliGRAM(s) Oral daily  ALPRAZolam 0.25 milliGRAM(s) Oral at bedtime  aMIOdarone    Tablet 200 milliGRAM(s) Oral daily  ammonium lactate 12% Lotion 1 Application(s) Topical two times a day  apixaban 2.5 milliGRAM(s) Oral two times a day  aspirin enteric coated 81 milliGRAM(s) Oral daily  budesonide 160 MICROgram(s)/formoterol 4.5 MICROgram(s) Inhaler 2 Puff(s) Inhalation two times a day  buMETAnide 1 milliGRAM(s) Oral two times a day  carvedilol 6.25 milliGRAM(s) Oral every 12 hours  coronavirus (EUA) Booster Vaccine (MODERNA) 0.25 milliLiter(s) IntraMuscular once  diltiazem    milliGRAM(s) Oral daily  famotidine    Tablet 20 milliGRAM(s) Oral daily  hydrALAZINE 25 milliGRAM(s) Oral every 8 hours  influenza  Vaccine (HIGH DOSE) 0.7 milliLiter(s) IntraMuscular once  levothyroxine 75 MICROGram(s) Oral daily  multivitamin/minerals 1 Tablet(s) Oral daily  mupirocin 2% Ointment 1 Application(s) Topical two times a day  senna 2 Tablet(s) Oral at bedtime  tiotropium 18 MICROgram(s) Capsule 1 Capsule(s) Inhalation daily  venlafaxine XR. 75 milliGRAM(s) Oral daily    MEDICATIONS  (PRN):  acetaminophen     Tablet .. 650 milliGRAM(s) Oral every 6 hours PRN Temp greater or equal to 38C (100.4F), Mild Pain (1 - 3)  benzonatate 100 milliGRAM(s) Oral every 8 hours PRN Cough  polyethylene glycol 3350 17 Gram(s) Oral daily PRN Constipation        Objective:  Vital Signs Last 24 Hrs  T(C): 36.8 (2022 21:59), Max: 36.8 (2022 05:11)  T(F): 98.2 (2022 21:59), Max: 98.2 (2022 05:11)  HR: 100 (2022 21:59) (74 - 113)  BP: 109/67 (2022 21:59) (109/67 - 128/84)  RR: 17 (2022 21:59) (16 - 18)  SpO2: 98% (2022 21:59) (97% - 99%)    Physical Exam:  General: no acute distress  Neck: supple, trachea midline  Lungs: clear, no wheeze/rhonchi  Cardiovascular: regular rate and rhythm, S1 S2  Abdomen: soft, nontender,  bowel sounds normal  Neurological: alert and oriented x3  Skin: no rash  Extremities: +3 edema  right leg venous ulcers Minimal lower leg erythema resolved      LABS:                        8.1    10.87 )-----------( 342      ( 2022 07:37 )             27.3   -09    143  |  101  |  54<H>  ----------------------------<  111<H>  3.9   |  40<H>  |  1.62<H>    Ca    8.6      2022 07:37    TPro  6.0  /  Alb  2.8<L>  /  TBili  0.2  /  DBili  x   /  AST  23  /  ALT  <10<L>  /  AlkPhos  67  08      Urinalysis Basic - ( 2022 19:32 )    Color: Yellow / Appearance: Clear / S.020 / pH: x  Gluc: x / Ketone: Negative  / Bili: Negative / Urobili: Negative mg/dL   Blood: x / Protein: 15 mg/dL / Nitrite: Negative   Leuk Esterase: Small / RBC: 0-2 /HPF / WBC 0-2   Sq Epi: x / Non Sq Epi: Few / Bacteria: Few      MICROBIOLOGY:    Culture - Urine (collected 2022 02:26)  Source: Clean Catch Clean Catch (Midstream)  Final Report (2022 22:03):    <10,000 CFU/mL Normal Urogenital Kassie    Culture - Blood (collected 2022 12:05)  Source: .Blood Blood-Peripheral  Preliminary Report (2022 13:02):    No growth to date.    Culture - Blood (collected 2022 12:05)  Source: .Blood Blood-Peripheral  Preliminary Report (2022 13:02):    No growth to date.      COVID-19 PCR . (22 @ 23:05)    COVID-19 PCR: NotDetec    Culture - Urine (22 @ 00:55)    -  Ampicillin: S <=2 Predicts results to ampicillin/sulbactam, amoxacillin-clavulanate and  piperacillin-tazobactam.    -  Ciprofloxacin: S <=1    -  Levofloxacin: S <=1    -  Nitrofurantoin: S <=32 Should not be used to treat pyelonephritis.    -  Tetra/Doxy: R >8    -  Vancomycin: S 2    Specimen Source: Catheterized Catheterized    Culture Results:   10,000 - 49,000 CFU/mL Enterococcus faecalis    Organism Identification: Enterococcus faecalis    Organism: Enterococcus faecalis    Method Type: BRAVO    Culture - Blood (collected 2022 16:15)  Source: .Blood Blood  Preliminary Report (2022 17:02):    No growth to date.    Culture - Blood (collected 2022 16:15)  Source: .Blood Blood  Preliminary Report (2022 17:02):    No growth to date.      RADIOLOGY & ADDITIONAL STUDIES:  ACC: [id number] EXAM:  US DPLX LWR EXT VEINS COMPL BI                          PROCEDURE DATE:  2022          INTERPRETATION:  CLINICAL INFORMATION: Bilateral leg swelling.    COMPARISON: Doppler venous sonogram 2021.    TECHNIQUE: Duplex sonography of the BILATERAL LOWER extremity veins with   color and spectral Doppler, with and without compression.    FINDINGS:    RIGHT:  Normal compressibility of the RIGHT common femoral, femoral and popliteal   veins.  Doppler examination shows normal spontaneous and phasic flow.  No RIGHT calf vein thrombosis is detected.    LEFT:  Normal compressibility of the LEFT common femoral, femoral and popliteal   veins.  Doppler examination shows normal spontaneous and phasic flow.  No LEFT calf veinthrombosis is detected.    IMPRESSION:    No evidence of deep venous thrombosis in either lower extremity.        ACC: [id number] EXAM:  XR CHEST PORTABLE URGENT 1V                          PROCEDURE DATE:  2022          INTERPRETATION:  Chest portable.    Clinical History: Shortness of breath. Bilateral leg edema.    Comparison: 2021.    Single AP view submitted.    The evaluation of the cardiomediastinal silhouette is limited on portable   technique.  Calcified aorta.    Again noted is blunting of the bilateral costophrenic angles which may   reflect small pleural effusions and/or pleural thickening.  The lungs are mildly hyperinflated with increased bronchovascular   markings.  Again noted postsurgical changes with sutures right midlung zone      Assessment :   89F PMHx Paroxysmal Atrial Fibrillation, HTN, Hypothyroidism, COPD (2-3 L nasal cannula at home) Hyperlipidemia recent admission to [facility] - with Covid pneumonia and - for acute CHF and Afib with RVR discharged to [facility] for ALEX who presented with worsening Bilateral LE edema and right LE wounds with erythema - mild cellulitis  Acute on Chronic CHF  COVID 19 + likely sec viral shedding rather than acute COVID 19 infection (recent admission to [facility] - with Covid pneumonia )  Asymptomatic bacteruria  On 3L NC ( home 02 )  New fever 1/7 with leukocytosis etiology unclear- CXR shows improvement with no new pna. Ua neg  Doubt sec cellulitis  Repeat cultures NGTD    Plan :   Dc Ancef  Trend temps and cbc  Diurese per cardiology  Pulm toileting  Elevate leg  Increase activity/OOB to chair  Dc plannig    Continue with present regiment.  Appropriate use of antibiotics and adverse effects reviewed.      > 35 minutes were spent in direct patient care reviewing notes, medications ,labs data/ imaging , discussion with multidisciplinary team.    Thank you for allowing me to participate in care of your patient .    Kate Sorto MD  Infectious Disease  [phone]

## 2022-01-09 NOTE — PROGRESS NOTE ADULT - SUBJECTIVE AND OBJECTIVE BOX
Chief Complaint: Leg swelling    Interval Events: No events overnight.    Review of Systems:  General: No fevers, chills, weight gain  Skin: No rashes, color changes  Cardiovascular: No chest pain, orthopnea  Respiratory: No shortness of breath, cough  Gastrointestinal: No nausea, abdominal pain  Genitourinary: No incontinence, pain with urination  Musculoskeletal: No pain, swelling, decreased range of motion  Neurological: No headache, weakness  Psychiatric: No depression, anxiety  Endocrine: No weight gain, increased thirst  All other systems are comprehensively negative.    Physical Exam:  Vital Signs Last 24 Hrs  T(C): 36.8 (09 Jan 2022 05:11), Max: 36.8 (09 Jan 2022 05:11)  T(F): 98.2 (09 Jan 2022 05:11), Max: 98.2 (09 Jan 2022 05:11)  HR: 113 (09 Jan 2022 05:11) (65 - 113)  BP: 128/84 (09 Jan 2022 05:11) (128/76 - 141/61)  BP(mean): --  RR: 18 (09 Jan 2022 05:11) (16 - 18)  SpO2: 99% (09 Jan 2022 05:11) (93% - 99%)  General: NAD  HEENT: MMM  Neck: No JVD, no carotid bruit  Lungs: CTAB  CV: RRR, nl S1/S2, no M/R/G  Abdomen: S/NT/ND, +BS  Extremities: 1+ LE edema, no cyanosis  Neuro: AAOx3, non-focal  Skin: Open leg wound    Labs:             01-09    143  |  101  |  54<H>  ----------------------------<  111<H>  3.9   |  40<H>  |  1.62<H>    Ca    8.6      09 Jan 2022 07:37  Phos  4.2     01-07  Mg     1.6     01-07    TPro  6.0  /  Alb  2.8<L>  /  TBili  0.2  /  DBili  x   /  AST  23  /  ALT  <10<L>  /  AlkPhos  67  01-08                        8.1    10.87 )-----------( 342      ( 09 Jan 2022 07:37 )             27.3       Telemetry: AF

## 2022-01-09 NOTE — PROGRESS NOTE ADULT - SUBJECTIVE AND OBJECTIVE BOX
Patient is a 89y old  Female who presents with a chief complaint of cellulitis right leg (2022 06:45)      INTERVAL HPI/OVERNIGHT EVENTS: Patient seen and examined at bedside. No overnight events.      MEDICATIONS  (STANDING):  allopurinol 100 milliGRAM(s) Oral daily  ALPRAZolam 0.25 milliGRAM(s) Oral at bedtime  aMIOdarone    Tablet 200 milliGRAM(s) Oral daily  ammonium lactate 12% Lotion 1 Application(s) Topical two times a day  apixaban 2.5 milliGRAM(s) Oral two times a day  aspirin enteric coated 81 milliGRAM(s) Oral daily  budesonide 160 MICROgram(s)/formoterol 4.5 MICROgram(s) Inhaler 2 Puff(s) Inhalation two times a day  buMETAnide 1 milliGRAM(s) Oral two times a day  carvedilol 6.25 milliGRAM(s) Oral every 12 hours  ceFAZolin   IVPB      ceFAZolin   IVPB 1000 milliGRAM(s) IV Intermittent every 8 hours  coronavirus (EUA) Booster Vaccine (MODERNA) 0.25 milliLiter(s) IntraMuscular once  diltiazem    milliGRAM(s) Oral daily  famotidine    Tablet 20 milliGRAM(s) Oral daily  hydrALAZINE 25 milliGRAM(s) Oral every 8 hours  influenza  Vaccine (HIGH DOSE) 0.7 milliLiter(s) IntraMuscular once  levothyroxine 75 MICROGram(s) Oral daily  multivitamin/minerals 1 Tablet(s) Oral daily  mupirocin 2% Ointment 1 Application(s) Topical two times a day  senna 2 Tablet(s) Oral at bedtime  tiotropium 18 MICROgram(s) Capsule 1 Capsule(s) Inhalation daily  venlafaxine XR. 75 milliGRAM(s) Oral daily    MEDICATIONS  (PRN):  acetaminophen     Tablet .. 650 milliGRAM(s) Oral every 6 hours PRN Temp greater or equal to 38C (100.4F), Mild Pain (1 - 3)  benzonatate 100 milliGRAM(s) Oral every 8 hours PRN Cough  polyethylene glycol 3350 17 Gram(s) Oral daily PRN Constipation      Allergies    Tomas Cheese - Pt states the one time she had a mouthful of tomas cheese, her throat was closing and she required an epinephrine injection. (Anaphylaxis)  iodine (Anaphylaxis)  penicillin (Other)  shellfish (Short breath; Hives)  sulfa drugs (Hives)    Intolerances        REVIEW OF SYSTEMS:  CONSTITUTIONAL: No fever or chills  HEENT:  No headache, no sore throat  RESPIRATORY: No cough, wheezing, or shortness of breath  CARDIOVASCULAR: No chest pain, palpitations, or leg swelling  GASTROINTESTINAL: No abd pain, nausea, vomiting, or diarrhea  GENITOURINARY: No dysuria, frequency, or hematuria  NEUROLOGICAL: no focal weakness or dizziness  MUSCULOSKELETAL: no myalgias     Vital Signs Last 24 Hrs  T(C): 36.6 (2022 11:12), Max: 36.8 (2022 05:11)  T(F): 97.8 (2022 11:12), Max: 98.2 (2022 05:11)  HR: 74 (2022 11:12) (74 - 113)  BP: 110/69 (2022 11:12) (110/69 - 141/61)  BP(mean): --  RR: 16 (2022 11:12) (16 - 18)  SpO2: 97% (2022 11:12) (93% - 99%)    PHYSICAL EXAM:  GENERAL: NAD, elderly   HEENT:  EOMI, moist mucous membranes  CHEST/LUNG:  CTA b/l, no rales, wheezes, or rhonchi  HEART:  RRR, S1, S2  ABDOMEN:  BS+, soft, nontender, nondistended  EXTREMITIES: no edema, cyanosis, or calf tenderness. 2+ bilateral trace LE edema, dressing on wound on right leg.   NERVOUS SYSTEM: AA&Ox3    LABS:                        8.1    10.87 )-----------( 342      ( 2022 07:37 )             27.3     CBC Full  -  ( 2022 07:37 )  WBC Count : 10.87 K/uL  Hemoglobin : 8.1 g/dL  Hematocrit : 27.3 %  Platelet Count - Automated : 342 K/uL  Mean Cell Volume : 106.6 fl  Mean Cell Hemoglobin : 31.6 pg  Mean Cell Hemoglobin Concentration : 29.7 gm/dL  Auto Neutrophil # : 7.12 K/uL  Auto Lymphocyte # : 2.15 K/uL  Auto Monocyte # : 1.22 K/uL  Auto Eosinophil # : 0.14 K/uL  Auto Basophil # : 0.04 K/uL  Auto Neutrophil % : 65.5 %  Auto Lymphocyte % : 19.8 %  Auto Monocyte % : 11.2 %  Auto Eosinophil % : 1.3 %  Auto Basophil % : 0.4 %    2022 07:37    143    |  101    |  54     ----------------------------<  111    3.9     |  40     |  1.62     Ca    8.6        2022 07:37        Urinalysis Basic - ( 2022 19:32 )    Color: Yellow / Appearance: Clear / S.020 / pH: x  Gluc: x / Ketone: Negative  / Bili: Negative / Urobili: Negative mg/dL   Blood: x / Protein: 15 mg/dL / Nitrite: Negative   Leuk Esterase: Small / RBC: 0-2 /HPF / WBC 0-2   Sq Epi: x / Non Sq Epi: Few / Bacteria: Few      CAPILLARY BLOOD GLUCOSE            Culture - Urine (collected 22 @ 00:55)  Source: Catheterized Catheterized  Final Report (22 @ 16:10):    10,000 - 49,000 CFU/mL Enterococcus faecalis  Organism: Enterococcus faecalis (22 @ 16:10)  Organism: Enterococcus faecalis (22 @ 16:10)      -  Ampicillin: S <=2 Predicts results to ampicillin/sulbactam, amoxacillin-clavulanate and  piperacillin-tazobactam.      -  Ciprofloxacin: S <=1      -  Levofloxacin: S <=1      -  Nitrofurantoin: S <=32 Should not be used to treat pyelonephritis.      -  Tetra/Doxy: R >8      -  Vancomycin: S 2      Method Type: BRAVO    Culture - Blood (collected 22 @ 16:15)  Source: .Blood Blood  Final Report (22 @ 17:00):    No Growth Final    Culture - Blood (collected 22 @ 16:15)  Source: .Blood Blood  Final Report (22 @ 17:00):    No Growth Final        RADIOLOGY & ADDITIONAL TESTS: < from: Xray Chest 1 View-PORTABLE IMMEDIATE (Xray Chest 1 View-PORTABLE IMMEDIATE .) (22 @ 08:09) >    ACC: [id number] EXAM:  XR CHEST PORTABLE IMMED 1V                          PROCEDURE DATE:  2022          INTERPRETATION:  DATE OF STUDY: 22    PRIOR:22    CLINICAL INDICATION: Fever    TECHNIQUE: portable chest.    FINDINGS:  The cardiomediastinal silhouette is stable.  Stable trace right pleural effusion.  Mild decrease in left pleural effusion and improvement in left basilar   opacity.  No pneumothorax.    IMPRESSION:  Stable trace right pleural effusion.  Improvement in left pleural effusion and left basilar atelectasis and/or   pneumonia.    --- End of Report ---            JORGE LEVINE MD; Attending Radiologist  This document has been electronically signed. 2022  3:19PM    < end of copied text >      Consultant(s) Notes Reviewed:  [x] YES  [ ] NO    Care Discussed with [x] Consultants  [x] Patient  [ ] Family  [ ]      [ x] Other; RN  DVT ppx

## 2022-01-09 NOTE — PROGRESS NOTE ADULT - NSPROGADDITIONALINFOA_GEN_ALL_CORE
Emerald Tavarez updated. All questions answered to the best of my ability
Emerald Tavarez updated. All questions answered to the best of my ability

## 2022-01-09 NOTE — PROGRESS NOTE ADULT - SUBJECTIVE AND OBJECTIVE BOX
PULMONARY/CRITICAL CARE  DOS 22  No fever. UA, CXR ok.  Unchanged.  Feels better. Less leg discomfort.   No  wheeze.    Pt. well known to me.    Patient is a 89y old  Female who presents with a chief complaint of cellulitis of legs  BRIEF HOSPITAL COURSE: ***89F PMHx Paroxysmal Atrial Fibrillation, HTN, Hypothyroidism, COPD (2-3 L nasal cannula at home) Hyperlipidemia, presenting with Bilateral LE edema and right LE wounds.  Patient was admitted to [facility] - with Covid pneumonia and - for acute CHF and Afib with RVR.  She was discharged to [facility] for ALEX.  While there her edema was increasing, and a little more than a week ago she developed a blister on her right ankle and lower leg that both burst.  She has pain in her leg especially at night.  No fevers or chills, no further SOB.  Her oxygen needs have not changed.  Her grandson became more and more concerned about her leg and felt that staff at [facility] was not addressing it so they requested that she be sent to [facility] for evaluation.    Events last 24 hours: ***    PAST MEDICAL & SURGICAL HISTORY:  Hypertension    Depression    Overactive bladder    Gout    Osteoarthritis    Hypothyroid    HLD (hyperlipidemia)    Paroxysmal atrial fibrillation    Asthma    Chronic obstructive pulmonary disease (COPD)  on home oxygen 2.5-3L NC    S/P cholecystectomy    S/P lumpectomy of breast    S/P hysterectomy    S/P lung surgery, follow-up exam  s/p removal of suspicious lesion, negative      Allergies    Tomas Cheese - Pt states the one time she had a mouthful of tomas cheese, her throat was closing and she required an epinephrine injection. (Anaphylaxis)  iodine (Anaphylaxis)  penicillin (Other)  shellfish (Short breath; Hives)  sulfa drugs (Hives)    Intolerances      FAMILY HISTORY:  Family history of nasopharyngeal cancer (Child)  daughter    Family history of breast cancer (Mother)          Medications:  ceFAZolin   IVPB 1000 milliGRAM(s) IV Intermittent every 8 hours    aMIOdarone    Tablet 200 milliGRAM(s) Oral daily  carvedilol 6.25 milliGRAM(s) Oral every 12 hours  diltiazem    milliGRAM(s) Oral daily  hydrALAZINE 25 milliGRAM(s) Oral every 8 hours    benzonatate 100 milliGRAM(s) Oral every 8 hours PRN  budesonide 160 MICROgram(s)/formoterol 4.5 MICROgram(s) Inhaler 2 Puff(s) Inhalation two times a day  tiotropium 18 MICROgram(s) Capsule 1 Capsule(s) Inhalation daily    acetaminophen     Tablet .. 650 milliGRAM(s) Oral every 6 hours PRN  ALPRAZolam 0.25 milliGRAM(s) Oral at bedtime  venlafaxine XR. 37.5 milliGRAM(s) Oral daily      apixaban 2.5 milliGRAM(s) Oral two times a day  aspirin enteric coated 81 milliGRAM(s) Oral daily    famotidine    Tablet 20 milliGRAM(s) Oral daily  polyethylene glycol 3350 17 Gram(s) Oral daily PRN  senna 2 Tablet(s) Oral at bedtime      allopurinol 100 milliGRAM(s) Oral daily  levothyroxine 75 MICROGram(s) Oral daily    multivitamin/minerals 1 Tablet(s) Oral daily  potassium chloride    Tablet ER 40 milliEquivalent(s) Oral every 4 hours                ICU Vital Signs Last 24 Hrs  T(C): 36.7 (2022 15:00), Max: 36.7 (2022 15:00)  T(F): 98 (2022 15:00), Max: 98 (2022 15:00)  HR: 82 (2022 15:00) (82 - 111)  BP: 142/80 (2022 15:00) (128/70 - 142/80)  BP(mean): --  ABP: --  ABP(mean): --  RR: 25 (2022 15:00) (20 - 25)  SpO2: 98% (2022 15:00) (98% - 98%)    Vital Signs Last 24 Hrs  T(C): 36.7 (2022 15:00), Max: 36.7 (2022 15:00)  T(F): 98 (2022 15:00), Max: 98 (2022 15:00)  HR: 82 (2022 15:00) (82 - 111)  BP: 142/80 (2022 15:00) (128/70 - 142/80)  BP(mean): --  RR: 25 (2022 15:00) (20 - 25)  SpO2: 98% (2022 15:00) (98% - 98%)        I&O's Detail        LABS:                        9.5    7.52  )-----------( 239      ( 2022 12:27 )             30.7         141  |  102  |  44<H>  ----------------------------<  107<H>  3.4<L>   |  38<H>  |  1.89<H>    Ca    8.3<L>      2022 12:27    TPro  5.8<L>  /  Alb  2.8<L>  /  TBili  0.4  /  DBili  x   /  AST  27  /  ALT  20  /  AlkPhos  59            CAPILLARY BLOOD GLUCOSE        PT/INR - ( 2022 12:27 )   PT: 14.6 sec;   INR: 1.22 ratio         PTT - ( 2022 12:27 )  PTT:29.5 sec  Urinalysis Basic - ( 2022 14:26 )    Color: Yellow / Appearance: Clear / S.015 / pH: x  Gluc: x / Ketone: Negative  / Bili: Negative / Urobili: Negative mg/dL   Blood: x / Protein: 15 mg/dL / Nitrite: Negative   Leuk Esterase: Trace / RBC: Negative /HPF / WBC 6-10   Sq Epi: x / Non Sq Epi: Moderate / Bacteria: Few      CULTURES:      Physical Examination:    General: No acute distress.  elderly female    HEENT: Pupils equal, reactive to light.  Symmetric.    PULM: clear no wheeze no  rhonchi rales    CVS: Regular rate and rhythm, no murmurs, rubs, or gallops    ABD: Soft, nondistended, nontender, normoactive bowel sounds, no masses    EXT: 3 plus leg edema, with erythema; bandaged  lower leg    SKIN: Warm and well perfused, no rashes noted.    NEURO: Alert, oriented, interactive, nonfocal    RADIOLOGY: ***rad< from: US Duplex Venous Lower Ext Complete, Bilateral (22 @ 12:55) >  ACC: [id number] EXAM:  US DPLX LWR EXT VEINS COMPL BI                          PROCEDURE DATE:  2022          INTERPRETATION:  CLINICAL INFORMATION: Bilateral leg swelling.    COMPARISON: Doppler venous sonogram 2021.    TECHNIQUE: Duplex sonography of the BILATERAL LOWER extremity veins with   color and spectral Doppler, with and without compression.    FINDINGS:    RIGHT:  Normal compressibility of the RIGHT common femoral, femoral and popliteal   veins.  Doppler examination shows normal spontaneous and phasic flow.  No RIGHT calf vein thrombosis is detected.    LEFT:  Normal compressibility of the LEFT common femoral, femoral and popliteal   veins.  Doppler examination shows normal spontaneous and phasic flow.  No LEFT calf veinthrombosis is detected.    IMPRESSION:    No evidence of deep venous thrombosis in either lower extremity.          --- End of Report ---    < end of copied text >  < from: Xray Chest 1 View- PORTABLE-Urgent (Xray Chest 1 View- PORTABLE-Urgent .) (22 @ 11:58) >  ACC: [id number] EXAM:  XR CHEST PORTABLE URGENT 1V                          PROCEDURE DATE:  2022          INTERPRETATION:  Chest portable.    Clinical History: Shortness of breath. Bilateral leg edema.    Comparison: 2021.    Single AP view submitted.    The evaluation of the cardiomediastinal silhouette is limited on portable   technique.  Calcified aorta.    Again noted is blunting of the bilateral costophrenic angles which may   reflect small pleural effusions and/or pleural thickening.  The lungs are mildly hyperinflated with increased bronchovascular   markings.  Again noted postsurgical changes with sutures right midlung zone.    Impression:    Findings as discussed above.    < end of copied text >      CRITICAL CARE TIME SPENT: ***

## 2022-01-10 ENCOUNTER — TRANSCRIPTION ENCOUNTER (OUTPATIENT)
Age: 87
End: 2022-01-10

## 2022-01-10 VITALS
TEMPERATURE: 98 F | SYSTOLIC BLOOD PRESSURE: 109 MMHG | DIASTOLIC BLOOD PRESSURE: 73 MMHG | HEART RATE: 110 BPM | OXYGEN SATURATION: 95 % | RESPIRATION RATE: 20 BRPM

## 2022-01-10 LAB
ANION GAP SERPL CALC-SCNC: 3 MMOL/L — LOW (ref 5–17)
BASOPHILS # BLD AUTO: 0.04 K/UL — SIGNIFICANT CHANGE UP (ref 0–0.2)
BASOPHILS NFR BLD AUTO: 0.4 % — SIGNIFICANT CHANGE UP (ref 0–2)
BUN SERPL-MCNC: 59 MG/DL — HIGH (ref 7–23)
CALCIUM SERPL-MCNC: 8.7 MG/DL — SIGNIFICANT CHANGE UP (ref 8.4–10.5)
CHLORIDE SERPL-SCNC: 98 MMOL/L — SIGNIFICANT CHANGE UP (ref 96–108)
CO2 SERPL-SCNC: 43 MMOL/L — HIGH (ref 22–31)
CREAT SERPL-MCNC: 1.71 MG/DL — HIGH (ref 0.5–1.3)
EOSINOPHIL # BLD AUTO: 0.05 K/UL — SIGNIFICANT CHANGE UP (ref 0–0.5)
EOSINOPHIL NFR BLD AUTO: 0.5 % — SIGNIFICANT CHANGE UP (ref 0–6)
GLUCOSE SERPL-MCNC: 103 MG/DL — HIGH (ref 70–99)
HCT VFR BLD CALC: 27.7 % — LOW (ref 34.5–45)
HGB BLD-MCNC: 8.5 G/DL — LOW (ref 11.5–15.5)
IMM GRANULOCYTES NFR BLD AUTO: 1.6 % — HIGH (ref 0–1.5)
LYMPHOCYTES # BLD AUTO: 19.5 % — SIGNIFICANT CHANGE UP (ref 13–44)
LYMPHOCYTES # BLD AUTO: 2.12 K/UL — SIGNIFICANT CHANGE UP (ref 1–3.3)
MCHC RBC-ENTMCNC: 30.7 GM/DL — LOW (ref 32–36)
MCHC RBC-ENTMCNC: 32.7 PG — SIGNIFICANT CHANGE UP (ref 27–34)
MCV RBC AUTO: 106.5 FL — HIGH (ref 80–100)
MONOCYTES # BLD AUTO: 1.3 K/UL — HIGH (ref 0–0.9)
MONOCYTES NFR BLD AUTO: 12 % — SIGNIFICANT CHANGE UP (ref 2–14)
NEUTROPHILS # BLD AUTO: 7.18 K/UL — SIGNIFICANT CHANGE UP (ref 1.8–7.4)
NEUTROPHILS NFR BLD AUTO: 66 % — SIGNIFICANT CHANGE UP (ref 43–77)
NRBC # BLD: 0 /100 WBCS — SIGNIFICANT CHANGE UP (ref 0–0)
PLATELET # BLD AUTO: 328 K/UL — SIGNIFICANT CHANGE UP (ref 150–400)
POTASSIUM SERPL-MCNC: 3.9 MMOL/L — SIGNIFICANT CHANGE UP (ref 3.5–5.3)
POTASSIUM SERPL-SCNC: 3.9 MMOL/L — SIGNIFICANT CHANGE UP (ref 3.5–5.3)
RBC # BLD: 2.6 M/UL — LOW (ref 3.8–5.2)
RBC # FLD: 18.5 % — HIGH (ref 10.3–14.5)
SARS-COV-2 RNA SPEC QL NAA+PROBE: SIGNIFICANT CHANGE UP
SARS-COV-2 RNA SPEC QL NAA+PROBE: SIGNIFICANT CHANGE UP
SODIUM SERPL-SCNC: 144 MMOL/L — SIGNIFICANT CHANGE UP (ref 135–145)
WBC # BLD: 10.86 K/UL — HIGH (ref 3.8–10.5)
WBC # FLD AUTO: 10.86 K/UL — HIGH (ref 3.8–10.5)

## 2022-01-10 PROCEDURE — 85379 FIBRIN DEGRADATION QUANT: CPT

## 2022-01-10 PROCEDURE — 96374 THER/PROPH/DIAG INJ IV PUSH: CPT

## 2022-01-10 PROCEDURE — 80048 BASIC METABOLIC PNL TOTAL CA: CPT

## 2022-01-10 PROCEDURE — 87186 SC STD MICRODIL/AGAR DIL: CPT

## 2022-01-10 PROCEDURE — 87040 BLOOD CULTURE FOR BACTERIA: CPT

## 2022-01-10 PROCEDURE — 93970 EXTREMITY STUDY: CPT

## 2022-01-10 PROCEDURE — 83605 ASSAY OF LACTIC ACID: CPT

## 2022-01-10 PROCEDURE — 36415 COLL VENOUS BLD VENIPUNCTURE: CPT

## 2022-01-10 PROCEDURE — 82607 VITAMIN B-12: CPT

## 2022-01-10 PROCEDURE — 85027 COMPLETE CBC AUTOMATED: CPT

## 2022-01-10 PROCEDURE — 99232 SBSQ HOSP IP/OBS MODERATE 35: CPT

## 2022-01-10 PROCEDURE — 87086 URINE CULTURE/COLONY COUNT: CPT

## 2022-01-10 PROCEDURE — 84100 ASSAY OF PHOSPHORUS: CPT

## 2022-01-10 PROCEDURE — U0003: CPT

## 2022-01-10 PROCEDURE — 85025 COMPLETE CBC W/AUTO DIFF WBC: CPT

## 2022-01-10 PROCEDURE — 80053 COMPREHEN METABOLIC PANEL: CPT

## 2022-01-10 PROCEDURE — 93005 ELECTROCARDIOGRAM TRACING: CPT

## 2022-01-10 PROCEDURE — 94640 AIRWAY INHALATION TREATMENT: CPT

## 2022-01-10 PROCEDURE — 71045 X-RAY EXAM CHEST 1 VIEW: CPT

## 2022-01-10 PROCEDURE — 83735 ASSAY OF MAGNESIUM: CPT

## 2022-01-10 PROCEDURE — 82746 ASSAY OF FOLIC ACID SERUM: CPT

## 2022-01-10 PROCEDURE — 97116 GAIT TRAINING THERAPY: CPT

## 2022-01-10 PROCEDURE — 84484 ASSAY OF TROPONIN QUANT: CPT

## 2022-01-10 PROCEDURE — 97110 THERAPEUTIC EXERCISES: CPT

## 2022-01-10 PROCEDURE — U0005: CPT

## 2022-01-10 PROCEDURE — 83880 ASSAY OF NATRIURETIC PEPTIDE: CPT

## 2022-01-10 PROCEDURE — 87640 STAPH A DNA AMP PROBE: CPT

## 2022-01-10 PROCEDURE — 85610 PROTHROMBIN TIME: CPT

## 2022-01-10 PROCEDURE — 87635 SARS-COV-2 COVID-19 AMP PRB: CPT

## 2022-01-10 PROCEDURE — 81001 URINALYSIS AUTO W/SCOPE: CPT

## 2022-01-10 PROCEDURE — 85730 THROMBOPLASTIN TIME PARTIAL: CPT

## 2022-01-10 PROCEDURE — 99285 EMERGENCY DEPT VISIT HI MDM: CPT | Mod: 25

## 2022-01-10 PROCEDURE — 87641 MR-STAPH DNA AMP PROBE: CPT

## 2022-01-10 PROCEDURE — 84145 PROCALCITONIN (PCT): CPT

## 2022-01-10 PROCEDURE — 97162 PT EVAL MOD COMPLEX 30 MIN: CPT

## 2022-01-10 RX ORDER — VENLAFAXINE HCL 75 MG
1 CAPSULE, EXT RELEASE 24 HR ORAL
Qty: 0 | Refills: 0 | DISCHARGE
Start: 2022-01-10

## 2022-01-10 RX ADMIN — Medication 81 MILLIGRAM(S): at 16:15

## 2022-01-10 RX ADMIN — Medication 1 APPLICATION(S): at 18:26

## 2022-01-10 RX ADMIN — Medication 25 MILLIGRAM(S): at 16:16

## 2022-01-10 RX ADMIN — FAMOTIDINE 20 MILLIGRAM(S): 10 INJECTION INTRAVENOUS at 16:16

## 2022-01-10 RX ADMIN — BUMETANIDE 1 MILLIGRAM(S): 0.25 INJECTION INTRAMUSCULAR; INTRAVENOUS at 05:25

## 2022-01-10 RX ADMIN — BUMETANIDE 1 MILLIGRAM(S): 0.25 INJECTION INTRAMUSCULAR; INTRAVENOUS at 18:29

## 2022-01-10 RX ADMIN — BUDESONIDE AND FORMOTEROL FUMARATE DIHYDRATE 2 PUFF(S): 160; 4.5 AEROSOL RESPIRATORY (INHALATION) at 05:26

## 2022-01-10 RX ADMIN — Medication 75 MILLIGRAM(S): at 16:17

## 2022-01-10 RX ADMIN — Medication 100 MILLIGRAM(S): at 16:15

## 2022-01-10 RX ADMIN — SENNA PLUS 2 TABLET(S): 8.6 TABLET ORAL at 21:33

## 2022-01-10 RX ADMIN — APIXABAN 2.5 MILLIGRAM(S): 2.5 TABLET, FILM COATED ORAL at 18:29

## 2022-01-10 RX ADMIN — Medication 100 MILLIGRAM(S): at 05:17

## 2022-01-10 RX ADMIN — BUDESONIDE AND FORMOTEROL FUMARATE DIHYDRATE 2 PUFF(S): 160; 4.5 AEROSOL RESPIRATORY (INHALATION) at 18:30

## 2022-01-10 RX ADMIN — Medication 25 MILLIGRAM(S): at 05:25

## 2022-01-10 RX ADMIN — Medication 1 TABLET(S): at 16:16

## 2022-01-10 RX ADMIN — CARVEDILOL PHOSPHATE 6.25 MILLIGRAM(S): 80 CAPSULE, EXTENDED RELEASE ORAL at 18:29

## 2022-01-10 RX ADMIN — APIXABAN 2.5 MILLIGRAM(S): 2.5 TABLET, FILM COATED ORAL at 05:24

## 2022-01-10 RX ADMIN — MUPIROCIN 1 APPLICATION(S): 20 OINTMENT TOPICAL at 05:25

## 2022-01-10 RX ADMIN — AMIODARONE HYDROCHLORIDE 200 MILLIGRAM(S): 400 TABLET ORAL at 05:24

## 2022-01-10 RX ADMIN — Medication 75 MICROGRAM(S): at 05:24

## 2022-01-10 RX ADMIN — TIOTROPIUM BROMIDE 1 CAPSULE(S): 18 CAPSULE ORAL; RESPIRATORY (INHALATION) at 05:26

## 2022-01-10 RX ADMIN — Medication 0.25 MILLIGRAM(S): at 21:33

## 2022-01-10 RX ADMIN — Medication 240 MILLIGRAM(S): at 05:24

## 2022-01-10 RX ADMIN — Medication 1 APPLICATION(S): at 05:31

## 2022-01-10 RX ADMIN — MUPIROCIN 1 APPLICATION(S): 20 OINTMENT TOPICAL at 18:29

## 2022-01-10 RX ADMIN — CARVEDILOL PHOSPHATE 6.25 MILLIGRAM(S): 80 CAPSULE, EXTENDED RELEASE ORAL at 05:25

## 2022-01-10 NOTE — PROGRESS NOTE ADULT - PROBLEM SELECTOR PROBLEM 6
Benign hypertension
Benign hypertension
Hypothyroidism

## 2022-01-10 NOTE — PROGRESS NOTE ADULT - PROBLEM SELECTOR PROBLEM 5
Stage 3 chronic kidney disease
Anxiety and depression
Stage 3 chronic kidney disease
Stage 3 chronic kidney disease
Benign hypertension
Anxiety and depression
Benign hypertension

## 2022-01-10 NOTE — PROGRESS NOTE ADULT - PROBLEM SELECTOR PROBLEM 3
Benign hypertension
Macrocytic anemia
Benign hypertension
Stage 3 chronic kidney disease
Macrocytic anemia
Stage 3 chronic kidney disease
Stage 3 chronic kidney disease

## 2022-01-10 NOTE — PROGRESS NOTE ADULT - PROBLEM SELECTOR PROBLEM 8
Prophylactic measure
Prophylactic measure
Gout
Prophylactic measure
Gout

## 2022-01-10 NOTE — PROGRESS NOTE ADULT - SUBJECTIVE AND OBJECTIVE BOX
CHARLES GRAHAM is a 89yFemale , patient examined and chart reviewed.    INTERVAL HPI/ OVERNIGHT EVENTS:   In chair. No events.  Afebrile Feels well.  NAD.    PAST MEDICAL & SURGICAL HISTORY:  Hypertension  Depression  Overactive bladder  Gout  Osteoarthritis  Hypothyroid  HLD (hyperlipidemia)  Paroxysmal atrial fibrillation  Asthma  Chronic obstructive pulmonary disease (COPD)  on home oxygen 2.5-3L NC  S/P cholecystectomy  S/P lumpectomy of breast  S/P hysterectomy  S/P lung surgery, follow-up exam  s/p removal of suspicious lesion, negative    For details regarding the patient's social history, family history, and other miscellaneous elements, please refer the initial infectious diseases consultation and/or the admitting history and physical examination for this admission.       ROS:  CONSTITUTIONAL:  no fever no chills  EYES:  Negative  blurry vision or double vision  CARDIOVASCULAR:  Negative for chest pain or palpitations  RESPIRATORY:  Negative for cough, wheezing, or SOB   GASTROINTESTINAL:  Negative for nausea, vomiting, diarrhea, constipation, or abdominal pain  GENITOURINARY:  Negative frequency, urgency or dysuria  NEUROLOGIC:  No headache, confusion, dizziness, lightheadedness  All other systems were reviewed and are negative     ALLERGIES  Tomas Cheese - Pt states the one time she had a mouthful of tomas cheese, her throat was closing and she required an epinephrine injection. (Anaphylaxis)  iodine (Anaphylaxis)  penicillin (Other)  shellfish (Short breath; Hives)  sulfa drugs (Hives)      Current inpatient medications :    ANTIBIOTICS/RELEVANT:    MEDICATIONS  (STANDING):  allopurinol 100 milliGRAM(s) Oral daily  ALPRAZolam 0.25 milliGRAM(s) Oral at bedtime  aMIOdarone    Tablet 200 milliGRAM(s) Oral daily  ammonium lactate 12% Lotion 1 Application(s) Topical two times a day  apixaban 2.5 milliGRAM(s) Oral two times a day  aspirin enteric coated 81 milliGRAM(s) Oral daily  budesonide 160 MICROgram(s)/formoterol 4.5 MICROgram(s) Inhaler 2 Puff(s) Inhalation two times a day  buMETAnide 1 milliGRAM(s) Oral two times a day  carvedilol 6.25 milliGRAM(s) Oral every 12 hours  coronavirus (EUA) Booster Vaccine (MODERNA) 0.25 milliLiter(s) IntraMuscular once  diltiazem    milliGRAM(s) Oral daily  famotidine    Tablet 20 milliGRAM(s) Oral daily  hydrALAZINE 25 milliGRAM(s) Oral every 8 hours  influenza  Vaccine (HIGH DOSE) 0.7 milliLiter(s) IntraMuscular once  levothyroxine 75 MICROGram(s) Oral daily  multivitamin/minerals 1 Tablet(s) Oral daily  mupirocin 2% Ointment 1 Application(s) Topical two times a day  senna 2 Tablet(s) Oral at bedtime  tiotropium 18 MICROgram(s) Capsule 1 Capsule(s) Inhalation daily  venlafaxine XR. 75 milliGRAM(s) Oral daily    MEDICATIONS  (PRN):  acetaminophen     Tablet .. 650 milliGRAM(s) Oral every 6 hours PRN Temp greater or equal to 38C (100.4F), Mild Pain (1 - 3)  benzonatate 100 milliGRAM(s) Oral every 8 hours PRN Cough  polyethylene glycol 3350 17 Gram(s) Oral daily PRN Constipation        Objective:  Vital Signs Last 24 Hrs  T(C): 36.6 (10 Raj 2022 10:35), Max: 36.8 (2022 17:28)  T(F): 97.9 (10 Raj 2022 10:35), Max: 98.2 (2022 17:28)  HR: 76 (10 Raj 2022 10:35) (62 - 100)  BP: 115/60 (10 Raj 2022 10:35) (109/67 - 135/81)  RR: 17 (10 Raj 2022 10:35) (16 - 17)  SpO2: 96% (10 Raj 2022 10:35) (96% - 98%)    Physical Exam:  General: no acute distress  Neck: supple, trachea midline  Lungs: clear, no wheeze/rhonchi  Cardiovascular: regular rate and rhythm, S1 S2  Abdomen: soft, nontender,  bowel sounds normal  Neurological: alert and oriented x3  Skin: no rash  Extremities: +3 edema  right leg venous ulcers Minimal lower leg erythema resolved      LABS:                        8.5    10.86 )-----------( 328      ( 10 Raj 2022 08:25 )             27.7   01-10    144  |  98  |  59<H>  ----------------------------<  103<H>  3.9   |  43<H>  |  1.71<H>    Ca    8.7      10 Raj 2022 08:25      Urinalysis Basic - ( 2022 19:32 )    Color: Yellow / Appearance: Clear / S.020 / pH: x  Gluc: x / Ketone: Negative  / Bili: Negative / Urobili: Negative mg/dL   Blood: x / Protein: 15 mg/dL / Nitrite: Negative   Leuk Esterase: Small / RBC: 0-2 /HPF / WBC 0-2   Sq Epi: x / Non Sq Epi: Few / Bacteria: Few      MICROBIOLOGY:    Culture - Urine (collected 2022 02:26)  Source: Clean Catch Clean Catch (Midstream)  Final Report (2022 22:03):    <10,000 CFU/mL Normal Urogenital Kassie    Culture - Blood (collected 2022 12:05)  Source: .Blood Blood-Peripheral  Preliminary Report (2022 13:02):    No growth to date.    Culture - Blood (collected 2022 12:05)  Source: .Blood Blood-Peripheral  Preliminary Report (2022 13:02):    No growth to date.      COVID-19 PCR . (22 @ 23:05)    COVID-19 PCR: NotDetec    Culture - Urine (22 @ 00:55)    -  Ampicillin: S <=2 Predicts results to ampicillin/sulbactam, amoxacillin-clavulanate and  piperacillin-tazobactam.    -  Ciprofloxacin: S <=1    -  Levofloxacin: S <=1    -  Nitrofurantoin: S <=32 Should not be used to treat pyelonephritis.    -  Tetra/Doxy: R >8    -  Vancomycin: S 2    Specimen Source: Catheterized Catheterized    Culture Results:   10,000 - 49,000 CFU/mL Enterococcus faecalis    Organism Identification: Enterococcus faecalis    Organism: Enterococcus faecalis    Method Type: BRAVO    Culture - Blood (collected 2022 16:15)  Source: .Blood Blood  Preliminary Report (2022 17:02):    No growth to date.    Culture - Blood (collected 2022 16:15)  Source: .Blood Blood  Preliminary Report (2022 17:02):    No growth to date.      RADIOLOGY & ADDITIONAL STUDIES:  ACC: [id number] EXAM:  US DPLX LWR EXT VEINS COMPL BI                          PROCEDURE DATE:  2022          INTERPRETATION:  CLINICAL INFORMATION: Bilateral leg swelling.    COMPARISON: Doppler venous sonogram 2021.    TECHNIQUE: Duplex sonography of the BILATERAL LOWER extremity veins with   color and spectral Doppler, with and without compression.    FINDINGS:    RIGHT:  Normal compressibility of the RIGHT common femoral, femoral and popliteal   veins.  Doppler examination shows normal spontaneous and phasic flow.  No RIGHT calf vein thrombosis is detected.    LEFT:  Normal compressibility of the LEFT common femoral, femoral and popliteal   veins.  Doppler examination shows normal spontaneous and phasic flow.  No LEFT calf veinthrombosis is detected.    IMPRESSION:    No evidence of deep venous thrombosis in either lower extremity.        ACC: [id number] EXAM:  XR CHEST PORTABLE URGENT 1V                          PROCEDURE DATE:  2022          INTERPRETATION:  Chest portable.    Clinical History: Shortness of breath. Bilateral leg edema.    Comparison: 2021.    Single AP view submitted.    The evaluation of the cardiomediastinal silhouette is limited on portable   technique.  Calcified aorta.    Again noted is blunting of the bilateral costophrenic angles which may   reflect small pleural effusions and/or pleural thickening.  The lungs are mildly hyperinflated with increased bronchovascular   markings.  Again noted postsurgical changes with sutures right midlung zone      Assessment :   89F PMHx Paroxysmal Atrial Fibrillation, HTN, Hypothyroidism, COPD (2-3 L nasal cannula at home) Hyperlipidemia recent admission to [facility] - with Covid pneumonia and - for acute CHF and Afib with RVR discharged to [facility] for ALEX who presented with worsening Bilateral LE edema and right LE wounds with erythema - mild cellulitis  Acute on Chronic CHF  COVID 19 + likely sec viral shedding rather than acute COVID 19 infection (recent admission to [facility] - with Covid pneumonia )  Asymptomatic bacteruria  On 3L NC ( home 02 )  New fever 1/ with leukocytosis etiology unclear- CXR shows improvement with no new pna. Ua neg. No further fevers thereafter.  Doubt sec cellulitis  Repeat cultures NGTD    Plan :  Monitor off antibiotics  Trend temps and cbc  Diurese per cardiology  Pulm toileting  Elevate leg  Increase activity/OOB to chair  Dc planning    D/w Dr Mayfield    Continue with present regiment.  Appropriate use of antibiotics and adverse effects reviewed.      > 35 minutes were spent in direct patient care reviewing notes, medications ,labs data/ imaging , discussion with multidisciplinary team.    Thank you for allowing me to participate in care of your patient .    Kate Sorto MD  Infectious Disease  [phone]

## 2022-01-10 NOTE — DISCHARGE NOTE NURSING/CASE MANAGEMENT/SOCIAL WORK - PATIENT PORTAL LINK FT
You can access the FollowMyHealth Patient Portal offered by [facility] by registering at the following website: [email or web address]. By joining [employer]’s FollowMyHealth portal, you will also be able to view your health information using other applications (apps) compatible with our system.

## 2022-01-10 NOTE — PROGRESS NOTE ADULT - PROBLEM SELECTOR PROBLEM 2
Wound cellulitis
Acute diastolic congestive heart failure
Wound cellulitis
Acute diastolic congestive heart failure
Wound cellulitis
Acute diastolic congestive heart failure

## 2022-01-10 NOTE — PROGRESS NOTE ADULT - SUBJECTIVE AND OBJECTIVE BOX
Patient is a 89y old  Female who presents with a chief complaint of cellulitis right leg (2022 06:45)    INTERVAL HPI/OVERNIGHT EVENTS: feeling well today, no further fevers, nothing new bothering her.     MEDICATIONS  (STANDING):  allopurinol 100 milliGRAM(s) Oral daily  ALPRAZolam 0.25 milliGRAM(s) Oral at bedtime  aMIOdarone    Tablet 200 milliGRAM(s) Oral daily  ammonium lactate 12% Lotion 1 Application(s) Topical two times a day  apixaban 2.5 milliGRAM(s) Oral two times a day  aspirin enteric coated 81 milliGRAM(s) Oral daily  budesonide 160 MICROgram(s)/formoterol 4.5 MICROgram(s) Inhaler 2 Puff(s) Inhalation two times a day  buMETAnide 1 milliGRAM(s) Oral two times a day  carvedilol 6.25 milliGRAM(s) Oral every 12 hours  coronavirus (EUA) Booster Vaccine (MODERNA) 0.25 milliLiter(s) IntraMuscular once  diltiazem    milliGRAM(s) Oral daily  famotidine    Tablet 20 milliGRAM(s) Oral daily  hydrALAZINE 25 milliGRAM(s) Oral every 8 hours  influenza  Vaccine (HIGH DOSE) 0.7 milliLiter(s) IntraMuscular once  levothyroxine 75 MICROGram(s) Oral daily  multivitamin/minerals 1 Tablet(s) Oral daily  mupirocin 2% Ointment 1 Application(s) Topical two times a day  senna 2 Tablet(s) Oral at bedtime  tiotropium 18 MICROgram(s) Capsule 1 Capsule(s) Inhalation daily  venlafaxine XR. 75 milliGRAM(s) Oral daily    MEDICATIONS  (PRN):  acetaminophen     Tablet .. 650 milliGRAM(s) Oral every 6 hours PRN Temp greater or equal to 38C (100.4F), Mild Pain (1 - 3)  benzonatate 100 milliGRAM(s) Oral every 8 hours PRN Cough  polyethylene glycol 3350 17 Gram(s) Oral daily PRN Constipation      Allergies  Tomas Cheese - Pt states the one time she had a mouthful of tomas cheese, her throat was closing and she required an epinephrine injection. (Anaphylaxis)  iodine (Anaphylaxis)  penicillin (Other)  shellfish (Short breath; Hives)  sulfa drugs (Hives)      REVIEW OF SYSTEMS:  CONSTITUTIONAL: No fever, weight loss, or fatigue  EYES: No eye pain, visual disturbances, or discharge  ENMT:  No difficulty hearing, tinnitus, vertigo; No sinus or throat pain  NECK: No pain or stiffness  BREASTS: No pain, masses, or nipple discharge  RESPIRATORY: No cough, wheezing, chills or hemoptysis; No shortness of breath  CARDIOVASCULAR: No chest pain, palpitations, lightheadedness,  GASTROINTESTINAL: No abdominal or epigastric pain. No nausea, vomiting, or hematemesis; No diarrhea or constipation. No melena or hematochezia.  GENITOURINARY: No dysuria, frequency, hematuria, or incontinence  NEUROLOGICAL: No headaches, memory loss, vertigo, loss of strength, numbness, or tremors  SKIN: No itching, burning, rashes  LYMPH NODES: No enlarged glands  ENDOCRINE: No heat or cold intolerance; No hair loss; No polydipsia or polyuria  MUSCULOSKELETAL: No joint pain or swelling; No muscle, back, or extremity pain  PSYCHIATRIC: No depression, anxiety, or mood swings  HEME/LYMPH: No easy bruising, or bleeding gums  ALLERGY AND IMMUNOLOGIC: No hives or eczema    Vital Signs Last 24 Hrs  T(C): 36.6 (10 Raj 2022 10:35), Max: 36.8 (2022 17:28)  T(F): 97.9 (10 Raj 2022 10:35), Max: 98.2 (2022 17:28)  HR: 76 (10 Raj 2022 10:35) (62 - 100)  BP: 115/60 (10 Raj 2022 10:35) (109/67 - 135/81)  BP(mean): --  RR: 17 (10 Raj 2022 10:35) (16 - 17)  SpO2: 96% (10 Raj 2022 10:35) (96% - 98%)    PHYSICAL EXAM:  GENERAL: NAD, well-groomed, well-developed  HEAD:  Atraumatic, Normocephalic  EYES:  conjunctiva and sclera clear  ENMT: Moist mucous membranes  NECK: Supple, No JVD  NERVOUS SYSTEM:  Alert & Oriented X3, Good concentration; All 4 extremities mobile, no gross sensory deficits.   CHEST/LUNG: Clear to auscultation bilaterally; No rales, rhonchi, wheezing, or rubs  HEART: Regular rate and rhythm; No murmurs, rubs, or gallops  ABDOMEN: Soft, Nontender, Nondistended; Bowel sounds present  EXTREMITIES:  2+ Peripheral Pulses, bilateral LE edema improving, no erythema, no warmth, dressing intact right leg.     LABS:                        8.5    10.86 )-----------( 328      ( 10 Raj 2022 08:25 )             27.7     10 2022 08:25    144    |  98     |  59     ----------------------------<  103    3.9     |  43     |  1.71     Ca    8.7        10 Raj 2022 08:25        Urinalysis Basic - ( 2022 19:32 )    Color: Yellow / Appearance: Clear / S.020 / pH: x  Gluc: x / Ketone: Negative  / Bili: Negative / Urobili: Negative mg/dL   Blood: x / Protein: 15 mg/dL / Nitrite: Negative   Leuk Esterase: Small / RBC: 0-2 /HPF / WBC 0-2   Sq Epi: x / Non Sq Epi: Few / Bacteria: Few      CAPILLARY BLOOD GLUCOSE          RADIOLOGY & ADDITIONAL TESTS:    Imaging Personally Reviewed:  [ ] YES     Consultant(s) Notes Reviewed:      Care Discussed with Consultants/Other Providers:    Advanced Directives: [ ] DNR  [ ] No feeding tube  [ ] MOLST in chart  [ ] MOLST completed today  [ ] Unknown

## 2022-01-10 NOTE — PROGRESS NOTE ADULT - PROBLEM SELECTOR PROBLEM 4
Hypothyroidism
Stage 3 chronic kidney disease
Stage 3 chronic kidney disease
Anxiety and depression

## 2022-01-10 NOTE — PROGRESS NOTE ADULT - SUBJECTIVE AND OBJECTIVE BOX
PULMONARY/CRITICAL CARE  DOS 1/10/22  Doing well.   No fever.   Unchanged.  Feels better. Less leg discomfort.   No  wheeze.    Pt. well known to me.    Patient is a 89y old  Female who presents with a chief complaint of cellulitis of legs  BRIEF HOSPITAL COURSE: ***89F PMHx Paroxysmal Atrial Fibrillation, HTN, Hypothyroidism, COPD (2-3 L nasal cannula at home) Hyperlipidemia, presenting with Bilateral LE edema and right LE wounds.  Patient was admitted to [facility] - with Covid pneumonia and - for acute CHF and Afib with RVR.  She was discharged to [facility] for ALEX.  While there her edema was increasing, and a little more than a week ago she developed a blister on her right ankle and lower leg that both burst.  She has pain in her leg especially at night.  No fevers or chills, no further SOB.  Her oxygen needs have not changed.  Her grandson became more and more concerned about her leg and felt that staff at [facility] was not addressing it so they requested that she be sent to [facility] for evaluation.    Events last 24 hours: ***    PAST MEDICAL & SURGICAL HISTORY:  Hypertension    Depression    Overactive bladder    Gout    Osteoarthritis    Hypothyroid    HLD (hyperlipidemia)    Paroxysmal atrial fibrillation    Asthma    Chronic obstructive pulmonary disease (COPD)  on home oxygen 2.5-3L NC    S/P cholecystectomy    S/P lumpectomy of breast    S/P hysterectomy    S/P lung surgery, follow-up exam  s/p removal of suspicious lesion, negative      Allergies    Tomas Cheese - Pt states the one time she had a mouthful of tomas cheese, her throat was closing and she required an epinephrine injection. (Anaphylaxis)  iodine (Anaphylaxis)  penicillin (Other)  shellfish (Short breath; Hives)  sulfa drugs (Hives)    Intolerances      FAMILY HISTORY:  Family history of nasopharyngeal cancer (Child)  daughter    Family history of breast cancer (Mother)          Medications:  ceFAZolin   IVPB 1000 milliGRAM(s) IV Intermittent every 8 hours    aMIOdarone    Tablet 200 milliGRAM(s) Oral daily  carvedilol 6.25 milliGRAM(s) Oral every 12 hours  diltiazem    milliGRAM(s) Oral daily  hydrALAZINE 25 milliGRAM(s) Oral every 8 hours    benzonatate 100 milliGRAM(s) Oral every 8 hours PRN  budesonide 160 MICROgram(s)/formoterol 4.5 MICROgram(s) Inhaler 2 Puff(s) Inhalation two times a day  tiotropium 18 MICROgram(s) Capsule 1 Capsule(s) Inhalation daily    acetaminophen     Tablet .. 650 milliGRAM(s) Oral every 6 hours PRN  ALPRAZolam 0.25 milliGRAM(s) Oral at bedtime  venlafaxine XR. 37.5 milliGRAM(s) Oral daily      apixaban 2.5 milliGRAM(s) Oral two times a day  aspirin enteric coated 81 milliGRAM(s) Oral daily    famotidine    Tablet 20 milliGRAM(s) Oral daily  polyethylene glycol 3350 17 Gram(s) Oral daily PRN  senna 2 Tablet(s) Oral at bedtime      allopurinol 100 milliGRAM(s) Oral daily  levothyroxine 75 MICROGram(s) Oral daily    multivitamin/minerals 1 Tablet(s) Oral daily  potassium chloride    Tablet ER 40 milliEquivalent(s) Oral every 4 hours                ICU Vital Signs Last 24 Hrs  T(C): 36.7 (2022 15:00), Max: 36.7 (2022 15:00)  T(F): 98 (2022 15:00), Max: 98 (2022 15:00)  HR: 82 (2022 15:00) (82 - 111)  BP: 142/80 (2022 15:00) (128/70 - 142/80)  BP(mean): --  ABP: --  ABP(mean): --  RR: 25 (2022 15:00) (20 - 25)  SpO2: 98% (2022 15:00) (98% - 98%)    Vital Signs Last 24 Hrs  T(C): 36.7 (2022 15:00), Max: 36.7 (2022 15:00)  T(F): 98 (2022 15:00), Max: 98 (2022 15:00)  HR: 82 (2022 15:00) (82 - 111)  BP: 142/80 (2022 15:00) (128/70 - 142/80)  BP(mean): --  RR: 25 (2022 15:00) (20 - 25)  SpO2: 98% (2022 15:00) (98% - 98%)        I&O's Detail        LABS:                        9.5    7.52  )-----------( 239      ( 2022 12:27 )             30.7         141  |  102  |  44<H>  ----------------------------<  107<H>  3.4<L>   |  38<H>  |  1.89<H>    Ca    8.3<L>      2022 12:27    TPro  5.8<L>  /  Alb  2.8<L>  /  TBili  0.4  /  DBili  x   /  AST  27  /  ALT  20  /  AlkPhos  59  03          CAPILLARY BLOOD GLUCOSE        PT/INR - ( 2022 12:27 )   PT: 14.6 sec;   INR: 1.22 ratio         PTT - ( 2022 12:27 )  PTT:29.5 sec  Urinalysis Basic - ( 2022 14:26 )    Color: Yellow / Appearance: Clear / S.015 / pH: x  Gluc: x / Ketone: Negative  / Bili: Negative / Urobili: Negative mg/dL   Blood: x / Protein: 15 mg/dL / Nitrite: Negative   Leuk Esterase: Trace / RBC: Negative /HPF / WBC 6-10   Sq Epi: x / Non Sq Epi: Moderate / Bacteria: Few      CULTURES:      Physical Examination:    General: No acute distress.  elderly female    HEENT: Pupils equal, reactive to light.  Symmetric.    PULM: clear no wheeze no  rhonchi rales    CVS: Regular rate and rhythm, no murmurs, rubs, or gallops    ABD: Soft, nondistended, nontender, normoactive bowel sounds, no masses    EXT: 3 plus leg edema, with erythema; bandaged  lower leg    SKIN: Warm and well perfused, no rashes noted.    NEURO: Alert, oriented, interactive, nonfocal    RADIOLOGY: ***rad< from: US Duplex Venous Lower Ext Complete, Bilateral (22 @ 12:55) >  ACC: [id number] EXAM:  US DPLX LWR EXT VEINS COMPL BI                          PROCEDURE DATE:  2022          INTERPRETATION:  CLINICAL INFORMATION: Bilateral leg swelling.    COMPARISON: Doppler venous sonogram 2021.    TECHNIQUE: Duplex sonography of the BILATERAL LOWER extremity veins with   color and spectral Doppler, with and without compression.    FINDINGS:    RIGHT:  Normal compressibility of the RIGHT common femoral, femoral and popliteal   veins.  Doppler examination shows normal spontaneous and phasic flow.  No RIGHT calf vein thrombosis is detected.    LEFT:  Normal compressibility of the LEFT common femoral, femoral and popliteal   veins.  Doppler examination shows normal spontaneous and phasic flow.  No LEFT calf veinthrombosis is detected.    IMPRESSION:    No evidence of deep venous thrombosis in either lower extremity.          --- End of Report ---    < end of copied text >  < from: Xray Chest 1 View- PORTABLE-Urgent (Xray Chest 1 View- PORTABLE-Urgent .) (22 @ 11:58) >  ACC: [id number] EXAM:  XR CHEST PORTABLE URGENT 1V                          PROCEDURE DATE:  2022          INTERPRETATION:  Chest portable.    Clinical History: Shortness of breath. Bilateral leg edema.    Comparison: 2021.    Single AP view submitted.    The evaluation of the cardiomediastinal silhouette is limited on portable   technique.  Calcified aorta.    Again noted is blunting of the bilateral costophrenic angles which may   reflect small pleural effusions and/or pleural thickening.  The lungs are mildly hyperinflated with increased bronchovascular   markings.  Again noted postsurgical changes with sutures right midlung zone.    Impression:    Findings as discussed above.    < end of copied text >      CRITICAL CARE TIME SPENT: ***   tooth/#19

## 2022-01-10 NOTE — DISCHARGE NOTE NURSING/CASE MANAGEMENT/SOCIAL WORK - NSDCPEFALRISK_GEN_ALL_CORE
For information on Fall & Injury Prevention, visit: [email or web address] OR  [email or web address] OR  https://www.cdc.gov/steadi/patient.html

## 2022-01-10 NOTE — PROGRESS NOTE ADULT - PROBLEM SELECTOR PLAN 2
Attempted to call mother back, LVM.    ----- Message from Erin Fabian sent at 1/19/2017  2:21 PM CST -----  Contact: [email or web address]@ [phone]  She is returning missed call from the staff.     
allergy to PCN but tolerated cephalosporin in the past  Switched from IV to PO Cefazolin by ID  MRSA/MSSA screen pending  ID consult - Dr Sorto covering Dr Simon  wound care - Dr Santos
IV Abx - allergy to PCN but tolerated cephalosporin in the past  MRSA/MSSA screen pending  ID consult - Dr Sorto to cover Dr Simon  wound care - Dr Santos
IV Abx - allergy to PCN but tolerated cephalosporin in the past  MRSA/MSSA screen pending  ID consult - Dr Sorto to cover Dr Simon  wound care - Dr Santos
Cardiology lori appreciated  Switched from IV to oral bumex by cardiology.   recent echo at [facility] hospital no need to repeat  continue cardiac meds.
Cardiology eval appreciated  On oral bumex per cardiology.   recent echo at [facility] no need to repeat  continue cardiac meds.
Cardiology eval appreciated  On oral bumex per cardiology.   recent echo at [facility] no need to repeat  continue cardiac meds.
allergy to PCN but tolerated cephalosporin in the past  Switched from IV to PO Cefazolin by ID  MRSA/MSSA screen negative for both  ID consult - Dr Sorto covering Dr Simon  wound care - Dr Santos

## 2022-01-10 NOTE — PROGRESS NOTE ADULT - PROBLEM SELECTOR PLAN 5
continue medication with hold parameters
effexor increased to 75mg  supportive care.  xanax prn.
continue medication with hold parameters
continue medication with hold parameters
effexor increased to 75mg  supportive care.  xanax prn.

## 2022-01-10 NOTE — PROGRESS NOTE ADULT - SUBJECTIVE AND OBJECTIVE BOX
Chief Complaint: Leg swelling    Interval Events: No events overnight.    Review of Systems:  General: No fevers, chills, weight gain  Skin: No rashes, color changes  Cardiovascular: No chest pain, orthopnea  Respiratory: No shortness of breath, cough  Gastrointestinal: No nausea, abdominal pain  Genitourinary: No incontinence, pain with urination  Musculoskeletal: No pain, swelling, decreased range of motion  Neurological: No headache, weakness  Psychiatric: No depression, anxiety  Endocrine: No weight gain, increased thirst  All other systems are comprehensively negative.    Physical Exam:  Vital Signs Last 24 Hrs  T(C): 36.5 (10 Raj 2022 05:22), Max: 36.8 (09 Jan 2022 17:28)  T(F): 97.7 (10 Raj 2022 05:22), Max: 98.2 (09 Jan 2022 17:28)  HR: 62 (10 Raj 2022 05:22) (62 - 100)  BP: 135/81 (10 Raj 2022 05:22) (109/67 - 135/81)  BP(mean): --  RR: 17 (10 Raj 2022 05:22) (16 - 17)  SpO2: 98% (10 Raj 2022 05:22) (97% - 98%)  General: NAD  HEENT: MMM  Neck: No JVD, no carotid bruit  Lungs: CTAB  CV: RRR, nl S1/S2, no M/R/G  Abdomen: S/NT/ND, +BS  Extremities: 2+ LE edema, no cyanosis  Neuro: AAOx3, non-focal  Skin: Open leg wound    Labs:    01-10    144  |  98  |  59<H>  ----------------------------<  103<H>  3.9   |  43<H>  |  1.71<H>    Ca    8.7      10 Raj 2022 08:25                          8.5    10.86 )-----------( 328      ( 10 Raj 2022 08:25 )             27.7       Telemetry: AF

## 2022-01-10 NOTE — PROGRESS NOTE ADULT - PROBLEM SELECTOR PLAN 9
DVT proph: Eliquis 2.5mg po BID    Confirmed DNR/DNI status with patient and grandson on admission
DVT proph: Eliquis 2.5mg po BID    Confirmed DNR/DNI status with patient and grandson on admission

## 2022-01-10 NOTE — PROGRESS NOTE ADULT - PROBLEM SELECTOR PLAN 6
continue synthroid
continue medication with hold parameters
continue synthroid
continue medication with hold parameters
continue synthroid

## 2022-01-10 NOTE — PROGRESS NOTE ADULT - PROBLEM SELECTOR PLAN 8
continue allopurinol
continue allopurinol
DVT proph: Eliquis 2.5mg po BID    PT eval, will likely need to go to [facility] upon DC however Grandson does not want [facility] again.  Confirmed DNR/DNI status with patient and grandson.
DVT proph: Eliquis 2.5mg po BID    Confirmed DNR/DNI status with patient and grandson on admission    Spoke to Juan [phone]
DVT proph: Eliquis 2.5mg po BID    PT eval, will likely need to go to [facility] upon DC however Grandson does not want [facility] again.  Confirmed DNR/DNI status with patient and grandson on admission    Message left for Grandson to update him
DVT proph: Eliquis 2.5mg po BID    Confirmed DNR/DNI status with patient and grandson on admission    DC to [facility] when bed available.
DVT proph: Eliquis 2.5mg po BID    Confirmed DNR/DNI status with patient and grandson on admission

## 2022-01-10 NOTE — PROGRESS NOTE ADULT - PROBLEM SELECTOR PLAN 3
creatinine stable with diuresis  monitor through diuresis  avoid nephrotoxic agents  renal consult appreciated.
creatinine stable with diuresis  monitor through diuresis  avoid nephrotoxic agents  renal consult appreciated.
B12, folate wnl  Iron studies reviewed  Anemia likely combination of ACD and due to renal function  Ordered occult as patient is on Eliquis and HGB downtrending  Always had "stomach issues" per grandson but no recent colonoscopy
creatinine stable with diuresis  monitor through diuresis  avoid nephrotoxic agents  renal consult appreciated.
creatinine improving with diuresis  monitor through diuresis  avoid nephrotoxic agents  if increases further consider renal evaluation
B12, folate wnl  Iron studies reviewed  Anemia likely combination of ACD and due to renal function  Ordered occult as patient is on Eliquis and HGB downtrending  Always had "stomach issues" per grandson but no recent colonoscopy
creatinine stable with diuresis  monitor through diuresis  avoid nephrotoxic agents  renal consult appreciated.

## 2022-01-10 NOTE — PROGRESS NOTE ADULT - PROBLEM SELECTOR PLAN 4
effexor increased to 75mg  supportive care.  xanax prn.
effexor inreased to 75mg  supportive care.  xanax prn.
effexor inreased to 75mg  supportive care.  xanax prn.
creatinine stable with diuresis  monitor through diuresis  avoid nephrotoxic agents  renal consult appreciated.
effexor inreased to 75mg  supportive care.  xanax prn.
patient has been on effexor at current dose for a long time will increase dose to 75mg daily  supportive care.
creatinine stable with diuresis  monitor through diuresis  avoid nephrotoxic agents  renal consult appreciated.

## 2022-01-10 NOTE — PROGRESS NOTE ADULT - PROBLEM SELECTOR PLAN 7
continue allopurinol
continue synthroid
continue synthroid

## 2022-01-10 NOTE — PROGRESS NOTE ADULT - PROBLEM SELECTOR PROBLEM 1
Acute diastolic congestive heart failure
Acute diastolic congestive heart failure
Wound cellulitis
Acute diastolic congestive heart failure
Wound cellulitis
Acute diastolic congestive heart failure
Acute diastolic congestive heart failure
Wound cellulitis
Acute diastolic congestive heart failure
Wound cellulitis

## 2022-01-10 NOTE — PROGRESS NOTE ADULT - PROBLEM SELECTOR PLAN 1
Cardiology eval appreciated  Continue IV diuresis  recent echo at [facility] no need to repeat  continue cardiac meds.
allergy to PCN but tolerated cephalosporin in the past  Febrile last night, sepsis workup initiated.  Follow blood cultures  Changed Abx to Ancef  ID follow up   MRSA/MSSA screen negative for both  wound care - Dr Santos
cont IV ABX as per ID  ID to switch to oral ABX   cont local  wound care  cont elevation of legs   will cont to follow and discuss with all attendings
allergy to PCN but tolerated cephalosporin in the past  Febrile overnight on 1/8, sepsis workup initiated.  Leukocytosis improving.   Follow blood cultures, urine cultures and pct   CXR 1/8- Stable trace right pleural effusion. Improvement in left pleural effusion and left basilar atelectasis and/or pneumonia.  Changed Abx to Ancef  ID follow up appreciated   MRSA/MSSA screen negative for both  wound care - Dr Santos
Cardiology lori appreciated  Switched from IV to oral bumex by cardiology.   recent echo at [facility] hospital no need to repeat  continue cardiac meds.
Cardiology lori appreciated  Switched from IV to oral bumex by cardiology.   recent echo at [facility] hospital no need to repeat  continue cardiac meds.
Cardiology eval appreciated  Continue IV diuresis  recent echo at [facility] no need to repeat  continue cardiac meds.
allergy to PCN but tolerated cephalosporin in the past  Febrile overnight on 1/8, sepsis workup initiated.  Leukocytosis improving.   Follow blood cultures, urine cultures and pct   CXR 1/8- Stable trace right pleural effusion. Improvement in left pleural effusion and left basilar atelectasis and/or pneumonia.  Completed antibiotics.   ID follow up appreciated   MRSA/MSSA screen negative for both  wound care - Dr Santos

## 2022-02-16 NOTE — PROGRESS NOTE ADULT - PROBLEM SELECTOR PROBLEM 9
Patient:   LA NENA GIBSON            MRN: [id number]            FIN: [id number]               Age:   72 years     Sex:  Male     :  1945   Associated Diagnoses:   Psychophysiological Insomnia; PAF (Paroxysmal Atrial Fibrillation); Obstructive Sleep Apnea Syndrome; Hypothyroid; HTN (hypertension); GERD (Gastroesophageal Reflux Disease); Dyslipidemia; Colon Polyps; Benign Prostatic Hypertrophy; Medicare annual wellness visit, subsequent   Author:   Pj Gordon MD      Visit Information   Visit type:  Annual exam.    Accompanied by:  No one.    Source of history:  Self.    History limitation:  None.       Chief Complaint   2017 10:01 AM CDT   awv      History of Present Illness    The patient is here for follow up.  Since I last saw him he had repair of knee meniscus with good effect.  He was active hiking this winter in [place].     He also had MRI of his prostate and biopsy at [facility], which was benign.  He was told to have PSA in .  Generally he has been well.    PHQ-9 score is 3.  He really has difficulty sleeping more than 5   to 6   hours at night but is doing better than he was.  He uses his CPAP every night and has been through cognitive and behavioral therapy for insomnia.  No daytime sleepiness and therefore is compliant and benefiting from therapy.    On complete review of systems he wears glasses, light tinnitus and decreased hearing unchanged, he uses his CPAP, and he takes flecainide when he gets episodes of heart fluttering on occasion.  He is on medications for hypertension.  He gets some swelling at the sock line at the end of the day but disappears overnight.  He has some acid reflux and takes Zantac.  He has occasional frequency of urine.  He had prior blood transfusion, had skin cancers treated but sees the dermatologist regularly.    On the Health Risk Assessment form he has some hearing difficulty and lacks some home safety issues, which we discussed.  He has some concerns about his 
health, mainly about his sleep.           Review of Systems   See HPI       Health Status   Allergies:    Allergic Reactions (Selected)  Severity Not Documented  Oats (Sneezing. and itching)  Poison ivy (Inflamation)   Medications:  (Selected)   Prescriptions  Prescribed  amLODIPine 5 mg oral tablet: See Instructions, Instructions: Take 1 tablet by mouth  daily, # 90 tab(s), 3 Refill(s), Type: Soft Stop, Pharmacy: [facility], Take 1 tablet by mouth  daily  amoxicillin 500 mg oral capsule: See Instructions, Instructions: Take 4 capsules by mouth  once 1 hour prior to the  procedure, # 20 cap(s), Type: Soft Stop, Pharmacy: [facility], Take 4 capsules by mouth  once 1 hour prior to the  procedure  atenolol 25 mg oral tablet: See Instructions, Instructions: Take one-half tablet by  mouth daily, # 45 tab(s), 3 Refill(s), Type: Soft Stop, Pharmacy: [facility], Take one-half tablet by  mouth daily  flecainide 100 mg oral tablet: See Instructions, Instructions: Take 1 tablet by mouth  every 12 hours, # 180 tab(s), 3 Refill(s), Type: Soft Stop, Pharmacy: [facility], Take 1 tablet by mouth  every 12 hours  levothyroxine 100 mcg (0.1 mg) oral tablet: See Instructions, Instructions: Take 1 tablet by mouth  daily, # 90 tab(s), 3 Refill(s), Type: Soft Stop, Pharmacy: [facility], Take 1 tablet by mouth  daily  Documented Medications  Documented  Flax Seed Oil: See Instructions, Instructions: 1 tbsp daily, 0 Refill(s), Type: Soft Stop  Glucosamine & Chondroitin with MSM: 1200/1500, PO, daily, 0 Refill(s)  Magnesium 250 mg: Magnesium 250 mg, See Instructions, Instructions: once daily, 0 Refill(s), Type: Maintenance  Multiple Vitamins oral capsule: 1 cap(s), PO, Daily, 0 Refill(s)  Prostate Formula (vits D, E, B-6, Saw Palmetto 210mg: Prostate Formula (vits D, E, B-6, Saw Palmetto 210mg, 0 Refill(s)  Vitamin C: 0 Refill(s), Type: Maintenance  Vitamin D with Minerals oral tablet: 1 
Anemia
tab(s), po, daily, 0 Refill(s), Type: Maintenance  Zantac 75: ( 75 mg ), PO, daily, 0 Refill(s), Type: Soft Stop  aspirin 325 mg oral tablet: 1 tab(s) ( 325 mg ), po, daily, 0 Refill(s), Type: Maintenance  cranberry oral tablet: 0 Refill(s), Type: Maintenance  ketoconazole topical 2% cream: 1 young, top, daily, PRN: for rash, gm, 0 Refill(s), Type: Maintenance  melatonin 5 mg oral tablet: 1 tab(s) ( 5 mg ), po, hs, tab(s), 0 Refill(s)  potassium gluconate 550 mg oral tablet: ( 595 mg ), 0 Refill(s), Type: Maintenance   Problem list:    All Problems  Allergy to poison ivy / ICD-9-.3 / Confirmed  Benign Prostatic Hypertrophy / ICD-9-.00 / Confirmed  Colon Polyps / ICD-9-.3 / Confirmed  Dyslipidemia / ICD-9-.4 / Confirmed  First-degree atrioventricular block / SNOMED CT 906183210 / Confirmed  GERD (Gastroesophageal Reflux Disease) / ICD-9-.81 / Confirmed  HTN (hypertension) / SNOMED CT 1335629225 / Confirmed  Hypothyroid / ICD-9-.9 / Confirmed  Obstructive Sleep Apnea Syndrome / ICD-9-.23 / Confirmed  PAF (Paroxysmal Atrial Fibrillation) / ICD-9-.31 / Confirmed  Chronic sinus bradycardia / SNOMED CT 78851476 / Confirmed  Psychophysiological Insomnia / ICD-9-.42 / Confirmed  Schatzki's ring / ICD-9-.3 / Confirmed  Inactive: Lentigo Maligna Melanoma / ICD-9-.9  Inactive: MVP (Mitral Valve Prolapse) / ICD-9-.0  Resolved: *Hospitalized@[facility] - Atrial fibrillation  Resolved: Anticoagulated / ICD-9-CM V58.61  Resolved: Right Inguinal Hernia / ICD-9-.90  Canceled: HEATHER (Obstructive Sleep Apnea) / ICD-9-.23  Canceled: S/P Colonoscopy / ICD-9-CM V45.89  Canceled: sleep apnea      Histories   Past Medical History:    Active  Hypothyroid (244.9): Onset on 6/1/2008 at 63 years.  Colon Polyps (211.3): Onset in 2000 at 54 years.  Benign Prostatic Hypertrophy (600.00)  GERD (Gastroesophageal Reflux Disease) (530.81)  Schatzki's ring 
(750.3)  Comments:  2010 CST 9:36 AM CST - Sherinejuan manuel Yin MAGAÑA  Hiatal hernia  HTN (hypertension) (3009446497)  Dyslipidemia (272.4)  Allergy to poison ivy (995.3)  Comments:  6/3/2013 CDT 12:43 PM CDT - Shahla Sanchez  Side-effect: inflammation.  Resolved  *Hospitalized@[facility] - Atrial fibrillation: Onset on 12/10/2012 at 67 years.  Resolved.  Right Inguinal Hernia (550.90):  Resolved.  Anticoagulated (V58.61):  Resolved.   Family History:    Pulmonary embolus care  Mother ()  CA - Lung cancer  Sister (Stacy, )  Diabetes mellitus type 2  Brother (Jim)  Myocardial infarction  Father ()  Miscellaneous  Brother (Jim)  Comments:  2011 9:36 AM - Mao San MD  Atrial fibrillation  Degenerative joint disease  Brother (Jim)     Procedure history:    Biopsy of prostate ([id number]) in the month of 2016 at 71 Years.  Comments:  2017 10:21 AM - Pj Gordon MD  Negative. After MRI  Arthroscopic knee operation ([id number]) in the month of 2016 at 71 Years.  Comments:  2017 10:22 AM - Pj Gordon MD  left medial meniscus  Echocardiogram ([id number]) on 6/15/2015 at 70 Years.  Comments:  2015 12:40 PM - Jocelyne Romo  Normal left ventricular chamber size. Calculated ejection fraction 60%.  Colonoscopy ([id number]) on 2015 at 70 Years.  Comments:  2015 9:53 AM - Linda Reyes RN  Sedation: MAC  Indication: personal history of adenomatous polyps  Normal  Repeat in 5 years  Colonoscopy ([id number]) on 2012 at 67 Years.  Comments:  2012 12:45 PM - Simona Castellanos MA  Adenoma repeat in 3 years  removal of skin cancer left ear in the month of 2009 at 64 Years.  Partial thyroidectomy ([id number]) in  at 63 Years.  Colonoscopy ([id number]) on 2006 at 61 Years.  Esophagogastroduodenoscopy ([id number]) on 2006 at 61 Years.  LASIK ([id number]) in  at 58 Years.  Parotidectomy ([id number]) on 2003 at 57 Years.  Comments:  12/10/2010 
11:20 AM - Shahla Sanchez  Right side.  Hernia repair ([id number]) in 1994 at 49 Years.  Comments:  12/10/2010 11:16 AM - Shahla Sanchez  Right side  Mitral Valve Propapse.  Colonoscopy ([id number]).  Comments:  12/13/2010 8:31 PM - Yin Burden  Repeat 12/5/2011    12/10/2010 11:19 AM - Shahla Sanchez  x 3 (2000, 2001, 2006).   Social History:        Alcohol Assessment: Current            Current, 3-5 oz, 2 times per week      Tobacco Assessment            Past      Substance Abuse Assessment            Never      Employment and Education Assessment            Retired, Work/School description: Banker.      Home and Environment Assessment            Marital status:  (Living together).  Spouse/Partner name: Griselda.  Ruy children.      Nutrition and Health Assessment            Type of diet: Regular.      Exercise and Physical Activity Assessment            Exercise frequency: 4-5 days per week.  Exercise type: Bicycling, Treadmill, Hiking.      Sexual Assessment            Sexually active: Yes.  Sexual orientation: Heterosexual.        Physical Examination   Vital Signs   5/18/2017 10:25 AM CDT Systolic Blood Pressure 132 mmHg    Diastolic Blood Pressure 64 mmHg    Mean Arterial Pressure 87 mmHg    BP Site Right arm    BP Method Manual   5/18/2017 10:01 AM CDT Peripheral Pulse Rate 48 bpm  LOW    Systolic Blood Pressure 146 mmHg  HI    Diastolic Blood Pressure 63 mmHg    Mean Arterial Pressure 91 mmHg    BP Site Right arm      Measurements from flowsheet : Measurements   5/18/2017 10:01 AM CDT Height Measured - Standard 70 in    Weight Measured - Standard 191 lb    BSA 2.07 m2    Body Mass Index 27.4 kg/m2      General:  Alert and oriented, No acute distress.    Eye:  Normal conjunctiva.    HENT:  Normocephalic, Normal hearing, Oral mucosa is moist, No pharyngeal erythema.    Neck:  Supple, Non-tender, No carotid bruit, No lymphadenopathy, No thyromegaly.    Respiratory:  Lungs are clear to auscultation, 
Respirations are non-labored.    Cardiovascular:  Normal rate, Regular rhythm, No gallop, Good pulses equal in all extremities, Normal peripheral perfusion.         Systolic murmur: Location ( [place] ), Blowing, Radiation ( To the left axilla, Not to the left carotid, Not to the right carotid ), Intensity ( Grade II ), Consistent with ( Mitral regurgitation ).         Edema: Bilateral, 1+, top of sock.    Gastrointestinal:  Soft, Non-tender, Non-distended, No organomegaly.    Lymphatics:  No lymphadenopathy neck, axilla, groin.    Musculoskeletal:  No deformity, Normal gait.    Integumentary:  No pallor.    Neurologic:  Normal sensory, Normal motor function, No focal deficits, Cranial Nerves II-XII are grossly intact.    Cognition and Speech:  Speech clear and coherent, Functional cognition intact.    Psychiatric:  Cooperative, Appropriate mood & affect, Non-suicidal.       Impression and Plan   Diagnosis     Psychophysiological Insomnia (VJZ42-PF F51.04).     PAF (Paroxysmal Atrial Fibrillation) (BWW67-XY I48.0).     Obstructive Sleep Apnea Syndrome (UPF37-TQ G47.33).     Hypothyroid (NAT42-CI E03.9).     HTN (hypertension) (JFH74-TJ I10).     GERD (Gastroesophageal Reflux Disease) (PNN11-PH K21.9).     Dyslipidemia (QFT14-SZ E78.5).     Colon Polyps (CMU49-QW K63.5).     Benign Prostatic Hypertrophy (PEW59-UM N40.0).     Medicare annual wellness visit, subsequent (QJQ16-PY Z00.00).     Course:  Progressing as expected.    Patient Instructions:       Counseled: Patient, Regarding medications, Diet, Activity, Verbalized understanding.    Orders     Orders (Selected)   Outpatient Orders  Ordered  Return to Clinic (Request): RFV: Wellness exam, Return in 1 year  Return to Clinic: RFV: Standing Orders, Return in June 2017  Return to Office (Request): RFV: Colonoscopy in 2020  Prescriptions  Prescribed  Replacment CPAP +9 cm H2o: Replacment CPAP +9 cm H2o, See Instructions, Instructions: Heated humidifier, heated tubing, 
filters, nasal or full face mask of choice with headgear.  Replacement cushions and supplies as needed.  Change supplies every 6 mos  Months = 99 (Lifetime),...  amLODIPine 5 mg oral tablet: See Instructions, Instructions: Take 1 tablet by mouth  daily, # 90 tab(s), 3 Refill(s), Type: Soft Stop, Pharmacy: [facility], Take 1 tablet by mouth  daily  amoxicillin 500 mg oral capsule: See Instructions, Instructions: Take 4 capsules by mouth  once 1 hour prior to the  procedure, # 20 cap(s), Type: Soft Stop, Pharmacy: [facility], Take 4 capsules by mouth  once 1 hour prior to the  procedure  atenolol 25 mg oral tablet: See Instructions, Instructions: Take one-half tablet by  mouth daily, # 45 tab(s), 3 Refill(s), Type: Soft Stop, Pharmacy: [facility], Take one-half tablet by  mouth daily  flecainide 100 mg oral tablet: See Instructions, Instructions: Take 1 tablet by mouth  every 12 hours, # 180 tab(s), 3 Refill(s), Type: Soft Stop, Pharmacy: [facility], Take 1 tablet by mouth  every 12 hours  levothyroxine 100 mcg (0.1 mg) oral tablet: See Instructions, Instructions: Take 1 tablet by mouth  daily, # 90 tab(s), 3 Refill(s), Type: Soft Stop, Pharmacy: [facility], Take 1 tablet by mouth  daily  Documented Medications  Documented  aspirin 325 mg oral tablet: 1 tab(s) ( 325 mg ), po, daily, 0 Refill(s), Type: Maintenance.       Annual Flu.  
Anemia
Anemia
Hypothyroidism
Hypothyroidism
Major depression
Hypothyroidism

## 2022-06-10 NOTE — ED ADULT TRIAGE NOTE - HEIGHT IN FEET
Needs Rx for Xarelto 20 mg called in to [facility] for 30 day supply
Requested Prescriptions     Signed Prescriptions Disp Refills    rivaroxaban (XARELTO) 20 MG TABS tablet 90 tablet 1     Sig: Take 1 tablet by mouth daily     Authorizing Provider: Joey Faustin A     Ordering User: Dana Rico
5

## 2022-12-06 NOTE — DIETITIAN INITIAL EVALUATION ADULT. - PROBLEM SELECTOR PLAN 4
1  Please see the post cardiac catheterization/ angioseal closure device instructions and stent booklet  No heavy lifting, greater than 10 lbs  or strenuous  activity for 48 hrs  See the post cardiac catheterization/ angioseal closure device instructions  2 Remove band aid tomorrow  Shower and wash area- wrist gently with soap and water- beginning tomorrow  Rinse and pat dry  Apply new water seal band aid  Repeat this process for 5 days  No powders, creams lotions or antibiotic ointments  for 5 days  No tub baths, hot tubs or swimming for 5 days       3 No driving for 2 days
not on any meds

## 2023-01-01 NOTE — ED ADULT NURSE NOTE - NSICDXPASTSURGICALHX_GEN_ALL_CORE_FT
PAST SURGICAL HISTORY:  S/P cholecystectomy     S/P hysterectomy     S/P lumpectomy of breast     S/P lung surgery, follow-up exam s/p removal of suspicious lesion, negative     Statement Selected

## 2023-05-06 NOTE — CONSULT NOTE ADULT - PROBLEM/RECOMMENDATION-3
5:18 PM Recheck on patient. Discussed with patient ED findings and plan for discharge. Patient was given ED warnings, discharge instructions, and follow up information to go home with. Patient understands and agrees with plan for discharge. Any questions have been answered.     DISPLAY PLAN FREE TEXT

## 2023-05-15 NOTE — PATIENT PROFILE ADULT. - DIETITIAN.
1.  Continue current medications. 2.  Rest ice and elevation to the left knee. 3.  Follow-up with your primary care physician on an outpatient basis in the next few days and assist with you establishing relationship with a pain physician. 4.  Follow-up with your neurologist as well.   5.  Return emergency department focal neurologic complaints
dietitian/nutrition services

## 2023-07-11 NOTE — ED ADULT TRIAGE NOTE - ARRIVAL FROM
What Is The Reason For Today's Visit?: Full Body Skin Examination What Is The Reason For Today's Visit? (Being Monitored For X): concerning skin lesions on an annual basis Home

## 2023-10-02 NOTE — ED ADULT NURSE NOTE - CHPI ED NUR SYMPTOMS NEG
Minocycline Counseling: Patient advised regarding possible photosensitivity and discoloration of the teeth, skin, lips, tongue and gums.  Patient instructed to avoid sunlight, if possible.  When exposed to sunlight, patients should wear protective clothing, sunglasses, and sunscreen.  The patient was instructed to call the office immediately if the following severe adverse effects occur:  hearing changes, easy bruising/bleeding, severe headache, or vision changes.  The patient verbalized understanding of the proper use and possible adverse effects of minocycline.  All of the patient's questions and concerns were addressed. Doxycycline Pregnancy And Lactation Text: This medication is Pregnancy Category D and not consider safe during pregnancy. It is also excreted in breast milk but is considered safe for shorter treatment courses. Tetracycline Pregnancy And Lactation Text: This medication is Pregnancy Category D and not consider safe during pregnancy. It is also excreted in breast milk. Topical Sulfur Applications Pregnancy And Lactation Text: This medication is Pregnancy Category C and has an unknown safety profile during pregnancy. It is unknown if this topical medication is excreted in breast milk. Topical Retinoid counseling:  Patient advised to apply a pea-sized amount only at bedtime and wait 30 minutes after washing their face before applying.  If too drying, patient may add a non-comedogenic moisturizer. The patient verbalized understanding of the proper use and possible adverse effects of retinoids.  All of the patient's questions and concerns were addressed. Azithromycin Counseling:  I discussed with the patient the risks of azithromycin including but not limited to GI upset, allergic reaction, drug rash, diarrhea, and yeast infections. Dapsone Pregnancy And Lactation Text: This medication is Pregnancy Category C and is not considered safe during pregnancy or breast feeding. Spironolactone Pregnancy And Lactation Text: This medication can cause feminization of the male fetus and should be avoided during pregnancy. The active metabolite is also found in breast milk. Benzoyl Peroxide Counseling: Patient counseled that medicine may cause skin irritation and bleach clothing.  In the event of skin irritation, the patient was advised to reduce the amount of the drug applied or use it less frequently.   The patient verbalized understanding of the proper use and possible adverse effects of benzoyl peroxide.  All of the patient's questions and concerns were addressed. High Dose Vitamin A Counseling: Side effects reviewed, pt to contact office should one occur. Birth Control Pills Pregnancy And Lactation Text: This medication should be avoided if pregnant and for the first 30 days post-partum. Isotretinoin Counseling: Patient should get monthly blood tests, not donate blood, not drive at night if vision affected, not share medication, and not undergo elective surgery for 6 months after tx completed. Side effects reviewed, pt to contact office should one occur. Bactrim Pregnancy And Lactation Text: This medication is Pregnancy Category D and is known to cause fetal risk.  It is also excreted in breast milk. Topical Clindamycin Pregnancy And Lactation Text: This medication is Pregnancy Category B and is considered safe during pregnancy. It is unknown if it is excreted in breast milk. Tazorac Pregnancy And Lactation Text: This medication is not safe during pregnancy. It is unknown if this medication is excreted in breast milk. Azelaic Acid Counseling: Patient counseled that medicine may cause skin irritation and to avoid applying near the eyes.  In the event of skin irritation, the patient was advised to reduce the amount of the drug applied or use it less frequently.   The patient verbalized understanding of the proper use and possible adverse effects of azelaic acid.  All of the patient's questions and concerns were addressed. Erythromycin Counseling:  I discussed with the patient the risks of erythromycin including but not limited to GI upset, allergic reaction, drug rash, diarrhea, increase in liver enzymes, and yeast infections. Detail Level: Zone Azithromycin Pregnancy And Lactation Text: This medication is considered safe during pregnancy and is also secreted in breast milk. Aklief counseling:  Patient advised to apply a pea-sized amount only at bedtime and wait 30 minutes after washing their face before applying.  If too drying, patient may add a non-comedogenic moisturizer.  The most commonly reported side effects including irritation, redness, scaling, dryness, stinging, burning, itching, and increased risk of sunburn.  The patient verbalized understanding of the proper use and possible adverse effects of retinoids.  All of the patient's questions and concerns were addressed. Doxycycline Counseling:  Patient counseled regarding possible photosensitivity and increased risk for sunburn.  Patient instructed to avoid sunlight, if possible.  When exposed to sunlight, patients should wear protective clothing, sunglasses, and sunscreen.  The patient was instructed to call the office immediately if the following severe adverse effects occur:  hearing changes, easy bruising/bleeding, severe headache, or vision changes.  The patient verbalized understanding of the proper use and possible adverse effects of doxycycline.  All of the patient's questions and concerns were addressed. Winlevi Counseling:  I discussed with the patient the risks of topical clascoterone including but not limited to erythema, scaling, itching, and stinging. Patient voiced their understanding. Topical Retinoid Pregnancy And Lactation Text: This medication is Pregnancy Category C. It is unknown if this medication is excreted in breast milk. High Dose Vitamin A Pregnancy And Lactation Text: High dose vitamin A therapy is contraindicated during pregnancy and breast feeding. Tetracycline Counseling: Patient counseled regarding possible photosensitivity and increased risk for sunburn.  Patient instructed to avoid sunlight, if possible.  When exposed to sunlight, patients should wear protective clothing, sunglasses, and sunscreen.  The patient was instructed to call the office immediately if the following severe adverse effects occur:  hearing changes, easy bruising/bleeding, severe headache, or vision changes.  The patient verbalized understanding of the proper use and possible adverse effects of tetracycline.  All of the patient's questions and concerns were addressed. Patient understands to avoid pregnancy while on therapy due to potential birth defects. Dapsone Counseling: I discussed with the patient the risks of dapsone including but not limited to hemolytic anemia, agranulocytosis, rashes, methemoglobinemia, kidney failure, peripheral neuropathy, headaches, GI upset, and liver toxicity.  Patients who start dapsone require monitoring including baseline LFTs and weekly CBCs for the first month, then every month thereafter.  The patient verbalized understanding of the proper use and possible adverse effects of dapsone.  All of the patient's questions and concerns were addressed. Topical Sulfur Applications Counseling: Topical Sulfur Counseling: Patient counseled that this medication may cause skin irritation or allergic reactions.  In the event of skin irritation, the patient was advised to reduce the amount of the drug applied or use it less frequently.   The patient verbalized understanding of the proper use and possible adverse effects of topical sulfur application.  All of the patient's questions and concerns were addressed. Benzoyl Peroxide Pregnancy And Lactation Text: This medication is Pregnancy Category C. It is unknown if benzoyl peroxide is excreted in breast milk. Isotretinoin Pregnancy And Lactation Text: This medication is Pregnancy Category X and is considered extremely dangerous during pregnancy. It is unknown if it is excreted in breast milk. Spironolactone Counseling: Patient advised regarding risks of diarrhea, abdominal pain, hyperkalemia, birth defects (for female patients), liver toxicity and renal toxicity. The patient may need blood work to monitor liver and kidney function and potassium levels while on therapy. The patient verbalized understanding of the proper use and possible adverse effects of spironolactone.  All of the patient's questions and concerns were addressed. Azelaic Acid Pregnancy And Lactation Text: This medication is considered safe during pregnancy and breast feeding. Include Pregnancy/Lactation Warning?: No Topical Clindamycin Counseling: Patient counseled that this medication may cause skin irritation or allergic reactions.  In the event of skin irritation, the patient was advised to reduce the amount of the drug applied or use it less frequently.   The patient verbalized understanding of the proper use and possible adverse effects of clindamycin.  All of the patient's questions and concerns were addressed. Birth Control Pills Counseling: Birth Control Pill Counseling: I discussed with the patient the potential side effects of OCPs including but not limited to increased risk of stroke, heart attack, thrombophlebitis, deep venous thrombosis, hepatic adenomas, breast changes, GI upset, headaches, and depression.  The patient verbalized understanding of the proper use and possible adverse effects of OCPs. All of the patient's questions and concerns were addressed. Erythromycin Pregnancy And Lactation Text: This medication is Pregnancy Category B and is considered safe during pregnancy. It is also excreted in breast milk. Bactrim Counseling:  I discussed with the patient the risks of sulfa antibiotics including but not limited to GI upset, allergic reaction, drug rash, diarrhea, dizziness, photosensitivity, and yeast infections.  Rarely, more serious reactions can occur including but not limited to aplastic anemia, agranulocytosis, methemoglobinemia, blood dyscrasias, liver or kidney failure, lung infiltrates or desquamative/blistering drug rashes. Tazorac Counseling:  Patient advised that medication is irritating and drying.  Patient may need to apply sparingly and wash off after an hour before eventually leaving it on overnight.  The patient verbalized understanding of the proper use and possible adverse effects of tazorac.  All of the patient's questions and concerns were addressed. Winlevi Pregnancy And Lactation Text: This medication is considered safe during pregnancy and breastfeeding. Sarecycline Counseling: Patient advised regarding possible photosensitivity and discoloration of the teeth, skin, lips, tongue and gums.  Patient instructed to avoid sunlight, if possible.  When exposed to sunlight, patients should wear protective clothing, sunglasses, and sunscreen.  The patient was instructed to call the office immediately if the following severe adverse effects occur:  hearing changes, easy bruising/bleeding, severe headache, or vision changes.  The patient verbalized understanding of the proper use and possible adverse effects of sarecycline.  All of the patient's questions and concerns were addressed. Aklief Pregnancy And Lactation Text: It is unknown if this medication is safe to use during pregnancy.  It is unknown if this medication is excreted in breast milk.  Breastfeeding women should use the topical cream on the smallest area of the skin for the shortest time needed while breastfeeding.  Do not apply to nipple and areola. no dizziness/no blurred vision/no change in level of consciousness/no nausea/no confusion/no loss of consciousness

## 2023-11-07 NOTE — PROGRESS NOTE ADULT - PROBLEM SELECTOR PLAN 4
Anesthesia Transfer of Care Note    Patient: Brayden Francois JrAntoine    Procedure(s) Performed: Procedure(s) (LRB):  COLONOSCOPY (N/A)    Patient location: GI    Anesthesia Type: general    Transport from OR: Transported from OR on 2-3 L/min O2 by NC with adequate spontaneous ventilation    Post pain: adequate analgesia    Post assessment: no apparent anesthetic complications    Post vital signs: stable    Level of consciousness: awake    Nausea/Vomiting: no nausea/vomiting    Complications: none    Transfer of care protocol was followed      Last vitals:   Visit Vitals  BP (!) 120/59   Pulse 80   Temp 36.8 °C (98.2 °F) (Skin)   Resp 18   SpO2 95%      Chronic, elevated. Pt did not take her BP meds today.   - Continue metoprolol, nifedipine, doxazosin

## 2023-11-27 NOTE — ED ADULT NURSE NOTE - NSFALLRSKPASTHIST_ED_ALL_ED
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 Patient instructed to only eat as tolerated and increased fluid intake.   - F/u appointment with Dr. Giang  
Do Not Use Extremity;
no
Allergy;

## 2024-01-01 NOTE — ED ADULT NURSE NOTE - DRUG PRE-SCREENING (DAST -1)
Infant LGA, at risk for hypoglycemia  Glucose gel as needed for levels </=45, notify MD if >3 treatments required  Serial glucose level testing per guidelines  Monitor closely for symptoms/response to treatment  If patient not responding adequately to glucose gel, may need to consult NICU for escalation of care
Statement Selected

## 2024-01-16 NOTE — PATIENT PROFILE ADULT. - PAIN CHRONIC, PROFILE
no
Initiate Treatment: tretinoin 0.025 % topical cream-Apply thin layer topically to face twice a week at night. Work up to nightly use as tolerated\\n\\nclindamycin 1.2 %(1 %base)-benzoyl peroxide 3.75 % topical gel in pump - Apply topically to affected areas as a spot treatment
Detail Level: Zone

## 2024-04-29 NOTE — DISCHARGE NOTE ADULT - PLAN OF CARE
Yes normalize renal function medications revised rate control, anticoagulation FU Cardiology re: routine surveillance medications revised - stopped ARB and Aldactone.  patient has CKD III labile BP - adjust meds as appropriate

## 2024-05-27 NOTE — PROGRESS NOTE ADULT - SUBJECTIVE AND OBJECTIVE BOX
"----- Message from Carina Freitas MA sent at 2024  9:07 AM CDT -----    ----- Message -----  From: Sunny Quesada MD  Sent: 2024   8:56 AM CDT  To: [facility]    Procedure: EGD    Diagnosis: Abdominal pain left upper quadrant right upper quadrant, GERD    Procedure Timin-6 weeks    #If within 4 weeks selected, please marty as high priority#    #If greater than 12 weeks, please select "5-12 weeks" and delay sending until 3 months prior to requested date#    Location: [address]    Additional Scheduling Information: No scheduling concerns    Prep Specifications:Standard prep    Is the patient taking a GLP-1 Agonist:no    Have you attached a patient to this message: yes  " Patient is a 89y old  Female who is admitted for BRENDA on CKD attributed to diuretics. New onset AF RVR with recent start of metoprolol and eliquis.   DC held yesterday for brief bradycardia on tele. She reports good sleep with melatonin. She denies CP SOB or palpitations.   She has occasional constipation, and had a hard stool this morning, but resolved and denies NVD or abd pain.     Vital Signs Last 24 Hrs  T(C): 36.8 (01 Jun 2018 09:17), Max: 37 (01 Jun 2018 00:05)  T(F): 98.2 (01 Jun 2018 09:17), Max: 98.6 (01 Jun 2018 00:05)  HR: 71 (01 Jun 2018 09:18) (68 - 96)  BP: 140/70 (01 Jun 2018 10:15) (117/69 - 187/75)  BP(mean): --  RR: 18 (01 Jun 2018 09:17) (16 - 20)  SpO2: 95% (01 Jun 2018 09:17) (94% - 98%)    Tele monitor with sinus 70      PHYSICAL EXAM:  GENERAL: NAD, well-developed  HEAD:  Atraumatic, Normocephalic  NECK: Supple, No JVD  CHEST/LUNG: Clear to auscultation bilaterally; No wheeze  HEART: irreg Regular rate and rhythm;   ABDOMEN: Soft, Nontender, Nondistended; Bowel sounds present  EXTREMITIES:  2+ Peripheral Pulses, No clubbing, cyanosis, or edema. lizeth neg X2   PSYCH: AAOx3                            11.0   11.0  )-----------( 216      ( 01 Jun 2018 07:23 )             33.0     01 Jun 2018 07:23    140    |  109    |  29     ----------------------------<  100    4.1     |  23     |  1.02     Ca    9.3        01 Jun 2018 07:23          CAPILLARY BLOOD GLUCOSE

## 2024-06-15 NOTE — ED ADULT NURSE NOTE - RESPIRATION RHYTHM, QM
Patient is a 27 year old female with past medical history of acne, anxiety, bacterial endocarditis, bipolar disorder, chlamydia, decorative tattoo, depression, hepatitis B virus infection, history of blood clots, HPV infection, IV drug use, heroin and crack cocaine use, opioid dependence, subclavian vein thrombosis, and substance abuse who was admitted to the hospital for Septic shock (HCC): The patient has been demonstrating signs of being combative and is currently sedated.    Consult Duration     The patient seen for over 50-minute, follow-up evaluation, over 50% counseling and coordinating care addressing  septic shock and opioid withdrawal.  Record reviewed, communication with attending, communication with RN and patient seen face to face evaluation.     History of Present Illness:   Communicating with the team, the patient continues to demonstrate episodes of increased restlessness and agitation. She otherwise remains intubated and sedated.     Vital signs: /58 HR 75  Labs and imaging reviewed: Glucose 93, sodium 134, Bun 10, creatinine 0.40, mag 1.7 WBC 8.1    The patient receiving haldol 5 mg QID, ativan 1 mg QID, methadone 5 mg BID, Seroquel 300 mg TID, Effexor XR 75 mg    The patient seen today laying in hospital bed. The patient is intubated and sedated.    She is not able to cooperate with interview due to sedation.     The patient has been demonstrating intense restlessness, agitation, confusion.    Past Psychiatric/Medication History:  1. Prior diagnoses: anxiety, bipolar disorder, depression, opioid dependence, substance abuse  2. Past psychiatric inpatient: Unknown  3. Past outpatient history: Unknown  4. Past suicide history: Unknown  5. 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    Bipolar disorder (HCC)    Chlamydia    Decorative tattoo    Depression    Hepatitis B virus infection    History of blood clots    Human papilloma virus infection    Intravenous drug abuse (HCC)    Heroin and crack cocaine    Opioid dependence (HCC)    Subclavian vein thrombosis (HCC)    Substance abuse (HCC)       Past Surgical History  Past Surgical History:   Procedure Laterality Date    Colonoscopy      Colonoscopy N/A 2017    Procedure: COLONOSCOPY;  Surgeon: Xu Day MD;  [facility]: [facility] ENDOSCOPY    Colposcopy, cervix w/upper adjacent vagina; w/biopsy(s), cervix            Other surgical history      wisdom teeth     Other surgical history      fasciotomy of right arm    Tonsillectomy      Upper gi endoscopy,exam         Family History  Family History   Problem Relation Age of Onset    Other (back pain) Father     Hypertension Mother     Hypertension Maternal Grandmother     Diabetes Maternal Grandfather     Prostate Cancer Paternal Grandfather         w/ metastasis       Social History  Social History     Socioeconomic History    Marital status: Single    Number of children: 0   Occupational History    Occupation: Unemployed   Tobacco Use    Smoking status: Every Day     Current packs/day: 0.00     Average packs/day: 1 pack/day for 1 year (1.0 ttl pk-yrs)     Types: Cigarettes     Start date: 2014     Last attempt to quit: 2015     Years since quittin.4    Smokeless tobacco: Current    Tobacco comments:     Vaping   Vaping Use    Vaping status: Never Used   Substance and Sexual Activity    Alcohol use: Not Currently     Comment: social    Drug use: Yes     Types: Heroin, \"Crack\" cocaine,  benzodiazepines, Other-see comments     Comment: Acid, mushrooms    Sexual activity: Yes     Partners: Male     Birth control/protection: Condom   Other Topics Concern    Caffeine Concern No     Comment:  2 cans Cola per day    Exercise Yes     Comment: hep    Reaction to local anesthetic No   Social History Narrative    The patient does not use an assistive device..      The patient does live in a home with stairs.           Current Medications:  Current Facility-Administered Medications   Medication Dose Route Frequency    methadone solution (DOLOPHINE) 10 mg/5mL  5 mg Oral BID    QUEtiapine (SEROquel) tab 300 mg  300 mg Oral TID    levETIRAcetam (Keppra) 500 mg/5mL injection 500 mg  500 mg Intravenous Q12H    propofol (Diprivan) 10 mg/mL infusion premix  5-40 mcg/kg/min (Dosing Weight) Intravenous Continuous    acetaminophen (Tylenol) 160 MG/5ML oral liquid 650 mg  650 mg Oral Q4H PRN    Or    acetaminophen (Tylenol) rectal suppository 650 mg  650 mg Rectal Q4H PRN    Or    acetaminophen (Ofirmev) 10 mg/mL infusion premix 1,000 mg  1,000 mg Intravenous Q6H PRN    polyethylene glycol (PEG 3350) (Miralax) 17 g oral packet 17 g  17 g Oral Daily PRN    senna (Senokot) 8.8 MG/5ML oral syrup 17.6 mg  10 mL Oral Nightly PRN    bisacodyl (Dulcolax) 10 MG rectal suppository 10 mg  10 mg Rectal Daily PRN    fleet enema (Fleet) 7-19 GM/118ML rectal enema 133 mL  1 enema Rectal Once PRN    chlorhexidine gluconate (Peridex) 0.12 % oral solution 15 mL  15 mL Mouth/Throat BID@0800,2000    fentaNYL (Sublimaze) 50 mcg/mL injection 50 mcg  50 mcg Intravenous Q30 Min PRN    midazolam (Versed) 2 MG/2ML injection 1 mg  1 mg Intravenous Q4H PRN    meropenem (Merrem) 1 g in sodium chloride 0.9% 100 mL IVPB-MBP  1 g Intravenous Q8H    venlafaxine ER (Effexor-XR) 24 hr cap 75 mg  75 mg Oral Nightly    prazosin (Minipress) cap 2 mg  2 mg Oral Nightly    fentaNYL in sodium chloride 0.9% (Sublimaze) 1000 mcg/100mL infusion premix    mcg/hr Intravenous Continuous    haloperidol lactate (Haldol) 5 MG/ML injection 5 mg  5 mg Intravenous 4 times per day    LORazepam (Ativan) 2 mg/mL injection 1 mg  1 mg Intravenous 4 times per day    diphenhydrAMINE (Benadryl) 50 mg/mL  injection 50 mg  50 mg Intravenous Q4H PRN    dextrose 10% infusion (TPN no rate)   Intravenous Continuous PRN    pancrelipase (Lip-Prot-Amyl) (Zenpep) DR particles cap 10,000 Units  10,000 Units Per G Tube PRN    And    sodium bicarbonate tab 325 mg  325 mg Oral PRN    famotidine (Pepcid) 20 mg/2mL injection 20 mg  20 mg Intravenous BID    ondansetron (Zofran) 4 MG/2ML injection 4 mg  4 mg Intravenous Q6H PRN    prochlorperazine (Compazine) 10 MG/2ML injection 5 mg  5 mg Intravenous Q8H PRN    acetaminophen (Tylenol) tab 650 mg  650 mg Oral Q6H PRN    melatonin tab 3 mg  3 mg Oral Nightly     Medications Prior to Admission   Medication Sig    prazosin 1 MG Oral Cap Take 4 capsules (4 mg total) by mouth nightly. Takes 4mg nightly    buprenorphine-naloxone 8-2 MG Sublingual Film dissolve 1 film under the tongue 3 TIMES A DAY for maintenance    prenatal vitamin with DHA 27-0.8-228 MG Oral Cap Take 1 capsule by mouth daily.    [] nitrofurantoin monohydrate macro (MACROBID) 100 MG Oral Cap Take 1 capsule (100 mg total) by mouth 2 (two) times daily for 7 days.    QUEtiapine 200 MG Oral Tab Take 1 tablet (200 mg total) by mouth nightly.    [] Buprenorphine HCl-Naloxone HCl 8.6-2.1 MG Sublingual SL Tab Place 8.6 mg of buprenorphine under the tongue in the morning, at noon, and at bedtime.    pantoprazole 20 MG Oral Tab EC Take 1 tablet (20 mg total) by mouth every morning before breakfast.    gabapentin 300 MG Oral Cap Take 1 capsule (300 mg total) by mouth nightly.    Melatonin 3 MG Oral Cap Take 1 capsule (3 mg total) by mouth at bedtime.    Naloxone HCl (NARCAN) 4 MG/0.1ML Nasal Liquid 4 mg by Nasal route as needed. IF PATIENT REMAINS UNRESPONSIVE, REPEAT DOSE IN OTHER  NOSTRIL 2 TO 5 MINUTES AFTER FIRST DOSE (Patient not taking: Reported on 5/9/2024)    venlafaxine  MG Oral Capsule SR 24 Hr Take 1 capsule (150 mg total) by mouth every morning.    ondansetron (ZOFRAN) 4 mg tablet Take 1 tablet (4 mg total) by mouth every 8 (eight) hours as needed for Nausea. (Patient not taking: Reported on 5/24/2024)    cloNIDine 0.1 MG Oral Tab Take 1 tablet (0.1 mg total) by mouth 2 (two) times daily as needed. (Patient not taking: Reported on 5/24/2024)       Allergies  Allergies   Allergen Reactions    Amoxicillin HIVES    Vancomycin RASH and ITCHING    Clindamycin RASH       Review of Systems:   As by Admitting/Attending    Results:   Laboratory Data:  Lab Results   Component Value Date    WBC 8.1 06/15/2024    HGB 7.0 (L) 06/15/2024    HCT 21.3 (L) 06/15/2024    .0 06/15/2024    CREATSERUM 0.40 (L) 06/15/2024    BUN 10 06/15/2024     (L) 06/15/2024    K 4.2 06/15/2024     06/15/2024    CO2 29.0 06/15/2024    GLU 93 06/15/2024    CA 8.2 (L) 06/15/2024    ALB 2.7 (L) 06/13/2024    ALKPHO 146 (H) 06/13/2024    TP 5.4 (L) 06/13/2024    AST 32 06/13/2024    ALT 32 06/13/2024    PTT 29.2 05/30/2020    INR 1.9 (A) 08/12/2020    PTP 14.7 (H) 05/31/2020    T4F 0.73 11/02/2011    TSH 2.080 02/13/2019    LIP 18 (L) 05/30/2020    DDIMER 0.47 02/28/2022    CRP <0.5 01/29/2015    MG 1.7 06/15/2024    PHOS 4.2 06/13/2024    TROP <0.045 05/30/2020    CK 55 03/29/2017    ETOH <3 06/09/2024         Imaging:  XR CHEST AP PORTABLE  (CPT=71045)    Result Date: 6/15/2024  CONCLUSION:  Unchanged mild cardiomegaly with interstitial pulmonary edema.  Moderate right pleural effusion, mildly increased.  Multifocal airspace opacities throughout the right lung, similar to prior.  Unchanged left retrocardiac opacity.    Dictated by (CST): Aziza Nath MD on 6/15/2024 at 9:14 AM     Finalized by (CST): Aziza Nath MD on 6/15/2024 at 9:16 AM          US VENOUS DOPPLER ARM BILAT - DIAG IMG  (LRC=55840)    Result Date: 6/14/2024  CONCLUSION:   No evidence of deep venous thrombosis.  Right IJ central venous catheter.    Dictated by (CST): Kaz French MD on 6/14/2024 at 10:57 AM     Finalized by (CST): Kaz French MD on 6/14/2024 at 11:00 AM          XR CHEST AP PORTABLE  (CPT=71045)    Result Date: 6/14/2024  CONCLUSION:   Enlarging right upper lobe airspace opacity, which may reflect pneumonia, atelectasis, or pulmonary edema.  The remaining opacities in the bilateral perihilar regions and bilateral lower lobes are not significantly changed.  No significant change in the bilateral pleural effusions.     Dictated by (CST): Kaz French MD on 6/14/2024 at 7:38 AM     Finalized by (CST): Kaz French MD on 6/14/2024 at 7:49 AM           Vital Signs:   Blood pressure 100/58, pulse 75, temperature 97 °F (36.1 °C), temperature source Temporal, resp. rate 20, weight 84.1 kg (185 lb 6.5 oz), last menstrual period 03/25/2024, SpO2 91%, not currently breastfeeding.    Mental Status Exam:   Appearance: Stated age single, , female in hospital gown, laying down in hospital bed.   Psychomotor: Patient has been demonstrating lethargy due to sedation  Orientation: Patient is alert and oriented to self   Gait: Not evaluated.  Attitude/Coorperation: Limited cooperation due to sedation  Behavior: Episodes of restlessness and agitation  Speech: Unable to communicate verbally  Mood: Unable to express emotion  Affect: Blunted affect  Thought process: Disorganized and confused  Thought content: No reports of suicidal or homicidal ideation  Perceptions: Patient demonstrating response to internal stimuli  Concentration: Grossly impaired  Memory: Grossly impaired  Intellect: Average.  Judgment and Insight: Questionable.     Impression:     Opiate Withdrawal Syndrome.  Opiate Dependency.  Bipolar Disorder unspecified.  Septic shock (HCC)  Pneumonia of left lower lobe due to infectious organism  Subchorionic  hematoma in first trimester, single or unspecified fetus (HCC)     The patient is a 27 year old  female, single, with past medical history of acne, anxiety, bacterial endocarditis, bipolar disorder, chlamydia, decorative tattoo, depression, hepatitis B virus infection, history of blood clots, HPV infection, IV drug use, heroin and crack cocaine use, opioid dependence, subclavian vein thrombosis, and substance abuse.  The patient is currently sedated.     6/11/24: Patient is sedated and exhibiting some agitation when in the room. Propofol will start to be decreased slowly.    6/12/24: Patient in process of decreasing sedation and possible extubation.  Otherwise the patient is alert and oriented to self and place only nodding her head agree with the need for extubation and cooperation.    6/14/24: Patient continues to exhibit some agitation and restlessness. Patient is still sedated and remains intubated.    6/15/2024: the patient remains intubated and sedated. The patient continues to have episodes of increased restlessness and agitation.     Discussed risk and benefit, acknowledging the current symptom and severity.  At this point, I would recommend the following approach:     Focus on safety  Focus on education and support.  Focus on insight orientation helping the patient understand diagnosis and treatment plan.  Lower methadone to 5 mg via NG tube BID  Continue Haldol 5 mg every 6 hours scheduled.  Continue Seroquel to 300 mg 3 times daily.  Continue venlafaxine to 75 mg  Continue prazosin 2 mg  Continue Ativan 1 mg IV every 6 hours scheduled.  Lower propofol graduating  Coordinate plan with team    Orders This Visit:  Orders Placed This Encounter   Procedures    CBC With Differential With Platelet    Comp Metabolic Panel (14)    Urinalysis with Culture Reflex    Lactic Acid, Plasma    HCG, Beta Subunit (Quant Pregnancy Test)    Ethyl Alcohol    Acetaminophen (Tylenol), S    Salicylate, Serum    Drug  Abuse Panel 11 screen    Manual differential    Lactic Acid Reflex Post Positive    CBC With Differential With Platelet    Comp Metabolic Panel (14)    Magnesium    Streptococcus Pneumoniae Ag, Urine    Legionella urine Ag serogrp 1    Potassium    Magnesium    Scan slide    MD BLOOD SMEAR CONSULT    Triglycerides    Basic Metabolic Panel (8)    Phosphorus    Phosphorus    CBC With Differential With Platelet    Phosphorus    Hemoglobin    Triglycerides    Magnesium    Phosphorus    Basic Metabolic Panel (8)    CBC With Differential With Platelet    Hepatic Function Panel (7)    Magnesium    CBC With Differential With Platelet    Comp Metabolic Panel (14)    Phosphorus    Magnesium    Basic Metabolic Panel (8)    CBC With Differential With Platelet    Manual differential    Magnesium    Basic Metabolic Panel (8)    CBC With Differential With Platelet    Magnesium    CBC With Differential With Platelet    Basic Metabolic Panel (8)    Type and screen    ABORH (Blood Type)    Antibody Screen    Prepare RBC Once    SARS-CoV-2/Flu A and B/RSV by PCR (GeneXpert)    Blood Culture    Blood Culture PCR    Blood Culture    Blood Culture    Sputum culture       Meds This Visit:  Requested Prescriptions      No prescriptions requested or ordered in this encounter       Hilaria Harriet, APRN  6/15/2024    Note to Patient: The 21st Century Cures Act makes medical notes like these available to patients in the interest of transparency. However, be advised this is a medical document. It is intended as peer to peer communication. It is written in medical language and may contain abbreviations or verbiage that are unfamiliar. It may appear blunt or direct. Medical documents are intended to carry relevant information, facts as evident, and the clinical opinion of the practitioner. This note may have been transcribed using a voice dictation system. Voice recognition errors may occur. This should not be taken to alter the content or meaning of  this note.      regular

## 2024-08-12 NOTE — DISCHARGE NOTE PROVIDER - NSDCQMERRANDS_GEN_ALL_CORE
No protocol for requested medication.    Medication: Diltiazem 30 MG  Last office visit date: 12/07/2023  Pharmacy: [facility] # [id number] [address]    Order pended, routed to clinician for review.    Yes

## 2024-12-15 NOTE — ED ADULT NURSE NOTE - NSHISCREENINGQ1_ED_A_ED
Pt fell onto left knee awkwardly while playing basketball. Pain is 8/10. Pt reports injury to same knee prior.   
No

## 2025-01-10 NOTE — PATIENT PROFILE ADULT - IS THERE A SUSPICION OF ABUSE/NEGLIGENCE?
Pt with some decrease in group therapy recently, but was experience cold symptoms over the last week. Pt still pleasant, cooperative, participatory when in attendance.    no

## 2025-01-22 NOTE — ED ADULT NURSE NOTE - NSFALLRSKOUTCOME_ED_ALL_ED
you take aspirin and not another kind of pain reliever, such as acetaminophen (Tylenol).   When should you call for help?   Call 911 if you have symptoms of a heart attack. These may include:    Chest pain or pressure, or a strange feeling in the chest.     Sweating.     Shortness of breath.     Pain, pressure, or a strange feeling in the back, neck, jaw, or upper belly or in one or both shoulders or arms.     Lightheadedness or sudden weakness.     A fast or irregular heartbeat.   After you call 911, the  may tell you to chew 1 adult-strength or 2 to 4 low-dose aspirin. Wait for an ambulance. Do not try to drive yourself.  Watch closely for changes in your health, and be sure to contact your doctor if you have any problems.  Where can you learn more?  Go to https://www.[employer]apprupt/patientEd and enter F075 to learn more about \"A Healthy Heart: Care Instructions.\"  Current as of: July 31, 2024  Content Version: 14.3  © 2024 [employer].   Care instructions adapted under license by [employer]. If you have questions about a medical condition or this instruction, always ask your healthcare professional. [employer] disclaims any warranty or liability for your use of this information.    Personalized Preventive Plan for Fish FLORES San Jr. - 1/22/2025  Medicare offers a range of preventive health benefits. Some of the tests and screenings are paid in full while other may be subject to a deductible, co-insurance, and/or copay.  Some of these benefits include a comprehensive review of your medical history including lifestyle, illnesses that may run in your family, and various assessments and screenings as appropriate.  After reviewing your medical record and screening and assessments performed today your provider may have ordered immunizations, labs, imaging, and/or referrals for you.  A list of these orders (if applicable) as well as your Preventive Care list are included within your 
Fall with Harm Risk

## 2025-01-30 NOTE — PATIENT PROFILE ADULT. - FUNCTIONAL SCREEN CURRENT LEVEL: TRANSFERRING, MLM
Continue current management with taking furosemide daily.  Plan follow-up next week as scheduled to reassess.  
(2) assistive person

## 2025-02-12 NOTE — ED ADULT NURSE NOTE - PERIPHERAL VASCULAR
Problem: Activity/Exercise Intolerance  Goal: Patient will tolerate activity/exercise  Intervention: Progress activity per Cardiac Rehab protocol  Note: Intervention Status  Done  Intervention: Progressive graded ambulation  Note: Intervention Status  Done  Intervention: Collaborate with nursing to ensure ambulation 4-6 times per day  Note: Intervention Status  Done  Intervention: Monitor and document progressive activity response  Note: Intervention Status  Done  Intervention: Encourage family to bring in assistive devices  Note: Intervention Status  Done      WDL

## 2025-04-08 NOTE — ED ADULT TRIAGE NOTE - NS ED NURSE DIRECT TO ROOM YN
Yes ED MD Tazorac Counseling:  Patient advised that medication is irritating and drying.  Patient may need to apply sparingly and wash off after an hour before eventually leaving it on overnight.  The patient verbalized understanding of the proper use and possible adverse effects of tazorac.  All of the patient's questions and concerns were addressed.

## 2025-04-11 NOTE — ED PROVIDER NOTE - HISTORY ATTESTATION, MLM
Medication: tamsulosin (FLOMAX) 0.4 MG Cap  passed protocol.   Last office visit date: 7/18/24  Next appointment scheduled?: Yes 5/16/25      No protocol for requested medication.    Medication:     gabapentin (NEURONTIN) 300 MG capsule     Last office visit date: 7/18/24  Pharmacy: [facility] - [place], [place] - [id number]    Order pended, routed to clinician for review.      I have reviewed and confirmed nurses' notes...

## 2025-05-14 NOTE — ED ADULT NURSE NOTE - NEURO BEHAVIOR
Action Requested: Summary for Provider     []  Critical Lab, Recommendations Needed  [x] Need Additional Advice  []   FYI    []   Need Orders  [] Need Medications Sent to Pharmacy  []  Other     SUMMARY: Please advise     Patient states she was seen in the office by MARGARITA Bartlett 3/31/25 and again 4/7/25 by Dr. Gregorio. Patient's amlodipine was increased to 10 mg daily. Patient has been taking medication for about one month . Patient also states she has bilateral lower edema and is feeling very tired. She is normally an energetic person.     Patient denies dizziness, shortness of breath, chest pain or palpitations.        Reason for call: Blood Pressure  Onset: worsening in past few days.    Reason for Disposition   Systolic BP  while taking blood pressure medications and NOT dizzy, lightheaded or weak    Protocols used: Low Blood Pressure-A-OH     calm